# Patient Record
Sex: FEMALE | Race: WHITE | NOT HISPANIC OR LATINO | Employment: OTHER | URBAN - NONMETROPOLITAN AREA
[De-identification: names, ages, dates, MRNs, and addresses within clinical notes are randomized per-mention and may not be internally consistent; named-entity substitution may affect disease eponyms.]

---

## 2017-02-22 ENCOUNTER — HOSPITAL ENCOUNTER (EMERGENCY)
Facility: HOSPITAL | Age: 67
Discharge: HOME | End: 2017-02-22
Attending: EMERGENCY MEDICINE | Admitting: EMERGENCY MEDICINE

## 2017-02-22 VITALS
HEIGHT: 62 IN | DIASTOLIC BLOOD PRESSURE: 89 MMHG | HEART RATE: 82 BPM | SYSTOLIC BLOOD PRESSURE: 146 MMHG | OXYGEN SATURATION: 99 % | WEIGHT: 130 LBS | BODY MASS INDEX: 23.92 KG/M2 | TEMPERATURE: 98.2 F | RESPIRATION RATE: 18 BRPM

## 2017-02-22 DIAGNOSIS — H00.014 HORDEOLUM OF LEFT UPPER EYELID, UNSPECIFIED HORDEOLUM TYPE: Primary | ICD-10-CM

## 2017-02-22 PROCEDURE — 99283 EMERGENCY DEPT VISIT LOW MDM: CPT

## 2017-02-22 RX ORDER — ACETAMINOPHEN AND CODEINE PHOSPHATE 300; 30 MG/1; MG/1
1 TABLET ORAL ONCE
Status: COMPLETED | OUTPATIENT
Start: 2017-02-22 | End: 2017-02-22

## 2017-02-22 RX ORDER — ERYTHROMYCIN 5 MG/G
1 OINTMENT OPHTHALMIC ONCE
Status: COMPLETED | OUTPATIENT
Start: 2017-02-22 | End: 2017-02-22

## 2017-02-22 RX ORDER — ERYTHROMYCIN 5 MG/G
OINTMENT OPHTHALMIC
Qty: 3.5 G | Refills: 0 | Status: ON HOLD | OUTPATIENT
Start: 2017-02-22 | End: 2017-07-03

## 2017-02-22 RX ADMIN — ERYTHROMYCIN 1 APPLICATION: 5 OINTMENT OPHTHALMIC at 18:02

## 2017-02-22 RX ADMIN — ACETAMINOPHEN AND CODEINE PHOSPHATE 1 TABLET: 30; 300 TABLET ORAL at 18:00

## 2017-07-03 ENCOUNTER — APPOINTMENT (OUTPATIENT)
Dept: GENERAL RADIOLOGY | Facility: HOSPITAL | Age: 67
End: 2017-07-03

## 2017-07-03 ENCOUNTER — APPOINTMENT (OUTPATIENT)
Dept: CT IMAGING | Facility: HOSPITAL | Age: 67
End: 2017-07-03

## 2017-07-03 ENCOUNTER — HOSPITAL ENCOUNTER (INPATIENT)
Facility: HOSPITAL | Age: 67
LOS: 8 days | Discharge: HOME HEALTH | End: 2017-07-11
Attending: EMERGENCY MEDICINE | Admitting: INTERNAL MEDICINE

## 2017-07-03 ENCOUNTER — APPOINTMENT (OUTPATIENT)
Dept: ULTRASOUND IMAGING | Facility: HOSPITAL | Age: 67
End: 2017-07-03

## 2017-07-03 DIAGNOSIS — N18.9 ACUTE ON CHRONIC RENAL FAILURE (HCC): ICD-10-CM

## 2017-07-03 DIAGNOSIS — R19.5 HEME + STOOL: Primary | ICD-10-CM

## 2017-07-03 DIAGNOSIS — R71.0 DECREASED HEMOGLOBIN: ICD-10-CM

## 2017-07-03 DIAGNOSIS — R10.13 EPIGASTRIC PAIN: ICD-10-CM

## 2017-07-03 DIAGNOSIS — J18.9 PNEUMONIA OF LEFT LOWER LOBE DUE TO INFECTIOUS ORGANISM: ICD-10-CM

## 2017-07-03 DIAGNOSIS — N17.9 ACUTE ON CHRONIC RENAL FAILURE (HCC): ICD-10-CM

## 2017-07-03 DIAGNOSIS — R11.2 NAUSEA AND VOMITING, INTRACTABILITY OF VOMITING NOT SPECIFIED, UNSPECIFIED VOMITING TYPE: ICD-10-CM

## 2017-07-03 DIAGNOSIS — R19.7 DIARRHEA, UNSPECIFIED TYPE: ICD-10-CM

## 2017-07-03 DIAGNOSIS — E86.0 DEHYDRATION: ICD-10-CM

## 2017-07-03 LAB
6-ACETYL MORPHINE: NEGATIVE
A-A DO2: 49.6 MMHG (ref 0–300)
ALBUMIN SERPL-MCNC: 3.5 G/DL (ref 3.4–4.8)
ALBUMIN/GLOB SERPL: 1.3 G/DL (ref 1.5–2.5)
ALP SERPL-CCNC: 94 U/L (ref 35–104)
ALT SERPL W P-5'-P-CCNC: 11 U/L (ref 10–36)
AMPHET+METHAMPHET UR QL: NEGATIVE
AMYLASE SERPL-CCNC: 56 U/L (ref 28–100)
ANION GAP SERPL CALCULATED.3IONS-SCNC: 7.5 MMOL/L (ref 3.6–11.2)
ARTERIAL PATENCY WRIST A: ABNORMAL
ARTERIAL PATENCY WRIST A: ABNORMAL
AST SERPL-CCNC: 14 U/L (ref 10–30)
ATMOSPHERIC PRESS: 729 MMHG
BACTERIA UR QL AUTO: ABNORMAL /HPF
BARBITURATES UR QL SCN: NEGATIVE
BASE EXCESS BLDA CALC-SCNC: -10.5 MMOL/L
BASE EXCESS BLDA CALC-SCNC: -9.6 MMOL/L
BASOPHILS # BLD AUTO: 0.05 10*3/MM3 (ref 0–0.3)
BASOPHILS NFR BLD AUTO: 0.6 % (ref 0–2)
BDY SITE: ABNORMAL
BDY SITE: ABNORMAL
BENZODIAZ UR QL SCN: NEGATIVE
BILIRUB SERPL-MCNC: 0.1 MG/DL (ref 0.2–1.8)
BILIRUB UR QL STRIP: NEGATIVE
BNP SERPL-MCNC: 500 PG/ML (ref 0–100)
BODY TEMPERATURE: 98.6 C
BUN BLD-MCNC: 46 MG/DL (ref 7–21)
BUN/CREAT SERPL: 13.2 (ref 7–25)
BUPRENORPHINE SERPL-MCNC: NEGATIVE NG/ML
CALCIUM SPEC-SCNC: 7.5 MG/DL (ref 7.7–10)
CANNABINOIDS SERPL QL: NEGATIVE
CHLORIDE SERPL-SCNC: 115 MMOL/L (ref 99–112)
CK MB SERPL-CCNC: 6.6 NG/ML (ref 0–5)
CK MB SERPL-CCNC: 7.19 NG/ML (ref 0–5)
CK MB SERPL-RTO: 6.1 % (ref 0–3)
CK MB SERPL-RTO: 6.3 % (ref 0–3)
CK SERPL-CCNC: 108 U/L (ref 24–173)
CK SERPL-CCNC: 115 U/L (ref 24–173)
CLARITY UR: ABNORMAL
CO2 SERPL-SCNC: 17.5 MMOL/L (ref 24.3–31.9)
COCAINE UR QL: NEGATIVE
COHGB MFR BLD: 1.3 % (ref 0–5)
COHGB MFR BLD: 4.6 % (ref 0–5)
COLOR UR: YELLOW
CREAT BLD-MCNC: 3.49 MG/DL (ref 0.43–1.29)
CREAT UR-MCNC: 25.3 MG/DL
CRP SERPL-MCNC: <0.5 MG/DL (ref 0–0.99)
D-LACTATE SERPL-SCNC: 0.5 MMOL/L (ref 0.5–2)
DEPRECATED RDW RBC AUTO: 55.2 FL (ref 37–54)
EOSINOPHIL # BLD AUTO: 0.17 10*3/MM3 (ref 0–0.7)
EOSINOPHIL NFR BLD AUTO: 2 % (ref 0–7)
ERYTHROCYTE [DISTWIDTH] IN BLOOD BY AUTOMATED COUNT: 17.4 % (ref 11.5–14.5)
GFR SERPL CREATININE-BSD FRML MDRD: 13 ML/MIN/1.73
GLOBULIN UR ELPH-MCNC: 2.8 GM/DL
GLUCOSE BLD-MCNC: 151 MG/DL (ref 70–110)
GLUCOSE BLDC GLUCOMTR-MCNC: 132 MG/DL (ref 70–130)
GLUCOSE BLDC GLUCOMTR-MCNC: 135 MG/DL (ref 70–130)
GLUCOSE BLDC GLUCOMTR-MCNC: 174 MG/DL (ref 70–130)
GLUCOSE UR STRIP-MCNC: ABNORMAL MG/DL
HBA1C MFR BLD: 6.1 % (ref 4.5–5.7)
HCO3 BLDA-SCNC: 15 MMOL/L (ref 22–26)
HCO3 BLDA-SCNC: 16 MMOL/L (ref 22–26)
HCT VFR BLD AUTO: 34.7 % (ref 37–47)
HCT VFR BLD CALC: 30 % (ref 37–47)
HCT VFR BLD CALC: 31 % (ref 37–47)
HGB BLD-MCNC: 10.9 G/DL (ref 12–16)
HGB BLDA-MCNC: 10.3 G/DL (ref 12–16)
HGB BLDA-MCNC: 10.7 G/DL (ref 12–16)
HGB UR QL STRIP.AUTO: ABNORMAL
HOROWITZ INDEX BLD+IHG-RTO: 21 %
HYALINE CASTS UR QL AUTO: ABNORMAL /LPF
IMM GRANULOCYTES # BLD: 0.02 10*3/MM3 (ref 0–0.03)
IMM GRANULOCYTES NFR BLD: 0.2 % (ref 0–0.5)
KETONES UR QL STRIP: NEGATIVE
L PNEUMO1 AG UR QL IA: NEGATIVE
LEUKOCYTE ESTERASE UR QL STRIP.AUTO: NEGATIVE
LIPASE SERPL-CCNC: 36 U/L (ref 13–60)
LYMPHOCYTES # BLD AUTO: 1.24 10*3/MM3 (ref 1–3)
LYMPHOCYTES NFR BLD AUTO: 14.3 % (ref 16–46)
MAGNESIUM SERPL-MCNC: 1.9 MG/DL (ref 1.7–2.6)
MCH RBC QN AUTO: 27 PG (ref 27–33)
MCHC RBC AUTO-ENTMCNC: 31.4 G/DL (ref 33–37)
MCV RBC AUTO: 85.9 FL (ref 80–94)
MDMA UR QL SCN: NEGATIVE
METHADONE UR QL SCN: NEGATIVE
METHGB BLD QL: 0.3 % (ref 0–3)
METHGB BLD QL: 0.3 % (ref 0–3)
MODALITY: ABNORMAL
MODALITY: ABNORMAL
MONOCYTES # BLD AUTO: 0.44 10*3/MM3 (ref 0.1–0.9)
MONOCYTES NFR BLD AUTO: 5.1 % (ref 0–12)
MYOGLOBIN SERPL-MCNC: 128 NG/ML (ref 0–109)
MYOGLOBIN SERPL-MCNC: 138 NG/ML (ref 0–109)
NEUTROPHILS # BLD AUTO: 6.75 10*3/MM3 (ref 1.4–6.5)
NEUTROPHILS NFR BLD AUTO: 77.8 % (ref 40–75)
NITRITE UR QL STRIP: NEGATIVE
OPIATES UR QL: NEGATIVE
OSMOLALITY SERPL CALC.SUM OF ELEC: 294.2 MOSM/KG (ref 273–305)
OXYCODONE UR QL SCN: NEGATIVE
OXYHGB MFR BLDV: 82 % (ref 85–100)
OXYHGB MFR BLDV: 85.4 % (ref 85–100)
PCO2 BLDA: 32.2 MM HG (ref 35–45)
PCO2 BLDA: 34.3 MM HG (ref 35–45)
PCP UR QL SCN: NEGATIVE
PH BLDA: 7.29 PH UNITS (ref 7.35–7.45)
PH BLDA: 7.29 PH UNITS (ref 7.35–7.45)
PH UR STRIP.AUTO: 5.5 [PH] (ref 5–8)
PLATELET # BLD AUTO: 268 10*3/MM3 (ref 130–400)
PMV BLD AUTO: 10.7 FL (ref 6–10)
PO2 BLDA: 50.7 MM HG (ref 80–100)
PO2 BLDA: 52.5 MM HG (ref 80–100)
POTASSIUM BLD-SCNC: 3.8 MMOL/L (ref 3.5–5.3)
PROT SERPL-MCNC: 6.3 G/DL (ref 6–8)
PROT UR QL STRIP: ABNORMAL
RBC # BLD AUTO: 4.04 10*6/MM3 (ref 4.2–5.4)
RBC # UR: ABNORMAL /HPF
REF LAB TEST METHOD: ABNORMAL
SAO2 % BLDCOA: 86.2 % (ref 90–100)
SAO2 % BLDCOA: 86.8 % (ref 90–100)
SODIUM BLD-SCNC: 140 MMOL/L (ref 135–153)
SODIUM UR-SCNC: 119 MMOL/L
SP GR UR STRIP: 1.02 (ref 1–1.03)
SQUAMOUS #/AREA URNS HPF: ABNORMAL /HPF
TROPONIN I SERPL-MCNC: 0.02 NG/ML
TROPONIN I SERPL-MCNC: <0.006 NG/ML
TSH SERPL DL<=0.05 MIU/L-ACNC: 0.86 MIU/ML (ref 0.55–4.78)
UROBILINOGEN UR QL STRIP: ABNORMAL
WBC NRBC COR # BLD: 8.67 10*3/MM3 (ref 4.5–12.5)
WBC UR QL AUTO: ABNORMAL /HPF

## 2017-07-03 PROCEDURE — 83735 ASSAY OF MAGNESIUM: CPT | Performed by: PHYSICIAN ASSISTANT

## 2017-07-03 PROCEDURE — 93005 ELECTROCARDIOGRAM TRACING: CPT | Performed by: PHYSICIAN ASSISTANT

## 2017-07-03 PROCEDURE — 80307 DRUG TEST PRSMV CHEM ANLYZR: CPT | Performed by: INTERNAL MEDICINE

## 2017-07-03 PROCEDURE — 74176 CT ABD & PELVIS W/O CONTRAST: CPT

## 2017-07-03 PROCEDURE — 82805 BLOOD GASES W/O2 SATURATION: CPT | Performed by: PHYSICIAN ASSISTANT

## 2017-07-03 PROCEDURE — 74022 RADEX COMPL AQT ABD SERIES: CPT | Performed by: RADIOLOGY

## 2017-07-03 PROCEDURE — 74022 RADEX COMPL AQT ABD SERIES: CPT

## 2017-07-03 PROCEDURE — 94799 UNLISTED PULMONARY SVC/PX: CPT

## 2017-07-03 PROCEDURE — 87040 BLOOD CULTURE FOR BACTERIA: CPT | Performed by: PHYSICIAN ASSISTANT

## 2017-07-03 PROCEDURE — 83874 ASSAY OF MYOGLOBIN: CPT | Performed by: INTERNAL MEDICINE

## 2017-07-03 PROCEDURE — 85025 COMPLETE CBC W/AUTO DIFF WBC: CPT | Performed by: PHYSICIAN ASSISTANT

## 2017-07-03 PROCEDURE — 74176 CT ABD & PELVIS W/O CONTRAST: CPT | Performed by: RADIOLOGY

## 2017-07-03 PROCEDURE — 94640 AIRWAY INHALATION TREATMENT: CPT

## 2017-07-03 PROCEDURE — 25010000002 HEPARIN (PORCINE) PER 1000 UNITS: Performed by: INTERNAL MEDICINE

## 2017-07-03 PROCEDURE — 93010 ELECTROCARDIOGRAM REPORT: CPT | Performed by: INTERNAL MEDICINE

## 2017-07-03 PROCEDURE — 83880 ASSAY OF NATRIURETIC PEPTIDE: CPT | Performed by: PHYSICIAN ASSISTANT

## 2017-07-03 PROCEDURE — 84484 ASSAY OF TROPONIN QUANT: CPT | Performed by: INTERNAL MEDICINE

## 2017-07-03 PROCEDURE — 82570 ASSAY OF URINE CREATININE: CPT | Performed by: INTERNAL MEDICINE

## 2017-07-03 PROCEDURE — 83050 HGB METHEMOGLOBIN QUAN: CPT | Performed by: INTERNAL MEDICINE

## 2017-07-03 PROCEDURE — 36600 WITHDRAWAL OF ARTERIAL BLOOD: CPT | Performed by: INTERNAL MEDICINE

## 2017-07-03 PROCEDURE — 81001 URINALYSIS AUTO W/SCOPE: CPT | Performed by: PHYSICIAN ASSISTANT

## 2017-07-03 PROCEDURE — 82962 GLUCOSE BLOOD TEST: CPT

## 2017-07-03 PROCEDURE — 63710000001 INSULIN ASPART PER 5 UNITS: Performed by: PHYSICIAN ASSISTANT

## 2017-07-03 PROCEDURE — 99284 EMERGENCY DEPT VISIT MOD MDM: CPT

## 2017-07-03 PROCEDURE — 25010000002 ONDANSETRON PER 1 MG: Performed by: PHYSICIAN ASSISTANT

## 2017-07-03 PROCEDURE — 25010000002 PIPERACILLIN-TAZOBACTAM: Performed by: PHYSICIAN ASSISTANT

## 2017-07-03 PROCEDURE — 80053 COMPREHEN METABOLIC PANEL: CPT | Performed by: PHYSICIAN ASSISTANT

## 2017-07-03 PROCEDURE — 25010000002 CEFTRIAXONE: Performed by: PHYSICIAN ASSISTANT

## 2017-07-03 PROCEDURE — 87205 SMEAR GRAM STAIN: CPT | Performed by: INTERNAL MEDICINE

## 2017-07-03 PROCEDURE — 82550 ASSAY OF CK (CPK): CPT | Performed by: INTERNAL MEDICINE

## 2017-07-03 PROCEDURE — 84300 ASSAY OF URINE SODIUM: CPT | Performed by: INTERNAL MEDICINE

## 2017-07-03 PROCEDURE — 99223 1ST HOSP IP/OBS HIGH 75: CPT | Performed by: INTERNAL MEDICINE

## 2017-07-03 PROCEDURE — 83690 ASSAY OF LIPASE: CPT | Performed by: PHYSICIAN ASSISTANT

## 2017-07-03 PROCEDURE — 82375 ASSAY CARBOXYHB QUANT: CPT | Performed by: PHYSICIAN ASSISTANT

## 2017-07-03 PROCEDURE — 83605 ASSAY OF LACTIC ACID: CPT | Performed by: PHYSICIAN ASSISTANT

## 2017-07-03 PROCEDURE — 83050 HGB METHEMOGLOBIN QUAN: CPT | Performed by: PHYSICIAN ASSISTANT

## 2017-07-03 PROCEDURE — 84443 ASSAY THYROID STIM HORMONE: CPT | Performed by: PHYSICIAN ASSISTANT

## 2017-07-03 PROCEDURE — 82375 ASSAY CARBOXYHB QUANT: CPT | Performed by: INTERNAL MEDICINE

## 2017-07-03 PROCEDURE — 82553 CREATINE MB FRACTION: CPT | Performed by: INTERNAL MEDICINE

## 2017-07-03 PROCEDURE — 87449 NOS EACH ORGANISM AG IA: CPT | Performed by: PHYSICIAN ASSISTANT

## 2017-07-03 PROCEDURE — 82150 ASSAY OF AMYLASE: CPT | Performed by: PHYSICIAN ASSISTANT

## 2017-07-03 PROCEDURE — 82805 BLOOD GASES W/O2 SATURATION: CPT | Performed by: INTERNAL MEDICINE

## 2017-07-03 PROCEDURE — 76775 US EXAM ABDO BACK WALL LIM: CPT | Performed by: RADIOLOGY

## 2017-07-03 PROCEDURE — 86140 C-REACTIVE PROTEIN: CPT | Performed by: PHYSICIAN ASSISTANT

## 2017-07-03 PROCEDURE — 76775 US EXAM ABDO BACK WALL LIM: CPT

## 2017-07-03 PROCEDURE — 36600 WITHDRAWAL OF ARTERIAL BLOOD: CPT | Performed by: PHYSICIAN ASSISTANT

## 2017-07-03 PROCEDURE — 83036 HEMOGLOBIN GLYCOSYLATED A1C: CPT | Performed by: INTERNAL MEDICINE

## 2017-07-03 RX ORDER — ALBUTEROL SULFATE 2.5 MG/3ML
2.5 SOLUTION RESPIRATORY (INHALATION)
Status: CANCELLED | OUTPATIENT
Start: 2017-07-03

## 2017-07-03 RX ORDER — ASPIRIN 81 MG/1
81 TABLET ORAL DAILY
Status: CANCELLED | OUTPATIENT
Start: 2017-07-03

## 2017-07-03 RX ORDER — GABAPENTIN 100 MG/1
200 CAPSULE ORAL 3 TIMES DAILY
Status: DISCONTINUED | OUTPATIENT
Start: 2017-07-03 | End: 2017-07-04

## 2017-07-03 RX ORDER — HYDRALAZINE HYDROCHLORIDE 25 MG/1
25 TABLET, FILM COATED ORAL 2 TIMES DAILY WITH MEALS
Status: CANCELLED | OUTPATIENT
Start: 2017-07-03

## 2017-07-03 RX ORDER — SODIUM CHLORIDE 0.9 % (FLUSH) 0.9 %
10 SYRINGE (ML) INJECTION AS NEEDED
Status: DISCONTINUED | OUTPATIENT
Start: 2017-07-03 | End: 2017-07-11 | Stop reason: HOSPADM

## 2017-07-03 RX ORDER — GABAPENTIN 300 MG/1
300 CAPSULE ORAL 3 TIMES DAILY
Status: ON HOLD | COMMUNITY
End: 2017-08-09

## 2017-07-03 RX ORDER — ONDANSETRON 4 MG/1
4 TABLET, ORALLY DISINTEGRATING ORAL EVERY 4 HOURS PRN
COMMUNITY
End: 2017-08-09 | Stop reason: HOSPADM

## 2017-07-03 RX ORDER — DEXTROSE MONOHYDRATE 25 G/50ML
25 INJECTION, SOLUTION INTRAVENOUS
Status: DISCONTINUED | OUTPATIENT
Start: 2017-07-03 | End: 2017-07-11 | Stop reason: HOSPADM

## 2017-07-03 RX ORDER — CLONAZEPAM 0.5 MG/1
0.5 TABLET ORAL 2 TIMES DAILY
Status: CANCELLED | OUTPATIENT
Start: 2017-07-03

## 2017-07-03 RX ORDER — AMLODIPINE BESYLATE 5 MG/1
5 TABLET ORAL NIGHTLY
Status: CANCELLED | OUTPATIENT
Start: 2017-07-03

## 2017-07-03 RX ORDER — GABAPENTIN 300 MG/1
300 CAPSULE ORAL 3 TIMES DAILY
Status: CANCELLED | OUTPATIENT
Start: 2017-07-03

## 2017-07-03 RX ORDER — GABAPENTIN 300 MG/1
300 CAPSULE ORAL EVERY 8 HOURS SCHEDULED
Status: CANCELLED | OUTPATIENT
Start: 2017-07-03

## 2017-07-03 RX ORDER — LISINOPRIL 40 MG/1
40 TABLET ORAL DAILY
COMMUNITY
End: 2017-07-11 | Stop reason: HOSPADM

## 2017-07-03 RX ORDER — IPRATROPIUM BROMIDE AND ALBUTEROL SULFATE 2.5; .5 MG/3ML; MG/3ML
3 SOLUTION RESPIRATORY (INHALATION) EVERY 6 HOURS PRN
Status: DISCONTINUED | OUTPATIENT
Start: 2017-07-03 | End: 2017-07-11 | Stop reason: HOSPADM

## 2017-07-03 RX ORDER — BUDESONIDE AND FORMOTEROL FUMARATE DIHYDRATE 80; 4.5 UG/1; UG/1
2 AEROSOL RESPIRATORY (INHALATION)
Status: CANCELLED | OUTPATIENT
Start: 2017-07-03

## 2017-07-03 RX ORDER — LISINOPRIL 10 MG/1
40 TABLET ORAL DAILY
Status: CANCELLED | OUTPATIENT
Start: 2017-07-03

## 2017-07-03 RX ORDER — NICOTINE POLACRILEX 4 MG
15 LOZENGE BUCCAL
Status: DISCONTINUED | OUTPATIENT
Start: 2017-07-03 | End: 2017-07-11 | Stop reason: HOSPADM

## 2017-07-03 RX ORDER — AMLODIPINE BESYLATE 5 MG/1
5 TABLET ORAL NIGHTLY
COMMUNITY
End: 2017-07-11 | Stop reason: HOSPADM

## 2017-07-03 RX ORDER — HYDRALAZINE HYDROCHLORIDE 25 MG/1
25 TABLET, FILM COATED ORAL 2 TIMES DAILY WITH MEALS
COMMUNITY
End: 2017-08-09 | Stop reason: HOSPADM

## 2017-07-03 RX ORDER — HYDRALAZINE HYDROCHLORIDE 10 MG/1
10 TABLET, FILM COATED ORAL 3 TIMES DAILY
Status: ON HOLD | COMMUNITY
End: 2017-07-03

## 2017-07-03 RX ORDER — ONDANSETRON 2 MG/ML
4 INJECTION INTRAMUSCULAR; INTRAVENOUS ONCE
Status: COMPLETED | OUTPATIENT
Start: 2017-07-03 | End: 2017-07-03

## 2017-07-03 RX ORDER — ONDANSETRON 4 MG/1
4 TABLET, ORALLY DISINTEGRATING ORAL EVERY 4 HOURS PRN
Status: CANCELLED | OUTPATIENT
Start: 2017-07-03

## 2017-07-03 RX ORDER — HYDRALAZINE HYDROCHLORIDE 25 MG/1
25 TABLET, FILM COATED ORAL EVERY 12 HOURS SCHEDULED
Status: DISCONTINUED | OUTPATIENT
Start: 2017-07-03 | End: 2017-07-04

## 2017-07-03 RX ORDER — CLONAZEPAM 0.5 MG/1
0.25 TABLET ORAL 2 TIMES DAILY PRN
Status: DISCONTINUED | OUTPATIENT
Start: 2017-07-03 | End: 2017-07-05

## 2017-07-03 RX ORDER — ALBUTEROL SULFATE 2.5 MG/3ML
2.5 SOLUTION RESPIRATORY (INHALATION) EVERY 6 HOURS PRN
Status: DISCONTINUED | OUTPATIENT
Start: 2017-07-03 | End: 2017-07-11 | Stop reason: HOSPADM

## 2017-07-03 RX ORDER — BUDESONIDE AND FORMOTEROL FUMARATE DIHYDRATE 80; 4.5 UG/1; UG/1
2 AEROSOL RESPIRATORY (INHALATION)
COMMUNITY

## 2017-07-03 RX ORDER — HEPARIN SODIUM 5000 [USP'U]/ML
5000 INJECTION, SOLUTION INTRAVENOUS; SUBCUTANEOUS EVERY 12 HOURS SCHEDULED
Status: DISCONTINUED | OUTPATIENT
Start: 2017-07-03 | End: 2017-07-11 | Stop reason: HOSPADM

## 2017-07-03 RX ORDER — SODIUM CHLORIDE 9 MG/ML
75 INJECTION, SOLUTION INTRAVENOUS CONTINUOUS
Status: DISCONTINUED | OUTPATIENT
Start: 2017-07-03 | End: 2017-07-07

## 2017-07-03 RX ORDER — ASPIRIN 81 MG/1
81 TABLET ORAL DAILY
Status: DISCONTINUED | OUTPATIENT
Start: 2017-07-03 | End: 2017-07-11 | Stop reason: HOSPADM

## 2017-07-03 RX ORDER — BUDESONIDE AND FORMOTEROL FUMARATE DIHYDRATE 80; 4.5 UG/1; UG/1
2 AEROSOL RESPIRATORY (INHALATION)
Status: DISCONTINUED | OUTPATIENT
Start: 2017-07-03 | End: 2017-07-11 | Stop reason: HOSPADM

## 2017-07-03 RX ORDER — SODIUM CHLORIDE 0.9 % (FLUSH) 0.9 %
1-10 SYRINGE (ML) INJECTION AS NEEDED
Status: DISCONTINUED | OUTPATIENT
Start: 2017-07-03 | End: 2017-07-11 | Stop reason: HOSPADM

## 2017-07-03 RX ORDER — AMLODIPINE BESYLATE 5 MG/1
5 TABLET ORAL NIGHTLY
Status: DISCONTINUED | OUTPATIENT
Start: 2017-07-03 | End: 2017-07-04

## 2017-07-03 RX ADMIN — GABAPENTIN 200 MG: 100 CAPSULE ORAL at 20:25

## 2017-07-03 RX ADMIN — INSULIN ASPART 2 UNITS: 100 INJECTION, SOLUTION INTRAVENOUS; SUBCUTANEOUS at 17:42

## 2017-07-03 RX ADMIN — HYDRALAZINE HYDROCHLORIDE 25 MG: 25 TABLET ORAL at 20:25

## 2017-07-03 RX ADMIN — SODIUM CHLORIDE 125 ML/HR: 9 INJECTION, SOLUTION INTRAVENOUS at 13:25

## 2017-07-03 RX ADMIN — ONDANSETRON 4 MG: 2 INJECTION, SOLUTION INTRAMUSCULAR; INTRAVENOUS at 11:23

## 2017-07-03 RX ADMIN — SODIUM CHLORIDE 500 ML: 9 INJECTION, SOLUTION INTRAVENOUS at 12:08

## 2017-07-03 RX ADMIN — GABAPENTIN 200 MG: 100 CAPSULE ORAL at 17:42

## 2017-07-03 RX ADMIN — AMLODIPINE BESYLATE 5 MG: 5 TABLET ORAL at 20:25

## 2017-07-03 RX ADMIN — HEPARIN SODIUM 5000 UNITS: 5000 INJECTION, SOLUTION INTRAVENOUS; SUBCUTANEOUS at 20:24

## 2017-07-03 RX ADMIN — DOXYCYCLINE 100 MG: 100 INJECTION, POWDER, LYOPHILIZED, FOR SOLUTION INTRAVENOUS at 12:44

## 2017-07-03 RX ADMIN — BUDESONIDE AND FORMOTEROL FUMARATE DIHYDRATE 2 PUFF: 80; 4.5 AEROSOL RESPIRATORY (INHALATION) at 18:12

## 2017-07-03 RX ADMIN — CLONAZEPAM 0.25 MG: 0.5 TABLET ORAL at 20:25

## 2017-07-03 RX ADMIN — CEFTRIAXONE 1 G: 1 INJECTION, POWDER, FOR SOLUTION INTRAMUSCULAR; INTRAVENOUS at 12:05

## 2017-07-03 RX ADMIN — PIPERACILLIN AND TAZOBACTAM 2.25 G: 2; .25 INJECTION, POWDER, LYOPHILIZED, FOR SOLUTION INTRAVENOUS; PARENTERAL at 18:17

## 2017-07-03 RX ADMIN — SODIUM CHLORIDE 500 ML: 9 INJECTION, SOLUTION INTRAVENOUS at 11:21

## 2017-07-03 RX ADMIN — ASPIRIN 81 MG: 81 TABLET ORAL at 17:42

## 2017-07-03 NOTE — H&P
[facility] HOSPITALIST HISTORY AND PHYSICAL    Patient Identification:  Name:  Sara Cardona  Age:  66 y.o.  Sex:  female  :  1950  MRN:  [id number]   Visit Number:  [id number]  Primary Care Physician:  Marcelino Sheehan MD     Chief complaint:   Chief Complaint   Patient presents with   • Diarrhea, nausea, vomiting, illness     History of presenting illness:   Patient is a 66 y.o. female with past medical history significant for arteriosclerotic cardiovascular disease, aortic stenosis, COPD, insulin dependent type II diabetes mellitus, essential hypertension, and anxiety that presented to the [facility] emergency department for evaluation of nausea, vomiting, diarrhea, and generalized illness. Patient states that on 2017 she developed acute onset of nausea, vomiting, and diarrhea. She also reports mid abdominal pain and cramping. Patient states that her symptoms were so severe that she went to her primary care the next day after onset. It was recommended that the patient be admitted to the hospital for IV fluids. Patient states that she wanted to try to get well at home instead. Patient reports that she had had no diarrhea or further vomiting since 2017, but has continued to worsen and feel ill. She states that her abdominal pain is still present but much improved, she also states she no longer has abdominal cramping. She denies any change in her appetite, she states she has actually been tolerating PO intake as she normally does. It is noted that she is eating fast food meal during my evaluation. She is still complaining of waxing and waning severe nausea and GI upset.  Patient denies any chest pain or shortness of breath. She denies any cough or sputum. However, she does report increased phlegm production since time of onset. She denies any urinary symptoms. She denies use of home oxygen.    Patient states that she checks her blood glucose regularly and states that  her average is 107-135. She states that she takes oral medications and is prescribed insulin. She states that she has not had to have an insulin injection in approximately one month. Patient states that she does not regularly check her blood pressure at home, but states that her nephrologist recently took her off of lisinopril. However, patient states that she did not follow physician's instructions and continued to take lisinopril daily as she was afraid to stop taking it all of a sudden. She states she was under the impression the lisinopril was to be discontinued for further kidney studies that she is unsure of.     Upon arrival to the ED, vitals were temperature 98.3, HR 82, RR 16-20, and //86, and oxygen 98 % on room air. CMP with glucose 151, chloride 115, creatinine 3.49, BUN 46, calcium 7.5, and eGFR 13. Amylase and lipase both WNL. CBC with hemoglobin 10.9 and hematocrit 34.7. Lactate WNL. Initial ABG with pH 7.287, pCO2 32.2, pO2 50.7, HCO3 15.0, and oxygen saturation of 86.2% on room air. Urinalysis with cloudy appearance, 1+ glucose, and 3+ proteinuria. While in the ED patient was started on rocephin and doxycycline. She was also given two 500 ml bolus of normal saline and started on continuous infusion.     Patient has been admitted to the medical telemetry floor for further evaluation and treatment.   ---------------------------------------------------------------------------------------------------------------------   Review of Systems   Constitutional: Negative for appetite change, chills, diaphoresis, fatigue and fever.   HENT: Negative for congestion, postnasal drip, sinus pressure, sneezing and sore throat.    Eyes: Negative for pain and visual disturbance.   Respiratory: Negative for cough, shortness of breath and wheezing.    Cardiovascular: Negative for chest pain, palpitations and leg swelling.   Gastrointestinal: Positive for abdominal pain, diarrhea, nausea and vomiting.    Endocrine: Negative for cold intolerance.   Genitourinary: Negative for dysuria, enuresis, flank pain, frequency, hematuria and urgency.   Musculoskeletal: Negative for arthralgias, back pain and myalgias.   Skin: Negative for rash and wound.   Allergic/Immunologic: Negative for environmental allergies and immunocompromised state.   Neurological: Negative for dizziness, syncope and weakness.   Hematological: Negative for adenopathy. Does not bruise/bleed easily.   Psychiatric/Behavioral: Negative for confusion and decreased concentration.      ---------------------------------------------------------------------------------------------------------------------   Past Medical History:   Diagnosis Date   • Anxiety    • Aortic stenosis    • ASCVD (arteriosclerotic cardiovascular disease)    • Breast cancer    • Chronic pain    • COPD (chronic obstructive pulmonary disease)    • Diabetic neuropathy    • Dyslipidemia    • Essential hypertension    • Insulin dependent diabetes mellitus    • Left carotid bruit    • Recurrent pneumonia    • Renal failure      Past Surgical History:   Procedure Laterality Date   • APPENDECTOMY     • CARDIAC CATHETERIZATION     • COLONOSCOPY     • HYSTERECTOMY     • MASTECTOMY Right    • RHINOPLASTY       Family History   Problem Relation Age of Onset   • Diabetes Mother    • Lung cancer Father      Social History     Social History   • Marital status:      Spouse name: N/A   • Number of children: N/A   • Years of education: N/A     Social History Main Topics   • Smoking status: Current Every Day Smoker     Packs/day: 1.00     Types: Cigarettes   • Smokeless tobacco: None   • Alcohol use No   • Drug use: No   • Sexual activity: Defer     Other Topics Concern   • None     Social History Narrative   • None     ---------------------------------------------------------------------------------------------------------------------   Allergies:  Review of patient's allergies indicates no  known allergies.  ---------------------------------------------------------------------------------------------------------------------   Prior to Admission Medications     Prescriptions Last Dose Informant Patient Reported? Taking?    acetaminophen (TYLENOL) 500 MG tablet   Yes No    Take 500 mg by mouth as needed for mild pain (1-3)    albuterol (PROVENTIL HFA;VENTOLIN HFA) 108 (90 BASE) MCG/ACT inhaler 7/3/2017  No Yes    Inhale 2 puffs every 6 (six) hours as needed for wheezing.    amLODIPine (NORVASC) 10 MG tablet 7/3/2017  No Yes    Take 1 tablet by mouth daily.    aspirin 81 MG tablet 7/3/2017  Yes Yes    Take 81 mg by mouth daily    atorvastatin (LIPITOR) 80 MG tablet 7/3/2017  Yes Yes    Take 80 mg by mouth daily    clonazePAM (KlonoPIN) 0.5 MG tablet 7/3/2017  Yes Yes    Take 0.5 mg by mouth 2 (two) times a day as needed for anxiety or seizures.    cloNIDine (CATAPRES) 0.1 MG tablet 7/3/2017  No Yes    Take 1 tablet by mouth every 12 (twelve) hours.    erythromycin (ROMYCIN) 5 MG/GM ophthalmic ointment   No No    Administer  to the right eye Every 4 (Four) Hours While Awake.    hydrALAZINE (APRESOLINE) 10 MG tablet   Yes Yes    Take 10 mg by mouth 3 (Three) Times a Day.    insulin glargine (LANTUS) 100 UNIT/ML injection 7/3/2017  Yes Yes    Inject 35 Units under the skin every night    isosorbide mononitrate (IMDUR) 30 MG 24 hr tablet   Yes No    Take 30 mg by mouth daily    lisinopril (PRINIVIL,ZESTRIL) 40 MG tablet   Yes Yes    Take 40 mg by mouth Daily.    metaxalone (SKELAXIN) 800 MG tablet   No No    Take 1 tablet by mouth 3 (three) times a day as needed for muscle spasms.    metoprolol tartrate (LOPRESSOR) 25 MG tablet   No No    Take 2 tablets by mouth 2 (two) times a day.    nabumetone (RELAFEN) 750 MG tablet   No No    Take 1 tablet by mouth 2 (two) times a day as needed for moderate pain (4-6).        Hospital Scheduled Meds:    [START ON 7/4/2017] doxycycline 100 mg Intravenous Q12H   heparin  (porcine) 5,000 Units Subcutaneous Q12H       Pharmacy to Dose Zosyn     sodium chloride 125 mL/hr Last Rate: 125 mL/hr (07/03/17 1325)     ---------------------------------------------------------------------------------------------------------------------   Vital Signs:  Temp:  [98.3 °F (36.8 °C)-98.5 °F (36.9 °C)] 98.3 °F (36.8 °C)  Heart Rate:  [82-89] 88  Resp:  [16-20] 18  BP: (117-196)/() 182/84  Last 3 weights    07/03/17  0950   Weight: 115 lb (52.2 kg)     Body mass index is 21.73 kg/(m^2).  SpO2 Readings from Last 3 Encounters:   07/03/17 97%   02/22/17 99%   08/15/16 94%     ---------------------------------------------------------------------------------------------------------------------   Physical Exam:  Constitutional:  Well-developed and well-nourished.  No respiratory distress.  Nasal cannula in place.     HENT:  Head: Normocephalic and atraumatic.  Mouth:  Moist mucous membranes.    Eyes:  Conjunctivae and EOM are normal.  Pupils are equal, round, and reactive to light.  No scleral icterus.  Neck:  Neck supple.  No JVD present.    Cardiovascular:  Normal rate, regular rhythm and normal heart sounds with no murmur.  Pulmonary/Chest:  No respiratory distress, no wheezes, no crackles, with normal breath sounds and good air movement.  Abdominal:  Soft.  Bowel sounds are normal.  No distension. Mild generalized tenderness to palpation.   Musculoskeletal:  No edema, no tenderness, and no deformity.  No red or swollen joints anywhere.    Neurological:  Alert and oriented to person, place, and time.  No cranial nerve deficit.  No tongue deviation.  No facial droop.  No slurred speech.   Skin:  Skin is warm and dry.  No rash noted.  No pallor.   Psychiatric:  Normal mood and affect.  Behavior is normal.  Judgment and thought content normal.   Peripheral vascular:  Trace edema bilateral lower extremities and strong pulses on all 4 extremities.  Genitourinary: No perales catheter in place, making urine.    ---------------------------------------------------------------------------------------------------------------------  EKG:    Reviewed.    Telemetry:    Patient not yet on telemetry.     I have personally reviewed the EKG/Telemetry strip  ---------------------------------------------------------------------------------------------------------------------     Results from last 7 days  Lab Units 07/03/17  1158 07/03/17  1039   LACTATE mmol/L 0.5  --    WBC 10*3/mm3  --  8.67   HEMOGLOBIN g/dL  --  10.9*   HEMATOCRIT %  --  34.7*   MCV fL  --  85.9   MCHC g/dL  --  31.4*   PLATELETS 10*3/mm3  --  268       Results from last 7 days  Lab Units 07/03/17  1216   PH, ARTERIAL pH units 7.287*   PO2 ART mm Hg 50.7*   PCO2, ARTERIAL mm Hg 32.2*   HCO3 ART mmol/L 15.0*       Results from last 7 days  Lab Units 07/03/17  1039   SODIUM mmol/L 140   POTASSIUM mmol/L 3.8   MAGNESIUM mg/dL 1.9   CHLORIDE mmol/L 115*   CO2 mmol/L 17.5*   BUN mg/dL 46*   CREATININE mg/dL 3.49*   EGFR IF NONAFRICN AM mL/min/1.73 13*   CALCIUM mg/dL 7.5*   GLUCOSE mg/dL 151*   ALBUMIN g/dL 3.50   BILIRUBIN mg/dL 0.1*   ALK PHOS U/L 94   AST (SGOT) U/L 14   ALT (SGPT) U/L 11   Estimated Creatinine Clearance: 12 mL/min (by C-G formula based on Cr of 3.49).  No results found for: AMMONIA          Lab Results   Component Value Date    HGBA1C 6.80 (H) 08/09/2016     Lab Results   Component Value Date    TSH 0.188 (L) 08/03/2015    FREET4 0.94 08/03/2015     No results found for: PREGTESTUR, PREGSERUM, HCG, HCGQUANT  Pain Management Panel     Pain Management Panel Latest Ref Rng & Units 8/9/2016 8/8/2016    CREATININE UR mg/dL 51.6 150.7    AMPHETAMINES SCREEN, URINE Negative - Negative    BARBITURATES SCREEN Negative - Negative    BENZODIAZEPINE SCREEN, URINE Negative - Negative    COCAINE SCREEN, URINE Negative - Negative    METHADONE SCREEN, URINE Negative - Negative      I have personally reviewed the above laboratory results.    ---------------------------------------------------------------------------------------------------------------------  Imaging Results (last 7 days)     Procedure Component Value Units Date/Time    XR Abdomen 2 View With Chest 1 View [[id number]] Collected:  07/03/17 1134     Updated:  07/03/17 1137    Narrative:       FINDINGS:   Cardiomegaly persists. Mild vascular congestion. No airspace edema.   Right lower lung pneumonia has resolved. Mild left basilar airspace  disease may represent pneumonia or atelectasis.  No pneumothorax.   Trace pleural effusions.   No acute osseous findings.  Mild constipation. Nonobstructive bowel gas pattern. Calcification  projects in the region of the left kidney and may represent  nonobstructing stone.    Impression:       1. Left basilar airspace disease.  2. Stable cardiomegaly with mild CHF.  3. Nonobstructive bowel gas pattern with changes of mild constipation.  4. Possible nonobstructing stone in the region of left kidney.     This report was finalized on 7/3/2017 11:35 AM by Dr. Junior Angel MD.    CT Abdomen Pelvis Without Contrast [[id number]] Collected:  07/03/17 1252     Updated:  07/03/17 1258    Narrative:       EXAM: CT ABDOMEN PELVIS WO CONTRAST-   Findings  LOWER THORAX:   Cardiomegaly. Chronic interstitial lung changes. Interstitial edema  noted. Trace right pleural effusion which is nonloculated. Right middle  lobe atelectasis and/or scarring. Dense coronary vascular  calcifications.  ABDOMEN:  Unenhanced liver is within normal limits. Gallstones are noted. The  unenhanced spleen and pancreas appear within normal limits. Large  nonobstructing stone lower pole of the left kidney stable from previous  exam and is 9 mm. Right kidney appears within normal limits. The  unenhanced adrenals are unremarkable. Moderate to large amount of stool  throughout colon to level of cecum consistent with moderate-advanced  chronic constipation. Resolution of inflammatory changes of  the right  colon since the previous exam. No intra-abdominal free fluid. No free  air. No adenopathy by CT size criteria. Dense advanced calcified  atherosclerotic changes abdominal arterial vasculature without aneurysm.  Body wall edema/anasarca is noted.  PELVIS:  Sigmoid diverticulosis noted but without evidence of acute  diverticulitis. No pelvic free fluid or adenopathy. Prior hysterectomy.  Prior appendectomy. No distal ureteral stones or urinary bladder stones.  BONES:   No acute bony abnormality.    Impression:       Impression:  1. CHF/edema involving the partially imaged lower chest with right  middle lobe airspace disease likely atelectasis.  2. Chronic constipation and changes of sigmoid diverticulosis without  evidence of diverticulitis.  3. Resolution of previously described colitis.  4. Large nonobstructing stone left kidney.  5. Cholelithiasis.     This report was finalized on 7/3/2017 12:56 PM by Dr. Junior Angel MD.      I have personally reviewed the above radiology results.   ---------------------------------------------------------------------------------------------------------------------  Assessment and Plan:  -Acute on chronic stage IV renal failure with baseline creatinine of 1.2-1.7 and creatinine on admission of 3.49  -Acute hypoxic respiratory failure   -Questionable left basilar and right middle lobe pneumonia/airspace disease with possible aspiration component due to emesis  -Acute metabolic acidosis   -Essential hypertension   -Hyperlipidemia   -History of coronary artery disease  -Insulin dependent type II diabetes mellitus with hyperglycemia   -Diabetic neuropathy   -COPD without acute exacerbation   -History of aortic stenosis   -Prolonged QTc interval 474 m/s  -Anxiety   -Chronic pain syndrome   -Tobacco smoking addiction     Patient has been admitted to the medical telemetry floor for further evaluation and treatment. Plan to continue gentle IV fluid hydration, will be careful  to avoid large fluid boluses as patient's BNP is elevated at 500 and slight edema on exam. I have ordered an echo to further evaluate.  Will hold home lisinopril. Continue to avoid nephrotoxic agents. Renal ultrasound and urine electrolytes ordered and pending. Continue supplemental oxygen to titrate SpO2 > 90%. Repeat ABG has been ordered to further evaluate acidosis. PRN nebulizer treatments made available for shortness of breath. Due to radiology findings suggestive of airspace disease/pneumonia, patient has been empirically started on doxycycline and zosyn with pharmacy dosing to cover possible aspiration during emesis. Blood cultures pending, will order respiratory culture. Mycoplasma and legionella testing ordered and pending, once negative will discontinue doxycycline.     Plan to continue patient's home blood pressure medications with hold parameters. Continue to avoid QTc prolonging agents, will repeat EKG in the morning. Trend cardiacs to rule out infarction/ischemia. Patient has been placed on low dose sliding scale insulin with accuchecks for now, will hold oral agents for now. Hemoglobin A1C level ordered, will titrate therapy as necessary.     Will repeat labs in the morning and continue to monitor the patient closely on telemetry.       DVT Prophylaxis: SQ Heparin     The patient is considered to be a high risk patient due to: Acute on chronic renal failure, acute hypoxic respiratory failure, metabolic acidosis, CAD, insulin dependent type II diabetes mellitus, aortic stenosis, tobacco smoking addiction.    I have discussed the patient's assessment and plan with the patient and the patient's .     MEAGAN Jasso  07/03/17  3:12 PM  ---------------------------------------------------------------------------------------------------------------------     *I have discussed with MEAGAN Jasso and agree with her assessment. I have edited/amended this note to reflect my findings and  recommendations.

## 2017-07-03 NOTE — ED NOTES
Pt is gone to CT at this time, will collect blood cultures when pt returns      Susie Mitchell  07/03/17 [id number]

## 2017-07-03 NOTE — PROGRESS NOTES
Pharmacy Renal Dosing Service Note:    Ms. Cardona has been evaluated for Zosyn dosing to treat her suspected aspiration pneumonia.  SIMON on CKD noted.  Based on her estimated ClCr of 11 mL/min using serum Cr of 3.49 mg/dL, will dose at 2.25 gm q8hrs at this time.  Will continue to monitor her renal function and adjust further if needed.    Thank you.  Meaghan Medel, Pharm.D.  7/3/2017  4:21 PM

## 2017-07-03 NOTE — ED NOTES
Patient resting on stretcher, NAD noted, 1st 500ml Bolus infusing at this time. Will continue to monitor.     Samira Chahal RN  07/03/17 [id number]

## 2017-07-03 NOTE — ED PROVIDER NOTES
Subjective   HPI Comments: 66 year old female who presents with nausea and abdominal pain for the last 4 days.  She states initially she had vomiting and diarrhea but that stopped about 3 days ago.  She is having constant midabdominal cramping.  She states she has had a subjective fever.  She feels very gassy.  She tried Gas-X with no relief.  She saw her PCP when this first started and he mentioned that he wanted to admit her to the hospital but she declined.  She was prescribed Zofran and told to take Pepto Bismol and drink plenty of fluids.  She states she has done this but it has not really helped.  She feels very fatigued and weak.    Patient is a 66 y.o. female presenting with abdominal pain.   History provided by:  Patient  Abdominal Pain   Pain location:  Periumbilical  Pain quality: cramping    Pain radiates to:  Does not radiate  Pain severity:  Moderate  Onset quality:  Gradual  Duration:  4 days  Timing:  Constant  Progression:  Worsening  Chronicity:  New  Context: not recent travel, not sick contacts, not suspicious food intake and not trauma    Relieved by:  Nothing  Worsened by:  Nothing  Ineffective treatments: Zofran and Pepto Bismol.  Associated symptoms: diarrhea, fatigue, nausea and vomiting    Associated symptoms: no anorexia, no belching, no chest pain, no chills, no constipation, no cough, no dysuria, no fever, no hematemesis, no hematochezia, no hematuria, no melena and no shortness of breath        Review of Systems   Constitutional: Positive for appetite change and fatigue. Negative for chills and fever.   HENT: Negative.    Eyes: Negative.    Respiratory: Negative for cough and shortness of breath.    Cardiovascular: Negative for chest pain.   Gastrointestinal: Positive for abdominal pain, diarrhea, nausea and vomiting. Negative for anorexia, constipation, hematemesis, hematochezia and melena.   Genitourinary: Negative for dysuria and hematuria.   Musculoskeletal: Negative.    Skin:  Negative.    Neurological: Positive for weakness.   Psychiatric/Behavioral: Negative.    All other systems reviewed and are negative.      Past Medical History:   Diagnosis Date   • Anxiety    • Aortic stenosis    • ASCVD (arteriosclerotic cardiovascular disease)    • Breast cancer    • Chronic pain    • COPD (chronic obstructive pulmonary disease)    • Diabetic neuropathy    • Dyslipidemia    • Essential hypertension    • Insulin dependent diabetes mellitus    • Left carotid bruit    • Recurrent pneumonia    • Renal failure        No Known Allergies    Past Surgical History:   Procedure Laterality Date   • APPENDECTOMY     • CARDIAC CATHETERIZATION     • COLONOSCOPY     • HYSTERECTOMY     • MASTECTOMY Right    • RHINOPLASTY         Family History   Problem Relation Age of Onset   • Diabetes Mother    • Lung cancer Father        Social History     Social History   • Marital status:      Spouse name: N/A   • Number of children: N/A   • Years of education: N/A     Social History Main Topics   • Smoking status: Current Every Day Smoker     Packs/day: 1.00     Types: Cigarettes   • Smokeless tobacco: None   • Alcohol use No   • Drug use: No   • Sexual activity: Defer     Other Topics Concern   • None     Social History Narrative   • None           Objective   Physical Exam   Constitutional: She is oriented to person, place, and time. She appears well-developed and well-nourished.   HENT:   Head: Normocephalic and atraumatic.   Right Ear: External ear normal.   Left Ear: External ear normal.   Nose: Nose normal.   Mouth/Throat: Oropharynx is clear and moist.   Eyes: Conjunctivae and EOM are normal. Pupils are equal, round, and reactive to light.   Neck: Normal range of motion. Neck supple.   Cardiovascular: Normal rate, regular rhythm, normal heart sounds and intact distal pulses.    Pulmonary/Chest: Effort normal and breath sounds normal. No respiratory distress.   Abdominal: Soft. Bowel sounds are normal. There  is no tenderness. There is no rebound and no guarding.   Musculoskeletal: Normal range of motion.   Neurological: She is alert and oriented to person, place, and time.   Skin: Skin is warm and dry.   Psychiatric: She has a normal mood and affect. Her behavior is normal. Judgment and thought content normal.   Nursing note and vitals reviewed.      Procedures         ED Course  ED Course   Comment By Time   Pt found to have acute on chronic renal failure, last known creatinine here was 1.7 last year.  Pt also possibly has a left lower lobe pneumonia.  I have discussed with Dr. Givens who will admit to medical telemetry. MEAGAN Yoder 07/03 1725   EKG shows a sinus rhythm, rate of 79, nonspecific T wave abnormalities, Prolonged QT, no acute ischemia per MEAGAN Cheung 07/03 1727                  MDM  Number of Diagnoses or Management Options  Acute on chronic renal failure:   Dehydration:   Pneumonia of left lower lobe due to infectious organism:      Amount and/or Complexity of Data Reviewed  Clinical lab tests: reviewed and ordered  Tests in the radiology section of CPT®: ordered and reviewed  Decide to obtain previous medical records or to obtain history from someone other than the patient: yes  Discuss the patient with other providers: yes    Patient Progress  Patient progress: stable      Final diagnoses:   Acute on chronic renal failure   Dehydration   Pneumonia of left lower lobe due to infectious organism            MEAGAN Yoder  07/03/17 1725       MEAGAN Yoder  07/03/17 0727

## 2017-07-04 ENCOUNTER — APPOINTMENT (OUTPATIENT)
Dept: GENERAL RADIOLOGY | Facility: HOSPITAL | Age: 67
End: 2017-07-04

## 2017-07-04 ENCOUNTER — APPOINTMENT (OUTPATIENT)
Dept: CARDIOLOGY | Facility: HOSPITAL | Age: 67
End: 2017-07-04

## 2017-07-04 LAB
A-A DO2: 63.4 MMHG (ref 0–300)
ALBUMIN SERPL-MCNC: 2.7 G/DL (ref 3.4–4.8)
ALBUMIN/GLOB SERPL: 1.1 G/DL (ref 1.5–2.5)
ALP SERPL-CCNC: 76 U/L (ref 35–104)
ALT SERPL W P-5'-P-CCNC: 9 U/L (ref 10–36)
ANION GAP SERPL CALCULATED.3IONS-SCNC: 5.8 MMOL/L (ref 3.6–11.2)
ARTERIAL PATENCY WRIST A: ABNORMAL
AST SERPL-CCNC: 12 U/L (ref 10–30)
ATMOSPHERIC PRESS: 729 MMHG
BASE EXCESS BLDA CALC-SCNC: -10.2 MMOL/L
BASOPHILS # BLD AUTO: 0.07 10*3/MM3 (ref 0–0.3)
BASOPHILS NFR BLD AUTO: 0.7 % (ref 0–2)
BDY SITE: ABNORMAL
BH CV ECHO MEAS - % IVS THICK: 20.7 %
BH CV ECHO MEAS - % LVPW THICK: 85.7 %
BH CV ECHO MEAS - ACS: 1.8 CM
BH CV ECHO MEAS - AO ROOT AREA (BSA CORRECTED): 2
BH CV ECHO MEAS - AO ROOT AREA: 6.9 CM^2
BH CV ECHO MEAS - AO ROOT DIAM: 3 CM
BH CV ECHO MEAS - BSA(HAYCOCK): 1.5 M^2
BH CV ECHO MEAS - BSA: 1.5 M^2
BH CV ECHO MEAS - BZI_BMI: 21.7 KILOGRAMS/M^2
BH CV ECHO MEAS - BZI_METRIC_HEIGHT: 154.9 CM
BH CV ECHO MEAS - BZI_METRIC_WEIGHT: 52.2 KG
BH CV ECHO MEAS - CONTRAST EF 4CH: 71.4 ML/M^2
BH CV ECHO MEAS - EDV(CUBED): 78.9 ML
BH CV ECHO MEAS - EDV(MOD-SP4): 77 ML
BH CV ECHO MEAS - EDV(TEICH): 82.6 ML
BH CV ECHO MEAS - EF(CUBED): 79.5 %
BH CV ECHO MEAS - EF(MOD-SP4): 71.4 %
BH CV ECHO MEAS - EF(TEICH): 72.2 %
BH CV ECHO MEAS - ESV(CUBED): 16.2 ML
BH CV ECHO MEAS - ESV(MOD-SP4): 22 ML
BH CV ECHO MEAS - ESV(TEICH): 23 ML
BH CV ECHO MEAS - FS: 41 %
BH CV ECHO MEAS - IVS/LVPW: 1
BH CV ECHO MEAS - IVSD: 1.1 CM
BH CV ECHO MEAS - IVSS: 1.3 CM
BH CV ECHO MEAS - LA DIMENSION: 5 CM
BH CV ECHO MEAS - LA/AO: 1.7
BH CV ECHO MEAS - LV DIASTOLIC VOL/BSA (35-75): 51.6 ML/M^2
BH CV ECHO MEAS - LV MASS(C)D: 151 GRAMS
BH CV ECHO MEAS - LV MASS(C)DI: 101.1 GRAMS/M^2
BH CV ECHO MEAS - LV MASS(C)S: 142.8 GRAMS
BH CV ECHO MEAS - LV MASS(C)SI: 95.6 GRAMS/M^2
BH CV ECHO MEAS - LV SYSTOLIC VOL/BSA (12-30): 14.7 ML/M^2
BH CV ECHO MEAS - LVIDD: 4.3 CM
BH CV ECHO MEAS - LVIDS: 2.5 CM
BH CV ECHO MEAS - LVLD AP4: 7.8 CM
BH CV ECHO MEAS - LVLS AP4: 6.4 CM
BH CV ECHO MEAS - LVOT AREA (M): 3.5 CM^2
BH CV ECHO MEAS - LVOT AREA: 3.4 CM^2
BH CV ECHO MEAS - LVOT DIAM: 2.1 CM
BH CV ECHO MEAS - LVPWD: 1 CM
BH CV ECHO MEAS - LVPWS: 1.9 CM
BH CV ECHO MEAS - MV A MAX VEL: 74 CM/SEC
BH CV ECHO MEAS - MV E MAX VEL: 148.7 CM/SEC
BH CV ECHO MEAS - MV E/A: 2
BH CV ECHO MEAS - PA ACC SLOPE: 1558 CM/SEC^2
BH CV ECHO MEAS - PA ACC TIME: 0.08 SEC
BH CV ECHO MEAS - PA PR(ACCEL): 41 MMHG
BH CV ECHO MEAS - RAP SYSTOLE: 10 MMHG
BH CV ECHO MEAS - RVDD: 2.9 CM
BH CV ECHO MEAS - RVSP: 63.5 MMHG
BH CV ECHO MEAS - SI(CUBED): 42 ML/M^2
BH CV ECHO MEAS - SI(MOD-SP4): 36.8 ML/M^2
BH CV ECHO MEAS - SI(TEICH): 39.9 ML/M^2
BH CV ECHO MEAS - SV(CUBED): 62.8 ML
BH CV ECHO MEAS - SV(MOD-SP4): 55 ML
BH CV ECHO MEAS - SV(TEICH): 59.6 ML
BH CV ECHO MEAS - TR MAX VEL: 365.8 CM/SEC
BILIRUB SERPL-MCNC: 0.1 MG/DL (ref 0.2–1.8)
BNP SERPL-MCNC: 1003 PG/ML (ref 0–100)
BODY TEMPERATURE: 98.6 C
BUN BLD-MCNC: 42 MG/DL (ref 7–21)
BUN/CREAT SERPL: 12.8 (ref 7–25)
CALCIUM SPEC-SCNC: 7 MG/DL (ref 7.7–10)
CHLORIDE SERPL-SCNC: 120 MMOL/L (ref 99–112)
CK MB SERPL-CCNC: 5.02 NG/ML (ref 0–5)
CK MB SERPL-RTO: 6.3 % (ref 0–3)
CK SERPL-CCNC: 80 U/L (ref 24–173)
CO2 SERPL-SCNC: 15.2 MMOL/L (ref 24.3–31.9)
COHGB MFR BLD: 1.1 % (ref 0–5)
CREAT BLD-MCNC: 3.28 MG/DL (ref 0.43–1.29)
CRP SERPL-MCNC: <0.5 MG/DL (ref 0–0.99)
DEPRECATED RDW RBC AUTO: 57.2 FL (ref 37–54)
EOSINOPHIL # BLD AUTO: 0.39 10*3/MM3 (ref 0–0.7)
EOSINOPHIL NFR BLD AUTO: 3.9 % (ref 0–7)
ERYTHROCYTE [DISTWIDTH] IN BLOOD BY AUTOMATED COUNT: 17.9 % (ref 11.5–14.5)
GFR SERPL CREATININE-BSD FRML MDRD: 14 ML/MIN/1.73
GLOBULIN UR ELPH-MCNC: 2.5 GM/DL
GLUCOSE BLD-MCNC: 131 MG/DL (ref 70–110)
GLUCOSE BLDC GLUCOMTR-MCNC: 124 MG/DL (ref 70–130)
GLUCOSE BLDC GLUCOMTR-MCNC: 162 MG/DL (ref 70–130)
GLUCOSE BLDC GLUCOMTR-MCNC: 176 MG/DL (ref 70–130)
GLUCOSE BLDC GLUCOMTR-MCNC: 190 MG/DL (ref 70–130)
HCO3 BLDA-SCNC: 15.2 MMOL/L (ref 22–26)
HCT VFR BLD AUTO: 28.8 % (ref 37–47)
HCT VFR BLD CALC: 30 % (ref 37–47)
HGB BLD-MCNC: 8.8 G/DL (ref 12–16)
HGB BLDA-MCNC: 10.1 G/DL (ref 12–16)
HOROWITZ INDEX BLD+IHG-RTO: 21 %
IMM GRANULOCYTES # BLD: 0.02 10*3/MM3 (ref 0–0.03)
IMM GRANULOCYTES NFR BLD: 0.2 % (ref 0–0.5)
LYMPHOCYTES # BLD AUTO: 2.88 10*3/MM3 (ref 1–3)
LYMPHOCYTES NFR BLD AUTO: 28.5 % (ref 16–46)
M PNEUMO IGM SER QL: NEGATIVE
MCH RBC QN AUTO: 27.4 PG (ref 27–33)
MCHC RBC AUTO-ENTMCNC: 30.6 G/DL (ref 33–37)
MCV RBC AUTO: 89.7 FL (ref 80–94)
METHGB BLD QL: 0.3 % (ref 0–3)
MODALITY: ABNORMAL
MONOCYTES # BLD AUTO: 0.59 10*3/MM3 (ref 0.1–0.9)
MONOCYTES NFR BLD AUTO: 5.8 % (ref 0–12)
MYOGLOBIN SERPL-MCNC: 122 NG/ML (ref 0–109)
NEUTROPHILS # BLD AUTO: 6.17 10*3/MM3 (ref 1.4–6.5)
NEUTROPHILS NFR BLD AUTO: 60.9 % (ref 40–75)
OSMOLALITY SERPL CALC.SUM OF ELEC: 293.5 MOSM/KG (ref 273–305)
OXYHGB MFR BLDV: 78 % (ref 85–100)
PCO2 BLDA: 31.6 MM HG (ref 35–45)
PH BLDA: 7.3 PH UNITS (ref 7.35–7.45)
PLATELET # BLD AUTO: 246 10*3/MM3 (ref 130–400)
PMV BLD AUTO: 10.9 FL (ref 6–10)
PO2 BLDA: 42 MM HG (ref 80–100)
POTASSIUM BLD-SCNC: 3.9 MMOL/L (ref 3.5–5.3)
PROT SERPL-MCNC: 5.2 G/DL (ref 6–8)
RBC # BLD AUTO: 3.21 10*6/MM3 (ref 4.2–5.4)
SAO2 % BLDCOA: 79.1 % (ref 90–100)
SODIUM BLD-SCNC: 141 MMOL/L (ref 135–153)
TROPONIN I SERPL-MCNC: <0.006 NG/ML
WBC NRBC COR # BLD: 10.12 10*3/MM3 (ref 4.5–12.5)

## 2017-07-04 PROCEDURE — 93005 ELECTROCARDIOGRAM TRACING: CPT | Performed by: PHYSICIAN ASSISTANT

## 2017-07-04 PROCEDURE — 71020 XR CHEST PA AND LATERAL: CPT | Performed by: RADIOLOGY

## 2017-07-04 PROCEDURE — 83050 HGB METHEMOGLOBIN QUAN: CPT | Performed by: INTERNAL MEDICINE

## 2017-07-04 PROCEDURE — 71020 HC CHEST PA AND LATERAL: CPT

## 2017-07-04 PROCEDURE — 25010000002 HEPARIN (PORCINE) PER 1000 UNITS: Performed by: INTERNAL MEDICINE

## 2017-07-04 PROCEDURE — 82805 BLOOD GASES W/O2 SATURATION: CPT | Performed by: INTERNAL MEDICINE

## 2017-07-04 PROCEDURE — 82962 GLUCOSE BLOOD TEST: CPT

## 2017-07-04 PROCEDURE — 93306 TTE W/DOPPLER COMPLETE: CPT | Performed by: INTERNAL MEDICINE

## 2017-07-04 PROCEDURE — 93306 TTE W/DOPPLER COMPLETE: CPT

## 2017-07-04 PROCEDURE — 84484 ASSAY OF TROPONIN QUANT: CPT | Performed by: INTERNAL MEDICINE

## 2017-07-04 PROCEDURE — 63710000001 INSULIN ASPART PER 5 UNITS: Performed by: PHYSICIAN ASSISTANT

## 2017-07-04 PROCEDURE — 25010000002 PIPERACILLIN-TAZOBACTAM: Performed by: PHYSICIAN ASSISTANT

## 2017-07-04 PROCEDURE — 93010 ELECTROCARDIOGRAM REPORT: CPT | Performed by: INTERNAL MEDICINE

## 2017-07-04 PROCEDURE — 82375 ASSAY CARBOXYHB QUANT: CPT | Performed by: INTERNAL MEDICINE

## 2017-07-04 PROCEDURE — 80053 COMPREHEN METABOLIC PANEL: CPT | Performed by: INTERNAL MEDICINE

## 2017-07-04 PROCEDURE — 82550 ASSAY OF CK (CPK): CPT | Performed by: INTERNAL MEDICINE

## 2017-07-04 PROCEDURE — 94799 UNLISTED PULMONARY SVC/PX: CPT

## 2017-07-04 PROCEDURE — 82553 CREATINE MB FRACTION: CPT | Performed by: INTERNAL MEDICINE

## 2017-07-04 PROCEDURE — 83880 ASSAY OF NATRIURETIC PEPTIDE: CPT | Performed by: PHYSICIAN ASSISTANT

## 2017-07-04 PROCEDURE — 99233 SBSQ HOSP IP/OBS HIGH 50: CPT | Performed by: INTERNAL MEDICINE

## 2017-07-04 PROCEDURE — 86140 C-REACTIVE PROTEIN: CPT | Performed by: PHYSICIAN ASSISTANT

## 2017-07-04 PROCEDURE — 85025 COMPLETE CBC W/AUTO DIFF WBC: CPT | Performed by: INTERNAL MEDICINE

## 2017-07-04 PROCEDURE — 83874 ASSAY OF MYOGLOBIN: CPT | Performed by: INTERNAL MEDICINE

## 2017-07-04 PROCEDURE — 36600 WITHDRAWAL OF ARTERIAL BLOOD: CPT | Performed by: INTERNAL MEDICINE

## 2017-07-04 RX ORDER — HYDRALAZINE HYDROCHLORIDE 20 MG/ML
10 INJECTION INTRAMUSCULAR; INTRAVENOUS EVERY 6 HOURS PRN
Status: DISCONTINUED | OUTPATIENT
Start: 2017-07-04 | End: 2017-07-11 | Stop reason: HOSPADM

## 2017-07-04 RX ORDER — GABAPENTIN 100 MG/1
200 CAPSULE ORAL 3 TIMES DAILY PRN
Status: DISCONTINUED | OUTPATIENT
Start: 2017-07-04 | End: 2017-07-11 | Stop reason: HOSPADM

## 2017-07-04 RX ORDER — GABAPENTIN 100 MG/1
100 CAPSULE ORAL NIGHTLY PRN
Status: DISCONTINUED | OUTPATIENT
Start: 2017-07-04 | End: 2017-07-11 | Stop reason: HOSPADM

## 2017-07-04 RX ORDER — BENZONATATE 100 MG/1
100 CAPSULE ORAL 3 TIMES DAILY PRN
Status: DISCONTINUED | OUTPATIENT
Start: 2017-07-04 | End: 2017-07-11 | Stop reason: HOSPADM

## 2017-07-04 RX ADMIN — SODIUM CHLORIDE 75 ML/HR: 9 INJECTION, SOLUTION INTRAVENOUS at 04:35

## 2017-07-04 RX ADMIN — BUDESONIDE AND FORMOTEROL FUMARATE DIHYDRATE 2 PUFF: 80; 4.5 AEROSOL RESPIRATORY (INHALATION) at 07:25

## 2017-07-04 RX ADMIN — ASPIRIN 81 MG: 81 TABLET ORAL at 08:41

## 2017-07-04 RX ADMIN — PIPERACILLIN AND TAZOBACTAM 2.25 G: 2; .25 INJECTION, POWDER, LYOPHILIZED, FOR SOLUTION INTRAVENOUS; PARENTERAL at 16:56

## 2017-07-04 RX ADMIN — INSULIN ASPART 2 UNITS: 100 INJECTION, SOLUTION INTRAVENOUS; SUBCUTANEOUS at 11:20

## 2017-07-04 RX ADMIN — CLONAZEPAM 0.25 MG: 0.5 TABLET ORAL at 11:20

## 2017-07-04 RX ADMIN — HYDRALAZINE HYDROCHLORIDE 25 MG: 25 TABLET ORAL at 08:41

## 2017-07-04 RX ADMIN — CLONAZEPAM 0.25 MG: 0.5 TABLET ORAL at 21:29

## 2017-07-04 RX ADMIN — INSULIN ASPART 2 UNITS: 100 INJECTION, SOLUTION INTRAVENOUS; SUBCUTANEOUS at 21:29

## 2017-07-04 RX ADMIN — GABAPENTIN 200 MG: 100 CAPSULE ORAL at 08:41

## 2017-07-04 RX ADMIN — INSULIN ASPART 2 UNITS: 100 INJECTION, SOLUTION INTRAVENOUS; SUBCUTANEOUS at 16:56

## 2017-07-04 RX ADMIN — BUDESONIDE AND FORMOTEROL FUMARATE DIHYDRATE 2 PUFF: 80; 4.5 AEROSOL RESPIRATORY (INHALATION) at 19:02

## 2017-07-04 RX ADMIN — PIPERACILLIN AND TAZOBACTAM 2.25 G: 2; .25 INJECTION, POWDER, LYOPHILIZED, FOR SOLUTION INTRAVENOUS; PARENTERAL at 01:17

## 2017-07-04 RX ADMIN — DOXYCYCLINE 100 MG: 100 INJECTION, POWDER, LYOPHILIZED, FOR SOLUTION INTRAVENOUS at 00:06

## 2017-07-04 RX ADMIN — DOXYCYCLINE 100 MG: 100 INJECTION, POWDER, LYOPHILIZED, FOR SOLUTION INTRAVENOUS at 11:20

## 2017-07-04 RX ADMIN — PIPERACILLIN AND TAZOBACTAM 2.25 G: 2; .25 INJECTION, POWDER, LYOPHILIZED, FOR SOLUTION INTRAVENOUS; PARENTERAL at 08:41

## 2017-07-04 RX ADMIN — HEPARIN SODIUM 5000 UNITS: 5000 INJECTION, SOLUTION INTRAVENOUS; SUBCUTANEOUS at 21:29

## 2017-07-04 RX ADMIN — HEPARIN SODIUM 5000 UNITS: 5000 INJECTION, SOLUTION INTRAVENOUS; SUBCUTANEOUS at 08:41

## 2017-07-04 NOTE — NURSING NOTE
Tele called stated pt had 5 beat run of vtach, pt vitals were taken (see flowsheet). Paged Cheri MAYBERRY Madisynpriscila notified of the run of vtach and also that the pt stated she does not normally take her Neurontin as scheduled. She only takes them PRN and they have made her very drowsy. Also notified that pt states her tongue feels swollen. Observed no swelling, tongue was dry.

## 2017-07-04 NOTE — PROGRESS NOTES
HOSPITALIST PROGRESS NOTE    Patient Identification:  Name:  Sara Cardona  Age:  66 y.o.  Sex:  female  :  1950  MRN:  [id number]  Visit Number:  [id number]  Primary Care Provider:  Marcelino Sheehan MD    Length of stay:  1     HPI: The patient is a 66 her old female who was admitted with nausea and found to have acute on chronic renal failure    Subjective:  The patient denies further episodes of nausea.  The patient does report lethargy as she states that she only takes her Neurontin as needed and does not take it as prescribed.  She denies vomiting and/or diarrhea.  She has no abdominal pain.  The patient reports that she has labile blood pressure readings at home as well.  She denies chest pain and/or shortness of breath but does complain of some occasional nonproductive cough.    Present during exam: N/A    Current Hospital Meds:    aspirin 81 mg Oral Daily   budesonide-formoterol 2 puff Inhalation BID - RT   heparin (porcine) 5,000 Units Subcutaneous Q12H   insulin aspart 0-7 Units Subcutaneous 4x Daily AC & at Bedtime   piperacillin-tazobactam 2.25 g Intravenous Q8H       sodium chloride 50 mL/hr Last Rate: 100 mL/hr (17 1440)       Vital Signs  Temp:  [98.2 °F (36.8 °C)-98.8 °F (37.1 °C)] 98.5 °F (36.9 °C)  Heart Rate:  [76-84] 84  Resp:  [18-19] 18  BP: (100-165)/(62-83) 157/77  Last 3 weights    17  0950   Weight: 115 lb (52.2 kg)     Body mass index is 21.73 kg/(m^2).    Physical exam:  Physical Exam   Constitutional: She is oriented to person, place, and time. She appears well-developed and well-nourished. She appears lethargic. No distress.   HENT:   Head: Normocephalic and atraumatic.   Nose: Nose normal.   Mouth/Throat: Oropharynx is clear and moist and mucous membranes are normal.   Eyes: Conjunctivae and EOM are normal. Pupils are equal, round, and reactive to light. No scleral icterus.   Neck: Trachea normal. Neck supple. No JVD present. Carotid bruit is not present. No  thyromegaly present.   Cardiovascular: Normal rate, regular rhythm and normal heart sounds.  Exam reveals no gallop and no friction rub.    No murmur heard.  No significant lower extremity edema   Pulmonary/Chest: Effort normal. No respiratory distress. She has no wheezes. She has rhonchi in the right upper field, the right lower field and the left upper field. She has rales in the left lower field.   Abdominal: Soft. Bowel sounds are normal. She exhibits no distension. There is no tenderness. There is no guarding.   Musculoskeletal: Normal range of motion.   Neurological: She is oriented to person, place, and time. She has normal strength. She appears lethargic. No cranial nerve deficit.   Skin: Skin is warm, dry and intact. No rash noted. No erythema.   Psychiatric: She has a normal mood and affect. Her speech is delayed.     Telemetry: Sinus 88 with a PVC    Results Review:    Results from last 7 days  Lab Units 07/04/17  0240 07/03/17  1039   WBC 10*3/mm3 10.12 8.67   HEMOGLOBIN g/dL 8.8* 10.9*   HEMATOCRIT % 28.8* 34.7*   PLATELETS 10*3/mm3 246 268       Results from last 7 days  Lab Units 07/04/17  0240 07/03/17  1039   SODIUM mmol/L 141 140   POTASSIUM mmol/L 3.9 3.8   CHLORIDE mmol/L 120* 115*   CO2 mmol/L 15.2* 17.5*   BUN mg/dL 42* 46*   CREATININE mg/dL 3.28* 3.49*   CALCIUM mg/dL 7.0* 7.5*   GLUCOSE mg/dL 131* 151*       Results from last 7 days  Lab Units 07/04/17  0240 07/03/17  1039   BILIRUBIN mg/dL 0.1* 0.1*   ALK PHOS U/L 76 94   AST (SGOT) U/L 12 14   ALT (SGPT) U/L 9* 11       Results from last 7 days  Lab Units 07/03/17  1039   MAGNESIUM mg/dL 1.9           Results from last 7 days  Lab Units 07/04/17  0240 07/03/17 2037 07/03/17  1508   CK TOTAL U/L 80 115 108   TROPONIN I ng/mL <0.006 0.018 <0.006   CK MB INDEX % 6.3* 6.3* 6.1*   MYOGLOBIN ng/mL 122.0* 138.0* 128.0*       Results from last 7 days  Lab Units 07/04/17  0240 07/03/17  1403   BNP pg/mL 1003.0* 500.0*     Renal  ultrasound:  FINDINGS:   Cholelithiasis is noted.      RIGHT: The right kidney measures 9.5 cm in length. No mass,  hydronephrosis, or shadowing stone.      LEFT: The left kidney measures 6.6 cm in length. No mass,  hydronephrosis, or shadowing stone. Size discrepancy when compared to  the right may be related to patient body habitus and imaging technique.      IMPRESSION:  1. Asymmetric size of the kidneys with left smaller than right probably  related to technique.  2. No hydronephrosis or shadowing stones noted.    Echocardiogram:          Assessment/Plan     -Non-oliguric SIMON on stage IV CKD with baseline creatinine for the past year ranging from 1.8-2.2; likely multifactorial in nature and related to poor oral intake with nausea/vomiting/diarrhea coupled with medications (ie ACEI); and labile blood pressure readings with history of diabetic nephropathy and nephrotic range proteinuria  -Cough; question volume overload in the setting of gentle IVF hydration and severe pulmonary hypertension with small pericardial effusion  -Protein malnutrition with hypoalbuminemia causing pseudo-hypocalcemia  -Diet controlled diabetes mellitus type II; well controlled with A1c 6.1%  -Essential hypertension  -Tobacco abuse; counseled cessation  -COPD  -Generalized anxiety disorder    Will continue with IV fluid hydration to decrease the rate of infusion.  I have ordered for a urine to protein creatinine ratio.  Her calculated FeNa is greater than 1%.  Her urinalysis was not suggestive of an infection.  I have placed a consult for nephrology and have held her blood pressure medications at this time.  I have placed her on as needed IV hydralazine for systolic blood pressure greater than 170, otherwise will allow for permissive hypertension to allow for renal perfusion.  I have ordered a room-air arterial blood gas given the patient's decreased CO2 level. I have ordered a PA and lateral chest x-ray.  Doxycycline has been  discontinued as her mycoplasma and Legionella studies were negative.  I have placed her on as needed Tessalon Perles for cough.  I will discontinue her IV cefepime as the patient does not appear to clinically exhibit pneumonia.  I have changed the patient's gabapentin to 100 mg at bedtime if needed.  I will repeat her CBC and CMP in the morning.      The patient is high risk due to the following diagnoses/reasons:  SIMON with CKD, pulmonary hypertension, tobacco abuse    I discussed the patients findings and my recommendations with patient and nursing staff (MARILY Hogan)    Disposition  Home when medically stable    Tish Givens DO  07/04/17  5:59 PM

## 2017-07-04 NOTE — PLAN OF CARE
Problem: Patient Care Overview (Adult)  Goal: Plan of Care Review  Outcome: Ongoing (interventions implemented as appropriate)  Goal: Adult Individualization and Mutuality  Outcome: Ongoing (interventions implemented as appropriate)  Goal: Discharge Needs Assessment  Outcome: Ongoing (interventions implemented as appropriate)    Problem: Renal Failure/Kidney Injury, Acute (Adult)  Goal: Signs and Symptoms of Listed Potential Problems Will be Absent or Manageable (Renal Failure/Kidney Injury, Acute)  Outcome: Ongoing (interventions implemented as appropriate)    Problem: Pneumonia (Adult)  Goal: Signs and Symptoms of Listed Potential Problems Will be Absent or Manageable (Pneumonia)  Outcome: Ongoing (interventions implemented as appropriate)

## 2017-07-04 NOTE — PLAN OF CARE
Problem: Patient Care Overview (Adult)  Goal: Plan of Care Review  Outcome: Ongoing (interventions implemented as appropriate)  Goal: Discharge Needs Assessment  Outcome: Ongoing (interventions implemented as appropriate)    Problem: Renal Failure/Kidney Injury, Acute (Adult)  Goal: Signs and Symptoms of Listed Potential Problems Will be Absent or Manageable (Renal Failure/Kidney Injury, Acute)  Outcome: Ongoing (interventions implemented as appropriate)

## 2017-07-05 LAB
ALBUMIN SERPL-MCNC: 3 G/DL (ref 3.4–4.8)
ALBUMIN/GLOB SERPL: 1.2 G/DL (ref 1.5–2.5)
ALP SERPL-CCNC: 78 U/L (ref 35–104)
ALT SERPL W P-5'-P-CCNC: 9 U/L (ref 10–36)
ANION GAP SERPL CALCULATED.3IONS-SCNC: 6.3 MMOL/L (ref 3.6–11.2)
AST SERPL-CCNC: 14 U/L (ref 10–30)
BASOPHILS # BLD AUTO: 0.09 10*3/MM3 (ref 0–0.3)
BASOPHILS NFR BLD AUTO: 0.7 % (ref 0–2)
BILIRUB SERPL-MCNC: 0.1 MG/DL (ref 0.2–1.8)
BUN BLD-MCNC: 44 MG/DL (ref 7–21)
BUN/CREAT SERPL: 13.6 (ref 7–25)
CALCIUM SPEC-SCNC: 7.1 MG/DL (ref 7.7–10)
CHLORIDE SERPL-SCNC: 119 MMOL/L (ref 99–112)
CO2 SERPL-SCNC: 18.7 MMOL/L (ref 24.3–31.9)
CREAT BLD-MCNC: 3.23 MG/DL (ref 0.43–1.29)
DEPRECATED RDW RBC AUTO: 56.9 FL (ref 37–54)
EOSINOPHIL # BLD AUTO: 0.55 10*3/MM3 (ref 0–0.7)
EOSINOPHIL NFR BLD AUTO: 4.3 % (ref 0–7)
ERYTHROCYTE [DISTWIDTH] IN BLOOD BY AUTOMATED COUNT: 17.8 % (ref 11.5–14.5)
GFR SERPL CREATININE-BSD FRML MDRD: 14 ML/MIN/1.73
GLOBULIN UR ELPH-MCNC: 2.5 GM/DL
GLUCOSE BLD-MCNC: 134 MG/DL (ref 70–110)
GLUCOSE BLDC GLUCOMTR-MCNC: 132 MG/DL (ref 70–130)
GLUCOSE BLDC GLUCOMTR-MCNC: 142 MG/DL (ref 70–130)
GLUCOSE BLDC GLUCOMTR-MCNC: 149 MG/DL (ref 70–130)
GLUCOSE BLDC GLUCOMTR-MCNC: 204 MG/DL (ref 70–130)
GLUCOSE BLDC GLUCOMTR-MCNC: 213 MG/DL (ref 70–130)
HCT VFR BLD AUTO: 30.2 % (ref 37–47)
HGB BLD-MCNC: 9.4 G/DL (ref 12–16)
IMM GRANULOCYTES # BLD: 0.03 10*3/MM3 (ref 0–0.03)
IMM GRANULOCYTES NFR BLD: 0.2 % (ref 0–0.5)
LYMPHOCYTES # BLD AUTO: 2.65 10*3/MM3 (ref 1–3)
LYMPHOCYTES NFR BLD AUTO: 20.9 % (ref 16–46)
MCH RBC QN AUTO: 28 PG (ref 27–33)
MCHC RBC AUTO-ENTMCNC: 31.1 G/DL (ref 33–37)
MCV RBC AUTO: 89.9 FL (ref 80–94)
MONOCYTES # BLD AUTO: 0.63 10*3/MM3 (ref 0.1–0.9)
MONOCYTES NFR BLD AUTO: 5 % (ref 0–12)
NEUTROPHILS # BLD AUTO: 8.72 10*3/MM3 (ref 1.4–6.5)
NEUTROPHILS NFR BLD AUTO: 68.9 % (ref 40–75)
OSMOLALITY SERPL CALC.SUM OF ELEC: 300 MOSM/KG (ref 273–305)
PLATELET # BLD AUTO: 280 10*3/MM3 (ref 130–400)
PMV BLD AUTO: 11.2 FL (ref 6–10)
POTASSIUM BLD-SCNC: 3.8 MMOL/L (ref 3.5–5.3)
PROT SERPL-MCNC: 5.5 G/DL (ref 6–8)
RBC # BLD AUTO: 3.36 10*6/MM3 (ref 4.2–5.4)
SODIUM BLD-SCNC: 144 MMOL/L (ref 135–153)
WBC NRBC COR # BLD: 12.67 10*3/MM3 (ref 4.5–12.5)

## 2017-07-05 PROCEDURE — 94799 UNLISTED PULMONARY SVC/PX: CPT

## 2017-07-05 PROCEDURE — 63710000001 INSULIN ASPART PER 5 UNITS: Performed by: PHYSICIAN ASSISTANT

## 2017-07-05 PROCEDURE — 80053 COMPREHEN METABOLIC PANEL: CPT | Performed by: INTERNAL MEDICINE

## 2017-07-05 PROCEDURE — 94640 AIRWAY INHALATION TREATMENT: CPT

## 2017-07-05 PROCEDURE — 63710000001 PROMETHAZINE PER 12.5 MG: Performed by: INTERNAL MEDICINE

## 2017-07-05 PROCEDURE — 82962 GLUCOSE BLOOD TEST: CPT

## 2017-07-05 PROCEDURE — 99232 SBSQ HOSP IP/OBS MODERATE 35: CPT | Performed by: INTERNAL MEDICINE

## 2017-07-05 PROCEDURE — 85025 COMPLETE CBC W/AUTO DIFF WBC: CPT | Performed by: INTERNAL MEDICINE

## 2017-07-05 PROCEDURE — 25010000002 HEPARIN (PORCINE) PER 1000 UNITS: Performed by: INTERNAL MEDICINE

## 2017-07-05 RX ORDER — SODIUM BICARBONATE 650 MG/1
1300 TABLET ORAL 3 TIMES DAILY
Status: DISCONTINUED | OUTPATIENT
Start: 2017-07-05 | End: 2017-07-07

## 2017-07-05 RX ORDER — PANTOPRAZOLE SODIUM 40 MG/1
40 TABLET, DELAYED RELEASE ORAL
Status: DISCONTINUED | OUTPATIENT
Start: 2017-07-06 | End: 2017-07-06

## 2017-07-05 RX ORDER — CLONAZEPAM 0.5 MG/1
0.5 TABLET ORAL 2 TIMES DAILY PRN
Status: DISCONTINUED | OUTPATIENT
Start: 2017-07-05 | End: 2017-07-11 | Stop reason: HOSPADM

## 2017-07-05 RX ORDER — PROMETHAZINE HYDROCHLORIDE 12.5 MG/1
12.5 TABLET ORAL EVERY 8 HOURS PRN
Status: DISCONTINUED | OUTPATIENT
Start: 2017-07-05 | End: 2017-07-11 | Stop reason: HOSPADM

## 2017-07-05 RX ORDER — PROMETHAZINE HYDROCHLORIDE 12.5 MG/1
12.5 TABLET ORAL ONCE
Status: COMPLETED | OUTPATIENT
Start: 2017-07-05 | End: 2017-07-05

## 2017-07-05 RX ADMIN — PROMETHAZINE HYDROCHLORIDE 12.5 MG: 12.5 TABLET ORAL at 07:11

## 2017-07-05 RX ADMIN — ASPIRIN 81 MG: 81 TABLET ORAL at 08:48

## 2017-07-05 RX ADMIN — CLONAZEPAM 0.5 MG: 0.5 TABLET ORAL at 20:43

## 2017-07-05 RX ADMIN — IPRATROPIUM BROMIDE AND ALBUTEROL SULFATE 3 ML: .5; 3 SOLUTION RESPIRATORY (INHALATION) at 08:04

## 2017-07-05 RX ADMIN — SODIUM CHLORIDE 75 ML/HR: 9 INJECTION, SOLUTION INTRAVENOUS at 20:44

## 2017-07-05 RX ADMIN — SODIUM BICARBONATE TAB 650 MG 1300 MG: 650 TAB at 17:29

## 2017-07-05 RX ADMIN — HEPARIN SODIUM 5000 UNITS: 5000 INJECTION, SOLUTION INTRAVENOUS; SUBCUTANEOUS at 20:43

## 2017-07-05 RX ADMIN — INSULIN ASPART 3 UNITS: 100 INJECTION, SOLUTION INTRAVENOUS; SUBCUTANEOUS at 17:30

## 2017-07-05 RX ADMIN — INSULIN ASPART 3 UNITS: 100 INJECTION, SOLUTION INTRAVENOUS; SUBCUTANEOUS at 20:47

## 2017-07-05 RX ADMIN — SODIUM BICARBONATE TAB 650 MG 1300 MG: 650 TAB at 20:43

## 2017-07-05 RX ADMIN — SODIUM BICARBONATE TAB 650 MG 1300 MG: 650 TAB at 12:31

## 2017-07-05 RX ADMIN — SODIUM CHLORIDE 50 ML/HR: 9 INJECTION, SOLUTION INTRAVENOUS at 07:11

## 2017-07-05 RX ADMIN — CLONAZEPAM 0.25 MG: 0.5 TABLET ORAL at 09:40

## 2017-07-05 RX ADMIN — IPRATROPIUM BROMIDE AND ALBUTEROL SULFATE 3 ML: .5; 3 SOLUTION RESPIRATORY (INHALATION) at 19:14

## 2017-07-05 RX ADMIN — BUDESONIDE AND FORMOTEROL FUMARATE DIHYDRATE 2 PUFF: 80; 4.5 AEROSOL RESPIRATORY (INHALATION) at 08:05

## 2017-07-05 RX ADMIN — HEPARIN SODIUM 5000 UNITS: 5000 INJECTION, SOLUTION INTRAVENOUS; SUBCUTANEOUS at 08:48

## 2017-07-05 RX ADMIN — BUDESONIDE AND FORMOTEROL FUMARATE DIHYDRATE 2 PUFF: 80; 4.5 AEROSOL RESPIRATORY (INHALATION) at 19:14

## 2017-07-05 NOTE — PROGRESS NOTES
HOSPITALIST PROGRESS NOTE    Patient Identification:  Name:  Sara Cardona  Age:  66 y.o.  Sex:  female  :  1950  MRN:  [id number]  Visit Number:  [id number]  Primary Care Provider:  Marcelino Sheehan MD    Length of stay:  2     HPI: The patient is a 66 her old female who was admitted with nausea and found to have acute on chronic renal failure    Subjective:  The patient was sitting on the side of the bed this afternoon eating steak buritto.  She reports nausea but denies vomiting.  She denies any abdominal pain but reported 2 episodes of loose stool earlier today.  The patient states that 1 episodewas black in color.    She denies cough, chest pain and/or dyspnea    Present during exam: Patient's     Current Hospital Meds:    aspirin 81 mg Oral Daily   budesonide-formoterol 2 puff Inhalation BID - RT   heparin (porcine) 5,000 Units Subcutaneous Q12H   insulin aspart 0-7 Units Subcutaneous 4x Daily AC & at Bedtime   sodium bicarbonate 1,300 mg Oral TID       sodium chloride 75 mL/hr Last Rate: 75 mL/hr (17 1231)       Vital Signs  Temp:  [97.6 °F (36.4 °C)-98.6 °F (37 °C)] 98.2 °F (36.8 °C)  Heart Rate:  [81-95] 95  Resp:  [18-20] 20  BP: (138-178)/(61-93) 152/84  Last 3 weights    17  0950   Weight: 115 lb (52.2 kg)     Body mass index is 21.73 kg/(m^2).    Physical exam:  Physical Exam   Constitutional: She is oriented to person, place, and time. She appears well-developed and well-nourished. No distress. Nasal cannula in place.   HENT:   Head: Normocephalic and atraumatic.   Nose: Nose normal.   Mouth/Throat: Oropharynx is clear and moist and mucous membranes are normal.   Eyes: Conjunctivae and EOM are normal. Pupils are equal, round, and reactive to light. No scleral icterus.   Neck: Trachea normal. Neck supple. No JVD present. Carotid bruit is not present. No thyromegaly present.   Cardiovascular: Normal rate, regular rhythm and normal heart sounds.  Exam reveals no gallop and  no friction rub.    No murmur heard.  No significant lower extremity edema   Pulmonary/Chest: Effort normal. No respiratory distress. She has no wheezes. She has no rhonchi. She has no rales.   Abdominal: Soft. Bowel sounds are normal. She exhibits no distension. There is no tenderness. There is no guarding.   Musculoskeletal: Normal range of motion.   Neurological: She is oriented to person, place, and time. She has normal strength. No cranial nerve deficit.   Skin: Skin is warm, dry and intact. No rash noted. No erythema.   Psychiatric: She has a normal mood and affect. Her speech is normal.     Results Review:    Results from last 7 days  Lab Units 07/05/17  0116 07/04/17  0240 07/03/17  1039   WBC 10*3/mm3 12.67* 10.12 8.67   HEMOGLOBIN g/dL 9.4* 8.8* 10.9*   HEMATOCRIT % 30.2* 28.8* 34.7*   PLATELETS 10*3/mm3 280 246 268       Results from last 7 days  Lab Units 07/05/17  0116 07/04/17  0240 07/03/17  1039   SODIUM mmol/L 144 141 140   POTASSIUM mmol/L 3.8 3.9 3.8   CHLORIDE mmol/L 119* 120* 115*   CO2 mmol/L 18.7* 15.2* 17.5*   BUN mg/dL 44* 42* 46*   CREATININE mg/dL 3.23* 3.28* 3.49*   CALCIUM mg/dL 7.1* 7.0* 7.5*   GLUCOSE mg/dL 134* 131* 151*       Results from last 7 days  Lab Units 07/05/17  0116 07/04/17 0240 07/03/17  1039   BILIRUBIN mg/dL 0.1* 0.1* 0.1*   ALK PHOS U/L 78 76 94   AST (SGOT) U/L 14 12 14   ALT (SGPT) U/L 9* 9* 11       Results from last 7 days  Lab Units 07/03/17  1039   MAGNESIUM mg/dL 1.9           Results from last 7 days  Lab Units 07/04/17  0240 07/03/17 2037 07/03/17  1508   CK TOTAL U/L 80 115 108   TROPONIN I ng/mL <0.006 0.018 <0.006   CK MB INDEX % 6.3* 6.3* 6.1*   MYOGLOBIN ng/mL 122.0* 138.0* 128.0*       Results from last 7 days  Lab Units 07/04/17  0240 07/03/17  1403   BNP pg/mL 1003.0* 500.0*       Assessment/Plan     -Non-oliguric SIMON on stage IV CKD with baseline creatinine for the past year ranging from 1.8-2.2; likely multifactorial in nature and related to poor  oral intake with nausea/vomiting/diarrhea coupled with medications (ie ACEI); and labile blood pressure readings with history of diabetic nephropathy and nephrotic range proteinuria  -Dark stool per patient report with history of daily NSAID use  -Mild leukocytosis  -Metabolic acidosis   -Protein malnutrition with hypoalbuminemia causing pseudo-hypocalcemia  -Diet controlled diabetes mellitus type II; well controlled with A1c 6.1%; overall fairly well controlled  -Essential hypertension, acceptable at this time  -Tobacco abuse; counseled cessation  -COPD  -Generalized anxiety disorder    Will continue with IV fluid hydration; increase rate of infusion per Nephrology. Will obtain hemoccult x3. Continue to follow her hemoglobin/hematocrit closely and monitor for signs/symptoms of active bleeding. Continue her on as needed IV hydralazine for systolic blood pressure greater than 170, otherwise will allow for permissive hypertension to allow for renal perfusion with goal systolic blood pressures 140s-150s.     Continue her as needed Tessalon Perles for cough.  I will repeat her CBC and CMP in the morning. Iron studies have been ordered as well. I appreciate Nephrology's assistance.    The patient is high risk due to the following diagnoses/reasons:  SIMON with CKD, pulmonary hypertension, tobacco abuse    I discussed the patients findings and my recommendations with patient and nursing staff (MARILY Foy)    Disposition  Home when medically stable    Tish Givens DO  07/05/17  2:35 PM

## 2017-07-05 NOTE — PLAN OF CARE
Problem: Patient Care Overview (Adult)  Goal: Plan of Care Review  Outcome: Ongoing (interventions implemented as appropriate)  Goal: Discharge Needs Assessment  Outcome: Ongoing (interventions implemented as appropriate)    Problem: Renal Failure/Kidney Injury, Acute (Adult)  Goal: Signs and Symptoms of Listed Potential Problems Will be Absent or Manageable (Renal Failure/Kidney Injury, Acute)  Outcome: Ongoing (interventions implemented as appropriate)    Problem: Pneumonia (Adult)  Goal: Signs and Symptoms of Listed Potential Problems Will be Absent or Manageable (Pneumonia)  Outcome: Ongoing (interventions implemented as appropriate)

## 2017-07-05 NOTE — PAYOR COMM NOTE
"Contact: Shey Jones RN @ [facility]  Phone: [phone]  Fax: [phone]    [facility] primary d/t Medicare B only.  Inpatient status  Clinical     Sara Cardona (66 y.o. Female)     Date of Birth Social Security Number Address Home Phone MRN    1950  [id number] [facility] [phone] [id number]    Buddhist Marital Status          Sabianism        Admission Date Admission Type Admitting Provider Attending Provider Department, Room/Bed    7/3/17 Emergency Tish Givens DO Grace, Aimee Russell, DO [address], [address]/    Discharge Date Discharge Disposition Discharge Destination                      Attending Provider: Tish Givens DO     Allergies:  No Known Allergies    Isolation:  None   Infection:  None   Code Status:  FULL    Ht:  61\" (154.9 cm)   Wt:  115 lb (52.2 kg)    Admission Cmt:  None   Principal Problem:  None                Active Insurance as of 7/3/2017     Primary Coverage     Payor Plan Insurance Group Employer/Plan Group    MEDICARE MEDICARE B ONLY      Payor Plan Address Payor Plan Phone Number Effective From Effective To    [address] [address] [phone] 7/1/2015     [place] [address]       Subscriber Name Subscriber Birth Date Member ID       SARA CARDONA 1950 [id number]           Secondary Coverage     Payor Plan Insurance Group Employer/Plan Group    [facility] MEDICAID      Payor Plan Address Payor Plan Phone Number Effective From Effective To    [address] [address] [phone] 6/30/2016     [place] [address]       Subscriber Name Subscriber Birth Date Member ID       SARA CARDONA 1950 [id number]                 Emergency Contacts      (Rel.) Home Phone Work Phone Mobile Phone    Dulce Neal (Friend) [phone] -- --               History & Physical      Tish Givens DO at 7/3/2017  2:22 PM               [facility] HOSPITALIST HISTORY AND " PHYSICAL    Patient Identification:  Name:  Sara Cardona  Age:  66 y.o.  Sex:  female  :  1950  MRN:  [id number]   Visit Number:  [id number]  Primary Care Physician:  Marcelino Sheehan MD     Chief complaint:   Chief Complaint   Patient presents with   • Diarrhea, nausea, vomiting, illness     History of presenting illness:   Patient is a 66 y.o. female with past medical history significant for arteriosclerotic cardiovascular disease, aortic stenosis, COPD, insulin dependent type II diabetes mellitus, essential hypertension, and anxiety that presented to the [facility] emergency department for evaluation of nausea, vomiting, diarrhea, and generalized illness. Patient states that on 2017 she developed acute onset of nausea, vomiting, and diarrhea. She also reports mid abdominal pain and cramping. Patient states that her symptoms were so severe that she went to her primary care the next day after onset. It was recommended that the patient be admitted to the hospital for IV fluids. Patient states that she wanted to try to get well at home instead. Patient reports that she had had no diarrhea or further vomiting since 2017, but has continued to worsen and feel ill. She states that her abdominal pain is still present but much improved, she also states she no longer has abdominal cramping. She denies any change in her appetite, she states she has actually been tolerating PO intake as she normally does. It is noted that she is eating fast food meal during my evaluation. She is still complaining of waxing and waning severe nausea and GI upset.  Patient denies any chest pain or shortness of breath. She denies any cough or sputum. However, she does report increased phlegm production since time of onset. She denies any urinary symptoms. She denies use of home oxygen.    Patient states that she checks her blood glucose regularly and states that her average is 107-135. She states that she takes  oral medications and is prescribed insulin. She states that she has not had to have an insulin injection in approximately one month. Patient states that she does not regularly check her blood pressure at home, but states that her nephrologist recently took her off of lisinopril. However, patient states that she did not follow physician's instructions and continued to take lisinopril daily as she was afraid to stop taking it all of a sudden. She states she was under the impression the lisinopril was to be discontinued for further kidney studies that she is unsure of.     Upon arrival to the ED, vitals were temperature 98.3, HR 82, RR 16-20, and //86, and oxygen 98 % on room air. CMP with glucose 151, chloride 115, creatinine 3.49, BUN 46, calcium 7.5, and eGFR 13. Amylase and lipase both WNL. CBC with hemoglobin 10.9 and hematocrit 34.7. Lactate WNL. Initial ABG with pH 7.287, pCO2 32.2, pO2 50.7, HCO3 15.0, and oxygen saturation of 86.2% on room air. Urinalysis with cloudy appearance, 1+ glucose, and 3+ proteinuria. While in the ED patient was started on rocephin and doxycycline. She was also given two 500 ml bolus of normal saline and started on continuous infusion.     Patient has been admitted to the medical telemetry floor for further evaluation and treatment.   ---------------------------------------------------------------------------------------------------------------------   Review of Systems   Constitutional: Negative for appetite change, chills, diaphoresis, fatigue and fever.   HENT: Negative for congestion, postnasal drip, sinus pressure, sneezing and sore throat.    Eyes: Negative for pain and visual disturbance.   Respiratory: Negative for cough, shortness of breath and wheezing.    Cardiovascular: Negative for chest pain, palpitations and leg swelling.   Gastrointestinal: Positive for abdominal pain, diarrhea, nausea and vomiting.   Endocrine: Negative for cold intolerance.    Genitourinary: Negative for dysuria, enuresis, flank pain, frequency, hematuria and urgency.   Musculoskeletal: Negative for arthralgias, back pain and myalgias.   Skin: Negative for rash and wound.   Allergic/Immunologic: Negative for environmental allergies and immunocompromised state.   Neurological: Negative for dizziness, syncope and weakness.   Hematological: Negative for adenopathy. Does not bruise/bleed easily.   Psychiatric/Behavioral: Negative for confusion and decreased concentration.      ---------------------------------------------------------------------------------------------------------------------   Past Medical History:   Diagnosis Date   • Anxiety    • Aortic stenosis    • ASCVD (arteriosclerotic cardiovascular disease)    • Breast cancer    • Chronic pain    • COPD (chronic obstructive pulmonary disease)    • Diabetic neuropathy    • Dyslipidemia    • Essential hypertension    • Insulin dependent diabetes mellitus    • Left carotid bruit    • Recurrent pneumonia    • Renal failure      Past Surgical History:   Procedure Laterality Date   • APPENDECTOMY     • CARDIAC CATHETERIZATION     • COLONOSCOPY     • HYSTERECTOMY     • MASTECTOMY Right    • RHINOPLASTY       Family History   Problem Relation Age of Onset   • Diabetes Mother    • Lung cancer Father      Social History     Social History   • Marital status:      Spouse name: N/A   • Number of children: N/A   • Years of education: N/A     Social History Main Topics   • Smoking status: Current Every Day Smoker     Packs/day: 1.00     Types: Cigarettes   • Smokeless tobacco: None   • Alcohol use No   • Drug use: No   • Sexual activity: Defer     Other Topics Concern   • None     Social History Narrative   • None     ---------------------------------------------------------------------------------------------------------------------   Allergies:  Review of patient's allergies indicates no known  allergies.  ---------------------------------------------------------------------------------------------------------------------   Prior to Admission Medications     Prescriptions Last Dose Informant Patient Reported? Taking?    acetaminophen (TYLENOL) 500 MG tablet   Yes No    Take 500 mg by mouth as needed for mild pain (1-3)    albuterol (PROVENTIL HFA;VENTOLIN HFA) 108 (90 BASE) MCG/ACT inhaler 7/3/2017  No Yes    Inhale 2 puffs every 6 (six) hours as needed for wheezing.    amLODIPine (NORVASC) 10 MG tablet 7/3/2017  No Yes    Take 1 tablet by mouth daily.    aspirin 81 MG tablet 7/3/2017  Yes Yes    Take 81 mg by mouth daily    atorvastatin (LIPITOR) 80 MG tablet 7/3/2017  Yes Yes    Take 80 mg by mouth daily    clonazePAM (KlonoPIN) 0.5 MG tablet 7/3/2017  Yes Yes    Take 0.5 mg by mouth 2 (two) times a day as needed for anxiety or seizures.    cloNIDine (CATAPRES) 0.1 MG tablet 7/3/2017  No Yes    Take 1 tablet by mouth every 12 (twelve) hours.    erythromycin (ROMYCIN) 5 MG/GM ophthalmic ointment   No No    Administer  to the right eye Every 4 (Four) Hours While Awake.    hydrALAZINE (APRESOLINE) 10 MG tablet   Yes Yes    Take 10 mg by mouth 3 (Three) Times a Day.    insulin glargine (LANTUS) 100 UNIT/ML injection 7/3/2017  Yes Yes    Inject 35 Units under the skin every night    isosorbide mononitrate (IMDUR) 30 MG 24 hr tablet   Yes No    Take 30 mg by mouth daily    lisinopril (PRINIVIL,ZESTRIL) 40 MG tablet   Yes Yes    Take 40 mg by mouth Daily.    metaxalone (SKELAXIN) 800 MG tablet   No No    Take 1 tablet by mouth 3 (three) times a day as needed for muscle spasms.    metoprolol tartrate (LOPRESSOR) 25 MG tablet   No No    Take 2 tablets by mouth 2 (two) times a day.    nabumetone (RELAFEN) 750 MG tablet   No No    Take 1 tablet by mouth 2 (two) times a day as needed for moderate pain (4-6).        Hospital Scheduled Meds:    [START ON 7/4/2017] doxycycline 100 mg Intravenous Q12H   heparin  (porcine) 5,000 Units Subcutaneous Q12H       Pharmacy to Dose Zosyn     sodium chloride 125 mL/hr Last Rate: 125 mL/hr (07/03/17 1325)     ---------------------------------------------------------------------------------------------------------------------   Vital Signs:  Temp:  [98.3 °F (36.8 °C)-98.5 °F (36.9 °C)] 98.3 °F (36.8 °C)  Heart Rate:  [82-89] 88  Resp:  [16-20] 18  BP: (117-196)/() 182/84  Last 3 weights    07/03/17  0950   Weight: 115 lb (52.2 kg)     Body mass index is 21.73 kg/(m^2).  SpO2 Readings from Last 3 Encounters:   07/03/17 97%   02/22/17 99%   08/15/16 94%     ---------------------------------------------------------------------------------------------------------------------   Physical Exam:  Constitutional:  Well-developed and well-nourished.  No respiratory distress.  Nasal cannula in place.     HENT:  Head: Normocephalic and atraumatic.  Mouth:  Moist mucous membranes.    Eyes:  Conjunctivae and EOM are normal.  Pupils are equal, round, and reactive to light.  No scleral icterus.  Neck:  Neck supple.  No JVD present.    Cardiovascular:  Normal rate, regular rhythm and normal heart sounds with no murmur.  Pulmonary/Chest:  No respiratory distress, no wheezes, no crackles, with normal breath sounds and good air movement.  Abdominal:  Soft.  Bowel sounds are normal.  No distension. Mild generalized tenderness to palpation.   Musculoskeletal:  No edema, no tenderness, and no deformity.  No red or swollen joints anywhere.    Neurological:  Alert and oriented to person, place, and time.  No cranial nerve deficit.  No tongue deviation.  No facial droop.  No slurred speech.   Skin:  Skin is warm and dry.  No rash noted.  No pallor.   Psychiatric:  Normal mood and affect.  Behavior is normal.  Judgment and thought content normal.   Peripheral vascular:  Trace edema bilateral lower extremities and strong pulses on all 4 extremities.  Genitourinary: No perales catheter in place, making urine.    ---------------------------------------------------------------------------------------------------------------------  EKG:    Reviewed.    Telemetry:    Patient not yet on telemetry.     I have personally reviewed the EKG/Telemetry strip  ---------------------------------------------------------------------------------------------------------------------     Results from last 7 days  Lab Units 07/03/17  1158 07/03/17  1039   LACTATE mmol/L 0.5  --    WBC 10*3/mm3  --  8.67   HEMOGLOBIN g/dL  --  10.9*   HEMATOCRIT %  --  34.7*   MCV fL  --  85.9   MCHC g/dL  --  31.4*   PLATELETS 10*3/mm3  --  268       Results from last 7 days  Lab Units 07/03/17  1216   PH, ARTERIAL pH units 7.287*   PO2 ART mm Hg 50.7*   PCO2, ARTERIAL mm Hg 32.2*   HCO3 ART mmol/L 15.0*       Results from last 7 days  Lab Units 07/03/17  1039   SODIUM mmol/L 140   POTASSIUM mmol/L 3.8   MAGNESIUM mg/dL 1.9   CHLORIDE mmol/L 115*   CO2 mmol/L 17.5*   BUN mg/dL 46*   CREATININE mg/dL 3.49*   EGFR IF NONAFRICN AM mL/min/1.73 13*   CALCIUM mg/dL 7.5*   GLUCOSE mg/dL 151*   ALBUMIN g/dL 3.50   BILIRUBIN mg/dL 0.1*   ALK PHOS U/L 94   AST (SGOT) U/L 14   ALT (SGPT) U/L 11   Estimated Creatinine Clearance: 12 mL/min (by C-G formula based on Cr of 3.49).  No results found for: AMMONIA          Lab Results   Component Value Date    HGBA1C 6.80 (H) 08/09/2016     Lab Results   Component Value Date    TSH 0.188 (L) 08/03/2015    FREET4 0.94 08/03/2015     No results found for: PREGTESTUR, PREGSERUM, HCG, HCGQUANT  Pain Management Panel     Pain Management Panel Latest Ref Rng & Units 8/9/2016 8/8/2016    CREATININE UR mg/dL 51.6 150.7    AMPHETAMINES SCREEN, URINE Negative - Negative    BARBITURATES SCREEN Negative - Negative    BENZODIAZEPINE SCREEN, URINE Negative - Negative    COCAINE SCREEN, URINE Negative - Negative    METHADONE SCREEN, URINE Negative - Negative      I have personally reviewed the above laboratory results.    ---------------------------------------------------------------------------------------------------------------------  Imaging Results (last 7 days)     Procedure Component Value Units Date/Time    XR Abdomen 2 View With Chest 1 View [[id number]] Collected:  07/03/17 1134     Updated:  07/03/17 1137    Narrative:       FINDINGS:   Cardiomegaly persists. Mild vascular congestion. No airspace edema.   Right lower lung pneumonia has resolved. Mild left basilar airspace  disease may represent pneumonia or atelectasis.  No pneumothorax.   Trace pleural effusions.   No acute osseous findings.  Mild constipation. Nonobstructive bowel gas pattern. Calcification  projects in the region of the left kidney and may represent  nonobstructing stone.    Impression:       1. Left basilar airspace disease.  2. Stable cardiomegaly with mild CHF.  3. Nonobstructive bowel gas pattern with changes of mild constipation.  4. Possible nonobstructing stone in the region of left kidney.     This report was finalized on 7/3/2017 11:35 AM by Dr. Junior Angel MD.    CT Abdomen Pelvis Without Contrast [[id number]] Collected:  07/03/17 1252     Updated:  07/03/17 1258    Narrative:       EXAM: CT ABDOMEN PELVIS WO CONTRAST-   Findings  LOWER THORAX:   Cardiomegaly. Chronic interstitial lung changes. Interstitial edema  noted. Trace right pleural effusion which is nonloculated. Right middle  lobe atelectasis and/or scarring. Dense coronary vascular  calcifications.  ABDOMEN:  Unenhanced liver is within normal limits. Gallstones are noted. The  unenhanced spleen and pancreas appear within normal limits. Large  nonobstructing stone lower pole of the left kidney stable from previous  exam and is 9 mm. Right kidney appears within normal limits. The  unenhanced adrenals are unremarkable. Moderate to large amount of stool  throughout colon to level of cecum consistent with moderate-advanced  chronic constipation. Resolution of inflammatory changes of  the right  colon since the previous exam. No intra-abdominal free fluid. No free  air. No adenopathy by CT size criteria. Dense advanced calcified  atherosclerotic changes abdominal arterial vasculature without aneurysm.  Body wall edema/anasarca is noted.  PELVIS:  Sigmoid diverticulosis noted but without evidence of acute  diverticulitis. No pelvic free fluid or adenopathy. Prior hysterectomy.  Prior appendectomy. No distal ureteral stones or urinary bladder stones.  BONES:   No acute bony abnormality.    Impression:       Impression:  1. CHF/edema involving the partially imaged lower chest with right  middle lobe airspace disease likely atelectasis.  2. Chronic constipation and changes of sigmoid diverticulosis without  evidence of diverticulitis.  3. Resolution of previously described colitis.  4. Large nonobstructing stone left kidney.  5. Cholelithiasis.     This report was finalized on 7/3/2017 12:56 PM by Dr. Junior Angel MD.      I have personally reviewed the above radiology results.   ---------------------------------------------------------------------------------------------------------------------  Assessment and Plan:  -Acute on chronic stage IV renal failure with baseline creatinine of 1.2-1.7 and creatinine on admission of 3.49  -Acute hypoxic respiratory failure   -Questionable left basilar and right middle lobe pneumonia/airspace disease with possible aspiration component due to emesis  -Acute metabolic acidosis   -Essential hypertension   -Hyperlipidemia   -History of coronary artery disease  -Insulin dependent type II diabetes mellitus with hyperglycemia   -Diabetic neuropathy   -COPD without acute exacerbation   -History of aortic stenosis   -Prolonged QTc interval 474 m/s  -Anxiety   -Chronic pain syndrome   -Tobacco smoking addiction     Patient has been admitted to the medical telemetry floor for further evaluation and treatment. Plan to continue gentle IV fluid hydration, will be careful  to avoid large fluid boluses as patient's BNP is elevated at 500 and slight edema on exam. I have ordered an echo to further evaluate.  Will hold home lisinopril. Continue to avoid nephrotoxic agents. Renal ultrasound and urine electrolytes ordered and pending. Continue supplemental oxygen to titrate SpO2 > 90%. Repeat ABG has been ordered to further evaluate acidosis. PRN nebulizer treatments made available for shortness of breath. Due to radiology findings suggestive of airspace disease/pneumonia, patient has been empirically started on doxycycline and zosyn with pharmacy dosing to cover possible aspiration during emesis. Blood cultures pending, will order respiratory culture. Mycoplasma and legionella testing ordered and pending, once negative will discontinue doxycycline.     Plan to continue patient's home blood pressure medications with hold parameters. Continue to avoid QTc prolonging agents, will repeat EKG in the morning. Trend cardiacs to rule out infarction/ischemia. Patient has been placed on low dose sliding scale insulin with accuchecks for now, will hold oral agents for now. Hemoglobin A1C level ordered, will titrate therapy as necessary.     Will repeat labs in the morning and continue to monitor the patient closely on telemetry.       DVT Prophylaxis: SQ Heparin     The patient is considered to be a high risk patient due to: Acute on chronic renal failure, acute hypoxic respiratory failure, metabolic acidosis, CAD, insulin dependent type II diabetes mellitus, aortic stenosis, tobacco smoking addiction.    I have discussed the patient's assessment and plan with the patient and the patient's .     MEAGAN Jasso  07/03/17  3:12 PM  ---------------------------------------------------------------------------------------------------------------------     *I have discussed with MEAGAN Jasso and agree with her assessment. I have edited/amended this note to reflect my findings and  recommendations.     Electronically signed by Tish Givens DO at 7/4/2017  5:29 PM           Emergency Department Notes      MEAGAN Yoder at 7/3/2017 10:14 AM     Attestation signed by Reji Chang MD at 7/3/2017  6:17 PM              For this patient encounter, I reviewed the NP or PA documentation, treatment plan, and medical decision making. Reji Chang MD 7/3/2017 6:17 PM                               Subjective   HPI Comments: 66 year old female who presents with nausea and abdominal pain for the last 4 days.  She states initially she had vomiting and diarrhea but that stopped about 3 days ago.  She is having constant midabdominal cramping.  She states she has had a subjective fever.  She feels very gassy.  She tried Gas-X with no relief.  She saw her PCP when this first started and he mentioned that he wanted to admit her to the hospital but she declined.  She was prescribed Zofran and told to take Pepto Bismol and drink plenty of fluids.  She states she has done this but it has not really helped.  She feels very fatigued and weak.    Patient is a 66 y.o. female presenting with abdominal pain.   History provided by:  Patient  Abdominal Pain   Pain location:  Periumbilical  Pain quality: cramping    Pain radiates to:  Does not radiate  Pain severity:  Moderate  Onset quality:  Gradual  Duration:  4 days  Timing:  Constant  Progression:  Worsening  Chronicity:  New  Context: not recent travel, not sick contacts, not suspicious food intake and not trauma    Relieved by:  Nothing  Worsened by:  Nothing  Ineffective treatments: Zofran and Pepto Bismol.  Associated symptoms: diarrhea, fatigue, nausea and vomiting    Associated symptoms: no anorexia, no belching, no chest pain, no chills, no constipation, no cough, no dysuria, no fever, no hematemesis, no hematochezia, no hematuria, no melena and no shortness of breath        Review of Systems   Constitutional: Positive for appetite change  and fatigue. Negative for chills and fever.   HENT: Negative.    Eyes: Negative.    Respiratory: Negative for cough and shortness of breath.    Cardiovascular: Negative for chest pain.   Gastrointestinal: Positive for abdominal pain, diarrhea, nausea and vomiting. Negative for anorexia, constipation, hematemesis, hematochezia and melena.   Genitourinary: Negative for dysuria and hematuria.   Musculoskeletal: Negative.    Skin: Negative.    Neurological: Positive for weakness.   Psychiatric/Behavioral: Negative.    All other systems reviewed and are negative.      Past Medical History:   Diagnosis Date   • Anxiety    • Aortic stenosis    • ASCVD (arteriosclerotic cardiovascular disease)    • Breast cancer    • Chronic pain    • COPD (chronic obstructive pulmonary disease)    • Diabetic neuropathy    • Dyslipidemia    • Essential hypertension    • Insulin dependent diabetes mellitus    • Left carotid bruit    • Recurrent pneumonia    • Renal failure        No Known Allergies    Past Surgical History:   Procedure Laterality Date   • APPENDECTOMY     • CARDIAC CATHETERIZATION     • COLONOSCOPY     • HYSTERECTOMY     • MASTECTOMY Right    • RHINOPLASTY         Family History   Problem Relation Age of Onset   • Diabetes Mother    • Lung cancer Father        Social History     Social History   • Marital status:      Spouse name: N/A   • Number of children: N/A   • Years of education: N/A     Social History Main Topics   • Smoking status: Current Every Day Smoker     Packs/day: 1.00     Types: Cigarettes   • Smokeless tobacco: None   • Alcohol use No   • Drug use: No   • Sexual activity: Defer     Other Topics Concern   • None     Social History Narrative   • None           Objective   Physical Exam   Constitutional: She is oriented to person, place, and time. She appears well-developed and well-nourished.   HENT:   Head: Normocephalic and atraumatic.   Right Ear: External ear normal.   Left Ear: External ear  normal.   Nose: Nose normal.   Mouth/Throat: Oropharynx is clear and moist.   Eyes: Conjunctivae and EOM are normal. Pupils are equal, round, and reactive to light.   Neck: Normal range of motion. Neck supple.   Cardiovascular: Normal rate, regular rhythm, normal heart sounds and intact distal pulses.    Pulmonary/Chest: Effort normal and breath sounds normal. No respiratory distress.   Abdominal: Soft. Bowel sounds are normal. There is no tenderness. There is no rebound and no guarding.   Musculoskeletal: Normal range of motion.   Neurological: She is alert and oriented to person, place, and time.   Skin: Skin is warm and dry.   Psychiatric: She has a normal mood and affect. Her behavior is normal. Judgment and thought content normal.   Nursing note and vitals reviewed.      Procedures        ED Course  ED Course   Comment By Time   Pt found to have acute on chronic renal failure, last known creatinine here was 1.7 last year.  Pt also possibly has a left lower lobe pneumonia.  I have discussed with Dr. Givens who will admit to medical telemetry. MEAGAN Yoder 07/03 1725   EKG shows a sinus rhythm, rate of 79, nonspecific T wave abnormalities, Prolonged QT, no acute ischemia per MEAGAN Cheung 07/03 1727                  MDM  Number of Diagnoses or Management Options  Acute on chronic renal failure:   Dehydration:   Pneumonia of left lower lobe due to infectious organism:      Amount and/or Complexity of Data Reviewed  Clinical lab tests: reviewed and ordered  Tests in the radiology section of CPT®:  ordered and reviewed  Decide to obtain previous medical records or to obtain history from someone other than the patient: yes  Discuss the patient with other providers: yes    Patient Progress  Patient progress: stable      Final diagnoses:   Acute on chronic renal failure   Dehydration   Pneumonia of left lower lobe due to infectious organism            MEAGAN Yoder  07/03/17 7340       Zandra Vicente  PA  17 1727       Electronically signed by Reji Chang MD at 7/3/2017  6:17 PM      Susie Mitchell at 7/3/2017 10:55 AM          Pt has been given urine cup and encouraged to provide sample.     Susie Mitchell  17 1055       Electronically signed by Susie Mitchell at 7/3/2017 10:55 AM      Samira Chahal RN at 7/3/2017 11:40 AM          Patient resting on stretcher, NAD noted, 1st 500ml Bolus infusing at this time. Will continue to monitor.     Samira Chahal RN  17 1145       Electronically signed by Samira Chahal RN at 7/3/2017 11:45 AM      Susie Mitchell at 7/3/2017 12:34 PM          Pt is gone to CT at this time, will collect blood cultures when pt returns      Susie Mitchell  17 1235       Electronically signed by Susie Mitchell at 7/3/2017 12:35 PM           Physician Progress Notes (last 24 hours) (Notes from 2017  9:32 AM through 2017  9:32 AM)      Tish Givens DO at 2017  5:59 PM  Version 1 of 1         HOSPITALIST PROGRESS NOTE    Patient Identification:  Name:  Sara Cardona  Age:  66 y.o.  Sex:  female  :  1950  MRN:  [id number]  Visit Number:  [id number]  Primary Care Provider:  Marcelino Sheehan MD    Length of stay:  1     HPI: The patient is a 66 her old female who was admitted with nausea and found to have acute on chronic renal failure    Subjective:  The patient denies further episodes of nausea.  The patient does report lethargy as she states that she only takes her Neurontin as needed and does not take it as prescribed.  She denies vomiting and/or diarrhea.  She has no abdominal pain.  The patient reports that she has labile blood pressure readings at home as well.  She denies chest pain and/or shortness of breath but does complain of some occasional nonproductive cough.    Present during exam: N/A    Current Hospital Meds:    aspirin 81 mg Oral Daily   budesonide-formoterol 2 puff Inhalation BID - RT   heparin (porcine) 5,000  Units Subcutaneous Q12H   insulin aspart 0-7 Units Subcutaneous 4x Daily AC & at Bedtime   piperacillin-tazobactam 2.25 g Intravenous Q8H       sodium chloride 50 mL/hr Last Rate: 100 mL/hr (07/04/17 1440)       Vital Signs  Temp:  [98.2 °F (36.8 °C)-98.8 °F (37.1 °C)] 98.5 °F (36.9 °C)  Heart Rate:  [76-84] 84  Resp:  [18-19] 18  BP: (100-165)/(62-83) 157/77  Last 3 weights    07/03/17  0950   Weight: 115 lb (52.2 kg)     Body mass index is 21.73 kg/(m^2).    Physical exam:  Physical Exam   Constitutional: She is oriented to person, place, and time. She appears well-developed and well-nourished. She appears lethargic. No distress.   HENT:   Head: Normocephalic and atraumatic.   Nose: Nose normal.   Mouth/Throat: Oropharynx is clear and moist and mucous membranes are normal.   Eyes: Conjunctivae and EOM are normal. Pupils are equal, round, and reactive to light. No scleral icterus.   Neck: Trachea normal. Neck supple. No JVD present. Carotid bruit is not present. No thyromegaly present.   Cardiovascular: Normal rate, regular rhythm and normal heart sounds.  Exam reveals no gallop and no friction rub.    No murmur heard.  No significant lower extremity edema   Pulmonary/Chest: Effort normal. No respiratory distress. She has no wheezes. She has rhonchi in the right upper field, the right lower field and the left upper field. She has rales in the left lower field.   Abdominal: Soft. Bowel sounds are normal. She exhibits no distension. There is no tenderness. There is no guarding.   Musculoskeletal: Normal range of motion.   Neurological: She is oriented to person, place, and time. She has normal strength. She appears lethargic. No cranial nerve deficit.   Skin: Skin is warm, dry and intact. No rash noted. No erythema.   Psychiatric: She has a normal mood and affect. Her speech is delayed.     Telemetry: Sinus 88 with a PVC    Results Review:    Results from last 7 days  Lab Units 07/04/17  0240 07/03/17  1039   WBC  10*3/mm3 10.12 8.67   HEMOGLOBIN g/dL 8.8* 10.9*   HEMATOCRIT % 28.8* 34.7*   PLATELETS 10*3/mm3 246 268       Results from last 7 days  Lab Units 07/04/17  0240 07/03/17  1039   SODIUM mmol/L 141 140   POTASSIUM mmol/L 3.9 3.8   CHLORIDE mmol/L 120* 115*   CO2 mmol/L 15.2* 17.5*   BUN mg/dL 42* 46*   CREATININE mg/dL 3.28* 3.49*   CALCIUM mg/dL 7.0* 7.5*   GLUCOSE mg/dL 131* 151*       Results from last 7 days  Lab Units 07/04/17  0240 07/03/17  1039   BILIRUBIN mg/dL 0.1* 0.1*   ALK PHOS U/L 76 94   AST (SGOT) U/L 12 14   ALT (SGPT) U/L 9* 11       Results from last 7 days  Lab Units 07/03/17  1039   MAGNESIUM mg/dL 1.9           Results from last 7 days  Lab Units 07/04/17  0240 07/03/17  2037 07/03/17  1508   CK TOTAL U/L 80 115 108   TROPONIN I ng/mL <0.006 0.018 <0.006   CK MB INDEX % 6.3* 6.3* 6.1*   MYOGLOBIN ng/mL 122.0* 138.0* 128.0*       Results from last 7 days  Lab Units 07/04/17  0240 07/03/17  1403   BNP pg/mL 1003.0* 500.0*     Renal ultrasound:  FINDINGS:   Cholelithiasis is noted.      RIGHT: The right kidney measures 9.5 cm in length. No mass,  hydronephrosis, or shadowing stone.      LEFT: The left kidney measures 6.6 cm in length. No mass,  hydronephrosis, or shadowing stone. Size discrepancy when compared to  the right may be related to patient body habitus and imaging technique.      IMPRESSION:  1. Asymmetric size of the kidneys with left smaller than right probably  related to technique.  2. No hydronephrosis or shadowing stones noted.    Echocardiogram:          Assessment/Plan     -Non-oliguric SIMON on stage IV CKD with baseline creatinine for the past year ranging from 1.8-2.2; likely multifactorial in nature and related to poor oral intake with nausea/vomiting/diarrhea coupled with medications (ie ACEI); and labile blood pressure readings with history of diabetic nephropathy and nephrotic range proteinuria  -Cough; question volume overload in the setting of gentle IVF hydration and  severe pulmonary hypertension with small pericardial effusion  -Protein malnutrition with hypoalbuminemia causing pseudo-hypocalcemia  -Diet controlled diabetes mellitus type II; well controlled with A1c 6.1%  -Essential hypertension  -Tobacco abuse; counseled cessation  -COPD  -Generalized anxiety disorder    Will continue with IV fluid hydration to decrease the rate of infusion.  I have ordered for a urine to protein creatinine ratio.  Her calculated FeNa is greater than 1%.  Her urinalysis was not suggestive of an infection.  I have placed a consult for nephrology and have held her blood pressure medications at this time.  I have placed her on as needed IV hydralazine for systolic blood pressure greater than 170, otherwise will allow for permissive hypertension to allow for renal perfusion.  I have ordered a room-air arterial blood gas given the patient's decreased CO2 level. I have ordered a PA and lateral chest x-ray.  Doxycycline has been discontinued as her mycoplasma and Legionella studies were negative.  I have placed her on as needed Tessalon Perles for cough.  I will discontinue her IV cefepime as the patient does not appear to clinically exhibit pneumonia.  I have changed the patient's gabapentin to 100 mg at bedtime if needed.  I will repeat her CBC and CMP in the morning.      The patient is high risk due to the following diagnoses/reasons:  SIMON with CKD, pulmonary hypertension, tobacco abuse    I discussed the patients findings and my recommendations with patient and nursing staff (MARILY Hogan)    Disposition  Home when medically stable    Tish Givens DO  07/04/17  5:59 PM         Electronically signed by Tish Givens DO at 7/4/2017  6:17 PM        Consult Notes (last 24 hours) (Notes from 7/4/2017  9:32 AM through 7/5/2017  9:32 AM)     No notes of this type exist for this encounter.

## 2017-07-05 NOTE — CONSULTS
Nephrology Consult Note    Referring Provider: Dr Givens  Reason for Consultation: SIMON    Subjective       History of present illness:  Sara Cardona is a 66 y.o. female who presented to [facility] emergency department with chief complaint of nausea, vomiting and diarrhea. She states the above symptoms started last Thursday . She has had several episodes of vomiting and diarrhea. She still feels nauseous today. She had soft stools today. She denies chest pain. She has chronic SOB. She was noted to be sleeping comfortably on evaluation. I lowered her bed to zero degrees and she was comfortable and denies SOB. She complains of cough with whitish sputum. She has COPD and  still actively smoking 1 pack per day. She had been taking aleeve 2-3 times/week. Her baseline creatinine is 1.7 and it was elevated at 3.4 on admission. It has improved slightly to 3.2 today   Patient denies hematuria, dysuria, difficulty passing urine.     Acute Kidney Injury Risk Factors :    Medications : lisinopril  Contrast : no  NSAIDS : yes  Volume depletion : yes  Hemodynamic Instability : no    History  Past Medical History:   Diagnosis Date   • Anxiety    • Aortic stenosis    • ASCVD (arteriosclerotic cardiovascular disease)    • Breast cancer    • Chronic pain    • COPD (chronic obstructive pulmonary disease)    • Diabetic neuropathy    • Dyslipidemia    • Essential hypertension    • Insulin dependent diabetes mellitus    • Left carotid bruit    • Recurrent pneumonia    • Renal failure    , Past Surgical History:   Procedure Laterality Date   • APPENDECTOMY     • CARDIAC CATHETERIZATION     • COLONOSCOPY     • HYSTERECTOMY     • MASTECTOMY Right    • RHINOPLASTY     , Family History   Problem Relation Age of Onset   • Diabetes Mother    • Lung cancer Father    , Social History   Substance Use Topics   • Smoking status: Current Every Day Smoker     Packs/day: 1.00     Types: Cigarettes   • Smokeless tobacco: None   • Alcohol use  No   , Prescriptions Prior to Admission   Medication Sig Dispense Refill Last Dose   • albuterol (PROVENTIL HFA;VENTOLIN HFA) 108 (90 BASE) MCG/ACT inhaler Inhale 2 puffs every 6 (six) hours as needed for wheezing. (Patient taking differently: Inhale 2 puffs Every 6 (Six) Hours.) 3.7 g 0 7/2/2017 at 2100   • amLODIPine (NORVASC) 5 MG tablet Take 5 mg by mouth Every Night.   07/01/2017   • aspirin 81 MG tablet Take 81 mg by mouth daily   7/2/2017 at Unknown time   • budesonide-formoterol (SYMBICORT) 80-4.5 MCG/ACT inhaler Inhale 2 puffs 2 (Two) Times a Day.   7/2/2017 at 2100   • clonazePAM (KlonoPIN) 0.5 MG tablet Take 0.5 mg by mouth 2 (Two) Times a Day.   7/2/2017 at Unknown time   • gabapentin (NEURONTIN) 300 MG capsule Take 300 mg by mouth 3 (Three) Times a Day.   7/2/2017 at Unknown time   • hydrALAZINE (APRESOLINE) 25 MG tablet Take 25 mg by mouth 2 (Two) Times a Day With Meals.   7/2/2017 at Unknown time   • lisinopril (PRINIVIL,ZESTRIL) 40 MG tablet Take 40 mg by mouth Daily.   7/2/2017 at Unknown time   • ondansetron ODT (ZOFRAN-ODT) 4 MG disintegrating tablet Take 4 mg by mouth Every 4 (Four) Hours As Needed for Nausea or Vomiting.   Unknown at Unknown time   , Scheduled Meds:    aspirin 81 mg Oral Daily   budesonide-formoterol 2 puff Inhalation BID - RT   heparin (porcine) 5,000 Units Subcutaneous Q12H   insulin aspart 0-7 Units Subcutaneous 4x Daily AC & at Bedtime   , Continuous Infusions:    sodium chloride 75 mL/hr Last Rate: 50 mL/hr (07/05/17 0711)   , PRN Meds:  •  albuterol  •  benzonatate  •  clonazePAM  •  dextrose  •  dextrose  •  gabapentin  •  gabapentin  •  glucagon (human recombinant)  •  hydrALAZINE  •  ipratropium-albuterol  •  sodium chloride  •  Insert peripheral IV **AND** sodium chloride and Allergies:  Review of patient's allergies indicates no known allergies.    Review of Systems  More than 10 point review if systems was done. Pertinent items are noted in HPI, all other systems  reviewed and negative    Objective     Vital Signs  Temp:  [97.6 °F (36.4 °C)-98.6 °F (37 °C)] 97.6 °F (36.4 °C)  Heart Rate:  [81-93] 91  Resp:  [18-20] 19  BP: (138-178)/(61-93) 178/80    I/O this shift:  In: 1000 [I.V.:1000]  Out: -   I/O last 3 completed shifts:  In: 2040 [P.O.:2040]  Out: 2150 [Urine:2150]    Physical Examination:    General Appearance : alert, appears stated age, cooperative and no distress  Head : normocephalic, without obvious abnormality and atraumatic  Eyes : conjunctivae and sclerae normal, no icterus, no pallor and PERRLA  Throat : oral mucosa moist  Neck: no adenopathy, suppple, no carotid bruit and no JVD  Lungs : left base crackles . No wheezing  Heart : regular rhythm & normal rate, normal S1, S2, no murmur, no anjelica, no rub   Abdomen :  normal bowel sounds, no masses and soft non-tender  Rectal : Deferred  Extremities : moves extremities well, no redness and no edema  Pulses :  palpable and equal bilaterally  Skin : no bleeding, bruising or rash  Neurologic : orientated to person, place, time, grossly no focal deficitis    Laboratory Data :      WBC WBC   Date Value Ref Range Status   07/05/2017 12.67 (H) 4.50 - 12.50 10*3/mm3 Final   07/04/2017 10.12 4.50 - 12.50 10*3/mm3 Final   07/03/2017 8.67 4.50 - 12.50 10*3/mm3 Final      HGB Hemoglobin   Date Value Ref Range Status   07/05/2017 9.4 (L) 12.0 - 16.0 g/dL Final   07/04/2017 8.8 (L) 12.0 - 16.0 g/dL Final   07/03/2017 10.9 (L) 12.0 - 16.0 g/dL Final      HCT Hematocrit   Date Value Ref Range Status   07/05/2017 30.2 (L) 37.0 - 47.0 % Final   07/04/2017 28.8 (L) 37.0 - 47.0 % Final   07/03/2017 34.7 (L) 37.0 - 47.0 % Final      Platlets No results found for: LABPLAT   MCV MCV   Date Value Ref Range Status   07/05/2017 89.9 80.0 - 94.0 fL Final   07/04/2017 89.7 80.0 - 94.0 fL Final   07/03/2017 85.9 80.0 - 94.0 fL Final          Sodium Sodium   Date Value Ref Range Status   07/05/2017 144 135 - 153 mmol/L Final   07/04/2017 141  135 - 153 mmol/L Final   07/03/2017 140 135 - 153 mmol/L Final      Potassium Potassium   Date Value Ref Range Status   07/05/2017 3.8 3.5 - 5.3 mmol/L Final   07/04/2017 3.9 3.5 - 5.3 mmol/L Final   07/03/2017 3.8 3.5 - 5.3 mmol/L Final      Chloride Chloride   Date Value Ref Range Status   07/05/2017 119 (H) 99 - 112 mmol/L Final   07/04/2017 120 (H) 99 - 112 mmol/L Final   07/03/2017 115 (H) 99 - 112 mmol/L Final      CO2 CO2   Date Value Ref Range Status   07/05/2017 18.7 (L) 24.3 - 31.9 mmol/L Final   07/04/2017 15.2 (L) 24.3 - 31.9 mmol/L Final   07/03/2017 17.5 (L) 24.3 - 31.9 mmol/L Final      BUN BUN   Date Value Ref Range Status   07/05/2017 44 (H) 7 - 21 mg/dL Final   07/04/2017 42 (H) 7 - 21 mg/dL Final   07/03/2017 46 (H) 7 - 21 mg/dL Final      Creatinine Creatinine   Date Value Ref Range Status   07/05/2017 3.23 (H) 0.43 - 1.29 mg/dL Final   07/04/2017 3.28 (H) 0.43 - 1.29 mg/dL Final   07/03/2017 3.49 (H) 0.43 - 1.29 mg/dL Final      Calcium Calcium   Date Value Ref Range Status   07/05/2017 7.1 (L) 7.7 - 10.0 mg/dL Final   07/04/2017 7.0 (L) 7.7 - 10.0 mg/dL Final   07/03/2017 7.5 (L) 7.7 - 10.0 mg/dL Final      PO4 No results found for: CAPO4   Albumin Albumin   Date Value Ref Range Status   07/05/2017 3.00 (L) 3.40 - 4.80 g/dL Final   07/04/2017 2.70 (L) 3.40 - 4.80 g/dL Final   07/03/2017 3.50 3.40 - 4.80 g/dL Final      Magnesium Magnesium   Date Value Ref Range Status   07/03/2017 1.9 1.7 - 2.6 mg/dL Final      Uric Acid No results found for: URICACID     Radiology results :     Imaging Results (last 72 hours)     Procedure Component Value Units Date/Time    XR Abdomen 2 View With Chest 1 View [[id number]] Collected:  07/03/17 1134     Updated:  07/03/17 1137    Narrative:       EXAMINATION: XR ABDOMEN 2 VW W CHEST 1 VW-      CLINICAL INDICATION:     abd pain, nausea     TECHNIQUE:  XR ABDOMEN 2 VW W CHEST 1 VW-      COMPARISON: 8/14/2016      FINDINGS:   Cardiomegaly persists. Mild vascular  congestion. No airspace edema.   Right lower lung pneumonia has resolved. Mild left basilar airspace  disease may represent pneumonia or atelectasis.  No pneumothorax.   Trace pleural effusions.   No acute osseous findings.     Mild constipation. Nonobstructive bowel gas pattern. Calcification  projects in the region of the left kidney and may represent  nonobstructing stone.            Impression:       1. Left basilar airspace disease.  2. Stable cardiomegaly with mild CHF.  3. Nonobstructive bowel gas pattern with changes of mild constipation.  4. Possible nonobstructing stone in the region of left kidney.     This report was finalized on 7/3/2017 11:35 AM by Dr. Junior Angel MD.       CT Abdomen Pelvis Without Contrast [[id number]] Collected:  07/03/17 1252     Updated:  07/03/17 1258    Narrative:       EXAM: CT ABDOMEN PELVIS WO CONTRAST-              TECHNIQUE: Multiple axial CT images were obtained from lung bases  through pubic symphysis without administration of IV contrast.  Reformatted images in the coronal and/or sagittal plane(s) were  generated from the axial data set to facilitate diagnostic accuracy  and/or surgical planning.  Oral Contrast:NONE.     Radiation dose reduction techniques were utilized per ALARA protocol.  Automated exposure control was initiated through either or [employer] or  D[employer]Right software packages by  protocol.       DOSE:         Clinical information  abd pain, SIMON      Comparison  10/09/2015     Findings  LOWER THORAX:   Cardiomegaly. Chronic interstitial lung changes. Interstitial edema  noted. Trace right pleural effusion which is nonloculated. Right middle  lobe atelectasis and/or scarring. Dense coronary vascular  calcifications.     ABDOMEN:  Unenhanced liver is within normal limits. Gallstones are noted. The  unenhanced spleen and pancreas appear within normal limits. Large  nonobstructing stone lower pole of the left kidney stable from previous  exam and is  9 mm. Right kidney appears within normal limits. The  unenhanced adrenals are unremarkable. Moderate to large amount of stool  throughout colon to level of cecum consistent with moderate-advanced  chronic constipation. Resolution of inflammatory changes of the right  colon since the previous exam. No intra-abdominal free fluid. No free  air. No adenopathy by CT size criteria. Dense advanced calcified  atherosclerotic changes abdominal arterial vasculature without aneurysm.  Body wall edema/anasarca is noted.     PELVIS:  Sigmoid diverticulosis noted but without evidence of acute  diverticulitis. No pelvic free fluid or adenopathy. Prior hysterectomy.  Prior appendectomy. No distal ureteral stones or urinary bladder stones.     BONES:   No acute bony abnormality.       Impression:       Impression:     1. CHF/edema involving the partially imaged lower chest with right  middle lobe airspace disease likely atelectasis.  2. Chronic constipation and changes of sigmoid diverticulosis without  evidence of diverticulitis.  3. Resolution of previously described colitis.  4. Large nonobstructing stone left kidney.  5. Cholelithiasis.     This report was finalized on 7/3/2017 12:56 PM by Dr. Junior Angle MD.       US Renal Limited [[id number]] Collected:  07/04/17 0705     Updated:  07/04/17 0708    Narrative:       EXAMINATION: US RENAL LIMITED-      CLINICAL INDICATION:     acute renal failure; N17.9-Acute kidney  failure, unspecified; N18.9-Chronic kidney disease, unspecified;  E86.0-Dehydration; J18.9-Pneumonia, unspecified organism     TECHNIQUE: Multiplanar gray scale sonographic imaging of the kidneys.  Color Doppler implemented.     COMPARISON: NONE      FINDINGS:      Cholelithiasis is noted.     RIGHT: The right kidney measures 9.5 cm in length. No mass,  hydronephrosis, or shadowing stone.     LEFT: The left kidney measures 6.6 cm in length. No mass,  hydronephrosis, or shadowing stone. Size discrepancy when  compared to  the right may be related to patient body habitus and imaging technique.       Impression:       1. Asymmetric size of the kidneys with left smaller than right probably  related to technique.  2. No hydronephrosis or shadowing stones noted.     This report was finalized on 7/4/2017 7:06 AM by Dr. Junior Angel MD.       XR Chest PA & Lateral [[id number]] Collected:  07/04/17 1745     Updated:  07/04/17 1748    Narrative:       EXAMINATION: XR CHEST PA AND LATERAL-      CLINICAL INDICATION:     cough and wheezing; N17.9-Acute kidney failure,  unspecified; N18.9-Chronic kidney disease, unspecified;  E86.0-Dehydration; J18.9-Pneumonia, unspecified organism     TECHNIQUE:  XR CHEST PA AND LATERAL-      COMPARISON: 07/03/2017      FINDINGS:   Since previous exam, evolving changes of CHF with perihilar airspace  edema.   Bibasilar atelectasis.   Small pleural effusions.   No pneumothorax.   Cardiomegaly.  Stable bony structures.       Impression:       Worsening/evolving changes of CHF with airspace edema.     This report was finalized on 7/4/2017 5:46 PM by Dr. Junior Angel MD.               Medications:        aspirin 81 mg Oral Daily   budesonide-formoterol 2 puff Inhalation BID - RT   heparin (porcine) 5,000 Units Subcutaneous Q12H   insulin aspart 0-7 Units Subcutaneous 4x Daily AC & at Bedtime       sodium chloride 75 mL/hr Last Rate: 50 mL/hr (07/05/17 0711)       Assessment/Plan     Active Problems:    Acute renal failure      1. ISMAEL on CKD 3 : Her baseline creatinine is 1.7 and it was elevated at 3.4 on admission. It has improved slightly to 3.2 today. Ismael from volume depletion and NSAIDS. She was also on lisinopril at home. There is evidence of pulmonary infiltrates on CXR but she has no evidence of active CHF clinically   Increase NS to 75 cc/hr. She has no hydronephrosis.avoid nephrotoxins    2. Metabolic acidosis with respiratory compensation : lactic acid is normal. It is likely from ISMAEL. I  will start sodium bicarbonate tablets    3. Tyep2 DM : controlled    4. HTN : keep -150/80s for renal perfusion    5. Anemia : check iron profile    6. Bone mineral disorder: check labs    Thanks Dr Givens for the consult. We will follow with you.  I discussed the patients findings and my recommendations with patient    Jai Chavez MD  07/05/17  9:35 AM

## 2017-07-05 NOTE — PLAN OF CARE
Problem: Patient Care Overview (Adult)  Goal: Discharge Needs Assessment  Outcome: Ongoing (interventions implemented as appropriate)  Discharge Planning Assessment   Horacio     Patient Name: Sara Cardona                      MRN: [id number]  Today's Date: 7/5/2017                       Admit Date: 7/3/2017             Discharge Needs Assessment        07/05/17 1318     Living Environment     Lives With child(veda), adult   Pt lives with Dipti savage.      Transportation Available family or friend will provide   Ex-, Ángel to provide transportation at discharge.      Living Environment     Quality Of Family Relationships supportive     Able to Return to Prior Living Arrangements yes     Discharge Needs Assessment     Equipment Currently Used at Home none     Equipment Needed After Discharge --   Pt may need home O2. O2 SAT on RA to be checked prior to discharge.      Discharge Facility/Level Of Care Needs --   Pt does not utilize home health services. Pt may benefit from home health services.      Discharge Disposition home or self-care       [facility]       Discharge Plan        07/05/17 1321     Case Management/Social Work Plan     Plan Pt lives at home with Dipti savage and plans to return home at discharge. Pt does not utilize home health services. Pt may benefit from home health services. HH to be arranged with MD order. Pt does not have DME. O2 SAT on RA to be checked prior to discharge and home O2 to be arranged with qualifying O2 SAT or ABG with MD order. SS to follow and assist as needed with discharge planning.      Patient/Family In Agreement With Plan yes       [facility]       Demographic Summary        07/05/17 2842     Referral Information     Referral Source nursing     Reason For Consult --   SS received consult d/c planning; may need O2 at discharge...sat 86% on RA in ED and now sat 90-94% with 3 lpm. SS spoke to pt.      Primary Care Physician Information     Name Dr. Benson  Aguila in [place] and pt also see's kidney specialist in [place]        Legal        07/05/17 1318     Legal     Legal Comments Pt does not have a POA and living will. Pt declines information about a living will.       Purnima Bravo

## 2017-07-05 NOTE — PROGRESS NOTES
Discharge Planning Assessment  [facility]     Patient Name: Sara Cardona  MRN: [id number]  Today's Date: 7/5/2017    Admit Date: 7/3/2017          Discharge Needs Assessment       07/05/17 1318    Living Environment    Lives With child(veda), adult   Pt lives with daughterDipti.     Transportation Available family or friend will provide   Ex-Ángel to provide transportation at discharge.     Living Environment    Quality Of Family Relationships supportive    Able to Return to Prior Living Arrangements yes    Discharge Needs Assessment    Equipment Currently Used at Home none    Equipment Needed After Discharge --   Pt may need home O2. O2 SAT on RA to be checked prior to discharge.     Discharge Facility/Level Of Care Needs --   Pt does not utilize home health services. Pt may benefit from home health services.     Discharge Disposition home or self-care            Discharge Plan       07/05/17 1321    Case Management/Social Work Plan    Plan Pt lives at home with daughterDipti and plans to return home at discharge. Pt does not utilize home health services. Pt may benefit from home health services. HH to be arranged with MD order. Pt does not have DME. O2 SAT on RA to be checked prior to discharge and home O2 to be arranged with qualifying O2 SAT or ABG with MD order. SS to follow and assist as needed with discharge planning.     Patient/Family In Agreement With Plan yes             Demographic Summary       07/05/17 1315    Referral Information    Referral Source nursing    Reason For Consult --   SS received consult d/c planning; may need O2 at discharge...sat 86% on RA in ED and now sat 90-94% with 3 lpm. SS spoke to pt.     Primary Care Physician Information    Name Dr. Marcelino Sheehan in [place] and pt also see's kidney specialist in [place]          Legal       07/05/17 1318    Legal    Legal Comments Pt does not have a POA and living will. Pt declines information about a living will.            Purnima Bravo

## 2017-07-05 NOTE — PLAN OF CARE
Problem: Patient Care Overview (Adult)  Goal: Plan of Care Review  Outcome: Ongoing (interventions implemented as appropriate)    Problem: Renal Failure/Kidney Injury, Acute (Adult)  Goal: Signs and Symptoms of Listed Potential Problems Will be Absent or Manageable (Renal Failure/Kidney Injury, Acute)  Outcome: Ongoing (interventions implemented as appropriate)    Problem: Pneumonia (Adult)  Goal: Signs and Symptoms of Listed Potential Problems Will be Absent or Manageable (Pneumonia)  Outcome: Ongoing (interventions implemented as appropriate)

## 2017-07-06 LAB
027 TOXIN: (no result)
25(OH)D3 SERPL-MCNC: 29 NG/ML
ALBUMIN SERPL-MCNC: 2.9 G/DL (ref 3.4–4.8)
ALBUMIN/GLOB SERPL: 1.1 G/DL (ref 1.5–2.5)
ALP SERPL-CCNC: 80 U/L (ref 35–104)
ALT SERPL W P-5'-P-CCNC: 10 U/L (ref 10–36)
ANION GAP SERPL CALCULATED.3IONS-SCNC: 5.8 MMOL/L (ref 3.6–11.2)
AST SERPL-CCNC: 10 U/L (ref 10–30)
BASOPHILS # BLD AUTO: 0.05 10*3/MM3 (ref 0–0.3)
BASOPHILS NFR BLD AUTO: 0.4 % (ref 0–2)
BILIRUB SERPL-MCNC: 0.2 MG/DL (ref 0.2–1.8)
BNP SERPL-MCNC: 1261 PG/ML (ref 0–100)
BUN BLD-MCNC: 48 MG/DL (ref 7–21)
BUN/CREAT SERPL: 15 (ref 7–25)
C DIFF TOX GENS STL QL NAA+PROBE: NEGATIVE
CALCIUM SPEC-SCNC: 7.1 MG/DL (ref 7.7–10)
CHLORIDE SERPL-SCNC: 121 MMOL/L (ref 99–112)
CO2 SERPL-SCNC: 16.2 MMOL/L (ref 24.3–31.9)
CREAT BLD-MCNC: 3.2 MG/DL (ref 0.43–1.29)
DEPRECATED RDW RBC AUTO: 55.6 FL (ref 37–54)
EOSINOPHIL # BLD AUTO: 0.39 10*3/MM3 (ref 0–0.7)
EOSINOPHIL NFR BLD AUTO: 3.4 % (ref 0–7)
EOSINOPHIL SPEC QL WRIGHT STN: NORMAL %
ERYTHROCYTE [DISTWIDTH] IN BLOOD BY AUTOMATED COUNT: 17.5 % (ref 11.5–14.5)
FERRITIN SERPL-MCNC: 48 NG/ML (ref 10–290.3)
GFR SERPL CREATININE-BSD FRML MDRD: 14 ML/MIN/1.73
GLOBULIN UR ELPH-MCNC: 2.6 GM/DL
GLUCOSE BLD-MCNC: 143 MG/DL (ref 70–110)
GLUCOSE BLDC GLUCOMTR-MCNC: 128 MG/DL (ref 70–130)
GLUCOSE BLDC GLUCOMTR-MCNC: 148 MG/DL (ref 70–130)
GLUCOSE BLDC GLUCOMTR-MCNC: 187 MG/DL (ref 70–130)
GLUCOSE BLDC GLUCOMTR-MCNC: 211 MG/DL (ref 70–130)
HCT VFR BLD AUTO: 28.4 % (ref 37–47)
HEMOCCULT STL QL: POSITIVE
HGB BLD-MCNC: 9 G/DL (ref 12–16)
IMM GRANULOCYTES # BLD: 0.05 10*3/MM3 (ref 0–0.03)
IMM GRANULOCYTES NFR BLD: 0.4 % (ref 0–0.5)
IRON 24H UR-MRATE: 24 MCG/DL (ref 49–151)
IRON SATN MFR SERPL: 11 % (ref 15–50)
LYMPHOCYTES # BLD AUTO: 2.55 10*3/MM3 (ref 1–3)
LYMPHOCYTES NFR BLD AUTO: 22.1 % (ref 16–46)
MCH RBC QN AUTO: 27.4 PG (ref 27–33)
MCHC RBC AUTO-ENTMCNC: 31.7 G/DL (ref 33–37)
MCV RBC AUTO: 86.3 FL (ref 80–94)
MONOCYTES # BLD AUTO: 0.49 10*3/MM3 (ref 0.1–0.9)
MONOCYTES NFR BLD AUTO: 4.2 % (ref 0–12)
NEUTROPHILS # BLD AUTO: 8 10*3/MM3 (ref 1.4–6.5)
NEUTROPHILS NFR BLD AUTO: 69.5 % (ref 40–75)
OSMOLALITY SERPL CALC.SUM OF ELEC: 300.1 MOSM/KG (ref 273–305)
PHOSPHATE SERPL-MCNC: 4.2 MG/DL (ref 2.7–4.5)
PLATELET # BLD AUTO: 280 10*3/MM3 (ref 130–400)
PMV BLD AUTO: 10.3 FL (ref 6–10)
POTASSIUM BLD-SCNC: 3.4 MMOL/L (ref 3.5–5.3)
POTASSIUM BLD-SCNC: 3.5 MMOL/L (ref 3.5–5.3)
PROT SERPL-MCNC: 5.5 G/DL (ref 6–8)
PTH-INTACT SERPL-MCNC: 391.5 PG/ML (ref 14–72)
RBC # BLD AUTO: 3.29 10*6/MM3 (ref 4.2–5.4)
SODIUM BLD-SCNC: 143 MMOL/L (ref 135–153)
TIBC SERPL-MCNC: 228 MCG/DL (ref 241–421)
WBC NRBC COR # BLD: 11.53 10*3/MM3 (ref 4.5–12.5)

## 2017-07-06 PROCEDURE — 80053 COMPREHEN METABOLIC PANEL: CPT | Performed by: INTERNAL MEDICINE

## 2017-07-06 PROCEDURE — 94799 UNLISTED PULMONARY SVC/PX: CPT

## 2017-07-06 PROCEDURE — 85025 COMPLETE CBC W/AUTO DIFF WBC: CPT | Performed by: INTERNAL MEDICINE

## 2017-07-06 PROCEDURE — 25010000002 HEPARIN (PORCINE) PER 1000 UNITS: Performed by: INTERNAL MEDICINE

## 2017-07-06 PROCEDURE — 87045 FECES CULTURE AEROBIC BACT: CPT | Performed by: PHYSICIAN ASSISTANT

## 2017-07-06 PROCEDURE — 83880 ASSAY OF NATRIURETIC PEPTIDE: CPT | Performed by: PHYSICIAN ASSISTANT

## 2017-07-06 PROCEDURE — 84100 ASSAY OF PHOSPHORUS: CPT | Performed by: INTERNAL MEDICINE

## 2017-07-06 PROCEDURE — 87493 C DIFF AMPLIFIED PROBE: CPT | Performed by: PHYSICIAN ASSISTANT

## 2017-07-06 PROCEDURE — 82728 ASSAY OF FERRITIN: CPT | Performed by: INTERNAL MEDICINE

## 2017-07-06 PROCEDURE — 99233 SBSQ HOSP IP/OBS HIGH 50: CPT | Performed by: INTERNAL MEDICINE

## 2017-07-06 PROCEDURE — 63710000001 PROMETHAZINE PER 12.5 MG: Performed by: INTERNAL MEDICINE

## 2017-07-06 PROCEDURE — 87046 STOOL CULTR AEROBIC BACT EA: CPT | Performed by: PHYSICIAN ASSISTANT

## 2017-07-06 PROCEDURE — 82962 GLUCOSE BLOOD TEST: CPT

## 2017-07-06 PROCEDURE — 83970 ASSAY OF PARATHORMONE: CPT | Performed by: INTERNAL MEDICINE

## 2017-07-06 PROCEDURE — 25010000002 IRON SUCROSE PER 1 MG: Performed by: INTERNAL MEDICINE

## 2017-07-06 PROCEDURE — 82270 OCCULT BLOOD FECES: CPT | Performed by: INTERNAL MEDICINE

## 2017-07-06 PROCEDURE — 63710000001 INSULIN ASPART PER 5 UNITS: Performed by: PHYSICIAN ASSISTANT

## 2017-07-06 PROCEDURE — 83540 ASSAY OF IRON: CPT | Performed by: INTERNAL MEDICINE

## 2017-07-06 PROCEDURE — 82306 VITAMIN D 25 HYDROXY: CPT | Performed by: INTERNAL MEDICINE

## 2017-07-06 PROCEDURE — 87899 AGENT NOS ASSAY W/OPTIC: CPT | Performed by: PHYSICIAN ASSISTANT

## 2017-07-06 PROCEDURE — 84132 ASSAY OF SERUM POTASSIUM: CPT | Performed by: PHYSICIAN ASSISTANT

## 2017-07-06 PROCEDURE — 83550 IRON BINDING TEST: CPT | Performed by: INTERNAL MEDICINE

## 2017-07-06 RX ORDER — CALCITRIOL 0.25 UG/1
0.5 CAPSULE, LIQUID FILLED ORAL DAILY
Status: DISCONTINUED | OUTPATIENT
Start: 2017-07-06 | End: 2017-07-11 | Stop reason: HOSPADM

## 2017-07-06 RX ORDER — AMLODIPINE BESYLATE 5 MG/1
5 TABLET ORAL
Status: DISCONTINUED | OUTPATIENT
Start: 2017-07-06 | End: 2017-07-10

## 2017-07-06 RX ORDER — PANTOPRAZOLE SODIUM 40 MG/10ML
40 INJECTION, POWDER, LYOPHILIZED, FOR SOLUTION INTRAVENOUS EVERY 12 HOURS SCHEDULED
Status: DISCONTINUED | OUTPATIENT
Start: 2017-07-06 | End: 2017-07-10

## 2017-07-06 RX ORDER — LOPERAMIDE HYDROCHLORIDE 2 MG/1
2 CAPSULE ORAL 4 TIMES DAILY PRN
Status: DISCONTINUED | OUTPATIENT
Start: 2017-07-06 | End: 2017-07-11 | Stop reason: HOSPADM

## 2017-07-06 RX ADMIN — HEPARIN SODIUM 5000 UNITS: 5000 INJECTION, SOLUTION INTRAVENOUS; SUBCUTANEOUS at 08:25

## 2017-07-06 RX ADMIN — ASPIRIN 81 MG: 81 TABLET ORAL at 08:25

## 2017-07-06 RX ADMIN — PANTOPRAZOLE SODIUM 40 MG: 40 TABLET, DELAYED RELEASE ORAL at 04:32

## 2017-07-06 RX ADMIN — IPRATROPIUM BROMIDE AND ALBUTEROL SULFATE 3 ML: .5; 3 SOLUTION RESPIRATORY (INHALATION) at 19:01

## 2017-07-06 RX ADMIN — PANTOPRAZOLE SODIUM 40 MG: 40 INJECTION, POWDER, FOR SOLUTION INTRAVENOUS at 20:20

## 2017-07-06 RX ADMIN — SODIUM CHLORIDE 75 ML/HR: 9 INJECTION, SOLUTION INTRAVENOUS at 20:27

## 2017-07-06 RX ADMIN — IRON SUCROSE 200 MG: 20 INJECTION, SOLUTION INTRAVENOUS at 09:48

## 2017-07-06 RX ADMIN — INSULIN ASPART 2 UNITS: 100 INJECTION, SOLUTION INTRAVENOUS; SUBCUTANEOUS at 20:27

## 2017-07-06 RX ADMIN — HEPARIN SODIUM 5000 UNITS: 5000 INJECTION, SOLUTION INTRAVENOUS; SUBCUTANEOUS at 20:19

## 2017-07-06 RX ADMIN — SODIUM BICARBONATE TAB 650 MG 1300 MG: 650 TAB at 08:25

## 2017-07-06 RX ADMIN — PROMETHAZINE HYDROCHLORIDE 12.5 MG: 12.5 TABLET ORAL at 09:48

## 2017-07-06 RX ADMIN — BUDESONIDE AND FORMOTEROL FUMARATE DIHYDRATE 2 PUFF: 80; 4.5 AEROSOL RESPIRATORY (INHALATION) at 19:01

## 2017-07-06 RX ADMIN — BUDESONIDE AND FORMOTEROL FUMARATE DIHYDRATE 2 PUFF: 80; 4.5 AEROSOL RESPIRATORY (INHALATION) at 07:46

## 2017-07-06 RX ADMIN — CLONAZEPAM 0.5 MG: 0.5 TABLET ORAL at 08:25

## 2017-07-06 RX ADMIN — CALCITRIOL 0.5 MCG: 0.25 CAPSULE ORAL at 08:25

## 2017-07-06 RX ADMIN — SODIUM BICARBONATE TAB 650 MG 1300 MG: 650 TAB at 17:20

## 2017-07-06 RX ADMIN — AMLODIPINE BESYLATE 5 MG: 5 TABLET ORAL at 08:25

## 2017-07-06 RX ADMIN — CLONAZEPAM 0.5 MG: 0.5 TABLET ORAL at 21:19

## 2017-07-06 RX ADMIN — INSULIN ASPART 3 UNITS: 100 INJECTION, SOLUTION INTRAVENOUS; SUBCUTANEOUS at 12:32

## 2017-07-06 RX ADMIN — PROMETHAZINE HYDROCHLORIDE 12.5 MG: 12.5 TABLET ORAL at 20:20

## 2017-07-06 RX ADMIN — SODIUM BICARBONATE TAB 650 MG 1300 MG: 650 TAB at 20:20

## 2017-07-06 NOTE — PLAN OF CARE
Problem: Patient Care Overview (Adult)  Goal: Plan of Care Review  Outcome: Ongoing (interventions implemented as appropriate)  Goal: Adult Individualization and Mutuality  Outcome: Ongoing (interventions implemented as appropriate)    Problem: Renal Failure/Kidney Injury, Acute (Adult)  Goal: Signs and Symptoms of Listed Potential Problems Will be Absent or Manageable (Renal Failure/Kidney Injury, Acute)  Outcome: Ongoing (interventions implemented as appropriate)

## 2017-07-06 NOTE — PROGRESS NOTES
"HOSPITALIST PROGRESS NOTE    Patient Identification:  Name:  Sara Cardona  Age:  66 y.o.  Sex:  female  :  1950  MRN:  [id number]  Visit Number:  [id number]  Primary Care Provider:  Marcelino Sheehan MD    Length of stay:  3     HPI: The patient is a 65 yo female who was admitted on 7/3/2017 with nausea and found to have acute on chronic renal failure.     Subjective:  Today, the patient was resting in bed upon my evaluation this morning. She was eating her second [employer] bar. She reports ongoing nausea without vomiting. She reports multiple episodes of loose stool overnight and this morning. She reports that her stools are black. She reports that prior to admission she \"drank Pepto-Bismol by the bottle-ful.\"    She denies any other new complaints. She denies chest pain or shortness of breath. She denies abdominal pain. She denies urinary symptoms. She continues to report mild cough productive of white sputum, for which she states she has chronically.      Present during exam: N/A    Current Hospital Meds:    amLODIPine 5 mg Oral Q24H   aspirin 81 mg Oral Daily   budesonide-formoterol 2 puff Inhalation BID - RT   calcitriol 0.5 mcg Oral Daily   heparin (porcine) 5,000 Units Subcutaneous Q12H   insulin aspart 0-7 Units Subcutaneous 4x Daily AC & at Bedtime   iron sucrose (VENOFER) IVPB 200 mg Intravenous Q24H   pantoprazole 40 mg Oral Q AM   sodium bicarbonate 1,300 mg Oral TID       sodium chloride 75 mL/hr Last Rate: 75 mL/hr (17)       Vital Signs  Temp:  [97.6 °F (36.4 °C)-98.3 °F (36.8 °C)] 97.6 °F (36.4 °C)  Heart Rate:  [90-95] 91  Resp:  [18-20] 18  BP: (152-198)/() 162/90  Last 3 weights    17  0950   Weight: 115 lb (52.2 kg)     Body mass index is 21.73 kg/(m^2).    Physical exam:  Physical Exam   Constitutional: She is oriented to person, place, and time. She appears well-developed and well-nourished. No distress. Nasal cannula in place.   HENT:   Head: " Normocephalic and atraumatic.   Nose: Nose normal.   Mouth/Throat: Oropharynx is clear and moist and mucous membranes are normal.   Eyes: Conjunctivae and EOM are normal. Pupils are equal, round, and reactive to light. No scleral icterus.   Neck: Trachea normal. Neck supple. No JVD present. Carotid bruit is not present. No thyromegaly present.   Cardiovascular: Normal rate, regular rhythm and normal heart sounds.  Exam reveals no gallop and no friction rub.    No murmur heard.  No significant lower extremity edema   Pulmonary/Chest: Effort normal. No respiratory distress. She has no wheezes. She has no rhonchi. She has no rales.   Abdominal: Soft. Bowel sounds are normal. She exhibits no distension. There is no tenderness. There is no guarding.   Musculoskeletal: Normal range of motion.   Neurological: She is oriented to person, place, and time. She has normal strength. No cranial nerve deficit.   Skin: Skin is warm, dry and intact. No rash noted. No erythema.   Psychiatric: She has a normal mood and affect. Her speech is normal.     Results Review:    Results from last 7 days  Lab Units 07/06/17  0101 07/05/17  0116 07/04/17  0240 07/03/17  1039   WBC 10*3/mm3 11.53 12.67* 10.12 8.67   HEMOGLOBIN g/dL 9.0* 9.4* 8.8* 10.9*   HEMATOCRIT % 28.4* 30.2* 28.8* 34.7*   PLATELETS 10*3/mm3 280 280 246 268       Results from last 7 days  Lab Units 07/06/17  0956 07/06/17  0101 07/05/17  0116 07/04/17  0240 07/03/17  1039   SODIUM mmol/L  --  143 144 141 140   POTASSIUM mmol/L 3.5 3.4* 3.8 3.9 3.8   CHLORIDE mmol/L  --  121* 119* 120* 115*   CO2 mmol/L  --  16.2* 18.7* 15.2* 17.5*   BUN mg/dL  --  48* 44* 42* 46*   CREATININE mg/dL  --  3.20* 3.23* 3.28* 3.49*   CALCIUM mg/dL  --  7.1* 7.1* 7.0* 7.5*   GLUCOSE mg/dL  --  143* 134* 131* 151*       Results from last 7 days  Lab Units 07/06/17  0101 07/05/17  0116 07/04/17  0240 07/03/17  1039   BILIRUBIN mg/dL 0.2 0.1* 0.1* 0.1*   ALK PHOS U/L 80 78 76 94   AST (SGOT) U/L 10 14  12 14   ALT (SGPT) U/L 10 9* 9* 11       Results from last 7 days  Lab Units 07/03/17  1039   MAGNESIUM mg/dL 1.9           Results from last 7 days  Lab Units 07/04/17  0240 07/03/17  2037 07/03/17  1508   CK TOTAL U/L 80 115 108   TROPONIN I ng/mL <0.006 0.018 <0.006   CK MB INDEX % 6.3* 6.3* 6.1*   MYOGLOBIN ng/mL 122.0* 138.0* 128.0*       Results from last 7 days  Lab Units 07/06/17  0956 07/04/17  0240 07/03/17  1403   BNP pg/mL 1261.0* 1003.0* 500.0*     I have personally reviewed the above laboratory results.     CXR 7/4/2017    I have personally reviewed the above radiology results.     Assessment/Plan     -Non-oliguric SIMON on stage IV CKD with baseline creatinine for the past year ranging from 1.8-2.2; likely multifactorial in nature and related to poor oral intake with nausea/vomiting/diarrhea coupled with medications (ie ACEI); and labile blood pressure readings with history of diabetic nephropathy and nephrotic range proteinuria  -Dark stool per patient report with history of daily NSAID use  -Elevated parathyroid hormone  -Vitamin D insufficiency   -Mild leukocytosis  -Metabolic acidosis   -Protein malnutrition with hypoalbuminemia causing pseudo-hypocalcemia  -Diet controlled diabetes mellitus type II; well controlled with A1c 6.1%; overall fairly well controlled  -Essential hypertension, acceptable at this time  -Tobacco abuse; counseled cessation  -COPD without acute exacerbation   -Generalized anxiety disorder    Will continue IV fluid hydration. Continue calcitriol, sodium bicarb tabs, and Norvasc per nephrology. Consider re-introducing her hydralazine based on blood pressure readings. Creatinine slightly improved. Hemoccult still pending. I ordered C. Difficile toxin PCR and stool culture as patient has had multiple bouts of loose stool overnight and this morning. Continue to closely monitor hemoglobin and hematocrit for signs/symptoms of active bleeding, plan to transfuse if hemoglobin were to  drop below 7.0. IV iron started today. Will consult Gastroenterology for further evaluation of black stool and place her on IV pantoprazole.    Allow permissive hypertension to allow for renal perfusion with goal systolic blood pressure of 140-150's/80s. Will repeat labs in the morning and continue to monitor the patient closely on telemetry. Nephrology assistance is much appreciated.     DVT prophylaxis: SQ Heparin     The patient is high risk due to the following diagnoses/reasons:  SIMON with CKD, pulmonary hypertension, tobacco abuse, possible GI bleed    I discussed the patients findings and my recommendations with patient and nursing staff     Disposition  Home when medically stable    MEAGAN Jasso  07/06/17  10:46 AM    *I have discussed this patient with MEAGAN Jasso and agree with her assessment. I have evaluated the patient independently and have edited/amended this note to reflect my findings/recommendations.

## 2017-07-06 NOTE — SIGNIFICANT NOTE
Attempted to see her twice this morning. She was not in room. reviewed records from clinic  Creatinine was 2.5 in may 2017. It is stable at 3.2 today . Add venofer and calcitriol.

## 2017-07-07 LAB
A-A DO2: 50.5 MMHG (ref 0–300)
ALBUMIN SERPL-MCNC: 2.5 G/DL (ref 3.4–4.8)
ALBUMIN/GLOB SERPL: 1 G/DL (ref 1.5–2.5)
ALP SERPL-CCNC: 74 U/L (ref 35–104)
ALT SERPL W P-5'-P-CCNC: 7 U/L (ref 10–36)
ANION GAP SERPL CALCULATED.3IONS-SCNC: 6.6 MMOL/L (ref 3.6–11.2)
ARTERIAL PATENCY WRIST A: ABNORMAL
AST SERPL-CCNC: 11 U/L (ref 10–30)
ATMOSPHERIC PRESS: 729 MMHG
BASE EXCESS BLDA CALC-SCNC: -10.4 MMOL/L
BASOPHILS # BLD AUTO: 0.06 10*3/MM3 (ref 0–0.3)
BASOPHILS NFR BLD AUTO: 0.6 % (ref 0–2)
BDY SITE: ABNORMAL
BILIRUB SERPL-MCNC: 0.1 MG/DL (ref 0.2–1.8)
BNP SERPL-MCNC: 809 PG/ML (ref 0–100)
BODY TEMPERATURE: 98.6 C
BUN BLD-MCNC: 35 MG/DL (ref 7–21)
BUN/CREAT SERPL: 13 (ref 7–25)
CALCIUM SPEC-SCNC: 7 MG/DL (ref 7.7–10)
CHLORIDE SERPL-SCNC: 120 MMOL/L (ref 99–112)
CO2 SERPL-SCNC: 13.4 MMOL/L (ref 24.3–31.9)
COHGB MFR BLD: 2.7 % (ref 0–5)
CREAT BLD-MCNC: 2.7 MG/DL (ref 0.43–1.29)
DEPRECATED RDW RBC AUTO: 57.3 FL (ref 37–54)
EOSINOPHIL # BLD AUTO: 0.76 10*3/MM3 (ref 0–0.7)
EOSINOPHIL NFR BLD AUTO: 7.2 % (ref 0–7)
ERYTHROCYTE [DISTWIDTH] IN BLOOD BY AUTOMATED COUNT: 17.7 % (ref 11.5–14.5)
GFR SERPL CREATININE-BSD FRML MDRD: 18 ML/MIN/1.73
GLOBULIN UR ELPH-MCNC: 2.5 GM/DL
GLUCOSE BLD-MCNC: 153 MG/DL (ref 70–110)
GLUCOSE BLDC GLUCOMTR-MCNC: 115 MG/DL (ref 70–130)
GLUCOSE BLDC GLUCOMTR-MCNC: 171 MG/DL (ref 70–130)
GLUCOSE BLDC GLUCOMTR-MCNC: 176 MG/DL (ref 70–130)
GLUCOSE BLDC GLUCOMTR-MCNC: 95 MG/DL (ref 70–130)
HCO3 BLDA-SCNC: 14.9 MMOL/L (ref 22–26)
HCT VFR BLD AUTO: 25.9 % (ref 37–47)
HCT VFR BLD CALC: 29 % (ref 37–47)
HGB BLD-MCNC: 7.8 G/DL (ref 12–16)
HGB BLDA-MCNC: 10 G/DL (ref 12–16)
HOROWITZ INDEX BLD+IHG-RTO: 21 %
IMM GRANULOCYTES # BLD: 0.04 10*3/MM3 (ref 0–0.03)
IMM GRANULOCYTES NFR BLD: 0.4 % (ref 0–0.5)
LYMPHOCYTES # BLD AUTO: 3.06 10*3/MM3 (ref 1–3)
LYMPHOCYTES NFR BLD AUTO: 28.9 % (ref 16–46)
MAGNESIUM SERPL-MCNC: 1.5 MG/DL (ref 1.7–2.6)
MCH RBC QN AUTO: 27.6 PG (ref 27–33)
MCHC RBC AUTO-ENTMCNC: 30.1 G/DL (ref 33–37)
MCV RBC AUTO: 91.5 FL (ref 80–94)
METHGB BLD QL: 0.1 % (ref 0–3)
MODALITY: ABNORMAL
MONOCYTES # BLD AUTO: 0.57 10*3/MM3 (ref 0.1–0.9)
MONOCYTES NFR BLD AUTO: 5.4 % (ref 0–12)
NEUTROPHILS # BLD AUTO: 6.08 10*3/MM3 (ref 1.4–6.5)
NEUTROPHILS NFR BLD AUTO: 57.5 % (ref 40–75)
OSMOLALITY SERPL CALC.SUM OF ELEC: 290.4 MOSM/KG (ref 273–305)
OXYHGB MFR BLDV: 86.9 % (ref 85–100)
PCO2 BLDA: 31 MM HG (ref 35–45)
PH BLDA: 7.3 PH UNITS (ref 7.35–7.45)
PLATELET # BLD AUTO: 259 10*3/MM3 (ref 130–400)
PMV BLD AUTO: 10.8 FL (ref 6–10)
PO2 BLDA: 55.6 MM HG (ref 80–100)
POTASSIUM BLD-SCNC: 3.6 MMOL/L (ref 3.5–5.3)
PROT SERPL-MCNC: 5 G/DL (ref 6–8)
RBC # BLD AUTO: 2.83 10*6/MM3 (ref 4.2–5.4)
SAO2 % BLDCOA: 89.4 % (ref 90–100)
SODIUM BLD-SCNC: 140 MMOL/L (ref 135–153)
WBC NRBC COR # BLD: 10.57 10*3/MM3 (ref 4.5–12.5)

## 2017-07-07 PROCEDURE — 83050 HGB METHEMOGLOBIN QUAN: CPT | Performed by: INTERNAL MEDICINE

## 2017-07-07 PROCEDURE — 80053 COMPREHEN METABOLIC PANEL: CPT | Performed by: PHYSICIAN ASSISTANT

## 2017-07-07 PROCEDURE — 93005 ELECTROCARDIOGRAM TRACING: CPT | Performed by: INTERNAL MEDICINE

## 2017-07-07 PROCEDURE — 84484 ASSAY OF TROPONIN QUANT: CPT | Performed by: INTERNAL MEDICINE

## 2017-07-07 PROCEDURE — 83880 ASSAY OF NATRIURETIC PEPTIDE: CPT | Performed by: PHYSICIAN ASSISTANT

## 2017-07-07 PROCEDURE — 25010000002 MAGNESIUM SULFATE 2 GM/50ML SOLUTION: Performed by: INTERNAL MEDICINE

## 2017-07-07 PROCEDURE — 82805 BLOOD GASES W/O2 SATURATION: CPT | Performed by: INTERNAL MEDICINE

## 2017-07-07 PROCEDURE — 82375 ASSAY CARBOXYHB QUANT: CPT | Performed by: INTERNAL MEDICINE

## 2017-07-07 PROCEDURE — 93010 ELECTROCARDIOGRAM REPORT: CPT | Performed by: INTERNAL MEDICINE

## 2017-07-07 PROCEDURE — 99233 SBSQ HOSP IP/OBS HIGH 50: CPT | Performed by: INTERNAL MEDICINE

## 2017-07-07 PROCEDURE — 63710000001 INSULIN ASPART PER 5 UNITS: Performed by: PHYSICIAN ASSISTANT

## 2017-07-07 PROCEDURE — 94799 UNLISTED PULMONARY SVC/PX: CPT

## 2017-07-07 PROCEDURE — 82550 ASSAY OF CK (CPK): CPT | Performed by: INTERNAL MEDICINE

## 2017-07-07 PROCEDURE — 99222 1ST HOSP IP/OBS MODERATE 55: CPT | Performed by: INTERNAL MEDICINE

## 2017-07-07 PROCEDURE — 85025 COMPLETE CBC W/AUTO DIFF WBC: CPT | Performed by: PHYSICIAN ASSISTANT

## 2017-07-07 PROCEDURE — 25010000002 IRON SUCROSE PER 1 MG: Performed by: INTERNAL MEDICINE

## 2017-07-07 PROCEDURE — 36600 WITHDRAWAL OF ARTERIAL BLOOD: CPT | Performed by: INTERNAL MEDICINE

## 2017-07-07 PROCEDURE — 82553 CREATINE MB FRACTION: CPT | Performed by: INTERNAL MEDICINE

## 2017-07-07 PROCEDURE — 63710000001 PROMETHAZINE PER 12.5 MG: Performed by: INTERNAL MEDICINE

## 2017-07-07 PROCEDURE — 82962 GLUCOSE BLOOD TEST: CPT

## 2017-07-07 PROCEDURE — 83735 ASSAY OF MAGNESIUM: CPT | Performed by: INTERNAL MEDICINE

## 2017-07-07 PROCEDURE — 25010000002 HEPARIN (PORCINE) PER 1000 UNITS: Performed by: INTERNAL MEDICINE

## 2017-07-07 RX ORDER — SODIUM BICARBONATE 650 MG/1
1300 TABLET ORAL 4 TIMES DAILY
Status: DISCONTINUED | OUTPATIENT
Start: 2017-07-07 | End: 2017-07-10

## 2017-07-07 RX ORDER — MAGNESIUM SULFATE HEPTAHYDRATE 40 MG/ML
2 INJECTION, SOLUTION INTRAVENOUS ONCE
Status: COMPLETED | OUTPATIENT
Start: 2017-07-07 | End: 2017-07-07

## 2017-07-07 RX ORDER — BISACODYL 5 MG/1
10 TABLET, DELAYED RELEASE ORAL ONCE
Status: COMPLETED | OUTPATIENT
Start: 2017-07-09 | End: 2017-07-09

## 2017-07-07 RX ORDER — POLYETHYLENE GLYCOL 3350, SODIUM CHLORIDE, POTASSIUM CHLORIDE, SODIUM BICARBONATE, AND SODIUM SULFATE 240; 5.84; 2.98; 6.72; 22.72 G/4L; G/4L; G/4L; G/4L; G/4L
4000 POWDER, FOR SOLUTION ORAL ONCE
Status: COMPLETED | OUTPATIENT
Start: 2017-07-09 | End: 2017-07-09

## 2017-07-07 RX ADMIN — INSULIN ASPART 2 UNITS: 100 INJECTION, SOLUTION INTRAVENOUS; SUBCUTANEOUS at 09:15

## 2017-07-07 RX ADMIN — SODIUM BICARBONATE TAB 650 MG 1300 MG: 650 TAB at 09:16

## 2017-07-07 RX ADMIN — MAGNESIUM SULFATE IN WATER 2 G: 40 INJECTION, SOLUTION INTRAVENOUS at 21:45

## 2017-07-07 RX ADMIN — CLONAZEPAM 0.5 MG: 0.5 TABLET ORAL at 09:16

## 2017-07-07 RX ADMIN — IPRATROPIUM BROMIDE AND ALBUTEROL SULFATE 3 ML: .5; 3 SOLUTION RESPIRATORY (INHALATION) at 06:36

## 2017-07-07 RX ADMIN — LOPERAMIDE HYDROCHLORIDE 2 MG: 2 CAPSULE ORAL at 20:43

## 2017-07-07 RX ADMIN — PROMETHAZINE HYDROCHLORIDE 12.5 MG: 12.5 TABLET ORAL at 09:16

## 2017-07-07 RX ADMIN — AMLODIPINE BESYLATE 5 MG: 5 TABLET ORAL at 09:17

## 2017-07-07 RX ADMIN — ASPIRIN 81 MG: 81 TABLET ORAL at 09:17

## 2017-07-07 RX ADMIN — HEPARIN SODIUM 5000 UNITS: 5000 INJECTION, SOLUTION INTRAVENOUS; SUBCUTANEOUS at 09:17

## 2017-07-07 RX ADMIN — PANTOPRAZOLE SODIUM 40 MG: 40 INJECTION, POWDER, FOR SOLUTION INTRAVENOUS at 09:16

## 2017-07-07 RX ADMIN — PANTOPRAZOLE SODIUM 40 MG: 40 INJECTION, POWDER, FOR SOLUTION INTRAVENOUS at 20:24

## 2017-07-07 RX ADMIN — CALCITRIOL 0.5 MCG: 0.25 CAPSULE ORAL at 09:16

## 2017-07-07 RX ADMIN — BUDESONIDE AND FORMOTEROL FUMARATE DIHYDRATE 2 PUFF: 80; 4.5 AEROSOL RESPIRATORY (INHALATION) at 06:36

## 2017-07-07 RX ADMIN — BUDESONIDE AND FORMOTEROL FUMARATE DIHYDRATE 2 PUFF: 80; 4.5 AEROSOL RESPIRATORY (INHALATION) at 19:29

## 2017-07-07 RX ADMIN — BENZONATATE 100 MG: 100 CAPSULE ORAL at 10:48

## 2017-07-07 RX ADMIN — SODIUM BICARBONATE TAB 650 MG 1300 MG: 650 TAB at 20:25

## 2017-07-07 RX ADMIN — IPRATROPIUM BROMIDE AND ALBUTEROL SULFATE 3 ML: .5; 3 SOLUTION RESPIRATORY (INHALATION) at 19:21

## 2017-07-07 RX ADMIN — CLONAZEPAM 0.5 MG: 0.5 TABLET ORAL at 21:00

## 2017-07-07 RX ADMIN — HEPARIN SODIUM 5000 UNITS: 5000 INJECTION, SOLUTION INTRAVENOUS; SUBCUTANEOUS at 20:24

## 2017-07-07 RX ADMIN — IRON SUCROSE 200 MG: 20 INJECTION, SOLUTION INTRAVENOUS at 10:55

## 2017-07-07 NOTE — CONSULTS
07/07/17  Chief Complaint   Patient presents with   • Headache   • Diarrhea     Sara Cardona is a 66 y.o. female who presents today at the request of Dr. Givens for   Blood in stool    HPI  The patient was seen for a GI evaluation due to blood in her stool.  The patient reports that she has had nausea, vomiting, diarrhea, and abdominal pain for the past week.  She also reports melena but explains that she has been using a significant amount of Pepto-Bismol.  Patient has also been taking Aleve.  She denies recent use of antibiotics.  She has never had an EGD.  Her last colonoscopy was 15 years ago.  Patient had a positive fecal blood test.  Her hemoglobin is 7.8 and hematocrit is 25.9.  CT scan of her abdomen and pelvis revealed chronic constipation, sigmoid diverticulosis, and cholelithiasis.  Total bilirubin is normal.   Medical, surgical, social, and family histories were reviewed and are listed below.        Review of Systems  History obtained from the patient  General ROS: negative for - chills, fatigue, fever, malaise, weight gain or weight loss  Psychological ROS: negative for - anxiety, behavioral disorder, depression, disorientation, irritability, memory difficulties or suicidal ideation  ENT ROS: negative for - epistaxis, headaches, hearing change, nasal congestion, nasal discharge, oral lesions, sinus pain, sneezing, sore throat, tinnitus, vertigo, visual changes or vocal changes  Hematological and Lymphatic ROS: negative for - bleeding problems, blood clots, blood transfusions, bruising, fatigue, jaundice, night sweats, pallor, swollen lymph nodes or weight loss  Endocrine ROS: negative for - breast changes, hot flashes, malaise/lethargy, palpitations, polydipsia/polyuria, temperature intolerance or unexpected weight changes  Respiratory ROS: negative for - cough, hemoptysis, orthopnea, pleuritic pain, shortness of breath, tachypnea or wheezing  Cardiovascular ROS: negative for - chest pain, dyspnea on  exertion, edema, irregular heartbeat, loss of consciousness, murmur, orthopnea, palpitations, paroxysmal nocturnal dyspnea, rapid heart rate or shortness of breath  Gastrointestinal ROS: positive for-nausea, vomiting, diarrhea, abdominal pain, melena; negative for -appetite loss, blood in stools, change in bowel habits, change in stools, constipation,  gas/bloating, heartburn, hematemesis, stool incontinence or swallowing difficulty/pain  Genito-Urinary ROS: negative for - change in urinary stream, incontinence, nocturia, pelvic pain, scrotal mass/pain, urinary frequency/urgency or vulvar/vaginal symptoms  Musculoskeletal ROS: positive for-back pain; negative for - gait disturbance,  joint stiffness, joint swelling,  or muscular weakness  Neurological ROS: negative for - behavioral changes, bowel and bladder control changes, confusion, dizziness, gait disturbance, headaches, impaired coordination/balance, memory loss, numbness/tingling, seizures, speech problems, tremors, visual changes or weakness  Dermatological ROS: negative for lumps, nail changes, pruritus, rash and skin lesion changes    ACTIVE PROBLEMS:   Specialty Problems     None          PAST MEDICAL HISTORY:  Past Medical History:   Diagnosis Date   • Anxiety    • Aortic stenosis    • ASCVD (arteriosclerotic cardiovascular disease)    • Breast cancer    • Chronic pain    • COPD (chronic obstructive pulmonary disease)    • Diabetic neuropathy    • Dyslipidemia    • Essential hypertension    • Insulin dependent diabetes mellitus    • Left carotid bruit    • Recurrent pneumonia    • Renal failure        SURGICAL HISTORY:  Past Surgical History:   Procedure Laterality Date   • APPENDECTOMY     • CARDIAC CATHETERIZATION     • COLONOSCOPY     • HYSTERECTOMY     • MASTECTOMY Right    • RHINOPLASTY         FAMILY HISTORY:  Family History   Problem Relation Age of Onset   • Diabetes Mother    • Lung cancer Father        SOCIAL HISTORY:  Social History   Substance  Use Topics   • Smoking status: Current Every Day Smoker     Packs/day: 1.00     Types: Cigarettes   • Smokeless tobacco: Not on file   • Alcohol use No       CURRENT MEDICATION:    Current Facility-Administered Medications:   •  albuterol (PROVENTIL) nebulizer solution 0.083% 2.5 mg/3mL, 2.5 mg, Nebulization, Q6H PRN, MEAGAN Jasso  •  amLODIPine (NORVASC) tablet 5 mg, 5 mg, Oral, Q24H, Jai Chavez MD, 5 mg at 07/07/17 0917  •  aspirin EC tablet 81 mg, 81 mg, Oral, Daily, MEAGAN Jasso, 81 mg at 07/07/17 0917  •  benzonatate (TESSALON) capsule 100 mg, 100 mg, Oral, TID PRN, Tish Givens DO, 100 mg at 07/07/17 1048  •  [START ON 7/9/2017] bisacodyl (DULCOLAX) EC tablet 10 mg, 10 mg, Oral, Once, MEAGAN Williamson  •  budesonide-formoterol (SYMBICORT) 80-4.5 MCG/ACT inhaler 2 puff, 2 puff, Inhalation, BID - RT, MEAGAN Jasso, 2 puff at 07/07/17 0636  •  calcitriol (ROCALTROL) capsule 0.5 mcg, 0.5 mcg, Oral, Daily, Jai Chavez MD, 0.5 mcg at 07/07/17 0916  •  clonazePAM (KlonoPIN) tablet 0.5 mg, 0.5 mg, Oral, BID PRN, Tish Givens DO, 0.5 mg at 07/07/17 0916  •  dextrose (D50W) solution 25 g, 25 g, Intravenous, Q15 Min PRN, MEAGAN Jasso  •  dextrose (GLUTOSE) oral gel 15 g, 15 g, Oral, Q15 Min PRN, MEAGAN Jasso  •  gabapentin (NEURONTIN) capsule 100 mg, 100 mg, Oral, Nightly PRN, Tish Givens DO  •  gabapentin (NEURONTIN) capsule 200 mg, 200 mg, Oral, TID PRN, MEAGAN Jasso  •  glucagon (human recombinant) (GLUCAGEN DIAGNOSTIC) injection 1 mg, 1 mg, Subcutaneous, Q15 Min PRN, MEAGAN Jasso  •  heparin (porcine) 5000 UNIT/ML injection 5,000 Units, 5,000 Units, Subcutaneous, Q12H, Tish Givens DO, 5,000 Units at 07/07/17 0917  •  hydrALAZINE (APRESOLINE) injection 10 mg, 10 mg, Intravenous, Q6H PRN, Tish Givens DO  •  insulin aspart (novoLOG) injection 0-7 Units, 0-7 Units, Subcutaneous, 4x Daily AC & at Bedtime, Cheri  MEAGAN Grey, 2 Units at 07/07/17 0915  •  ipratropium-albuterol (DUO-NEB) nebulizer solution 3 mL, 3 mL, Nebulization, Q6H PRN, MEAGAN Jasso, 3 mL at 07/07/17 0636  •  iron sucrose (VENOFER) 200 mg in sodium chloride 0.9 % 100 mL IVPB, 200 mg, Intravenous, Q24H, Jai Chavez MD, Last Rate: 110 mL/hr at 07/07/17 1055, 200 mg at 07/07/17 1055  •  loperamide (IMODIUM) capsule 2 mg, 2 mg, Oral, 4x Daily PRN, Tish Givens DO  •  pantoprazole (PROTONIX) injection 40 mg, 40 mg, Intravenous, Q12H, Tish Givens DO, 40 mg at 07/07/17 0916  •  [START ON 7/9/2017] polyethylene glycol with electrolytes (GOLYTELY) solution 4,000 mL, 4,000 mL, Oral, Once, MEAGAN Williamson  •  promethazine (PHENERGAN) tablet 12.5 mg, 12.5 mg, Oral, Q8H PRN, Linda Pool MD, 12.5 mg at 07/07/17 0916  •  sodium bicarbonate tablet 1,300 mg, 1,300 mg, Oral, TID, Jai Chavez MD, 1,300 mg at 07/07/17 0916  •  sodium chloride 0.9 % flush 1-10 mL, 1-10 mL, Intravenous, PRN, Tish Givens DO  •  Insert peripheral IV, , , Once **AND** sodium chloride 0.9 % flush 10 mL, 10 mL, Intravenous, PRN, MEAGAN Yoder    ALLERGIES:  Review of patient's allergies indicates no known allergies.     VITAL SIGNS:  Vital Signs (last 24 hours)       07/06 0700  -  07/07 0659 07/07 0700  -  07/07 1132   Most Recent    Temp (°F) 97.6 -  98.8       98 (36.7)    Heart Rate 67 -  94       94    Resp 18 -  20       19    /71 -  176/86       125/71    SpO2 (%) 90 -  98       94          PHYSICAL EXAMINATION    General Appearance:    Alert, cooperative, in no acute distress   Head:    Normocephalic, without obvious abnormality, atraumatic   Eyes:            Lids and lashes normal, conjunctivae and sclerae normal, no   icterus, no pallor, corneas clear, PERRLA   Ears:    Ears appear intact with no abnormalities noted   Throat:   No oral lesions, no thrush, oral mucosa moist   Neck:   No adenopathy, supple, trachea midline, no  thyromegaly, no   carotid bruit, no JVD   Back:     No kyphosis present, no scoliosis present, no skin lesions,      erythema or scars, no tenderness to percussion or                   palpation,   range of motion normal   Lungs:     Crackles,respirations regular, even and unlabored    Heart:    Regular rhythm and normal rate, normal S1 and S2, no            murmur, no gallop, no rub, no click   Chest Wall:    No abnormalities observed   Abdomen:     Normal bowel sounds, no masses, no organomegaly, soft        non-tender, non-distended, no guarding, no rebound                tenderness   Rectal:     Deferred   Extremities:   Moves all extremities well, no edema, no cyanosis, no             redness   Pulses:   Pulses palpable and equal bilaterally   Skin:   No bleeding, bruising or rash   Lymph nodes:   No palpable adenopathy   Neurologic:   Cranial nerves 2 - 12 grossly intact, sensation intact, DTR       present and equal bilaterally       LABS  Lab Results (last 24 hours)     Procedure Component Value Units Date/Time    Eosinophil Smear [[id number]] Collected:  07/03/17 1706    Specimen:  Urine from Urine, Clean Catch Updated:  07/06/17 1213     Eosinophil, Urine No Eosinophils Seen %                                         <5% few or none seen       Narrative:       Performed at:  [address] [place], [place] [id number]  : Anjel Zheng PhD, Phone:  [phone]    POC Glucose Fingerstick [[id number]]  (Abnormal) Collected:  07/06/17 1217    Specimen:  Blood Updated:  07/06/17 1227     Glucose 211 (H) mg/dL     Narrative:       Meter: [id number] : [id number] Gunnar Rankin    Blood Culture [[id number]]  (Normal) Collected:  07/03/17 1203    Specimen:  Blood from Arm, Left Updated:  07/06/17 1301     Blood Culture No growth at 3 days    Blood Culture [[id number]]  (Normal) Collected:  07/03/17 1253    Specimen:  Blood from Arm, Left Updated:  07/06/17 1401     Blood Culture No  growth at 3 days    Clostridium Difficile Toxin, PCR [[id number]] Collected:  07/06/17 0817    Specimen:  Stool from Per Rectum Updated:  07/06/17 1504     C. Difficile Toxins by PCR Negative     027 Toxin Presumptive Negative    Narrative:         For In Vitro Diagnostic use only.  027-NAP1-BI results are NOT intended to guide treatment. 027 typing is relative to PCR ribotyping and shown to be equivalent to B1 typing by restriction endonuclease analysis or NAP1 typing by pulse field gel electrophoresis.    POC Glucose Fingerstick [[id number]]  (Abnormal) Collected:  07/06/17 1638    Specimen:  Blood Updated:  07/06/17 1653     Glucose 148 (H) mg/dL     Narrative:       Meter: [id number] : [id number] Gunnar Rankin    Occult Blood X 1, Stool [[id number]]  (Abnormal) Collected:  07/06/17 0817    Specimen:  Stool from Per Rectum Updated:  07/06/17 2011     Fecal Occult Blood Positive (A)    POC Glucose Fingerstick [[id number]]  (Abnormal) Collected:  07/06/17 2018    Specimen:  Blood Updated:  07/06/17 2024     Glucose 187 (H) mg/dL     Narrative:       Meter: [id number] : [id number] deepthi casanova    CBC & Differential [[id number]] Collected:  07/07/17 0115    Specimen:  Blood Updated:  07/07/17 0212    Narrative:       The following orders were created for panel order CBC & Differential.  Procedure                               Abnormality         Status                     ---------                               -----------         ------                     CBC Auto Differential[[id number]]        Abnormal            Final result                 Please view results for these tests on the individual orders.    CBC Auto Differential [[id number]]  (Abnormal) Collected:  07/07/17 0115    Specimen:  Blood Updated:  07/07/17 0212     WBC 10.57 10*3/mm3      RBC 2.83 (L) 10*6/mm3      Hemoglobin 7.8 (L) g/dL      Hematocrit 25.9 (L) %      MCV 91.5 fL      MCH 27.6 pg      MCHC 30.1 (L) g/dL      RDW 17.7 (H) %       RDW-SD 57.3 (H) fl      MPV 10.8 (H) fL      Platelets 259 10*3/mm3      Neutrophil % 57.5 %      Lymphocyte % 28.9 %      Monocyte % 5.4 %      Eosinophil % 7.2 (H) %      Basophil % 0.6 %      Immature Grans % 0.4 %      Neutrophils, Absolute 6.08 10*3/mm3      Lymphocytes, Absolute 3.06 (H) 10*3/mm3      Monocytes, Absolute 0.57 10*3/mm3      Eosinophils, Absolute 0.76 (H) 10*3/mm3      Basophils, Absolute 0.06 10*3/mm3      Immature Grans, Absolute 0.04 (H) 10*3/mm3     Comprehensive Metabolic Panel [[id number]]  (Abnormal) Collected:  07/07/17 0115    Specimen:  Blood Updated:  07/07/17 0228     Glucose 153 (H) mg/dL      BUN 35 (H) mg/dL      Creatinine 2.70 (H) mg/dL      Sodium 140 mmol/L      Potassium 3.6 mmol/L      Chloride 120 (H) mmol/L      CO2 13.4 (L) mmol/L      Calcium 7.0 (L) mg/dL      Total Protein 5.0 (L) g/dL      Albumin 2.50 (L) g/dL      ALT (SGPT) 7 (L) U/L      AST (SGOT) 11 U/L      Alkaline Phosphatase 74 U/L       Note New Reference Ranges        Total Bilirubin 0.1 (L) mg/dL      eGFR Non African Amer 18 (L) mL/min/1.73      Globulin 2.5 gm/dL      A/G Ratio 1.0 (L) g/dL      BUN/Creatinine Ratio 13.0     Anion Gap 6.6 mmol/L     Magnesium [[id number]]  (Abnormal) Collected:  07/07/17 0115    Specimen:  Blood Updated:  07/07/17 0228     Magnesium 1.5 (L) mg/dL     Osmolality, Calculated [[id number]]  (Normal) Collected:  07/07/17 0115    Specimen:  Blood Updated:  07/07/17 0228     Osmolality Calc 290.4 mOsm/kg     BNP [[id number]]  (Abnormal) Collected:  07/07/17 0115    Specimen:  Blood Updated:  07/07/17 0235     .0 (H) pg/mL     Stool Culture [[id number]] Collected:  07/06/17 0817    Specimen:  Stool from Per Rectum Updated:  07/07/17 0642     Stool Culture Culture in progress    POC Glucose Fingerstick [[id number]]  (Abnormal) Collected:  07/07/17 0902    Specimen:  Blood Updated:  07/07/17 0909     Glucose 176 (H) mg/dL     Narrative:       Meter: [id number] : [id number]  Ismael DALY    Blood Gas, Arterial [[id number]]  (Abnormal) Collected:  07/07/17 1004    Specimen:  Arterial Blood Updated:  07/07/17 1014     Site Arterial: right brachial     Vince's Test N/A     pH, Arterial 7.299 (C) pH units      pCO2, Arterial 31.0 (L) mm Hg      pO2, Arterial 55.6 (L) mm Hg      HCO3, Arterial 14.9 (C) mmol/L      Base Excess, Arterial -10.4 mmol/L      O2 Saturation, Arterial 89.4 (L) %      Hemoglobin, Blood Gas 10.0 (L) g/dL      Hematocrit, Blood Gas 29.0 (L) %      Oxyhemoglobin 86.9 %      Methemoglobin 0.1 %      Carboxyhemoglobin 2.7 %      A-a Gradiant 50.5 mmHg      Temperature 98.6 C      Barometric Pressure for Blood Gas 729 mmHg      Modality Room air     FIO2 21 %           IMAGING  Imaging Results (last 72 hours)     Procedure Component Value Units Date/Time    XR Chest PA & Lateral [[id number]] Collected:  07/04/17 1745     Updated:  07/04/17 1748    Narrative:       EXAMINATION: XR CHEST PA AND LATERAL-      CLINICAL INDICATION:     cough and wheezing; N17.9-Acute kidney failure,  unspecified; N18.9-Chronic kidney disease, unspecified;  E86.0-Dehydration; J18.9-Pneumonia, unspecified organism     TECHNIQUE:  XR CHEST PA AND LATERAL-      COMPARISON: 07/03/2017      FINDINGS:   Since previous exam, evolving changes of CHF with perihilar airspace  edema.   Bibasilar atelectasis.   Small pleural effusions.   No pneumothorax.   Cardiomegaly.  Stable bony structures.       Impression:       Worsening/evolving changes of CHF with airspace edema.     This report was finalized on 7/4/2017 5:46 PM by Dr. Junior Angel MD.             Assessment/Plan   Rectal bleeding  Blood loss anemia  ASCVD  COPD  Diabetes mellitus  Hypertension  Hyperlipidemia  History of breast cancer    REC  Follow HGB + HCT levels--transfuse if needed.  Treatment with Protonix has been initiated.  EGD and colonoscopy will be arranged (Monday).  Procedures, benefits, risks and alternatives were explained to the  patient.  Concur with present management.  Thank you for asking me to participate in the care of your patient.    Patient seen and evaluated by Dr. Suarez, including history of present illness, physical examination, assessment and treatment plan.  The note above was reviewed and edited by Dr. Suarez.      Electronically signed by: MEAGAN Williamson     CC:  Dr. Tish Givens

## 2017-07-07 NOTE — PLAN OF CARE
Problem: Patient Care Overview (Adult)  Goal: Plan of Care Review  Outcome: Ongoing (interventions implemented as appropriate)    07/07/17 0305   Coping/Psychosocial Response Interventions   Plan Of Care Reviewed With patient   Patient Care Overview   Progress no change         Problem: Renal Failure/Kidney Injury, Acute (Adult)  Goal: Signs and Symptoms of Listed Potential Problems Will be Absent or Manageable (Renal Failure/Kidney Injury, Acute)  Outcome: Ongoing (interventions implemented as appropriate)    07/07/17 0305   Renal Failure/Kidney Injury, Acute   Problems Assessed (Acute Renal Failure/Kidney Injury) all   Problems Present (Acute Renal Failure/Kidney Injury) electrolyte imbalance         Problem: Pneumonia (Adult)  Goal: Signs and Symptoms of Listed Potential Problems Will be Absent or Manageable (Pneumonia)  Outcome: Ongoing (interventions implemented as appropriate)    07/07/17 0305   Pneumonia   Problems Assessed (Pneumonia) all   Problems Present (Pneumonia) none

## 2017-07-07 NOTE — PLAN OF CARE
Problem: Patient Care Overview (Adult)  Goal: Plan of Care Review    07/07/17 0305 07/07/17 0800   Coping/Psychosocial Response Interventions   Plan Of Care Reviewed With --  patient   Patient Care Overview   Progress no change --          Problem: Renal Failure/Kidney Injury, Acute (Adult)  Goal: Signs and Symptoms of Listed Potential Problems Will be Absent or Manageable (Renal Failure/Kidney Injury, Acute)    07/07/17 0305   Renal Failure/Kidney Injury, Acute   Problems Assessed (Acute Renal Failure/Kidney Injury) all   Problems Present (Acute Renal Failure/Kidney Injury) electrolyte imbalance         Problem: Pneumonia (Adult)  Goal: Signs and Symptoms of Listed Potential Problems Will be Absent or Manageable (Pneumonia)    07/07/17 0305   Pneumonia   Problems Assessed (Pneumonia) all   Problems Present (Pneumonia) none

## 2017-07-07 NOTE — PROGRESS NOTES
Nephrology  Note      Subjective       Still has nausea, diarrhea better  eating well    Objective     Vital Signs  Temp:  [97.6 °F (36.4 °C)-98.8 °F (37.1 °C)] 98 °F (36.7 °C)  Heart Rate:  [81-94] 81  Resp:  [18-20] 20  BP: (143-176)/(69-90) 151/72    I/O this shift:  In: 240 [P.O.:240]  Out: -   I/O last 3 completed shifts:  In: 4740 [P.O.:2890; I.V.:1850]  Out: -     Physical Examination:    General Appearance : alert,  no distress  Head : normocephalic  Eyes :  no pallor   Throat : oral mucosa moist  Neck:  no JVD  Lungs : clear, No wheezing  Heart : regular rhythm & normal rate, normal S1, S2, no murmur, no anjelica, no rub   Abdomen :   soft non-tender  Extremities : trace edema  Pulses :  palpable and equal bilaterally  Skin : no bleeding, bruising or rash  Neurologic : orientated to person, place, time, grossly no focal deficitis    Laboratory Data :      WBC WBC   Date Value Ref Range Status   07/07/2017 10.57 4.50 - 12.50 10*3/mm3 Final   07/06/2017 11.53 4.50 - 12.50 10*3/mm3 Final   07/05/2017 12.67 (H) 4.50 - 12.50 10*3/mm3 Final      HGB Hemoglobin   Date Value Ref Range Status   07/07/2017 7.8 (L) 12.0 - 16.0 g/dL Final   07/06/2017 9.0 (L) 12.0 - 16.0 g/dL Final   07/05/2017 9.4 (L) 12.0 - 16.0 g/dL Final      HCT Hematocrit   Date Value Ref Range Status   07/07/2017 25.9 (L) 37.0 - 47.0 % Final   07/06/2017 28.4 (L) 37.0 - 47.0 % Final   07/05/2017 30.2 (L) 37.0 - 47.0 % Final      Platlets No results found for: LABPLAT   MCV MCV   Date Value Ref Range Status   07/07/2017 91.5 80.0 - 94.0 fL Final   07/06/2017 86.3 80.0 - 94.0 fL Final   07/05/2017 89.9 80.0 - 94.0 fL Final          Sodium Sodium   Date Value Ref Range Status   07/07/2017 140 135 - 153 mmol/L Final   07/06/2017 143 135 - 153 mmol/L Final   07/05/2017 144 135 - 153 mmol/L Final      Potassium Potassium   Date Value Ref Range Status   07/07/2017 3.6 3.5 - 5.3 mmol/L Final   07/06/2017 3.5 3.5 - 5.3 mmol/L Final   07/06/2017 3.4 (L) 3.5  - 5.3 mmol/L Final   07/05/2017 3.8 3.5 - 5.3 mmol/L Final      Chloride Chloride   Date Value Ref Range Status   07/07/2017 120 (H) 99 - 112 mmol/L Final   07/06/2017 121 (H) 99 - 112 mmol/L Final   07/05/2017 119 (H) 99 - 112 mmol/L Final      CO2 CO2   Date Value Ref Range Status   07/07/2017 13.4 (L) 24.3 - 31.9 mmol/L Final   07/06/2017 16.2 (L) 24.3 - 31.9 mmol/L Final   07/05/2017 18.7 (L) 24.3 - 31.9 mmol/L Final      BUN BUN   Date Value Ref Range Status   07/07/2017 35 (H) 7 - 21 mg/dL Final   07/06/2017 48 (H) 7 - 21 mg/dL Final   07/05/2017 44 (H) 7 - 21 mg/dL Final      Creatinine Creatinine   Date Value Ref Range Status   07/07/2017 2.70 (H) 0.43 - 1.29 mg/dL Final   07/06/2017 3.20 (H) 0.43 - 1.29 mg/dL Final   07/05/2017 3.23 (H) 0.43 - 1.29 mg/dL Final      Calcium Calcium   Date Value Ref Range Status   07/07/2017 7.0 (L) 7.7 - 10.0 mg/dL Final   07/06/2017 7.1 (L) 7.7 - 10.0 mg/dL Final   07/05/2017 7.1 (L) 7.7 - 10.0 mg/dL Final      PO4 No results found for: CAPO4   Albumin Albumin   Date Value Ref Range Status   07/07/2017 2.50 (L) 3.40 - 4.80 g/dL Final   07/06/2017 2.90 (L) 3.40 - 4.80 g/dL Final   07/05/2017 3.00 (L) 3.40 - 4.80 g/dL Final      Magnesium Magnesium   Date Value Ref Range Status   07/07/2017 1.5 (L) 1.7 - 2.6 mg/dL Final      Uric Acid No results found for: URICACID     Radiology results :   Imaging Results (last 24 hours)     ** No results found for the last 24 hours. **              Medications:        amLODIPine 5 mg Oral Q24H   aspirin 81 mg Oral Daily   budesonide-formoterol 2 puff Inhalation BID - RT   calcitriol 0.5 mcg Oral Daily   heparin (porcine) 5,000 Units Subcutaneous Q12H   insulin aspart 0-7 Units Subcutaneous 4x Daily AC & at Bedtime   iron sucrose (VENOFER) IVPB 200 mg Intravenous Q24H   pantoprazole 40 mg Intravenous Q12H   sodium bicarbonate 1,300 mg Oral TID       sodium chloride 75 mL/hr Last Rate: 75 mL/hr (07/06/17 2027)       Assessment/Plan     Active  Problems:    Acute renal failure      1. SIMON on CKD 3 : Her baseline creatinine is 2.5 from may 2017/. It has improved  to 2.7 today. Dc NS as she is developing edema.   SIMON from volume depletion and NSAIDS. She was also on lisinopril at home.  She has no hydronephrosis.avoid nephrotoxins    2. Metabolic acidosis with respiratory compensation : little worse,  increase sodium bicarbonate tablets, check abg    3. Type2 DM : controlled    4. HTN : keep -150/80s for renal perfusion, better with amlodipine    5. Anemia : started venofer    6. Bone mineral disorder:started calcitriol    Counseled to quit smoking and NSAIDS    I discussed the patients findings and my recommendations with patient. Dr Tosha Chavez MD  07/07/17  6:39 AM

## 2017-07-07 NOTE — PROGRESS NOTES
HOSPITALIST PROGRESS NOTE    Patient Identification:  Name:  Sara Cardona  Age:  66 y.o.  Sex:  female  :  1950  MRN:  [id number]  Visit Number:  [id number]  Primary Care Provider:  Marcelino Sheehan MD    Length of stay:  4     HPI: The patient is a 67 yo female who was admitted on 7/3/2017 with nausea and found to have acute on chronic renal failure.     Subjective:  Today, the patient was sitting on the side of the bed.  She continues to report diarrhea and nausea.  She denies abdominal pain.  She complains of some intermittent pain in her mid thoracic back has been present for approximately one year but worsening.  She reports occasional nonproductive cough.    Present during exam: The patient's     Current Hospital Meds:    amLODIPine 5 mg Oral Q24H   aspirin 81 mg Oral Daily   [START ON 2017] bisacodyl 10 mg Oral Once   budesonide-formoterol 2 puff Inhalation BID - RT   calcitriol 0.5 mcg Oral Daily   heparin (porcine) 5,000 Units Subcutaneous Q12H   insulin aspart 0-7 Units Subcutaneous 4x Daily AC & at Bedtime   iron sucrose (VENOFER) IVPB 200 mg Intravenous Q24H   pantoprazole 40 mg Intravenous Q12H   [START ON 2017] polyethylene glycol with electrolytes 4,000 mL Oral Once   sodium bicarbonate 1,300 mg Oral 4x Daily     Vital Signs  Temp:  [97.9 °F (36.6 °C)-98.4 °F (36.9 °C)] 98.1 °F (36.7 °C)  Heart Rate:  [67-94] 79  Resp:  [19-20] 20  BP: (125-151)/(69-92) 138/86  Last 3 weights    17  0950   Weight: 115 lb (52.2 kg)     Body mass index is 21.73 kg/(m^2).    Physical exam:  Physical Exam   Constitutional: She is oriented to person, place, and time. She appears well-developed and well-nourished. No distress. Nasal cannula in place.   HENT:   Head: Normocephalic and atraumatic.   Nose: Nose normal.   Mouth/Throat: Oropharynx is clear and moist and mucous membranes are normal.   Eyes: Conjunctivae and EOM are normal. Pupils are equal, round, and reactive to light. No  scleral icterus.   Neck: Trachea normal. Neck supple. No JVD present. Carotid bruit is not present. No thyromegaly present.   Cardiovascular: Normal rate, regular rhythm and normal heart sounds.  Exam reveals no gallop and no friction rub.    No murmur heard.  No significant lower extremity edema   Pulmonary/Chest: Effort normal. No respiratory distress. She has no wheezes. She has no rhonchi. She has no rales.   Abdominal: Soft. Bowel sounds are normal. She exhibits no distension. There is no tenderness. There is no guarding.   Musculoskeletal: Normal range of motion.        Thoracic back: She exhibits tenderness and bony tenderness.   Neurological: She is oriented to person, place, and time. She has normal strength. No cranial nerve deficit.   Skin: Skin is warm, dry and intact. No rash noted. No erythema.   Psychiatric: She has a normal mood and affect. Her speech is normal.     Results Review:    Telemetry: SR 80-90 with occasional Trigeminy PVCs    Results from last 7 days  Lab Units 07/07/17  0115 07/06/17  0101 07/05/17  0116 07/04/17  0240 07/03/17  1039   WBC 10*3/mm3 10.57 11.53 12.67* 10.12 8.67   HEMOGLOBIN g/dL 7.8* 9.0* 9.4* 8.8* 10.9*   HEMATOCRIT % 25.9* 28.4* 30.2* 28.8* 34.7*   PLATELETS 10*3/mm3 259 280 280 246 268       Results from last 7 days  Lab Units 07/07/17  0115 07/06/17  0956 07/06/17  0101 07/05/17  0116 07/04/17  0240 07/03/17  1039   SODIUM mmol/L 140  --  143 144 141 140   POTASSIUM mmol/L 3.6 3.5 3.4* 3.8 3.9 3.8   CHLORIDE mmol/L 120*  --  121* 119* 120* 115*   CO2 mmol/L 13.4*  --  16.2* 18.7* 15.2* 17.5*   BUN mg/dL 35*  --  48* 44* 42* 46*   CREATININE mg/dL 2.70*  --  3.20* 3.23* 3.28* 3.49*   CALCIUM mg/dL 7.0*  --  7.1* 7.1* 7.0* 7.5*   GLUCOSE mg/dL 153*  --  143* 134* 131* 151*       Results from last 7 days  Lab Units 07/07/17  0115 07/06/17  0101 07/05/17  0116 07/04/17  0240 07/03/17  1039   BILIRUBIN mg/dL 0.1* 0.2 0.1* 0.1* 0.1*   ALK PHOS U/L 74 80 78 76 94   AST  (SGOT) U/L 11 10 14 12 14   ALT (SGPT) U/L 7* 10 9* 9* 11       Results from last 7 days  Lab Units 07/07/17  0115 07/03/17  1039   MAGNESIUM mg/dL 1.5* 1.9       Results from last 7 days  Lab Units 07/04/17  0240 07/03/17  2037 07/03/17  1508   CK TOTAL U/L 80 115 108   TROPONIN I ng/mL <0.006 0.018 <0.006   CK MB INDEX % 6.3* 6.3* 6.1*   MYOGLOBIN ng/mL 122.0* 138.0* 128.0*       Results from last 7 days  Lab Units 07/07/17  0115 07/06/17  0956 07/04/17  0240 07/03/17  1403   BNP pg/mL 809.0* 1261.0* 1003.0* 500.0*     I have personally reviewed the above laboratory results.     CXR 7/4/2017    I have personally reviewed the above radiology results.     Assessment/Plan     -Non-oliguric SIMON on stage IV CKD with baseline creatinine for the past year ranging from 1.8-2.2; likely multifactorial in nature and related to poor oral intake with nausea/vomiting/diarrhea coupled with medications (ie ACEI); and labile blood pressure readings with history of diabetic nephropathy and nephrotic range proteinuria  -Mid thoracic back pain  -Hypomagnesemia  -Diarrhea with negative C.Diff toxin; may be due to stool seepage in the setting of chronic constipation  -Dark stool per patient report with history of daily NSAID use and worsening normocytic anemia  -Metabolic acidosis with respiratory compensation  -Elevated parathyroid hormone  -Vitamin D insufficiency   -Mild leukocytosis, resolved  -Metabolic acidosis   -Severe protein malnutrition with hypoalbuminemia causing pseudo-hypocalcemia  -Diet controlled diabetes mellitus type II; well controlled with A1c 6.1%; overall fairly well controlled  -Essential hypertension, acceptable at this time  -Tobacco abuse; counseled cessation  -COPD without acute exacerbation   -Generalized anxiety disorder    IV fluid hydration has been discontinued by nephrology.  His been evaluated by gastroenterology and will undergo endoscopies early next week. Obtain a thoracic xray given her complaint  of point tenderness/spinal tenderness.   I will continue her on IV Pantoprazole. Continue calcitriol, sodium bicarb tabs, and Norvasc per nephrology. BP improved. Hemoccult positive. C.Diff toxin negative. Will supplement her magnesium.    Continue to closely monitor hemoglobin and hematocrit for signs/symptoms of active bleeding, plan to transfuse if hemoglobin were to drop below 7.0. I appreciate the assistance of my consultants.     Allow permissive hypertension to allow for renal perfusion with goal systolic blood pressure of 140-150's/80s. Will repeat labs in the morning and continue to monitor the patient closely on telemetry. Nephrology assistance is much appreciated.     DVT prophylaxis: SQ Heparin   GI prophylaxis: IV Pantoprazole    The patient is high risk due to the following diagnoses/reasons:  SIMON with CKD, pulmonary hypertension, tobacco abuse, possible GI bleed    I discussed the patients findings and my recommendations with patient, family and nursing staff     Disposition  Home when medically stable    Tihs Givens,   07/07/17  7:40 PM

## 2017-07-07 NOTE — PROGRESS NOTES
Discharge Planning Assessment   [facility]     Patient Name: Sara Cardona  MRN: [id number]  Today's Date: 7/7/2017    Admit Date: 7/3/2017       Discharge Plan       07/07/17 1506    Case Management/Social Work Plan    Plan SS attempted visit with pt and pt was out of her room. Pt lives at home with dtr and plans to return home at discharge. Pt does not utilize home health services. Pt may benefit from home health services. HH to be arranged with MD order. Pt does not have DME. O2 SAT on RA to be checked prior to discharge and home O2 will be arranged with qualifying O2 SAT of 88% or below or ABG with MD order. SS to follow.           Expected Discharge Date and Time     Expected Discharge Date Expected Discharge Time    Jul 10, 2017           Purnima Bravo

## 2017-07-08 ENCOUNTER — APPOINTMENT (OUTPATIENT)
Dept: GENERAL RADIOLOGY | Facility: HOSPITAL | Age: 67
End: 2017-07-08

## 2017-07-08 LAB
ALBUMIN SERPL-MCNC: 3.1 G/DL (ref 3.4–4.8)
ALBUMIN/GLOB SERPL: 1.2 G/DL (ref 1.5–2.5)
ALP SERPL-CCNC: 88 U/L (ref 35–104)
ALT SERPL W P-5'-P-CCNC: 14 U/L (ref 10–36)
ANION GAP SERPL CALCULATED.3IONS-SCNC: 4.7 MMOL/L (ref 3.6–11.2)
AST SERPL-CCNC: 18 U/L (ref 10–30)
BACTERIA SPEC AEROBE CULT: NORMAL
BACTERIA SPEC AEROBE CULT: NORMAL
BASOPHILS # BLD AUTO: 0.07 10*3/MM3 (ref 0–0.3)
BASOPHILS NFR BLD AUTO: 0.6 % (ref 0–2)
BILIRUB SERPL-MCNC: 0.1 MG/DL (ref 0.2–1.8)
BUN BLD-MCNC: 39 MG/DL (ref 7–21)
BUN/CREAT SERPL: 15.4 (ref 7–25)
CALCIUM SPEC-SCNC: 7.8 MG/DL (ref 7.7–10)
CHLORIDE SERPL-SCNC: 119 MMOL/L (ref 99–112)
CK MB SERPL-CCNC: 4.87 NG/ML (ref 0–5)
CK MB SERPL-RTO: 4.7 % (ref 0–3)
CK SERPL-CCNC: 103 U/L (ref 24–173)
CO2 SERPL-SCNC: 19.3 MMOL/L (ref 24.3–31.9)
CREAT BLD-MCNC: 2.53 MG/DL (ref 0.43–1.29)
DEPRECATED RDW RBC AUTO: 56.5 FL (ref 37–54)
EOSINOPHIL # BLD AUTO: 0.68 10*3/MM3 (ref 0–0.7)
EOSINOPHIL NFR BLD AUTO: 5.6 % (ref 0–7)
ERYTHROCYTE [DISTWIDTH] IN BLOOD BY AUTOMATED COUNT: 17.5 % (ref 11.5–14.5)
GFR SERPL CREATININE-BSD FRML MDRD: 19 ML/MIN/1.73
GLOBULIN UR ELPH-MCNC: 2.5 GM/DL
GLUCOSE BLD-MCNC: 144 MG/DL (ref 70–110)
GLUCOSE BLDC GLUCOMTR-MCNC: 109 MG/DL (ref 70–130)
GLUCOSE BLDC GLUCOMTR-MCNC: 158 MG/DL (ref 70–130)
GLUCOSE BLDC GLUCOMTR-MCNC: 171 MG/DL (ref 70–130)
GLUCOSE BLDC GLUCOMTR-MCNC: 190 MG/DL (ref 70–130)
HCT VFR BLD AUTO: 27.7 % (ref 37–47)
HGB BLD-MCNC: 8.3 G/DL (ref 12–16)
IMM GRANULOCYTES # BLD: 0.03 10*3/MM3 (ref 0–0.03)
IMM GRANULOCYTES NFR BLD: 0.2 % (ref 0–0.5)
LYMPHOCYTES # BLD AUTO: 2.11 10*3/MM3 (ref 1–3)
LYMPHOCYTES NFR BLD AUTO: 17.5 % (ref 16–46)
MAGNESIUM SERPL-MCNC: 1.6 MG/DL (ref 1.7–2.6)
MCH RBC QN AUTO: 27.4 PG (ref 27–33)
MCHC RBC AUTO-ENTMCNC: 30 G/DL (ref 33–37)
MCV RBC AUTO: 91.4 FL (ref 80–94)
MONOCYTES # BLD AUTO: 0.62 10*3/MM3 (ref 0.1–0.9)
MONOCYTES NFR BLD AUTO: 5.1 % (ref 0–12)
NEUTROPHILS # BLD AUTO: 8.55 10*3/MM3 (ref 1.4–6.5)
NEUTROPHILS NFR BLD AUTO: 71 % (ref 40–75)
OSMOLALITY SERPL CALC.SUM OF ELEC: 296.9 MOSM/KG (ref 273–305)
PLATELET # BLD AUTO: 275 10*3/MM3 (ref 130–400)
PMV BLD AUTO: 10.6 FL (ref 6–10)
POTASSIUM BLD-SCNC: 3.8 MMOL/L (ref 3.5–5.3)
PROT SERPL-MCNC: 5.6 G/DL (ref 6–8)
RBC # BLD AUTO: 3.03 10*6/MM3 (ref 4.2–5.4)
SODIUM BLD-SCNC: 143 MMOL/L (ref 135–153)
TROPONIN I SERPL-MCNC: 0.04 NG/ML
WBC NRBC COR # BLD: 12.06 10*3/MM3 (ref 4.5–12.5)

## 2017-07-08 PROCEDURE — 82962 GLUCOSE BLOOD TEST: CPT

## 2017-07-08 PROCEDURE — 84484 ASSAY OF TROPONIN QUANT: CPT | Performed by: INTERNAL MEDICINE

## 2017-07-08 PROCEDURE — 63710000001 INSULIN ASPART PER 5 UNITS: Performed by: PHYSICIAN ASSISTANT

## 2017-07-08 PROCEDURE — 63710000001 PROMETHAZINE PER 12.5 MG: Performed by: INTERNAL MEDICINE

## 2017-07-08 PROCEDURE — 99232 SBSQ HOSP IP/OBS MODERATE 35: CPT | Performed by: INTERNAL MEDICINE

## 2017-07-08 PROCEDURE — 25010000002 IRON SUCROSE PER 1 MG: Performed by: INTERNAL MEDICINE

## 2017-07-08 PROCEDURE — 72074 X-RAY EXAM THORAC SPINE4/>VW: CPT

## 2017-07-08 PROCEDURE — 94799 UNLISTED PULMONARY SVC/PX: CPT

## 2017-07-08 PROCEDURE — 80053 COMPREHEN METABOLIC PANEL: CPT | Performed by: INTERNAL MEDICINE

## 2017-07-08 PROCEDURE — 85025 COMPLETE CBC W/AUTO DIFF WBC: CPT | Performed by: INTERNAL MEDICINE

## 2017-07-08 PROCEDURE — 25010000002 HEPARIN (PORCINE) PER 1000 UNITS: Performed by: INTERNAL MEDICINE

## 2017-07-08 PROCEDURE — 72072 X-RAY EXAM THORAC SPINE 3VWS: CPT | Performed by: RADIOLOGY

## 2017-07-08 PROCEDURE — 83735 ASSAY OF MAGNESIUM: CPT | Performed by: INTERNAL MEDICINE

## 2017-07-08 RX ADMIN — SODIUM BICARBONATE TAB 650 MG 1300 MG: 650 TAB at 17:29

## 2017-07-08 RX ADMIN — PROMETHAZINE HYDROCHLORIDE 12.5 MG: 12.5 TABLET ORAL at 09:10

## 2017-07-08 RX ADMIN — SODIUM BICARBONATE TAB 650 MG 1300 MG: 650 TAB at 09:00

## 2017-07-08 RX ADMIN — PANTOPRAZOLE SODIUM 40 MG: 40 INJECTION, POWDER, FOR SOLUTION INTRAVENOUS at 09:00

## 2017-07-08 RX ADMIN — LOPERAMIDE HYDROCHLORIDE 2 MG: 2 CAPSULE ORAL at 13:13

## 2017-07-08 RX ADMIN — PROMETHAZINE HYDROCHLORIDE 12.5 MG: 12.5 TABLET ORAL at 17:32

## 2017-07-08 RX ADMIN — MAGNESIUM GLUCONATE 500 MG ORAL TABLET 400 MG: 500 TABLET ORAL at 09:01

## 2017-07-08 RX ADMIN — LOPERAMIDE HYDROCHLORIDE 2 MG: 2 CAPSULE ORAL at 05:31

## 2017-07-08 RX ADMIN — ASPIRIN 81 MG: 81 TABLET ORAL at 09:00

## 2017-07-08 RX ADMIN — CALCITRIOL 0.5 MCG: 0.25 CAPSULE ORAL at 09:01

## 2017-07-08 RX ADMIN — HEPARIN SODIUM 5000 UNITS: 5000 INJECTION, SOLUTION INTRAVENOUS; SUBCUTANEOUS at 09:01

## 2017-07-08 RX ADMIN — IRON SUCROSE 200 MG: 20 INJECTION, SOLUTION INTRAVENOUS at 09:21

## 2017-07-08 RX ADMIN — PANTOPRAZOLE SODIUM 40 MG: 40 INJECTION, POWDER, FOR SOLUTION INTRAVENOUS at 20:55

## 2017-07-08 RX ADMIN — CLONAZEPAM 0.5 MG: 0.5 TABLET ORAL at 09:00

## 2017-07-08 RX ADMIN — HEPARIN SODIUM 5000 UNITS: 5000 INJECTION, SOLUTION INTRAVENOUS; SUBCUTANEOUS at 20:55

## 2017-07-08 RX ADMIN — AMLODIPINE BESYLATE 5 MG: 5 TABLET ORAL at 09:00

## 2017-07-08 RX ADMIN — SODIUM BICARBONATE TAB 650 MG 1300 MG: 650 TAB at 20:55

## 2017-07-08 RX ADMIN — INSULIN ASPART 2 UNITS: 100 INJECTION, SOLUTION INTRAVENOUS; SUBCUTANEOUS at 08:22

## 2017-07-08 RX ADMIN — SODIUM BICARBONATE TAB 650 MG 1300 MG: 650 TAB at 13:13

## 2017-07-08 RX ADMIN — INSULIN ASPART 2 UNITS: 100 INJECTION, SOLUTION INTRAVENOUS; SUBCUTANEOUS at 21:15

## 2017-07-08 RX ADMIN — INSULIN ASPART 2 UNITS: 100 INJECTION, SOLUTION INTRAVENOUS; SUBCUTANEOUS at 17:28

## 2017-07-08 RX ADMIN — IPRATROPIUM BROMIDE AND ALBUTEROL SULFATE 3 ML: .5; 3 SOLUTION RESPIRATORY (INHALATION) at 06:28

## 2017-07-08 RX ADMIN — CLONAZEPAM 0.5 MG: 0.5 TABLET ORAL at 21:00

## 2017-07-08 RX ADMIN — BUDESONIDE AND FORMOTEROL FUMARATE DIHYDRATE 2 PUFF: 80; 4.5 AEROSOL RESPIRATORY (INHALATION) at 06:26

## 2017-07-08 RX ADMIN — MAGNESIUM GLUCONATE 500 MG ORAL TABLET 400 MG: 500 TABLET ORAL at 00:19

## 2017-07-08 NOTE — PLAN OF CARE
Problem: Patient Care Overview (Adult)  Goal: Plan of Care Review    07/08/17 0249 07/08/17 0800   Coping/Psychosocial Response Interventions   Plan Of Care Reviewed With --  patient   Patient Care Overview   Progress no change --          Problem: Renal Failure/Kidney Injury, Acute (Adult)  Goal: Signs and Symptoms of Listed Potential Problems Will be Absent or Manageable (Renal Failure/Kidney Injury, Acute)    07/08/17 0249   Renal Failure/Kidney Injury, Acute   Problems Assessed (Acute Renal Failure/Kidney Injury) all   Problems Present (Acute Renal Failure/Kidney Injury) hypertension;electrolyte imbalance;acid-base imbalance         Problem: Pneumonia (Adult)  Goal: Signs and Symptoms of Listed Potential Problems Will be Absent or Manageable (Pneumonia)    07/08/17 0249   Pneumonia   Problems Assessed (Pneumonia) all   Problems Present (Pneumonia) none

## 2017-07-08 NOTE — PLAN OF CARE
Problem: Patient Care Overview (Adult)  Goal: Plan of Care Review  Outcome: Ongoing (interventions implemented as appropriate)    07/08/17 0249   Coping/Psychosocial Response Interventions   Plan Of Care Reviewed With patient   Patient Care Overview   Progress no change         Problem: Renal Failure/Kidney Injury, Acute (Adult)  Goal: Signs and Symptoms of Listed Potential Problems Will be Absent or Manageable (Renal Failure/Kidney Injury, Acute)  Outcome: Ongoing (interventions implemented as appropriate)    07/08/17 0249   Renal Failure/Kidney Injury, Acute   Problems Assessed (Acute Renal Failure/Kidney Injury) all   Problems Present (Acute Renal Failure/Kidney Injury) hypertension;electrolyte imbalance;acid-base imbalance         Problem: Pneumonia (Adult)  Goal: Signs and Symptoms of Listed Potential Problems Will be Absent or Manageable (Pneumonia)  Outcome: Ongoing (interventions implemented as appropriate)    07/08/17 0249   Pneumonia   Problems Assessed (Pneumonia) all   Problems Present (Pneumonia) none

## 2017-07-08 NOTE — PROGRESS NOTES
Interval History:     Patient Complaints: The patient is feeling no better today.  She continues to have some diarrhea and nausea though.  She denies any increased shortness of breath.  There is no vomiting.        Vital Signs  Temp:  [97.7 °F (36.5 °C)-98.6 °F (37 °C)] 97.7 °F (36.5 °C)  Heart Rate:  [] 88  Resp:  [20-25] 25  BP: (137-169)/() 142/98    Physical Exam:    General:             No distress      HEENT:  No pallor                Neck:   No JVD        Lungs:     Occasional scattered expiratory wheeze posteriorly     Heart:   RRR,  no rub       Abdomen:     Normal bowel sounds, soft non-tender, non-distended, no guarding       Extremities:   No edema        Skin:   No petechiae, no rash       Neurologic:  Cr Ns grossly intact, sensation N, moves all extremities.        Results Review:     I reviewed the patient's new clinical results.    Lab Results (last 24 hours)     Procedure Component Value Units Date/Time    POC Glucose Fingerstick [[id number]]  (Normal) Collected:  07/07/17 2015    Specimen:  Blood Updated:  07/07/17 2037     Glucose 115 mg/dL     Narrative:       M[facility] [id number] [id number]    CK [[id number]]  (Normal) Collected:  07/07/17 2339    Specimen:  Blood Updated:  07/08/17 0016     Creatine Kinase 103 U/L     CK-MB [[id number]]  (Normal) Collected:  07/07/17 2339    Specimen:  Blood Updated:  07/08/17 0018     CKMB 4.87 ng/mL     CK-MB Index [[id number]]  (Abnormal) Collected:  07/07/17 2339    Specimen:  Blood Updated:  07/08/17 0018     CK-MB Index 4.7 (H) %     Troponin [[id number]]  (Normal) Collected:  07/07/17 2339    Specimen:  Blood Updated:  07/08/17 0033     Troponin I 0.039 ng/mL     Narrative:       Ultra Troponin I Reference Range:         <=0.039 ng/mL: Negative    0.04-0.779 ng/mL: Indeterminate Range. Suspicious of MI.  Clinical correlation required.       >=0.78  ng/mL: Consistent with myocardial injury.  Clinical correlation required.     CBC & Differential [[id number]] Collected:  07/08/17 0509    Specimen:  Blood Updated:  07/08/17 0559    Narrative:       The following orders were created for panel order CBC & Differential.  Procedure                               Abnormality         Status                     ---------                               -----------         ------                     CBC Auto Differential[[id number]]        Abnormal            Final result                 Please view results for these tests on the individual orders.    CBC Auto Differential [[id number]]  (Abnormal) Collected:  07/08/17 0509    Specimen:  Blood Updated:  07/08/17 0559     WBC 12.06 10*3/mm3      RBC 3.03 (L) 10*6/mm3      Hemoglobin 8.3 (L) g/dL      Hematocrit 27.7 (L) %      MCV 91.4 fL      MCH 27.4 pg      MCHC 30.0 (L) g/dL      RDW 17.5 (H) %      RDW-SD 56.5 (H) fl      MPV 10.6 (H) fL      Platelets 275 10*3/mm3      Neutrophil % 71.0 %      Lymphocyte % 17.5 %      Monocyte % 5.1 %      Eosinophil % 5.6 %      Basophil % 0.6 %      Immature Grans % 0.2 %      Neutrophils, Absolute 8.55 (H) 10*3/mm3      Lymphocytes, Absolute 2.11 10*3/mm3      Monocytes, Absolute 0.62 10*3/mm3      Eosinophils, Absolute 0.68 10*3/mm3      Basophils, Absolute 0.07 10*3/mm3      Immature Grans, Absolute 0.03 10*3/mm3     Magnesium [[id number]]  (Abnormal) Collected:  07/08/17 0509    Specimen:  Blood Updated:  07/08/17 0622     Magnesium 1.6 (L) mg/dL     Comprehensive Metabolic Panel [[id number]]  (Abnormal) Collected:  07/08/17 0509    Specimen:  Blood Updated:  07/08/17 0626     Glucose 144 (H) mg/dL      BUN 39 (H) mg/dL      Creatinine 2.53 (H) mg/dL      Sodium 143 mmol/L      Potassium 3.8 mmol/L      Chloride 119 (H) mmol/L      CO2 19.3 (L) mmol/L      Calcium 7.8 mg/dL      Total Protein 5.6 (L) g/dL      Albumin 3.10 (L) g/dL      ALT (SGPT) 14 U/L      AST (SGOT) 18 U/L      Alkaline Phosphatase 88 U/L       Note New Reference Ranges        Total Bilirubin  0.1 (L) mg/dL      eGFR Non African Amer 19 (L) mL/min/1.73      Globulin 2.5 gm/dL      A/G Ratio 1.2 (L) g/dL      BUN/Creatinine Ratio 15.4     Anion Gap 4.7 mmol/L     Osmolality, Calculated [[id number]]  (Normal) Collected:  07/08/17 0509    Specimen:  Blood Updated:  07/08/17 0626     Osmolality Calc 296.9 mOsm/kg     Troponin [[id number]]  (Normal) Collected:  07/08/17 0509    Specimen:  Blood Updated:  07/08/17 0631     Troponin I 0.039 ng/mL     Narrative:       Ultra Troponin I Reference Range:         <=0.039 ng/mL: Negative    0.04-0.779 ng/mL: Indeterminate Range. Suspicious of MI.  Clinical correlation required.       >=0.78  ng/mL: Consistent with myocardial injury.  Clinical correlation required.    POC Glucose Fingerstick [[id number]]  (Abnormal) Collected:  07/08/17 0739    Specimen:  Blood Updated:  07/08/17 0746     Glucose 158 (H) mg/dL     Narrative:       Meter: [id number] : [id number] Ismael DALY    Troponin [[id number]]  (Abnormal) Collected:  07/08/17 1114    Specimen:  Blood Updated:  07/08/17 1205     Troponin I 0.041 (H) ng/mL     Narrative:       Ultra Troponin I Reference Range:         <=0.039 ng/mL: Negative    0.04-0.779 ng/mL: Indeterminate Range. Suspicious of MI.  Clinical correlation required.       >=0.78  ng/mL: Consistent with myocardial injury.  Clinical correlation required.    POC Glucose Fingerstick [[id number]]  (Normal) Collected:  07/08/17 1204    Specimen:  Blood Updated:  07/08/17 1229     Glucose 109 mg/dL     Narrative:       Meter: [id number] : [id number] Ismael DALY    Blood Culture [[id number]]  (Normal) Collected:  07/03/17 1203    Specimen:  Blood from Arm, Left Updated:  07/08/17 1301     Blood Culture No growth at 5 days    Blood Culture [[id number]]  (Normal) Collected:  07/03/17 1253    Specimen:  Blood from Arm, Left Updated:  07/08/17 1401     Blood Culture No growth at 5 days    Stool Culture [[id number]] Collected:  07/06/17 0823    Specimen:   Stool from Per Rectum Updated:  07/08/17 1607     Stool Culture No growth day 1    Narrative:         Absence of Campylobacter antigen or below limit of detection.  Shiga toxin testing not performed due to no enteric growth.    POC Glucose Fingerstick [[id number]]  (Abnormal) Collected:  07/08/17 1721    Specimen:  Blood Updated:  07/08/17 1728     Glucose 190 (H) mg/dL     Narrative:       Meter: [id number] : [id number] Ismael Bhattla MALIKA          Imaging Results (last 24 hours)     Procedure Component Value Units Date/Time    XR Spine Thoracic 4+ View [[id number]] Collected:  07/08/17 0859     Updated:  07/08/17 0902    Narrative:       EXAMINATION: XR SPINE THORACIC 4+ VW-      CLINICAL INDICATION:pain; R19.5-Other fecal abnormalities; N17.9-Acute  kidney failure, unspecified; N18.9-Chronic kidney disease, unspecified;  E86.0-Dehydration; J18.9-Pneumonia, unspecified organism;  R71.0-Precipitous drop in hematocrit; R11.2-Nausea with vomiting,  unspecified; R19.7-Diarrhea, unspecified; R10.13-Epigastric pain        COMPARISON: None      TECHNIQUE: 3 views thoracic spine.     FINDINGS:   Osteoporosis. Mild degenerative disc disease mid and lower thoracic  spine. No acute fracture or malalignment radiographically evident. T1  poorly visualized on lateral view. Incidentally, bibasilar airspace  disease noted with pleural effusions. Please correlate for CHF/edema.       Impression:          1. No acute fracture or malalignment radiographically evident.  2. Bilateral airspace disease with pleural effusions and likely reflect  CHF/edema.        This report was finalized on 7/8/2017 9:00 AM by Dr. Junior Angel MD.             Assessment and Plan:    1. SIMON on CKD 3 : Her baseline creatinine is 2.5 from may 2017/. It has improved to 2.53 today from 2.7 yesterday.   SIMON from volume depletion and NSAIDS but off IV fluids at this time   She was also on lisinopril at home.   She has no hydronephrosis.avoid nephrotoxins     2.  Metabolic acidosis with respiratory compensation :on PO bicarb     3. Type2 DM : controlled     4. HTN : keep -150/80s for renal perfusion, better with amlodipine     5. Anemia iron def : on venofer     6. Bone mineral disorder:on calcitriol    Manuel Garrido MD  07/08/17  6:09 PM

## 2017-07-08 NOTE — PROGRESS NOTES
"HOSPITALIST PROGRESS NOTE    Patient Identification:  Name:  Sara Cardona  Age:  66 y.o.  Sex:  female  :  1950  MRN:  [id number]  Visit Number:  [id number]  Primary Care Provider:  Marcelino Sheehan MD    Length of stay:  5     HPI: The patient is a 67 yo female who was admitted on 7/3/2017 with nausea and found to have acute on chronic renal failure.     Subjective:  Today, the patient was resting in bed upon my evaluation this afternoon. She continues to report diarrhea and nausea. She does state that her nausea has slightly improved since time of admission. She states her stools are the consistency of \"ice cream.\" and states that the stool are no longer dark in color and are starting to lighten up. She denies any abdominal pain. She does continue to report intermittent pain in mid thoracic back. She states that her bed is uncomfortable and she believes this is exacerbating her symptoms. She denies any chest pain. She denies any vomiting. She denies any urinary symptoms. She currently states that she feels worse as she is fighting with her child and she is stressed out.     Present during exam: N/A    Current Hospital Meds:    amLODIPine 5 mg Oral Q24H   aspirin 81 mg Oral Daily   [START ON 2017] bisacodyl 10 mg Oral Once   budesonide-formoterol 2 puff Inhalation BID - RT   calcitriol 0.5 mcg Oral Daily   heparin (porcine) 5,000 Units Subcutaneous Q12H   insulin aspart 0-7 Units Subcutaneous 4x Daily AC & at Bedtime   pantoprazole 40 mg Intravenous Q12H   [START ON 2017] polyethylene glycol with electrolytes 4,000 mL Oral Once   sodium bicarbonate 1,300 mg Oral 4x Daily     Vital Signs  Temp:  [97.7 °F (36.5 °C)-98.6 °F (37 °C)] 97.7 °F (36.5 °C)  Heart Rate:  [] 88  Resp:  [19-25] 25  BP: (137-169)/() 142/98  Last 3 weights    17  0950   Weight: 115 lb (52.2 kg)     Body mass index is 21.73 kg/(m^2).    Physical exam:  Physical Exam   Constitutional: She is oriented to " person, place, and time. She appears well-developed and well-nourished. No distress. Nasal cannula in place.   HENT:   Head: Normocephalic and atraumatic.   Nose: Nose normal.   Mouth/Throat: Oropharynx is clear and moist and mucous membranes are normal.   Eyes: Conjunctivae and EOM are normal. Pupils are equal, round, and reactive to light. No scleral icterus.   Neck: Trachea normal. Neck supple. No JVD present. Carotid bruit is not present. No thyromegaly present.   Cardiovascular: Normal rate, regular rhythm and normal heart sounds.  Exam reveals no gallop and no friction rub.    No murmur heard.  No significant lower extremity edema   Pulmonary/Chest: Effort normal. No respiratory distress. She has no wheezes. She has no rhonchi. She has no rales.   Abdominal: Soft. Bowel sounds are normal. She exhibits no distension. There is no tenderness. There is no guarding.   Musculoskeletal: Normal range of motion.        Thoracic back: She exhibits tenderness and bony tenderness.   Neurological: She is oriented to person, place, and time. She has normal strength. No cranial nerve deficit.   Skin: Skin is warm, dry and intact. No rash noted. No erythema.   Psychiatric: She has a normal mood and affect. Her speech is normal.     Results Review:    Telemetry: SR 80-90     Results from last 7 days  Lab Units 07/08/17  0509 07/07/17  0115 07/06/17  0101 07/05/17  0116 07/04/17  0240 07/03/17  1039   WBC 10*3/mm3 12.06 10.57 11.53 12.67* 10.12 8.67   HEMOGLOBIN g/dL 8.3* 7.8* 9.0* 9.4* 8.8* 10.9*   HEMATOCRIT % 27.7* 25.9* 28.4* 30.2* 28.8* 34.7*   PLATELETS 10*3/mm3 275 259 280 280 246 268       Results from last 7 days  Lab Units 07/08/17  0509 07/07/17  0115 07/06/17  0956 07/06/17  0101 07/05/17  0116 07/04/17  0240 07/03/17  1039   SODIUM mmol/L 143 140  --  143 144 141 140   POTASSIUM mmol/L 3.8 3.6 3.5 3.4* 3.8 3.9 3.8   CHLORIDE mmol/L 119* 120*  --  121* 119* 120* 115*   CO2 mmol/L 19.3* 13.4*  --  16.2* 18.7* 15.2*  17.5*   BUN mg/dL 39* 35*  --  48* 44* 42* 46*   CREATININE mg/dL 2.53* 2.70*  --  3.20* 3.23* 3.28* 3.49*   CALCIUM mg/dL 7.8 7.0*  --  7.1* 7.1* 7.0* 7.5*   GLUCOSE mg/dL 144* 153*  --  143* 134* 131* 151*       Results from last 7 days  Lab Units 07/08/17  0509 07/07/17  0115 07/06/17  0101 07/05/17  0116 07/04/17  0240 07/03/17  1039   BILIRUBIN mg/dL 0.1* 0.1* 0.2 0.1* 0.1* 0.1*   ALK PHOS U/L 88 74 80 78 76 94   AST (SGOT) U/L 18 11 10 14 12 14   ALT (SGPT) U/L 14 7* 10 9* 9* 11       Results from last 7 days  Lab Units 07/08/17  0509 07/07/17  0115 07/03/17  1039   MAGNESIUM mg/dL 1.6* 1.5* 1.9       Results from last 7 days  Lab Units 07/08/17  1114 07/08/17  0509 07/07/17  2339 07/04/17  0240 07/03/17  2037 07/03/17  1508   CK TOTAL U/L  --   --  103 80 115 108   TROPONIN I ng/mL 0.041* 0.039 0.039 <0.006 0.018 <0.006   CK MB INDEX %  --   --  4.7* 6.3* 6.3* 6.1*   MYOGLOBIN ng/mL  --   --   --  122.0* 138.0* 128.0*       Results from last 7 days  Lab Units 07/07/17  0115 07/06/17  0956 07/04/17  0240 07/03/17  1403   BNP pg/mL 809.0* 1261.0* 1003.0* 500.0*     I have personally reviewed the above laboratory results.     CXR 7/4/2017      Thoracic spine X-ray 7/7/2017  IMPRESSION:      1. No acute fracture or malalignment radiographically evident.  2. Bilateral airspace disease with pleural effusions and likely reflect  CHF/edema.      I have personally reviewed the above radiology results.     Assessment/Plan     -Non-oliguric SIMON on stage IV CKD with baseline creatinine for the past year ranging from 1.8-2.2; likely multifactorial in nature and related to poor oral intake with nausea/vomiting/diarrhea coupled with medications (ie ACEI); and labile blood pressure readings with history of diabetic nephropathy and nephrotic range proteinuria  -Mid thoracic back pain  -Hypomagnesemia  -Diarrhea with negative C.Difficile toxin; may be due to stool seepage in the setting of chronic constipation  -Dark stool per  patient report with history of daily NSAID use and worsening normocytic anemia with hemoccult positive stool  -Metabolic acidosis with respiratory compensation  -Elevated parathyroid hormone  -Vitamin D insufficiency   -Mild leukocytosis, resolved  -Metabolic acidosis   -Severe protein malnutrition with hypoalbuminemia causing pseudo-hypocalcemia  -Diet controlled diabetes mellitus type II; well controlled with A1c 6.1%; overall fairly well controlled  -Essential hypertension, acceptable at this time  -Tobacco abuse; counseled cessation  -COPD without acute exacerbation   -Generalized anxiety disorder    Thoracic xray with no acute bony abnormalities. Patient is to undergo endoscopies Monday by gastroenterology. Continue IV Protonix in the meantime. Will begin bowel prep tomorrow. Creatinine continues to improve. Continue holding IV fluids. Continue calcitriol, sodium bicarbonate tabs, and Norvasc per nephrology. Nephrology and gastroenterology assistance is much appreciated. Will continue to supplement electrolytes per protocol. Will repeat her magnesium level in the morning as we are supplementing her Mg.    Continue to closely monitor hemoglobin and hematocrit, plan to transfuse if hemoglobin were to drop below 7.0. Monitor for signs/symptoms of active bleeding. Allow permissive hypertension to allow for renal perfusion with goal systolic blood pressure of 140-150s. Will repeat labs in the morning and continue to monitor the patient closely on telemetry.       DVT prophylaxis: SQ Heparin   GI prophylaxis: IV Pantoprazole    The patient is high risk due to the following diagnoses/reasons:  SIMON with CKD, pulmonary hypertension, tobacco abuse, possible GI bleed    I discussed the patients findings and my recommendations with patient, family and nursing staff     Disposition  Home when medically stable    MEAGAN Jasso  07/08/17  12:26 PM    I have evaluated this patient and have edited/amended this note to  reflect my findings and treatment recommendations.

## 2017-07-09 ENCOUNTER — APPOINTMENT (OUTPATIENT)
Dept: CT IMAGING | Facility: HOSPITAL | Age: 67
End: 2017-07-09

## 2017-07-09 ENCOUNTER — APPOINTMENT (OUTPATIENT)
Dept: GENERAL RADIOLOGY | Facility: HOSPITAL | Age: 67
End: 2017-07-09

## 2017-07-09 LAB
ALBUMIN SERPL-MCNC: 3.5 G/DL (ref 3.4–4.8)
ALBUMIN/GLOB SERPL: 1.2 G/DL (ref 1.5–2.5)
ALP SERPL-CCNC: 104 U/L (ref 35–104)
ALT SERPL W P-5'-P-CCNC: 15 U/L (ref 10–36)
ANION GAP SERPL CALCULATED.3IONS-SCNC: 3.9 MMOL/L (ref 3.6–11.2)
AST SERPL-CCNC: 20 U/L (ref 10–30)
BACTERIA SPEC AEROBE CULT: NO GROWTH
BASOPHILS # BLD AUTO: 0.04 10*3/MM3 (ref 0–0.3)
BASOPHILS NFR BLD AUTO: 0.3 % (ref 0–2)
BILIRUB SERPL-MCNC: 0.3 MG/DL (ref 0.2–1.8)
BUN BLD-MCNC: 44 MG/DL (ref 7–21)
BUN/CREAT SERPL: 16.2 (ref 7–25)
CALCIUM SPEC-SCNC: 8.7 MG/DL (ref 7.7–10)
CHLORIDE SERPL-SCNC: 118 MMOL/L (ref 99–112)
CO2 SERPL-SCNC: 23.1 MMOL/L (ref 24.3–31.9)
CREAT BLD-MCNC: 2.71 MG/DL (ref 0.43–1.29)
DEPRECATED RDW RBC AUTO: 55.3 FL (ref 37–54)
EOSINOPHIL # BLD AUTO: 0.7 10*3/MM3 (ref 0–0.7)
EOSINOPHIL NFR BLD AUTO: 5.1 % (ref 0–7)
ERYTHROCYTE [DISTWIDTH] IN BLOOD BY AUTOMATED COUNT: 17.6 % (ref 11.5–14.5)
GFR SERPL CREATININE-BSD FRML MDRD: 18 ML/MIN/1.73
GLOBULIN UR ELPH-MCNC: 3 GM/DL
GLUCOSE BLD-MCNC: 127 MG/DL (ref 70–110)
GLUCOSE BLDC GLUCOMTR-MCNC: 183 MG/DL (ref 70–130)
GLUCOSE BLDC GLUCOMTR-MCNC: 257 MG/DL (ref 70–130)
GLUCOSE BLDC GLUCOMTR-MCNC: 72 MG/DL (ref 70–130)
GLUCOSE BLDC GLUCOMTR-MCNC: 77 MG/DL (ref 70–130)
HCT VFR BLD AUTO: 31.4 % (ref 37–47)
HGB BLD-MCNC: 9.5 G/DL (ref 12–16)
IMM GRANULOCYTES # BLD: 0.04 10*3/MM3 (ref 0–0.03)
IMM GRANULOCYTES NFR BLD: 0.3 % (ref 0–0.5)
LYMPHOCYTES # BLD AUTO: 2.18 10*3/MM3 (ref 1–3)
LYMPHOCYTES NFR BLD AUTO: 16 % (ref 16–46)
MAGNESIUM SERPL-MCNC: 1.7 MG/DL (ref 1.7–2.6)
MCH RBC QN AUTO: 27.3 PG (ref 27–33)
MCHC RBC AUTO-ENTMCNC: 30.3 G/DL (ref 33–37)
MCV RBC AUTO: 90.2 FL (ref 80–94)
MONOCYTES # BLD AUTO: 0.72 10*3/MM3 (ref 0.1–0.9)
MONOCYTES NFR BLD AUTO: 5.3 % (ref 0–12)
NEUTROPHILS # BLD AUTO: 9.97 10*3/MM3 (ref 1.4–6.5)
NEUTROPHILS NFR BLD AUTO: 73 % (ref 40–75)
OSMOLALITY SERPL CALC.SUM OF ELEC: 301.5 MOSM/KG (ref 273–305)
PLATELET # BLD AUTO: 288 10*3/MM3 (ref 130–400)
PMV BLD AUTO: 10.4 FL (ref 6–10)
POTASSIUM BLD-SCNC: 3.9 MMOL/L (ref 3.5–5.3)
PROT SERPL-MCNC: 6.5 G/DL (ref 6–8)
RBC # BLD AUTO: 3.48 10*6/MM3 (ref 4.2–5.4)
SODIUM BLD-SCNC: 145 MMOL/L (ref 135–153)
WBC NRBC COR # BLD: 13.65 10*3/MM3 (ref 4.5–12.5)

## 2017-07-09 PROCEDURE — 25010000002 HEPARIN (PORCINE) PER 1000 UNITS: Performed by: INTERNAL MEDICINE

## 2017-07-09 PROCEDURE — 80053 COMPREHEN METABOLIC PANEL: CPT | Performed by: PHYSICIAN ASSISTANT

## 2017-07-09 PROCEDURE — 71010 XR CHEST AP: CPT | Performed by: RADIOLOGY

## 2017-07-09 PROCEDURE — 94799 UNLISTED PULMONARY SVC/PX: CPT

## 2017-07-09 PROCEDURE — 99232 SBSQ HOSP IP/OBS MODERATE 35: CPT | Performed by: INTERNAL MEDICINE

## 2017-07-09 PROCEDURE — 71250 CT THORAX DX C-: CPT

## 2017-07-09 PROCEDURE — 71010 HC CHEST AP: CPT

## 2017-07-09 PROCEDURE — 83735 ASSAY OF MAGNESIUM: CPT | Performed by: PHYSICIAN ASSISTANT

## 2017-07-09 PROCEDURE — 25010000002 HYDRALAZINE PER 20 MG: Performed by: INTERNAL MEDICINE

## 2017-07-09 PROCEDURE — 85025 COMPLETE CBC W/AUTO DIFF WBC: CPT | Performed by: PHYSICIAN ASSISTANT

## 2017-07-09 PROCEDURE — 63710000001 INSULIN ASPART PER 5 UNITS: Performed by: PHYSICIAN ASSISTANT

## 2017-07-09 PROCEDURE — 71250 CT THORAX DX C-: CPT | Performed by: RADIOLOGY

## 2017-07-09 PROCEDURE — 82962 GLUCOSE BLOOD TEST: CPT

## 2017-07-09 RX ORDER — SODIUM CHLORIDE 450 MG/100ML
75 INJECTION, SOLUTION INTRAVENOUS CONTINUOUS
Status: DISCONTINUED | OUTPATIENT
Start: 2017-07-09 | End: 2017-07-10

## 2017-07-09 RX ORDER — NICOTINE 21 MG/24HR
1 PATCH, TRANSDERMAL 24 HOURS TRANSDERMAL EVERY 24 HOURS
Status: DISCONTINUED | OUTPATIENT
Start: 2017-07-09 | End: 2017-07-11 | Stop reason: HOSPADM

## 2017-07-09 RX ADMIN — HEPARIN SODIUM 5000 UNITS: 5000 INJECTION, SOLUTION INTRAVENOUS; SUBCUTANEOUS at 08:32

## 2017-07-09 RX ADMIN — HYDRALAZINE HYDROCHLORIDE 10 MG: 20 INJECTION INTRAMUSCULAR; INTRAVENOUS at 07:17

## 2017-07-09 RX ADMIN — AMLODIPINE BESYLATE 5 MG: 5 TABLET ORAL at 08:30

## 2017-07-09 RX ADMIN — IPRATROPIUM BROMIDE AND ALBUTEROL SULFATE 3 ML: .5; 3 SOLUTION RESPIRATORY (INHALATION) at 19:20

## 2017-07-09 RX ADMIN — ASPIRIN 81 MG: 81 TABLET ORAL at 08:30

## 2017-07-09 RX ADMIN — NICOTINE 1 PATCH: 21 PATCH, EXTENDED RELEASE TRANSDERMAL at 22:02

## 2017-07-09 RX ADMIN — BUDESONIDE AND FORMOTEROL FUMARATE DIHYDRATE 2 PUFF: 80; 4.5 AEROSOL RESPIRATORY (INHALATION) at 06:58

## 2017-07-09 RX ADMIN — GABAPENTIN 200 MG: 100 CAPSULE ORAL at 21:01

## 2017-07-09 RX ADMIN — BUDESONIDE AND FORMOTEROL FUMARATE DIHYDRATE 2 PUFF: 80; 4.5 AEROSOL RESPIRATORY (INHALATION) at 19:20

## 2017-07-09 RX ADMIN — IPRATROPIUM BROMIDE AND ALBUTEROL SULFATE 3 ML: .5; 3 SOLUTION RESPIRATORY (INHALATION) at 05:24

## 2017-07-09 RX ADMIN — BENZONATATE 100 MG: 100 CAPSULE ORAL at 08:36

## 2017-07-09 RX ADMIN — INSULIN ASPART 2 UNITS: 100 INJECTION, SOLUTION INTRAVENOUS; SUBCUTANEOUS at 17:48

## 2017-07-09 RX ADMIN — PANTOPRAZOLE SODIUM 40 MG: 40 INJECTION, POWDER, FOR SOLUTION INTRAVENOUS at 21:00

## 2017-07-09 RX ADMIN — INSULIN ASPART 4 UNITS: 100 INJECTION, SOLUTION INTRAVENOUS; SUBCUTANEOUS at 08:27

## 2017-07-09 RX ADMIN — HYDRALAZINE HYDROCHLORIDE 10 MG: 20 INJECTION INTRAMUSCULAR; INTRAVENOUS at 21:02

## 2017-07-09 RX ADMIN — BENZONATATE 100 MG: 100 CAPSULE ORAL at 00:04

## 2017-07-09 RX ADMIN — CALCITRIOL 0.5 MCG: 0.25 CAPSULE ORAL at 08:29

## 2017-07-09 RX ADMIN — SODIUM BICARBONATE TAB 650 MG 1300 MG: 650 TAB at 12:25

## 2017-07-09 RX ADMIN — HEPARIN SODIUM 5000 UNITS: 5000 INJECTION, SOLUTION INTRAVENOUS; SUBCUTANEOUS at 21:01

## 2017-07-09 RX ADMIN — SODIUM CHLORIDE 75 ML/HR: 4.5 INJECTION, SOLUTION INTRAVENOUS at 16:56

## 2017-07-09 RX ADMIN — CLONAZEPAM 0.5 MG: 0.5 TABLET ORAL at 21:01

## 2017-07-09 RX ADMIN — PANTOPRAZOLE SODIUM 40 MG: 40 INJECTION, POWDER, FOR SOLUTION INTRAVENOUS at 08:32

## 2017-07-09 RX ADMIN — BISACODYL 10 MG: 5 TABLET, COATED ORAL at 16:46

## 2017-07-09 RX ADMIN — SODIUM BICARBONATE TAB 650 MG 1300 MG: 650 TAB at 21:01

## 2017-07-09 RX ADMIN — SODIUM BICARBONATE TAB 650 MG 1300 MG: 650 TAB at 08:28

## 2017-07-09 RX ADMIN — CLONAZEPAM 0.5 MG: 0.5 TABLET ORAL at 08:28

## 2017-07-09 RX ADMIN — SODIUM BICARBONATE TAB 650 MG 1300 MG: 650 TAB at 17:48

## 2017-07-09 RX ADMIN — POLYETHYLENE GLYCOL 3350, SODIUM CHLORIDE, POTASSIUM CHLORIDE, SODIUM BICARBONATE, AND SODIUM SULFATE 4000 ML: 240; 5.84; 2.98; 6.72; 22.72 POWDER, FOR SOLUTION ORAL at 16:45

## 2017-07-09 NOTE — PROGRESS NOTES
Interval History:     Patient Complaints: The patient is in no distress.  She is nothing by mouth for a colonoscopy.  Colon prep has not been started yet.  Denies any chest pain fever or hemoptysis.  Sodium is higher as is the creatinine.  X-ray chest reviewed by me and shown to Dr. Givens and then to Dr. Murillo is highly suggestive of a right-sided pneumothorax.  Dr. Murillo will be getting a CT scan.        Vital Signs  Temp:  [98.2 °F (36.8 °C)-99.2 °F (37.3 °C)] 98.5 °F (36.9 °C)  Heart Rate:  [80-96] 89  Resp:  [18-21] 18  BP: (124-193)/() 124/65    Physical Exam:    General:             No distress      HEENT:  no cyanosis                Neck:   No retraction of accessory muscles        Lungs:     Lungs sound surprisingly equal on both sides     Heart:   RRR,  no rub       Abdomen:     Normal bowel sounds, soft non-tender, non-distended, no guarding       Extremities:   No pedal edema        Skin:   No petechiae, no rash       Neurologic:  Cr Ns grossly intact, sensation N, moves all extremities.        Results Review:     I reviewed the patient's new clinical results.    Lab Results (last 24 hours)     Procedure Component Value Units Date/Time    POC Glucose Fingerstick [[id number]]  (Abnormal) Collected:  07/08/17 1721    Specimen:  Blood Updated:  07/08/17 1728     Glucose 190 (H) mg/dL     Narrative:       Meter: [id number] : [id number] Ismael DALY    POC Glucose Fingerstick [[id number]]  (Abnormal) Collected:  07/08/17 2048    Specimen:  Blood Updated:  07/08/17 2056     Glucose 171 (H) mg/dL     Narrative:       Meter: [id number] : [id number] deepthi casanova    CBC & Differential [[id number]] Collected:  07/09/17 0146    Specimen:  Blood Updated:  07/09/17 0316    Narrative:       The following orders were created for panel order CBC & Differential.  Procedure                               Abnormality         Status                     ---------                               -----------          ------                     CBC Auto Differential[[id number]]        Abnormal            Final result                 Please view results for these tests on the individual orders.    CBC Auto Differential [[id number]]  (Abnormal) Collected:  07/09/17 0146    Specimen:  Blood Updated:  07/09/17 0316     WBC 13.65 (H) 10*3/mm3      RBC 3.48 (L) 10*6/mm3      Hemoglobin 9.5 (L) g/dL      Hematocrit 31.4 (L) %      MCV 90.2 fL      MCH 27.3 pg      MCHC 30.3 (L) g/dL      RDW 17.6 (H) %      RDW-SD 55.3 (H) fl      MPV 10.4 (H) fL      Platelets 288 10*3/mm3      Neutrophil % 73.0 %      Lymphocyte % 16.0 %      Monocyte % 5.3 %      Eosinophil % 5.1 %      Basophil % 0.3 %      Immature Grans % 0.3 %      Neutrophils, Absolute 9.97 (H) 10*3/mm3      Lymphocytes, Absolute 2.18 10*3/mm3      Monocytes, Absolute 0.72 10*3/mm3      Eosinophils, Absolute 0.70 10*3/mm3      Basophils, Absolute 0.04 10*3/mm3      Immature Grans, Absolute 0.04 (H) 10*3/mm3     Comprehensive Metabolic Panel [[id number]]  (Abnormal) Collected:  07/09/17 0146    Specimen:  Blood Updated:  07/09/17 0317     Glucose 127 (H) mg/dL      BUN 44 (H) mg/dL      Creatinine 2.71 (H) mg/dL      Sodium 145 mmol/L      Potassium 3.9 mmol/L      Chloride 118 (H) mmol/L      CO2 23.1 (L) mmol/L      Calcium 8.7 mg/dL      Total Protein 6.5 g/dL      Albumin 3.50 g/dL      ALT (SGPT) 15 U/L      AST (SGOT) 20 U/L      Alkaline Phosphatase 104 U/L       Note New Reference Ranges        Total Bilirubin 0.3 mg/dL      eGFR Non African Amer 18 (L) mL/min/1.73      Globulin 3.0 gm/dL      A/G Ratio 1.2 (L) g/dL      BUN/Creatinine Ratio 16.2     Anion Gap 3.9 mmol/L     Magnesium [[id number]]  (Normal) Collected:  07/09/17 0146    Specimen:  Blood Updated:  07/09/17 0317     Magnesium 1.7 mg/dL     Osmolality, Calculated [[id number]]  (Normal) Collected:  07/09/17 0146    Specimen:  Blood Updated:  07/09/17 0317     Osmolality Calc 301.5 mOsm/kg     POC Glucose  Fingerstick [[id number]]  (Abnormal) Collected:  07/09/17 0819    Specimen:  Blood Updated:  07/09/17 0827     Glucose 257 (H) mg/dL     Narrative:       Meter: [id number] : [id number] Ismael DALY    Stool Culture [[id number]] Collected:  07/06/17 0817    Specimen:  Stool from Per Rectum Updated:  07/09/17 0858     Stool Culture No growth    Narrative:         Absence of Campylobacter antigen or below limit of detection.  Shiga toxin testing not performed due to no enteric growth.    POC Glucose Fingerstick [[id number]]  (Normal) Collected:  07/09/17 1151    Specimen:  Blood Updated:  07/09/17 1158     Glucose 72 mg/dL     Narrative:       Meter: [id number] : [id number] Ismael DALY          Imaging Results (last 24 hours)     Procedure Component Value Units Date/Time    XR Chest AP [[id number]] Collected:  07/09/17 0801     Updated:  07/09/17 0804    Narrative:       EXAMINATION: XR CHEST AP-      CLINICAL INDICATION:     SEDATION IN INTUBATED PATIENTS     TECHNIQUE:  XR CHEST AP-      COMPARISON: 07/04/2017      FINDINGS:   Persistent bibasilar airspace disease may represent combination  atelectasis and/or pneumonia. Overall slight decreased since the  previous exam.  Cardiomegaly noted. Interstitial edema improved. Pulmonary vascular  congestion improved.   Stable small effusions.   No pneumothorax.   No acute osseous findings.            Impression:       1. Overall slight improvement in changes of CHF/edema with bibasilar  airspace disease.     This report was finalized on 7/9/2017 8:02 AM by Dr. Junior Angel MD.       CT Chest Without Contrast [[id number]] Updated:  07/09/17 1521          Assessment and Plan:    1. SIMON on CKD 3 : Her baseline creatinine is 2.5 from may 2017/.  Creatinine is worse again today . SIMON initially felt to be from volume depletion and NSAIDS. She was also on lisinopril at home.   She has no hydronephrosis.avoid nephrotoxins.  Since her creatinine is higher as is her sodium  and since she is nothing by mouth I'm going to start her on IV fluids until her nothing by mouth status is discontinued.  This despite the fact that the x-ray chest still shows a little congestion      2. Metabolic acidosis with respiratory compensation :on PO bicarb     3. Type2 DM : controlled      4. HTN : keep -150/80s for renal perfusion, better with amlodipine      5. Anemia iron def : on venofer      6. Bone mineral disorder:on calcitriol    7.  Right pneumothorax: See discussion above    Manuel Garrido MD  07/09/17  3:22 PM

## 2017-07-09 NOTE — PROGRESS NOTES
HOSPITALIST PROGRESS NOTE    Patient Identification:  Name:  Sara Cardona  Age:  66 y.o.  Sex:  female  :  1950  MRN:  [id number]  Visit Number:  [id number]  Primary Care Provider:  Marcelino Sheehan MD    Length of stay:  6     HPI: The patient is a 67 yo female who was admitted on 7/3/2017 with nausea and found to have acute on chronic renal failure.     Subjective:  Today, the patient was walking from the elevator as she had been outside to smoke.  Upon evaluation, the patient continues to report some mild nausea but today she thinks it is related to her nothing by mouth status.  The patient is currently prepping for colonoscopy planned for tomorrow.  She denies any abdominal pain.  She continues to report some black stool.  She denies chest pain and/or dyspnea.    Present during exam: N/A    Current Hospital Meds:    amLODIPine 5 mg Oral Q24H   aspirin 81 mg Oral Daily   budesonide-formoterol 2 puff Inhalation BID - RT   calcitriol 0.5 mcg Oral Daily   heparin (porcine) 5,000 Units Subcutaneous Q12H   insulin aspart 0-7 Units Subcutaneous 4x Daily AC & at Bedtime   pantoprazole 40 mg Intravenous Q12H   sodium bicarbonate 1,300 mg Oral 4x Daily     Vital Signs  Temp:  [97.9 °F (36.6 °C)-99.2 °F (37.3 °C)] 97.9 °F (36.6 °C)  Heart Rate:  [80-96] 85  Resp:  [18-21] 20  BP: (124-193)/() 179/104  Last 3 weights    17  0950   Weight: 115 lb (52.2 kg)     Body mass index is 21.73 kg/(m^2).    Physical exam:  Physical Exam   Constitutional: She is oriented to person, place, and time. She appears well-developed and well-nourished. No distress. Nasal cannula in place.   HENT:   Head: Normocephalic and atraumatic.   Nose: Nose normal.   Mouth/Throat: Oropharynx is clear and moist and mucous membranes are normal.   Eyes: Conjunctivae and EOM are normal. Pupils are equal, round, and reactive to light. No scleral icterus.   Neck: Trachea normal. Neck supple. No JVD present. Carotid bruit is not  present. No thyromegaly present.   Cardiovascular: Normal rate, regular rhythm and normal heart sounds.  Exam reveals no gallop and no friction rub.    No murmur heard.  No significant lower extremity edema   Pulmonary/Chest: Effort normal. No respiratory distress. She has no wheezes. She has no rhonchi. She has no rales.   Abdominal: Soft. Bowel sounds are normal. She exhibits no distension. There is no tenderness. There is no guarding.   Musculoskeletal: Normal range of motion.        Thoracic back: She exhibits tenderness and bony tenderness.   Neurological: She is oriented to person, place, and time. She has normal strength. No cranial nerve deficit.   Skin: Skin is warm, dry and intact. No rash noted. No erythema.   Psychiatric: She has a normal mood and affect. Her speech is normal.     Results Review:    Telemetry: Sinus rhythm     Results from last 7 days  Lab Units 07/09/17  0146 07/08/17  0509 07/07/17  0115 07/06/17  0101 07/05/17  0116 07/04/17  0240 07/03/17  1039   WBC 10*3/mm3 13.65* 12.06 10.57 11.53 12.67* 10.12 8.67   HEMOGLOBIN g/dL 9.5* 8.3* 7.8* 9.0* 9.4* 8.8* 10.9*   HEMATOCRIT % 31.4* 27.7* 25.9* 28.4* 30.2* 28.8* 34.7*   PLATELETS 10*3/mm3 288 275 259 280 280 246 268       Results from last 7 days  Lab Units 07/09/17  0146 07/08/17  0509 07/07/17  0115 07/06/17  0956 07/06/17  0101 07/05/17  0116 07/04/17  0240 07/03/17  1039   SODIUM mmol/L 145 143 140  --  143 144 141 140   POTASSIUM mmol/L 3.9 3.8 3.6 3.5 3.4* 3.8 3.9 3.8   CHLORIDE mmol/L 118* 119* 120*  --  121* 119* 120* 115*   CO2 mmol/L 23.1* 19.3* 13.4*  --  16.2* 18.7* 15.2* 17.5*   BUN mg/dL 44* 39* 35*  --  48* 44* 42* 46*   CREATININE mg/dL 2.71* 2.53* 2.70*  --  3.20* 3.23* 3.28* 3.49*   CALCIUM mg/dL 8.7 7.8 7.0*  --  7.1* 7.1* 7.0* 7.5*   GLUCOSE mg/dL 127* 144* 153*  --  143* 134* 131* 151*       Results from last 7 days  Lab Units 07/09/17  0146 07/08/17  0509 07/07/17  0115 07/06/17  0101 07/05/17  0116 07/04/17  0240  07/03/17  1039   BILIRUBIN mg/dL 0.3 0.1* 0.1* 0.2 0.1* 0.1* 0.1*   ALK PHOS U/L 104 88 74 80 78 76 94   AST (SGOT) U/L 20 18 11 10 14 12 14   ALT (SGPT) U/L 15 14 7* 10 9* 9* 11       Results from last 7 days  Lab Units 07/09/17  0146 07/08/17  0509 07/07/17  0115 07/03/17  1039   MAGNESIUM mg/dL 1.7 1.6* 1.5* 1.9       Results from last 7 days  Lab Units 07/08/17  1114 07/08/17  0509 07/07/17  2339 07/04/17  0240 07/03/17  2037 07/03/17  1508   CK TOTAL U/L  --   --  103 80 115 108   TROPONIN I ng/mL 0.041* 0.039 0.039 <0.006 0.018 <0.006   CK MB INDEX %  --   --  4.7* 6.3* 6.3* 6.1*   MYOGLOBIN ng/mL  --   --   --  122.0* 138.0* 128.0*       Results from last 7 days  Lab Units 07/07/17  0115 07/06/17  0956 07/04/17  0240 07/03/17  1403   BNP pg/mL 809.0* 1261.0* 1003.0* 500.0*     I have personally reviewed the above laboratory results.     CT chest without contrast:  Official report is pending. No pneumothorax per my view.      Assessment/Plan     -Non-oliguric SIMON on stage IV CKD with baseline creatinine for the past year ranging from 1.8-2.2; likely multifactorial and secondary to volume depletion and medication induced (NSAIDs/ACEI) at home  -Hypomagnesemia  -Uncontrolled essential hypertension  -Diarrhea with negative C.Difficile toxin; may be due to stool seepage in the setting of chronic constipation  -Possible upper GI bleed with complaints of dark stool in the setting of NSAID use  -Elevated parathyroid hormone  -Vitamin D insufficiency   -Mild leukocytosis  -Metabolic acidosis   -Severe protein malnutrition with hypoalbuminemia causing pseudo-hypocalcemia  -Diet controlled diabetes mellitus type II; well controlled with A1c 6.1%; overall fairly well controlled  -Tobacco abuse; counseled cessation  -COPD without acute exacerbation   -Generalized anxiety disorder    The patient underwent a chest xray today that was questionable for pneumothorax (reviewed with Dr. Garrido). I have ordered a CT chest without  contrast with no obvious pneumothorax per my view - official report pending. Patient receiving gentle IVF hydration as she will be NPO for endoscopies tomorrow. I will place a nicotine patch.  Continue IV Protonix in the meantime.     Continue calcitriol, sodium bicarbonate tabs, and Norvasc per nephrology. Nephrology and gastroenterology assistance is much appreciated. Will continue to supplement electrolytes per protocol. Will repeat her magnesium level in the morning as we are supplementing her Mg.    Continue to closely monitor hemoglobin and hematocrit, plan to transfuse if hemoglobin were to drop below 7.0. Monitor for signs/symptoms of active bleeding. Allow permissive hypertension to allow for renal perfusion with goal systolic blood pressure of 140-150s. Will repeat labs in the morning and continue to monitor the patient closely on telemetry.     DVT prophylaxis: SQ Heparin   GI prophylaxis: IV Pantoprazole    The patient is high risk due to the following diagnoses/reasons:  SIMON with CKD, pulmonary hypertension, tobacco abuse, possible GI bleed    I discussed the patients findings and my recommendations with patient and consulting provider (Dr. Garrido)    Disposition  Home when medically stable    Tish Givens,   07/09/17  7:33 PM

## 2017-07-09 NOTE — PLAN OF CARE
Problem: Patient Care Overview (Adult)  Goal: Plan of Care Review    07/09/17 0248 07/09/17 0800   Coping/Psychosocial Response Interventions   Plan Of Care Reviewed With --  patient   Patient Care Overview   Progress no change --          Problem: Renal Failure/Kidney Injury, Acute (Adult)  Goal: Signs and Symptoms of Listed Potential Problems Will be Absent or Manageable (Renal Failure/Kidney Injury, Acute)    07/09/17 0248   Renal Failure/Kidney Injury, Acute   Problems Assessed (Acute Renal Failure/Kidney Injury) all   Problems Present (Acute Renal Failure/Kidney Injury) acid-base imbalance;hypertension;electrolyte imbalance         Problem: Pneumonia (Adult)  Goal: Signs and Symptoms of Listed Potential Problems Will be Absent or Manageable (Pneumonia)    07/09/17 0248   Pneumonia   Problems Assessed (Pneumonia) all   Problems Present (Pneumonia) none

## 2017-07-09 NOTE — PLAN OF CARE
Problem: Patient Care Overview (Adult)  Goal: Plan of Care Review    07/09/17 0248   Coping/Psychosocial Response Interventions   Plan Of Care Reviewed With patient   Patient Care Overview   Progress no change         Problem: Renal Failure/Kidney Injury, Acute (Adult)  Goal: Signs and Symptoms of Listed Potential Problems Will be Absent or Manageable (Renal Failure/Kidney Injury, Acute)  Outcome: Ongoing (interventions implemented as appropriate)    07/09/17 0248   Renal Failure/Kidney Injury, Acute   Problems Assessed (Acute Renal Failure/Kidney Injury) all   Problems Present (Acute Renal Failure/Kidney Injury) acid-base imbalance;hypertension;electrolyte imbalance         Problem: Pneumonia (Adult)  Goal: Signs and Symptoms of Listed Potential Problems Will be Absent or Manageable (Pneumonia)  Outcome: Ongoing (interventions implemented as appropriate)    07/09/17 0248   Pneumonia   Problems Assessed (Pneumonia) all   Problems Present (Pneumonia) none

## 2017-07-10 ENCOUNTER — ANESTHESIA EVENT (OUTPATIENT)
Dept: PERIOP | Facility: HOSPITAL | Age: 67
End: 2017-07-10

## 2017-07-10 ENCOUNTER — ANESTHESIA (OUTPATIENT)
Dept: PERIOP | Facility: HOSPITAL | Age: 67
End: 2017-07-10

## 2017-07-10 LAB
ALBUMIN SERPL-MCNC: 2.8 G/DL (ref 3.4–4.8)
ALBUMIN/GLOB SERPL: 1.1 G/DL (ref 1.5–2.5)
ALP SERPL-CCNC: 95 U/L (ref 35–104)
ALT SERPL W P-5'-P-CCNC: 15 U/L (ref 10–36)
ANION GAP SERPL CALCULATED.3IONS-SCNC: 4.3 MMOL/L (ref 3.6–11.2)
AST SERPL-CCNC: 16 U/L (ref 10–30)
BASOPHILS # BLD AUTO: 0.04 10*3/MM3 (ref 0–0.3)
BASOPHILS NFR BLD AUTO: 0.4 % (ref 0–2)
BILIRUB SERPL-MCNC: 0.2 MG/DL (ref 0.2–1.8)
BUN BLD-MCNC: 33 MG/DL (ref 7–21)
BUN/CREAT SERPL: 14.5 (ref 7–25)
CALCIUM SPEC-SCNC: 7.9 MG/DL (ref 7.7–10)
CHLORIDE SERPL-SCNC: 120 MMOL/L (ref 99–112)
CO2 SERPL-SCNC: 21.7 MMOL/L (ref 24.3–31.9)
CREAT BLD-MCNC: 2.27 MG/DL (ref 0.43–1.29)
DEPRECATED RDW RBC AUTO: 55.4 FL (ref 37–54)
EOSINOPHIL # BLD AUTO: 0.53 10*3/MM3 (ref 0–0.7)
EOSINOPHIL NFR BLD AUTO: 5.3 % (ref 0–7)
ERYTHROCYTE [DISTWIDTH] IN BLOOD BY AUTOMATED COUNT: 17.5 % (ref 11.5–14.5)
GFR SERPL CREATININE-BSD FRML MDRD: 22 ML/MIN/1.73
GLOBULIN UR ELPH-MCNC: 2.6 GM/DL
GLUCOSE BLD-MCNC: 113 MG/DL (ref 70–110)
GLUCOSE BLDC GLUCOMTR-MCNC: 131 MG/DL (ref 70–130)
GLUCOSE BLDC GLUCOMTR-MCNC: 132 MG/DL (ref 70–130)
GLUCOSE BLDC GLUCOMTR-MCNC: 136 MG/DL (ref 70–130)
GLUCOSE BLDC GLUCOMTR-MCNC: 238 MG/DL (ref 70–130)
HCT VFR BLD AUTO: 28.1 % (ref 37–47)
HGB BLD-MCNC: 8.6 G/DL (ref 12–16)
IMM GRANULOCYTES # BLD: 0.02 10*3/MM3 (ref 0–0.03)
IMM GRANULOCYTES NFR BLD: 0.2 % (ref 0–0.5)
LYMPHOCYTES # BLD AUTO: 2.3 10*3/MM3 (ref 1–3)
LYMPHOCYTES NFR BLD AUTO: 23.1 % (ref 16–46)
MCH RBC QN AUTO: 27.8 PG (ref 27–33)
MCHC RBC AUTO-ENTMCNC: 30.6 G/DL (ref 33–37)
MCV RBC AUTO: 90.9 FL (ref 80–94)
MONOCYTES # BLD AUTO: 0.5 10*3/MM3 (ref 0.1–0.9)
MONOCYTES NFR BLD AUTO: 5 % (ref 0–12)
NEUTROPHILS # BLD AUTO: 6.56 10*3/MM3 (ref 1.4–6.5)
NEUTROPHILS NFR BLD AUTO: 66 % (ref 40–75)
OSMOLALITY SERPL CALC.SUM OF ELEC: 298.6 MOSM/KG (ref 273–305)
PLATELET # BLD AUTO: 239 10*3/MM3 (ref 130–400)
PMV BLD AUTO: 10.6 FL (ref 6–10)
POTASSIUM BLD-SCNC: 3.5 MMOL/L (ref 3.5–5.3)
PROT SERPL-MCNC: 5.4 G/DL (ref 6–8)
RBC # BLD AUTO: 3.09 10*6/MM3 (ref 4.2–5.4)
SODIUM BLD-SCNC: 146 MMOL/L (ref 135–153)
WBC NRBC COR # BLD: 9.95 10*3/MM3 (ref 4.5–12.5)

## 2017-07-10 PROCEDURE — 99231 SBSQ HOSP IP/OBS SF/LOW 25: CPT | Performed by: INTERNAL MEDICINE

## 2017-07-10 PROCEDURE — 25010000002 FENTANYL CITRATE (PF) 100 MCG/2ML SOLUTION: Performed by: NURSE ANESTHETIST, CERTIFIED REGISTERED

## 2017-07-10 PROCEDURE — 80053 COMPREHEN METABOLIC PANEL: CPT | Performed by: INTERNAL MEDICINE

## 2017-07-10 PROCEDURE — 0DB98ZX EXCISION OF DUODENUM, VIA NATURAL OR ARTIFICIAL OPENING ENDOSCOPIC, DIAGNOSTIC: ICD-10-PCS | Performed by: INTERNAL MEDICINE

## 2017-07-10 PROCEDURE — 94799 UNLISTED PULMONARY SVC/PX: CPT

## 2017-07-10 PROCEDURE — 25010000002 HEPARIN (PORCINE) PER 1000 UNITS: Performed by: INTERNAL MEDICINE

## 2017-07-10 PROCEDURE — 0DBN8ZZ EXCISION OF SIGMOID COLON, VIA NATURAL OR ARTIFICIAL OPENING ENDOSCOPIC: ICD-10-PCS | Performed by: INTERNAL MEDICINE

## 2017-07-10 PROCEDURE — 45385 COLONOSCOPY W/LESION REMOVAL: CPT | Performed by: INTERNAL MEDICINE

## 2017-07-10 PROCEDURE — 43239 EGD BIOPSY SINGLE/MULTIPLE: CPT | Performed by: INTERNAL MEDICINE

## 2017-07-10 PROCEDURE — 82962 GLUCOSE BLOOD TEST: CPT

## 2017-07-10 PROCEDURE — 63710000001 INSULIN ASPART PER 5 UNITS: Performed by: PHYSICIAN ASSISTANT

## 2017-07-10 PROCEDURE — 0DBE8ZX EXCISION OF LARGE INTESTINE, VIA NATURAL OR ARTIFICIAL OPENING ENDOSCOPIC, DIAGNOSTIC: ICD-10-PCS | Performed by: INTERNAL MEDICINE

## 2017-07-10 PROCEDURE — 88305 TISSUE EXAM BY PATHOLOGIST: CPT | Performed by: INTERNAL MEDICINE

## 2017-07-10 PROCEDURE — 85025 COMPLETE CBC W/AUTO DIFF WBC: CPT | Performed by: INTERNAL MEDICINE

## 2017-07-10 PROCEDURE — 25010000002 PROPOFOL 10 MG/ML EMULSION: Performed by: NURSE ANESTHETIST, CERTIFIED REGISTERED

## 2017-07-10 PROCEDURE — 0DB68ZX EXCISION OF STOMACH, VIA NATURAL OR ARTIFICIAL OPENING ENDOSCOPIC, DIAGNOSTIC: ICD-10-PCS | Performed by: INTERNAL MEDICINE

## 2017-07-10 RX ORDER — FENTANYL CITRATE 50 UG/ML
50 INJECTION, SOLUTION INTRAMUSCULAR; INTRAVENOUS
Status: DISCONTINUED | OUTPATIENT
Start: 2017-07-10 | End: 2017-07-10 | Stop reason: HOSPADM

## 2017-07-10 RX ORDER — FENTANYL CITRATE 50 UG/ML
INJECTION, SOLUTION INTRAMUSCULAR; INTRAVENOUS AS NEEDED
Status: DISCONTINUED | OUTPATIENT
Start: 2017-07-10 | End: 2017-07-10 | Stop reason: SURG

## 2017-07-10 RX ORDER — SODIUM BICARBONATE 650 MG/1
1300 TABLET ORAL 3 TIMES DAILY
Status: DISCONTINUED | OUTPATIENT
Start: 2017-07-10 | End: 2017-07-11 | Stop reason: HOSPADM

## 2017-07-10 RX ORDER — SODIUM CHLORIDE, SODIUM LACTATE, POTASSIUM CHLORIDE, CALCIUM CHLORIDE 600; 310; 30; 20 MG/100ML; MG/100ML; MG/100ML; MG/100ML
125 INJECTION, SOLUTION INTRAVENOUS CONTINUOUS
Status: DISCONTINUED | OUTPATIENT
Start: 2017-07-10 | End: 2017-07-10

## 2017-07-10 RX ORDER — PROPOFOL 10 MG/ML
VIAL (ML) INTRAVENOUS AS NEEDED
Status: DISCONTINUED | OUTPATIENT
Start: 2017-07-10 | End: 2017-07-10 | Stop reason: SURG

## 2017-07-10 RX ORDER — ONDANSETRON 2 MG/ML
4 INJECTION INTRAMUSCULAR; INTRAVENOUS ONCE AS NEEDED
Status: DISCONTINUED | OUTPATIENT
Start: 2017-07-10 | End: 2017-07-10 | Stop reason: HOSPADM

## 2017-07-10 RX ORDER — SODIUM CHLORIDE 0.9 % (FLUSH) 0.9 %
1-10 SYRINGE (ML) INJECTION AS NEEDED
Status: DISCONTINUED | OUTPATIENT
Start: 2017-07-10 | End: 2017-07-10 | Stop reason: HOSPADM

## 2017-07-10 RX ORDER — PANTOPRAZOLE SODIUM 40 MG/1
40 TABLET, DELAYED RELEASE ORAL
Status: DISCONTINUED | OUTPATIENT
Start: 2017-07-11 | End: 2017-07-11 | Stop reason: HOSPADM

## 2017-07-10 RX ORDER — OXYCODONE HYDROCHLORIDE AND ACETAMINOPHEN 5; 325 MG/1; MG/1
1 TABLET ORAL ONCE AS NEEDED
Status: DISCONTINUED | OUTPATIENT
Start: 2017-07-10 | End: 2017-07-10 | Stop reason: HOSPADM

## 2017-07-10 RX ORDER — AMLODIPINE BESYLATE 10 MG/1
10 TABLET ORAL
Status: DISCONTINUED | OUTPATIENT
Start: 2017-07-11 | End: 2017-07-11 | Stop reason: HOSPADM

## 2017-07-10 RX ORDER — LIDOCAINE HYDROCHLORIDE 10 MG/ML
INJECTION, SOLUTION INFILTRATION; PERINEURAL AS NEEDED
Status: DISCONTINUED | OUTPATIENT
Start: 2017-07-10 | End: 2017-07-10 | Stop reason: SURG

## 2017-07-10 RX ORDER — IPRATROPIUM BROMIDE AND ALBUTEROL SULFATE 2.5; .5 MG/3ML; MG/3ML
3 SOLUTION RESPIRATORY (INHALATION) ONCE
Status: DISCONTINUED | OUTPATIENT
Start: 2017-07-10 | End: 2017-07-10 | Stop reason: HOSPADM

## 2017-07-10 RX ORDER — MEPERIDINE HYDROCHLORIDE 25 MG/ML
12.5 INJECTION INTRAMUSCULAR; INTRAVENOUS; SUBCUTANEOUS
Status: DISCONTINUED | OUTPATIENT
Start: 2017-07-10 | End: 2017-07-10 | Stop reason: HOSPADM

## 2017-07-10 RX ORDER — PROPOFOL 10 MG/ML
VIAL (ML) INTRAVENOUS CONTINUOUS PRN
Status: DISCONTINUED | OUTPATIENT
Start: 2017-07-10 | End: 2017-07-10

## 2017-07-10 RX ADMIN — PROPOFOL 20 MG: 10 INJECTION, EMULSION INTRAVENOUS at 10:46

## 2017-07-10 RX ADMIN — BENZONATATE 100 MG: 100 CAPSULE ORAL at 23:06

## 2017-07-10 RX ADMIN — SODIUM BICARBONATE TAB 650 MG 1300 MG: 650 TAB at 17:21

## 2017-07-10 RX ADMIN — BUDESONIDE AND FORMOTEROL FUMARATE DIHYDRATE 2 PUFF: 80; 4.5 AEROSOL RESPIRATORY (INHALATION) at 07:37

## 2017-07-10 RX ADMIN — BUDESONIDE AND FORMOTEROL FUMARATE DIHYDRATE 2 PUFF: 80; 4.5 AEROSOL RESPIRATORY (INHALATION) at 18:30

## 2017-07-10 RX ADMIN — SODIUM CHLORIDE 75 ML/HR: 4.5 INJECTION, SOLUTION INTRAVENOUS at 12:17

## 2017-07-10 RX ADMIN — PROPOFOL 20 MG: 10 INJECTION, EMULSION INTRAVENOUS at 10:54

## 2017-07-10 RX ADMIN — CLONAZEPAM 0.5 MG: 0.5 TABLET ORAL at 21:35

## 2017-07-10 RX ADMIN — HEPARIN SODIUM 5000 UNITS: 5000 INJECTION, SOLUTION INTRAVENOUS; SUBCUTANEOUS at 21:35

## 2017-07-10 RX ADMIN — LIDOCAINE HYDROCHLORIDE 40 MG: 10 INJECTION, SOLUTION INFILTRATION; PERINEURAL at 10:25

## 2017-07-10 RX ADMIN — SODIUM BICARBONATE TAB 650 MG 1300 MG: 650 TAB at 21:35

## 2017-07-10 RX ADMIN — IPRATROPIUM BROMIDE AND ALBUTEROL SULFATE 3 ML: .5; 3 SOLUTION RESPIRATORY (INHALATION) at 11:15

## 2017-07-10 RX ADMIN — PROPOFOL 20 MG: 10 INJECTION, EMULSION INTRAVENOUS at 10:36

## 2017-07-10 RX ADMIN — AMLODIPINE BESYLATE 5 MG: 5 TABLET ORAL at 08:07

## 2017-07-10 RX ADMIN — SODIUM CHLORIDE, POTASSIUM CHLORIDE, SODIUM LACTATE AND CALCIUM CHLORIDE: 600; 310; 30; 20 INJECTION, SOLUTION INTRAVENOUS at 10:25

## 2017-07-10 RX ADMIN — FENTANYL CITRATE 100 MCG: 50 INJECTION INTRAMUSCULAR; INTRAVENOUS at 10:25

## 2017-07-10 RX ADMIN — NICOTINE 1 PATCH: 21 PATCH, EXTENDED RELEASE TRANSDERMAL at 21:38

## 2017-07-10 RX ADMIN — CLONAZEPAM 0.5 MG: 0.5 TABLET ORAL at 08:08

## 2017-07-10 NOTE — OP NOTE
07/10/17    COLONOSCOPY with biopsies and polypectomy, ESOPHAGOGASTRODUODENOSCOPY with biopsies Procedure Note    Sara Cardona  7/3/2017 - 7/10/2017    Pre-op Diagnosis: Abdominal pain, nausea/vomiting, diarrhea, anemia, GI blood loss, colon cancer screening, last colonoscopy was performed about 15 years ago      Post-op Diagnosis:   Gastritis, mild  Colonic diverticulosis  Sigmoid polyp, removed  No bleeding was observed        Anesthesia: Per Anesthesia service, general anesthesia      Estimated Blood Loss: Negligible      Findings: EGD was performed with careful inspection of the esophagus, stomach and duodenum to the fourth portion.  Mild nonerosive gastritis was observed.  The examination was otherwise normal.  No evidence of recent/active bleeding was observed.  Biopsies were taken randomly from the gastric antrum and from the duodenum.    Normal rectal examination.  Colonoscopy was performed to the cecum, confirmed by identification of typical cecal anatomy.  Bowel preparation was excellent.  The scope was retroflexed within the rectum.  All areas were examined very carefully.  Scattered diverticuli were seen in the colon, predominantly in the sigmoid colon, without obvious evidence of diverticulitis.  A single small (under 1 cm in size) benign-appearing sessile polyp was removed from the sigmoid colon using the cold snare technique.  No other abnormality was identified.  Biopsies were taken randomly from the colon.    She tolerated the procedures well and there were no immediate complications.  She left the OR in stable and satisfactory condition.      Complications: None      Recommendations:   Diet as tolerated  Await biopsy findings  Repeat screening/surveillance colonoscopy in 3-5 years      Zheng Suarez III, MD     Date: 7/10/2017  Time: 11:05 AM     CC:  Dr. Tish Givens

## 2017-07-10 NOTE — PROGRESS NOTES
HOSPITALIST PROGRESS NOTE    Patient Identification:  Name:  Sara Cardona  Age:  66 y.o.  Sex:  female  :  1950  MRN:  [id number]  Visit Number:  [id number]  Primary Care Provider:  Marcelino Sheehan MD    Length of stay:  7     HPI: The patient is a 67 yo female who was admitted on 7/3/2017 with nausea and found to have acute on chronic renal failure.     Procedures:  EGD/Colonoscopy:   Gastritis, mild   Colonic diverticulosis   Sigmoid polyp, removed   No bleeding was observed     Subjective:  The patient was seen this evening sitting on her bed.  She has undergone EGD and colonoscopy today.  Her EGD revealed some mild gastritis.  She had a sigmoid polyp that was removed.  There was no active bleeding.    Patient currently denies any nausea.  She remained somewhat somnolent from the sedation she received today.  She currently denies any back pain.  She has no more abdominal pain and denies any diarrhea at this time.    Present during exam: N/A    Current Hospital Meds:    amLODIPine 5 mg Oral Q24H   aspirin 81 mg Oral Daily   budesonide-formoterol 2 puff Inhalation BID - RT   calcitriol 0.5 mcg Oral Daily   heparin (porcine) 5,000 Units Subcutaneous Q12H   insulin aspart 0-7 Units Subcutaneous 4x Daily AC & at Bedtime   nicotine 1 patch Transdermal Q24H   pantoprazole 40 mg Intravenous Q12H   sodium bicarbonate 1,300 mg Oral TID     Vital Signs  Temp:  [97.4 °F (36.3 °C)-98.7 °F (37.1 °C)] 97.4 °F (36.3 °C)  Heart Rate:  [] 95  Resp:  [18-24] 20  BP: (124-196)/() 178/95  Last 3 weights    17  0950 07/10/17  0936   Weight: 115 lb (52.2 kg) 115 lb (52.2 kg)     Body mass index is 21.73 kg/(m^2).    Physical exam:  Physical Exam   Constitutional: She is oriented to person, place, and time. She appears well-developed and well-nourished. No distress. Nasal cannula in place.   HENT:   Head: Normocephalic and atraumatic.   Nose: Nose normal.   Mouth/Throat: Oropharynx is clear and moist and  mucous membranes are normal.   Eyes: Conjunctivae and EOM are normal. Pupils are equal, round, and reactive to light. No scleral icterus.   Neck: Trachea normal. Neck supple. No JVD present. Carotid bruit is not present. No thyromegaly present.   Cardiovascular: Normal rate, regular rhythm and normal heart sounds.  Exam reveals no gallop and no friction rub.    No murmur heard.  No significant lower extremity edema   Pulmonary/Chest: Effort normal. No respiratory distress. She has no wheezes. She has no rhonchi. She has no rales.   Abdominal: Soft. Bowel sounds are normal. She exhibits no distension. There is no tenderness. There is no guarding.   Neurological: She is oriented to person, place, and time. She has normal strength. No cranial nerve deficit.   Skin: Skin is warm, dry and intact. No rash noted. No erythema.   Psychiatric: She has a normal mood and affect. Her speech is normal.     Results Review:    Telemetry: Sinus rhythm     Results from last 7 days  Lab Units 07/10/17  0200 07/09/17  0146 07/08/17  0509 07/07/17  0115 07/06/17  0101 07/05/17  0116 07/04/17  0240   WBC 10*3/mm3 9.95 13.65* 12.06 10.57 11.53 12.67* 10.12   HEMOGLOBIN g/dL 8.6* 9.5* 8.3* 7.8* 9.0* 9.4* 8.8*   HEMATOCRIT % 28.1* 31.4* 27.7* 25.9* 28.4* 30.2* 28.8*   PLATELETS 10*3/mm3 239 288 275 259 280 280 246       Results from last 7 days  Lab Units 07/10/17  0200 07/09/17  0146 07/08/17  0509 07/07/17  0115 07/06/17  0956 07/06/17  0101 07/05/17  0116 07/04/17  0240   SODIUM mmol/L 146 145 143 140  --  143 144 141   POTASSIUM mmol/L 3.5 3.9 3.8 3.6 3.5 3.4* 3.8 3.9   CHLORIDE mmol/L 120* 118* 119* 120*  --  121* 119* 120*   CO2 mmol/L 21.7* 23.1* 19.3* 13.4*  --  16.2* 18.7* 15.2*   BUN mg/dL 33* 44* 39* 35*  --  48* 44* 42*   CREATININE mg/dL 2.27* 2.71* 2.53* 2.70*  --  3.20* 3.23* 3.28*   CALCIUM mg/dL 7.9 8.7 7.8 7.0*  --  7.1* 7.1* 7.0*   GLUCOSE mg/dL 113* 127* 144* 153*  --  143* 134* 131*       Results from last 7 days  Lab  Units 07/10/17  0200 07/09/17  0146 07/08/17  0509 07/07/17  0115 07/06/17  0101 07/05/17  0116 07/04/17  0240   BILIRUBIN mg/dL 0.2 0.3 0.1* 0.1* 0.2 0.1* 0.1*   ALK PHOS U/L 95 104 88 74 80 78 76   AST (SGOT) U/L 16 20 18 11 10 14 12   ALT (SGPT) U/L 15 15 14 7* 10 9* 9*       Results from last 7 days  Lab Units 07/09/17  0146 07/08/17  0509 07/07/17  0115   MAGNESIUM mg/dL 1.7 1.6* 1.5*       Results from last 7 days  Lab Units 07/08/17  1114 07/08/17  0509 07/07/17  2339 07/04/17  0240 07/03/17  2037   CK TOTAL U/L  --   --  103 80 115   TROPONIN I ng/mL 0.041* 0.039 0.039 <0.006 0.018   CK MB INDEX %  --   --  4.7* 6.3* 6.3*   MYOGLOBIN ng/mL  --   --   --  122.0* 138.0*       Results from last 7 days  Lab Units 07/07/17  0115 07/06/17  0956 07/04/17  0240   BNP pg/mL 809.0* 1261.0* 1003.0*         Assessment/Plan     -Non-oliguric SIMON on stage IV CKD with baseline creatinine for the past year ranging from 1.8-2.2; likely multifactorial and secondary to volume depletion and medication induced (NSAIDs/ACEI) at home  -Hypomagnesemia, resolved with supplementation  -Uncontrolled essential hypertension  -Possible upper GI bleed with complaints of dark stool in the setting of NSAID use; EGD negative  -Elevated parathyroid hormone  -Vitamin D insufficiency   -Metabolic acidosis   -Severe protein malnutrition with hypoalbuminemia causing pseudo-hypocalcemia  -Diet controlled diabetes mellitus type II; well controlled with A1c 6.1%; overall fairly well controlled  -Tobacco abuse; counseled cessation  -COPD without acute exacerbation   -Generalized anxiety disorder    Patient underwent EGD and colonoscopy today that were unremarkable.  The patient's creatinine is actually better than her baseline creatinine of 2.5 in May 2017.  Plan to discontinue her lactated Ringer's.  I will place her on oral pantoprazole.  She remains on sodium bicarbonate tablets in addition to calcitriol.  Her blood pressure is uncontrolled and I  have increased the dose of her amlodipine. The patient was taking oral hydralazine at home and is currently only receiving as needed IV hydralazine.  Should her blood pressures continued to be uncontrolled, we may consider adding back her oral hydralazine.      Continue to closely monitor hemoglobin and hematocrit, plan to transfuse if hemoglobin were to drop below 7.0. Monitor for signs/symptoms of active bleeding. Allow permissive hypertension to allow for renal perfusion with goal systolic blood pressure of 140-150s. Will repeat labs in the morning and continue to monitor the patient closely on telemetry.     DVT prophylaxis: SQ Heparin   GI prophylaxis: PO Pantoprazole    The patient is high risk due to the following diagnoses/reasons:  SIMON with CKD, pulmonary hypertension, tobacco abuse, possible GI bleed    I discussed the patients findings and my recommendations with patient    Disposition  Home when medically stable; possibly in 1-2 days    Tish Givens DO  07/10/17  6:59 PM

## 2017-07-10 NOTE — ANESTHESIA PREPROCEDURE EVALUATION
Anesthesia Evaluation     no history of anesthetic complications:  NPO Solid Status: > 8 hours  NPO Liquid Status: > 8 hours     Airway   Mallampati: II  TM distance: <3 FB  Neck ROM: full  no difficulty expected  Dental    (+) edentulous    Pulmonary - normal exam   (+) pneumonia , COPD,   Cardiovascular - normal exam    (+) hypertension, valvular problems/murmurs,       Neuro/Psych  GI/Hepatic/Renal/Endo    (+)  diabetes mellitus,     Musculoskeletal     Abdominal  - normal exam   Substance History      OB/GYN          Other                                      Anesthesia Plan    ASA 3     general     intravenous induction   Anesthetic plan and risks discussed with patient.

## 2017-07-10 NOTE — PLAN OF CARE
Problem: Renal Failure/Kidney Injury, Acute (Adult)  Goal: Signs and Symptoms of Listed Potential Problems Will be Absent or Manageable (Renal Failure/Kidney Injury, Acute)  Outcome: Ongoing (interventions implemented as appropriate)    07/09/17 0248   Renal Failure/Kidney Injury, Acute   Problems Assessed (Acute Renal Failure/Kidney Injury) all   Problems Present (Acute Renal Failure/Kidney Injury) acid-base imbalance;hypertension;electrolyte imbalance         Problem: Pneumonia (Adult)  Goal: Signs and Symptoms of Listed Potential Problems Will be Absent or Manageable (Pneumonia)  Outcome: Ongoing (interventions implemented as appropriate)    07/09/17 0248   Pneumonia   Problems Assessed (Pneumonia) all   Problems Present (Pneumonia) none

## 2017-07-10 NOTE — PLAN OF CARE
Problem: Patient Care Overview (Adult)  Goal: Plan of Care Review  Outcome: Ongoing (interventions implemented as appropriate)    07/10/17 1049   Coping/Psychosocial Response Interventions   Plan Of Care Reviewed With patient   Patient Care Overview   Progress no change       Goal: Adult Individualization and Mutuality  Outcome: Ongoing (interventions implemented as appropriate)  Goal: Discharge Needs Assessment  Outcome: Ongoing (interventions implemented as appropriate)    07/04/17 0044 07/05/17 1318 07/10/17 0932   Discharge Needs Assessment   Concerns To Be Addressed no discharge needs identified --  --    Equipment Needed After Discharge --  (Pt may need home O2. O2 SAT on RA to be checked prior to discharge. ) --    Discharge Facility/Level Of Care Needs --  (Pt does not utilize home health services. Pt may benefit from home health services. ) --    Discharge Disposition --  home or self-care --    Current Health   Anticipated Changes Related to Illness inability to care for self --  --    Living Environment   Transportation Available --  family or friend will provide  (Ex-, Ángel to provide transportation at discharge. ) --    Self-Care   Equipment Currently Used at Home --  --  none         Problem: Renal Failure/Kidney Injury, Acute (Adult)  Goal: Signs and Symptoms of Listed Potential Problems Will be Absent or Manageable (Renal Failure/Kidney Injury, Acute)  Outcome: Ongoing (interventions implemented as appropriate)    07/09/17 0248   Renal Failure/Kidney Injury, Acute   Problems Assessed (Acute Renal Failure/Kidney Injury) all   Problems Present (Acute Renal Failure/Kidney Injury) acid-base imbalance;hypertension;electrolyte imbalance         Problem: Pneumonia (Adult)  Goal: Signs and Symptoms of Listed Potential Problems Will be Absent or Manageable (Pneumonia)  Outcome: Ongoing (interventions implemented as appropriate)    07/09/17 0248   Pneumonia   Problems Assessed (Pneumonia) all    Problems Present (Pneumonia) none

## 2017-07-10 NOTE — ANESTHESIA POSTPROCEDURE EVALUATION
Patient: Sara Cardona    Procedure Summary     Date Anesthesia Start Anesthesia Stop Room / Location    07/10/17 1027 1107 [facility] 10 / [facility]       Procedure Diagnosis Surgeon Provider    COLONOSCOPY (N/A ); ESOPHAGOGASTRODUODENOSCOPY (N/A Esophagus) Epigastric pain; Heme + stool; Decreased hemoglobin; Diarrhea, unspecified type; Nausea and vomiting, intractability of vomiting not specified, unspecified vomiting type  (Epigastric pain [R10.13]; Heme + stool [R19.5]; Decreased hemoglobin [R71.0]; Diarrhea, unspecified type [R19.7]; Nausea and vomiting, intractability of vomiting not specified, unspecified vomiting type [R11.2]) MD Nash Hernández III, MD          Anesthesia Type: general  Last vitals  BP     Temp      Pulse     Resp      SpO2        Post Anesthesia Care and Evaluation    Patient location during evaluation: PACU  Patient participation: complete - patient participated  Level of consciousness: awake and alert  Pain score: 1  Pain management: adequate  Airway patency: patent  Anesthetic complications: No anesthetic complications  PONV Status: controlled  Cardiovascular status: acceptable  Respiratory status: acceptable  Hydration status: acceptable

## 2017-07-10 NOTE — PROGRESS NOTES
Discharge Planning Assessment   Horacio     Patient Name: Sara Cardona  MRN: [id number]  Today's Date: 7/10/2017    Admit Date: 7/3/2017       Discharge Plan       07/10/17 1529    Case Management/Social Work Plan    Plan SS spoke to pt who states plans to return home at discharge with dtr. Pt does not utilize home health services. Pt does not have DME. Pt may need home O2. Home O2 to be arranged with MD order and qualifying O2 SAT on RA of 88% or below or ABG's. SS to follow.           Expected Discharge Date and Time     Expected Discharge Date Expected Discharge Time    Jul 12, 2017           Purnima Bravo

## 2017-07-11 VITALS
BODY MASS INDEX: 21.71 KG/M2 | RESPIRATION RATE: 16 BRPM | WEIGHT: 115 LBS | TEMPERATURE: 98.3 F | HEIGHT: 61 IN | OXYGEN SATURATION: 91 % | SYSTOLIC BLOOD PRESSURE: 107 MMHG | DIASTOLIC BLOOD PRESSURE: 88 MMHG | HEART RATE: 95 BPM

## 2017-07-11 LAB
ALBUMIN SERPL-MCNC: 3.1 G/DL (ref 3.4–4.8)
ALBUMIN/GLOB SERPL: 1.2 G/DL (ref 1.5–2.5)
ALP SERPL-CCNC: 102 U/L (ref 35–104)
ALT SERPL W P-5'-P-CCNC: 11 U/L (ref 10–36)
ANION GAP SERPL CALCULATED.3IONS-SCNC: 4.6 MMOL/L (ref 3.6–11.2)
AST SERPL-CCNC: 18 U/L (ref 10–30)
BASOPHILS # BLD AUTO: 0.04 10*3/MM3 (ref 0–0.3)
BASOPHILS NFR BLD AUTO: 0.3 % (ref 0–2)
BILIRUB SERPL-MCNC: 0.1 MG/DL (ref 0.2–1.8)
BUN BLD-MCNC: 31 MG/DL (ref 7–21)
BUN/CREAT SERPL: 11.8 (ref 7–25)
CALCIUM SPEC-SCNC: 7.8 MG/DL (ref 7.7–10)
CHLORIDE SERPL-SCNC: 113 MMOL/L (ref 99–112)
CO2 SERPL-SCNC: 22.4 MMOL/L (ref 24.3–31.9)
CREAT BLD-MCNC: 2.63 MG/DL (ref 0.43–1.29)
DEPRECATED RDW RBC AUTO: 57.9 FL (ref 37–54)
EOSINOPHIL # BLD AUTO: 0.57 10*3/MM3 (ref 0–0.7)
EOSINOPHIL NFR BLD AUTO: 4.8 % (ref 0–7)
ERYTHROCYTE [DISTWIDTH] IN BLOOD BY AUTOMATED COUNT: 17.7 % (ref 11.5–14.5)
GFR SERPL CREATININE-BSD FRML MDRD: 18 ML/MIN/1.73
GLOBULIN UR ELPH-MCNC: 2.5 GM/DL
GLUCOSE BLD-MCNC: 140 MG/DL (ref 70–110)
GLUCOSE BLDC GLUCOMTR-MCNC: 133 MG/DL (ref 70–130)
GLUCOSE BLDC GLUCOMTR-MCNC: 145 MG/DL (ref 70–130)
HCT VFR BLD AUTO: 29.8 % (ref 37–47)
HGB BLD-MCNC: 8.8 G/DL (ref 12–16)
IMM GRANULOCYTES # BLD: 0.03 10*3/MM3 (ref 0–0.03)
IMM GRANULOCYTES NFR BLD: 0.3 % (ref 0–0.5)
LAB AP CASE REPORT: NORMAL
LYMPHOCYTES # BLD AUTO: 2.76 10*3/MM3 (ref 1–3)
LYMPHOCYTES NFR BLD AUTO: 23.3 % (ref 16–46)
Lab: NORMAL
MCH RBC QN AUTO: 27 PG (ref 27–33)
MCHC RBC AUTO-ENTMCNC: 29.5 G/DL (ref 33–37)
MCV RBC AUTO: 91.4 FL (ref 80–94)
MONOCYTES # BLD AUTO: 0.71 10*3/MM3 (ref 0.1–0.9)
MONOCYTES NFR BLD AUTO: 6 % (ref 0–12)
NEUTROPHILS # BLD AUTO: 7.75 10*3/MM3 (ref 1.4–6.5)
NEUTROPHILS NFR BLD AUTO: 65.3 % (ref 40–75)
OSMOLALITY SERPL CALC.SUM OF ELEC: 288.2 MOSM/KG (ref 273–305)
PATH REPORT.FINAL DX SPEC: NORMAL
PLATELET # BLD AUTO: 234 10*3/MM3 (ref 130–400)
PMV BLD AUTO: 10.6 FL (ref 6–10)
POTASSIUM BLD-SCNC: 4 MMOL/L (ref 3.5–5.3)
PROT SERPL-MCNC: 5.6 G/DL (ref 6–8)
RBC # BLD AUTO: 3.26 10*6/MM3 (ref 4.2–5.4)
SODIUM BLD-SCNC: 140 MMOL/L (ref 135–153)
WBC NRBC COR # BLD: 11.86 10*3/MM3 (ref 4.5–12.5)

## 2017-07-11 PROCEDURE — 85025 COMPLETE CBC W/AUTO DIFF WBC: CPT | Performed by: INTERNAL MEDICINE

## 2017-07-11 PROCEDURE — 63510000001 EPOETIN ALFA PER 1000 UNITS: Performed by: INTERNAL MEDICINE

## 2017-07-11 PROCEDURE — 25010000002 HEPARIN (PORCINE) PER 1000 UNITS: Performed by: INTERNAL MEDICINE

## 2017-07-11 PROCEDURE — 82962 GLUCOSE BLOOD TEST: CPT

## 2017-07-11 PROCEDURE — 80053 COMPREHEN METABOLIC PANEL: CPT | Performed by: INTERNAL MEDICINE

## 2017-07-11 PROCEDURE — 99239 HOSP IP/OBS DSCHRG MGMT >30: CPT | Performed by: INTERNAL MEDICINE

## 2017-07-11 PROCEDURE — 99232 SBSQ HOSP IP/OBS MODERATE 35: CPT | Performed by: PHYSICIAN ASSISTANT

## 2017-07-11 RX ORDER — PANTOPRAZOLE SODIUM 40 MG/1
40 TABLET, DELAYED RELEASE ORAL
Qty: 30 TABLET | Refills: 1 | Status: SHIPPED | OUTPATIENT
Start: 2017-07-11 | End: 2017-11-01

## 2017-07-11 RX ORDER — AMLODIPINE BESYLATE 10 MG/1
10 TABLET ORAL
Qty: 30 TABLET | Refills: 1 | Status: SHIPPED | OUTPATIENT
Start: 2017-07-11 | End: 2017-08-09 | Stop reason: HOSPADM

## 2017-07-11 RX ORDER — HYDRALAZINE HYDROCHLORIDE 25 MG/1
25 TABLET, FILM COATED ORAL EVERY 12 HOURS SCHEDULED
Status: DISCONTINUED | OUTPATIENT
Start: 2017-07-11 | End: 2017-07-11 | Stop reason: HOSPADM

## 2017-07-11 RX ADMIN — PANTOPRAZOLE SODIUM 40 MG: 40 TABLET, DELAYED RELEASE ORAL at 08:49

## 2017-07-11 RX ADMIN — AMLODIPINE BESYLATE 10 MG: 10 TABLET ORAL at 08:48

## 2017-07-11 RX ADMIN — SODIUM BICARBONATE TAB 650 MG 1300 MG: 650 TAB at 08:49

## 2017-07-11 RX ADMIN — GABAPENTIN 100 MG: 100 CAPSULE ORAL at 10:04

## 2017-07-11 RX ADMIN — CALCITRIOL 0.5 MCG: 0.25 CAPSULE ORAL at 08:49

## 2017-07-11 RX ADMIN — GABAPENTIN 100 MG: 100 CAPSULE ORAL at 02:05

## 2017-07-11 RX ADMIN — HEPARIN SODIUM 5000 UNITS: 5000 INJECTION, SOLUTION INTRAVENOUS; SUBCUTANEOUS at 08:49

## 2017-07-11 RX ADMIN — BENZONATATE 100 MG: 100 CAPSULE ORAL at 09:59

## 2017-07-11 RX ADMIN — ASPIRIN 81 MG: 81 TABLET ORAL at 08:49

## 2017-07-11 RX ADMIN — HYDRALAZINE HYDROCHLORIDE 25 MG: 25 TABLET ORAL at 08:49

## 2017-07-11 RX ADMIN — ERYTHROPOIETIN 20000 UNITS: 20000 INJECTION, SOLUTION INTRAVENOUS; SUBCUTANEOUS at 05:28

## 2017-07-11 NOTE — DISCHARGE SUMMARY
Discharge Summary:    Date of Admission: 7/3/2017  Date of Discharge:  7/11/2017    PCP: Marcelino Sheehan MD      DISCHARGE DIAGNOSIS  Active Problems:    Acute renal failure      SECONDARY DIAGNOSES  Past Medical History:   Diagnosis Date   • Anxiety    • Aortic stenosis    • ASCVD (arteriosclerotic cardiovascular disease)    • Breast cancer    • Chronic pain    • COPD (chronic obstructive pulmonary disease)    • Diabetic neuropathy    • Dyslipidemia    • Essential hypertension    • Insulin dependent diabetes mellitus    • Left carotid bruit    • Recurrent pneumonia    • Renal failure          CONSULTS   Nephrology and GI    PROCEDURES PERFORMED  EGD and colonoscopy    HOSPITAL COURSE  Patient is a 66 y.o. female presented to [facility] complaining of nausea and had  acute kidney injury on top of chronic kidney disease.  Apparently her baseline creatinine in May 2017 was 2.5.  Upon admission, her creatinine was 3.4.  The patient has been seen by nephrology and her renal function is improving.  Her lisinopril was stopped, nephrology will be contacted to evaluate the patient prior to discharge home today, she still having some nausea today but her creatinine is close to baseline.   The patient also complained of some dark stools and had reportedly been taking approximately 6 Aleve tablets per day.  She underwent EGD and colonoscopy today and her EGD revealed mild gastritis.  Her colonoscopy revealed a sigmoid polyp that was removed and she had no evidence of active bleeding.  Will be discharged on by mouth Protonix.  Had hemoglobin was stable.  She has not required blood during her hospitalization.    Her blood pressure was increasing gradually during the hospital course , lisinopril was stopped, her amlodipine was decreased to 10 mg today.  She was on hydralazine at home.  Initially was held this medication upon admission and restarted yesterday.  Advised to avoid taking any over-the-counter NSAIDs  "regularly, will evaluate for home oxygen and discharged with home health, patient was seen and examined on the day of discharge, diabetes for discharge more than 35 minutes, advised to stop smoking.  Time was taken to coordinate care with the patient and the nursing staff.      CONDITION ON DISCHARGE  Stable.      VITAL SIGNS  /85 (BP Location: Right arm, Patient Position: Lying)  Pulse 90  Temp 98.4 °F (36.9 °C) (Oral)   Resp 18  Ht 61\" (154.9 cm)  Wt 115 lb (52.2 kg)  SpO2 94%  BMI 21.73 kg/m2  Objective      DISCHARGE DISPOSITION   Home-Health Care [facility] [place]    DISCHARGE MEDICATIONS   Sara Cardona   Home Medication Instructions CHATO:[id number]    Printed on:07/11/17 1998   Medication Information                      albuterol (PROVENTIL HFA;VENTOLIN HFA) 108 (90 BASE) MCG/ACT inhaler  Inhale 2 puffs every 6 (six) hours as needed for wheezing.             amLODIPine (NORVASC) 10 MG tablet  Take 1 tablet by mouth Daily.             aspirin 81 MG tablet  Take 81 mg by mouth daily             budesonide-formoterol (SYMBICORT) 80-4.5 MCG/ACT inhaler  Inhale 2 puffs 2 (Two) Times a Day.             clonazePAM (KlonoPIN) 0.5 MG tablet  Take 0.5 mg by mouth 2 (Two) Times a Day.             gabapentin (NEURONTIN) 300 MG capsule  Take 300 mg by mouth 3 (Three) Times a Day.             hydrALAZINE (APRESOLINE) 25 MG tablet  Take 25 mg by mouth 2 (Two) Times a Day With Meals.             ondansetron ODT (ZOFRAN-ODT) 4 MG disintegrating tablet  Take 4 mg by mouth Every 4 (Four) Hours As Needed for Nausea or Vomiting.             pantoprazole (PROTONIX) 40 MG EC tablet  Take 1 tablet by mouth Every Morning Before Breakfast.                 DISCHARGE DIET         Dietary Orders            Start     Ordered    07/10/17 1816  Diet Regular; Consistent Carbohydrate  Diet Effective Now     Question Answer Comment   Diet Texture / Consistency Regular    Common Modifiers Consistent Carbohydrate        07/10/17 1815    "       ACTIVITY AT DISCHARGE  As tolerated   See primary care provider, Marcelino Sheehan MD, in 1-2 weeks  Follow-up with nephrology as an outpatient    Additional Instructions for the Follow-ups that You Need to Schedule     Discharge Follow-up with PCP    As directed    Follow Up Details:  within one week       Discharge Follow-up with Specialty    As directed    Specialty:  nephrology   Follow Up:  1 Week                 TEST  RESULTS PENDING AT DISCHARGE   Order Current Status    Tissue Exam In process             Chas More MD  07/11/17  11:57 AM

## 2017-07-11 NOTE — PLAN OF CARE
Problem: Renal Failure/Kidney Injury, Acute (Adult)  Goal: Signs and Symptoms of Listed Potential Problems Will be Absent or Manageable (Renal Failure/Kidney Injury, Acute)  Outcome: Ongoing (interventions implemented as appropriate)    07/09/17 0248   Renal Failure/Kidney Injury, Acute   Problems Assessed (Acute Renal Failure/Kidney Injury) all   Problems Present (Acute Renal Failure/Kidney Injury) acid-base imbalance;hypertension;electrolyte imbalance

## 2017-07-11 NOTE — DISCHARGE INSTR - APPOINTMENTS
Follow-up appointment with Dr Wheeler July 18, 2018 at 11:30                                                                                                                                                                                                                                                                                                                                                                                                                                                                                                                                                                                                                                                                                                                                                                                                                                                                                                                                                                                                                                                                                                                                                                                                                                      Follow-up appointment with Dr Sheehan July 19, 2017 at 3:45

## 2017-07-11 NOTE — DISCHARGE PLACEMENT REQUEST
"Sara Cardona (66 y.o. Female)     Date of Birth Social Security Number Address Home Phone MRN    [id number]  [id number] [place] [address] [phone] [id number]    Judaism Marital Status          Jain        Admission Date Admission Type Admitting Provider Attending Provider Department, Room/Bed    7/3/17 Emergency Tish Givens DO Grace, Aimee Russell, DO [address], 3327/1P    Discharge Date Discharge Disposition Discharge Destination         Home-Health Care [facility]             Attending Provider: Tish Givens DO     Allergies:  No Known Allergies    Isolation:  None   Infection:  None   Code Status:  FULL    Ht:  61\" (154.9 cm)   Wt:  115 lb (52.2 kg)    Admission Cmt:  None   Principal Problem:  None                Active Insurance as of 7/3/2017     Primary Coverage     Payor Plan Insurance Group Employer/Plan Group    MEDICARE MEDICARE B ONLY      Payor Plan Address Payor Plan Phone Number Effective From Effective To    [address] [address] [phone] 2015     [place] [address]       Subscriber Name Subscriber Birth Date Member ID       SARA CARDONA 1950 [id number]           Secondary Coverage     Payor Plan Insurance Group Employer/Plan Group    [place]      Payor Plan Address Payor Plan Phone Number Effective From Effective To    [address] [address] [phone] 2016     [place] [address]       Subscriber Name Subscriber Birth Date Member ID       SARA CARDONA 1950 [id number]                 Emergency Contacts      (Rel.) Home Phone Work Phone Mobile Phone    Dulce Neal (Friend) [phone] -- --        [address] [address]  Phone:  [phone]  Fax:          Patient:     Sara Cardona MRN:  [id number]   [address] [place] [address] :  1950  SSN:    Phone: [phone] Sex:  F      INSURANCE PAYOR PLAN GROUP # " SUBSCRIBER ID   Primary:  Secondary: [place]ARE [place] [id number]  [id number]   [id number]  [id number]   Admitting Diagnosis: Acute renal failure [N17.9]  Order Date:  2017               [facility]       (Order ID: [id number])     Diagnosis:         Priority:  Routine Expected Date:   Expiration Date:        Interval:   Count:          Specimen Type:   Specimen Source:   Specimen Taken Date:   Specimen Taken Time:                         Authorizing Provider:Chas More MD  Authorizing Provider's NPI: [id number]  Order Entered By: Chas More MD 2017 11:49 AM     Electronically signed by: Chas More MD 2017 11:49 AM                  History & Physical      Tish Orlando Givens DO at 7/3/2017  2:22 PM               [facility]PITAL[facility]YSICAL    Patient Identification:  Name:  Sara Cardona  Age:  66 y.o.  Sex:  female  :  1950  MRN:  [id number]   Visit Number:  [id number]  Primary Care Physician:  Marcelino Sheehan MD     Chief complaint:   Chief Complaint   Patient presents with   • Diarrhea, nausea, vomiting, illness     History of presenting illness:   Patient is a 66 y.o. female with past medical history significant for arteriosclerotic cardiovascular disease, aortic stenosis, COPD, insulin dependent type II diabetes mellitus, essential hypertension, and anxiety that presented to the [facility] emergency department for evaluation of nausea, vomiting, diarrhea, and generalized illness. Patient states that on 2017 she developed acute onset of nausea, vomiting, and diarrhea. She also reports mid abdominal pain and cramping. Patient states that her symptoms were so severe that she went to her primary care the next day after onset. It was recommended that the patient be admitted to the hospital for IV fluids. Patient states that she wanted to try to get well at home instead. Patient reports that she had had  no diarrhea or further vomiting since 6/30/2017, but has continued to worsen and feel ill. She states that her abdominal pain is still present but much improved, she also states she no longer has abdominal cramping. She denies any change in her appetite, she states she has actually been tolerating PO intake as she normally does. It is noted that she is eating fast food meal during my evaluation. She is still complaining of waxing and waning severe nausea and GI upset.  Patient denies any chest pain or shortness of breath. She denies any cough or sputum. However, she does report increased phlegm production since time of onset. She denies any urinary symptoms. She denies use of home oxygen.    Patient states that she checks her blood glucose regularly and states that her average is 107-135. She states that she takes oral medications and is prescribed insulin. She states that she has not had to have an insulin injection in approximately one month. Patient states that she does not regularly check her blood pressure at home, but states that her nephrologist recently took her off of lisinopril. However, patient states that she did not follow physician's instructions and continued to take lisinopril daily as she was afraid to stop taking it all of a sudden. She states she was under the impression the lisinopril was to be discontinued for further kidney studies that she is unsure of.     Upon arrival to the ED, vitals were temperature 98.3, HR 82, RR 16-20, and //86, and oxygen 98 % on room air. CMP with glucose 151, chloride 115, creatinine 3.49, BUN 46, calcium 7.5, and eGFR 13. Amylase and lipase both WNL. CBC with hemoglobin 10.9 and hematocrit 34.7. Lactate WNL. Initial ABG with pH 7.287, pCO2 32.2, pO2 50.7, HCO3 15.0, and oxygen saturation of 86.2% on room air. Urinalysis with cloudy appearance, 1+ glucose, and 3+ proteinuria. While in the ED patient was started on rocephin and doxycycline. She was also  given two 500 ml bolus of normal saline and started on continuous infusion.     Patient has been admitted to the medical telemetry floor for further evaluation and treatment.   ---------------------------------------------------------------------------------------------------------------------   Review of Systems   Constitutional: Negative for appetite change, chills, diaphoresis, fatigue and fever.   HENT: Negative for congestion, postnasal drip, sinus pressure, sneezing and sore throat.    Eyes: Negative for pain and visual disturbance.   Respiratory: Negative for cough, shortness of breath and wheezing.    Cardiovascular: Negative for chest pain, palpitations and leg swelling.   Gastrointestinal: Positive for abdominal pain, diarrhea, nausea and vomiting.   Endocrine: Negative for cold intolerance.   Genitourinary: Negative for dysuria, enuresis, flank pain, frequency, hematuria and urgency.   Musculoskeletal: Negative for arthralgias, back pain and myalgias.   Skin: Negative for rash and wound.   Allergic/Immunologic: Negative for environmental allergies and immunocompromised state.   Neurological: Negative for dizziness, syncope and weakness.   Hematological: Negative for adenopathy. Does not bruise/bleed easily.   Psychiatric/Behavioral: Negative for confusion and decreased concentration.      ---------------------------------------------------------------------------------------------------------------------   Past Medical History:   Diagnosis Date   • Anxiety    • Aortic stenosis    • ASCVD (arteriosclerotic cardiovascular disease)    • Breast cancer    • Chronic pain    • COPD (chronic obstructive pulmonary disease)    • Diabetic neuropathy    • Dyslipidemia    • Essential hypertension    • Insulin dependent diabetes mellitus    • Left carotid bruit    • Recurrent pneumonia    • Renal failure      Past Surgical History:   Procedure Laterality Date   • APPENDECTOMY     • CARDIAC CATHETERIZATION     •  COLONOSCOPY     • HYSTERECTOMY     • MASTECTOMY Right    • RHINOPLASTY       Family History   Problem Relation Age of Onset   • Diabetes Mother    • Lung cancer Father      Social History     Social History   • Marital status:      Spouse name: N/A   • Number of children: N/A   • Years of education: N/A     Social History Main Topics   • Smoking status: Current Every Day Smoker     Packs/day: 1.00     Types: Cigarettes   • Smokeless tobacco: None   • Alcohol use No   • Drug use: No   • Sexual activity: Defer     Other Topics Concern   • None     Social History Narrative   • None     ---------------------------------------------------------------------------------------------------------------------   Allergies:  Review of patient's allergies indicates no known allergies.  ---------------------------------------------------------------------------------------------------------------------   Prior to Admission Medications     Prescriptions Last Dose Informant Patient Reported? Taking?    acetaminophen (TYLENOL) 500 MG tablet   Yes No    Take 500 mg by mouth as needed for mild pain (1-3)    albuterol (PROVENTIL HFA;VENTOLIN HFA) 108 (90 BASE) MCG/ACT inhaler 7/3/2017  No Yes    Inhale 2 puffs every 6 (six) hours as needed for wheezing.    amLODIPine (NORVASC) 10 MG tablet 7/3/2017  No Yes    Take 1 tablet by mouth daily.    aspirin 81 MG tablet 7/3/2017  Yes Yes    Take 81 mg by mouth daily    atorvastatin (LIPITOR) 80 MG tablet 7/3/2017  Yes Yes    Take 80 mg by mouth daily    clonazePAM (KlonoPIN) 0.5 MG tablet 7/3/2017  Yes Yes    Take 0.5 mg by mouth 2 (two) times a day as needed for anxiety or seizures.    cloNIDine (CATAPRES) 0.1 MG tablet 7/3/2017  No Yes    Take 1 tablet by mouth every 12 (twelve) hours.    erythromycin (ROMYCIN) 5 MG/GM ophthalmic ointment   No No    Administer  to the right eye Every 4 (Four) Hours While Awake.    hydrALAZINE (APRESOLINE) 10 MG tablet   Yes Yes    Take 10 mg by mouth  3 (Three) Times a Day.    insulin glargine (LANTUS) 100 UNIT/ML injection 7/3/2017  Yes Yes    Inject 35 Units under the skin every night    isosorbide mononitrate (IMDUR) 30 MG 24 hr tablet   Yes No    Take 30 mg by mouth daily    lisinopril (PRINIVIL,ZESTRIL) 40 MG tablet   Yes Yes    Take 40 mg by mouth Daily.    metaxalone (SKELAXIN) 800 MG tablet   No No    Take 1 tablet by mouth 3 (three) times a day as needed for muscle spasms.    metoprolol tartrate (LOPRESSOR) 25 MG tablet   No No    Take 2 tablets by mouth 2 (two) times a day.    nabumetone (RELAFEN) 750 MG tablet   No No    Take 1 tablet by mouth 2 (two) times a day as needed for moderate pain (4-6).        Hospital Scheduled Meds:    [START ON 7/4/2017] doxycycline 100 mg Intravenous Q12H   heparin (porcine) 5,000 Units Subcutaneous Q12H       Pharmacy to Dose Zosyn     sodium chloride 125 mL/hr Last Rate: 125 mL/hr (07/03/17 1325)     ---------------------------------------------------------------------------------------------------------------------   Vital Signs:  Temp:  [98.3 °F (36.8 °C)-98.5 °F (36.9 °C)] 98.3 °F (36.8 °C)  Heart Rate:  [82-89] 88  Resp:  [16-20] 18  BP: (117-196)/() 182/84  Last 3 weights    07/03/17  0950   Weight: 115 lb (52.2 kg)     Body mass index is 21.73 kg/(m^2).  SpO2 Readings from Last 3 Encounters:   07/03/17 97%   02/22/17 99%   08/15/16 94%     ---------------------------------------------------------------------------------------------------------------------   Physical Exam:  Constitutional:  Well-developed and well-nourished.  No respiratory distress.  Nasal cannula in place.     HENT:  Head: Normocephalic and atraumatic.  Mouth:  Moist mucous membranes.    Eyes:  Conjunctivae and EOM are normal.  Pupils are equal, round, and reactive to light.  No scleral icterus.  Neck:  Neck supple.  No JVD present.    Cardiovascular:  Normal rate, regular rhythm and normal heart sounds with no murmur.  Pulmonary/Chest:   No respiratory distress, no wheezes, no crackles, with normal breath sounds and good air movement.  Abdominal:  Soft.  Bowel sounds are normal.  No distension. Mild generalized tenderness to palpation.   Musculoskeletal:  No edema, no tenderness, and no deformity.  No red or swollen joints anywhere.    Neurological:  Alert and oriented to person, place, and time.  No cranial nerve deficit.  No tongue deviation.  No facial droop.  No slurred speech.   Skin:  Skin is warm and dry.  No rash noted.  No pallor.   Psychiatric:  Normal mood and affect.  Behavior is normal.  Judgment and thought content normal.   Peripheral vascular:  Trace edema bilateral lower extremities and strong pulses on all 4 extremities.  Genitourinary: No perales catheter in place, making urine.   ---------------------------------------------------------------------------------------------------------------------  EKG:    Reviewed.    Telemetry:    Patient not yet on telemetry.     I have personally reviewed the EKG/Telemetry strip  ---------------------------------------------------------------------------------------------------------------------     Results from last 7 days  Lab Units 07/03/17  1158 07/03/17  1039   LACTATE mmol/L 0.5  --    WBC 10*3/mm3  --  8.67   HEMOGLOBIN g/dL  --  10.9*   HEMATOCRIT %  --  34.7*   MCV fL  --  85.9   MCHC g/dL  --  31.4*   PLATELETS 10*3/mm3  --  268       Results from last 7 days  Lab Units 07/03/17  1216   PH, ARTERIAL pH units 7.287*   PO2 ART mm Hg 50.7*   PCO2, ARTERIAL mm Hg 32.2*   HCO3 ART mmol/L 15.0*       Results from last 7 days  Lab Units 07/03/17  1039   SODIUM mmol/L 140   POTASSIUM mmol/L 3.8   MAGNESIUM mg/dL 1.9   CHLORIDE mmol/L 115*   CO2 mmol/L 17.5*   BUN mg/dL 46*   CREATININE mg/dL 3.49*   EGFR IF NONAFRICN AM mL/min/1.73 13*   CALCIUM mg/dL 7.5*   GLUCOSE mg/dL 151*   ALBUMIN g/dL 3.50   BILIRUBIN mg/dL 0.1*   ALK PHOS U/L 94   AST (SGOT) U/L 14   ALT (SGPT) U/L 11   Estimated  Creatinine Clearance: 12 mL/min (by C-G formula based on Cr of 3.49).  No results found for: AMMONIA          Lab Results   Component Value Date    HGBA1C 6.80 (H) 08/09/2016     Lab Results   Component Value Date    TSH 0.188 (L) 08/03/2015    FREET4 0.94 08/03/2015     No results found for: PREGTESTUR, PREGSERUM, HCG, HCGQUANT  Pain Management Panel     Pain Management Panel Latest Ref Rng & Units 8/9/2016 8/8/2016    CREATININE UR mg/dL 51.6 150.7    AMPHETAMINES SCREEN, URINE Negative - Negative    BARBITURATES SCREEN Negative - Negative    BENZODIAZEPINE SCREEN, URINE Negative - Negative    COCAINE SCREEN, URINE Negative - Negative    METHADONE SCREEN, URINE Negative - Negative      I have personally reviewed the above laboratory results.   ---------------------------------------------------------------------------------------------------------------------  Imaging Results (last 7 days)     Procedure Component Value Units Date/Time    XR Abdomen 2 View With Chest 1 View [[id number]] Collected:  07/03/17 1134     Updated:  07/03/17 1137    Narrative:       FINDINGS:   Cardiomegaly persists. Mild vascular congestion. No airspace edema.   Right lower lung pneumonia has resolved. Mild left basilar airspace  disease may represent pneumonia or atelectasis.  No pneumothorax.   Trace pleural effusions.   No acute osseous findings.  Mild constipation. Nonobstructive bowel gas pattern. Calcification  projects in the region of the left kidney and may represent  nonobstructing stone.    Impression:       1. Left basilar airspace disease.  2. Stable cardiomegaly with mild CHF.  3. Nonobstructive bowel gas pattern with changes of mild constipation.  4. Possible nonobstructing stone in the region of left kidney.     This report was finalized on 7/3/2017 11:35 AM by Dr. Junior Angel MD.    CT Abdomen Pelvis Without Contrast [[id number]] Collected:  07/03/17 1252     Updated:  07/03/17 1258    Narrative:       EXAM: CT ABDOMEN  PELVIS WO CONTRAST-   Findings  LOWER THORAX:   Cardiomegaly. Chronic interstitial lung changes. Interstitial edema  noted. Trace right pleural effusion which is nonloculated. Right middle  lobe atelectasis and/or scarring. Dense coronary vascular  calcifications.  ABDOMEN:  Unenhanced liver is within normal limits. Gallstones are noted. The  unenhanced spleen and pancreas appear within normal limits. Large  nonobstructing stone lower pole of the left kidney stable from previous  exam and is 9 mm. Right kidney appears within normal limits. The  unenhanced adrenals are unremarkable. Moderate to large amount of stool  throughout colon to level of cecum consistent with moderate-advanced  chronic constipation. Resolution of inflammatory changes of the right  colon since the previous exam. No intra-abdominal free fluid. No free  air. No adenopathy by CT size criteria. Dense advanced calcified  atherosclerotic changes abdominal arterial vasculature without aneurysm.  Body wall edema/anasarca is noted.  PELVIS:  Sigmoid diverticulosis noted but without evidence of acute  diverticulitis. No pelvic free fluid or adenopathy. Prior hysterectomy.  Prior appendectomy. No distal ureteral stones or urinary bladder stones.  BONES:   No acute bony abnormality.    Impression:       Impression:  1. CHF/edema involving the partially imaged lower chest with right  middle lobe airspace disease likely atelectasis.  2. Chronic constipation and changes of sigmoid diverticulosis without  evidence of diverticulitis.  3. Resolution of previously described colitis.  4. Large nonobstructing stone left kidney.  5. Cholelithiasis.     This report was finalized on 7/3/2017 12:56 PM by Dr. Junior Angel MD.      I have personally reviewed the above radiology results.   ---------------------------------------------------------------------------------------------------------------------  Assessment and Plan:  -Acute on chronic stage IV renal failure  with baseline creatinine of 1.2-1.7 and creatinine on admission of 3.49  -Acute hypoxic respiratory failure   -Questionable left basilar and right middle lobe pneumonia/airspace disease with possible aspiration component due to emesis  -Acute metabolic acidosis   -Essential hypertension   -Hyperlipidemia   -History of coronary artery disease  -Insulin dependent type II diabetes mellitus with hyperglycemia   -Diabetic neuropathy   -COPD without acute exacerbation   -History of aortic stenosis   -Prolonged QTc interval 474 m/s  -Anxiety   -Chronic pain syndrome   -Tobacco smoking addiction     Patient has been admitted to the medical telemetry floor for further evaluation and treatment. Plan to continue gentle IV fluid hydration, will be careful to avoid large fluid boluses as patient's BNP is elevated at 500 and slight edema on exam. I have ordered an echo to further evaluate.  Will hold home lisinopril. Continue to avoid nephrotoxic agents. Renal ultrasound and urine electrolytes ordered and pending. Continue supplemental oxygen to titrate SpO2 > 90%. Repeat ABG has been ordered to further evaluate acidosis. PRN nebulizer treatments made available for shortness of breath. Due to radiology findings suggestive of airspace disease/pneumonia, patient has been empirically started on doxycycline and zosyn with pharmacy dosing to cover possible aspiration during emesis. Blood cultures pending, will order respiratory culture. Mycoplasma and legionella testing ordered and pending, once negative will discontinue doxycycline.     Plan to continue patient's home blood pressure medications with hold parameters. Continue to avoid QTc prolonging agents, will repeat EKG in the morning. Trend cardiacs to rule out infarction/ischemia. Patient has been placed on low dose sliding scale insulin with accuchecks for now, will hold oral agents for now. Hemoglobin A1C level ordered, will titrate therapy as necessary.     Will repeat labs in  the morning and continue to monitor the patient closely on telemetry.       DVT Prophylaxis: SQ Heparin     The patient is considered to be a high risk patient due to: Acute on chronic renal failure, acute hypoxic respiratory failure, metabolic acidosis, CAD, insulin dependent type II diabetes mellitus, aortic stenosis, tobacco smoking addiction.    I have discussed the patient's assessment and plan with the patient and the patient's .     MEAGAN Jasso  07/03/17  3:12 PM  ---------------------------------------------------------------------------------------------------------------------     *I have discussed with MEAGAN Jasso and agree with her assessment. I have edited/amended this note to reflect my findings and recommendations.     Electronically signed by Tish Givens DO at 7/4/2017  5:29 PM        Vital Signs (last 24 hours)       07/10 0700  -  07/11 0659 07/11 0700  -  07/11 1317   Most Recent    Temp (°F) 97.4 -  98.8    98.3 -  98.4     98.3 (36.8)    Heart Rate 85 -  101    90 -  95     95    Resp 19 -  24    16 -  18     16    /78 -  (!) 183/81    107/88 -  151/85     107/88    SpO2 (%) (!)80 -  97      91     91          Intake & Output (last day)       07/10 0701 - 07/11 0700 07/11 0701 - 07/12 0700    P.O. 1240     I.V. (mL/kg) 400 (7.7)     Total Intake(mL/kg) 1640 (31.4)     Net +1640            Unmeasured Urine Occurrence 3 x     Unmeasured Stool Occurrence 1 x         Hospital Medications (active)       Dose Frequency Start End    albuterol (PROVENTIL) nebulizer solution 0.083% 2.5 mg/3mL 2.5 mg Every 6 Hours PRN 7/3/2017     Sig - Route: Take 2.5 mg by nebulization Every 6 (Six) Hours As Needed for Wheezing or Shortness of Air. - Nebulization    Cosign for Ordering: Required by Tish Givens DO    amLODIPine (NORVASC) tablet 10 mg 10 mg Every 24 Hours Scheduled 7/11/2017     Sig - Route: Take 1 tablet by mouth Daily. - Oral    aspirin EC tablet 81 mg 81  mg Daily 7/3/2017     Sig - Route: Take 1 tablet by mouth Daily. - Oral    Cosign for Ordering: Required by Tish Givens DO    benzonatate (TESSALON) capsule 100 mg 100 mg 3 Times Daily PRN 7/4/2017     Sig - Route: Take 1 capsule by mouth 3 (Three) Times a Day As Needed for Cough. - Oral    budesonide-formoterol (SYMBICORT) 80-4.5 MCG/ACT inhaler 2 puff 2 puff 2 Times Daily - RT 7/3/2017     Sig - Route: Inhale 2 puffs 2 (Two) Times a Day. - Inhalation    Cosign for Ordering: Required by Tish Givens DO    calcitriol (ROCALTROL) capsule 0.5 mcg 0.5 mcg Daily 7/6/2017     Sig - Route: Take 2 capsules by mouth Daily. - Oral    clonazePAM (KlonoPIN) tablet 0.5 mg 0.5 mg 2 Times Daily PRN 7/5/2017     Sig - Route: Take 1 tablet by mouth 2 (Two) Times a Day As Needed for Anxiety or Seizures. - Oral    dextrose (D50W) solution 25 g 25 g Every 15 Minutes PRN 7/3/2017     Sig - Route: Infuse 50 mL into a venous catheter Every 15 (Fifteen) Minutes As Needed for Low Blood Sugar (Blood Sugar Less Than 70, Patient Has IV Access - Unresponsive, NPO or Unable To Safely Swallow). - Intravenous    Cosign for Ordering: Required by Tish Givens DO    dextrose (GLUTOSE) oral gel 15 g 15 g Every 15 Minutes PRN 7/3/2017     Sig - Route: Take 15 g by mouth Every 15 (Fifteen) Minutes As Needed for Low Blood Sugar (Blood Sugar Less Than 70, Patient Alert, Is Not NPO & Can Safely Swallow). - Oral    Cosign for Ordering: Required by Tish Givens DO    epoetin jane (EPOGEN,PROCRIT) injection 20,000 Units 20,000 Units Once 7/11/2017 7/11/2017    Sig - Route: Inject 1 mL under the skin 1 (One) Time. - Subcutaneous    gabapentin (NEURONTIN) capsule 100 mg 100 mg Nightly PRN 7/4/2017     Sig - Route: Take 1 capsule by mouth At Night As Needed (leg pain). - Oral    gabapentin (NEURONTIN) capsule 200 mg 200 mg 3 Times Daily PRN 7/4/2017     Sig - Route: Take 2 capsules by mouth 3 (Three) Times a Day As Needed  (Neuropathy). - Oral    Cosign for Ordering: Required by Tish Givens DO    glucagon (human recombinant) (GLUCAGEN DIAGNOSTIC) injection 1 mg 1 mg Every 15 Minutes PRN 7/3/2017     Sig - Route: Inject 1 mg under the skin Every 15 (Fifteen) Minutes As Needed (Blood Glucose Less Than 70 - Patient Without IV Access - Unresponsive, NPO or Unable To Safely Swallow). - Subcutaneous    Cosign for Ordering: Required by Tish Givens DO    heparin (porcine) 5000 UNIT/ML injection 5,000 Units 5,000 Units Every 12 Hours Scheduled 7/3/2017     Sig - Route: Inject 1 mL under the skin Every 12 (Twelve) Hours. - Subcutaneous    hydrALAZINE (APRESOLINE) injection 10 mg 10 mg Every 6 Hours PRN 7/4/2017     Sig - Route: Infuse 0.5 mL into a venous catheter Every 6 (Six) Hours As Needed for High Blood Pressure. - Intravenous    hydrALAZINE (APRESOLINE) tablet 25 mg 25 mg Every 12 Hours Scheduled 7/11/2017     Sig - Route: Take 1 tablet by mouth Every 12 (Twelve) Hours. - Oral    insulin aspart (novoLOG) injection 0-7 Units 0-7 Units 4 Times Daily Before Meals & Nightly 7/3/2017     Sig - Route: Inject 0-7 Units under the skin 4 (Four) Times a Day Before Meals & at Bedtime. - Subcutaneous    Cosign for Ordering: Required by Tish Givens DO    ipratropium-albuterol (DUO-NEB) nebulizer solution 3 mL 3 mL Every 6 Hours PRN 7/3/2017     Sig - Route: Take 3 mL by nebulization Every 6 (Six) Hours As Needed for Shortness of Air. - Nebulization    Cosign for Ordering: Required by Tish Givens DO    loperamide (IMODIUM) capsule 2 mg 2 mg 4 Times Daily PRN 7/6/2017     Sig - Route: Take 1 capsule by mouth 4 (Four) Times a Day As Needed for Diarrhea. - Oral    nicotine (NICODERM CQ) 21 MG/24HR patch 1 patch 1 patch Every 24 Hours 7/9/2017     Sig - Route: Place 1 patch on the skin Daily. - Transdermal    pantoprazole (PROTONIX) EC tablet 40 mg 40 mg Every Morning Before Breakfast 7/11/2017     Sig - Route: Take 1  "tablet by mouth Every Morning Before Breakfast. - Oral    promethazine (PHENERGAN) tablet 12.5 mg 12.5 mg Every 8 Hours PRN 7/5/2017     Sig - Route: Take 1 tablet by mouth Every 8 (Eight) Hours As Needed for Nausea or Vomiting. - Oral    sodium bicarbonate tablet 1,300 mg 1,300 mg 3 Times Daily 7/10/2017     Sig - Route: Take 2 tablets by mouth 3 (Three) Times a Day. - Oral    sodium chloride 0.9 % flush 1-10 mL 1-10 mL As Needed 7/3/2017     Sig - Route: Infuse 1-10 mL into a venous catheter As Needed for Line Care. - Intravenous    sodium chloride 0.9 % flush 10 mL 10 mL As Needed 7/3/2017     Sig - Route: Infuse 10 mL into a venous catheter As Needed for Line Care. - Intravenous    Cosign for Ordering: Accepted by Reji Chang MD on 7/3/2017  2:31 PM    Linked Group 1:  \"And\" Linked Group Details        amLODIPine (NORVASC) tablet 5 mg (Discontinued) 5 mg Every 24 Hours Scheduled 7/6/2017 7/10/2017    Sig - Route: Take 1 tablet by mouth Daily. - Oral    lactated ringers infusion (Discontinued) 125 mL/hr Continuous 7/10/2017 7/10/2017    Sig - Route: Infuse 125 mL/hr into a venous catheter Continuous. - Intravenous    pantoprazole (PROTONIX) injection 40 mg (Discontinued) 40 mg Every 12 Hours Scheduled 7/6/2017 7/10/2017    Sig - Route: Infuse 10 mL into a venous catheter Every 12 (Twelve) Hours. - Intravenous    sodium chloride 0.45 % infusion (Discontinued) 75 mL/hr Continuous 7/9/2017 7/10/2017    Sig - Route: Infuse 75 mL/hr into a venous catheter Continuous. - Intravenous            Lab Results (last 24 hours)     Procedure Component Value Units Date/Time    Tissue Exam [[id number]] Collected:  07/10/17 1034    Specimen:  Tissue from Small Intestine, Duodenum; Tissue from Gastric, Antrum; Tissue from Large Intestine; Polyp from Large Intestine, Sigmoid Colon Updated:  07/10/17 1319    POC Glucose Fingerstick [[id number]]  (Abnormal) Collected:  07/10/17 1651    Specimen:  Blood Updated:  07/10/17 1658 "     Glucose 238 (H) mg/dL     Narrative:       Meter: [id number] : [id number] telly winter    POC Glucose Fingerstick [[id number]]  (Abnormal) Collected:  07/10/17 2059    Specimen:  Blood Updated:  07/10/17 2106     Glucose 132 (H) mg/dL     Narrative:       Meter: [id number] : [id number] lena sprague    CBC & Differential [[id number]] Collected:  07/11/17 0151    Specimen:  Blood Updated:  07/11/17 0233    Narrative:       The following orders were created for panel order CBC & Differential.  Procedure                               Abnormality         Status                     ---------                               -----------         ------                     CBC Auto Differential[[id number]]        Abnormal            Final result                 Please view results for these tests on the individual orders.    CBC Auto Differential [[id number]]  (Abnormal) Collected:  07/11/17 0151    Specimen:  Blood Updated:  07/11/17 0233     WBC 11.86 10*3/mm3      RBC 3.26 (L) 10*6/mm3      Hemoglobin 8.8 (L) g/dL      Hematocrit 29.8 (L) %      MCV 91.4 fL      MCH 27.0 pg      MCHC 29.5 (L) g/dL      RDW 17.7 (H) %      RDW-SD 57.9 (H) fl      MPV 10.6 (H) fL      Platelets 234 10*3/mm3      Neutrophil % 65.3 %      Lymphocyte % 23.3 %      Monocyte % 6.0 %      Eosinophil % 4.8 %      Basophil % 0.3 %      Immature Grans % 0.3 %      Neutrophils, Absolute 7.75 (H) 10*3/mm3      Lymphocytes, Absolute 2.76 10*3/mm3      Monocytes, Absolute 0.71 10*3/mm3      Eosinophils, Absolute 0.57 10*3/mm3      Basophils, Absolute 0.04 10*3/mm3      Immature Grans, Absolute 0.03 10*3/mm3     Comprehensive Metabolic Panel [[id number]]  (Abnormal) Collected:  07/11/17 0151    Specimen:  Blood Updated:  07/11/17 0304     Glucose 140 (H) mg/dL      BUN 31 (H) mg/dL      Creatinine 2.63 (H) mg/dL      Sodium 140 mmol/L      Potassium 4.0 mmol/L      Chloride 113 (H) mmol/L      CO2 22.4 (L) mmol/L      Calcium 7.8 mg/dL      Total  Protein 5.6 (L) g/dL      Albumin 3.10 (L) g/dL      ALT (SGPT) 11 U/L      AST (SGOT) 18 U/L      Alkaline Phosphatase 102 U/L       Note New Reference Ranges        Total Bilirubin 0.1 (L) mg/dL      eGFR Non African Amer 18 (L) mL/min/1.73      Globulin 2.5 gm/dL      A/G Ratio 1.2 (L) g/dL      BUN/Creatinine Ratio 11.8     Anion Gap 4.6 mmol/L     Osmolality, Calculated [[id number]]  (Normal) Collected:  07/11/17 0151    Specimen:  Blood Updated:  07/11/17 0304     Osmolality Calc 288.2 mOsm/kg     POC Glucose Fingerstick [[id number]]  (Abnormal) Collected:  07/11/17 0749    Specimen:  Blood Updated:  07/11/17 0757     Glucose 133 (H) mg/dL     Narrative:       Meter: [id number] : [id number] Padron Thresa L    POC Glucose Fingerstick [[id number]]  (Abnormal) Collected:  07/11/17 1153    Specimen:  Blood Updated:  07/11/17 1201     Glucose 145 (H) mg/dL     Narrative:       Meter: [id number] : [id number] Padron Thresa L        Imaging Results (last 24 hours)     ** No results found for the last 24 hours. **        Orders (last 24 hrs)     Start     Ordered    07/11/17 1315  Inpatient Consult to Case Management   Once     Provider:  (Not yet assigned)    07/11/17 1315    07/11/17 1226  Nursing Communication Okay to discharge, follow up with Dr Wheeler in 1 week and have BMP drawn on Friday  Continuous     Comments:  Okay to discharge, follow up with Dr Wheeler in 1 week and have BMP drawn on Friday 07/11/17 1227    07/11/17 1202  POC Glucose Fingerstick  Once      07/11/17 1201    07/11/17 1154  Discontinue IV  Once      07/11/17 1155    07/11/17 1153  Discharge patient  Once      07/11/17 1155    07/11/17 1150  Pulse Oximetry While Ambulating  Once      07/11/17 1149    07/11/17 1148  Notify Home Health  Until Discontinued      07/11/17 1149    07/11/17 0900  amLODIPine (NORVASC) tablet 10 mg  Every 24 Hours Scheduled      07/10/17 1900    07/11/17 0900  hydrALAZINE (APRESOLINE) tablet 25 mg   Every 12 Hours Scheduled      07/11/17 0658    07/11/17 0758  POC Glucose Fingerstick  Once      07/11/17 0757    07/11/17 0730  pantoprazole (PROTONIX) EC tablet 40 mg  Every Morning Before Breakfast      07/10/17 2033    07/11/17 0600  CBC & Differential  Morning Draw      07/10/17 2033    07/11/17 0600  Comprehensive Metabolic Panel  Morning Draw      07/10/17 2033    07/11/17 0600  epoetin jane (EPOGEN,PROCRIT) injection 20,000 Units  Once      07/10/17 2137    07/11/17 0600  CBC Auto Differential  PROCEDURE ONCE      07/11/17 0001    07/11/17 0305  Osmolality, Calculated  Once      07/11/17 0304    07/11/17 0000  pantoprazole (PROTONIX) 40 MG EC tablet  Every Morning Before Breakfast      07/11/17 1155    07/11/17 0000  amLODIPine (NORVASC) 10 MG tablet  Every 24 Hours Scheduled      07/11/17 1155    07/11/17 0000  Discharge Follow-up with PCP      07/11/17 1155    07/11/17 0000  Discharge Follow-up with Specialty      07/11/17 1155    07/11/17 0000  Activity as Tolerated      07/11/17 1155    07/11/17 0000  Diet: Cardiac; Thin Liquids, No Restrictions      07/11/17 1155    07/10/17 2107  POC Glucose Fingerstick  Once      07/10/17 2106    07/10/17 1816  Diet Regular; Consistent Carbohydrate  Diet Effective Now      07/10/17 1815    07/10/17 1659  POC Glucose Fingerstick  Once      07/10/17 1658    07/10/17 1000  lactated ringers infusion  Continuous,   Status:  Discontinued      07/10/17 0924    07/10/17 0900  sodium bicarbonate tablet 1,300 mg  3 Times Daily      07/10/17 0654    07/09/17 2100  nicotine (NICODERM CQ) 21 MG/24HR patch 1 patch  Every 24 Hours      07/09/17 1937    07/09/17 1600  sodium chloride 0.45 % infusion  Continuous,   Status:  Discontinued      07/09/17 1521    07/06/17 2100  pantoprazole (PROTONIX) injection 40 mg  Every 12 Hours Scheduled,   Status:  Discontinued      07/06/17 1718    07/06/17 1716  loperamide (IMODIUM) capsule 2 mg  4 Times Daily PRN      07/06/17 1717    07/06/17  0900  calcitriol (ROCALTROL) capsule 0.5 mcg  Daily      07/06/17 0642    07/06/17 0900  amLODIPine (NORVASC) tablet 5 mg  Every 24 Hours Scheduled,   Status:  Discontinued      07/06/17 0644    07/05/17 2048  promethazine (PHENERGAN) tablet 12.5 mg  Every 8 Hours PRN      07/05/17 2048    07/05/17 1433  clonazePAM (KlonoPIN) tablet 0.5 mg  2 Times Daily PRN      07/05/17 1433    07/04/17 1800  benzonatate (TESSALON) capsule 100 mg  3 Times Daily PRN      07/04/17 1753    07/04/17 1800  hydrALAZINE (APRESOLINE) injection 10 mg  Every 6 Hours PRN      07/04/17 1754    07/04/17 1630  gabapentin (NEURONTIN) capsule 100 mg  Nightly PRN      07/04/17 1630    07/04/17 1530  gabapentin (NEURONTIN) capsule 200 mg  3 Times Daily PRN      07/04/17 1520    07/03/17 2100  heparin (porcine) 5000 UNIT/ML injection 5,000 Units  Every 12 Hours Scheduled      07/03/17 1424    07/03/17 1900  budesonide-formoterol (SYMBICORT) 80-4.5 MCG/ACT inhaler 2 puff  2 Times Daily - RT      07/03/17 1546    07/03/17 1730  insulin aspart (novoLOG) injection 0-7 Units  4 Times Daily Before Meals & Nightly      07/03/17 1557    07/03/17 1700  POC Glucose Fingerstick  4 Times Daily Before Meals & at Bedtime      07/03/17 1557    07/03/17 1630  aspirin EC tablet 81 mg  Daily      07/03/17 1546    07/03/17 1600  ipratropium-albuterol (DUO-NEB) nebulizer solution 3 mL  Every 6 Hours PRN      07/03/17 1543    07/03/17 1557  dextrose (GLUTOSE) oral gel 15 g  Every 15 Minutes PRN      07/03/17 1557    07/03/17 1557  dextrose (D50W) solution 25 g  Every 15 Minutes PRN      07/03/17 1557    07/03/17 1557  glucagon (human recombinant) (GLUCAGEN DIAGNOSTIC) injection 1 mg  Every 15 Minutes PRN      07/03/17 1557    07/03/17 1543  albuterol (PROVENTIL) nebulizer solution 0.083% 2.5 mg/3mL  Every 6 Hours PRN      07/03/17 1546    07/03/17 1424  sodium chloride 0.9 % flush 1-10 mL  As Needed      07/03/17 1424    07/03/17 1021  sodium chloride 0.9 % flush 10 mL   As Needed      07/03/17 1021    Unscheduled  Oxygen Therapy- Nasal Cannula; 2 LPM; Titrate for SPO2: equal to or greater than, 92%  As Needed      07/03/17 1236    Unscheduled  Up With Assistance  As Needed      07/03/17 1424    --  lisinopril (PRINIVIL,ZESTRIL) 40 MG tablet  Daily      07/03/17 1045    --  budesonide-formoterol (SYMBICORT) 80-4.5 MCG/ACT inhaler  2 Times Daily - RT      07/03/17 1435    --  amLODIPine (NORVASC) 5 MG tablet  Nightly      07/03/17 1435    --  hydrALAZINE (APRESOLINE) 25 MG tablet  2 Times Daily With Meals      07/03/17 1435    --  gabapentin (NEURONTIN) 300 MG capsule  3 Times Daily      07/03/17 1435    --  ondansetron ODT (ZOFRAN-ODT) 4 MG disintegrating tablet  Every 4 Hours PRN      07/03/17 1435    --  SCANNED - TELEMETRY        07/03/17 0000             Operative/Procedure Notes (most recent note)      Zheng Suarez III, MD at 7/10/2017 11:05 AM  Version 2 of 2         07/10/17    COLONOSCOPY with biopsies and polypectomy, ESOPHAGOGASTRODUODENOSCOPY with biopsies Procedure Note    Sara Cardona  7/3/2017 - 7/10/2017    Pre-op Diagnosis: Abdominal pain, nausea/vomiting, diarrhea, anemia, GI blood loss, colon cancer screening, last colonoscopy was performed about 15 years ago      Post-op Diagnosis:   Gastritis, mild  Colonic diverticulosis  Sigmoid polyp, removed  No bleeding was observed        Anesthesia: Per Anesthesia service, general anesthesia      Estimated Blood Loss: Negligible      Findings: EGD was performed with careful inspection of the esophagus, stomach and duodenum to the fourth portion.  Mild nonerosive gastritis was observed.  The examination was otherwise normal.  No evidence of recent/active bleeding was observed.  Biopsies were taken randomly from the gastric antrum and from the duodenum.    Normal rectal examination.  Colonoscopy was performed to the cecum, confirmed by identification of typical cecal anatomy.  Bowel preparation was excellent.  The  scope was retroflexed within the rectum.  All areas were examined very carefully.  Scattered diverticuli were seen in the colon, predominantly in the sigmoid colon, without obvious evidence of diverticulitis.  A single small (under 1 cm in size) benign-appearing sessile polyp was removed from the sigmoid colon using the cold snare technique.  No other abnormality was identified.  Biopsies were taken randomly from the colon.    She tolerated the procedures well and there were no immediate complications.  She left the OR in stable and satisfactory condition.      Complications: None      Recommendations:   Diet as tolerated  Await biopsy findings  Repeat screening/surveillance colonoscopy in 3-5 years      Zheng Suarez III, MD     Date: 7/10/2017  Time: 11:05 AM     CC:  Dr. Tish Givens       Electronically signed by Zheng Suarez III, MD at 7/10/2017 11:26 AM      Zheng Suarez III, MD at 7/10/2017 11:05 AM  Version 1 of 2         07/10/17    COLONOSCOPY with biopsies and polypectomy, ESOPHAGOGASTRODUODENOSCOPY with biopsies Procedure Note    Sara Cardona  7/3/2017 - 7/10/2017    Pre-op Diagnosis: Abdominal pain, nausea/vomiting, diarrhea, anemia, GI blood loss, colon cancer screening, last colonoscopy was performed about 15 years ago      Post-op Diagnosis:   Gastritis, mild  Colonic diverticulosis  Sigmoid polyp, removed  No bleeding was observed        Anesthesia: Per Anesthesia service, general anesthesia      Estimated Blood Loss: Negligible      Findings: EGD was performed with careful inspection of the esophagus, stomach and duodenum to the fourth portion.  Mild nonerosive gastritis was observed.  The examination was otherwise normal.  No evidence of recent/active bleeding was observed.  Biopsies were taken randomly from the gastric antrum and from the duodenum.    Normal rectal examination.  Colonoscopy was performed to the cecum, confirmed by identification of typical cecal anatomy.   Bowel preparation was excellent.  The scope was retroflexed within the rectum.  All areas were examined very carefully.  Scattered diverticuli were seen in the colon, predominantly in the sigmoid colon, without obvious evidence of diverticulitis.  A single small (under 1 cm in size) benign-appearing sessile polyp was removed from the sigmoid colon using the cold snare technique.  This polyp was removed completely, but the polyp was lost after it was removed and no specimen was retrieved.  No other abnormality was identified.  Biopsies were taken randomly from the colon.    She tolerated the procedures well and there were no immediate complications.  She left the OR in stable and satisfactory condition.      Complications: None      Recommendations:   Diet as tolerated  Await biopsy findings  Repeat screening/surveillance colonoscopy in 3-5 years      Zheng Suarez III, MD     Date: 7/10/2017  Time: 11:05 AM     CC:  Dr. Tish Givens       Electronically signed by Zheng Suarez III, MD at 7/10/2017 11:09 AM           Physician Progress Notes (most recent note)      Tish Givens DO at 7/10/2017  6:59 PM  Version 1 of 1         HOSPITALIST PROGRESS NOTE    Patient Identification:  Name:  Sara Cardona  Age:  66 y.o.  Sex:  female  :  1950  MRN:  [id number]  Visit Number:  [id number]  Primary Care Provider:  Marcelino Sheehan MD    Length of stay:  7     HPI: The patient is a 65 yo female who was admitted on 7/3/2017 with nausea and found to have acute on chronic renal failure.     Procedures:  EGD/Colonoscopy:   Gastritis, mild   Colonic diverticulosis   Sigmoid polyp, removed   No bleeding was observed     Subjective:  The patient was seen this evening sitting on her bed.  She has undergone EGD and colonoscopy today.  Her EGD revealed some mild gastritis.  She had a sigmoid polyp that was removed.  There was no active bleeding.    Patient currently denies any nausea.  She remained  somewhat somnolent from the sedation she received today.  She currently denies any back pain.  She has no more abdominal pain and denies any diarrhea at this time.    Present during exam: N/A    Current Hospital Meds:    amLODIPine 5 mg Oral Q24H   aspirin 81 mg Oral Daily   budesonide-formoterol 2 puff Inhalation BID - RT   calcitriol 0.5 mcg Oral Daily   heparin (porcine) 5,000 Units Subcutaneous Q12H   insulin aspart 0-7 Units Subcutaneous 4x Daily AC & at Bedtime   nicotine 1 patch Transdermal Q24H   pantoprazole 40 mg Intravenous Q12H   sodium bicarbonate 1,300 mg Oral TID     Vital Signs  Temp:  [97.4 °F (36.3 °C)-98.7 °F (37.1 °C)] 97.4 °F (36.3 °C)  Heart Rate:  [] 95  Resp:  [18-24] 20  BP: (124-196)/() 178/95  Last 3 weights    07/03/17  0950 07/10/17  0936   Weight: 115 lb (52.2 kg) 115 lb (52.2 kg)     Body mass index is 21.73 kg/(m^2).    Physical exam:  Physical Exam   Constitutional: She is oriented to person, place, and time. She appears well-developed and well-nourished. No distress. Nasal cannula in place.   HENT:   Head: Normocephalic and atraumatic.   Nose: Nose normal.   Mouth/Throat: Oropharynx is clear and moist and mucous membranes are normal.   Eyes: Conjunctivae and EOM are normal. Pupils are equal, round, and reactive to light. No scleral icterus.   Neck: Trachea normal. Neck supple. No JVD present. Carotid bruit is not present. No thyromegaly present.   Cardiovascular: Normal rate, regular rhythm and normal heart sounds.  Exam reveals no gallop and no friction rub.    No murmur heard.  No significant lower extremity edema   Pulmonary/Chest: Effort normal. No respiratory distress. She has no wheezes. She has no rhonchi. She has no rales.   Abdominal: Soft. Bowel sounds are normal. She exhibits no distension. There is no tenderness. There is no guarding.   Neurological: She is oriented to person, place, and time. She has normal strength. No cranial nerve deficit.   Skin: Skin is  warm, dry and intact. No rash noted. No erythema.   Psychiatric: She has a normal mood and affect. Her speech is normal.     Results Review:    Telemetry: Sinus rhythm     Results from last 7 days  Lab Units 07/10/17  0200 07/09/17  0146 07/08/17  0509 07/07/17  0115 07/06/17  0101 07/05/17  0116 07/04/17  0240   WBC 10*3/mm3 9.95 13.65* 12.06 10.57 11.53 12.67* 10.12   HEMOGLOBIN g/dL 8.6* 9.5* 8.3* 7.8* 9.0* 9.4* 8.8*   HEMATOCRIT % 28.1* 31.4* 27.7* 25.9* 28.4* 30.2* 28.8*   PLATELETS 10*3/mm3 239 288 275 259 280 280 246       Results from last 7 days  Lab Units 07/10/17  0200 07/09/17  0146 07/08/17  0509 07/07/17  0115 07/06/17  0956 07/06/17  0101 07/05/17  0116 07/04/17  0240   SODIUM mmol/L 146 145 143 140  --  143 144 141   POTASSIUM mmol/L 3.5 3.9 3.8 3.6 3.5 3.4* 3.8 3.9   CHLORIDE mmol/L 120* 118* 119* 120*  --  121* 119* 120*   CO2 mmol/L 21.7* 23.1* 19.3* 13.4*  --  16.2* 18.7* 15.2*   BUN mg/dL 33* 44* 39* 35*  --  48* 44* 42*   CREATININE mg/dL 2.27* 2.71* 2.53* 2.70*  --  3.20* 3.23* 3.28*   CALCIUM mg/dL 7.9 8.7 7.8 7.0*  --  7.1* 7.1* 7.0*   GLUCOSE mg/dL 113* 127* 144* 153*  --  143* 134* 131*       Results from last 7 days  Lab Units 07/10/17  0200 07/09/17  0146 07/08/17  0509 07/07/17  0115 07/06/17  0101 07/05/17  0116 07/04/17  0240   BILIRUBIN mg/dL 0.2 0.3 0.1* 0.1* 0.2 0.1* 0.1*   ALK PHOS U/L 95 104 88 74 80 78 76   AST (SGOT) U/L 16 20 18 11 10 14 12   ALT (SGPT) U/L 15 15 14 7* 10 9* 9*       Results from last 7 days  Lab Units 07/09/17  0146 07/08/17  0509 07/07/17  0115   MAGNESIUM mg/dL 1.7 1.6* 1.5*       Results from last 7 days  Lab Units 07/08/17  1114 07/08/17  0509 07/07/17  2339 07/04/17  0240 07/03/17  2037   CK TOTAL U/L  --   --  103 80 115   TROPONIN I ng/mL 0.041* 0.039 0.039 <0.006 0.018   CK MB INDEX %  --   --  4.7* 6.3* 6.3*   MYOGLOBIN ng/mL  --   --   --  122.0* 138.0*       Results from last 7 days  Lab Units 07/07/17  0115 07/06/17  0956 07/04/17  0240   BNP  pg/mL 809.0* 1261.0* 1003.0*         Assessment/Plan     -Non-oliguric SIMON on stage IV CKD with baseline creatinine for the past year ranging from 1.8-2.2; likely multifactorial and secondary to volume depletion and medication induced (NSAIDs/ACEI) at home  -Hypomagnesemia, resolved with supplementation  -Uncontrolled essential hypertension  -Possible upper GI bleed with complaints of dark stool in the setting of NSAID use; EGD negative  -Elevated parathyroid hormone  -Vitamin D insufficiency   -Metabolic acidosis   -Severe protein malnutrition with hypoalbuminemia causing pseudo-hypocalcemia  -Diet controlled diabetes mellitus type II; well controlled with A1c 6.1%; overall fairly well controlled  -Tobacco abuse; counseled cessation  -COPD without acute exacerbation   -Generalized anxiety disorder    Patient underwent EGD and colonoscopy today that were unremarkable.  The patient's creatinine is actually better than her baseline creatinine of 2.5 in May 2017.  Plan to discontinue her lactated Ringer's.  I will place her on oral pantoprazole.  She remains on sodium bicarbonate tablets in addition to calcitriol.  Her blood pressure is uncontrolled and I have increased the dose of her amlodipine. The patient was taking oral hydralazine at home and is currently only receiving as needed IV hydralazine.  Should her blood pressures continued to be uncontrolled, we may consider adding back her oral hydralazine.      Continue to closely monitor hemoglobin and hematocrit, plan to transfuse if hemoglobin were to drop below 7.0. Monitor for signs/symptoms of active bleeding. Allow permissive hypertension to allow for renal perfusion with goal systolic blood pressure of 140-150s. Will repeat labs in the morning and continue to monitor the patient closely on telemetry.     DVT prophylaxis: SQ Heparin   GI prophylaxis: PO Pantoprazole    The patient is high risk due to the following diagnoses/reasons:  SIMON with CKD, pulmonary  hypertension, tobacco abuse, possible GI bleed    I discussed the patients findings and my recommendations with patient    Disposition  Home when medically stable; possibly in 1-2 days    Tish Givens DO  07/10/17  6:59 PM     Electronically signed by Tish Givens DO at 7/10/2017  8:32 PM           Consult Notes (most recent note)      Zheng Suarez III, MD at 7/7/2017 11:31 AM  Version 2 of 2     Consult Orders:    1. Inpatient Consult to Gastroenterology [[id number]] ordered by Tish Givens DO at 07/06/17 1717                07/07/17  Chief Complaint   Patient presents with   • Headache   • Diarrhea     Sara Cardona is a 66 y.o. female who presents today at the request of Dr. Givens for   Blood in stool    HPI  The patient was seen for a GI evaluation due to blood in her stool.  The patient reports that she has had nausea, vomiting, diarrhea, and abdominal pain for the past week.  She also reports melena but explains that she has been using a significant amount of Pepto-Bismol.  Patient has also been taking Aleve.  She denies recent use of antibiotics.  She has never had an EGD.  Her last colonoscopy was 15 years ago.  Patient had a positive fecal blood test.  Her hemoglobin is 7.8 and hematocrit is 25.9.  CT scan of her abdomen and pelvis revealed chronic constipation, sigmoid diverticulosis, and cholelithiasis.  Total bilirubin is normal.   Medical, surgical, social, and family histories were reviewed and are listed below.        Review of Systems  History obtained from the patient  General ROS: negative for - chills, fatigue, fever, malaise, weight gain or weight loss  Psychological ROS: negative for - anxiety, behavioral disorder, depression, disorientation, irritability, memory difficulties or suicidal ideation  ENT ROS: negative for - epistaxis, headaches, hearing change, nasal congestion, nasal discharge, oral lesions, sinus pain, sneezing, sore throat, tinnitus, vertigo, visual  changes or vocal changes  Hematological and Lymphatic ROS: negative for - bleeding problems, blood clots, blood transfusions, bruising, fatigue, jaundice, night sweats, pallor, swollen lymph nodes or weight loss  Endocrine ROS: negative for - breast changes, hot flashes, malaise/lethargy, palpitations, polydipsia/polyuria, temperature intolerance or unexpected weight changes  Respiratory ROS: negative for - cough, hemoptysis, orthopnea, pleuritic pain, shortness of breath, tachypnea or wheezing  Cardiovascular ROS: negative for - chest pain, dyspnea on exertion, edema, irregular heartbeat, loss of consciousness, murmur, orthopnea, palpitations, paroxysmal nocturnal dyspnea, rapid heart rate or shortness of breath  Gastrointestinal ROS: positive for-nausea, vomiting, diarrhea, abdominal pain, melena; negative for -appetite loss, blood in stools, change in bowel habits, change in stools, constipation,  gas/bloating, heartburn, hematemesis, stool incontinence or swallowing difficulty/pain  Genito-Urinary ROS: negative for - change in urinary stream, incontinence, nocturia, pelvic pain, scrotal mass/pain, urinary frequency/urgency or vulvar/vaginal symptoms  Musculoskeletal ROS: positive for-back pain; negative for - gait disturbance,  joint stiffness, joint swelling,  or muscular weakness  Neurological ROS: negative for - behavioral changes, bowel and bladder control changes, confusion, dizziness, gait disturbance, headaches, impaired coordination/balance, memory loss, numbness/tingling, seizures, speech problems, tremors, visual changes or weakness  Dermatological ROS: negative for lumps, nail changes, pruritus, rash and skin lesion changes    ACTIVE PROBLEMS:   Specialty Problems     None          PAST MEDICAL HISTORY:  Past Medical History:   Diagnosis Date   • Anxiety    • Aortic stenosis    • ASCVD (arteriosclerotic cardiovascular disease)    • Breast cancer    • Chronic pain    • COPD (chronic obstructive  pulmonary disease)    • Diabetic neuropathy    • Dyslipidemia    • Essential hypertension    • Insulin dependent diabetes mellitus    • Left carotid bruit    • Recurrent pneumonia    • Renal failure        SURGICAL HISTORY:  Past Surgical History:   Procedure Laterality Date   • APPENDECTOMY     • CARDIAC CATHETERIZATION     • COLONOSCOPY     • HYSTERECTOMY     • MASTECTOMY Right    • RHINOPLASTY         FAMILY HISTORY:  Family History   Problem Relation Age of Onset   • Diabetes Mother    • Lung cancer Father        SOCIAL HISTORY:  Social History   Substance Use Topics   • Smoking status: Current Every Day Smoker     Packs/day: 1.00     Types: Cigarettes   • Smokeless tobacco: Not on file   • Alcohol use No       CURRENT MEDICATION:    Current Facility-Administered Medications:   •  albuterol (PROVENTIL) nebulizer solution 0.083% 2.5 mg/3mL, 2.5 mg, Nebulization, Q6H PRN, MEAGAN Jasso  •  amLODIPine (NORVASC) tablet 5 mg, 5 mg, Oral, Q24H, Jai Chavez MD, 5 mg at 07/07/17 0917  •  aspirin EC tablet 81 mg, 81 mg, Oral, Daily, MEAGAN Jasso, 81 mg at 07/07/17 0917  •  benzonatate (TESSALON) capsule 100 mg, 100 mg, Oral, TID PRN, Tish Givens DO, 100 mg at 07/07/17 1048  •  [START ON 7/9/2017] bisacodyl (DULCOLAX) EC tablet 10 mg, 10 mg, Oral, Once, MEAGAN Williamson  •  budesonide-formoterol (SYMBICORT) 80-4.5 MCG/ACT inhaler 2 puff, 2 puff, Inhalation, BID - RT, MEAGAN Jasso, 2 puff at 07/07/17 0636  •  calcitriol (ROCALTROL) capsule 0.5 mcg, 0.5 mcg, Oral, Daily, Jai Chavez MD, 0.5 mcg at 07/07/17 0916  •  clonazePAM (KlonoPIN) tablet 0.5 mg, 0.5 mg, Oral, BID PRN, Tish Givens DO, 0.5 mg at 07/07/17 0916  •  dextrose (D50W) solution 25 g, 25 g, Intravenous, Q15 Min PRN, Cheri E O'Kuma, PA  •  dextrose (GLUTOSE) oral gel 15 g, 15 g, Oral, Q15 Min PRN, MEAGAN Jasso  •  gabapentin (NEURONTIN) capsule 100 mg, 100 mg, Oral, Nightly PRN, Tish Givens,  DO  •  gabapentin (NEURONTIN) capsule 200 mg, 200 mg, Oral, TID PRN, MEAGAN Jasso  •  glucagon (human recombinant) (GLUCAGEN DIAGNOSTIC) injection 1 mg, 1 mg, Subcutaneous, Q15 Min PRN, MEAGAN Jasso  •  heparin (porcine) 5000 UNIT/ML injection 5,000 Units, 5,000 Units, Subcutaneous, Q12H, Tish Givens DO, 5,000 Units at 07/07/17 0917  •  hydrALAZINE (APRESOLINE) injection 10 mg, 10 mg, Intravenous, Q6H PRN, Tish Givens DO  •  insulin aspart (novoLOG) injection 0-7 Units, 0-7 Units, Subcutaneous, 4x Daily AC & at Bedtime, MEAGAN Jasso, 2 Units at 07/07/17 0915  •  ipratropium-albuterol (DUO-NEB) nebulizer solution 3 mL, 3 mL, Nebulization, Q6H PRN, MEAGAN Jasso, 3 mL at 07/07/17 0636  •  iron sucrose (VENOFER) 200 mg in sodium chloride 0.9 % 100 mL IVPB, 200 mg, Intravenous, Q24H, Jai Chavez MD, Last Rate: 110 mL/hr at 07/07/17 1055, 200 mg at 07/07/17 1055  •  loperamide (IMODIUM) capsule 2 mg, 2 mg, Oral, 4x Daily PRN, Tish Givens DO  •  pantoprazole (PROTONIX) injection 40 mg, 40 mg, Intravenous, Q12H, Tish Givens DO, 40 mg at 07/07/17 0916  •  [START ON 7/9/2017] polyethylene glycol with electrolytes (GOLYTELY) solution 4,000 mL, 4,000 mL, Oral, Once, MEAGAN Williamson  •  promethazine (PHENERGAN) tablet 12.5 mg, 12.5 mg, Oral, Q8H PRN, Linda Pool MD, 12.5 mg at 07/07/17 0916  •  sodium bicarbonate tablet 1,300 mg, 1,300 mg, Oral, TID, Jai Chavez MD, 1,300 mg at 07/07/17 0916  •  sodium chloride 0.9 % flush 1-10 mL, 1-10 mL, Intravenous, PRN, Tish Givens DO  •  Insert peripheral IV, , , Once **AND** sodium chloride 0.9 % flush 10 mL, 10 mL, Intravenous, PRN, MEAGAN Yoder    ALLERGIES:  Review of patient's allergies indicates no known allergies.     VITAL SIGNS:  Vital Signs (last 24 hours)       07/06 0700  -  07/07 0659 07/07 0700  -  07/07 1132   Most Recent    Temp (°F) 97.6 -  98.8       98 (36.7)    Heart Rate  67 -  94       94    Resp 18 -  20       19    /71 -  176/86       125/71    SpO2 (%) 90 -  98       94          PHYSICAL EXAMINATION    General Appearance:    Alert, cooperative, in no acute distress   Head:    Normocephalic, without obvious abnormality, atraumatic   Eyes:            Lids and lashes normal, conjunctivae and sclerae normal, no   icterus, no pallor, corneas clear, PERRLA   Ears:    Ears appear intact with no abnormalities noted   Throat:   No oral lesions, no thrush, oral mucosa moist   Neck:   No adenopathy, supple, trachea midline, no thyromegaly, no   carotid bruit, no JVD   Back:     No kyphosis present, no scoliosis present, no skin lesions,      erythema or scars, no tenderness to percussion or                   palpation,   range of motion normal   Lungs:     Crackles,respirations regular, even and unlabored    Heart:    Regular rhythm and normal rate, normal S1 and S2, no            murmur, no gallop, no rub, no click   Chest Wall:    No abnormalities observed   Abdomen:     Normal bowel sounds, no masses, no organomegaly, soft        non-tender, non-distended, no guarding, no rebound                tenderness   Rectal:     Deferred   Extremities:   Moves all extremities well, no edema, no cyanosis, no             redness   Pulses:   Pulses palpable and equal bilaterally   Skin:   No bleeding, bruising or rash   Lymph nodes:   No palpable adenopathy   Neurologic:   Cranial nerves 2 - 12 grossly intact, sensation intact, DTR       present and equal bilaterally       LABS  Lab Results (last 24 hours)     Procedure Component Value Units Date/Time    Eosinophil Smear [[id number]] Collected:  07/03/17 1706    Specimen:  Urine from Urine, Clean Catch Updated:  07/06/17 1213     Eosinophil, Urine No Eosinophils Seen %                                         <5% few or none seen       Narrative:       Performed at:  [address] [place] [address]  [id number]  :  Anjel Zheng PhD, Phone:  [phone]    POC Glucose Fingerstick [[id number]]  (Abnormal) Collected:  07/06/17 1217    Specimen:  Blood Updated:  07/06/17 1227     Glucose 211 (H) mg/dL     Narrative:       Meter: [id number] : [id number] Gunnar Rankin    Blood Culture [[id number]]  (Normal) Collected:  07/03/17 1203    Specimen:  Blood from Arm, Left Updated:  07/06/17 1301     Blood Culture No growth at 3 days    Blood Culture [[id number]]  (Normal) Collected:  07/03/17 1253    Specimen:  Blood from Arm, Left Updated:  07/06/17 1401     Blood Culture No growth at 3 days    Clostridium Difficile Toxin, PCR [[id number]] Collected:  07/06/17 0817    Specimen:  Stool from Per Rectum Updated:  07/06/17 1504     C. Difficile Toxins by PCR Negative     027 Toxin Presumptive Negative    Narrative:         For In Vitro Diagnostic use only.  027-NAP1-BI results are NOT intended to guide treatment. 027 typing is relative to PCR ribotyping and shown to be equivalent to B1 typing by restriction endonuclease analysis or NAP1 typing by pulse field gel electrophoresis.    POC Glucose Fingerstick [[id number]]  (Abnormal) Collected:  07/06/17 1638    Specimen:  Blood Updated:  07/06/17 1653     Glucose 148 (H) mg/dL     Narrative:       Meter: [id number] : [id number] Gunnar Rnakin    Occult Blood X 1, Stool [[id number]]  (Abnormal) Collected:  07/06/17 0817    Specimen:  Stool from Per Rectum Updated:  07/06/17 2011     Fecal Occult Blood Positive (A)    POC Glucose Fingerstick [[id number]]  (Abnormal) Collected:  07/06/17 2018    Specimen:  Blood Updated:  07/06/17 2024     Glucose 187 (H) mg/dL     Narrative:       Meter: [id number] : [id number] deepthi casanova    CBC & Differential [[id number]] Collected:  07/07/17 0115    Specimen:  Blood Updated:  07/07/17 0212    Narrative:       The following orders were created for panel order CBC & Differential.  Procedure                               Abnormality          Status                     ---------                               -----------         ------                     CBC Auto Differential[[id number]]        Abnormal            Final result                 Please view results for these tests on the individual orders.    CBC Auto Differential [[id number]]  (Abnormal) Collected:  07/07/17 0115    Specimen:  Blood Updated:  07/07/17 0212     WBC 10.57 10*3/mm3      RBC 2.83 (L) 10*6/mm3      Hemoglobin 7.8 (L) g/dL      Hematocrit 25.9 (L) %      MCV 91.5 fL      MCH 27.6 pg      MCHC 30.1 (L) g/dL      RDW 17.7 (H) %      RDW-SD 57.3 (H) fl      MPV 10.8 (H) fL      Platelets 259 10*3/mm3      Neutrophil % 57.5 %      Lymphocyte % 28.9 %      Monocyte % 5.4 %      Eosinophil % 7.2 (H) %      Basophil % 0.6 %      Immature Grans % 0.4 %      Neutrophils, Absolute 6.08 10*3/mm3      Lymphocytes, Absolute 3.06 (H) 10*3/mm3      Monocytes, Absolute 0.57 10*3/mm3      Eosinophils, Absolute 0.76 (H) 10*3/mm3      Basophils, Absolute 0.06 10*3/mm3      Immature Grans, Absolute 0.04 (H) 10*3/mm3     Comprehensive Metabolic Panel [[id number]]  (Abnormal) Collected:  07/07/17 0115    Specimen:  Blood Updated:  07/07/17 0228     Glucose 153 (H) mg/dL      BUN 35 (H) mg/dL      Creatinine 2.70 (H) mg/dL      Sodium 140 mmol/L      Potassium 3.6 mmol/L      Chloride 120 (H) mmol/L      CO2 13.4 (L) mmol/L      Calcium 7.0 (L) mg/dL      Total Protein 5.0 (L) g/dL      Albumin 2.50 (L) g/dL      ALT (SGPT) 7 (L) U/L      AST (SGOT) 11 U/L      Alkaline Phosphatase 74 U/L       Note New Reference Ranges        Total Bilirubin 0.1 (L) mg/dL      eGFR Non African Amer 18 (L) mL/min/1.73      Globulin 2.5 gm/dL      A/G Ratio 1.0 (L) g/dL      BUN/Creatinine Ratio 13.0     Anion Gap 6.6 mmol/L     Magnesium [[id number]]  (Abnormal) Collected:  07/07/17 0115    Specimen:  Blood Updated:  07/07/17 0228     Magnesium 1.5 (L) mg/dL     Osmolality, Calculated [[id number]]  (Normal) Collected:   07/07/17 0115    Specimen:  Blood Updated:  07/07/17 0228     Osmolality Calc 290.4 mOsm/kg     BNP [[id number]]  (Abnormal) Collected:  07/07/17 0115    Specimen:  Blood Updated:  07/07/17 0235     .0 (H) pg/mL     Stool Culture [[id number]] Collected:  07/06/17 0817    Specimen:  Stool from Per Rectum Updated:  07/07/17 0642     Stool Culture Culture in progress    POC Glucose Fingerstick [[id number]]  (Abnormal) Collected:  07/07/17 0902    Specimen:  Blood Updated:  07/07/17 0909     Glucose 176 (H) mg/dL     Narrative:       Meter: [id number] : [id number] Ismael DALY    Blood Gas, Arterial [[id number]]  (Abnormal) Collected:  07/07/17 1004    Specimen:  Arterial Blood Updated:  07/07/17 1014     Site Arterial: right brachial     Vince's Test N/A     pH, Arterial 7.299 (C) pH units      pCO2, Arterial 31.0 (L) mm Hg      pO2, Arterial 55.6 (L) mm Hg      HCO3, Arterial 14.9 (C) mmol/L      Base Excess, Arterial -10.4 mmol/L      O2 Saturation, Arterial 89.4 (L) %      Hemoglobin, Blood Gas 10.0 (L) g/dL      Hematocrit, Blood Gas 29.0 (L) %      Oxyhemoglobin 86.9 %      Methemoglobin 0.1 %      Carboxyhemoglobin 2.7 %      A-a Gradiant 50.5 mmHg      Temperature 98.6 C      Barometric Pressure for Blood Gas 729 mmHg      Modality Room air     FIO2 21 %           IMAGING  Imaging Results (last 72 hours)     Procedure Component Value Units Date/Time    XR Chest PA & Lateral [[id number]] Collected:  07/04/17 1745     Updated:  07/04/17 1748    Narrative:       EXAMINATION: XR CHEST PA AND LATERAL-      CLINICAL INDICATION:     cough and wheezing; N17.9-Acute kidney failure,  unspecified; N18.9-Chronic kidney disease, unspecified;  E86.0-Dehydration; J18.9-Pneumonia, unspecified organism     TECHNIQUE:  XR CHEST PA AND LATERAL-      COMPARISON: 07/03/2017      FINDINGS:   Since previous exam, evolving changes of CHF with perihilar airspace  edema.   Bibasilar atelectasis.   Small pleural effusions.   No  pneumothorax.   Cardiomegaly.  Stable bony structures.       Impression:       Worsening/evolving changes of CHF with airspace edema.     This report was finalized on 7/4/2017 5:46 PM by Dr. Junior Angel MD.             Assessment/Plan   Rectal bleeding  Blood loss anemia  ASCVD  COPD  Diabetes mellitus  Hypertension  Hyperlipidemia  History of breast cancer    REC  Follow HGB + HCT levels--transfuse if needed.  Treatment with Protonix has been initiated.  EGD and colonoscopy will be arranged (Monday).  Procedures, benefits, risks and alternatives were explained to the patient.  Concur with present management.  Thank you for asking me to participate in the care of your patient.    Patient seen and evaluated by Dr. Suarez, including history of present illness, physical examination, assessment and treatment plan.  The note above was reviewed and edited by Dr. Suarez.      Electronically signed by: MEAGAN Williamson     CC:  Dr. Tish Givens     Electronically signed by Zheng Suarez III, MD at 7/7/2017  2:17 PM      MEAGAN Williamson at 7/7/2017 11:31 AM  Version 1 of 2         07/07/17  Chief Complaint   Patient presents with   • Headache   • Diarrhea     Sara Cardona is a 66 y.o. female who presents today at the request of Dr. Givens for   Blood in stool    HPI  The patient was seen for a GI evaluation due to blood in her stool.  The patient reports that she has had nausea, vomiting, diarrhea, and abdominal pain for the past week.  She also reports melena but explains that she has been using a significant amount of Pepto-Bismol.  Patient has also been taking Aleve.  She denies recent use of antibiotics.  She has never had an EGD.  Her last colonoscopy was 15 years ago.  Patient had a positive fecal blood test.  Her hemoglobin is 7.8 and hematocrit is 25.9.  CT scan of her abdomen and pelvis revealed chronic constipation, sigmoid diverticulosis, and cholelithiasis.  Total bilirubin is  normal.   Medical, surgical, social, and family histories were reviewed and are listed below.        Review of Systems  History obtained from the patient  General ROS: negative for - chills, fatigue, fever, malaise, weight gain or weight loss  Psychological ROS: negative for - anxiety, behavioral disorder, depression, disorientation, irritability, memory difficulties or suicidal ideation  ENT ROS: negative for - epistaxis, headaches, hearing change, nasal congestion, nasal discharge, oral lesions, sinus pain, sneezing, sore throat, tinnitus, vertigo, visual changes or vocal changes  Hematological and Lymphatic ROS: negative for - bleeding problems, blood clots, blood transfusions, bruising, fatigue, jaundice, night sweats, pallor, swollen lymph nodes or weight loss  Endocrine ROS: negative for - breast changes, hot flashes, malaise/lethargy, palpitations, polydipsia/polyuria, temperature intolerance or unexpected weight changes  Respiratory ROS: negative for - cough, hemoptysis, orthopnea, pleuritic pain, shortness of breath, tachypnea or wheezing  Cardiovascular ROS: negative for - chest pain, dyspnea on exertion, edema, irregular heartbeat, loss of consciousness, murmur, orthopnea, palpitations, paroxysmal nocturnal dyspnea, rapid heart rate or shortness of breath  Gastrointestinal ROS: positive for-nausea, vomiting, diarrhea, abdominal pain, melena; negative for -appetite loss, blood in stools, change in bowel habits, change in stools, constipation,  gas/bloating, heartburn, hematemesis, stool incontinence or swallowing difficulty/pain  Genito-Urinary ROS: negative for - change in urinary stream, incontinence, nocturia, pelvic pain, scrotal mass/pain, urinary frequency/urgency or vulvar/vaginal symptoms  Musculoskeletal ROS: positive for-back pain; negative for - gait disturbance,  joint stiffness, joint swelling,  or muscular weakness  Neurological ROS: negative for - behavioral changes, bowel and bladder  control changes, confusion, dizziness, gait disturbance, headaches, impaired coordination/balance, memory loss, numbness/tingling, seizures, speech problems, tremors, visual changes or weakness  Dermatological ROS: negative for lumps, nail changes, pruritus, rash and skin lesion changes    ACTIVE PROBLEMS:   Specialty Problems     None          PAST MEDICAL HISTORY:  Past Medical History:   Diagnosis Date   • Anxiety    • Aortic stenosis    • ASCVD (arteriosclerotic cardiovascular disease)    • Breast cancer    • Chronic pain    • COPD (chronic obstructive pulmonary disease)    • Diabetic neuropathy    • Dyslipidemia    • Essential hypertension    • Insulin dependent diabetes mellitus    • Left carotid bruit    • Recurrent pneumonia    • Renal failure        SURGICAL HISTORY:  Past Surgical History:   Procedure Laterality Date   • APPENDECTOMY     • CARDIAC CATHETERIZATION     • COLONOSCOPY     • HYSTERECTOMY     • MASTECTOMY Right    • RHINOPLASTY         FAMILY HISTORY:  Family History   Problem Relation Age of Onset   • Diabetes Mother    • Lung cancer Father        SOCIAL HISTORY:  Social History   Substance Use Topics   • Smoking status: Current Every Day Smoker     Packs/day: 1.00     Types: Cigarettes   • Smokeless tobacco: Not on file   • Alcohol use No       CURRENT MEDICATION:    Current Facility-Administered Medications:   •  albuterol (PROVENTIL) nebulizer solution 0.083% 2.5 mg/3mL, 2.5 mg, Nebulization, Q6H PRN, MEAGAN Jasso  •  amLODIPine (NORVASC) tablet 5 mg, 5 mg, Oral, Q24H, Jai Chavez MD, 5 mg at 07/07/17 0917  •  aspirin EC tablet 81 mg, 81 mg, Oral, Daily, MEAGAN Jasso, 81 mg at 07/07/17 0917  •  benzonatate (TESSALON) capsule 100 mg, 100 mg, Oral, TID PRN, Tish Givens DO, 100 mg at 07/07/17 1048  •  [START ON 7/9/2017] bisacodyl (DULCOLAX) EC tablet 10 mg, 10 mg, Oral, Once, MEAGAN Williamson  •  budesonide-formoterol (SYMBICORT) 80-4.5 MCG/ACT inhaler 2  puff, 2 puff, Inhalation, BID - RT, MEAGAN Jasso, 2 puff at 07/07/17 0636  •  calcitriol (ROCALTROL) capsule 0.5 mcg, 0.5 mcg, Oral, Daily, Jai Chavez MD, 0.5 mcg at 07/07/17 0916  •  clonazePAM (KlonoPIN) tablet 0.5 mg, 0.5 mg, Oral, BID PRN, Tish Givens DO, 0.5 mg at 07/07/17 0916  •  dextrose (D50W) solution 25 g, 25 g, Intravenous, Q15 Min PRN, MEAGAN Jasso  •  dextrose (GLUTOSE) oral gel 15 g, 15 g, Oral, Q15 Min PRN, MEAGAN Jasso  •  gabapentin (NEURONTIN) capsule 100 mg, 100 mg, Oral, Nightly PRN, Tish Givens DO  •  gabapentin (NEURONTIN) capsule 200 mg, 200 mg, Oral, TID PRN, MEAGAN Jasso  •  glucagon (human recombinant) (GLUCAGEN DIAGNOSTIC) injection 1 mg, 1 mg, Subcutaneous, Q15 Min PRN, MEAGAN Jasso  •  heparin (porcine) 5000 UNIT/ML injection 5,000 Units, 5,000 Units, Subcutaneous, Q12H, Tish Givens DO, 5,000 Units at 07/07/17 0917  •  hydrALAZINE (APRESOLINE) injection 10 mg, 10 mg, Intravenous, Q6H PRN, Tish Givens DO  •  insulin aspart (novoLOG) injection 0-7 Units, 0-7 Units, Subcutaneous, 4x Daily AC & at Bedtime, MEAGAN Jasso, 2 Units at 07/07/17 0915  •  ipratropium-albuterol (DUO-NEB) nebulizer solution 3 mL, 3 mL, Nebulization, Q6H PRN, MEAGAN Jasso, 3 mL at 07/07/17 0636  •  iron sucrose (VENOFER) 200 mg in sodium chloride 0.9 % 100 mL IVPB, 200 mg, Intravenous, Q24H, Jai Chavez MD, Last Rate: 110 mL/hr at 07/07/17 1055, 200 mg at 07/07/17 1055  •  loperamide (IMODIUM) capsule 2 mg, 2 mg, Oral, 4x Daily PRN, Tish Givens DO  •  pantoprazole (PROTONIX) injection 40 mg, 40 mg, Intravenous, Q12H, Tish Givens DO, 40 mg at 07/07/17 0916  •  [START ON 7/9/2017] polyethylene glycol with electrolytes (GOLYTELY) solution 4,000 mL, 4,000 mL, Oral, Once, MEAGAN Williamson  •  promethazine (PHENERGAN) tablet 12.5 mg, 12.5 mg, Oral, Q8H PRN, Linda Pool MD, 12.5 mg at 07/07/17  0916  •  sodium bicarbonate tablet 1,300 mg, 1,300 mg, Oral, TID, Jai Chavez MD, 1,300 mg at 07/07/17 0916  •  sodium chloride 0.9 % flush 1-10 mL, 1-10 mL, Intravenous, PRN, Tish Givens DO  •  Insert peripheral IV, , , Once **AND** sodium chloride 0.9 % flush 10 mL, 10 mL, Intravenous, PRN, MEAGAN Yoder    ALLERGIES:  Review of patient's allergies indicates no known allergies.     VITAL SIGNS:  Vital Signs (last 24 hours)       07/06 0700  -  07/07 0659 07/07 0700  -  07/07 1132   Most Recent    Temp (°F) 97.6 -  98.8       98 (36.7)    Heart Rate 67 -  94       94    Resp 18 -  20       19    /71 -  176/86       125/71    SpO2 (%) 90 -  98       94          PHYSICAL EXAMINATION    General Appearance:    Alert, cooperative, in no acute distress   Head:    Normocephalic, without obvious abnormality, atraumatic   Eyes:            Lids and lashes normal, conjunctivae and sclerae normal, no   icterus, no pallor, corneas clear, PERRLA   Ears:    Ears appear intact with no abnormalities noted   Throat:   No oral lesions, no thrush, oral mucosa moist   Neck:   No adenopathy, supple, trachea midline, no thyromegaly, no   carotid bruit, no JVD   Back:     No kyphosis present, no scoliosis present, no skin lesions,      erythema or scars, no tenderness to percussion or                   palpation,   range of motion normal   Lungs:     Crackles,respirations regular, even and unlabored    Heart:    Regular rhythm and normal rate, normal S1 and S2, no            murmur, no gallop, no rub, no click   Chest Wall:    No abnormalities observed   Abdomen:     Normal bowel sounds, no masses, no organomegaly, soft        non-tender, non-distended, no guarding, no rebound                tenderness   Rectal:     Deferred   Extremities:   Moves all extremities well, no edema, no cyanosis, no             redness   Pulses:   Pulses palpable and equal bilaterally   Skin:   No bleeding, bruising or rash   Lymph nodes:   No  palpable adenopathy   Neurologic:   Cranial nerves 2 - 12 grossly intact, sensation intact, DTR       present and equal bilaterally       LABS  Lab Results (last 24 hours)     Procedure Component Value Units Date/Time    Eosinophil Smear [[id number]] Collected:  07/03/17 1706    Specimen:  Urine from Urine, Clean Catch Updated:  07/06/17 1213     Eosinophil, Urine No Eosinophils Seen %                                         <5% few or none seen       Narrative:       Performed at:  [address] [place] [address]  [id number]  : Anjel Zheng PhD, Phone:  [phone]    POC Glucose Fingerstick [[id number]]  (Abnormal) Collected:  07/06/17 1217    Specimen:  Blood Updated:  07/06/17 1227     Glucose 211 (H) mg/dL     Narrative:       Meter: [id number] : [id number] Gunnar Rankin    Blood Culture [[id number]]  (Normal) Collected:  07/03/17 1203    Specimen:  Blood from Arm, Left Updated:  07/06/17 1301     Blood Culture No growth at 3 days    Blood Culture [[id number]]  (Normal) Collected:  07/03/17 1253    Specimen:  Blood from Arm, Left Updated:  07/06/17 1401     Blood Culture No growth at 3 days    Clostridium Difficile Toxin, PCR [[id number]] Collected:  07/06/17 0817    Specimen:  Stool from Per Rectum Updated:  07/06/17 1504     C. Difficile Toxins by PCR Negative     027 Toxin Presumptive Negative    Narrative:         For In Vitro Diagnostic use only.  027-NAP1-BI results are NOT intended to guide treatment. 027 typing is relative to PCR ribotyping and shown to be equivalent to B1 typing by restriction endonuclease analysis or NAP1 typing by pulse field gel electrophoresis.    POC Glucose Fingerstick [[id number]]  (Abnormal) Collected:  07/06/17 1638    Specimen:  Blood Updated:  07/06/17 1653     Glucose 148 (H) mg/dL     Narrative:       Meter: [id number] : [id number] Rankin    Occult Blood X 1, Stool [[id number]]  (Abnormal) Collected:  07/06/17 0817     Specimen:  Stool from Per Rectum Updated:  07/06/17 2011     Fecal Occult Blood Positive (A)    POC Glucose Fingerstick [[id number]]  (Abnormal) Collected:  07/06/17 2018    Specimen:  Blood Updated:  07/06/17 2024     Glucose 187 (H) mg/dL     Narrative:       Meter: [id number] : [id number] deepthi casanova    CBC & Differential [[id number]] Collected:  07/07/17 0115    Specimen:  Blood Updated:  07/07/17 0212    Narrative:       The following orders were created for panel order CBC & Differential.  Procedure                               Abnormality         Status                     ---------                               -----------         ------                     CBC Auto Differential[[id number]]        Abnormal            Final result                 Please view results for these tests on the individual orders.    CBC Auto Differential [[id number]]  (Abnormal) Collected:  07/07/17 0115    Specimen:  Blood Updated:  07/07/17 0212     WBC 10.57 10*3/mm3      RBC 2.83 (L) 10*6/mm3      Hemoglobin 7.8 (L) g/dL      Hematocrit 25.9 (L) %      MCV 91.5 fL      MCH 27.6 pg      MCHC 30.1 (L) g/dL      RDW 17.7 (H) %      RDW-SD 57.3 (H) fl      MPV 10.8 (H) fL      Platelets 259 10*3/mm3      Neutrophil % 57.5 %      Lymphocyte % 28.9 %      Monocyte % 5.4 %      Eosinophil % 7.2 (H) %      Basophil % 0.6 %      Immature Grans % 0.4 %      Neutrophils, Absolute 6.08 10*3/mm3      Lymphocytes, Absolute 3.06 (H) 10*3/mm3      Monocytes, Absolute 0.57 10*3/mm3      Eosinophils, Absolute 0.76 (H) 10*3/mm3      Basophils, Absolute 0.06 10*3/mm3      Immature Grans, Absolute 0.04 (H) 10*3/mm3     Comprehensive Metabolic Panel [[id number]]  (Abnormal) Collected:  07/07/17 0115    Specimen:  Blood Updated:  07/07/17 0228     Glucose 153 (H) mg/dL      BUN 35 (H) mg/dL      Creatinine 2.70 (H) mg/dL      Sodium 140 mmol/L      Potassium 3.6 mmol/L      Chloride 120 (H) mmol/L      CO2 13.4 (L) mmol/L      Calcium 7.0 (L)  mg/dL      Total Protein 5.0 (L) g/dL      Albumin 2.50 (L) g/dL      ALT (SGPT) 7 (L) U/L      AST (SGOT) 11 U/L      Alkaline Phosphatase 74 U/L       Note New Reference Ranges        Total Bilirubin 0.1 (L) mg/dL      eGFR Non African Amer 18 (L) mL/min/1.73      Globulin 2.5 gm/dL      A/G Ratio 1.0 (L) g/dL      BUN/Creatinine Ratio 13.0     Anion Gap 6.6 mmol/L     Magnesium [[id number]]  (Abnormal) Collected:  07/07/17 0115    Specimen:  Blood Updated:  07/07/17 0228     Magnesium 1.5 (L) mg/dL     Osmolality, Calculated [[id number]]  (Normal) Collected:  07/07/17 0115    Specimen:  Blood Updated:  07/07/17 0228     Osmolality Calc 290.4 mOsm/kg     BNP [[id number]]  (Abnormal) Collected:  07/07/17 0115    Specimen:  Blood Updated:  07/07/17 0235     .0 (H) pg/mL     Stool Culture [[id number]] Collected:  07/06/17 0817    Specimen:  Stool from Per Rectum Updated:  07/07/17 0642     Stool Culture Culture in progress    POC Glucose Fingerstick [[id number]]  (Abnormal) Collected:  07/07/17 0902    Specimen:  Blood Updated:  07/07/17 0909     Glucose 176 (H) mg/dL     Narrative:       Meter: [id number] : [id number] Ismael DALY    Blood Gas, Arterial [[id number]]  (Abnormal) Collected:  07/07/17 1004    Specimen:  Arterial Blood Updated:  07/07/17 1014     Site Arterial: right brachial     Vince's Test N/A     pH, Arterial 7.299 (C) pH units      pCO2, Arterial 31.0 (L) mm Hg      pO2, Arterial 55.6 (L) mm Hg      HCO3, Arterial 14.9 (C) mmol/L      Base Excess, Arterial -10.4 mmol/L      O2 Saturation, Arterial 89.4 (L) %      Hemoglobin, Blood Gas 10.0 (L) g/dL      Hematocrit, Blood Gas 29.0 (L) %      Oxyhemoglobin 86.9 %      Methemoglobin 0.1 %      Carboxyhemoglobin 2.7 %      A-a Gradiant 50.5 mmHg      Temperature 98.6 C      Barometric Pressure for Blood Gas 729 mmHg      Modality Room air     FIO2 21 %           IMAGING  Imaging Results (last 72 hours)     Procedure Component Value Units  Date/Time    XR Chest PA & Lateral [[id number]] Collected:  07/04/17 1745     Updated:  07/04/17 1748    Narrative:       EXAMINATION: XR CHEST PA AND LATERAL-      CLINICAL INDICATION:     cough and wheezing; N17.9-Acute kidney failure,  unspecified; N18.9-Chronic kidney disease, unspecified;  E86.0-Dehydration; J18.9-Pneumonia, unspecified organism     TECHNIQUE:  XR CHEST PA AND LATERAL-      COMPARISON: 07/03/2017      FINDINGS:   Since previous exam, evolving changes of CHF with perihilar airspace  edema.   Bibasilar atelectasis.   Small pleural effusions.   No pneumothorax.   Cardiomegaly.  Stable bony structures.       Impression:       Worsening/evolving changes of CHF with airspace edema.     This report was finalized on 7/4/2017 5:46 PM by Dr. Junior Angel MD.             Assessment/Plan         Electronically signed by: MEAGAN Williamson     Electronically signed by MEAGAN Williamson at 7/7/2017 11:48 AM        Nutrition Notes (most recent note)     No notes of this type exist for this encounter.        Physical Therapy Notes (most recent note)     No notes of this type exist for this encounter.        Occupational Therapy Notes (most recent note)     No notes of this type exist for this encounter.        Speech Language Pathology Notes (most recent note)     No notes of this type exist for this encounter.        Respiratory Therapy Notes (most recent note)     No notes of this type exist for this encounter.               Discharge Summary      Chas More MD at 7/11/2017 11:57 AM          Discharge Summary:    Date of Admission: 7/3/2017  Date of Discharge:  7/11/2017    PCP: Marcelino Sheehan MD      DISCHARGE DIAGNOSIS  Active Problems:    Acute renal failure      SECONDARY DIAGNOSES  Past Medical History:   Diagnosis Date   • Anxiety    • Aortic stenosis    • ASCVD (arteriosclerotic cardiovascular disease)    • Breast cancer    • Chronic pain    • COPD (chronic  obstructive pulmonary disease)    • Diabetic neuropathy    • Dyslipidemia    • Essential hypertension    • Insulin dependent diabetes mellitus    • Left carotid bruit    • Recurrent pneumonia    • Renal failure          CONSULTS   Nephrology and GI    PROCEDURES PERFORMED  EGD and colonoscopy    HOSPITAL COURSE  Patient is a 66 y.o. female presented to [facility] complaining of nausea and had  acute kidney injury on top of chronic kidney disease.  Apparently her baseline creatinine in May 2017 was 2.5.  Upon admission, her creatinine was 3.4.  The patient has been seen by nephrology and her renal function is improving.  Her lisinopril was stopped, nephrology will be contacted to evaluate the patient prior to discharge home today, she still having some nausea today but her creatinine is close to baseline.   The patient also complained of some dark stools and had reportedly been taking approximately 6 Aleve tablets per day.  She underwent EGD and colonoscopy today and her EGD revealed mild gastritis.  Her colonoscopy revealed a sigmoid polyp that was removed and she had no evidence of active bleeding.  Will be discharged on by mouth Protonix.  Had hemoglobin was stable.  She has not required blood during her hospitalization.    Her blood pressure was increasing gradually during the hospital course , lisinopril was stopped, her amlodipine was decreased to 10 mg today.  She was on hydralazine at home.  Initially was held this medication upon admission and restarted yesterday.  Advised to avoid taking any over-the-counter NSAIDs regularly, will evaluate for home oxygen and discharged with home health, patient was seen and examined on the day of discharge, diabetes for discharge more than 35 minutes, advised to stop smoking.  Time was taken to coordinate care with the patient and the nursing staff.      CONDITION ON DISCHARGE  Stable.      VITAL SIGNS  /85 (BP Location: Right arm, Patient Position: Lying)  " Pulse 90  Temp 98.4 °F (36.9 °C) (Oral)   Resp 18  Ht 61\" (154.9 cm)  Wt 115 lb (52.2 kg)  SpO2 94%  BMI 21.73 kg/m2  Objective      DISCHARGE DISPOSITION   [place] [facility]    [place]CHARGE MEDICATIONS   Sara Cardona   Home Medication Instructions CHATO:[id number]    Printed on:07/11/17 2483   Medication Information                      albuterol (PROVENTIL HFA;VENTOLIN HFA) 108 (90 BASE) MCG/ACT inhaler  Inhale 2 puffs every 6 (six) hours as needed for wheezing.             amLODIPine (NORVASC) 10 MG tablet  Take 1 tablet by mouth Daily.             aspirin 81 MG tablet  Take 81 mg by mouth daily             budesonide-formoterol (SYMBICORT) 80-4.5 MCG/ACT inhaler  Inhale 2 puffs 2 (Two) Times a Day.             clonazePAM (KlonoPIN) 0.5 MG tablet  Take 0.5 mg by mouth 2 (Two) Times a Day.             gabapentin (NEURONTIN) 300 MG capsule  Take 300 mg by mouth 3 (Three) Times a Day.             hydrALAZINE (APRESOLINE) 25 MG tablet  Take 25 mg by mouth 2 (Two) Times a Day With Meals.             ondansetron ODT (ZOFRAN-ODT) 4 MG disintegrating tablet  Take 4 mg by mouth Every 4 (Four) Hours As Needed for Nausea or Vomiting.             pantoprazole (PROTONIX) 40 MG EC tablet  Take 1 tablet by mouth Every Morning Before Breakfast.                 DISCHARGE DIET         Dietary Orders            Start     Ordered    07/10/17 1816  Diet Regular; Consistent Carbohydrate  Diet Effective Now     Question Answer Comment   Diet Texture / Consistency Regular    Common Modifiers Consistent Carbohydrate        07/10/17 1815          ACTIVITY AT DISCHARGE  As tolerated   See primary care provider, Marcelino Sheehan MD, in 1-2 weeks  Follow-up with nephrology as an outpatient    Additional Instructions for the Follow-ups that You Need to Schedule     Discharge Follow-up with PCP    As directed    Follow Up Details:  within one week       Discharge Follow-up with Specialty    As directed    Specialty:  nephrology "   Follow Up:  1 Week                 TEST  RESULTS PENDING AT DISCHARGE   Order Current Status    Tissue Exam In process             Chas More MD  07/11/17  11:57 AM     Electronically signed by Chas More MD at 7/11/2017 12:03 PM        Discharge Order     Start     Ordered    07/11/17 1153  Discharge patient  Once     Expected Discharge Date:  07/11/17    Expected Discharge Time:  Evening    Discharge Disposition:  Home-Health Care [facility]        07/11/17 1157

## 2017-07-11 NOTE — PROGRESS NOTES
Discharge Planning Assessment   [place]     Patient Name: Sara Cardona  MRN: [id number]  Today's Date: 7/11/2017    Admit Date: 7/3/2017       Discharge Plan       07/11/17 1420    Final Note    Final Note Pt is being discharged home today. SS received consult for please arrange home health and home oxygen for room air sat 88% and notify home health. SS spoke to pt who has no preference for DME company or home health agency. SS contacted [facility] [phone] per [place] with referral. SS contacted [facility] per [place]. [facility] [employer]. [employer] to deliver portal O2 tank to hospital and O2 concentrator to pt's home. Nurse to call report to [facility]. No other needs identified.         Discharge Placement     Facility/Agency Request Status Selected? Address Phone Number Fax Number    [facility] Accepted     [address] [place] [id number] [phone]        Expected Discharge Date and Time     Expected Discharge Date Expected Discharge Time    Jul 11, 2017           Purnima Bravo

## 2017-07-11 NOTE — DISCHARGE PLACEMENT REQUEST
"Sara Cardona (66 y.o. Female)     Date of Birth Social Security Number Address Home Phone MRN    [id number]  [address] [facility] [place] [address] [phone] [id number]    Yazidism Marital Status          Anabaptism        Admission Date Admission Type Admitting Provider Attending Provider Department, Room/Bed    7/3/17 Emergency Tish Givens DO Grace, Aimee Russell, DO [address], 3327/1P    Discharge Date Discharge Disposition Discharge Destination         Home-Health [facility]             Attending Provider: Tish Givens DO     Allergies:  No Known Allergies    Isolation:  None   Infection:  None   Code Status:  FULL    Ht:  61\" (154.9 cm)   Wt:  115 lb (52.2 kg)    Admission Cmt:  None   Principal Problem:  None                Active Insurance as of 7/3/2017     Primary Coverage     Payor Plan Insurance Group Employer/Plan Group    MEDICARE MEDICARE B ONLY      Payor Plan Address Payor Plan Phone Number Effective From Effective To    [address] [address] [phone] 2015     [place] [address]       Subscriber Name Subscriber Birth Date Member ID       SARA CARDONA 1950 [id number]           Secondary Coverage     Payor Plan Insurance Group Employer/Plan Group    [place]      Payor Plan Address Payor Plan Phone Number Effective From Effective To    [address] [address] [phone] 2016     [place] [address]       Subscriber Name Subscriber Birth Date Member ID       SARA CARDONA 1950 [id number]                 Emergency Contacts      (Rel.) Home Phone Work Phone Mobile Phone    Dulce Neal (Friend) [phone] -- --        [address] [address]  Phone:  [phone]  Fax:          Patient:     Sara Cardona MRN:  [id number]   [address] [place] [address] :  1950  SSN:    Phone: [phone] Sex:  F      INSURANCE PAYOR PLAN GROUP # " SUBSCRIBER ID   Primary:  Secondary: MEDICARE [place] [id number]  [id number]   [id number]  [id number]   Admitting Diagnosis: Acute renal failure [N17.9]  Order Date:  2017               Inpatient Consult to Case Management        (Order ID: [id number])     Diagnosis:         Priority:  Routine Expected Date:   Expiration Date:        Interval:   Count:    Reason for Consult? please arrange home health and home oxygen for room air sat 88%     Specimen Type:   Specimen Source:   Specimen Taken Date:   Specimen Taken Time:                         Verbal Order Mode: Telephone with readback   Authorizing Provider: Chas More MD  Authorizing Provider's NPI: [id number]     Order Entered By: Caryl Ma RN 2017  1:15 PM     Electronically signed by:                  History & Physical      Tish Givens DO at 7/3/2017  2:22 PM               [facility] HOSPITALIST HISTORY AND PHYSICAL    Patient Identification:  Name:  Sara Cardona  Age:  66 y.o.  Sex:  female  :  1950  MRN:  [id number]   Visit Number:  [id number]  Primary Care Physician:  Marcelino Sheehan MD     Chief complaint:   Chief Complaint   Patient presents with   • Diarrhea, nausea, vomiting, illness     History of presenting illness:   Patient is a 66 y.o. female with past medical history significant for arteriosclerotic cardiovascular disease, aortic stenosis, COPD, insulin dependent type II diabetes mellitus, essential hypertension, and anxiety that presented to the [facility] emergency department for evaluation of nausea, vomiting, diarrhea, and generalized illness. Patient states that on 2017 she developed acute onset of nausea, vomiting, and diarrhea. She also reports mid abdominal pain and cramping. Patient states that her symptoms were so severe that she went to her primary care the next day after onset. It was recommended that the patient be admitted to  the hospital for IV fluids. Patient states that she wanted to try to get well at home instead. Patient reports that she had had no diarrhea or further vomiting since 6/30/2017, but has continued to worsen and feel ill. She states that her abdominal pain is still present but much improved, she also states she no longer has abdominal cramping. She denies any change in her appetite, she states she has actually been tolerating PO intake as she normally does. It is noted that she is eating fast food meal during my evaluation. She is still complaining of waxing and waning severe nausea and GI upset.  Patient denies any chest pain or shortness of breath. She denies any cough or sputum. However, she does report increased phlegm production since time of onset. She denies any urinary symptoms. She denies use of home oxygen.    Patient states that she checks her blood glucose regularly and states that her average is 107-135. She states that she takes oral medications and is prescribed insulin. She states that she has not had to have an insulin injection in approximately one month. Patient states that she does not regularly check her blood pressure at home, but states that her nephrologist recently took her off of lisinopril. However, patient states that she did not follow physician's instructions and continued to take lisinopril daily as she was afraid to stop taking it all of a sudden. She states she was under the impression the lisinopril was to be discontinued for further kidney studies that she is unsure of.     Upon arrival to the ED, vitals were temperature 98.3, HR 82, RR 16-20, and //86, and oxygen 98 % on room air. CMP with glucose 151, chloride 115, creatinine 3.49, BUN 46, calcium 7.5, and eGFR 13. Amylase and lipase both WNL. CBC with hemoglobin 10.9 and hematocrit 34.7. Lactate WNL. Initial ABG with pH 7.287, pCO2 32.2, pO2 50.7, HCO3 15.0, and oxygen saturation of 86.2% on room air. Urinalysis with  cloudy appearance, 1+ glucose, and 3+ proteinuria. While in the ED patient was started on rocephin and doxycycline. She was also given two 500 ml bolus of normal saline and started on continuous infusion.     Patient has been admitted to the medical telemetry floor for further evaluation and treatment.   ---------------------------------------------------------------------------------------------------------------------   Review of Systems   Constitutional: Negative for appetite change, chills, diaphoresis, fatigue and fever.   HENT: Negative for congestion, postnasal drip, sinus pressure, sneezing and sore throat.    Eyes: Negative for pain and visual disturbance.   Respiratory: Negative for cough, shortness of breath and wheezing.    Cardiovascular: Negative for chest pain, palpitations and leg swelling.   Gastrointestinal: Positive for abdominal pain, diarrhea, nausea and vomiting.   Endocrine: Negative for cold intolerance.   Genitourinary: Negative for dysuria, enuresis, flank pain, frequency, hematuria and urgency.   Musculoskeletal: Negative for arthralgias, back pain and myalgias.   Skin: Negative for rash and wound.   Allergic/Immunologic: Negative for environmental allergies and immunocompromised state.   Neurological: Negative for dizziness, syncope and weakness.   Hematological: Negative for adenopathy. Does not bruise/bleed easily.   Psychiatric/Behavioral: Negative for confusion and decreased concentration.      ---------------------------------------------------------------------------------------------------------------------   Past Medical History:   Diagnosis Date   • Anxiety    • Aortic stenosis    • ASCVD (arteriosclerotic cardiovascular disease)    • Breast cancer    • Chronic pain    • COPD (chronic obstructive pulmonary disease)    • Diabetic neuropathy    • Dyslipidemia    • Essential hypertension    • Insulin dependent diabetes mellitus    • Left carotid bruit    • Recurrent pneumonia    •  Renal failure      Past Surgical History:   Procedure Laterality Date   • APPENDECTOMY     • CARDIAC CATHETERIZATION     • COLONOSCOPY     • HYSTERECTOMY     • MASTECTOMY Right    • RHINOPLASTY       Family History   Problem Relation Age of Onset   • Diabetes Mother    • Lung cancer Father      Social History     Social History   • Marital status:      Spouse name: N/A   • Number of children: N/A   • Years of education: N/A     Social History Main Topics   • Smoking status: Current Every Day Smoker     Packs/day: 1.00     Types: Cigarettes   • Smokeless tobacco: None   • Alcohol use No   • Drug use: No   • Sexual activity: Defer     Other Topics Concern   • None     Social History Narrative   • None     ---------------------------------------------------------------------------------------------------------------------   Allergies:  Review of patient's allergies indicates no known allergies.  ---------------------------------------------------------------------------------------------------------------------   Prior to Admission Medications     Prescriptions Last Dose Informant Patient Reported? Taking?    acetaminophen (TYLENOL) 500 MG tablet   Yes No    Take 500 mg by mouth as needed for mild pain (1-3)    albuterol (PROVENTIL HFA;VENTOLIN HFA) 108 (90 BASE) MCG/ACT inhaler 7/3/2017  No Yes    Inhale 2 puffs every 6 (six) hours as needed for wheezing.    amLODIPine (NORVASC) 10 MG tablet 7/3/2017  No Yes    Take 1 tablet by mouth daily.    aspirin 81 MG tablet 7/3/2017  Yes Yes    Take 81 mg by mouth daily    atorvastatin (LIPITOR) 80 MG tablet 7/3/2017  Yes Yes    Take 80 mg by mouth daily    clonazePAM (KlonoPIN) 0.5 MG tablet 7/3/2017  Yes Yes    Take 0.5 mg by mouth 2 (two) times a day as needed for anxiety or seizures.    cloNIDine (CATAPRES) 0.1 MG tablet 7/3/2017  No Yes    Take 1 tablet by mouth every 12 (twelve) hours.    erythromycin (ROMYCIN) 5 MG/GM ophthalmic ointment   No No    Administer  to  the right eye Every 4 (Four) Hours While Awake.    hydrALAZINE (APRESOLINE) 10 MG tablet   Yes Yes    Take 10 mg by mouth 3 (Three) Times a Day.    insulin glargine (LANTUS) 100 UNIT/ML injection 7/3/2017  Yes Yes    Inject 35 Units under the skin every night    isosorbide mononitrate (IMDUR) 30 MG 24 hr tablet   Yes No    Take 30 mg by mouth daily    lisinopril (PRINIVIL,ZESTRIL) 40 MG tablet   Yes Yes    Take 40 mg by mouth Daily.    metaxalone (SKELAXIN) 800 MG tablet   No No    Take 1 tablet by mouth 3 (three) times a day as needed for muscle spasms.    metoprolol tartrate (LOPRESSOR) 25 MG tablet   No No    Take 2 tablets by mouth 2 (two) times a day.    nabumetone (RELAFEN) 750 MG tablet   No No    Take 1 tablet by mouth 2 (two) times a day as needed for moderate pain (4-6).        Hospital Scheduled Meds:    [START ON 7/4/2017] doxycycline 100 mg Intravenous Q12H   heparin (porcine) 5,000 Units Subcutaneous Q12H       Pharmacy to Dose Zosyn     sodium chloride 125 mL/hr Last Rate: 125 mL/hr (07/03/17 1325)     ---------------------------------------------------------------------------------------------------------------------   Vital Signs:  Temp:  [98.3 °F (36.8 °C)-98.5 °F (36.9 °C)] 98.3 °F (36.8 °C)  Heart Rate:  [82-89] 88  Resp:  [16-20] 18  BP: (117-196)/() 182/84  Last 3 weights    07/03/17  0950   Weight: 115 lb (52.2 kg)     Body mass index is 21.73 kg/(m^2).  SpO2 Readings from Last 3 Encounters:   07/03/17 97%   02/22/17 99%   08/15/16 94%     ---------------------------------------------------------------------------------------------------------------------   Physical Exam:  Constitutional:  Well-developed and well-nourished.  No respiratory distress.  Nasal cannula in place.     HENT:  Head: Normocephalic and atraumatic.  Mouth:  Moist mucous membranes.    Eyes:  Conjunctivae and EOM are normal.  Pupils are equal, round, and reactive to light.  No scleral icterus.  Neck:  Neck supple.  No  JVD present.    Cardiovascular:  Normal rate, regular rhythm and normal heart sounds with no murmur.  Pulmonary/Chest:  No respiratory distress, no wheezes, no crackles, with normal breath sounds and good air movement.  Abdominal:  Soft.  Bowel sounds are normal.  No distension. Mild generalized tenderness to palpation.   Musculoskeletal:  No edema, no tenderness, and no deformity.  No red or swollen joints anywhere.    Neurological:  Alert and oriented to person, place, and time.  No cranial nerve deficit.  No tongue deviation.  No facial droop.  No slurred speech.   Skin:  Skin is warm and dry.  No rash noted.  No pallor.   Psychiatric:  Normal mood and affect.  Behavior is normal.  Judgment and thought content normal.   Peripheral vascular:  Trace edema bilateral lower extremities and strong pulses on all 4 extremities.  Genitourinary: No perales catheter in place, making urine.   ---------------------------------------------------------------------------------------------------------------------  EKG:    Reviewed.    Telemetry:    Patient not yet on telemetry.     I have personally reviewed the EKG/Telemetry strip  ---------------------------------------------------------------------------------------------------------------------     Results from last 7 days  Lab Units 07/03/17  1158 07/03/17  1039   LACTATE mmol/L 0.5  --    WBC 10*3/mm3  --  8.67   HEMOGLOBIN g/dL  --  10.9*   HEMATOCRIT %  --  34.7*   MCV fL  --  85.9   MCHC g/dL  --  31.4*   PLATELETS 10*3/mm3  --  268       Results from last 7 days  Lab Units 07/03/17  1216   PH, ARTERIAL pH units 7.287*   PO2 ART mm Hg 50.7*   PCO2, ARTERIAL mm Hg 32.2*   HCO3 ART mmol/L 15.0*       Results from last 7 days  Lab Units 07/03/17  1039   SODIUM mmol/L 140   POTASSIUM mmol/L 3.8   MAGNESIUM mg/dL 1.9   CHLORIDE mmol/L 115*   CO2 mmol/L 17.5*   BUN mg/dL 46*   CREATININE mg/dL 3.49*   EGFR IF NONAFRICN AM mL/min/1.73 13*   CALCIUM mg/dL 7.5*   GLUCOSE mg/dL 151*    ALBUMIN g/dL 3.50   BILIRUBIN mg/dL 0.1*   ALK PHOS U/L 94   AST (SGOT) U/L 14   ALT (SGPT) U/L 11   Estimated Creatinine Clearance: 12 mL/min (by C-G formula based on Cr of 3.49).  No results found for: AMMONIA          Lab Results   Component Value Date    HGBA1C 6.80 (H) 08/09/2016     Lab Results   Component Value Date    TSH 0.188 (L) 08/03/2015    FREET4 0.94 08/03/2015     No results found for: PREGTESTUR, PREGSERUM, HCG, HCGQUANT  Pain Management Panel     Pain Management Panel Latest Ref Rng & Units 8/9/2016 8/8/2016    CREATININE UR mg/dL 51.6 150.7    AMPHETAMINES SCREEN, URINE Negative - Negative    BARBITURATES SCREEN Negative - Negative    BENZODIAZEPINE SCREEN, URINE Negative - Negative    COCAINE SCREEN, URINE Negative - Negative    METHADONE SCREEN, URINE Negative - Negative      I have personally reviewed the above laboratory results.   ---------------------------------------------------------------------------------------------------------------------  Imaging Results (last 7 days)     Procedure Component Value Units Date/Time    XR Abdomen 2 View With Chest 1 View [[id number]] Collected:  07/03/17 1134     Updated:  07/03/17 1137    Narrative:       FINDINGS:   Cardiomegaly persists. Mild vascular congestion. No airspace edema.   Right lower lung pneumonia has resolved. Mild left basilar airspace  disease may represent pneumonia or atelectasis.  No pneumothorax.   Trace pleural effusions.   No acute osseous findings.  Mild constipation. Nonobstructive bowel gas pattern. Calcification  projects in the region of the left kidney and may represent  nonobstructing stone.    Impression:       1. Left basilar airspace disease.  2. Stable cardiomegaly with mild CHF.  3. Nonobstructive bowel gas pattern with changes of mild constipation.  4. Possible nonobstructing stone in the region of left kidney.     This report was finalized on 7/3/2017 11:35 AM by Dr. Junior Angel MD.    CT Abdomen Pelvis Without  Contrast [[id number]] Collected:  07/03/17 1252     Updated:  07/03/17 1258    Narrative:       EXAM: CT ABDOMEN PELVIS WO CONTRAST-   Findings  LOWER THORAX:   Cardiomegaly. Chronic interstitial lung changes. Interstitial edema  noted. Trace right pleural effusion which is nonloculated. Right middle  lobe atelectasis and/or scarring. Dense coronary vascular  calcifications.  ABDOMEN:  Unenhanced liver is within normal limits. Gallstones are noted. The  unenhanced spleen and pancreas appear within normal limits. Large  nonobstructing stone lower pole of the left kidney stable from previous  exam and is 9 mm. Right kidney appears within normal limits. The  unenhanced adrenals are unremarkable. Moderate to large amount of stool  throughout colon to level of cecum consistent with moderate-advanced  chronic constipation. Resolution of inflammatory changes of the right  colon since the previous exam. No intra-abdominal free fluid. No free  air. No adenopathy by CT size criteria. Dense advanced calcified  atherosclerotic changes abdominal arterial vasculature without aneurysm.  Body wall edema/anasarca is noted.  PELVIS:  Sigmoid diverticulosis noted but without evidence of acute  diverticulitis. No pelvic free fluid or adenopathy. Prior hysterectomy.  Prior appendectomy. No distal ureteral stones or urinary bladder stones.  BONES:   No acute bony abnormality.    Impression:       Impression:  1. CHF/edema involving the partially imaged lower chest with right  middle lobe airspace disease likely atelectasis.  2. Chronic constipation and changes of sigmoid diverticulosis without  evidence of diverticulitis.  3. Resolution of previously described colitis.  4. Large nonobstructing stone left kidney.  5. Cholelithiasis.     This report was finalized on 7/3/2017 12:56 PM by Dr. Junior Angel MD.      I have personally reviewed the above radiology results.    ---------------------------------------------------------------------------------------------------------------------  Assessment and Plan:  -Acute on chronic stage IV renal failure with baseline creatinine of 1.2-1.7 and creatinine on admission of 3.49  -Acute hypoxic respiratory failure   -Questionable left basilar and right middle lobe pneumonia/airspace disease with possible aspiration component due to emesis  -Acute metabolic acidosis   -Essential hypertension   -Hyperlipidemia   -History of coronary artery disease  -Insulin dependent type II diabetes mellitus with hyperglycemia   -Diabetic neuropathy   -COPD without acute exacerbation   -History of aortic stenosis   -Prolonged QTc interval 474 m/s  -Anxiety   -Chronic pain syndrome   -Tobacco smoking addiction     Patient has been admitted to the medical telemetry floor for further evaluation and treatment. Plan to continue gentle IV fluid hydration, will be careful to avoid large fluid boluses as patient's BNP is elevated at 500 and slight edema on exam. I have ordered an echo to further evaluate.  Will hold home lisinopril. Continue to avoid nephrotoxic agents. Renal ultrasound and urine electrolytes ordered and pending. Continue supplemental oxygen to titrate SpO2 > 90%. Repeat ABG has been ordered to further evaluate acidosis. PRN nebulizer treatments made available for shortness of breath. Due to radiology findings suggestive of airspace disease/pneumonia, patient has been empirically started on doxycycline and zosyn with pharmacy dosing to cover possible aspiration during emesis. Blood cultures pending, will order respiratory culture. Mycoplasma and legionella testing ordered and pending, once negative will discontinue doxycycline.     Plan to continue patient's home blood pressure medications with hold parameters. Continue to avoid QTc prolonging agents, will repeat EKG in the morning. Trend cardiacs to rule out infarction/ischemia. Patient has been  placed on low dose sliding scale insulin with accuchecks for now, will hold oral agents for now. Hemoglobin A1C level ordered, will titrate therapy as necessary.     Will repeat labs in the morning and continue to monitor the patient closely on telemetry.       DVT Prophylaxis: SQ Heparin     The patient is considered to be a high risk patient due to: Acute on chronic renal failure, acute hypoxic respiratory failure, metabolic acidosis, CAD, insulin dependent type II diabetes mellitus, aortic stenosis, tobacco smoking addiction.    I have discussed the patient's assessment and plan with the patient and the patient's .     MEAGAN Jasso  07/03/17  3:12 PM  ---------------------------------------------------------------------------------------------------------------------     *I have discussed with MEAGAN Jasso and agree with her assessment. I have edited/amended this note to reflect my findings and recommendations.     Electronically signed by Tish Givens DO at 7/4/2017  5:29 PM        Vital Signs (last 72 hrs)       07/08 0700  -  07/09 0659 07/09 0700  -  07/10 0659 07/10 0700  -  07/11 0659 07/11 0700  -  07/11 1320   Most Recent    Temp (°F) 97.7 -  99.2    97.6 -  98.5    97.4 -  98.8    98.3 -  98.4     98.3 (36.8)    Heart Rate 80 -  96    82 -  96    85 -  101    90 -  95     95    Resp 18 -  25    18 -  20    19 -  24    16 -  18     16    /98 -  (!) 193/97    124/65 -  (!) 196/98    124/78 -  (!) 183/81    107/88 -  151/85     107/88    SpO2 (%) 96 -  97    91 -  96    (!)80 -  97      91     91

## 2017-07-11 NOTE — SIGNIFICANT NOTE
She has out to smoke as always. Unable to assess. I had ordered one dose of epogen today and also resumed home dose of hydralazine  Ok to dc today and f/u Dr Wheeler in 1 week with BMP this friday

## 2017-07-11 NOTE — PROGRESS NOTES
Gastroenterology Progress Note    : 1950    Reason for follow-up: blood in stool    Subjective     Interval History:  Sara Cardona is a 66 y.o. female who was admitted to the hospital due to acute renal failure on 7/3/2017. She is being followed by GI due to blood in stool. Patient reports dramatic improvement in symptoms since yesterday. Currently, she denies abdominal pain, nausea, vomiting or rectal bleeding. She is feeling much better.     EGD and colonoscopy by Dr. Suarez yesterday (2017):  Gastritis, mild  Colonic diverticulosis  Sigmoid polyp, removed  No bleeding was observed     Current Hgb 8.8.    Review of Systems   Constitutional: Positive for fatigue.   Respiratory: Negative for shortness of breath.    Cardiovascular: Negative for chest pain.   Gastrointestinal: Negative for abdominal pain, anal bleeding, blood in stool, nausea and vomiting.   Musculoskeletal: Positive for arthralgias.   Neurological: Positive for weakness.       Past medical history, surgical history, family history and social history have been reviewed and remain unchanged since initial consultation.    Objective       Current Facility-Administered Medications:   •  albuterol (PROVENTIL) nebulizer solution 0.083% 2.5 mg/3mL, 2.5 mg, Nebulization, Q6H PRN, MEAGAN Jasso  •  amLODIPine (NORVASC) tablet 10 mg, 10 mg, Oral, Q24H, Tish Givens DO, 10 mg at 17 0848  •  aspirin EC tablet 81 mg, 81 mg, Oral, Daily, MEAGAN Jasso, 81 mg at 17 0849  •  benzonatate (TESSALON) capsule 100 mg, 100 mg, Oral, TID PRN, Tish Givens DO, 100 mg at 17 0959  •  budesonide-formoterol (SYMBICORT) 80-4.5 MCG/ACT inhaler 2 puff, 2 puff, Inhalation, BID - RT, MEAGAN Jasso, 2 puff at 07/10/17 1830  •  calcitriol (ROCALTROL) capsule 0.5 mcg, 0.5 mcg, Oral, Daily, Jai Chavez MD, 0.5 mcg at 17 0849  •  clonazePAM (KlonoPIN) tablet 0.5 mg, 0.5 mg, Oral, BID PRN, Tish Givens DO,  0.5 mg at 07/10/17 2135  •  dextrose (D50W) solution 25 g, 25 g, Intravenous, Q15 Min PRN, MEAGAN Jasso  •  dextrose (GLUTOSE) oral gel 15 g, 15 g, Oral, Q15 Min PRN, MEAGAN Jasso  •  gabapentin (NEURONTIN) capsule 100 mg, 100 mg, Oral, Nightly PRN, Tish Givens DO, 100 mg at 07/11/17 0205  •  gabapentin (NEURONTIN) capsule 200 mg, 200 mg, Oral, TID PRN, MEAGAN Jasso, 100 mg at 07/11/17 1004  •  glucagon (human recombinant) (GLUCAGEN DIAGNOSTIC) injection 1 mg, 1 mg, Subcutaneous, Q15 Min PRN, MEAGAN Jasso  •  heparin (porcine) 5000 UNIT/ML injection 5,000 Units, 5,000 Units, Subcutaneous, Q12H, Tish Givens DO, 5,000 Units at 07/11/17 0849  •  hydrALAZINE (APRESOLINE) injection 10 mg, 10 mg, Intravenous, Q6H PRN, Tish Givens DO, 10 mg at 07/09/17 2102  •  hydrALAZINE (APRESOLINE) tablet 25 mg, 25 mg, Oral, Q12H, Jai Chavez MD, 25 mg at 07/11/17 0849  •  insulin aspart (novoLOG) injection 0-7 Units, 0-7 Units, Subcutaneous, 4x Daily AC & at Bedtime, MEAGAN Jasso, 2 Units at 07/09/17 1748  •  ipratropium-albuterol (DUO-NEB) nebulizer solution 3 mL, 3 mL, Nebulization, Q6H PRN, MEAGAN Jasso, 3 mL at 07/10/17 1115  •  loperamide (IMODIUM) capsule 2 mg, 2 mg, Oral, 4x Daily PRN, Tish Givens DO, 2 mg at 07/08/17 1313  •  nicotine (NICODERM CQ) 21 MG/24HR patch 1 patch, 1 patch, Transdermal, Q24H, Tish Givens DO, 1 patch at 07/10/17 2138  •  pantoprazole (PROTONIX) EC tablet 40 mg, 40 mg, Oral, QAM AC, Tish Givens, , 40 mg at 07/11/17 0849  •  promethazine (PHENERGAN) tablet 12.5 mg, 12.5 mg, Oral, Q8H PRN, Linda Pool MD, 12.5 mg at 07/08/17 1732  •  sodium bicarbonate tablet 1,300 mg, 1,300 mg, Oral, TID, Jai Chavez MD, 1,300 mg at 07/11/17 0849  •  sodium chloride 0.9 % flush 1-10 mL, 1-10 mL, Intravenous, PRN, Tish Givens DO  •  Insert peripheral IV, , , Once **AND** sodium chloride 0.9 % flush 10 mL,  10 mL, Intravenous, PRN, MEAGAN Yoder    Allergies:   Review of patient's allergies indicates no known allergies.    Vital Signs:  Temp:  [97.4 °F (36.3 °C)-98.8 °F (37.1 °C)] 98.4 °F (36.9 °C)  Heart Rate:  [87-95] 90  Resp:  [18-24] 18  BP: (139-182)/() 151/85  Body mass index is 21.73 kg/(m^2).    Intake/Output Summary (Last 24 hours) at 07/11/17 1013  Last data filed at 07/11/17 0300   Gross per 24 hour   Intake             1640 ml   Output                0 ml   Net             1640 ml          Physical Exam   Constitutional: She is oriented to person, place, and time. She appears well-developed. No distress.   Lying comfortably in bed sleeping   HENT:   Head: Normocephalic and atraumatic.   Nose: Nose normal.   Mouth/Throat: Oropharynx is clear and moist.   Eyes: Conjunctivae are normal. Right eye exhibits no discharge. Left eye exhibits no discharge. No scleral icterus.   Neck: Normal range of motion. No JVD present.   Cardiovascular: Normal rate, regular rhythm and normal heart sounds.  Exam reveals no gallop and no friction rub.    No murmur heard.  Pulmonary/Chest: Effort normal and breath sounds normal. No respiratory distress. She has no wheezes. She has no rales. She exhibits no tenderness.   Abdominal: Soft. Bowel sounds are normal. She exhibits no mass. There is no tenderness.   Musculoskeletal: Normal range of motion. She exhibits no edema or deformity.   Neurological: She is alert and oriented to person, place, and time. Coordination normal.   Skin: Skin is warm and dry. No rash noted. She is not diaphoretic. No erythema.   Psychiatric: She has a normal mood and affect. Her behavior is normal. Judgment and thought content normal.   Arouses from sleep upon verbal greeting. Pleasant. Cooperative with exam and answers questions appropriately.   Vitals reviewed.       Results Review:  I reviewed the patient's new clinical results including all available labs and radiology reports.    Hgb  trend:  Results for IRINA BOX (MRN [id number]) as of 7/11/2017 10:15   Ref. Range 7/3/2017 10:39 7/4/2017 02:40 7/5/2017 01:16 7/6/2017 01:01 7/7/2017 01:15   Hemoglobin Latest Ref Range: 12.0 - 16.0 g/dL 10.9 (L) 8.8 (L) 9.4 (L) 9.0 (L) 7.8 (L)     7/8/2017 05:09 7/9/2017 01:46 7/10/2017 02:00 7/11/2017 01:51   8.3 (L) 9.5 (L) 8.6 (L) 8.8 (L)       Lab Results (last 24 hours)     Procedure Component Value Units Date/Time    POC Glucose Fingerstick [[id number]]  (Abnormal) Collected:  07/10/17 1207    Specimen:  Blood Updated:  07/10/17 1214     Glucose 136 (H) mg/dL     Narrative:       Meter: [id number] : [id number] telly winter    Tissue Exam [[id number]] Collected:  07/10/17 1034    Specimen:  Tissue from Small Intestine, Duodenum; Tissue from Gastric, Antrum; Tissue from Large Intestine; Polyp from Large Intestine, Sigmoid Colon Updated:  07/10/17 1319    POC Glucose Fingerstick [[id number]]  (Abnormal) Collected:  07/10/17 1651    Specimen:  Blood Updated:  07/10/17 1658     Glucose 238 (H) mg/dL     Narrative:       Meter: [id number] : [id number] telly winter    POC Glucose Fingerstick [[id number]]  (Abnormal) Collected:  07/10/17 2059    Specimen:  Blood Updated:  07/10/17 2106     Glucose 132 (H) mg/dL     Narrative:       Meter: [id number] : [id number] lena sprague    CBC & Differential [[id number]] Collected:  07/11/17 0151    Specimen:  Blood Updated:  07/11/17 0233    Narrative:       The following orders were created for panel order CBC & Differential.  Procedure                               Abnormality         Status                     ---------                               -----------         ------                     CBC Auto Differential[[id number]]        Abnormal            Final result                 Please view results for these tests on the individual orders.    CBC Auto Differential [[id number]]  (Abnormal) Collected:  07/11/17 0151    Specimen:  Blood Updated:  07/11/17  0233     WBC 11.86 10*3/mm3      RBC 3.26 (L) 10*6/mm3      Hemoglobin 8.8 (L) g/dL      Hematocrit 29.8 (L) %      MCV 91.4 fL      MCH 27.0 pg      MCHC 29.5 (L) g/dL      RDW 17.7 (H) %      RDW-SD 57.9 (H) fl      MPV 10.6 (H) fL      Platelets 234 10*3/mm3      Neutrophil % 65.3 %      Lymphocyte % 23.3 %      Monocyte % 6.0 %      Eosinophil % 4.8 %      Basophil % 0.3 %      Immature Grans % 0.3 %      Neutrophils, Absolute 7.75 (H) 10*3/mm3      Lymphocytes, Absolute 2.76 10*3/mm3      Monocytes, Absolute 0.71 10*3/mm3      Eosinophils, Absolute 0.57 10*3/mm3      Basophils, Absolute 0.04 10*3/mm3      Immature Grans, Absolute 0.03 10*3/mm3     Comprehensive Metabolic Panel [[id number]]  (Abnormal) Collected:  07/11/17 0151    Specimen:  Blood Updated:  07/11/17 0304     Glucose 140 (H) mg/dL      BUN 31 (H) mg/dL      Creatinine 2.63 (H) mg/dL      Sodium 140 mmol/L      Potassium 4.0 mmol/L      Chloride 113 (H) mmol/L      CO2 22.4 (L) mmol/L      Calcium 7.8 mg/dL      Total Protein 5.6 (L) g/dL      Albumin 3.10 (L) g/dL      ALT (SGPT) 11 U/L      AST (SGOT) 18 U/L      Alkaline Phosphatase 102 U/L       Note New Reference Ranges        Total Bilirubin 0.1 (L) mg/dL      eGFR Non African Amer 18 (L) mL/min/1.73      Globulin 2.5 gm/dL      A/G Ratio 1.2 (L) g/dL      BUN/Creatinine Ratio 11.8     Anion Gap 4.6 mmol/L     Osmolality, Calculated [[id number]]  (Normal) Collected:  07/11/17 0151    Specimen:  Blood Updated:  07/11/17 0304     Osmolality Calc 288.2 mOsm/kg     POC Glucose Fingerstick [[id number]]  (Abnormal) Collected:  07/11/17 0749    Specimen:  Blood Updated:  07/11/17 0757     Glucose 133 (H) mg/dL     Narrative:       Meter: [id number] : [id number] Vito GANNON          Imaging Results (last 72 hours)     Procedure Component Value Units Date/Time    XR Chest AP [[id number]] Collected:  07/09/17 0801     Updated:  07/09/17 0804    Narrative:       EXAMINATION: XR CHEST AP-       CLINICAL INDICATION:     SEDATION IN INTUBATED PATIENTS     TECHNIQUE:  XR CHEST AP-      COMPARISON: 07/04/2017      FINDINGS:   Persistent bibasilar airspace disease may represent combination  atelectasis and/or pneumonia. Overall slight decreased since the  previous exam.  Cardiomegaly noted. Interstitial edema improved. Pulmonary vascular  congestion improved.   Stable small effusions.   No pneumothorax.   No acute osseous findings.            Impression:       1. Overall slight improvement in changes of CHF/edema with bibasilar  airspace disease.     This report was finalized on 7/9/2017 8:02 AM by Dr. Junior Angel MD.       CT Chest Without Contrast [[id number]] Collected:  07/10/17 0703     Updated:  07/10/17 0705    Narrative:       CT CHEST WO CONTRAST-        TECHNIQUE: Multiple axial CT images were obtained from lung apex through  upper abdomen without administration of IV contrast. Reformatted images  in the coronal and/or sagittal plane(s) were generated from the axial  data set to facilitate diagnostic accuracy and/or surgical planning.  Oral Contrast:NONE.     Radiation dose reduction techniques were utilized per ALARA protocol.  Automated exposure control was initiated through either or Knexx[employer] or  DoseRight software packages by  protocol.          [id number] mGy.cm  Clinical information  evaluate for pneumothorax; R19.5-Other fecal abnormalities; N17.9-Acute  kidney failure, unspecified; N18.9-Chronic kidney disease, unspecified;  E86.0-Dehydration; J18.9-Pneumonia, unspecified organism;  R71.0-Precipitous drop in hematocrit; R11.2-Nausea with vomiting,  unspecified; R19.7-Diarrhea, unspecified; R10.13-Epigastric pain      Comparison  NONE.     Findings  LUNGS: BIBASILAR LUNG ATELECTASIS WITH SOME GROUNDGLASS ATTENUATION AND  MINIMAL CONSOLIDATION AT THE LEFT LUNG BASE.     HEART: CARDIOMEGALY. CORONARY ARTERY CALCIFICATIONS.     PERICARDIUM: No effusion.     MEDIASTINUM: No masses.  No enlarged lymph nodes.  No fluid collections.     PLEURA: SMALL BILATERAL PLEURAL EFFUSIONS.     MAJOR AIRWAYS: Clear. No intrinsic mass.     VASCULATURE: No evidence of aneurysm.     VISUALIZED UPPER ABDOMEN:        LIVER: Homogeneous. No focal hepatic mass or ductal dilatation.        SPLEEN: Homogeneous. No splenomegaly.        ADRENALS: No mass.        KIDNEYS: No mass. No obstructive uropathy.  No evidence of  urolithiasis.        GI TRACT: Non-dilated. No definite wall thickening.        PERITONEUM: No free air. No free fluid or loculated fluid  collections.           ABDOMINAL WALL: No focal hernia or mass.           OTHER: None.     BONES: No acute bony abnormality.       Impression:       Impression:  Probable congestive failure.     This report was finalized on 7/10/2017 7:03 AM by Dr. Andres Wu MD.               Assessment/Plan     Assessment:  Acute renal failure  Anemia, Hgb 8.8  Gastritis  Diverticulosis  Sigmoid colon polyp    Plan:  · Patient reports feeling much better. Hgb/HCT stable. Denies rectal bleeding. Patient to have repeat colorectal screening colonoscopy based upon pathology results. She will call to check on these results in a few days. GI will sign off.     I discussed the patients findings and my recommendations with patient.    Steph Law PA-C      Electronically signed 7/11/2017 at 1:41 PM.

## 2017-07-24 ENCOUNTER — HOSPITAL ENCOUNTER (INPATIENT)
Facility: HOSPITAL | Age: 67
LOS: 16 days | Discharge: HOME | End: 2017-08-09
Attending: EMERGENCY MEDICINE | Admitting: HOSPITALIST

## 2017-07-24 ENCOUNTER — APPOINTMENT (OUTPATIENT)
Dept: GENERAL RADIOLOGY | Facility: HOSPITAL | Age: 67
End: 2017-07-24

## 2017-07-24 DIAGNOSIS — N17.9 ACUTE ON CHRONIC RENAL FAILURE (HCC): Primary | ICD-10-CM

## 2017-07-24 DIAGNOSIS — N18.9 ACUTE ON CHRONIC RENAL FAILURE (HCC): Primary | ICD-10-CM

## 2017-07-24 PROBLEM — I50.9 CHF (CONGESTIVE HEART FAILURE) (HCC): Status: ACTIVE | Noted: 2017-07-24

## 2017-07-24 LAB
A-A DO2: 245.5 MMHG (ref 0–300)
A-A DO2: 88.6 MMHG (ref 0–300)
ACETONE BLD QL: NEGATIVE
ALBUMIN SERPL-MCNC: 3.8 G/DL (ref 3.4–4.8)
ALBUMIN/GLOB SERPL: 1.1 G/DL (ref 1.5–2.5)
ALP SERPL-CCNC: 132 U/L (ref 35–104)
ALT SERPL W P-5'-P-CCNC: 17 U/L (ref 10–36)
ANION GAP SERPL CALCULATED.3IONS-SCNC: 15.1 MMOL/L (ref 3.6–11.2)
ARTERIAL PATENCY WRIST A: ABNORMAL
ARTERIAL PATENCY WRIST A: POSITIVE
AST SERPL-CCNC: 18 U/L (ref 10–30)
ATMOSPHERIC PRESS: 725 MMHG
ATMOSPHERIC PRESS: 725 MMHG
BACTERIA UR QL AUTO: ABNORMAL /HPF
BASE EXCESS BLDA CALC-SCNC: -7.6 MMOL/L
BASE EXCESS BLDA CALC-SCNC: -9.9 MMOL/L
BASOPHILS # BLD AUTO: 0.05 10*3/MM3 (ref 0–0.3)
BASOPHILS NFR BLD AUTO: 0.5 % (ref 0–2)
BDY SITE: ABNORMAL
BDY SITE: ABNORMAL
BILIRUB SERPL-MCNC: 0.2 MG/DL (ref 0.2–1.8)
BILIRUB UR QL STRIP: NEGATIVE
BNP SERPL-MCNC: 624 PG/ML (ref 0–100)
BODY TEMPERATURE: 98.6 C
BODY TEMPERATURE: 98.6 C
BUN BLD-MCNC: 55 MG/DL (ref 7–21)
BUN/CREAT SERPL: 17.7 (ref 7–25)
CALCIUM SPEC-SCNC: 8 MG/DL (ref 7.7–10)
CHLORIDE SERPL-SCNC: 114 MMOL/L (ref 99–112)
CK MB SERPL-CCNC: 5.64 NG/ML (ref 0–5)
CK MB SERPL-CCNC: 6.17 NG/ML (ref 0–5)
CK MB SERPL-RTO: 6.3 % (ref 0–3)
CK MB SERPL-RTO: 6.5 % (ref 0–3)
CK SERPL-CCNC: 90 U/L (ref 24–173)
CK SERPL-CCNC: 95 U/L (ref 24–173)
CLARITY UR: CLEAR
CO2 SERPL-SCNC: 11.9 MMOL/L (ref 24.3–31.9)
COHGB MFR BLD: 1.8 % (ref 0–5)
COHGB MFR BLD: 2.9 % (ref 0–5)
COLOR UR: YELLOW
CREAT BLD-MCNC: 3.1 MG/DL (ref 0.43–1.29)
CRP SERPL-MCNC: 2.87 MG/DL (ref 0–0.99)
D-LACTATE SERPL-SCNC: 0.7 MMOL/L (ref 0.5–2)
DEPRECATED RDW RBC AUTO: 65.7 FL (ref 37–54)
EOSINOPHIL # BLD AUTO: 0.37 10*3/MM3 (ref 0–0.7)
EOSINOPHIL NFR BLD AUTO: 3.8 % (ref 0–7)
ERYTHROCYTE [DISTWIDTH] IN BLOOD BY AUTOMATED COUNT: 19.3 % (ref 11.5–14.5)
GFR SERPL CREATININE-BSD FRML MDRD: 15 ML/MIN/1.73
GLOBULIN UR ELPH-MCNC: 3.4 GM/DL
GLUCOSE BLD-MCNC: 163 MG/DL (ref 70–110)
GLUCOSE UR STRIP-MCNC: NEGATIVE MG/DL
HCO3 BLDA-SCNC: 16.8 MMOL/L (ref 22–26)
HCO3 BLDA-SCNC: 18.5 MMOL/L (ref 22–26)
HCT VFR BLD AUTO: 34.2 % (ref 37–47)
HCT VFR BLD CALC: 29 % (ref 37–47)
HCT VFR BLD CALC: 31 % (ref 37–47)
HGB BLD-MCNC: 10.2 G/DL (ref 12–16)
HGB BLDA-MCNC: 10 G/DL (ref 12–16)
HGB BLDA-MCNC: 10.6 G/DL (ref 12–16)
HGB UR QL STRIP.AUTO: ABNORMAL
HOROWITZ INDEX BLD+IHG-RTO: 28 %
HOROWITZ INDEX BLD+IHG-RTO: 50 %
HYALINE CASTS UR QL AUTO: ABNORMAL /LPF
IMM GRANULOCYTES # BLD: 0.02 10*3/MM3 (ref 0–0.03)
IMM GRANULOCYTES NFR BLD: 0.2 % (ref 0–0.5)
INR PPP: 0.96 (ref 0.9–1.1)
KETONES UR QL STRIP: NEGATIVE
LEUKOCYTE ESTERASE UR QL STRIP.AUTO: NEGATIVE
LYMPHOCYTES # BLD AUTO: 1.24 10*3/MM3 (ref 1–3)
LYMPHOCYTES NFR BLD AUTO: 12.6 % (ref 16–46)
MCH RBC QN AUTO: 28.6 PG (ref 27–33)
MCHC RBC AUTO-ENTMCNC: 29.8 G/DL (ref 33–37)
MCV RBC AUTO: 95.8 FL (ref 80–94)
METHGB BLD QL: 0.2 % (ref 0–3)
METHGB BLD QL: 0.3 % (ref 0–3)
MODALITY: ABNORMAL
MODALITY: ABNORMAL
MONOCYTES # BLD AUTO: 0.48 10*3/MM3 (ref 0.1–0.9)
MONOCYTES NFR BLD AUTO: 4.9 % (ref 0–12)
MYOGLOBIN SERPL-MCNC: 107 NG/ML (ref 0–109)
NEUTROPHILS # BLD AUTO: 7.65 10*3/MM3 (ref 1.4–6.5)
NEUTROPHILS NFR BLD AUTO: 78 % (ref 40–75)
NITRITE UR QL STRIP: NEGATIVE
OSMOLALITY SERPL CALC.SUM OF ELEC: 300 MOSM/KG (ref 273–305)
OXYHGB MFR BLDV: 80.5 % (ref 85–100)
OXYHGB MFR BLDV: 82.8 % (ref 85–100)
PCO2 BLDA: 40.1 MM HG (ref 35–45)
PCO2 BLDA: 40.4 MM HG (ref 35–45)
PH BLDA: 7.24 PH UNITS (ref 7.35–7.45)
PH BLDA: 7.28 PH UNITS (ref 7.35–7.45)
PH UR STRIP.AUTO: 5.5 [PH] (ref 5–8)
PLATELET # BLD AUTO: 305 10*3/MM3 (ref 130–400)
PMV BLD AUTO: 10.3 FL (ref 6–10)
PO2 BLDA: 48.4 MM HG (ref 80–100)
PO2 BLDA: 53.6 MM HG (ref 80–100)
POTASSIUM BLD-SCNC: 4.9 MMOL/L (ref 3.5–5.3)
PROT SERPL-MCNC: 7.2 G/DL (ref 6–8)
PROT UR QL STRIP: ABNORMAL
PROTHROMBIN TIME: 12.9 SECONDS (ref 11–15.4)
RBC # BLD AUTO: 3.57 10*6/MM3 (ref 4.2–5.4)
RBC # UR: ABNORMAL /HPF
REF LAB TEST METHOD: ABNORMAL
SAO2 % BLDCOA: 82.1 % (ref 90–100)
SAO2 % BLDCOA: 85.5 % (ref 90–100)
SODIUM BLD-SCNC: 141 MMOL/L (ref 135–153)
SP GR UR STRIP: 1.01 (ref 1–1.03)
SQUAMOUS #/AREA URNS HPF: ABNORMAL /HPF
TROPONIN I SERPL-MCNC: 0.03 NG/ML
TROPONIN I SERPL-MCNC: 0.03 NG/ML
UROBILINOGEN UR QL STRIP: ABNORMAL
WBC NRBC COR # BLD: 9.81 10*3/MM3 (ref 4.5–12.5)
WBC UR QL AUTO: ABNORMAL /HPF

## 2017-07-24 PROCEDURE — 86140 C-REACTIVE PROTEIN: CPT | Performed by: HOSPITALIST

## 2017-07-24 PROCEDURE — 71010 XR CHEST 1 VW: CPT | Performed by: RADIOLOGY

## 2017-07-24 PROCEDURE — 82375 ASSAY CARBOXYHB QUANT: CPT | Performed by: HOSPITALIST

## 2017-07-24 PROCEDURE — 94799 UNLISTED PULMONARY SVC/PX: CPT

## 2017-07-24 PROCEDURE — 82550 ASSAY OF CK (CPK): CPT | Performed by: HOSPITALIST

## 2017-07-24 PROCEDURE — 25010000002 HEPARIN (PORCINE) PER 1000 UNITS: Performed by: HOSPITALIST

## 2017-07-24 PROCEDURE — 36600 WITHDRAWAL OF ARTERIAL BLOOD: CPT | Performed by: EMERGENCY MEDICINE

## 2017-07-24 PROCEDURE — 82805 BLOOD GASES W/O2 SATURATION: CPT | Performed by: EMERGENCY MEDICINE

## 2017-07-24 PROCEDURE — 82009 KETONE BODYS QUAL: CPT | Performed by: EMERGENCY MEDICINE

## 2017-07-24 PROCEDURE — 93010 ELECTROCARDIOGRAM REPORT: CPT | Performed by: INTERNAL MEDICINE

## 2017-07-24 PROCEDURE — 94640 AIRWAY INHALATION TREATMENT: CPT

## 2017-07-24 PROCEDURE — 71010 HC CHEST PA OR AP: CPT

## 2017-07-24 PROCEDURE — 82550 ASSAY OF CK (CPK): CPT | Performed by: EMERGENCY MEDICINE

## 2017-07-24 PROCEDURE — 99285 EMERGENCY DEPT VISIT HI MDM: CPT

## 2017-07-24 PROCEDURE — 84484 ASSAY OF TROPONIN QUANT: CPT | Performed by: HOSPITALIST

## 2017-07-24 PROCEDURE — 36415 COLL VENOUS BLD VENIPUNCTURE: CPT

## 2017-07-24 PROCEDURE — 25010000002 FUROSEMIDE PER 20 MG: Performed by: EMERGENCY MEDICINE

## 2017-07-24 PROCEDURE — 83874 ASSAY OF MYOGLOBIN: CPT | Performed by: HOSPITALIST

## 2017-07-24 PROCEDURE — 82553 CREATINE MB FRACTION: CPT | Performed by: EMERGENCY MEDICINE

## 2017-07-24 PROCEDURE — 84484 ASSAY OF TROPONIN QUANT: CPT | Performed by: EMERGENCY MEDICINE

## 2017-07-24 PROCEDURE — 80053 COMPREHEN METABOLIC PANEL: CPT | Performed by: EMERGENCY MEDICINE

## 2017-07-24 PROCEDURE — 82805 BLOOD GASES W/O2 SATURATION: CPT | Performed by: HOSPITALIST

## 2017-07-24 PROCEDURE — 82375 ASSAY CARBOXYHB QUANT: CPT | Performed by: EMERGENCY MEDICINE

## 2017-07-24 PROCEDURE — 81001 URINALYSIS AUTO W/SCOPE: CPT | Performed by: EMERGENCY MEDICINE

## 2017-07-24 PROCEDURE — 83880 ASSAY OF NATRIURETIC PEPTIDE: CPT | Performed by: HOSPITALIST

## 2017-07-24 PROCEDURE — 94660 CPAP INITIATION&MGMT: CPT

## 2017-07-24 PROCEDURE — 82553 CREATINE MB FRACTION: CPT | Performed by: HOSPITALIST

## 2017-07-24 PROCEDURE — 93005 ELECTROCARDIOGRAM TRACING: CPT | Performed by: HOSPITALIST

## 2017-07-24 PROCEDURE — 83050 HGB METHEMOGLOBIN QUAN: CPT | Performed by: HOSPITALIST

## 2017-07-24 PROCEDURE — 83605 ASSAY OF LACTIC ACID: CPT | Performed by: EMERGENCY MEDICINE

## 2017-07-24 PROCEDURE — 85025 COMPLETE CBC W/AUTO DIFF WBC: CPT | Performed by: EMERGENCY MEDICINE

## 2017-07-24 PROCEDURE — 83050 HGB METHEMOGLOBIN QUAN: CPT | Performed by: EMERGENCY MEDICINE

## 2017-07-24 PROCEDURE — 85610 PROTHROMBIN TIME: CPT | Performed by: EMERGENCY MEDICINE

## 2017-07-24 PROCEDURE — 93005 ELECTROCARDIOGRAM TRACING: CPT | Performed by: EMERGENCY MEDICINE

## 2017-07-24 PROCEDURE — 99223 1ST HOSP IP/OBS HIGH 75: CPT | Performed by: HOSPITALIST

## 2017-07-24 PROCEDURE — 36600 WITHDRAWAL OF ARTERIAL BLOOD: CPT | Performed by: HOSPITALIST

## 2017-07-24 RX ORDER — HEPARIN SODIUM 5000 [USP'U]/ML
5000 INJECTION, SOLUTION INTRAVENOUS; SUBCUTANEOUS EVERY 12 HOURS SCHEDULED
Status: DISCONTINUED | OUTPATIENT
Start: 2017-07-24 | End: 2017-08-09 | Stop reason: HOSPADM

## 2017-07-24 RX ORDER — ONDANSETRON 4 MG/1
4 TABLET, ORALLY DISINTEGRATING ORAL EVERY 4 HOURS PRN
Status: CANCELLED | OUTPATIENT
Start: 2017-07-24

## 2017-07-24 RX ORDER — HYDRALAZINE HYDROCHLORIDE 25 MG/1
25 TABLET, FILM COATED ORAL 2 TIMES DAILY WITH MEALS
Status: DISCONTINUED | OUTPATIENT
Start: 2017-07-25 | End: 2017-07-26

## 2017-07-24 RX ORDER — METHYLPREDNISOLONE SODIUM SUCCINATE 40 MG/ML
40 INJECTION, POWDER, LYOPHILIZED, FOR SOLUTION INTRAMUSCULAR; INTRAVENOUS EVERY 12 HOURS SCHEDULED
Status: DISCONTINUED | OUTPATIENT
Start: 2017-07-25 | End: 2017-07-25

## 2017-07-24 RX ORDER — CLONAZEPAM 0.5 MG/1
0.5 TABLET ORAL EVERY 12 HOURS SCHEDULED
Status: DISCONTINUED | OUTPATIENT
Start: 2017-07-25 | End: 2017-07-24

## 2017-07-24 RX ORDER — IPRATROPIUM BROMIDE AND ALBUTEROL SULFATE 2.5; .5 MG/3ML; MG/3ML
3 SOLUTION RESPIRATORY (INHALATION)
Status: DISCONTINUED | OUTPATIENT
Start: 2017-07-25 | End: 2017-07-25

## 2017-07-24 RX ORDER — ASPIRIN 81 MG/1
81 TABLET, CHEWABLE ORAL NIGHTLY
Status: DISCONTINUED | OUTPATIENT
Start: 2017-07-25 | End: 2017-08-09 | Stop reason: HOSPADM

## 2017-07-24 RX ORDER — FUROSEMIDE 10 MG/ML
40 INJECTION INTRAMUSCULAR; INTRAVENOUS ONCE
Status: COMPLETED | OUTPATIENT
Start: 2017-07-24 | End: 2017-07-24

## 2017-07-24 RX ORDER — SODIUM CHLORIDE 0.9 % (FLUSH) 0.9 %
10 SYRINGE (ML) INJECTION AS NEEDED
Status: DISCONTINUED | OUTPATIENT
Start: 2017-07-24 | End: 2017-08-09 | Stop reason: HOSPADM

## 2017-07-24 RX ORDER — ALBUTEROL SULFATE 2.5 MG/3ML
2.5 SOLUTION RESPIRATORY (INHALATION) ONCE
Status: COMPLETED | OUTPATIENT
Start: 2017-07-24 | End: 2017-07-24

## 2017-07-24 RX ORDER — NITROGLYCERIN 0.4 MG/1
0.4 TABLET SUBLINGUAL
Status: DISCONTINUED | OUTPATIENT
Start: 2017-07-24 | End: 2017-08-09 | Stop reason: HOSPADM

## 2017-07-24 RX ORDER — CLONAZEPAM 0.5 MG/1
0.5 TABLET ORAL 2 TIMES DAILY PRN
Status: DISCONTINUED | OUTPATIENT
Start: 2017-07-24 | End: 2017-08-09 | Stop reason: HOSPADM

## 2017-07-24 RX ORDER — IPRATROPIUM BROMIDE AND ALBUTEROL SULFATE 2.5; .5 MG/3ML; MG/3ML
3 SOLUTION RESPIRATORY (INHALATION) EVERY 6 HOURS PRN
Status: DISCONTINUED | OUTPATIENT
Start: 2017-07-24 | End: 2017-07-24

## 2017-07-24 RX ORDER — BUDESONIDE AND FORMOTEROL FUMARATE DIHYDRATE 80; 4.5 UG/1; UG/1
2 AEROSOL RESPIRATORY (INHALATION)
Status: DISCONTINUED | OUTPATIENT
Start: 2017-07-25 | End: 2017-08-09 | Stop reason: HOSPADM

## 2017-07-24 RX ORDER — SODIUM CHLORIDE 0.9 % (FLUSH) 0.9 %
1-10 SYRINGE (ML) INJECTION AS NEEDED
Status: DISCONTINUED | OUTPATIENT
Start: 2017-07-24 | End: 2017-08-09 | Stop reason: HOSPADM

## 2017-07-24 RX ORDER — NICOTINE 21 MG/24HR
1 PATCH, TRANSDERMAL 24 HOURS TRANSDERMAL NIGHTLY
Status: DISCONTINUED | OUTPATIENT
Start: 2017-07-25 | End: 2017-08-09 | Stop reason: HOSPADM

## 2017-07-24 RX ORDER — AMLODIPINE BESYLATE 10 MG/1
10 TABLET ORAL NIGHTLY
Status: DISCONTINUED | OUTPATIENT
Start: 2017-07-25 | End: 2017-07-26

## 2017-07-24 RX ORDER — ALBUTEROL SULFATE 2.5 MG/3ML
2.5 SOLUTION RESPIRATORY (INHALATION)
Status: CANCELLED | OUTPATIENT
Start: 2017-07-24

## 2017-07-24 RX ORDER — PANTOPRAZOLE SODIUM 40 MG/1
40 TABLET, DELAYED RELEASE ORAL
Status: DISCONTINUED | OUTPATIENT
Start: 2017-07-25 | End: 2017-08-09 | Stop reason: HOSPADM

## 2017-07-24 RX ORDER — GABAPENTIN 100 MG/1
100 CAPSULE ORAL EVERY 12 HOURS SCHEDULED
Status: DISCONTINUED | OUTPATIENT
Start: 2017-07-25 | End: 2017-08-01

## 2017-07-24 RX ADMIN — HEPARIN SODIUM 5000 UNITS: 5000 INJECTION, SOLUTION INTRAVENOUS; SUBCUTANEOUS at 22:33

## 2017-07-24 RX ADMIN — ALBUTEROL SULFATE 2.5 MG: 2.5 SOLUTION RESPIRATORY (INHALATION) at 15:23

## 2017-07-24 RX ADMIN — FUROSEMIDE 40 MG: 10 INJECTION, SOLUTION INTRAMUSCULAR; INTRAVENOUS at 15:06

## 2017-07-25 ENCOUNTER — APPOINTMENT (OUTPATIENT)
Dept: GENERAL RADIOLOGY | Facility: HOSPITAL | Age: 67
End: 2017-07-25

## 2017-07-25 LAB
A-A DO2: 156.4 MMHG (ref 0–300)
A-A DO2: 232 MMHG (ref 0–300)
ALBUMIN SERPL-MCNC: 3.1 G/DL (ref 3.4–4.8)
ALBUMIN/GLOB SERPL: 1.1 G/DL (ref 1.5–2.5)
ALP SERPL-CCNC: 114 U/L (ref 35–104)
ALT SERPL W P-5'-P-CCNC: 13 U/L (ref 10–36)
ANION GAP SERPL CALCULATED.3IONS-SCNC: 7.1 MMOL/L (ref 3.6–11.2)
ARTERIAL PATENCY WRIST A: POSITIVE
ARTERIAL PATENCY WRIST A: POSITIVE
AST SERPL-CCNC: 13 U/L (ref 10–30)
ATMOSPHERIC PRESS: 725 MMHG
ATMOSPHERIC PRESS: 729 MMHG
BASE EXCESS BLDA CALC-SCNC: -5.9 MMOL/L
BASE EXCESS BLDA CALC-SCNC: -7.5 MMOL/L
BASOPHILS # BLD AUTO: 0.05 10*3/MM3 (ref 0–0.3)
BASOPHILS NFR BLD AUTO: 0.5 % (ref 0–2)
BDY SITE: ABNORMAL
BDY SITE: ABNORMAL
BILIRUB SERPL-MCNC: 0.2 MG/DL (ref 0.2–1.8)
BNP SERPL-MCNC: 724 PG/ML (ref 0–100)
BODY TEMPERATURE: 98.6 C
BODY TEMPERATURE: 98.6 C
BUN BLD-MCNC: 56 MG/DL (ref 7–21)
BUN/CREAT SERPL: 17.9 (ref 7–25)
CALCIUM SPEC-SCNC: 8 MG/DL (ref 7.7–10)
CHLORIDE SERPL-SCNC: 114 MMOL/L (ref 99–112)
CK MB SERPL-CCNC: 4.1 NG/ML (ref 0–5)
CK MB SERPL-CCNC: 4.89 NG/ML (ref 0–5)
CK MB SERPL-RTO: 5.6 % (ref 0–3)
CK MB SERPL-RTO: 6.2 % (ref 0–3)
CK SERPL-CCNC: 73 U/L (ref 24–173)
CK SERPL-CCNC: 79 U/L (ref 24–173)
CO2 SERPL-SCNC: 20.9 MMOL/L (ref 24.3–31.9)
COHGB MFR BLD: 1.2 % (ref 0–5)
COHGB MFR BLD: 1.7 % (ref 0–5)
CREAT BLD-MCNC: 3.12 MG/DL (ref 0.43–1.29)
CRP SERPL-MCNC: 2.33 MG/DL (ref 0–0.99)
DEPRECATED RDW RBC AUTO: 62.8 FL (ref 37–54)
EOSINOPHIL # BLD AUTO: 0.43 10*3/MM3 (ref 0–0.7)
EOSINOPHIL NFR BLD AUTO: 4.3 % (ref 0–7)
EPAP: 6
EPAP: 6
ERYTHROCYTE [DISTWIDTH] IN BLOOD BY AUTOMATED COUNT: 18.8 % (ref 11.5–14.5)
GFR SERPL CREATININE-BSD FRML MDRD: 15 ML/MIN/1.73
GLOBULIN UR ELPH-MCNC: 2.9 GM/DL
GLUCOSE BLD-MCNC: 183 MG/DL (ref 70–110)
GLUCOSE BLDC GLUCOMTR-MCNC: 228 MG/DL (ref 70–130)
GLUCOSE BLDC GLUCOMTR-MCNC: 321 MG/DL (ref 70–130)
GLUCOSE BLDC GLUCOMTR-MCNC: 372 MG/DL (ref 70–130)
GLUCOSE BLDC GLUCOMTR-MCNC: 435 MG/DL (ref 70–130)
HCO3 BLDA-SCNC: 18.6 MMOL/L (ref 22–26)
HCO3 BLDA-SCNC: 19.5 MMOL/L (ref 22–26)
HCT VFR BLD AUTO: 29.5 % (ref 37–47)
HCT VFR BLD CALC: 30 % (ref 37–47)
HCT VFR BLD CALC: 32 % (ref 37–47)
HGB BLD-MCNC: 8.7 G/DL (ref 12–16)
HGB BLDA-MCNC: 10.2 G/DL (ref 12–16)
HGB BLDA-MCNC: 10.9 G/DL (ref 12–16)
HOROWITZ INDEX BLD+IHG-RTO: 40 %
HOROWITZ INDEX BLD+IHG-RTO: 50 %
IMM GRANULOCYTES # BLD: 0.02 10*3/MM3 (ref 0–0.03)
IMM GRANULOCYTES NFR BLD: 0.2 % (ref 0–0.5)
IPAP: 12
IPAP: 12
LDH SERPL-CCNC: 244 U/L (ref 135–225)
LYMPHOCYTES # BLD AUTO: 1.27 10*3/MM3 (ref 1–3)
LYMPHOCYTES NFR BLD AUTO: 12.6 % (ref 16–46)
MAGNESIUM SERPL-MCNC: 1.8 MG/DL (ref 1.7–2.6)
MCH RBC QN AUTO: 27.8 PG (ref 27–33)
MCHC RBC AUTO-ENTMCNC: 29.5 G/DL (ref 33–37)
MCV RBC AUTO: 94.2 FL (ref 80–94)
METHGB BLD QL: 0 % (ref 0–3)
METHGB BLD QL: 0.2 % (ref 0–3)
MODALITY: ABNORMAL
MODALITY: ABNORMAL
MONOCYTES # BLD AUTO: 0.49 10*3/MM3 (ref 0.1–0.9)
MONOCYTES NFR BLD AUTO: 4.9 % (ref 0–12)
MYOGLOBIN SERPL-MCNC: 100 NG/ML (ref 0–109)
MYOGLOBIN SERPL-MCNC: 97 NG/ML (ref 0–109)
NEUTROPHILS # BLD AUTO: 7.78 10*3/MM3 (ref 1.4–6.5)
NEUTROPHILS NFR BLD AUTO: 77.5 % (ref 40–75)
OSMOLALITY SERPL CALC.SUM OF ELEC: 303.3 MOSM/KG (ref 273–305)
OXYHGB MFR BLDV: 88.8 % (ref 85–100)
OXYHGB MFR BLDV: 92.4 % (ref 85–100)
PCO2 BLDA: 38.2 MM HG (ref 35–45)
PCO2 BLDA: 40.1 MM HG (ref 35–45)
PH BLDA: 7.28 PH UNITS (ref 7.35–7.45)
PH BLDA: 7.33 PH UNITS (ref 7.35–7.45)
PHOSPHATE SERPL-MCNC: 5.2 MG/DL (ref 2.7–4.5)
PLATELET # BLD AUTO: 280 10*3/MM3 (ref 130–400)
PMV BLD AUTO: 10.5 FL (ref 6–10)
PO2 BLDA: 61.9 MM HG (ref 80–100)
PO2 BLDA: 72.5 MM HG (ref 80–100)
POTASSIUM BLD-SCNC: 4.3 MMOL/L (ref 3.5–5.3)
PROT SERPL-MCNC: 6 G/DL (ref 6–8)
PROT SERPL-MCNC: 6.9 G/DL (ref 6–8)
RBC # BLD AUTO: 3.13 10*6/MM3 (ref 4.2–5.4)
SAO2 % BLDCOA: 90.3 % (ref 90–100)
SAO2 % BLDCOA: 93.7 % (ref 90–100)
SET MECH RESP RATE: 16
SET MECH RESP RATE: 20
SODIUM BLD-SCNC: 142 MMOL/L (ref 135–153)
TROPONIN I SERPL-MCNC: 0.03 NG/ML
TROPONIN I SERPL-MCNC: 0.03 NG/ML
WBC NRBC COR # BLD: 10.04 10*3/MM3 (ref 4.5–12.5)

## 2017-07-25 PROCEDURE — 82375 ASSAY CARBOXYHB QUANT: CPT | Performed by: HOSPITALIST

## 2017-07-25 PROCEDURE — 82962 GLUCOSE BLOOD TEST: CPT

## 2017-07-25 PROCEDURE — 83874 ASSAY OF MYOGLOBIN: CPT | Performed by: HOSPITALIST

## 2017-07-25 PROCEDURE — 99233 SBSQ HOSP IP/OBS HIGH 50: CPT | Performed by: INTERNAL MEDICINE

## 2017-07-25 PROCEDURE — 83615 LACTATE (LD) (LDH) ENZYME: CPT | Performed by: INTERNAL MEDICINE

## 2017-07-25 PROCEDURE — 83050 HGB METHEMOGLOBIN QUAN: CPT | Performed by: INTERNAL MEDICINE

## 2017-07-25 PROCEDURE — 87116 MYCOBACTERIA CULTURE: CPT | Performed by: INTERNAL MEDICINE

## 2017-07-25 PROCEDURE — 94799 UNLISTED PULMONARY SVC/PX: CPT

## 2017-07-25 PROCEDURE — 83880 ASSAY OF NATRIURETIC PEPTIDE: CPT | Performed by: HOSPITALIST

## 2017-07-25 PROCEDURE — 86140 C-REACTIVE PROTEIN: CPT | Performed by: HOSPITALIST

## 2017-07-25 PROCEDURE — 25010000002 METHYLPREDNISOLONE PER 40 MG: Performed by: NURSE PRACTITIONER

## 2017-07-25 PROCEDURE — 83735 ASSAY OF MAGNESIUM: CPT | Performed by: NURSE PRACTITIONER

## 2017-07-25 PROCEDURE — 85025 COMPLETE CBC W/AUTO DIFF WBC: CPT | Performed by: HOSPITALIST

## 2017-07-25 PROCEDURE — 32555 ASPIRATE PLEURA W/ IMAGING: CPT | Performed by: INTERNAL MEDICINE

## 2017-07-25 PROCEDURE — 36600 WITHDRAWAL OF ARTERIAL BLOOD: CPT | Performed by: HOSPITALIST

## 2017-07-25 PROCEDURE — 83050 HGB METHEMOGLOBIN QUAN: CPT | Performed by: HOSPITALIST

## 2017-07-25 PROCEDURE — 87070 CULTURE OTHR SPECIMN AEROBIC: CPT | Performed by: INTERNAL MEDICINE

## 2017-07-25 PROCEDURE — 87102 FUNGUS ISOLATION CULTURE: CPT | Performed by: INTERNAL MEDICINE

## 2017-07-25 PROCEDURE — 82805 BLOOD GASES W/O2 SATURATION: CPT | Performed by: HOSPITALIST

## 2017-07-25 PROCEDURE — 82805 BLOOD GASES W/O2 SATURATION: CPT | Performed by: INTERNAL MEDICINE

## 2017-07-25 PROCEDURE — 82553 CREATINE MB FRACTION: CPT | Performed by: HOSPITALIST

## 2017-07-25 PROCEDURE — 25010000002 METHYLPREDNISOLONE PER 40 MG: Performed by: HOSPITALIST

## 2017-07-25 PROCEDURE — 84484 ASSAY OF TROPONIN QUANT: CPT | Performed by: HOSPITALIST

## 2017-07-25 PROCEDURE — 63710000001 INSULIN DETEMIR PER 5 UNITS: Performed by: HOSPITALIST

## 2017-07-25 PROCEDURE — 82042 OTHER SOURCE ALBUMIN QUAN EA: CPT | Performed by: INTERNAL MEDICINE

## 2017-07-25 PROCEDURE — 84157 ASSAY OF PROTEIN OTHER: CPT | Performed by: INTERNAL MEDICINE

## 2017-07-25 PROCEDURE — 36600 WITHDRAWAL OF ARTERIAL BLOOD: CPT | Performed by: INTERNAL MEDICINE

## 2017-07-25 PROCEDURE — 71010 XR CHEST 1 VW: CPT | Performed by: RADIOLOGY

## 2017-07-25 PROCEDURE — 63710000001 INSULIN ASPART PER 5 UNITS: Performed by: HOSPITALIST

## 2017-07-25 PROCEDURE — 84100 ASSAY OF PHOSPHORUS: CPT | Performed by: NURSE PRACTITIONER

## 2017-07-25 PROCEDURE — 25010000002 HEPARIN (PORCINE) PER 1000 UNITS: Performed by: HOSPITALIST

## 2017-07-25 PROCEDURE — 82550 ASSAY OF CK (CPK): CPT | Performed by: HOSPITALIST

## 2017-07-25 PROCEDURE — 82375 ASSAY CARBOXYHB QUANT: CPT | Performed by: INTERNAL MEDICINE

## 2017-07-25 PROCEDURE — 94660 CPAP INITIATION&MGMT: CPT

## 2017-07-25 PROCEDURE — 25010000002 FENTANYL CITRATE (PF) 100 MCG/2ML SOLUTION

## 2017-07-25 PROCEDURE — 87206 SMEAR FLUORESCENT/ACID STAI: CPT | Performed by: INTERNAL MEDICINE

## 2017-07-25 PROCEDURE — 71010 HC CHEST PA OR AP: CPT

## 2017-07-25 PROCEDURE — 87205 SMEAR GRAM STAIN: CPT | Performed by: HOSPITALIST

## 2017-07-25 PROCEDURE — 99407 BEHAV CHNG SMOKING > 10 MIN: CPT | Performed by: INTERNAL MEDICINE

## 2017-07-25 PROCEDURE — 99291 CRITICAL CARE FIRST HOUR: CPT | Performed by: INTERNAL MEDICINE

## 2017-07-25 PROCEDURE — 25010000002 FUROSEMIDE PER 20 MG: Performed by: INTERNAL MEDICINE

## 2017-07-25 PROCEDURE — 99223 1ST HOSP IP/OBS HIGH 75: CPT | Performed by: INTERNAL MEDICINE

## 2017-07-25 PROCEDURE — 80053 COMPREHEN METABOLIC PANEL: CPT | Performed by: HOSPITALIST

## 2017-07-25 PROCEDURE — 84155 ASSAY OF PROTEIN SERUM: CPT | Performed by: INTERNAL MEDICINE

## 2017-07-25 RX ORDER — SODIUM BICARBONATE 650 MG/1
650 TABLET ORAL 3 TIMES DAILY
Status: DISCONTINUED | OUTPATIENT
Start: 2017-07-25 | End: 2017-07-27

## 2017-07-25 RX ORDER — NICOTINE POLACRILEX 4 MG
15 LOZENGE BUCCAL
Status: DISCONTINUED | OUTPATIENT
Start: 2017-07-25 | End: 2017-08-09 | Stop reason: HOSPADM

## 2017-07-25 RX ORDER — DEXTROSE MONOHYDRATE 25 G/50ML
25 INJECTION, SOLUTION INTRAVENOUS
Status: DISCONTINUED | OUTPATIENT
Start: 2017-07-25 | End: 2017-08-09 | Stop reason: HOSPADM

## 2017-07-25 RX ORDER — IPRATROPIUM BROMIDE AND ALBUTEROL SULFATE 2.5; .5 MG/3ML; MG/3ML
3 SOLUTION RESPIRATORY (INHALATION)
Status: DISCONTINUED | OUTPATIENT
Start: 2017-07-25 | End: 2017-07-27

## 2017-07-25 RX ORDER — ISOSORBIDE MONONITRATE 60 MG/1
60 TABLET, EXTENDED RELEASE ORAL
Status: DISCONTINUED | OUTPATIENT
Start: 2017-07-25 | End: 2017-08-09 | Stop reason: HOSPADM

## 2017-07-25 RX ORDER — FUROSEMIDE 10 MG/ML
20 INJECTION INTRAMUSCULAR; INTRAVENOUS ONCE
Status: COMPLETED | OUTPATIENT
Start: 2017-07-25 | End: 2017-07-25

## 2017-07-25 RX ORDER — FENTANYL CITRATE 50 UG/ML
INJECTION, SOLUTION INTRAMUSCULAR; INTRAVENOUS
Status: COMPLETED
Start: 2017-07-25 | End: 2017-07-25

## 2017-07-25 RX ORDER — ATORVASTATIN CALCIUM 40 MG/1
40 TABLET, FILM COATED ORAL NIGHTLY
Status: DISCONTINUED | OUTPATIENT
Start: 2017-07-25 | End: 2017-08-09 | Stop reason: HOSPADM

## 2017-07-25 RX ORDER — FUROSEMIDE 10 MG/ML
40 INJECTION INTRAMUSCULAR; INTRAVENOUS ONCE
Status: DISCONTINUED | OUTPATIENT
Start: 2017-07-25 | End: 2017-07-25

## 2017-07-25 RX ORDER — METHYLPREDNISOLONE SODIUM SUCCINATE 40 MG/ML
20 INJECTION, POWDER, LYOPHILIZED, FOR SOLUTION INTRAMUSCULAR; INTRAVENOUS EVERY 12 HOURS SCHEDULED
Status: DISCONTINUED | OUTPATIENT
Start: 2017-07-25 | End: 2017-07-26

## 2017-07-25 RX ORDER — FENTANYL CITRATE 50 UG/ML
25 INJECTION, SOLUTION INTRAMUSCULAR; INTRAVENOUS ONCE
Status: COMPLETED | OUTPATIENT
Start: 2017-07-25 | End: 2017-07-25

## 2017-07-25 RX ADMIN — AMLODIPINE BESYLATE 10 MG: 10 TABLET ORAL at 00:46

## 2017-07-25 RX ADMIN — GABAPENTIN 100 MG: 100 CAPSULE ORAL at 00:46

## 2017-07-25 RX ADMIN — SODIUM BICARBONATE TAB 650 MG 650 MG: 650 TAB at 20:28

## 2017-07-25 RX ADMIN — ASPIRIN 81 MG: 81 TABLET, CHEWABLE ORAL at 20:30

## 2017-07-25 RX ADMIN — SODIUM BICARBONATE TAB 650 MG 650 MG: 650 TAB at 09:50

## 2017-07-25 RX ADMIN — METHYLPREDNISOLONE SODIUM SUCCINATE 20 MG: 40 INJECTION, POWDER, FOR SOLUTION INTRAMUSCULAR; INTRAVENOUS at 20:30

## 2017-07-25 RX ADMIN — METHYLPREDNISOLONE SODIUM SUCCINATE 40 MG: 40 INJECTION, POWDER, FOR SOLUTION INTRAMUSCULAR; INTRAVENOUS at 00:46

## 2017-07-25 RX ADMIN — BUDESONIDE AND FORMOTEROL FUMARATE DIHYDRATE 2 PUFF: 80; 4.5 AEROSOL RESPIRATORY (INHALATION) at 18:50

## 2017-07-25 RX ADMIN — INSULIN ASPART 2 UNITS: 100 INJECTION, SOLUTION INTRAVENOUS; SUBCUTANEOUS at 06:19

## 2017-07-25 RX ADMIN — IPRATROPIUM BROMIDE AND ALBUTEROL SULFATE 3 ML: .5; 3 SOLUTION RESPIRATORY (INHALATION) at 15:38

## 2017-07-25 RX ADMIN — HYDRALAZINE HYDROCHLORIDE 25 MG: 25 TABLET ORAL at 17:56

## 2017-07-25 RX ADMIN — GABAPENTIN 100 MG: 100 CAPSULE ORAL at 20:29

## 2017-07-25 RX ADMIN — FENTANYL CITRATE 25 MCG: 50 INJECTION INTRAMUSCULAR; INTRAVENOUS at 15:16

## 2017-07-25 RX ADMIN — METOPROLOL TARTRATE 12.5 MG: 25 TABLET, FILM COATED ORAL at 16:15

## 2017-07-25 RX ADMIN — FUROSEMIDE 20 MG: 10 INJECTION, SOLUTION INTRAMUSCULAR; INTRAVENOUS at 15:20

## 2017-07-25 RX ADMIN — ATORVASTATIN CALCIUM 40 MG: 40 TABLET, FILM COATED ORAL at 20:29

## 2017-07-25 RX ADMIN — IPRATROPIUM BROMIDE AND ALBUTEROL SULFATE 3 ML: .5; 3 SOLUTION RESPIRATORY (INHALATION) at 00:36

## 2017-07-25 RX ADMIN — ISOSORBIDE MONONITRATE 60 MG: 60 TABLET, EXTENDED RELEASE ORAL at 16:14

## 2017-07-25 RX ADMIN — INSULIN ASPART 8 UNITS: 100 INJECTION, SOLUTION INTRAVENOUS; SUBCUTANEOUS at 17:54

## 2017-07-25 RX ADMIN — IPRATROPIUM BROMIDE AND ALBUTEROL SULFATE 3 ML: .5; 3 SOLUTION RESPIRATORY (INHALATION) at 18:50

## 2017-07-25 RX ADMIN — NICOTINE 1 PATCH: 21 PATCH, EXTENDED RELEASE TRANSDERMAL at 20:29

## 2017-07-25 RX ADMIN — METOPROLOL TARTRATE 12.5 MG: 25 TABLET, FILM COATED ORAL at 20:29

## 2017-07-25 RX ADMIN — BUDESONIDE AND FORMOTEROL FUMARATE DIHYDRATE 2 PUFF: 80; 4.5 AEROSOL RESPIRATORY (INHALATION) at 00:36

## 2017-07-25 RX ADMIN — IPRATROPIUM BROMIDE AND ALBUTEROL SULFATE 3 ML: .5; 3 SOLUTION RESPIRATORY (INHALATION) at 07:16

## 2017-07-25 RX ADMIN — BUDESONIDE AND FORMOTEROL FUMARATE DIHYDRATE 2 PUFF: 80; 4.5 AEROSOL RESPIRATORY (INHALATION) at 07:16

## 2017-07-25 RX ADMIN — NICOTINE 1 PATCH: 21 PATCH, EXTENDED RELEASE TRANSDERMAL at 00:46

## 2017-07-25 RX ADMIN — SODIUM BICARBONATE TAB 650 MG 650 MG: 650 TAB at 15:20

## 2017-07-25 RX ADMIN — INSULIN ASPART 7 UNITS: 100 INJECTION, SOLUTION INTRAVENOUS; SUBCUTANEOUS at 10:42

## 2017-07-25 RX ADMIN — FENTANYL CITRATE 25 MCG: 50 INJECTION, SOLUTION INTRAMUSCULAR; INTRAVENOUS at 15:16

## 2017-07-25 RX ADMIN — INSULIN DETEMIR 30 UNITS: 100 INJECTION, SOLUTION SUBCUTANEOUS at 22:42

## 2017-07-25 RX ADMIN — PANTOPRAZOLE SODIUM 40 MG: 40 TABLET, DELAYED RELEASE ORAL at 07:49

## 2017-07-25 RX ADMIN — HYDRALAZINE HYDROCHLORIDE 25 MG: 25 TABLET ORAL at 07:49

## 2017-07-25 RX ADMIN — DOXYCYCLINE 100 MG: 100 INJECTION, POWDER, LYOPHILIZED, FOR SOLUTION INTRAVENOUS at 02:17

## 2017-07-25 RX ADMIN — DOXYCYCLINE 100 MG: 100 INJECTION, POWDER, LYOPHILIZED, FOR SOLUTION INTRAVENOUS at 20:28

## 2017-07-25 RX ADMIN — IPRATROPIUM BROMIDE AND ALBUTEROL SULFATE 3 ML: .5; 3 SOLUTION RESPIRATORY (INHALATION) at 11:32

## 2017-07-25 RX ADMIN — HEPARIN SODIUM 5000 UNITS: 5000 INJECTION, SOLUTION INTRAVENOUS; SUBCUTANEOUS at 20:30

## 2017-07-25 RX ADMIN — METHYLPREDNISOLONE SODIUM SUCCINATE 40 MG: 40 INJECTION, POWDER, FOR SOLUTION INTRAMUSCULAR; INTRAVENOUS at 08:19

## 2017-07-25 RX ADMIN — HEPARIN SODIUM 5000 UNITS: 5000 INJECTION, SOLUTION INTRAVENOUS; SUBCUTANEOUS at 08:19

## 2017-07-25 RX ADMIN — CLONAZEPAM 0.5 MG: 0.5 TABLET ORAL at 15:23

## 2017-07-25 RX ADMIN — DOXYCYCLINE 100 MG: 100 INJECTION, POWDER, LYOPHILIZED, FOR SOLUTION INTRAVENOUS at 08:19

## 2017-07-25 RX ADMIN — AMLODIPINE BESYLATE 10 MG: 10 TABLET ORAL at 20:30

## 2017-07-25 RX ADMIN — IPRATROPIUM BROMIDE AND ALBUTEROL SULFATE 3 ML: .5; 3 SOLUTION RESPIRATORY (INHALATION) at 22:24

## 2017-07-25 RX ADMIN — HYDRALAZINE HYDROCHLORIDE 25 MG: 25 TABLET ORAL at 00:46

## 2017-07-25 RX ADMIN — GABAPENTIN 100 MG: 100 CAPSULE ORAL at 09:50

## 2017-07-25 RX ADMIN — ASPIRIN 81 MG: 81 TABLET, CHEWABLE ORAL at 00:46

## 2017-07-25 RX ADMIN — CLONAZEPAM 0.5 MG: 0.5 TABLET ORAL at 00:25

## 2017-07-25 RX ADMIN — INSULIN ASPART 9 UNITS: 100 INJECTION, SOLUTION INTRAVENOUS; SUBCUTANEOUS at 20:30

## 2017-07-25 RX ADMIN — INSULIN ASPART 4 UNITS: 100 INJECTION, SOLUTION INTRAVENOUS; SUBCUTANEOUS at 00:51

## 2017-07-26 LAB
A-A DO2: 145.5 MMHG (ref 0–300)
ALBUMIN FLD-MCNC: 0.8 G/DL
ALBUMIN SERPL-MCNC: 3.2 G/DL (ref 3.4–4.8)
ALBUMIN/GLOB SERPL: 1.1 G/DL (ref 1.5–2.5)
ALP SERPL-CCNC: 112 U/L (ref 35–104)
ALT SERPL W P-5'-P-CCNC: 13 U/L (ref 10–36)
ANION GAP SERPL CALCULATED.3IONS-SCNC: 2 MMOL/L (ref 3.6–11.2)
ARTERIAL PATENCY WRIST A: POSITIVE
AST SERPL-CCNC: 13 U/L (ref 10–30)
ATMOSPHERIC PRESS: 729 MMHG
BASE EXCESS BLDA CALC-SCNC: -5.8 MMOL/L
BASOPHILS # BLD AUTO: 0.01 10*3/MM3 (ref 0–0.3)
BASOPHILS NFR BLD AUTO: 0.1 % (ref 0–2)
BDY SITE: ABNORMAL
BILIRUB SERPL-MCNC: 0.1 MG/DL (ref 0.2–1.8)
BODY TEMPERATURE: 98.6 C
BUN BLD-MCNC: 63 MG/DL (ref 7–21)
BUN/CREAT SERPL: 20.1 (ref 7–25)
CALCIUM SPEC-SCNC: 7.8 MG/DL (ref 7.7–10)
CHLORIDE SERPL-SCNC: 111 MMOL/L (ref 99–112)
CO2 SERPL-SCNC: 26 MMOL/L (ref 24.3–31.9)
COHGB MFR BLD: 2 % (ref 0–5)
CREAT BLD-MCNC: 3.14 MG/DL (ref 0.43–1.29)
CRP SERPL-MCNC: 2.17 MG/DL (ref 0–0.99)
DEPRECATED RDW RBC AUTO: 59.7 FL (ref 37–54)
EOSINOPHIL # BLD AUTO: 0 10*3/MM3 (ref 0–0.7)
EOSINOPHIL NFR BLD AUTO: 0 % (ref 0–7)
ERYTHROCYTE [DISTWIDTH] IN BLOOD BY AUTOMATED COUNT: 18.3 % (ref 11.5–14.5)
GFR SERPL CREATININE-BSD FRML MDRD: 15 ML/MIN/1.73
GLOBULIN UR ELPH-MCNC: 2.9 GM/DL
GLUCOSE BLD-MCNC: 400 MG/DL (ref 70–110)
GLUCOSE BLDC GLUCOMTR-MCNC: 204 MG/DL (ref 70–130)
GLUCOSE BLDC GLUCOMTR-MCNC: 266 MG/DL (ref 70–130)
GLUCOSE BLDC GLUCOMTR-MCNC: 324 MG/DL (ref 70–130)
GLUCOSE BLDC GLUCOMTR-MCNC: 327 MG/DL (ref 70–130)
GLUCOSE BLDC GLUCOMTR-MCNC: 332 MG/DL (ref 70–130)
GLUCOSE BLDC GLUCOMTR-MCNC: 364 MG/DL (ref 70–130)
GRAM STN SPEC: NORMAL
HCO3 BLDA-SCNC: 19.6 MMOL/L (ref 22–26)
HCT VFR BLD AUTO: 27.8 % (ref 37–47)
HCT VFR BLD CALC: 27 % (ref 37–47)
HGB BLD-MCNC: 8.1 G/DL (ref 12–16)
HGB BLDA-MCNC: 9.3 G/DL (ref 12–16)
HOROWITZ INDEX BLD+IHG-RTO: 36 %
IMM GRANULOCYTES # BLD: 0.02 10*3/MM3 (ref 0–0.03)
IMM GRANULOCYTES NFR BLD: 0.3 % (ref 0–0.5)
LDH FLD-CCNC: 67 IU/L
LYMPHOCYTES # BLD AUTO: 0.53 10*3/MM3 (ref 1–3)
LYMPHOCYTES NFR BLD AUTO: 6.7 % (ref 16–46)
MCH RBC QN AUTO: 27.2 PG (ref 27–33)
MCHC RBC AUTO-ENTMCNC: 29.1 G/DL (ref 33–37)
MCV RBC AUTO: 93.3 FL (ref 80–94)
METHGB BLD QL: 0.3 % (ref 0–3)
MODALITY: ABNORMAL
MONOCYTES # BLD AUTO: 0.21 10*3/MM3 (ref 0.1–0.9)
MONOCYTES NFR BLD AUTO: 2.7 % (ref 0–12)
NEUTROPHILS # BLD AUTO: 7.13 10*3/MM3 (ref 1.4–6.5)
NEUTROPHILS NFR BLD AUTO: 90.2 % (ref 40–75)
OSMOLALITY SERPL CALC.SUM OF ELEC: 312.3 MOSM/KG (ref 273–305)
OXYHGB MFR BLDV: 86 % (ref 85–100)
PCO2 BLDA: 37.5 MM HG (ref 35–45)
PH BLDA: 7.33 PH UNITS (ref 7.35–7.45)
PLATELET # BLD AUTO: 309 10*3/MM3 (ref 130–400)
PMV BLD AUTO: 10.9 FL (ref 6–10)
PO2 BLDA: 56.5 MM HG (ref 80–100)
POTASSIUM BLD-SCNC: 4.9 MMOL/L (ref 3.5–5.3)
PROT FLD-MCNC: 1.3 G/DL
PROT SERPL-MCNC: 6.1 G/DL (ref 6–8)
RBC # BLD AUTO: 2.98 10*6/MM3 (ref 4.2–5.4)
SAO2 % BLDCOA: 88 % (ref 90–100)
SODIUM BLD-SCNC: 139 MMOL/L (ref 135–153)
WBC NRBC COR # BLD: 7.9 10*3/MM3 (ref 4.5–12.5)

## 2017-07-26 PROCEDURE — 80053 COMPREHEN METABOLIC PANEL: CPT | Performed by: NURSE PRACTITIONER

## 2017-07-26 PROCEDURE — 99233 SBSQ HOSP IP/OBS HIGH 50: CPT | Performed by: INTERNAL MEDICINE

## 2017-07-26 PROCEDURE — 94799 UNLISTED PULMONARY SVC/PX: CPT

## 2017-07-26 PROCEDURE — 36600 WITHDRAWAL OF ARTERIAL BLOOD: CPT | Performed by: INTERNAL MEDICINE

## 2017-07-26 PROCEDURE — 63710000001 PREDNISONE PER 5 MG: Performed by: INTERNAL MEDICINE

## 2017-07-26 PROCEDURE — 85025 COMPLETE CBC W/AUTO DIFF WBC: CPT | Performed by: NURSE PRACTITIONER

## 2017-07-26 PROCEDURE — 94660 CPAP INITIATION&MGMT: CPT

## 2017-07-26 PROCEDURE — 82805 BLOOD GASES W/O2 SATURATION: CPT | Performed by: INTERNAL MEDICINE

## 2017-07-26 PROCEDURE — 82375 ASSAY CARBOXYHB QUANT: CPT | Performed by: INTERNAL MEDICINE

## 2017-07-26 PROCEDURE — 82962 GLUCOSE BLOOD TEST: CPT

## 2017-07-26 PROCEDURE — 86140 C-REACTIVE PROTEIN: CPT | Performed by: NURSE PRACTITIONER

## 2017-07-26 PROCEDURE — 25010000002 METHYLPREDNISOLONE PER 40 MG: Performed by: NURSE PRACTITIONER

## 2017-07-26 PROCEDURE — 83050 HGB METHEMOGLOBIN QUAN: CPT | Performed by: INTERNAL MEDICINE

## 2017-07-26 PROCEDURE — 63710000001 INSULIN DETEMIR PER 5 UNITS: Performed by: HOSPITALIST

## 2017-07-26 PROCEDURE — 25010000002 HEPARIN (PORCINE) PER 1000 UNITS: Performed by: HOSPITALIST

## 2017-07-26 PROCEDURE — 63710000001 INSULIN ASPART PER 5 UNITS: Performed by: HOSPITALIST

## 2017-07-26 RX ORDER — PREDNISONE 20 MG/1
20 TABLET ORAL
Status: DISCONTINUED | OUTPATIENT
Start: 2017-07-26 | End: 2017-07-26

## 2017-07-26 RX ORDER — PREDNISONE 10 MG/1
10 TABLET ORAL
Status: DISCONTINUED | OUTPATIENT
Start: 2017-07-26 | End: 2017-07-28

## 2017-07-26 RX ORDER — FUROSEMIDE 10 MG/ML
40 INJECTION INTRAMUSCULAR; INTRAVENOUS ONCE
Status: DISCONTINUED | OUTPATIENT
Start: 2017-07-26 | End: 2017-07-26

## 2017-07-26 RX ORDER — AMLODIPINE BESYLATE 5 MG/1
5 TABLET ORAL NIGHTLY
Status: DISCONTINUED | OUTPATIENT
Start: 2017-07-26 | End: 2017-07-26

## 2017-07-26 RX ORDER — GUAIFENESIN 600 MG/1
1200 TABLET, EXTENDED RELEASE ORAL EVERY 12 HOURS SCHEDULED
Status: DISCONTINUED | OUTPATIENT
Start: 2017-07-26 | End: 2017-08-09 | Stop reason: HOSPADM

## 2017-07-26 RX ADMIN — METHYLPREDNISOLONE SODIUM SUCCINATE 20 MG: 40 INJECTION, POWDER, FOR SOLUTION INTRAMUSCULAR; INTRAVENOUS at 08:23

## 2017-07-26 RX ADMIN — ASPIRIN 81 MG: 81 TABLET, CHEWABLE ORAL at 21:37

## 2017-07-26 RX ADMIN — GABAPENTIN 100 MG: 100 CAPSULE ORAL at 08:42

## 2017-07-26 RX ADMIN — NICOTINE 1 PATCH: 21 PATCH, EXTENDED RELEASE TRANSDERMAL at 21:40

## 2017-07-26 RX ADMIN — HEPARIN SODIUM 5000 UNITS: 5000 INJECTION, SOLUTION INTRAVENOUS; SUBCUTANEOUS at 08:23

## 2017-07-26 RX ADMIN — IPRATROPIUM BROMIDE AND ALBUTEROL SULFATE 3 ML: .5; 3 SOLUTION RESPIRATORY (INHALATION) at 19:05

## 2017-07-26 RX ADMIN — INSULIN ASPART 8 UNITS: 100 INJECTION, SOLUTION INTRAVENOUS; SUBCUTANEOUS at 05:24

## 2017-07-26 RX ADMIN — INSULIN ASPART 8 UNITS: 100 INJECTION, SOLUTION INTRAVENOUS; SUBCUTANEOUS at 10:51

## 2017-07-26 RX ADMIN — DOXYCYCLINE 100 MG: 100 INJECTION, POWDER, LYOPHILIZED, FOR SOLUTION INTRAVENOUS at 08:24

## 2017-07-26 RX ADMIN — CLONAZEPAM 0.5 MG: 0.5 TABLET ORAL at 00:04

## 2017-07-26 RX ADMIN — IPRATROPIUM BROMIDE AND ALBUTEROL SULFATE 3 ML: .5; 3 SOLUTION RESPIRATORY (INHALATION) at 22:50

## 2017-07-26 RX ADMIN — METOPROLOL TARTRATE 25 MG: 25 TABLET, FILM COATED ORAL at 21:39

## 2017-07-26 RX ADMIN — CLONAZEPAM 0.5 MG: 0.5 TABLET ORAL at 23:34

## 2017-07-26 RX ADMIN — CLONAZEPAM 0.5 MG: 0.5 TABLET ORAL at 12:40

## 2017-07-26 RX ADMIN — INSULIN DETEMIR 30 UNITS: 100 INJECTION, SOLUTION SUBCUTANEOUS at 21:41

## 2017-07-26 RX ADMIN — SODIUM BICARBONATE TAB 650 MG 650 MG: 650 TAB at 08:23

## 2017-07-26 RX ADMIN — INSULIN ASPART 16 UNITS: 100 INJECTION, SOLUTION INTRAVENOUS; SUBCUTANEOUS at 21:38

## 2017-07-26 RX ADMIN — IPRATROPIUM BROMIDE AND ALBUTEROL SULFATE 3 ML: .5; 3 SOLUTION RESPIRATORY (INHALATION) at 07:05

## 2017-07-26 RX ADMIN — PANTOPRAZOLE SODIUM 40 MG: 40 TABLET, DELAYED RELEASE ORAL at 05:24

## 2017-07-26 RX ADMIN — IPRATROPIUM BROMIDE AND ALBUTEROL SULFATE 3 ML: .5; 3 SOLUTION RESPIRATORY (INHALATION) at 10:57

## 2017-07-26 RX ADMIN — INSULIN ASPART 12 UNITS: 100 INJECTION, SOLUTION INTRAVENOUS; SUBCUTANEOUS at 06:05

## 2017-07-26 RX ADMIN — HEPARIN SODIUM 5000 UNITS: 5000 INJECTION, SOLUTION INTRAVENOUS; SUBCUTANEOUS at 21:40

## 2017-07-26 RX ADMIN — ATORVASTATIN CALCIUM 40 MG: 40 TABLET, FILM COATED ORAL at 21:37

## 2017-07-26 RX ADMIN — METOPROLOL TARTRATE 12.5 MG: 25 TABLET, FILM COATED ORAL at 10:52

## 2017-07-26 RX ADMIN — GUAIFENESIN 1200 MG: 600 TABLET, EXTENDED RELEASE ORAL at 10:47

## 2017-07-26 RX ADMIN — BUDESONIDE AND FORMOTEROL FUMARATE DIHYDRATE 2 PUFF: 80; 4.5 AEROSOL RESPIRATORY (INHALATION) at 19:05

## 2017-07-26 RX ADMIN — BUDESONIDE AND FORMOTEROL FUMARATE DIHYDRATE 2 PUFF: 80; 4.5 AEROSOL RESPIRATORY (INHALATION) at 07:05

## 2017-07-26 RX ADMIN — IPRATROPIUM BROMIDE AND ALBUTEROL SULFATE 3 ML: .5; 3 SOLUTION RESPIRATORY (INHALATION) at 02:00

## 2017-07-26 RX ADMIN — INSULIN ASPART 16 UNITS: 100 INJECTION, SOLUTION INTRAVENOUS; SUBCUTANEOUS at 17:34

## 2017-07-26 RX ADMIN — SODIUM BICARBONATE TAB 650 MG 650 MG: 650 TAB at 17:18

## 2017-07-26 RX ADMIN — PREDNISONE 10 MG: 10 TABLET ORAL at 10:47

## 2017-07-26 RX ADMIN — DOXYCYCLINE 100 MG: 100 INJECTION, POWDER, LYOPHILIZED, FOR SOLUTION INTRAVENOUS at 21:37

## 2017-07-26 RX ADMIN — GUAIFENESIN 1200 MG: 600 TABLET, EXTENDED RELEASE ORAL at 21:38

## 2017-07-26 RX ADMIN — GABAPENTIN 100 MG: 100 CAPSULE ORAL at 21:37

## 2017-07-26 RX ADMIN — SODIUM BICARBONATE TAB 650 MG 650 MG: 650 TAB at 21:40

## 2017-07-27 LAB
A-A DO2: 146.6 MMHG (ref 0–300)
ALBUMIN SERPL-MCNC: 3.3 G/DL (ref 3.4–4.8)
ALBUMIN/GLOB SERPL: 1.2 G/DL (ref 1.5–2.5)
ALP SERPL-CCNC: 106 U/L (ref 35–104)
ALT SERPL W P-5'-P-CCNC: 25 U/L (ref 10–36)
ANION GAP SERPL CALCULATED.3IONS-SCNC: 9.1 MMOL/L (ref 3.6–11.2)
ARTERIAL PATENCY WRIST A: POSITIVE
AST SERPL-CCNC: 19 U/L (ref 10–30)
ATMOSPHERIC PRESS: 727 MMHG
BASE EXCESS BLDA CALC-SCNC: -11.3 MMOL/L
BASOPHILS # BLD AUTO: 0.01 10*3/MM3 (ref 0–0.3)
BASOPHILS NFR BLD AUTO: 0.1 % (ref 0–2)
BDY SITE: ABNORMAL
BILIRUB SERPL-MCNC: 0.2 MG/DL (ref 0.2–1.8)
BODY TEMPERATURE: 98.6 C
BUN BLD-MCNC: 75 MG/DL (ref 7–21)
BUN/CREAT SERPL: 23.4 (ref 7–25)
CALCIUM SPEC-SCNC: 8 MG/DL (ref 7.7–10)
CHLORIDE SERPL-SCNC: 112 MMOL/L (ref 99–112)
CO2 SERPL-SCNC: 16.9 MMOL/L (ref 24.3–31.9)
COHGB MFR BLD: 1.5 % (ref 0–5)
CREAT BLD-MCNC: 3.2 MG/DL (ref 0.43–1.29)
CRP SERPL-MCNC: 1.21 MG/DL (ref 0–0.99)
DEPRECATED RDW RBC AUTO: 60.7 FL (ref 37–54)
EOSINOPHIL # BLD AUTO: 0 10*3/MM3 (ref 0–0.7)
EOSINOPHIL NFR BLD AUTO: 0 % (ref 0–7)
ERYTHROCYTE [DISTWIDTH] IN BLOOD BY AUTOMATED COUNT: 18 % (ref 11.5–14.5)
GFR SERPL CREATININE-BSD FRML MDRD: 14 ML/MIN/1.73
GLOBULIN UR ELPH-MCNC: 2.8 GM/DL
GLUCOSE BLD-MCNC: 115 MG/DL (ref 70–110)
GLUCOSE BLDC GLUCOMTR-MCNC: 101 MG/DL (ref 70–130)
GLUCOSE BLDC GLUCOMTR-MCNC: 156 MG/DL (ref 70–130)
GLUCOSE BLDC GLUCOMTR-MCNC: 208 MG/DL (ref 70–130)
GLUCOSE BLDC GLUCOMTR-MCNC: 252 MG/DL (ref 70–130)
GLUCOSE BLDC GLUCOMTR-MCNC: 90 MG/DL (ref 70–130)
HCO3 BLDA-SCNC: 14.5 MMOL/L (ref 22–26)
HCT VFR BLD AUTO: 27.8 % (ref 37–47)
HCT VFR BLD CALC: 32 % (ref 37–47)
HGB BLD-MCNC: 8.3 G/DL (ref 12–16)
HGB BLDA-MCNC: 10.8 G/DL (ref 12–16)
HOROWITZ INDEX BLD+IHG-RTO: 36 %
IMM GRANULOCYTES # BLD: 0.04 10*3/MM3 (ref 0–0.03)
IMM GRANULOCYTES NFR BLD: 0.3 % (ref 0–0.5)
LYMPHOCYTES # BLD AUTO: 1.19 10*3/MM3 (ref 1–3)
LYMPHOCYTES NFR BLD AUTO: 10.1 % (ref 16–46)
MCH RBC QN AUTO: 27.3 PG (ref 27–33)
MCHC RBC AUTO-ENTMCNC: 29.9 G/DL (ref 33–37)
MCV RBC AUTO: 91.4 FL (ref 80–94)
METHGB BLD QL: 0.2 % (ref 0–3)
MODALITY: ABNORMAL
MONOCYTES # BLD AUTO: 0.73 10*3/MM3 (ref 0.1–0.9)
MONOCYTES NFR BLD AUTO: 6.2 % (ref 0–12)
NEUTROPHILS # BLD AUTO: 9.87 10*3/MM3 (ref 1.4–6.5)
NEUTROPHILS NFR BLD AUTO: 83.3 % (ref 40–75)
OSMOLALITY SERPL CALC.SUM OF ELEC: 298.9 MOSM/KG (ref 273–305)
OXYHGB MFR BLDV: 88.5 % (ref 85–100)
PCO2 BLDA: 32.3 MM HG (ref 35–45)
PH BLDA: 7.27 PH UNITS (ref 7.35–7.45)
PLATELET # BLD AUTO: 289 10*3/MM3 (ref 130–400)
PMV BLD AUTO: 10.4 FL (ref 6–10)
PO2 BLDA: 60.7 MM HG (ref 80–100)
POTASSIUM BLD-SCNC: 4.9 MMOL/L (ref 3.5–5.3)
PROT SERPL-MCNC: 6.1 G/DL (ref 6–8)
RBC # BLD AUTO: 3.04 10*6/MM3 (ref 4.2–5.4)
SAO2 % BLDCOA: 90 % (ref 90–100)
SODIUM BLD-SCNC: 138 MMOL/L (ref 135–153)
WBC NRBC COR # BLD: 11.84 10*3/MM3 (ref 4.5–12.5)

## 2017-07-27 PROCEDURE — 87045 FECES CULTURE AEROBIC BACT: CPT | Performed by: HOSPITALIST

## 2017-07-27 PROCEDURE — 87899 AGENT NOS ASSAY W/OPTIC: CPT | Performed by: HOSPITALIST

## 2017-07-27 PROCEDURE — 80053 COMPREHEN METABOLIC PANEL: CPT | Performed by: INTERNAL MEDICINE

## 2017-07-27 PROCEDURE — 63710000001 INSULIN DETEMIR PER 5 UNITS: Performed by: HOSPITALIST

## 2017-07-27 PROCEDURE — 85025 COMPLETE CBC W/AUTO DIFF WBC: CPT | Performed by: INTERNAL MEDICINE

## 2017-07-27 PROCEDURE — 63710000001 PREDNISONE PER 5 MG: Performed by: INTERNAL MEDICINE

## 2017-07-27 PROCEDURE — 82962 GLUCOSE BLOOD TEST: CPT

## 2017-07-27 PROCEDURE — 94799 UNLISTED PULMONARY SVC/PX: CPT

## 2017-07-27 PROCEDURE — 87046 STOOL CULTR AEROBIC BACT EA: CPT | Performed by: HOSPITALIST

## 2017-07-27 PROCEDURE — 36600 WITHDRAWAL OF ARTERIAL BLOOD: CPT | Performed by: NURSE PRACTITIONER

## 2017-07-27 PROCEDURE — 63710000001 INSULIN ASPART PER 5 UNITS: Performed by: HOSPITALIST

## 2017-07-27 PROCEDURE — 83050 HGB METHEMOGLOBIN QUAN: CPT | Performed by: NURSE PRACTITIONER

## 2017-07-27 PROCEDURE — 94760 N-INVAS EAR/PLS OXIMETRY 1: CPT

## 2017-07-27 PROCEDURE — 94660 CPAP INITIATION&MGMT: CPT

## 2017-07-27 PROCEDURE — 25010000002 HEPARIN (PORCINE) PER 1000 UNITS: Performed by: HOSPITALIST

## 2017-07-27 PROCEDURE — 82805 BLOOD GASES W/O2 SATURATION: CPT | Performed by: NURSE PRACTITIONER

## 2017-07-27 PROCEDURE — 25010000002 FUROSEMIDE PER 20 MG: Performed by: INTERNAL MEDICINE

## 2017-07-27 PROCEDURE — 99233 SBSQ HOSP IP/OBS HIGH 50: CPT | Performed by: INTERNAL MEDICINE

## 2017-07-27 PROCEDURE — 86140 C-REACTIVE PROTEIN: CPT | Performed by: INTERNAL MEDICINE

## 2017-07-27 PROCEDURE — 63510000001 EPOETIN ALFA PER 1000 UNITS: Performed by: INTERNAL MEDICINE

## 2017-07-27 PROCEDURE — 99232 SBSQ HOSP IP/OBS MODERATE 35: CPT | Performed by: INTERNAL MEDICINE

## 2017-07-27 PROCEDURE — 82375 ASSAY CARBOXYHB QUANT: CPT | Performed by: NURSE PRACTITIONER

## 2017-07-27 RX ORDER — ACETAMINOPHEN 325 MG/1
650 TABLET ORAL EVERY 6 HOURS PRN
Status: DISCONTINUED | OUTPATIENT
Start: 2017-07-27 | End: 2017-08-09 | Stop reason: HOSPADM

## 2017-07-27 RX ORDER — IPRATROPIUM BROMIDE AND ALBUTEROL SULFATE 2.5; .5 MG/3ML; MG/3ML
3 SOLUTION RESPIRATORY (INHALATION)
Status: DISCONTINUED | OUTPATIENT
Start: 2017-07-27 | End: 2017-07-28

## 2017-07-27 RX ORDER — SODIUM BICARBONATE 650 MG/1
1300 TABLET ORAL 3 TIMES DAILY
Status: DISCONTINUED | OUTPATIENT
Start: 2017-07-27 | End: 2017-07-31

## 2017-07-27 RX ORDER — FUROSEMIDE 10 MG/ML
40 INJECTION INTRAMUSCULAR; INTRAVENOUS ONCE
Status: COMPLETED | OUTPATIENT
Start: 2017-07-27 | End: 2017-07-27

## 2017-07-27 RX ORDER — CLOPIDOGREL BISULFATE 75 MG/1
75 TABLET ORAL DAILY
Status: DISCONTINUED | OUTPATIENT
Start: 2017-07-27 | End: 2017-08-09 | Stop reason: HOSPADM

## 2017-07-27 RX ADMIN — FUROSEMIDE 40 MG: 10 INJECTION, SOLUTION INTRAMUSCULAR; INTRAVENOUS at 13:20

## 2017-07-27 RX ADMIN — DOXYCYCLINE 100 MG: 100 INJECTION, POWDER, LYOPHILIZED, FOR SOLUTION INTRAVENOUS at 21:20

## 2017-07-27 RX ADMIN — BUDESONIDE AND FORMOTEROL FUMARATE DIHYDRATE 2 PUFF: 80; 4.5 AEROSOL RESPIRATORY (INHALATION) at 06:29

## 2017-07-27 RX ADMIN — IPRATROPIUM BROMIDE AND ALBUTEROL SULFATE 3 ML: .5; 3 SOLUTION RESPIRATORY (INHALATION) at 06:29

## 2017-07-27 RX ADMIN — GUAIFENESIN 1200 MG: 600 TABLET, EXTENDED RELEASE ORAL at 08:22

## 2017-07-27 RX ADMIN — INSULIN ASPART 12 UNITS: 100 INJECTION, SOLUTION INTRAVENOUS; SUBCUTANEOUS at 17:36

## 2017-07-27 RX ADMIN — SODIUM BICARBONATE TAB 650 MG 650 MG: 650 TAB at 08:20

## 2017-07-27 RX ADMIN — CLOPIDOGREL BISULFATE 75 MG: 75 TABLET, FILM COATED ORAL at 16:51

## 2017-07-27 RX ADMIN — HEPARIN SODIUM 5000 UNITS: 5000 INJECTION, SOLUTION INTRAVENOUS; SUBCUTANEOUS at 21:17

## 2017-07-27 RX ADMIN — DOXYCYCLINE 100 MG: 100 INJECTION, POWDER, LYOPHILIZED, FOR SOLUTION INTRAVENOUS at 08:14

## 2017-07-27 RX ADMIN — ACETAMINOPHEN 650 MG: 325 TABLET ORAL at 23:13

## 2017-07-27 RX ADMIN — IPRATROPIUM BROMIDE AND ALBUTEROL SULFATE 3 ML: .5; 3 SOLUTION RESPIRATORY (INHALATION) at 19:27

## 2017-07-27 RX ADMIN — PANTOPRAZOLE SODIUM 40 MG: 40 TABLET, DELAYED RELEASE ORAL at 08:21

## 2017-07-27 RX ADMIN — ERYTHROPOIETIN 10000 UNITS: 20000 INJECTION, SOLUTION INTRAVENOUS; SUBCUTANEOUS at 17:41

## 2017-07-27 RX ADMIN — METOPROLOL TARTRATE 25 MG: 25 TABLET, FILM COATED ORAL at 08:19

## 2017-07-27 RX ADMIN — INSULIN ASPART 12 UNITS: 100 INJECTION, SOLUTION INTRAVENOUS; SUBCUTANEOUS at 13:18

## 2017-07-27 RX ADMIN — SODIUM BICARBONATE TAB 650 MG 1300 MG: 650 TAB at 21:18

## 2017-07-27 RX ADMIN — PREDNISONE 10 MG: 10 TABLET ORAL at 08:21

## 2017-07-27 RX ADMIN — ASPIRIN 81 MG: 81 TABLET, CHEWABLE ORAL at 21:17

## 2017-07-27 RX ADMIN — IPRATROPIUM BROMIDE AND ALBUTEROL SULFATE 3 ML: .5; 3 SOLUTION RESPIRATORY (INHALATION) at 10:25

## 2017-07-27 RX ADMIN — GABAPENTIN 100 MG: 100 CAPSULE ORAL at 13:19

## 2017-07-27 RX ADMIN — NICOTINE 1 PATCH: 21 PATCH, EXTENDED RELEASE TRANSDERMAL at 21:22

## 2017-07-27 RX ADMIN — ISOSORBIDE MONONITRATE 60 MG: 60 TABLET, EXTENDED RELEASE ORAL at 08:20

## 2017-07-27 RX ADMIN — SODIUM BICARBONATE TAB 650 MG 1300 MG: 650 TAB at 16:51

## 2017-07-27 RX ADMIN — ACETAMINOPHEN 650 MG: 325 TABLET ORAL at 02:57

## 2017-07-27 RX ADMIN — CLONAZEPAM 0.5 MG: 0.5 TABLET ORAL at 08:24

## 2017-07-27 RX ADMIN — HEPARIN SODIUM 5000 UNITS: 5000 INJECTION, SOLUTION INTRAVENOUS; SUBCUTANEOUS at 08:25

## 2017-07-27 RX ADMIN — BUDESONIDE AND FORMOTEROL FUMARATE DIHYDRATE 2 PUFF: 80; 4.5 AEROSOL RESPIRATORY (INHALATION) at 19:38

## 2017-07-27 RX ADMIN — METOPROLOL TARTRATE 25 MG: 25 TABLET, FILM COATED ORAL at 21:18

## 2017-07-27 RX ADMIN — IPRATROPIUM BROMIDE AND ALBUTEROL SULFATE 3 ML: .5; 3 SOLUTION RESPIRATORY (INHALATION) at 02:00

## 2017-07-27 RX ADMIN — GUAIFENESIN 1200 MG: 600 TABLET, EXTENDED RELEASE ORAL at 21:18

## 2017-07-27 RX ADMIN — ACETAMINOPHEN 650 MG: 325 TABLET ORAL at 08:26

## 2017-07-27 RX ADMIN — ATORVASTATIN CALCIUM 40 MG: 40 TABLET, FILM COATED ORAL at 21:18

## 2017-07-27 RX ADMIN — GABAPENTIN 100 MG: 100 CAPSULE ORAL at 21:18

## 2017-07-27 RX ADMIN — INSULIN DETEMIR 30 UNITS: 100 INJECTION, SOLUTION SUBCUTANEOUS at 21:17

## 2017-07-27 RX ADMIN — CLONAZEPAM 0.5 MG: 0.5 TABLET ORAL at 21:18

## 2017-07-28 ENCOUNTER — APPOINTMENT (OUTPATIENT)
Dept: GENERAL RADIOLOGY | Facility: HOSPITAL | Age: 67
End: 2017-07-28

## 2017-07-28 LAB
A-A DO2: 108.7 MMHG (ref 0–300)
A-A DO2: 70.1 MMHG (ref 0–300)
ALBUMIN SERPL-MCNC: 3.1 G/DL (ref 3.4–4.8)
ALBUMIN/GLOB SERPL: 1.2 G/DL (ref 1.5–2.5)
ALP SERPL-CCNC: 95 U/L (ref 35–104)
ALT SERPL W P-5'-P-CCNC: 24 U/L (ref 10–36)
ANION GAP SERPL CALCULATED.3IONS-SCNC: 8.5 MMOL/L (ref 3.6–11.2)
ARTERIAL PATENCY WRIST A: ABNORMAL
ARTERIAL PATENCY WRIST A: POSITIVE
AST SERPL-CCNC: 21 U/L (ref 10–30)
ATMOSPHERIC PRESS: 723 MMHG
ATMOSPHERIC PRESS: 727 MMHG
BASE EXCESS BLDA CALC-SCNC: -10.5 MMOL/L
BASE EXCESS BLDA CALC-SCNC: -8.3 MMOL/L
BASOPHILS # BLD AUTO: 0.03 10*3/MM3 (ref 0–0.3)
BASOPHILS NFR BLD AUTO: 0.3 % (ref 0–2)
BDY SITE: ABNORMAL
BDY SITE: ABNORMAL
BILIRUB SERPL-MCNC: 0.1 MG/DL (ref 0.2–1.8)
BODY TEMPERATURE: 98.6 C
BODY TEMPERATURE: 98.6 C
BUN BLD-MCNC: 89 MG/DL (ref 7–21)
BUN/CREAT SERPL: 27 (ref 7–25)
CALCIUM SPEC-SCNC: 7.2 MG/DL (ref 7.7–10)
CHLORIDE SERPL-SCNC: 110 MMOL/L (ref 99–112)
CO2 SERPL-SCNC: 19.5 MMOL/L (ref 24.3–31.9)
COHGB MFR BLD: 1.1 % (ref 0–5)
COHGB MFR BLD: 1.2 % (ref 0–5)
CREAT BLD-MCNC: 3.3 MG/DL (ref 0.43–1.29)
CRP SERPL-MCNC: 0.66 MG/DL (ref 0–0.99)
DEPRECATED RDW RBC AUTO: 59.8 FL (ref 37–54)
EOSINOPHIL # BLD AUTO: 0.05 10*3/MM3 (ref 0–0.7)
EOSINOPHIL NFR BLD AUTO: 0.5 % (ref 0–7)
EPAP: 6
EPAP: 6
ERYTHROCYTE [DISTWIDTH] IN BLOOD BY AUTOMATED COUNT: 18.1 % (ref 11.5–14.5)
GFR SERPL CREATININE-BSD FRML MDRD: 14 ML/MIN/1.73
GLOBULIN UR ELPH-MCNC: 2.6 GM/DL
GLUCOSE BLD-MCNC: 168 MG/DL (ref 70–110)
GLUCOSE BLDC GLUCOMTR-MCNC: 129 MG/DL (ref 70–130)
GLUCOSE BLDC GLUCOMTR-MCNC: 146 MG/DL (ref 70–130)
GLUCOSE BLDC GLUCOMTR-MCNC: 232 MG/DL (ref 70–130)
GLUCOSE BLDC GLUCOMTR-MCNC: 346 MG/DL (ref 70–130)
GLUCOSE BLDC GLUCOMTR-MCNC: 48 MG/DL (ref 70–130)
HCO3 BLDA-SCNC: 16.3 MMOL/L (ref 22–26)
HCO3 BLDA-SCNC: 18.3 MMOL/L (ref 22–26)
HCT VFR BLD AUTO: 27 % (ref 37–47)
HCT VFR BLD CALC: 26 % (ref 37–47)
HCT VFR BLD CALC: 26 % (ref 37–47)
HGB BLD-MCNC: 8 G/DL (ref 12–16)
HGB BLDA-MCNC: 8.9 G/DL (ref 12–16)
HGB BLDA-MCNC: 9 G/DL (ref 12–16)
HOROWITZ INDEX BLD+IHG-RTO: 30 %
HOROWITZ INDEX BLD+IHG-RTO: 35 %
IMM GRANULOCYTES # BLD: 0.07 10*3/MM3 (ref 0–0.03)
IMM GRANULOCYTES NFR BLD: 0.7 % (ref 0–0.5)
IPAP: 12
IPAP: 15
LYMPHOCYTES # BLD AUTO: 1.92 10*3/MM3 (ref 1–3)
LYMPHOCYTES NFR BLD AUTO: 18.9 % (ref 16–46)
MCH RBC QN AUTO: 28 PG (ref 27–33)
MCHC RBC AUTO-ENTMCNC: 29.6 G/DL (ref 33–37)
MCV RBC AUTO: 94.4 FL (ref 80–94)
METHGB BLD QL: 0.1 % (ref 0–3)
METHGB BLD QL: 0.1 % (ref 0–3)
MODALITY: ABNORMAL
MODALITY: ABNORMAL
MONOCYTES # BLD AUTO: 0.59 10*3/MM3 (ref 0.1–0.9)
MONOCYTES NFR BLD AUTO: 5.8 % (ref 0–12)
NEUTROPHILS # BLD AUTO: 7.52 10*3/MM3 (ref 1.4–6.5)
NEUTROPHILS NFR BLD AUTO: 73.8 % (ref 40–75)
OSMOLALITY SERPL CALC.SUM OF ELEC: 306.8 MOSM/KG (ref 273–305)
OXYHGB MFR BLDV: 94 % (ref 85–100)
OXYHGB MFR BLDV: 94.2 % (ref 85–100)
PCO2 BLDA: 39.8 MM HG (ref 35–45)
PCO2 BLDA: 42.5 MM HG (ref 35–45)
PH BLDA: 7.23 PH UNITS (ref 7.35–7.45)
PH BLDA: 7.25 PH UNITS (ref 7.35–7.45)
PLATELET # BLD AUTO: 276 10*3/MM3 (ref 130–400)
PMV BLD AUTO: 10.8 FL (ref 6–10)
PO2 BLDA: 82.8 MM HG (ref 80–100)
PO2 BLDA: 83 MM HG (ref 80–100)
POTASSIUM BLD-SCNC: 5 MMOL/L (ref 3.5–5.3)
PROT SERPL-MCNC: 5.7 G/DL (ref 6–8)
RBC # BLD AUTO: 2.86 10*6/MM3 (ref 4.2–5.4)
SAO2 % BLDCOA: 95.1 % (ref 90–100)
SAO2 % BLDCOA: 95.4 % (ref 90–100)
SET MECH RESP RATE: 20
SODIUM BLD-SCNC: 138 MMOL/L (ref 135–153)
TOTAL RATE: 20 BREATHS/MINUTE
TOTAL RATE: 20 BREATHS/MINUTE
WBC NRBC COR # BLD: 10.18 10*3/MM3 (ref 4.5–12.5)

## 2017-07-28 PROCEDURE — 99233 SBSQ HOSP IP/OBS HIGH 50: CPT | Performed by: INTERNAL MEDICINE

## 2017-07-28 PROCEDURE — 85025 COMPLETE CBC W/AUTO DIFF WBC: CPT | Performed by: INTERNAL MEDICINE

## 2017-07-28 PROCEDURE — 83050 HGB METHEMOGLOBIN QUAN: CPT | Performed by: INTERNAL MEDICINE

## 2017-07-28 PROCEDURE — 71010 XR CHEST AP: CPT | Performed by: RADIOLOGY

## 2017-07-28 PROCEDURE — 83050 HGB METHEMOGLOBIN QUAN: CPT | Performed by: HOSPITALIST

## 2017-07-28 PROCEDURE — 94799 UNLISTED PULMONARY SVC/PX: CPT

## 2017-07-28 PROCEDURE — 82805 BLOOD GASES W/O2 SATURATION: CPT | Performed by: INTERNAL MEDICINE

## 2017-07-28 PROCEDURE — 71010 HC CHEST AP: CPT

## 2017-07-28 PROCEDURE — 63710000001 INSULIN ASPART PER 5 UNITS: Performed by: HOSPITALIST

## 2017-07-28 PROCEDURE — 63710000001 INSULIN DETEMIR PER 5 UNITS: Performed by: INTERNAL MEDICINE

## 2017-07-28 PROCEDURE — 82962 GLUCOSE BLOOD TEST: CPT

## 2017-07-28 PROCEDURE — 25010000002 HEPARIN (PORCINE) PER 1000 UNITS: Performed by: HOSPITALIST

## 2017-07-28 PROCEDURE — 25010000002 METHYLPREDNISOLONE PER 40 MG: Performed by: INTERNAL MEDICINE

## 2017-07-28 PROCEDURE — 86140 C-REACTIVE PROTEIN: CPT | Performed by: INTERNAL MEDICINE

## 2017-07-28 PROCEDURE — 82375 ASSAY CARBOXYHB QUANT: CPT | Performed by: INTERNAL MEDICINE

## 2017-07-28 PROCEDURE — 36600 WITHDRAWAL OF ARTERIAL BLOOD: CPT | Performed by: HOSPITALIST

## 2017-07-28 PROCEDURE — 94660 CPAP INITIATION&MGMT: CPT

## 2017-07-28 PROCEDURE — 63710000001 PREDNISONE PER 5 MG: Performed by: INTERNAL MEDICINE

## 2017-07-28 PROCEDURE — 36600 WITHDRAWAL OF ARTERIAL BLOOD: CPT | Performed by: INTERNAL MEDICINE

## 2017-07-28 PROCEDURE — 80053 COMPREHEN METABOLIC PANEL: CPT | Performed by: INTERNAL MEDICINE

## 2017-07-28 PROCEDURE — 99232 SBSQ HOSP IP/OBS MODERATE 35: CPT | Performed by: INTERNAL MEDICINE

## 2017-07-28 PROCEDURE — 82375 ASSAY CARBOXYHB QUANT: CPT | Performed by: HOSPITALIST

## 2017-07-28 PROCEDURE — 82805 BLOOD GASES W/O2 SATURATION: CPT | Performed by: HOSPITALIST

## 2017-07-28 RX ORDER — METHYLPREDNISOLONE SODIUM SUCCINATE 40 MG/ML
40 INJECTION, POWDER, LYOPHILIZED, FOR SOLUTION INTRAMUSCULAR; INTRAVENOUS EVERY 12 HOURS SCHEDULED
Status: DISCONTINUED | OUTPATIENT
Start: 2017-07-28 | End: 2017-08-01

## 2017-07-28 RX ORDER — DOXYCYCLINE 100 MG/1
100 CAPSULE ORAL EVERY 12 HOURS SCHEDULED
Status: DISCONTINUED | OUTPATIENT
Start: 2017-07-28 | End: 2017-08-09 | Stop reason: HOSPADM

## 2017-07-28 RX ORDER — IPRATROPIUM BROMIDE AND ALBUTEROL SULFATE 2.5; .5 MG/3ML; MG/3ML
3 SOLUTION RESPIRATORY (INHALATION)
Status: DISCONTINUED | OUTPATIENT
Start: 2017-07-28 | End: 2017-08-09 | Stop reason: HOSPADM

## 2017-07-28 RX ADMIN — GABAPENTIN 100 MG: 100 CAPSULE ORAL at 08:37

## 2017-07-28 RX ADMIN — IPRATROPIUM BROMIDE AND ALBUTEROL SULFATE 3 ML: .5; 3 SOLUTION RESPIRATORY (INHALATION) at 22:50

## 2017-07-28 RX ADMIN — SODIUM BICARBONATE TAB 650 MG 1300 MG: 650 TAB at 15:38

## 2017-07-28 RX ADMIN — IPRATROPIUM BROMIDE AND ALBUTEROL SULFATE 3 ML: .5; 3 SOLUTION RESPIRATORY (INHALATION) at 19:31

## 2017-07-28 RX ADMIN — IPRATROPIUM BROMIDE AND ALBUTEROL SULFATE 3 ML: .5; 3 SOLUTION RESPIRATORY (INHALATION) at 00:25

## 2017-07-28 RX ADMIN — CLONAZEPAM 0.5 MG: 0.5 TABLET ORAL at 08:48

## 2017-07-28 RX ADMIN — INSULIN ASPART 8 UNITS: 100 INJECTION, SOLUTION INTRAVENOUS; SUBCUTANEOUS at 21:38

## 2017-07-28 RX ADMIN — BUDESONIDE AND FORMOTEROL FUMARATE DIHYDRATE 2 PUFF: 80; 4.5 AEROSOL RESPIRATORY (INHALATION) at 19:31

## 2017-07-28 RX ADMIN — PANTOPRAZOLE SODIUM 40 MG: 40 TABLET, DELAYED RELEASE ORAL at 09:59

## 2017-07-28 RX ADMIN — GUAIFENESIN 1200 MG: 600 TABLET, EXTENDED RELEASE ORAL at 08:41

## 2017-07-28 RX ADMIN — IPRATROPIUM BROMIDE AND ALBUTEROL SULFATE 3 ML: .5; 3 SOLUTION RESPIRATORY (INHALATION) at 15:14

## 2017-07-28 RX ADMIN — CLONAZEPAM 0.5 MG: 0.5 TABLET ORAL at 20:58

## 2017-07-28 RX ADMIN — METHYLPREDNISOLONE SODIUM SUCCINATE 40 MG: 40 INJECTION, POWDER, FOR SOLUTION INTRAMUSCULAR; INTRAVENOUS at 20:58

## 2017-07-28 RX ADMIN — GUAIFENESIN 1200 MG: 600 TABLET, EXTENDED RELEASE ORAL at 20:57

## 2017-07-28 RX ADMIN — INSULIN ASPART 16 UNITS: 100 INJECTION, SOLUTION INTRAVENOUS; SUBCUTANEOUS at 17:21

## 2017-07-28 RX ADMIN — INSULIN DETEMIR 20 UNITS: 100 INJECTION, SOLUTION SUBCUTANEOUS at 20:59

## 2017-07-28 RX ADMIN — METHYLPREDNISOLONE SODIUM SUCCINATE 40 MG: 40 INJECTION, POWDER, FOR SOLUTION INTRAMUSCULAR; INTRAVENOUS at 11:25

## 2017-07-28 RX ADMIN — SODIUM BICARBONATE TAB 650 MG 1300 MG: 650 TAB at 08:39

## 2017-07-28 RX ADMIN — IPRATROPIUM BROMIDE AND ALBUTEROL SULFATE 3 ML: .5; 3 SOLUTION RESPIRATORY (INHALATION) at 10:59

## 2017-07-28 RX ADMIN — ASPIRIN 81 MG: 81 TABLET, CHEWABLE ORAL at 20:57

## 2017-07-28 RX ADMIN — Medication 10 ML: at 08:38

## 2017-07-28 RX ADMIN — NICOTINE 1 PATCH: 21 PATCH, EXTENDED RELEASE TRANSDERMAL at 20:59

## 2017-07-28 RX ADMIN — SODIUM BICARBONATE TAB 650 MG 1300 MG: 650 TAB at 20:57

## 2017-07-28 RX ADMIN — METOPROLOL TARTRATE 25 MG: 25 TABLET, FILM COATED ORAL at 20:57

## 2017-07-28 RX ADMIN — METOPROLOL TARTRATE 25 MG: 25 TABLET, FILM COATED ORAL at 08:41

## 2017-07-28 RX ADMIN — BUDESONIDE AND FORMOTEROL FUMARATE DIHYDRATE 2 PUFF: 80; 4.5 AEROSOL RESPIRATORY (INHALATION) at 07:09

## 2017-07-28 RX ADMIN — PREDNISONE 10 MG: 10 TABLET ORAL at 08:39

## 2017-07-28 RX ADMIN — ISOSORBIDE MONONITRATE 60 MG: 60 TABLET, EXTENDED RELEASE ORAL at 08:40

## 2017-07-28 RX ADMIN — IPRATROPIUM BROMIDE AND ALBUTEROL SULFATE 3 ML: .5; 3 SOLUTION RESPIRATORY (INHALATION) at 07:09

## 2017-07-28 RX ADMIN — ACETAMINOPHEN 650 MG: 325 TABLET ORAL at 08:37

## 2017-07-28 RX ADMIN — HEPARIN SODIUM 5000 UNITS: 5000 INJECTION, SOLUTION INTRAVENOUS; SUBCUTANEOUS at 20:58

## 2017-07-28 RX ADMIN — HEPARIN SODIUM 5000 UNITS: 5000 INJECTION, SOLUTION INTRAVENOUS; SUBCUTANEOUS at 08:40

## 2017-07-28 RX ADMIN — DOXYCYCLINE 100 MG: 100 CAPSULE ORAL at 20:57

## 2017-07-28 RX ADMIN — CLOPIDOGREL BISULFATE 75 MG: 75 TABLET, FILM COATED ORAL at 08:40

## 2017-07-28 RX ADMIN — ACETAMINOPHEN 650 MG: 325 TABLET ORAL at 14:02

## 2017-07-28 RX ADMIN — ATORVASTATIN CALCIUM 40 MG: 40 TABLET, FILM COATED ORAL at 20:57

## 2017-07-28 RX ADMIN — DOXYCYCLINE 100 MG: 100 INJECTION, POWDER, LYOPHILIZED, FOR SOLUTION INTRAVENOUS at 08:37

## 2017-07-28 RX ADMIN — GABAPENTIN 100 MG: 100 CAPSULE ORAL at 20:58

## 2017-07-29 LAB
A-A DO2: 59 MMHG (ref 0–300)
ALBUMIN SERPL-MCNC: 3.4 G/DL (ref 3.4–4.8)
ALBUMIN/GLOB SERPL: 1.3 G/DL (ref 1.5–2.5)
ALP SERPL-CCNC: 105 U/L (ref 35–104)
ALT SERPL W P-5'-P-CCNC: 56 U/L (ref 10–36)
ANION GAP SERPL CALCULATED.3IONS-SCNC: 10.2 MMOL/L (ref 3.6–11.2)
ARTERIAL PATENCY WRIST A: POSITIVE
AST SERPL-CCNC: 44 U/L (ref 10–30)
ATMOSPHERIC PRESS: 723 MMHG
BACTERIA SPEC AEROBE CULT: NORMAL
BASE EXCESS BLDA CALC-SCNC: -8.3 MMOL/L
BASOPHILS # BLD AUTO: 0.01 10*3/MM3 (ref 0–0.3)
BASOPHILS NFR BLD AUTO: 0.1 % (ref 0–2)
BDY SITE: ABNORMAL
BILIRUB SERPL-MCNC: 0.1 MG/DL (ref 0.2–1.8)
BODY TEMPERATURE: 98.6 C
BUN BLD-MCNC: 88 MG/DL (ref 7–21)
BUN/CREAT SERPL: 24.9 (ref 7–25)
CALCIUM SPEC-SCNC: 7.1 MG/DL (ref 7.7–10)
CHLORIDE SERPL-SCNC: 110 MMOL/L (ref 99–112)
CO2 SERPL-SCNC: 16.8 MMOL/L (ref 24.3–31.9)
COHGB MFR BLD: 1.4 % (ref 0–5)
CREAT BLD-MCNC: 3.54 MG/DL (ref 0.43–1.29)
DEPRECATED RDW RBC AUTO: 58.1 FL (ref 37–54)
EOSINOPHIL # BLD AUTO: 0 10*3/MM3 (ref 0–0.7)
EOSINOPHIL NFR BLD AUTO: 0 % (ref 0–7)
EPAP: 6
ERYTHROCYTE [DISTWIDTH] IN BLOOD BY AUTOMATED COUNT: 17.8 % (ref 11.5–14.5)
GFR SERPL CREATININE-BSD FRML MDRD: 13 ML/MIN/1.73
GLOBULIN UR ELPH-MCNC: 2.7 GM/DL
GLUCOSE BLD-MCNC: 97 MG/DL (ref 70–110)
GLUCOSE BLDC GLUCOMTR-MCNC: 103 MG/DL (ref 70–130)
GLUCOSE BLDC GLUCOMTR-MCNC: 170 MG/DL (ref 70–130)
GLUCOSE BLDC GLUCOMTR-MCNC: 255 MG/DL (ref 70–130)
GLUCOSE BLDC GLUCOMTR-MCNC: 380 MG/DL (ref 70–130)
HCO3 BLDA-SCNC: 18.3 MMOL/L (ref 22–26)
HCT VFR BLD AUTO: 28.2 % (ref 37–47)
HCT VFR BLD CALC: 26 % (ref 37–47)
HGB BLD-MCNC: 8.2 G/DL (ref 12–16)
HGB BLDA-MCNC: 8.7 G/DL (ref 12–16)
HOROWITZ INDEX BLD+IHG-RTO: 30 %
IMM GRANULOCYTES # BLD: 0.07 10*3/MM3 (ref 0–0.03)
IMM GRANULOCYTES NFR BLD: 0.8 % (ref 0–0.5)
IPAP: 15
LYMPHOCYTES # BLD AUTO: 0.73 10*3/MM3 (ref 1–3)
LYMPHOCYTES NFR BLD AUTO: 8.6 % (ref 16–46)
MCH RBC QN AUTO: 27.3 PG (ref 27–33)
MCHC RBC AUTO-ENTMCNC: 29.1 G/DL (ref 33–37)
MCV RBC AUTO: 94 FL (ref 80–94)
METHGB BLD QL: 0.2 % (ref 0–3)
MODALITY: ABNORMAL
MONOCYTES # BLD AUTO: 0.09 10*3/MM3 (ref 0.1–0.9)
MONOCYTES NFR BLD AUTO: 1.1 % (ref 0–12)
NEUTROPHILS # BLD AUTO: 7.54 10*3/MM3 (ref 1.4–6.5)
NEUTROPHILS NFR BLD AUTO: 89.4 % (ref 40–75)
OSMOLALITY SERPL CALC.SUM OF ELEC: 300.6 MOSM/KG (ref 273–305)
OXYHGB MFR BLDV: 95.5 % (ref 85–100)
PCO2 BLDA: 42.3 MM HG (ref 35–45)
PH BLDA: 7.25 PH UNITS (ref 7.35–7.45)
PLATELET # BLD AUTO: 280 10*3/MM3 (ref 130–400)
PMV BLD AUTO: 11 FL (ref 6–10)
PO2 BLDA: 94.1 MM HG (ref 80–100)
POTASSIUM BLD-SCNC: 4.8 MMOL/L (ref 3.5–5.3)
POTASSIUM BLD-SCNC: 5.4 MMOL/L (ref 3.5–5.3)
PROT SERPL-MCNC: 6.1 G/DL (ref 6–8)
RBC # BLD AUTO: 3 10*6/MM3 (ref 4.2–5.4)
SAO2 % BLDCOA: 97.1 % (ref 90–100)
SET MECH RESP RATE: 20
SODIUM BLD-SCNC: 137 MMOL/L (ref 135–153)
WBC NRBC COR # BLD: 8.44 10*3/MM3 (ref 4.5–12.5)

## 2017-07-29 PROCEDURE — 84132 ASSAY OF SERUM POTASSIUM: CPT | Performed by: INTERNAL MEDICINE

## 2017-07-29 PROCEDURE — 85025 COMPLETE CBC W/AUTO DIFF WBC: CPT | Performed by: INTERNAL MEDICINE

## 2017-07-29 PROCEDURE — 36600 WITHDRAWAL OF ARTERIAL BLOOD: CPT | Performed by: INTERNAL MEDICINE

## 2017-07-29 PROCEDURE — 94799 UNLISTED PULMONARY SVC/PX: CPT

## 2017-07-29 PROCEDURE — 83050 HGB METHEMOGLOBIN QUAN: CPT | Performed by: INTERNAL MEDICINE

## 2017-07-29 PROCEDURE — 63710000001 INSULIN ASPART PER 5 UNITS: Performed by: HOSPITALIST

## 2017-07-29 PROCEDURE — 82805 BLOOD GASES W/O2 SATURATION: CPT | Performed by: INTERNAL MEDICINE

## 2017-07-29 PROCEDURE — 99233 SBSQ HOSP IP/OBS HIGH 50: CPT | Performed by: INTERNAL MEDICINE

## 2017-07-29 PROCEDURE — 25010000002 HEPARIN (PORCINE) PER 1000 UNITS: Performed by: HOSPITALIST

## 2017-07-29 PROCEDURE — 25010000002 METHYLPREDNISOLONE PER 40 MG: Performed by: INTERNAL MEDICINE

## 2017-07-29 PROCEDURE — 82962 GLUCOSE BLOOD TEST: CPT

## 2017-07-29 PROCEDURE — 80053 COMPREHEN METABOLIC PANEL: CPT | Performed by: INTERNAL MEDICINE

## 2017-07-29 PROCEDURE — 94660 CPAP INITIATION&MGMT: CPT

## 2017-07-29 PROCEDURE — 82375 ASSAY CARBOXYHB QUANT: CPT | Performed by: INTERNAL MEDICINE

## 2017-07-29 PROCEDURE — 63710000001 INSULIN DETEMIR PER 5 UNITS: Performed by: INTERNAL MEDICINE

## 2017-07-29 RX ADMIN — INSULIN DETEMIR 25 UNITS: 100 INJECTION, SOLUTION SUBCUTANEOUS at 21:53

## 2017-07-29 RX ADMIN — ATORVASTATIN CALCIUM 40 MG: 40 TABLET, FILM COATED ORAL at 21:40

## 2017-07-29 RX ADMIN — SODIUM BICARBONATE TAB 650 MG 1300 MG: 650 TAB at 08:39

## 2017-07-29 RX ADMIN — HEPARIN SODIUM 5000 UNITS: 5000 INJECTION, SOLUTION INTRAVENOUS; SUBCUTANEOUS at 21:53

## 2017-07-29 RX ADMIN — INSULIN ASPART 4 UNITS: 100 INJECTION, SOLUTION INTRAVENOUS; SUBCUTANEOUS at 11:18

## 2017-07-29 RX ADMIN — PANTOPRAZOLE SODIUM 40 MG: 40 TABLET, DELAYED RELEASE ORAL at 08:39

## 2017-07-29 RX ADMIN — METHYLPREDNISOLONE SODIUM SUCCINATE 40 MG: 40 INJECTION, POWDER, FOR SOLUTION INTRAMUSCULAR; INTRAVENOUS at 08:39

## 2017-07-29 RX ADMIN — METHYLPREDNISOLONE SODIUM SUCCINATE 40 MG: 40 INJECTION, POWDER, FOR SOLUTION INTRAMUSCULAR; INTRAVENOUS at 21:42

## 2017-07-29 RX ADMIN — INSULIN ASPART 20 UNITS: 100 INJECTION, SOLUTION INTRAVENOUS; SUBCUTANEOUS at 17:37

## 2017-07-29 RX ADMIN — ISOSORBIDE MONONITRATE 60 MG: 60 TABLET, EXTENDED RELEASE ORAL at 08:39

## 2017-07-29 RX ADMIN — METOPROLOL TARTRATE 25 MG: 25 TABLET, FILM COATED ORAL at 08:40

## 2017-07-29 RX ADMIN — HEPARIN SODIUM 5000 UNITS: 5000 INJECTION, SOLUTION INTRAVENOUS; SUBCUTANEOUS at 08:39

## 2017-07-29 RX ADMIN — INSULIN ASPART 12 UNITS: 100 INJECTION, SOLUTION INTRAVENOUS; SUBCUTANEOUS at 08:42

## 2017-07-29 RX ADMIN — DOXYCYCLINE 100 MG: 100 CAPSULE ORAL at 21:40

## 2017-07-29 RX ADMIN — CLONAZEPAM 0.5 MG: 0.5 TABLET ORAL at 21:40

## 2017-07-29 RX ADMIN — NITROGLYCERIN 0.4 MG: 0.4 TABLET SUBLINGUAL at 23:10

## 2017-07-29 RX ADMIN — GUAIFENESIN 1200 MG: 600 TABLET, EXTENDED RELEASE ORAL at 21:40

## 2017-07-29 RX ADMIN — NICOTINE 1 PATCH: 21 PATCH, EXTENDED RELEASE TRANSDERMAL at 21:41

## 2017-07-29 RX ADMIN — ASPIRIN 81 MG: 81 TABLET, CHEWABLE ORAL at 21:39

## 2017-07-29 RX ADMIN — IPRATROPIUM BROMIDE AND ALBUTEROL SULFATE 3 ML: .5; 3 SOLUTION RESPIRATORY (INHALATION) at 11:00

## 2017-07-29 RX ADMIN — GABAPENTIN 100 MG: 100 CAPSULE ORAL at 21:40

## 2017-07-29 RX ADMIN — IPRATROPIUM BROMIDE AND ALBUTEROL SULFATE 3 ML: .5; 3 SOLUTION RESPIRATORY (INHALATION) at 06:12

## 2017-07-29 RX ADMIN — ACETAMINOPHEN 650 MG: 325 TABLET ORAL at 17:41

## 2017-07-29 RX ADMIN — CLOPIDOGREL BISULFATE 75 MG: 75 TABLET, FILM COATED ORAL at 08:40

## 2017-07-29 RX ADMIN — SODIUM BICARBONATE TAB 650 MG 1300 MG: 650 TAB at 21:39

## 2017-07-29 RX ADMIN — GABAPENTIN 100 MG: 100 CAPSULE ORAL at 08:39

## 2017-07-29 RX ADMIN — SODIUM BICARBONATE TAB 650 MG 1300 MG: 650 TAB at 17:37

## 2017-07-29 RX ADMIN — IPRATROPIUM BROMIDE AND ALBUTEROL SULFATE 3 ML: .5; 3 SOLUTION RESPIRATORY (INHALATION) at 19:02

## 2017-07-29 RX ADMIN — BUDESONIDE AND FORMOTEROL FUMARATE DIHYDRATE 2 PUFF: 80; 4.5 AEROSOL RESPIRATORY (INHALATION) at 19:12

## 2017-07-29 RX ADMIN — BUDESONIDE AND FORMOTEROL FUMARATE DIHYDRATE 2 PUFF: 80; 4.5 AEROSOL RESPIRATORY (INHALATION) at 06:12

## 2017-07-29 RX ADMIN — CLONAZEPAM 0.5 MG: 0.5 TABLET ORAL at 08:38

## 2017-07-29 RX ADMIN — IPRATROPIUM BROMIDE AND ALBUTEROL SULFATE 3 ML: .5; 3 SOLUTION RESPIRATORY (INHALATION) at 23:18

## 2017-07-29 RX ADMIN — DOXYCYCLINE 100 MG: 100 CAPSULE ORAL at 08:41

## 2017-07-29 RX ADMIN — METOPROLOL TARTRATE 25 MG: 25 TABLET, FILM COATED ORAL at 21:40

## 2017-07-29 RX ADMIN — IPRATROPIUM BROMIDE AND ALBUTEROL SULFATE 3 ML: .5; 3 SOLUTION RESPIRATORY (INHALATION) at 14:01

## 2017-07-29 RX ADMIN — GUAIFENESIN 1200 MG: 600 TABLET, EXTENDED RELEASE ORAL at 08:40

## 2017-07-30 LAB
ALBUMIN SERPL-MCNC: 3.2 G/DL (ref 3.4–4.8)
ALBUMIN/GLOB SERPL: 1.2 G/DL (ref 1.5–2.5)
ALP SERPL-CCNC: 100 U/L (ref 35–104)
ALT SERPL W P-5'-P-CCNC: 76 U/L (ref 10–36)
ANION GAP SERPL CALCULATED.3IONS-SCNC: 11.1 MMOL/L (ref 3.6–11.2)
AST SERPL-CCNC: 36 U/L (ref 10–30)
BACTERIA FLD CULT: NORMAL
BASOPHILS # BLD AUTO: 0.01 10*3/MM3 (ref 0–0.3)
BASOPHILS NFR BLD AUTO: 0.1 % (ref 0–2)
BILIRUB SERPL-MCNC: 0.1 MG/DL (ref 0.2–1.8)
BUN BLD-MCNC: 99 MG/DL (ref 7–21)
BUN/CREAT SERPL: 26.5 (ref 7–25)
CALCIUM SPEC-SCNC: 6.9 MG/DL (ref 7.7–10)
CHLORIDE SERPL-SCNC: 110 MMOL/L (ref 99–112)
CO2 SERPL-SCNC: 15.9 MMOL/L (ref 24.3–31.9)
CREAT BLD-MCNC: 3.73 MG/DL (ref 0.43–1.29)
DEPRECATED RDW RBC AUTO: 58.7 FL (ref 37–54)
EOSINOPHIL # BLD AUTO: 0 10*3/MM3 (ref 0–0.7)
EOSINOPHIL NFR BLD AUTO: 0 % (ref 0–7)
ERYTHROCYTE [DISTWIDTH] IN BLOOD BY AUTOMATED COUNT: 17.9 % (ref 11.5–14.5)
GFR SERPL CREATININE-BSD FRML MDRD: 12 ML/MIN/1.73
GLOBULIN UR ELPH-MCNC: 2.6 GM/DL
GLUCOSE BLD-MCNC: 268 MG/DL (ref 70–110)
GLUCOSE BLDC GLUCOMTR-MCNC: 224 MG/DL (ref 70–130)
GLUCOSE BLDC GLUCOMTR-MCNC: 335 MG/DL (ref 70–130)
GLUCOSE BLDC GLUCOMTR-MCNC: 355 MG/DL (ref 70–130)
GLUCOSE BLDC GLUCOMTR-MCNC: 399 MG/DL (ref 70–130)
HCT VFR BLD AUTO: 27.8 % (ref 37–47)
HGB BLD-MCNC: 8.2 G/DL (ref 12–16)
IMM GRANULOCYTES # BLD: 0.09 10*3/MM3 (ref 0–0.03)
IMM GRANULOCYTES NFR BLD: 1 % (ref 0–0.5)
LYMPHOCYTES # BLD AUTO: 0.76 10*3/MM3 (ref 1–3)
LYMPHOCYTES NFR BLD AUTO: 8.3 % (ref 16–46)
MCH RBC QN AUTO: 28.2 PG (ref 27–33)
MCHC RBC AUTO-ENTMCNC: 29.5 G/DL (ref 33–37)
MCV RBC AUTO: 95.5 FL (ref 80–94)
MONOCYTES # BLD AUTO: 0.21 10*3/MM3 (ref 0.1–0.9)
MONOCYTES NFR BLD AUTO: 2.3 % (ref 0–12)
NEUTROPHILS # BLD AUTO: 8.06 10*3/MM3 (ref 1.4–6.5)
NEUTROPHILS NFR BLD AUTO: 88.3 % (ref 40–75)
OSMOLALITY SERPL CALC.SUM OF ELEC: 314.1 MOSM/KG (ref 273–305)
PLATELET # BLD AUTO: 260 10*3/MM3 (ref 130–400)
PMV BLD AUTO: 10.9 FL (ref 6–10)
POTASSIUM BLD-SCNC: 5.3 MMOL/L (ref 3.5–5.3)
PROT SERPL-MCNC: 5.8 G/DL (ref 6–8)
RBC # BLD AUTO: 2.91 10*6/MM3 (ref 4.2–5.4)
SODIUM BLD-SCNC: 137 MMOL/L (ref 135–153)
WBC NRBC COR # BLD: 9.13 10*3/MM3 (ref 4.5–12.5)

## 2017-07-30 PROCEDURE — 93005 ELECTROCARDIOGRAM TRACING: CPT | Performed by: HOSPITALIST

## 2017-07-30 PROCEDURE — 94799 UNLISTED PULMONARY SVC/PX: CPT

## 2017-07-30 PROCEDURE — 25010000002 METHYLPREDNISOLONE PER 40 MG: Performed by: INTERNAL MEDICINE

## 2017-07-30 PROCEDURE — 94660 CPAP INITIATION&MGMT: CPT

## 2017-07-30 PROCEDURE — 63710000001 INSULIN ASPART PER 5 UNITS: Performed by: HOSPITALIST

## 2017-07-30 PROCEDURE — 63710000001 INSULIN DETEMIR PER 5 UNITS: Performed by: INTERNAL MEDICINE

## 2017-07-30 PROCEDURE — 80053 COMPREHEN METABOLIC PANEL: CPT | Performed by: INTERNAL MEDICINE

## 2017-07-30 PROCEDURE — 93010 ELECTROCARDIOGRAM REPORT: CPT | Performed by: INTERNAL MEDICINE

## 2017-07-30 PROCEDURE — 82962 GLUCOSE BLOOD TEST: CPT

## 2017-07-30 PROCEDURE — 99233 SBSQ HOSP IP/OBS HIGH 50: CPT | Performed by: INTERNAL MEDICINE

## 2017-07-30 PROCEDURE — 25010000002 HEPARIN (PORCINE) PER 1000 UNITS: Performed by: HOSPITALIST

## 2017-07-30 PROCEDURE — 85025 COMPLETE CBC W/AUTO DIFF WBC: CPT | Performed by: INTERNAL MEDICINE

## 2017-07-30 RX ADMIN — SODIUM BICARBONATE TAB 650 MG 1300 MG: 650 TAB at 21:11

## 2017-07-30 RX ADMIN — GABAPENTIN 100 MG: 100 CAPSULE ORAL at 08:54

## 2017-07-30 RX ADMIN — DOXYCYCLINE 100 MG: 100 CAPSULE ORAL at 21:12

## 2017-07-30 RX ADMIN — INSULIN ASPART 8 UNITS: 100 INJECTION, SOLUTION INTRAVENOUS; SUBCUTANEOUS at 12:40

## 2017-07-30 RX ADMIN — IPRATROPIUM BROMIDE AND ALBUTEROL SULFATE 3 ML: .5; 3 SOLUTION RESPIRATORY (INHALATION) at 10:47

## 2017-07-30 RX ADMIN — IPRATROPIUM BROMIDE AND ALBUTEROL SULFATE 3 ML: .5; 3 SOLUTION RESPIRATORY (INHALATION) at 19:02

## 2017-07-30 RX ADMIN — NITROGLYCERIN 0.4 MG: 0.4 TABLET SUBLINGUAL at 13:15

## 2017-07-30 RX ADMIN — ISOSORBIDE MONONITRATE 60 MG: 60 TABLET, EXTENDED RELEASE ORAL at 08:54

## 2017-07-30 RX ADMIN — CLOPIDOGREL BISULFATE 75 MG: 75 TABLET, FILM COATED ORAL at 08:55

## 2017-07-30 RX ADMIN — ASPIRIN 81 MG: 81 TABLET, CHEWABLE ORAL at 21:12

## 2017-07-30 RX ADMIN — PANTOPRAZOLE SODIUM 40 MG: 40 TABLET, DELAYED RELEASE ORAL at 08:55

## 2017-07-30 RX ADMIN — HEPARIN SODIUM 5000 UNITS: 5000 INJECTION, SOLUTION INTRAVENOUS; SUBCUTANEOUS at 21:10

## 2017-07-30 RX ADMIN — SODIUM BICARBONATE TAB 650 MG 1300 MG: 650 TAB at 15:32

## 2017-07-30 RX ADMIN — GUAIFENESIN 1200 MG: 600 TABLET, EXTENDED RELEASE ORAL at 08:54

## 2017-07-30 RX ADMIN — IPRATROPIUM BROMIDE AND ALBUTEROL SULFATE 3 ML: .5; 3 SOLUTION RESPIRATORY (INHALATION) at 22:34

## 2017-07-30 RX ADMIN — SODIUM BICARBONATE TAB 650 MG 1300 MG: 650 TAB at 08:54

## 2017-07-30 RX ADMIN — METOPROLOL TARTRATE 25 MG: 25 TABLET, FILM COATED ORAL at 21:12

## 2017-07-30 RX ADMIN — METHYLPREDNISOLONE SODIUM SUCCINATE 40 MG: 40 INJECTION, POWDER, FOR SOLUTION INTRAMUSCULAR; INTRAVENOUS at 08:56

## 2017-07-30 RX ADMIN — INSULIN ASPART 16 UNITS: 100 INJECTION, SOLUTION INTRAVENOUS; SUBCUTANEOUS at 17:26

## 2017-07-30 RX ADMIN — INSULIN ASPART 20 UNITS: 100 INJECTION, SOLUTION INTRAVENOUS; SUBCUTANEOUS at 21:10

## 2017-07-30 RX ADMIN — GUAIFENESIN 1200 MG: 600 TABLET, EXTENDED RELEASE ORAL at 21:12

## 2017-07-30 RX ADMIN — METHYLPREDNISOLONE SODIUM SUCCINATE 40 MG: 40 INJECTION, POWDER, FOR SOLUTION INTRAMUSCULAR; INTRAVENOUS at 21:10

## 2017-07-30 RX ADMIN — GABAPENTIN 100 MG: 100 CAPSULE ORAL at 21:12

## 2017-07-30 RX ADMIN — ATORVASTATIN CALCIUM 40 MG: 40 TABLET, FILM COATED ORAL at 21:11

## 2017-07-30 RX ADMIN — HEPARIN SODIUM 5000 UNITS: 5000 INJECTION, SOLUTION INTRAVENOUS; SUBCUTANEOUS at 08:56

## 2017-07-30 RX ADMIN — IPRATROPIUM BROMIDE AND ALBUTEROL SULFATE 3 ML: .5; 3 SOLUTION RESPIRATORY (INHALATION) at 06:16

## 2017-07-30 RX ADMIN — IPRATROPIUM BROMIDE AND ALBUTEROL SULFATE 3 ML: .5; 3 SOLUTION RESPIRATORY (INHALATION) at 03:54

## 2017-07-30 RX ADMIN — INSULIN DETEMIR 25 UNITS: 100 INJECTION, SOLUTION SUBCUTANEOUS at 21:09

## 2017-07-30 RX ADMIN — CLONAZEPAM 0.5 MG: 0.5 TABLET ORAL at 08:55

## 2017-07-30 RX ADMIN — NITROGLYCERIN 0.4 MG: 0.4 TABLET SUBLINGUAL at 13:11

## 2017-07-30 RX ADMIN — NITROGLYCERIN 0.4 MG: 0.4 TABLET SUBLINGUAL at 18:51

## 2017-07-30 RX ADMIN — METOPROLOL TARTRATE 25 MG: 25 TABLET, FILM COATED ORAL at 08:55

## 2017-07-30 RX ADMIN — INSULIN ASPART 20 UNITS: 100 INJECTION, SOLUTION INTRAVENOUS; SUBCUTANEOUS at 08:57

## 2017-07-30 RX ADMIN — NICOTINE 1 PATCH: 21 PATCH, EXTENDED RELEASE TRANSDERMAL at 21:12

## 2017-07-30 RX ADMIN — DOXYCYCLINE 100 MG: 100 CAPSULE ORAL at 08:54

## 2017-07-30 RX ADMIN — BUDESONIDE AND FORMOTEROL FUMARATE DIHYDRATE 2 PUFF: 80; 4.5 AEROSOL RESPIRATORY (INHALATION) at 06:15

## 2017-07-30 RX ADMIN — IPRATROPIUM BROMIDE AND ALBUTEROL SULFATE 3 ML: .5; 3 SOLUTION RESPIRATORY (INHALATION) at 14:09

## 2017-07-30 RX ADMIN — BUDESONIDE AND FORMOTEROL FUMARATE DIHYDRATE 2 PUFF: 80; 4.5 AEROSOL RESPIRATORY (INHALATION) at 19:02

## 2017-07-30 RX ADMIN — CLONAZEPAM 0.5 MG: 0.5 TABLET ORAL at 21:12

## 2017-07-31 ENCOUNTER — APPOINTMENT (OUTPATIENT)
Dept: GENERAL RADIOLOGY | Facility: HOSPITAL | Age: 67
End: 2017-07-31

## 2017-07-31 LAB
A-A DO2: 64.2 MMHG (ref 0–300)
A-A DO2: 65.9 MMHG (ref 0–300)
ALBUMIN SERPL-MCNC: 3.3 G/DL (ref 3.4–4.8)
ALBUMIN/GLOB SERPL: 1.3 G/DL (ref 1.5–2.5)
ALP SERPL-CCNC: 94 U/L (ref 35–104)
ALT SERPL W P-5'-P-CCNC: 58 U/L (ref 10–36)
ANION GAP SERPL CALCULATED.3IONS-SCNC: 13.1 MMOL/L (ref 3.6–11.2)
ARTERIAL PATENCY WRIST A: ABNORMAL
ARTERIAL PATENCY WRIST A: POSITIVE
AST SERPL-CCNC: 24 U/L (ref 10–30)
ATMOSPHERIC PRESS: 728 MMHG
ATMOSPHERIC PRESS: 730 MMHG
BASE EXCESS BLDA CALC-SCNC: -9.4 MMOL/L
BASE EXCESS BLDA CALC-SCNC: -9.7 MMOL/L
BASOPHILS # BLD AUTO: 0.01 10*3/MM3 (ref 0–0.3)
BASOPHILS NFR BLD AUTO: 0.1 % (ref 0–2)
BDY SITE: ABNORMAL
BDY SITE: ABNORMAL
BILIRUB SERPL-MCNC: 0.1 MG/DL (ref 0.2–1.8)
BODY TEMPERATURE: 98.6 C
BODY TEMPERATURE: 98.6 C
BUN BLD-MCNC: 109 MG/DL (ref 7–21)
BUN/CREAT SERPL: 31 (ref 7–25)
CALCIUM SPEC-SCNC: 6.9 MG/DL (ref 7.7–10)
CHLORIDE SERPL-SCNC: 111 MMOL/L (ref 99–112)
CO2 SERPL-SCNC: 15.9 MMOL/L (ref 24.3–31.9)
COHGB MFR BLD: 1.3 % (ref 0–5)
COHGB MFR BLD: 1.4 % (ref 0–5)
COLLECT DURATION TIME UR: 24 HRS
CREAT BLD-MCNC: 3.52 MG/DL (ref 0.43–1.29)
CREAT CL 24H UR+SERPL-VRATE: 14.7 ML/MIN (ref 63–143)
CREAT CL 24H UR+SERPL-VRATE: 21.2 L/24 HR (ref 95–205.9)
CREAT UR-MCNC: 49 MG/DL
CREATINE 24H UR-MRATE: 0.75 G/24 HR (ref 0.72–1.51)
DEPRECATED RDW RBC AUTO: 58.9 FL (ref 37–54)
EOSINOPHIL # BLD AUTO: 0 10*3/MM3 (ref 0–0.7)
EOSINOPHIL NFR BLD AUTO: 0 % (ref 0–7)
EPAP: 6
ERYTHROCYTE [DISTWIDTH] IN BLOOD BY AUTOMATED COUNT: 18 % (ref 11.5–14.5)
GFR SERPL CREATININE-BSD FRML MDRD: 13 ML/MIN/1.73
GLOBULIN UR ELPH-MCNC: 2.5 GM/DL
GLUCOSE BLD-MCNC: 142 MG/DL (ref 70–110)
GLUCOSE BLDC GLUCOMTR-MCNC: 157 MG/DL (ref 70–130)
GLUCOSE BLDC GLUCOMTR-MCNC: 202 MG/DL (ref 70–130)
GLUCOSE BLDC GLUCOMTR-MCNC: 273 MG/DL (ref 70–130)
GLUCOSE BLDC GLUCOMTR-MCNC: 285 MG/DL (ref 70–130)
HCO3 BLDA-SCNC: 16.4 MMOL/L (ref 22–26)
HCO3 BLDA-SCNC: 17.8 MMOL/L (ref 22–26)
HCT VFR BLD AUTO: 28.5 % (ref 37–47)
HCT VFR BLD CALC: 26 % (ref 37–47)
HCT VFR BLD CALC: 27 % (ref 37–47)
HGB BLD-MCNC: 8.3 G/DL (ref 12–16)
HGB BLDA-MCNC: 8.8 G/DL (ref 12–16)
HGB BLDA-MCNC: 9.2 G/DL (ref 12–16)
HOROWITZ INDEX BLD+IHG-RTO: 30 %
HOROWITZ INDEX BLD+IHG-RTO: 30 %
IMM GRANULOCYTES # BLD: 0.18 10*3/MM3 (ref 0–0.03)
IMM GRANULOCYTES NFR BLD: 1.7 % (ref 0–0.5)
IPAP: 15
LYMPHOCYTES # BLD AUTO: 0.97 10*3/MM3 (ref 1–3)
LYMPHOCYTES NFR BLD AUTO: 9.4 % (ref 16–46)
MAGNESIUM SERPL-MCNC: 1.6 MG/DL (ref 1.7–2.6)
MCH RBC QN AUTO: 27.8 PG (ref 27–33)
MCHC RBC AUTO-ENTMCNC: 29.1 G/DL (ref 33–37)
MCV RBC AUTO: 95.3 FL (ref 80–94)
METHGB BLD QL: 0.1 % (ref 0–3)
METHGB BLD QL: 0.2 % (ref 0–3)
MODALITY: ABNORMAL
MODALITY: ABNORMAL
MONOCYTES # BLD AUTO: 0.35 10*3/MM3 (ref 0.1–0.9)
MONOCYTES NFR BLD AUTO: 3.4 % (ref 0–12)
NEUTROPHILS # BLD AUTO: 8.84 10*3/MM3 (ref 1.4–6.5)
NEUTROPHILS NFR BLD AUTO: 85.4 % (ref 40–75)
OSMOLALITY SERPL CALC.SUM OF ELEC: 316.2 MOSM/KG (ref 273–305)
OXYHGB MFR BLDV: 94.7 % (ref 85–100)
OXYHGB MFR BLDV: 95.7 % (ref 85–100)
PCO2 BLDA: 36.8 MM HG (ref 35–45)
PCO2 BLDA: 44.9 MM HG (ref 35–45)
PH BLDA: 7.22 PH UNITS (ref 7.35–7.45)
PH BLDA: 7.27 PH UNITS (ref 7.35–7.45)
PHOSPHATE SERPL-MCNC: 6.2 MG/DL (ref 2.7–4.5)
PLATELET # BLD AUTO: 256 10*3/MM3 (ref 130–400)
PMV BLD AUTO: 10.8 FL (ref 6–10)
PO2 BLDA: 87.3 MM HG (ref 80–100)
PO2 BLDA: 95.8 MM HG (ref 80–100)
POTASSIUM BLD-SCNC: 4.5 MMOL/L (ref 3.5–5.3)
PROT SERPL-MCNC: 5.8 G/DL (ref 6–8)
RBC # BLD AUTO: 2.99 10*6/MM3 (ref 4.2–5.4)
SAO2 % BLDCOA: 96.2 % (ref 90–100)
SAO2 % BLDCOA: 97.1 % (ref 90–100)
SET MECH RESP RATE: 20
SET MECH RESP RATE: 24
SODIUM BLD-SCNC: 140 MMOL/L (ref 135–153)
WBC NRBC COR # BLD: 10.35 10*3/MM3 (ref 4.5–12.5)

## 2017-07-31 PROCEDURE — 36600 WITHDRAWAL OF ARTERIAL BLOOD: CPT | Performed by: HOSPITALIST

## 2017-07-31 PROCEDURE — 81050 URINALYSIS VOLUME MEASURE: CPT | Performed by: INTERNAL MEDICINE

## 2017-07-31 PROCEDURE — 25010000002 MAGNESIUM SULFATE 2 GM/50ML SOLUTION: Performed by: HOSPITALIST

## 2017-07-31 PROCEDURE — 82962 GLUCOSE BLOOD TEST: CPT

## 2017-07-31 PROCEDURE — 83735 ASSAY OF MAGNESIUM: CPT | Performed by: INTERNAL MEDICINE

## 2017-07-31 PROCEDURE — 36600 WITHDRAWAL OF ARTERIAL BLOOD: CPT | Performed by: INTERNAL MEDICINE

## 2017-07-31 PROCEDURE — 63710000001 INSULIN ASPART PER 5 UNITS: Performed by: HOSPITALIST

## 2017-07-31 PROCEDURE — 94799 UNLISTED PULMONARY SVC/PX: CPT

## 2017-07-31 PROCEDURE — 25010000002 METHYLPREDNISOLONE PER 40 MG: Performed by: INTERNAL MEDICINE

## 2017-07-31 PROCEDURE — 99233 SBSQ HOSP IP/OBS HIGH 50: CPT | Performed by: INTERNAL MEDICINE

## 2017-07-31 PROCEDURE — 82375 ASSAY CARBOXYHB QUANT: CPT | Performed by: INTERNAL MEDICINE

## 2017-07-31 PROCEDURE — 25010000002 HEPARIN (PORCINE) PER 1000 UNITS: Performed by: HOSPITALIST

## 2017-07-31 PROCEDURE — 99407 BEHAV CHNG SMOKING > 10 MIN: CPT | Performed by: INTERNAL MEDICINE

## 2017-07-31 PROCEDURE — 94660 CPAP INITIATION&MGMT: CPT

## 2017-07-31 PROCEDURE — 80053 COMPREHEN METABOLIC PANEL: CPT | Performed by: INTERNAL MEDICINE

## 2017-07-31 PROCEDURE — 82575 CREATININE CLEARANCE TEST: CPT | Performed by: INTERNAL MEDICINE

## 2017-07-31 PROCEDURE — 83050 HGB METHEMOGLOBIN QUAN: CPT | Performed by: HOSPITALIST

## 2017-07-31 PROCEDURE — 83050 HGB METHEMOGLOBIN QUAN: CPT | Performed by: INTERNAL MEDICINE

## 2017-07-31 PROCEDURE — 82805 BLOOD GASES W/O2 SATURATION: CPT | Performed by: INTERNAL MEDICINE

## 2017-07-31 PROCEDURE — 63710000001 INSULIN DETEMIR PER 5 UNITS: Performed by: INTERNAL MEDICINE

## 2017-07-31 PROCEDURE — 82805 BLOOD GASES W/O2 SATURATION: CPT | Performed by: HOSPITALIST

## 2017-07-31 PROCEDURE — 63510000001 EPOETIN ALFA PER 1000 UNITS: Performed by: INTERNAL MEDICINE

## 2017-07-31 PROCEDURE — 82375 ASSAY CARBOXYHB QUANT: CPT | Performed by: HOSPITALIST

## 2017-07-31 PROCEDURE — 71010 HC CHEST AP: CPT

## 2017-07-31 PROCEDURE — 84100 ASSAY OF PHOSPHORUS: CPT | Performed by: INTERNAL MEDICINE

## 2017-07-31 PROCEDURE — 99232 SBSQ HOSP IP/OBS MODERATE 35: CPT | Performed by: INTERNAL MEDICINE

## 2017-07-31 PROCEDURE — 85025 COMPLETE CBC W/AUTO DIFF WBC: CPT | Performed by: INTERNAL MEDICINE

## 2017-07-31 PROCEDURE — 71010 XR CHEST AP: CPT | Performed by: RADIOLOGY

## 2017-07-31 RX ORDER — MAGNESIUM SULFATE HEPTAHYDRATE 40 MG/ML
2 INJECTION, SOLUTION INTRAVENOUS ONCE
Status: COMPLETED | OUTPATIENT
Start: 2017-07-31 | End: 2017-07-31

## 2017-07-31 RX ORDER — MAGNESIUM SULFATE HEPTAHYDRATE 40 MG/ML
2 INJECTION, SOLUTION INTRAVENOUS AS NEEDED
Status: DISCONTINUED | OUTPATIENT
Start: 2017-07-31 | End: 2017-08-09 | Stop reason: HOSPADM

## 2017-07-31 RX ORDER — MAGNESIUM SULFATE HEPTAHYDRATE 40 MG/ML
4 INJECTION, SOLUTION INTRAVENOUS AS NEEDED
Status: DISCONTINUED | OUTPATIENT
Start: 2017-07-31 | End: 2017-08-09 | Stop reason: HOSPADM

## 2017-07-31 RX ORDER — MAGNESIUM SULFATE 1 G/100ML
1 INJECTION INTRAVENOUS AS NEEDED
Status: DISCONTINUED | OUTPATIENT
Start: 2017-07-31 | End: 2017-08-09 | Stop reason: HOSPADM

## 2017-07-31 RX ORDER — SODIUM BICARBONATE 650 MG/1
1300 TABLET ORAL 4 TIMES DAILY
Status: DISCONTINUED | OUTPATIENT
Start: 2017-07-31 | End: 2017-08-06

## 2017-07-31 RX ADMIN — ISOSORBIDE MONONITRATE 60 MG: 60 TABLET, EXTENDED RELEASE ORAL at 09:38

## 2017-07-31 RX ADMIN — GUAIFENESIN 1200 MG: 600 TABLET, EXTENDED RELEASE ORAL at 08:30

## 2017-07-31 RX ADMIN — BUDESONIDE AND FORMOTEROL FUMARATE DIHYDRATE 2 PUFF: 80; 4.5 AEROSOL RESPIRATORY (INHALATION) at 18:54

## 2017-07-31 RX ADMIN — INSULIN ASPART 8 UNITS: 100 INJECTION, SOLUTION INTRAVENOUS; SUBCUTANEOUS at 11:20

## 2017-07-31 RX ADMIN — NICOTINE 1 PATCH: 21 PATCH, EXTENDED RELEASE TRANSDERMAL at 20:52

## 2017-07-31 RX ADMIN — INSULIN DETEMIR 25 UNITS: 100 INJECTION, SOLUTION SUBCUTANEOUS at 20:49

## 2017-07-31 RX ADMIN — SODIUM BICARBONATE TAB 650 MG 1300 MG: 650 TAB at 09:38

## 2017-07-31 RX ADMIN — INSULIN ASPART 4 UNITS: 100 INJECTION, SOLUTION INTRAVENOUS; SUBCUTANEOUS at 08:31

## 2017-07-31 RX ADMIN — SODIUM BICARBONATE TAB 650 MG 1300 MG: 650 TAB at 17:31

## 2017-07-31 RX ADMIN — CLONAZEPAM 0.5 MG: 0.5 TABLET ORAL at 08:31

## 2017-07-31 RX ADMIN — INSULIN ASPART 12 UNITS: 100 INJECTION, SOLUTION INTRAVENOUS; SUBCUTANEOUS at 20:51

## 2017-07-31 RX ADMIN — METOPROLOL TARTRATE 25 MG: 25 TABLET, FILM COATED ORAL at 08:31

## 2017-07-31 RX ADMIN — METHYLPREDNISOLONE SODIUM SUCCINATE 40 MG: 40 INJECTION, POWDER, FOR SOLUTION INTRAMUSCULAR; INTRAVENOUS at 08:31

## 2017-07-31 RX ADMIN — PANTOPRAZOLE SODIUM 40 MG: 40 TABLET, DELAYED RELEASE ORAL at 08:30

## 2017-07-31 RX ADMIN — CLONAZEPAM 0.5 MG: 0.5 TABLET ORAL at 20:51

## 2017-07-31 RX ADMIN — ASPIRIN 81 MG: 81 TABLET, CHEWABLE ORAL at 20:53

## 2017-07-31 RX ADMIN — ERYTHROPOIETIN 10000 UNITS: 20000 INJECTION, SOLUTION INTRAVENOUS; SUBCUTANEOUS at 17:31

## 2017-07-31 RX ADMIN — METHYLPREDNISOLONE SODIUM SUCCINATE 40 MG: 40 INJECTION, POWDER, FOR SOLUTION INTRAMUSCULAR; INTRAVENOUS at 20:52

## 2017-07-31 RX ADMIN — SODIUM BICARBONATE TAB 650 MG 1300 MG: 650 TAB at 20:53

## 2017-07-31 RX ADMIN — IPRATROPIUM BROMIDE AND ALBUTEROL SULFATE 3 ML: .5; 3 SOLUTION RESPIRATORY (INHALATION) at 03:42

## 2017-07-31 RX ADMIN — MAGNESIUM SULFATE IN WATER 2 G: 40 INJECTION, SOLUTION INTRAVENOUS at 13:21

## 2017-07-31 RX ADMIN — ATORVASTATIN CALCIUM 40 MG: 40 TABLET, FILM COATED ORAL at 20:54

## 2017-07-31 RX ADMIN — GABAPENTIN 100 MG: 100 CAPSULE ORAL at 08:30

## 2017-07-31 RX ADMIN — GABAPENTIN 100 MG: 100 CAPSULE ORAL at 20:51

## 2017-07-31 RX ADMIN — DOXYCYCLINE 100 MG: 100 CAPSULE ORAL at 20:53

## 2017-07-31 RX ADMIN — IPRATROPIUM BROMIDE AND ALBUTEROL SULFATE 3 ML: .5; 3 SOLUTION RESPIRATORY (INHALATION) at 18:43

## 2017-07-31 RX ADMIN — HEPARIN SODIUM 5000 UNITS: 5000 INJECTION, SOLUTION INTRAVENOUS; SUBCUTANEOUS at 20:52

## 2017-07-31 RX ADMIN — HEPARIN SODIUM 5000 UNITS: 5000 INJECTION, SOLUTION INTRAVENOUS; SUBCUTANEOUS at 08:30

## 2017-07-31 RX ADMIN — ACETAMINOPHEN 650 MG: 325 TABLET ORAL at 00:30

## 2017-07-31 RX ADMIN — IPRATROPIUM BROMIDE AND ALBUTEROL SULFATE 3 ML: .5; 3 SOLUTION RESPIRATORY (INHALATION) at 06:19

## 2017-07-31 RX ADMIN — BUDESONIDE AND FORMOTEROL FUMARATE DIHYDRATE 2 PUFF: 80; 4.5 AEROSOL RESPIRATORY (INHALATION) at 06:20

## 2017-07-31 RX ADMIN — INSULIN ASPART 8 UNITS: 100 INJECTION, SOLUTION INTRAVENOUS; SUBCUTANEOUS at 17:00

## 2017-07-31 RX ADMIN — GUAIFENESIN 1200 MG: 600 TABLET, EXTENDED RELEASE ORAL at 20:53

## 2017-07-31 RX ADMIN — DOXYCYCLINE 100 MG: 100 CAPSULE ORAL at 08:30

## 2017-07-31 RX ADMIN — CLOPIDOGREL BISULFATE 75 MG: 75 TABLET, FILM COATED ORAL at 08:30

## 2017-07-31 RX ADMIN — METOPROLOL TARTRATE 25 MG: 25 TABLET, FILM COATED ORAL at 20:54

## 2017-07-31 RX ADMIN — IPRATROPIUM BROMIDE AND ALBUTEROL SULFATE 3 ML: .5; 3 SOLUTION RESPIRATORY (INHALATION) at 22:42

## 2017-08-01 LAB
A-A DO2: 55.8 MMHG (ref 0–300)
A-A DO2: 61 MMHG (ref 0–300)
ANION GAP SERPL CALCULATED.3IONS-SCNC: 12.9 MMOL/L (ref 3.6–11.2)
ARTERIAL PATENCY WRIST A: ABNORMAL
ARTERIAL PATENCY WRIST A: POSITIVE
ATMOSPHERIC PRESS: 729 MMHG
ATMOSPHERIC PRESS: 729 MMHG
BASE EXCESS BLDA CALC-SCNC: -12.4 MMOL/L
BASE EXCESS BLDA CALC-SCNC: -12.8 MMOL/L
BASOPHILS # BLD AUTO: 0 10*3/MM3 (ref 0–0.3)
BASOPHILS NFR BLD AUTO: 0 % (ref 0–2)
BDY SITE: ABNORMAL
BDY SITE: ABNORMAL
BODY TEMPERATURE: 98.6 C
BODY TEMPERATURE: 98.6 C
BUN BLD-MCNC: 117 MG/DL (ref 7–21)
BUN/CREAT SERPL: 32.6 (ref 7–25)
CALCIUM SPEC-SCNC: 7.2 MG/DL (ref 7.7–10)
CHLORIDE SERPL-SCNC: 111 MMOL/L (ref 99–112)
CO2 SERPL-SCNC: 17.1 MMOL/L (ref 24.3–31.9)
COHGB MFR BLD: 0.9 % (ref 0–5)
COHGB MFR BLD: 1 % (ref 0–5)
CREAT BLD-MCNC: 3.59 MG/DL (ref 0.43–1.29)
DEPRECATED RDW RBC AUTO: 60.9 FL (ref 37–54)
EOSINOPHIL # BLD AUTO: 0 10*3/MM3 (ref 0–0.7)
EOSINOPHIL NFR BLD AUTO: 0 % (ref 0–7)
EPAP: 6
EPAP: 6
ERYTHROCYTE [DISTWIDTH] IN BLOOD BY AUTOMATED COUNT: 18.2 % (ref 11.5–14.5)
GFR SERPL CREATININE-BSD FRML MDRD: 13 ML/MIN/1.73
GLUCOSE BLD-MCNC: 123 MG/DL (ref 70–110)
GLUCOSE BLDC GLUCOMTR-MCNC: 148 MG/DL (ref 70–130)
GLUCOSE BLDC GLUCOMTR-MCNC: 162 MG/DL (ref 70–130)
GLUCOSE BLDC GLUCOMTR-MCNC: 267 MG/DL (ref 70–130)
GLUCOSE BLDC GLUCOMTR-MCNC: 292 MG/DL (ref 70–130)
HCO3 BLDA-SCNC: 15.6 MMOL/L (ref 22–26)
HCO3 BLDA-SCNC: 16.5 MMOL/L (ref 22–26)
HCT VFR BLD AUTO: 31.6 % (ref 37–47)
HCT VFR BLD CALC: 30 % (ref 37–47)
HCT VFR BLD CALC: 30 % (ref 37–47)
HGB BLD-MCNC: 9.4 G/DL (ref 12–16)
HGB BLDA-MCNC: 10.2 G/DL (ref 12–16)
HGB BLDA-MCNC: 10.2 G/DL (ref 12–16)
HOROWITZ INDEX BLD+IHG-RTO: 30 %
HOROWITZ INDEX BLD+IHG-RTO: 30 %
IMM GRANULOCYTES # BLD: 0.16 10*3/MM3 (ref 0–0.03)
IMM GRANULOCYTES NFR BLD: 1.3 % (ref 0–0.5)
IPAP: 18
IPAP: 18
LYMPHOCYTES # BLD AUTO: 0.71 10*3/MM3 (ref 1–3)
LYMPHOCYTES NFR BLD AUTO: 5.7 % (ref 16–46)
MAGNESIUM SERPL-MCNC: 1.8 MG/DL (ref 1.7–2.6)
MCH RBC QN AUTO: 27.4 PG (ref 27–33)
MCHC RBC AUTO-ENTMCNC: 29.7 G/DL (ref 33–37)
MCV RBC AUTO: 92.1 FL (ref 80–94)
METHGB BLD QL: 0.4 % (ref 0–3)
METHGB BLD QL: 0.5 % (ref 0–3)
MODALITY: ABNORMAL
MODALITY: ABNORMAL
MONOCYTES # BLD AUTO: 0.51 10*3/MM3 (ref 0.1–0.9)
MONOCYTES NFR BLD AUTO: 4.1 % (ref 0–12)
NEUTROPHILS # BLD AUTO: 11.06 10*3/MM3 (ref 1.4–6.5)
NEUTROPHILS NFR BLD AUTO: 88.9 % (ref 40–75)
OSMOLALITY SERPL CALC.SUM OF ELEC: 319.9 MOSM/KG (ref 273–305)
OXYHGB MFR BLDV: 93.4 % (ref 85–100)
OXYHGB MFR BLDV: 95.1 % (ref 85–100)
PCO2 BLDA: 44.6 MM HG (ref 35–45)
PCO2 BLDA: 54 MM HG (ref 35–45)
PH BLDA: 7.1 PH UNITS (ref 7.35–7.45)
PH BLDA: 7.16 PH UNITS (ref 7.35–7.45)
PLATELET # BLD AUTO: 303 10*3/MM3 (ref 130–400)
PMV BLD AUTO: 11 FL (ref 6–10)
PO2 BLDA: 85.4 MM HG (ref 80–100)
PO2 BLDA: 91.2 MM HG (ref 80–100)
POTASSIUM BLD-SCNC: 4.7 MMOL/L (ref 3.5–5.3)
RBC # BLD AUTO: 3.43 10*6/MM3 (ref 4.2–5.4)
SAO2 % BLDCOA: 94.8 % (ref 90–100)
SAO2 % BLDCOA: 96.4 % (ref 90–100)
SET MECH RESP RATE: 24
SET MECH RESP RATE: 24
SODIUM BLD-SCNC: 141 MMOL/L (ref 135–153)
WBC NRBC COR # BLD: 12.44 10*3/MM3 (ref 4.5–12.5)

## 2017-08-01 PROCEDURE — 82962 GLUCOSE BLOOD TEST: CPT

## 2017-08-01 PROCEDURE — 36600 WITHDRAWAL OF ARTERIAL BLOOD: CPT | Performed by: INTERNAL MEDICINE

## 2017-08-01 PROCEDURE — 63710000001 INSULIN ASPART PER 5 UNITS: Performed by: HOSPITALIST

## 2017-08-01 PROCEDURE — 85025 COMPLETE CBC W/AUTO DIFF WBC: CPT | Performed by: INTERNAL MEDICINE

## 2017-08-01 PROCEDURE — 63710000001 INSULIN DETEMIR PER 5 UNITS: Performed by: INTERNAL MEDICINE

## 2017-08-01 PROCEDURE — 83735 ASSAY OF MAGNESIUM: CPT | Performed by: INTERNAL MEDICINE

## 2017-08-01 PROCEDURE — 82375 ASSAY CARBOXYHB QUANT: CPT | Performed by: INTERNAL MEDICINE

## 2017-08-01 PROCEDURE — 94799 UNLISTED PULMONARY SVC/PX: CPT

## 2017-08-01 PROCEDURE — 83050 HGB METHEMOGLOBIN QUAN: CPT | Performed by: INTERNAL MEDICINE

## 2017-08-01 PROCEDURE — 94660 CPAP INITIATION&MGMT: CPT

## 2017-08-01 PROCEDURE — 82805 BLOOD GASES W/O2 SATURATION: CPT | Performed by: INTERNAL MEDICINE

## 2017-08-01 PROCEDURE — 99233 SBSQ HOSP IP/OBS HIGH 50: CPT | Performed by: INTERNAL MEDICINE

## 2017-08-01 PROCEDURE — 25010000002 MAGNESIUM SULFATE 2 GM/50ML SOLUTION: Performed by: INTERNAL MEDICINE

## 2017-08-01 PROCEDURE — 63710000001 PREDNISONE PER 5 MG: Performed by: INTERNAL MEDICINE

## 2017-08-01 PROCEDURE — 80048 BASIC METABOLIC PNL TOTAL CA: CPT | Performed by: INTERNAL MEDICINE

## 2017-08-01 PROCEDURE — 25010000002 HEPARIN (PORCINE) PER 1000 UNITS: Performed by: HOSPITALIST

## 2017-08-01 RX ORDER — FLUMAZENIL 0.1 MG/ML
0.1 INJECTION INTRAVENOUS ONCE
Status: COMPLETED | OUTPATIENT
Start: 2017-08-01 | End: 2017-08-01

## 2017-08-01 RX ORDER — FLUMAZENIL 0.1 MG/ML
INJECTION INTRAVENOUS
Status: COMPLETED
Start: 2017-08-01 | End: 2017-08-01

## 2017-08-01 RX ADMIN — CLONAZEPAM 0.5 MG: 0.5 TABLET ORAL at 21:00

## 2017-08-01 RX ADMIN — ATORVASTATIN CALCIUM 40 MG: 40 TABLET, FILM COATED ORAL at 20:47

## 2017-08-01 RX ADMIN — IPRATROPIUM BROMIDE AND ALBUTEROL SULFATE 3 ML: .5; 3 SOLUTION RESPIRATORY (INHALATION) at 22:36

## 2017-08-01 RX ADMIN — SODIUM BICARBONATE TAB 650 MG 1300 MG: 650 TAB at 11:59

## 2017-08-01 RX ADMIN — IPRATROPIUM BROMIDE AND ALBUTEROL SULFATE 3 ML: .5; 3 SOLUTION RESPIRATORY (INHALATION) at 06:31

## 2017-08-01 RX ADMIN — IPRATROPIUM BROMIDE AND ALBUTEROL SULFATE 3 ML: .5; 3 SOLUTION RESPIRATORY (INHALATION) at 02:15

## 2017-08-01 RX ADMIN — HEPARIN SODIUM 5000 UNITS: 5000 INJECTION, SOLUTION INTRAVENOUS; SUBCUTANEOUS at 09:38

## 2017-08-01 RX ADMIN — SODIUM BICARBONATE TAB 650 MG 1300 MG: 650 TAB at 09:35

## 2017-08-01 RX ADMIN — BUDESONIDE AND FORMOTEROL FUMARATE DIHYDRATE 2 PUFF: 80; 4.5 AEROSOL RESPIRATORY (INHALATION) at 18:49

## 2017-08-01 RX ADMIN — INSULIN DETEMIR 25 UNITS: 100 INJECTION, SOLUTION SUBCUTANEOUS at 22:12

## 2017-08-01 RX ADMIN — IPRATROPIUM BROMIDE AND ALBUTEROL SULFATE 3 ML: .5; 3 SOLUTION RESPIRATORY (INHALATION) at 18:49

## 2017-08-01 RX ADMIN — METOPROLOL TARTRATE 25 MG: 25 TABLET, FILM COATED ORAL at 09:37

## 2017-08-01 RX ADMIN — DOXYCYCLINE 100 MG: 100 CAPSULE ORAL at 09:36

## 2017-08-01 RX ADMIN — NICOTINE 1 PATCH: 21 PATCH, EXTENDED RELEASE TRANSDERMAL at 20:47

## 2017-08-01 RX ADMIN — GUAIFENESIN 1200 MG: 600 TABLET, EXTENDED RELEASE ORAL at 09:35

## 2017-08-01 RX ADMIN — PREDNISONE 15 MG: 5 TABLET ORAL at 12:00

## 2017-08-01 RX ADMIN — SODIUM BICARBONATE TAB 650 MG 1300 MG: 650 TAB at 17:53

## 2017-08-01 RX ADMIN — FLUMAZENIL 0.1 MG: 0.1 INJECTION, SOLUTION INTRAVENOUS at 15:34

## 2017-08-01 RX ADMIN — IPRATROPIUM BROMIDE AND ALBUTEROL SULFATE 3 ML: .5; 3 SOLUTION RESPIRATORY (INHALATION) at 14:35

## 2017-08-01 RX ADMIN — FLUMAZENIL 0.1 MG: 0.1 INJECTION INTRAVENOUS at 15:34

## 2017-08-01 RX ADMIN — ISOSORBIDE MONONITRATE 60 MG: 60 TABLET, EXTENDED RELEASE ORAL at 09:38

## 2017-08-01 RX ADMIN — METOPROLOL TARTRATE 25 MG: 25 TABLET, FILM COATED ORAL at 20:46

## 2017-08-01 RX ADMIN — MAGNESIUM SULFATE IN WATER 2 G: 40 INJECTION, SOLUTION INTRAVENOUS at 14:34

## 2017-08-01 RX ADMIN — GUAIFENESIN 1200 MG: 600 TABLET, EXTENDED RELEASE ORAL at 20:46

## 2017-08-01 RX ADMIN — SODIUM BICARBONATE 50 ML/HR: 84 INJECTION, SOLUTION INTRAVENOUS at 17:53

## 2017-08-01 RX ADMIN — INSULIN ASPART 12 UNITS: 100 INJECTION, SOLUTION INTRAVENOUS; SUBCUTANEOUS at 09:38

## 2017-08-01 RX ADMIN — HEPARIN SODIUM 5000 UNITS: 5000 INJECTION, SOLUTION INTRAVENOUS; SUBCUTANEOUS at 20:47

## 2017-08-01 RX ADMIN — DOXYCYCLINE 100 MG: 100 CAPSULE ORAL at 20:47

## 2017-08-01 RX ADMIN — CLONAZEPAM 0.5 MG: 0.5 TABLET ORAL at 09:47

## 2017-08-01 RX ADMIN — ASPIRIN 81 MG: 81 TABLET, CHEWABLE ORAL at 20:46

## 2017-08-01 RX ADMIN — PANTOPRAZOLE SODIUM 40 MG: 40 TABLET, DELAYED RELEASE ORAL at 11:59

## 2017-08-01 RX ADMIN — BUDESONIDE AND FORMOTEROL FUMARATE DIHYDRATE 2 PUFF: 80; 4.5 AEROSOL RESPIRATORY (INHALATION) at 06:31

## 2017-08-01 RX ADMIN — SODIUM BICARBONATE TAB 650 MG 1300 MG: 650 TAB at 20:47

## 2017-08-01 RX ADMIN — INSULIN ASPART 12 UNITS: 100 INJECTION, SOLUTION INTRAVENOUS; SUBCUTANEOUS at 12:00

## 2017-08-01 RX ADMIN — GABAPENTIN 100 MG: 100 CAPSULE ORAL at 09:38

## 2017-08-01 RX ADMIN — CLOPIDOGREL BISULFATE 75 MG: 75 TABLET, FILM COATED ORAL at 09:38

## 2017-08-01 RX ADMIN — INSULIN ASPART 12 UNITS: 100 INJECTION, SOLUTION INTRAVENOUS; SUBCUTANEOUS at 22:13

## 2017-08-01 NOTE — PROGRESS NOTES
Discharge Planning Assessment   [place]     Patient Name: Sara Cardona  MRN: [id number]  Today's Date: 8/1/2017    Admit Date: 7/24/2017          Discharge Plan       08/01/17 1352    Case Management/Social Work Plan    Plan SS received consult to arrange a home bipap.  SS contacted [facility] and provided order.  Norma contacted SS and she states Dolores will need to be contacted at discharge for bipap to be delivered.  SS will follow.     Patient/Family In Agreement With Plan yes        Expected Discharge Date and Time     Expected Discharge Date Expected Discharge Time    Jul 31, 2017           Zandra Sun

## 2017-08-01 NOTE — PLAN OF CARE
Problem: Cardiac: Heart Failure (Adult)  Goal: Signs and Symptoms of Listed Potential Problems Will be Absent or Manageable (Cardiac: Heart Failure)  Outcome: Ongoing (interventions implemented as appropriate)    07/29/17 2324   Cardiac: Heart Failure   Problems Assessed (Heart Failure) all   Problems Present (Heart Failure) respiratory compromise         Problem: Renal Failure/Kidney Injury, Acute (Adult)  Goal: Signs and Symptoms of Listed Potential Problems Will be Absent or Manageable (Renal Failure/Kidney Injury, Acute)  Outcome: Ongoing (interventions implemented as appropriate)    07/29/17 2324   Renal Failure/Kidney Injury, Acute   Problems Assessed (Acute Renal Failure/Kidney Injury) all   Problems Present (Acute Renal Failure/Kidney Injury) none         Problem: Fall Risk (Adult)  Goal: Identify Related Risk Factors and Signs and Symptoms  Outcome: Ongoing (interventions implemented as appropriate)    07/29/17 2324   Fall Risk   Fall Risk: Related Risk Factors impaired vision   Fall Risk: Signs and Symptoms presence of risk factors       Goal: Absence of Falls  Outcome: Ongoing (interventions implemented as appropriate)    07/29/17 2324   Fall Risk (Adult)   Absence of Falls making progress toward outcome         Problem: Pressure Ulcer Risk (Saul Scale) (Adult,Obstetrics,Pediatric)  Goal: Identify Related Risk Factors and Signs and Symptoms  Outcome: Ongoing (interventions implemented as appropriate)    07/29/17 2324   Pressure Ulcer Risk (Saul Scale)   Related Risk Factors (Pressure Ulcer Risk (Saul Scale)) critical care admission       Goal: Skin Integrity  Outcome: Ongoing (interventions implemented as appropriate)    07/29/17 2324   Pressure Ulcer Risk (Saul Scale) (Adult,Obstetrics,Pediatric)   Skin Integrity making progress toward outcome         Problem: Patient Care Overview (Adult)  Goal: Plan of Care Review  Outcome: Ongoing (interventions implemented as appropriate)    07/29/17 2324  07/31/17 1915   Coping/Psychosocial Response Interventions   Plan Of Care Reviewed With --  patient   Patient Care Overview   Progress improving --        Goal: Adult Individualization and Mutuality  Outcome: Ongoing (interventions implemented as appropriate)    07/27/17 0449   Mutuality/Individual Preferences   What Anxieties, Fears or Concerns Do You Have About Your Health or Care? none   What Questions Do You Have About Your Health or Care? none   What Information Would Help Us Give You More Personalized Care? none       Goal: Discharge Needs Assessment  Outcome: Ongoing (interventions implemented as appropriate)    07/25/17 1505 07/25/17 1936 07/28/17 0013   Discharge Needs Assessment   Concerns To Be Addressed --  --  --    Readmission Within The Last 30 Days --  no previous admission in last 30 days --    Equipment Needed After Discharge --  none --    Current Discharge Risk --  --  chronically ill   Discharge Disposition --  --  still a patient   Discharge Planning Comments --  pt currently in CCU with no complaints identified. VSS will continue to monitor --    Current Health   Outpatient/Agency/Support Group Needs (Pt does not utilize home health at this time. Pt declines home health at discharge. ) --  --    Anticipated Changes Related to Illness --  none --    Self-Care   Equipment Currently Used at Home --  none --    Living Environment   Transportation Available --  none --      07/29/17 0917   Discharge Needs Assessment   Concerns To Be Addressed substance/tobacco abuse/use concerns   Readmission Within The Last 30 Days --    Equipment Needed After Discharge --    Current Discharge Risk --    Discharge Disposition --    Discharge Planning Comments --    Current Health   Outpatient/Agency/Support Group Needs --    Anticipated Changes Related to Illness --    Self-Care   Equipment Currently Used at Home --    Living Environment   Transportation Available --          Problem: Skin Integrity Impairment,  Risk/Actual (Adult)  Goal: Identify Related Risk Factors and Signs and Symptoms  Outcome: Ongoing (interventions implemented as appropriate)    07/29/17 2324   Skin Integrity Impairment, Risk/Actual   Skin Integrity Impairment, Risk/Actual: Related Risk Factors edema   Signs and Symptoms (Skin Integrity Impairment) edema       Goal: Skin Integrity/Wound Healing  Outcome: Ongoing (interventions implemented as appropriate)    07/29/17 0917   Skin Integrity Impairment, Risk/Actual (Adult)   Skin Integrity/Wound Healing making progress toward outcome         Problem: Diabetes, Type 2 (Adult)  Goal: Signs and Symptoms of Listed Potential Problems Will be Absent or Manageable (Diabetes, Type 2)  Outcome: Ongoing (interventions implemented as appropriate)    07/28/17 0013 07/28/17 1604   Diabetes, Type 2   Problems Assessed (Type 2 Diabetes) --  all   Problems Present (Type 2 Diabetes) none --

## 2017-08-01 NOTE — PROGRESS NOTES
Nephrology  Note      Subjective      She has no nausea , vomiting or metallic taste. She is voiding well. Very good appetite. No chest pain or SOB     Objective     Vital Signs  Temp:  [97.4 °F (36.3 °C)-98.8 °F (37.1 °C)] 98.5 °F (36.9 °C)  Heart Rate:  [64-89] 66  Resp:  [12-26] 26  BP: (108-175)/(54-92) 118/56    I/O this shift:  In: 240 [P.O.:240]  Out: 200 [Urine:200]  I/O last 3 completed shifts:  In: 1200 [P.O.:1200]  Out: 1300 [Urine:1300]    Physical Examination:    General Appearance : alert, no distress  Head : normocephalic  Eyes :no pallor   Throat : oral mucosa moist  Lungs : reduced breath sounds at bases  Heart : regular rhythm & normal rate, normal S1, S2, no murmur   Abdomen :  soft non-tender  Extremities : 3+ edema upto thighs  Skin : no  rash  Neurologic :grossly no focal deficitis    Laboratory Data :      WBC WBC   Date Value Ref Range Status   08/01/2017 12.44 4.50 - 12.50 10*3/mm3 Final   07/31/2017 10.35 4.50 - 12.50 10*3/mm3 Final   07/30/2017 9.13 4.50 - 12.50 10*3/mm3 Final      HGB Hemoglobin   Date Value Ref Range Status   08/01/2017 9.4 (L) 12.0 - 16.0 g/dL Final   07/31/2017 8.3 (L) 12.0 - 16.0 g/dL Final   07/30/2017 8.2 (L) 12.0 - 16.0 g/dL Final      HCT Hematocrit   Date Value Ref Range Status   08/01/2017 31.6 (L) 37.0 - 47.0 % Final   07/31/2017 28.5 (L) 37.0 - 47.0 % Final   07/30/2017 27.8 (L) 37.0 - 47.0 % Final      Platlets No results found for: LABPLAT   MCV MCV   Date Value Ref Range Status   08/01/2017 92.1 80.0 - 94.0 fL Final   07/31/2017 95.3 (H) 80.0 - 94.0 fL Final   07/30/2017 95.5 (H) 80.0 - 94.0 fL Final          Sodium Sodium   Date Value Ref Range Status   08/01/2017 141 135 - 153 mmol/L Final   07/31/2017 140 135 - 153 mmol/L Final   07/30/2017 137 135 - 153 mmol/L Final      Potassium Potassium   Date Value Ref Range Status   08/01/2017 4.7 3.5 - 5.3 mmol/L Final   07/31/2017 4.5 3.5 - 5.3 mmol/L Final   07/30/2017 5.3 3.5 - 5.3 mmol/L Final      Chloride  Chloride   Date Value Ref Range Status   08/01/2017 111 99 - 112 mmol/L Final   07/31/2017 111 99 - 112 mmol/L Final   07/30/2017 110 99 - 112 mmol/L Final      CO2 CO2   Date Value Ref Range Status   08/01/2017 17.1 (L) 24.3 - 31.9 mmol/L Final   07/31/2017 15.9 (L) 24.3 - 31.9 mmol/L Final   07/30/2017 15.9 (L) 24.3 - 31.9 mmol/L Final      BUN BUN   Date Value Ref Range Status   08/01/2017 117 (H) 7 - 21 mg/dL Final   07/31/2017 109 (H) 7 - 21 mg/dL Final   07/30/2017 99 (H) 7 - 21 mg/dL Final      Creatinine Creatinine   Date Value Ref Range Status   08/01/2017 3.59 (H) 0.43 - 1.29 mg/dL Final   07/31/2017 3.52 (H) 0.43 - 1.29 mg/dL Final   07/30/2017 3.73 (H) 0.43 - 1.29 mg/dL Final      Calcium Calcium   Date Value Ref Range Status   08/01/2017 7.2 (L) 7.7 - 10.0 mg/dL Final   07/31/2017 6.9 (L) 7.7 - 10.0 mg/dL Final   07/30/2017 6.9 (L) 7.7 - 10.0 mg/dL Final      PO4 No results found for: CAPO4   Albumin Albumin   Date Value Ref Range Status   07/31/2017 3.30 (L) 3.40 - 4.80 g/dL Final   07/30/2017 3.20 (L) 3.40 - 4.80 g/dL Final      Magnesium Magnesium   Date Value Ref Range Status   08/01/2017 1.8 1.7 - 2.6 mg/dL Final   07/31/2017 1.6 (L) 1.7 - 2.6 mg/dL Final      Uric Acid No results found for: URICACID     Radiology results :     Imaging Results (last 24 hours)     ** No results found for the last 24 hours. **            Medications:        aspirin 81 mg Oral Nightly   atorvastatin 40 mg Oral Nightly   budesonide-formoterol 2 puff Inhalation BID - RT   clopidogrel 75 mg Oral Daily   doxycycline 100 mg Oral Q12H   epoetin jane 10,000 Units Subcutaneous Once per day on Mon Thu   gabapentin 100 mg Oral Q12H   guaiFENesin 1,200 mg Oral Q12H   heparin (porcine) 5,000 Units Subcutaneous Q12H   insulin aspart 0-24 Units Subcutaneous 4x Daily AC & at Bedtime   insulin detemir 25 Units Subcutaneous Nightly   ipratropium-albuterol 3 mL Nebulization Q4H - RT   isosorbide mononitrate 60 mg Oral Q24H   metoprolol  tartrate 25 mg Oral Q12H   nicotine 1 patch Transdermal Nightly   pantoprazole 40 mg Oral QAM    Pharmacy Meds to Bed Consult  Does not apply Daily   predniSONE 15 mg Oral Daily With Breakfast   sodium bicarbonate 1,300 mg Oral 4x Daily       hold 1 each       Assessment/Plan     Principal Problem:    Acute on chronic renal failure  Active Problems:    CHF (congestive heart failure)      1. SIMON on CKD 4 : her baseline creatinine is 2.5. She was worsening CKD from uncontrolled HTN and type 2 DM.  Her creatinine is stable at 3.5 . No acute indication for Hemodialysis  24-hour creatinine clearance is 14.7 cc/min    2. Metabolic and respiratory acidosis : on BIPAP and sodium bicarbonate PO . better    3. Anemia of chronic disease : continue epogen    4. Edema : from CKD 4 and steroids, she is not in CHF will watch without diuresis    5. Hypoxic and hypercarbic respiratory failure with R pleural effusion : s/p thoracentesis    6. Azotemia : likely from solumedrol, has been reduced per Dr Martin    I discussed the patients findings and my recommendations with patient, family, Dr Mario Chavez MD  08/01/17  1:01 PM

## 2017-08-01 NOTE — NURSING NOTE
"AT DR. ANTUNEZ'S REQUEST PT WAS ASKED IF SHE WANTED TO BE INTUBATED IF NECESSARY.  PT A & O X4., EDIE GEORGE RN AND AGAPITO ANGELA AND PTS  SIGNIFICANT OTHER PRESENT. ORIENTATION QUESTIONS ASKED IN FRONT OF WITNESSES. PT REPEATEDLY REFUSES INTUBATION. PT THEN ASKED IF IT WAS AN EMERGENCY AND SHE MIGHT DIE WITHOUT MECHANICAL VENTILATION WOULD SHE WANT IT  PT STATES \"I MIGHT WANT IT THEN BUT NOT NOW, I DON'T NEED IT NOW\".  MILLIE HOLCOMB RN  "

## 2017-08-01 NOTE — DISCHARGE PLACEMENT REQUEST
"DulceSara hui (67 y.o. Female)     Date of Birth Social Security Number Address Home Phone MRN    1950  [id number]  [facility] [address] [phone] [id number]    Yazidism Marital Status          Gnosticism        Admission Date Admission Type Admitting Provider Attending Provider Department, Room/Bed    7/24/17 Emergency Edward Solorzano MD Hammons, Edward Wen MD [facility] CARE, P219/1P    Discharge Date Discharge Disposition Discharge Destination                      Attending Provider: Edward Solorzano MD     Allergies:  No Known Allergies    Isolation:  None   Infection:  None   Code Status:  FULL    Ht:  61\" (154.9 cm)   Wt:  159 lb 12.8 oz (72.5 kg)    Admission Cmt:  None   Principal Problem:  Acute on chronic renal failure [N17.9,N18.9]                 Active Insurance as of 7/24/2017     Primary Coverage     Payor Plan Insurance Group Employer/Plan Group    MEDICARE MEDICARE B ONLY      Payor Plan Address Payor Plan Phone Number Effective From Effective To    [address] [address] [phone] 7/1/2015     [place] [address]       Subscriber Name Subscriber Birth Date Member ID       SARA CARDONA 1950 [id number]           Secondary Coverage     Payor Plan Insurance Group Employer/Plan Group    [employer] [place]ICAID      Payor Plan Address Payor Plan Phone Number Effective From Effective To    [address] [address] [phone] 6/30/2016     [place] [address]       Subscriber Name Subscriber Birth Date Member ID       SARA CARDONA 1950 [id number]                 Emergency Contacts      (Rel.) Home Phone Work Phone Mobile Phone    ÁngelDulce (Friend) [phone] -- --            Emergency Contact Information     Name Relation Home Work Mobile    Dulce Neal Friend [phone]            Insurance Information                MEDICARE/MEDICARE B ONLY Phone: [phone]    Subscriber: Sara Cardona Subscriber#: " [id number]    Group#:  Precert#:         [facility] MEDICAID Phone: [phone]    Subscriber: Sara Cardona Subscriber#: [id number]    Group#:  Precert#:              History & Physical      Edward Solorzano MD at 2017 11:51 PM          Hospitalist History and Physical        Patient Identification  Name: Sara Cardona  Age/Sex: 67 y.o. female  :  1950        MRN: [id number]  Visit Number: [id number]  PCP: Marcelino Sheehan MD        Chief complaint shortness of breath    History of Present Illness:  Patient is a 67 y.o. female who presents with complaints of worsening shortness of breath for the last 3-4 days. During this time she has also experienced intermittent chest pain with radiation of pain down both arms all the way to her fingers. She has also had increased cough with foamy clear sputum production. She has developed worsening edema in her bilateral lower extremities during this time as well. She reports taking a water pill for the last couple days to help with swelling, but since starting this she has developed worsening muscle cramps in her legs. In the ER today, she had evidence of mixed metabolic and respiratory acidosis. Chest XR commented on worsening changes of CHF/edema with increasing bibasilar airspace disease and BNP was elevated at 624 and she was given a dose of lasix by her ER provider. She had acute on chronic renal failure as well with Cr up from 2.6 on 2017 to 3.1 now. Bicarb was low at 11.9. She had no leukocytosis and CRP was only mildly elevated at 2.87. Upon arrival to the floor, she demanded to go out and smoke. She returned and had a large liquid bowel movement. She had had persistent hypoxia since that time despite increasing supplemental oxygen and has continued to complain of shortness of breath and chest pain. Repeat ABG showed worsening mixed acidosis. Therefore she has been moved to the CCU for initiation of bipap and closer  monitoring.    Of note, patient was hospitalized at our facility earlier this month from 7/3 to 7/11/2017 for acute on chronic renal failure and complaints of dark stools felt to be secondary to excessive NSAID use. She underwent EGD and colonoscopy at that time which showed mild gastritis and a sigmoid polyp but no evidence of active bleeding. She was discharged home with protonix.    Review of Systems  Review of Systems   Constitutional: Positive for activity change and fatigue. Negative for appetite change, chills, diaphoresis and fever.   HENT: Negative for congestion, postnasal drip, rhinorrhea, sinus pressure and sneezing.    Eyes: Negative for photophobia, pain, discharge, redness, itching and visual disturbance.   Respiratory: Positive for cough, shortness of breath and wheezing. Negative for apnea and stridor.    Cardiovascular: Positive for chest pain and leg swelling. Negative for palpitations.   Gastrointestinal: Positive for diarrhea. Negative for abdominal distention, abdominal pain, blood in stool, constipation, nausea and vomiting.   Endocrine: Positive for polyuria (since starting fluid pills at home ). Negative for cold intolerance, heat intolerance, polydipsia and polyphagia.   Genitourinary: Negative for dysuria, hematuria, pelvic pain and urgency.   Musculoskeletal: Negative for arthralgias, back pain, myalgias, neck pain and neck stiffness.   Skin: Negative for color change, pallor, rash and wound.   Allergic/Immunologic: Negative for environmental allergies, food allergies and immunocompromised state.   Neurological: Positive for numbness (in hands bilaterally, radiating down her arms, associated with episodes of chest pain). Negative for dizziness, light-headedness and headaches.   Hematological: Negative for adenopathy. Does not bruise/bleed easily.   Psychiatric/Behavioral: Negative for agitation, behavioral problems and confusion.       History  Past Medical History:   Diagnosis Date   •  Anxiety    • Aortic stenosis    • ASCVD (arteriosclerotic cardiovascular disease)    • Breast cancer    • CHF (congestive heart failure) 7/24/2017   • Chronic pain    • COPD (chronic obstructive pulmonary disease)    • Diabetic neuropathy    • Dyslipidemia    • Essential hypertension    • Insulin dependent diabetes mellitus    • Left carotid bruit    • Recurrent pneumonia    • Renal failure      Past Surgical History:   Procedure Laterality Date   • APPENDECTOMY     • CARDIAC CATHETERIZATION     • COLONOSCOPY     • COLONOSCOPY N/A 7/10/2017    Procedure: COLONOSCOPY;  Surgeon: Zheng Suarez III, MD;  Location:  [facility];  Service:    • ENDOSCOPY N/A 7/10/2017    Procedure: ESOPHAGOGASTRODUODENOSCOPY;  Surgeon: Zheng Suarez III, MD;  Location:  [facility];  Service:    • HYSTERECTOMY     • MASTECTOMY Right    • RHINOPLASTY       Family History   Problem Relation Age of Onset   • Diabetes Mother    • Lung cancer Father      Social History   Substance Use Topics   • Smoking status: Current Every Day Smoker     Packs/day: 1.00     Types: Cigarettes   • Smokeless tobacco: None   • Alcohol use No     Prescriptions Prior to Admission   Medication Sig Dispense Refill Last Dose   • amLODIPine (NORVASC) 10 MG tablet Take 1 tablet by mouth Daily. 30 tablet 1 7/23/2017 at Unknown time   • aspirin 81 MG tablet Take 81 mg by mouth daily   7/23/2017 at Unknown time   • budesonide-formoterol (SYMBICORT) 80-4.5 MCG/ACT inhaler Inhale 2 puffs 2 (Two) Times a Day.   7/23/2017 at Unknown time   • clonazePAM (KlonoPIN) 0.5 MG tablet Take 0.5 mg by mouth 2 (Two) Times a Day.   7/23/2017 at Unknown time   • gabapentin (NEURONTIN) 300 MG capsule Take 300 mg by mouth 3 (Three) Times a Day.   7/23/2017 at Unknown time   • hydrALAZINE (APRESOLINE) 25 MG tablet Take 25 mg by mouth 2 (Two) Times a Day With Meals.   7/23/2017 at Unknown time   • pantoprazole (PROTONIX) 40 MG EC tablet Take 1 tablet by mouth Every Morning Before  Breakfast. 30 tablet 1 7/23/2017 at Unknown time   • albuterol (PROVENTIL HFA;VENTOLIN HFA) 108 (90 BASE) MCG/ACT inhaler Inhale 2 puffs every 6 (six) hours as needed for wheezing. (Patient taking differently: Inhale 2 puffs Every 6 (Six) Hours.) 3.7 g 0 Unknown at Unknown time   • ondansetron ODT (ZOFRAN-ODT) 4 MG disintegrating tablet Take 4 mg by mouth Every 4 (Four) Hours As Needed for Nausea or Vomiting.   Unknown at Unknown time     Allergies:  Review of patient's allergies indicates no known allergies.    Objective     Vital Signs  Temp:  [98 °F (36.7 °C)-98.4 °F (36.9 °C)] 98.2 °F (36.8 °C)  Heart Rate:  [] 105  Resp:  [20-34] 34  BP: (144-151)/() 144/84  Body mass index is 27.81 kg/(m^2).    Physical Exam:  Physical Exam   Constitutional: She is oriented to person, place, and time.   Anxious, in mild respiratory distress   HENT:   Head: Normocephalic and atraumatic.   Mouth/Throat: Oropharynx is clear and moist.   Eyes: Conjunctivae and EOM are normal. Pupils are equal, round, and reactive to light.   Neck: Normal range of motion. Neck supple. No JVD present. No tracheal deviation present. No thyromegaly present.   Cardiovascular: Normal heart sounds and intact distal pulses.    No murmur heard.  Tachycardic, regular rhythm   Pulmonary/Chest:   Tachypneic, diminished breath sounds diffusely with expiratory wheezing throughout. Faint bibasilar rales.   Abdominal: Soft. Bowel sounds are normal. She exhibits no distension. There is no tenderness.   Musculoskeletal: Normal range of motion. She exhibits edema (1-2+ pitting edema b/l lower ext). She exhibits no tenderness or deformity.   Lymphadenopathy:     She has no cervical adenopathy.   Neurological: She is alert and oriented to person, place, and time.   No focal deficits appreciated on exam   Skin: Skin is warm and dry.   Psychiatric: Her behavior is normal. Thought content normal.   Anxious in appearance         Results Review:       Lab  Results:    Results from last 7 days  Lab Units 07/24/17  1454   WBC 10*3/mm3 9.81   HEMOGLOBIN g/dL 10.2*   PLATELETS 10*3/mm3 305       Results from last 7 days  Lab Units 07/24/17  1642   CRP mg/dL 2.87*       Results from last 7 days  Lab Units 07/24/17  1454   SODIUM mmol/L 141   POTASSIUM mmol/L 4.9   CHLORIDE mmol/L 114*   CO2 mmol/L 11.9*   BUN mg/dL 55*   CREATININE mg/dL 3.10*   CALCIUM mg/dL 8.0   GLUCOSE mg/dL 163*         No results found for: HGBA1C    Results from last 7 days  Lab Units 07/24/17  1454   BILIRUBIN mg/dL 0.2   ALK PHOS U/L 132*   AST (SGOT) U/L 18   ALT (SGPT) U/L 17       Results from last 7 days  Lab Units 07/24/17  1956 07/24/17  1454   CK TOTAL U/L 90 95   TROPONIN I ng/mL 0.032 0.026   CK MB INDEX % 6.3* 6.5*   MYOGLOBIN ng/mL 107.0  --        Results from last 7 days  Lab Units 07/24/17  1939   BNP pg/mL 624.0*       Results from last 7 days  Lab Units 07/24/17  1454   INR  0.96       Results from last 7 days  Lab Units 07/24/17  2316   PH, ARTERIAL pH units 7.283*   PO2 ART mm Hg 48.4*   PCO2, ARTERIAL mm Hg 40.1   HCO3 ART mmol/L 18.5*       I have reviewed the patient's laboratory results.    Imaging:  Imaging Results (last 72 hours)     Procedure Component Value Units Date/Time    XR Chest 1 View [[id number]] Collected:  07/24/17 1519     Updated:  07/24/17 1611    Narrative:       EXAMINATION: XR CHEST 1 VW-      CLINICAL INDICATION:     shortness of air     TECHNIQUE:  XR CHEST 1 VW-      COMPARISON: 07/09/2017      FINDINGS:   Increasing bibasilar airspace disease likely combination atelectasis and  pneumonia.   Cardiomegaly. Interstitial edema. Vascular congestion.   No pneumothorax.   Slight increase in right greater than left pleural effusions.   No acute osseous findings.            Impression:       Worsening changes of CHF/edema with increasing bibasilar  airspace disease.     This report was finalized on 7/24/2017 4:09 PM by Dr. Junior Angel MD.           I have  personally reviewed the patient's radiologic imaging.        EKG: sinus tachycardia, , with PACs. QTc borderline prolonged at 469. No overt ST changes to suggest acute ischemia.    I have personally reviewed the patient's EKG.        Assessment/Plan     Active Problems:  - Acute on chronic hypoxic respiratory failure, felt to be secondary to combination of COPD exacerbation and diastolic CHF exacerbation: already received lasix in the ER, will hold on further doses in light of worsening renal failure. Will treat with IV steroids, doxycycline and nebs for COPD exacerbation. Bipap initiated in light of worsening mixed acidosis on repeat ABG. Trend cardiac enzymes to rule out underlying cardiac ischemia. Will consult both pulmonology and cardiology to assist in further management of the patient tomorrow morning.  - Acute on Stage IV CKD: suspect triggered by recent lasix use at home (for management of worsening lower ext edema related to above). Will avoid nephrotoxic medications moving forward. Repeat lab work in the morning to see how kidneys respond to lasix given in ER. Consult nephrology for further assistance.   - Mixed metabolic and respiratory acidosis: secondary to worsening renal failure, coupled with impairment in respiratory compensation due to COPD exacerbation. Starting bipap, consulting specialists as above. Repeat ABG in a few hours after bipap has been initiated.  - Type II DM: apparently on lantus at home but not listed on med rec. Will cover with SSI for now. A1c as of earlier this month was 6.1% indicating adequate control.  - Hypertension: resume home meds with hold parameters  - Borderline prolonged QT interval: avoid QT prolonging meds  - Chronic pain: continue gabapentin at reduced dose and frequency due to acute on CKD.  - Anxiety: resume home klonopin with hold parameters. Confirm dosage with PATRICIA.  - Tobacco abuse: nicotine patch ordered    DVT/GI Prophylaxis: SQ heparin, PO  protonix    Estimated Length of Stay >2 midnights    I discussed the patient's findings, assessment and plan with the patient and nursing staff in the CCU.    * Patient is high risk due to acute on chronic hypoxic respiratory faliure, COPD exacerbation, diastolic CHF exacerbation, acute on Stage IV CKD, mixed metabolic and respiratory acidosis requiring bipap, Type II DM insulin dependent    Edward Solorzano MD  07/24/17  11:51 PM       Electronically signed by Edward Solorzano MD at 7/25/2017 12:31 AM          Lab Results (last 24 hours)     Procedure Component Value Units Date/Time    Creatinine Clearance, Urine, 24 Hour [[id number]]  (Abnormal) Collected:  07/31/17 1604    Specimen:  24 Hour Urine from Urine, Clean Catch Updated:  07/31/17 1639     Creatinine Clearance 14.7 (L) ml/min      Creatinine, Urine 49.0 mg/dL      Time (Hours) 24 hrs      Creatinine, 24H 0.75 g/24 hr      CREATININE CLEARANCE L/24 HOUR 21.2 (L) L/24 hr     POC Glucose Fingerstick [[id number]]  (Abnormal) Collected:  07/31/17 1640    Specimen:  Blood Updated:  07/31/17 1646     Glucose 273 (H) mg/dL     Narrative:       Meter: [id number] : [id number] cori kunz    POC Glucose Fingerstick [[id number]]  (Abnormal) Collected:  07/31/17 2002    Specimen:  Blood Updated:  07/31/17 2008     Glucose 285 (H) mg/dL     Narrative:       Meter: [id number] : [id number] JOHN CASTILLO    CBC & Differential [[id number]] Collected:  08/01/17 0055    Specimen:  Blood Updated:  08/01/17 0106    Narrative:       The following orders were created for panel order CBC & Differential.  Procedure                               Abnormality         Status                     ---------                               -----------         ------                     CBC Auto Differential[[id number]]        Abnormal            Final result                 Please view results for these tests on the individual orders.    CBC Auto Differential [[id number]]   (Abnormal) Collected:  08/01/17 0055    Specimen:  Blood Updated:  08/01/17 0106     WBC 12.44 10*3/mm3      RBC 3.43 (L) 10*6/mm3      Hemoglobin 9.4 (L) g/dL      Hematocrit 31.6 (L) %      MCV 92.1 fL      MCH 27.4 pg      MCHC 29.7 (L) g/dL      RDW 18.2 (H) %      RDW-SD 60.9 (H) fl      MPV 11.0 (H) fL      Platelets 303 10*3/mm3      Neutrophil % 88.9 (H) %      Lymphocyte % 5.7 (L) %      Monocyte % 4.1 %      Eosinophil % 0.0 %      Basophil % 0.0 %      Immature Grans % 1.3 (H) %      Neutrophils, Absolute 11.06 (H) 10*3/mm3      Lymphocytes, Absolute 0.71 (L) 10*3/mm3      Monocytes, Absolute 0.51 10*3/mm3      Eosinophils, Absolute 0.00 10*3/mm3      Basophils, Absolute 0.00 10*3/mm3      Immature Grans, Absolute 0.16 (H) 10*3/mm3     Basic Metabolic Panel [[id number]]  (Abnormal) Collected:  08/01/17 0055    Specimen:  Blood Updated:  08/01/17 0128     Glucose 123 (H) mg/dL       (H) mg/dL      Creatinine 3.59 (H) mg/dL      Sodium 141 mmol/L      Potassium 4.7 mmol/L      Chloride 111 mmol/L      CO2 17.1 (L) mmol/L      Calcium 7.2 (L) mg/dL      eGFR Non African Amer 13 (L) mL/min/1.73      BUN/Creatinine Ratio 32.6 (H)     Anion Gap 12.9 (H) mmol/L     Narrative:       GFR Normal >60  Chronic Kidney Disease <60  Kidney Failure <15    Magnesium [[id number]]  (Normal) Collected:  08/01/17 0055    Specimen:  Blood Updated:  08/01/17 0128     Magnesium 1.8 mg/dL     Osmolality, Calculated [[id number]]  (Abnormal) Collected:  08/01/17 0055    Specimen:  Blood Updated:  08/01/17 0128     Osmolality Calc 319.9 (H) mOsm/kg     POC Glucose Fingerstick [[id number]]  (Abnormal) Collected:  08/01/17 0712    Specimen:  Blood Updated:  08/01/17 0718     Glucose 267 (H) mg/dL     Narrative:       Meter: [id number] : [id number] cori kunz    POC Glucose Fingerstick [[id number]]  (Abnormal) Collected:  08/01/17 1129    Specimen:  Blood Updated:  08/01/17 1136     Glucose 162 (H) mg/dL     Narrative:        Meter: [id number] : [id number] cori kunz           Physician Progress Notes (last 24 hours) (Notes from 2017  1:37 PM through 2017  1:37 PM)      Priscilla Juárez MD at 2017  9:42 AM  Version 1 of 1             [facility]     Patient Identification:  Name:  Sara Cardona  Age:  67 y.o.  Sex:  female  :  1950  MRN:  [id number]  Visit Number:  [id number]  Primary Care Provider:  Marcelino Sheehan MD    Length of stay:  8    Chief complaint:  67 years old female admitted with acute on chronic renal failure.     Subjective:    Patient was wanting to smoke a cigarette and threatening to leave AMA.  Patient was explained the dangers of leaving AMA.  Patient was explained her medical condition and severity of her illness and the fact that she may die if treatment is not completed.  Her nicotine patch was increased to prevent nicotine withdrawal and craving for cigarettes.  Urine output slightly improved in last 24 hours.    Patient states that her breathing is better today.  He shouldn't states that she is using BiPAP at night and whenever she takes a nap and has been sleeping very well with BiPAP.  Patient denies any complaints.  She states that she wants a cigarette.    ----------------------------------------------------------------------------------------------------------------------  Current Hospital Meds:    aspirin 81 mg Oral Nightly   atorvastatin 40 mg Oral Nightly   budesonide-formoterol 2 puff Inhalation BID - RT   clopidogrel 75 mg Oral Daily   doxycycline 100 mg Oral Q12H   epoetin jane 10,000 Units Subcutaneous Once per day on Mon Th   gabapentin 100 mg Oral Q12H   guaiFENesin 1,200 mg Oral Q12H   heparin (porcine) 5,000 Units Subcutaneous Q12H   insulin aspart 0-24 Units Subcutaneous 4x Daily AC & at Bedtime   insulin detemir 25 Units Subcutaneous Nightly   ipratropium-albuterol 3 mL Nebulization Q4H - RT   isosorbide mononitrate 60  mg Oral Q24H   metoprolol tartrate 25 mg Oral Q12H   nicotine 1 patch Transdermal Nightly   pantoprazole 40 mg Oral [facility]   Pharmacy Meds to Bed Consult  Does not apply Daily   predniSONE 15 mg Oral Daily With Breakfast   sodium bicarbonate 1,300 mg Oral 4x Daily       hold 1 each     ----------------------------------------------------------------------------------------------------------------------  Vital Signs:  Temp:  [97.4 °F (36.3 °C)-98.8 °F (37.1 °C)] 98.5 °F (36.9 °C)  Heart Rate:  [64-89] 80  Resp:  [12-20] 18  BP: (108-175)/(54-92) 172/89  Last 3 weights    07/30/17  0600 07/31/17  0600 08/01/17  0400   Weight: 160 lb 4.8 oz (72.7 kg) 163 lb 1.6 oz (74 kg) 159 lb 12.8 oz (72.5 kg)     Body mass index is 30.19 kg/(m^2).    Intake/Output Summary (Last 24 hours) at 08/01/17 0942  Last data filed at 08/01/17 0400   Gross per 24 hour   Intake              960 ml   Output              400 ml   Net              560 ml     Diet Regular; Cardiac, Consistent Carbohydrate, Low Potassium  ----------------------------------------------------------------------------------------------------------------------  Physical exam:  Constitutional:  Well-developed and well-nourished.     HENT:  Head:  Normocephalic and atraumatic.  Mouth:  Moist mucous membranes.    Eyes:  Conjunctivae and EOM are normal.  Pupils are equal, round, and reactive to light.   Neck:  Neck supple.  No JVD present.    Cardiovascular:  Regular rate and rhythm. S1+S2. No murmur, rubs or gallops.   Pulmonary/Chest: Inspiratory bibasilar crackles with expiratory rhonchi in right basilar area.  Abdominal:  Soft. Non-tender. No viscera palpable.  Bowel sounds audible.   Musculoskeletal: No deformity or joint swelling.   Peripheral vascular: Bilateral dorsalis pedis palpable.  +2 pitting edema extending up to her thighs  Neurological:  Alert and oriented to person, place, and time.  Cranial nerves grossly intact. Strength bilaterally symmetrical in upper  and lower extremities.   Skin:  Skin is warm and dry. No rash noted. No pallor.   ----------------------------------------------------------------------------------------------------------------------  Tele:  Issa rhythm with heart rate 70s-80  ----------------------------------------------------------------------------------------------------------------------        Results from last 7 days  Lab Units 08/01/17  0055 07/31/17  0454 07/30/17  0404  07/28/17  0215 07/27/17  0124 07/26/17  0132   CRP mg/dL  --   --   --   --  0.66 1.21* 2.17*   WBC 10*3/mm3 12.44 10.35 9.13  < > 10.18 11.84 7.90   HEMOGLOBIN g/dL 9.4* 8.3* 8.2*  < > 8.0* 8.3* 8.1*   HEMATOCRIT % 31.6* 28.5* 27.8*  < > 27.0* 27.8* 27.8*   MCV fL 92.1 95.3* 95.5*  < > 94.4* 91.4 93.3   MCHC g/dL 29.7* 29.1* 29.5*  < > 29.6* 29.9* 29.1*   PLATELETS 10*3/mm3 303 256 260  < > 276 289 309   < > = values in this interval not displayed.    Results from last 7 days  Lab Units 07/31/17  0651   PH, ARTERIAL pH units 7.267*   PO2 ART mm Hg 95.8   PCO2, ARTERIAL mm Hg 36.8   HCO3 ART mmol/L 16.4*       Results from last 7 days  Lab Units 08/01/17  0055 07/31/17  0454 07/30/17  0404  07/29/17  0135   SODIUM mmol/L 141 140 137  --  137   POTASSIUM mmol/L 4.7 4.5 5.3  < > 5.4*   MAGNESIUM mg/dL 1.8 1.6*  --   --   --    CHLORIDE mmol/L 111 111 110  --  110   CO2 mmol/L 17.1* 15.9* 15.9*  --  16.8*   BUN mg/dL 117* 109* 99*  --  88*   CREATININE mg/dL 3.59* 3.52* 3.73*  --  3.54*   EGFR IF NONAFRICN AM mL/min/1.73 13* 13* 12*  --  13*   CALCIUM mg/dL 7.2* 6.9* 6.9*  --  7.1*   GLUCOSE mg/dL 123* 142* 268*  --  97   ALBUMIN g/dL  --  3.30* 3.20*  --  3.40   BILIRUBIN mg/dL  --  0.1* 0.1*  --  0.1*   ALK PHOS U/L  --  94 100  --  105*   AST (SGOT) U/L  --  24 36*  --  44*   ALT (SGPT) U/L  --  58* 76*  --  56*   < > = values in this interval not displayed.Estimated Creatinine Clearance: 13.9 mL/min (by C-G formula based on Cr of 3.59).    No results found for:  AMMONIA               Stool Culture   Date Value Ref Range Status   07/27/2017 Normal Fecal Andreina  Final       I have personally looked at the labs and they are summarized above.  ----------------------------------------------------------------------------------------------------------------------  Imaging Results (last 24 hours)     ** No results found for the last 24 hours. **        ----------------------------------------------------------------------------------------------------------------------  Assessment and Plan:    - Acute on chronic hypoxic respiratory failure:   2/2 COPD exacerbation and diastolic CHF exacerbation. ABGs show persistent Metabolic acidosis with normal PCO2. CXR showed bibasilar atelactasis.  Continue treatment for COPD exacerbation and CHF.  Holding diuresis 2/2 worsening renal function. Cont BiPAP PRN and QHS. Pulm input appreciated.     - SIMON on CKD IV:   Creatinine and blood urea nitrogen worsened since yesterday.  Continues to have anion gap acidosis..  24-hour creatinine clearance is 0.75 and WNL.    Pt is at high risk for dialysis. Nephrology input appreciated.      - Acute diastolic CHF exacerbation:   Echo on 7/4/17 showed an EF of 66-70%, trace AR, mild MR, mild MS, mild to moderate TR and severe pulmonary HTN. Holding diuresis 2/2 worsening renal function. Lower extremity edema is worsening. Cardiology input appreciated.       - Acute exacerbation of COPD with ongoing tobacco abuse:   Improving. Cont steroids, antibiotics and nebs. symbicortCont BiPAP. Nicotine patch ordered. Cont to encourage cessation.     - Anemia on chronic disease  Due to CKD. Continue epogen.      - Mixed respiratory and metabolic acidosis:   Pt has been more compliant with BiPAP. Currently on PO sodium bicarb per nephrology. Cont BiPAP and treatment per nephrology.  Patient continues to have persistent anion gap acidosis.     - Hyperkalemia:   Improved.  Potassium continues to remain within normal  limits.  Continue low potassium diet.      - Essential HTN:   Blood pressure controlled. On metoprolol.      - DM II, insulin dependent:   HgbA1c 6.1 on 7/3/17.   Blood sugars elevated to 200s.  Hyperglycemia likely secondary to systemic steroids.  Episode of hypoglycemia on 7/28.    Continue Levemir 25 units daily at bedtime.  Will admit and insulin and continue sliding scale insulin.  Monitor blood sugar with Accu-Cheks before meals and at bedtime..       - Pleural effusions:   R>L. S/P right-sided thoracentesis. Pleural fluid appears transudative. Cultures negative.      - CAD:   Asymptomatic.  Continue aspirin, atorvastatin, Plavix, metoprolol and Imdur. Cardiology following with input appreciated.      - Chronic pain:   Neurontin decreased for renal failure per pharmacy adjustment.      - Anxiety:   Cont klonopin with close monitoring in the setting of respiratory failure.      - Prophylaxis:  Heparin and PPI.     - Fluid, electrolytes and nutrition:  No IVF.   Electrolyte replacement per protocol.  Low K+ diet.      Pt is at high risk 2/2 acute on chronic hypoxic respiratory failure, SIMNO on CKD IV at high risk for dialysis, CHF exacerbation, COPD exacerbation with ongoing tobacco abuse, mixed acidosis and hx of noncompliance.    Priscilla Juárez MD  08/01/17  9:42 AM     Electronically signed by Priscilla Juárez MD at 8/1/2017 11:08 AM      Devin Martin MD at 8/1/2017 11:26 AM  Version 2 of 2          LOS: 8 days     Chief Complaint:  Pulmonology is following for shortness of breath    Subjective     Interval History: Patient states that she is feeling much better today.  She used her BiPAP overnight.  No acute events reported overnight.    History taken from: patient chart RN    Review of Systems:   Review of Systems - History obtained from chart review and the patient  General ROS: negative for - chills, fatigue or fever  Psychological ROS: negative for - anxiety or depression  ENT ROS: negative for -  headaches, visual changes or vocal changes  Allergy and Immunology ROS: negative for - nasal congestion, postnasal drip or seasonal allergies  Endocrine ROS: negative for - polydipsia/polyuria  Respiratory ROS: no cough, shortness of breath, or wheezing  Cardiovascular ROS: no chest pain or dyspnea on exertion  Gastrointestinal ROS: no abdominal pain, change in bowel habits, or black or bloody stools  Musculoskeletal ROS: negative for - joint pain, joint stiffness or joint swelling  Neurological ROS: no TIA or stroke symptoms                    Objective     Vital Signs  Temp:  [97.4 °F (36.3 °C)-98.8 °F (37.1 °C)] 98.5 °F (36.9 °C)  Heart Rate:  [64-89] 80  Resp:  [12-20] 18  BP: (108-175)/(54-92) 172/89  Body mass index is 30.19 kg/(m^2).    Intake/Output Summary (Last 24 hours) at 08/01/17 1126  Last data filed at 08/01/17 1000   Gross per 24 hour   Intake             1200 ml   Output              350 ml   Net              850 ml     I/O this shift:  In: 240 [P.O.:240]  Out: 200 [Urine:200]    Physical Exam:  GENERAL APPEARANCE: Well developed, well nourished, alert and cooperative, and appears to be in no acute distress.    HEAD: normocephalic.    EYES: PERRL    NECK: Neck supple.     CARDIAC: Normal S1 and S2. No S3, S4 or murmurs. Rhythm is regular. There is no peripheral edema, cyanosis or pallor. Extremities are warm and well perfused. Capillary refill is less than 2 seconds. No carotid bruits.    RESPIRATORY: Clear to auscultation and percussion without rales, rhonchi, wheezing or diminished breath sounds.    GI: Positive bowel sounds. Soft, nondistended, nontender.     MUSCULOSKELTAL: No significant deformity or joint abnormality. No edema. Peripheral pulses intact.     NEUROLOGICAL: Strength and sensation symmetric and intact throughout.     PSYCHIATRIC: The mental examination revealed the patient was oriented to person, place, and time.                 Results Review:                I reviewed the  patient's new clinical results.  I reviewed the patient's new imaging results and agree with the interpretation.    Results from last 7 days  Lab Units 08/01/17  0055 07/31/17 0454 07/30/17  0404   WBC 10*3/mm3 12.44 10.35 9.13   HEMOGLOBIN g/dL 9.4* 8.3* 8.2*   PLATELETS 10*3/mm3 303 256 260       Results from last 7 days  Lab Units 08/01/17 0055 07/31/17 0454 07/30/17  0404   SODIUM mmol/L 141 140 137   POTASSIUM mmol/L 4.7 4.5 5.3   CHLORIDE mmol/L 111 111 110   CO2 mmol/L 17.1* 15.9* 15.9*   BUN mg/dL 117* 109* 99*   CREATININE mg/dL 3.59* 3.52* 3.73*   CALCIUM mg/dL 7.2* 6.9* 6.9*   GLUCOSE mg/dL 123* 142* 268*   MAGNESIUM mg/dL 1.8 1.6*  --      Lab Results   Component Value Date    INR 0.96 07/24/2017    INR 0.93 08/08/2016    INR 0.93 10/09/2015    PROTIME 12.9 07/24/2017    PROTIME 10.5 08/08/2016    PROTIME 10.3 10/09/2015       Results from last 7 days  Lab Units 07/31/17 0454 07/30/17 0404 07/29/17  0135   ALK PHOS U/L 94 100 105*   BILIRUBIN mg/dL 0.1* 0.1* 0.1*   ALT (SGPT) U/L 58* 76* 56*   AST (SGOT) U/L 24 36* 44*       Results from last 7 days  Lab Units 07/31/17  0651   PH, ARTERIAL pH units 7.267*   PO2 ART mm Hg 95.8   PCO2, ARTERIAL mm Hg 36.8   HCO3 ART mmol/L 16.4*     Imaging Results (last 24 hours)     ** No results found for the last 24 hours. **             Medication Review:   Scheduled Medications:    aspirin 81 mg Oral Nightly   atorvastatin 40 mg Oral Nightly   budesonide-formoterol 2 puff Inhalation BID - RT   clopidogrel 75 mg Oral Daily   doxycycline 100 mg Oral Q12H   epoetin jane 10,000 Units Subcutaneous Once per day on Mon Thu   gabapentin 100 mg Oral Q12H   guaiFENesin 1,200 mg Oral Q12H   heparin (porcine) 5,000 Units Subcutaneous Q12H   insulin aspart 0-24 Units Subcutaneous 4x Daily AC & at Bedtime   insulin detemir 25 Units Subcutaneous Nightly   ipratropium-albuterol 3 mL Nebulization Q4H - RT   isosorbide mononitrate 60 mg Oral Q24H   metoprolol tartrate 25 mg Oral  Q12H   nicotine 1 patch Transdermal Nightly   pantoprazole 40 mg Oral QAM AC   Pharmacy Meds to Bed Consult  Does not apply Daily   predniSONE 15 mg Oral Daily With Breakfast   sodium bicarbonate 1,300 mg Oral 4x Daily     Continuous infusions:    hold 1 each       Assessment/Plan      Respiratory failure/hypoxia/shortness of breath:  Metabolic acidosis is likely caused by renal failure.    Patient reports she used her BiPAP overnight.  She states that she feels so much better during the day when she used her BiPAP.  She also asked if she could get a BiPAP for home.  We'll place  consult to see if we can get her qualified for home BiPAP/cardiology.    Discontinued Solu-Medrol and started her on prednisone 20 mg by mouth daily.    Continue scheduled inhalants and as needed neb treatments.    Acute on chronic renal failure: 24 hour urine showed a decreased creatinine clearance.  Continue management per nephrology.    Case discussed with dr gamez  Patient Active Problem List   Diagnosis Code   • Acute renal failure N17.9   • Essential hypertension I10   • Dyslipidemia E78.5   • Diabetic neuropathy E11.40   • Chronic pain G89.29   • Anxiety F41.9   • Anemia D64.9   • Acute on chronic renal failure N17.9, N18.9   • CHF (congestive heart failure) I50.9             CLARA Presley  08/01/17  11:26 AM        Attestation: Scribed for Dr. Martin, by CLARA Laboy      I, Devin Martin M.D. attest that the above note accurately reflects the work and decisions made  by me.  Patient was seen and evaluated by Dr. Martin, including history of present illness, physical exam, assessment, and treatment plan.  The above note was reviewed and edited by Dr. Martin.     Electronically signed by Devin Martin MD at 8/1/2017 11:58 AM      CLARA Presley at 8/1/2017 11:26 AM  Version 1 of 2          LOS: 8 days     Chief Complaint:  Pulmonology is following for shortness of  breath    Subjective     Interval History: Patient states that she is feeling much better today.  She used her BiPAP overnight.  No acute events reported overnight.    History taken from: patient chart RN    Review of Systems:   Review of Systems - History obtained from chart review and the patient  General ROS: negative for - chills, fatigue or fever  Psychological ROS: negative for - anxiety or depression  ENT ROS: negative for - headaches, visual changes or vocal changes  Allergy and Immunology ROS: negative for - nasal congestion, postnasal drip or seasonal allergies  Endocrine ROS: negative for - polydipsia/polyuria  Respiratory ROS: no cough, shortness of breath, or wheezing  Cardiovascular ROS: no chest pain or dyspnea on exertion  Gastrointestinal ROS: no abdominal pain, change in bowel habits, or black or bloody stools  Musculoskeletal ROS: negative for - joint pain, joint stiffness or joint swelling  Neurological ROS: no TIA or stroke symptoms                    Objective     Vital Signs  Temp:  [97.4 °F (36.3 °C)-98.8 °F (37.1 °C)] 98.5 °F (36.9 °C)  Heart Rate:  [64-89] 80  Resp:  [12-20] 18  BP: (108-175)/(54-92) 172/89  Body mass index is 30.19 kg/(m^2).    Intake/Output Summary (Last 24 hours) at 08/01/17 1126  Last data filed at 08/01/17 1000   Gross per 24 hour   Intake             1200 ml   Output              350 ml   Net              850 ml     I/O this shift:  In: 240 [P.O.:240]  Out: 200 [Urine:200]    Physical Exam:  GENERAL APPEARANCE: Well developed, well nourished, alert and cooperative, and appears to be in no acute distress.    HEAD: normocephalic.    EYES: PERRL    NECK: Neck supple.     CARDIAC: Normal S1 and S2. No S3, S4 or murmurs. Rhythm is regular. There is no peripheral edema, cyanosis or pallor. Extremities are warm and well perfused. Capillary refill is less than 2 seconds. No carotid bruits.    RESPIRATORY: Clear to auscultation and percussion without rales, rhonchi, wheezing  or diminished breath sounds.    GI: Positive bowel sounds. Soft, nondistended, nontender.     MUSCULOSKELTAL: No significant deformity or joint abnormality. No edema. Peripheral pulses intact.     NEUROLOGICAL: Strength and sensation symmetric and intact throughout.     PSYCHIATRIC: The mental examination revealed the patient was oriented to person, place, and time.                 Results Review:                I reviewed the patient's new clinical results.  I reviewed the patient's new imaging results and agree with the interpretation.    Results from last 7 days  Lab Units 08/01/17 0055 07/31/17 0454 07/30/17  0404   WBC 10*3/mm3 12.44 10.35 9.13   HEMOGLOBIN g/dL 9.4* 8.3* 8.2*   PLATELETS 10*3/mm3 303 256 260       Results from last 7 days  Lab Units 08/01/17 0055 07/31/17 0454 07/30/17  0404   SODIUM mmol/L 141 140 137   POTASSIUM mmol/L 4.7 4.5 5.3   CHLORIDE mmol/L 111 111 110   CO2 mmol/L 17.1* 15.9* 15.9*   BUN mg/dL 117* 109* 99*   CREATININE mg/dL 3.59* 3.52* 3.73*   CALCIUM mg/dL 7.2* 6.9* 6.9*   GLUCOSE mg/dL 123* 142* 268*   MAGNESIUM mg/dL 1.8 1.6*  --      Lab Results   Component Value Date    INR 0.96 07/24/2017    INR 0.93 08/08/2016    INR 0.93 10/09/2015    PROTIME 12.9 07/24/2017    PROTIME 10.5 08/08/2016    PROTIME 10.3 10/09/2015       Results from last 7 days  Lab Units 07/31/17 0454 07/30/17  0404 07/29/17  0135   ALK PHOS U/L 94 100 105*   BILIRUBIN mg/dL 0.1* 0.1* 0.1*   ALT (SGPT) U/L 58* 76* 56*   AST (SGOT) U/L 24 36* 44*       Results from last 7 days  Lab Units 07/31/17  0651   PH, ARTERIAL pH units 7.267*   PO2 ART mm Hg 95.8   PCO2, ARTERIAL mm Hg 36.8   HCO3 ART mmol/L 16.4*     Imaging Results (last 24 hours)     ** No results found for the last 24 hours. **             Medication Review:   Scheduled Medications:    aspirin 81 mg Oral Nightly   atorvastatin 40 mg Oral Nightly   budesonide-formoterol 2 puff Inhalation BID - RT   clopidogrel 75 mg Oral Daily   doxycycline 100  mg Oral Q12H   epoetin jane 10,000 Units Subcutaneous Once per day on Mon Thu   gabapentin 100 mg Oral Q12H   guaiFENesin 1,200 mg Oral Q12H   heparin (porcine) 5,000 Units Subcutaneous Q12H   insulin aspart 0-24 Units Subcutaneous 4x Daily AC & at Bedtime   insulin detemir 25 Units Subcutaneous Nightly   ipratropium-albuterol 3 mL Nebulization Q4H - RT   isosorbide mononitrate 60 mg Oral Q24H   metoprolol tartrate 25 mg Oral Q12H   nicotine 1 patch Transdermal Nightly   pantoprazole 40 mg Oral QAM AC   Pharmacy Meds to Bed Consult  Does not apply Daily   predniSONE 15 mg Oral Daily With Breakfast   sodium bicarbonate 1,300 mg Oral 4x Daily     Continuous infusions:    hold 1 each       Assessment/Plan      Respiratory failure/hypoxia/shortness of breath:  Metabolic acidosis is likely caused by renal failure.    Patient reports she used her BiPAP overnight.  She states that she feels so much better during the day when she used her BiPAP.  She also asked if she could get a BiPAP for home.  We'll place  consult to see if we can get her qualified for home BiPAP/cardiology.    Discontinued Solu-Medrol and started her on prednisone 20 mg by mouth daily.    Continue scheduled inhalants and as needed neb treatments.    Acute on chronic renal failure: 24 hour urine showed a decreased creatinine clearance.  Continue management per nephrology.      Patient Active Problem List   Diagnosis Code   • Acute renal failure N17.9   • Essential hypertension I10   • Dyslipidemia E78.5   • Diabetic neuropathy E11.40   • Chronic pain G89.29   • Anxiety F41.9   • Anemia D64.9   • Acute on chronic renal failure N17.9, N18.9   • CHF (congestive heart failure) I50.9             CLARA Presley  08/01/17  11:26 AM        Attestation: Scribed for Dr. Martin, by CLARA Laboy         Electronically signed by CLARA Presley at 8/1/2017 11:30 AM      Jai Chavez MD at 8/1/2017  1:01 PM  Version 1 of  1         Nephrology  Note      Subjective      She has no nausea , vomiting or metallic taste. She is voiding well. Very good appetite. No chest pain or SOB     Objective     Vital Signs  Temp:  [97.4 °F (36.3 °C)-98.8 °F (37.1 °C)] 98.5 °F (36.9 °C)  Heart Rate:  [64-89] 66  Resp:  [12-26] 26  BP: (108-175)/(54-92) 118/56    I/O this shift:  In: 240 [P.O.:240]  Out: 200 [Urine:200]  I/O last 3 completed shifts:  In: 1200 [P.O.:1200]  Out: 1300 [Urine:1300]    Physical Examination:    General Appearance : alert, no distress  Head : normocephalic  Eyes :no pallor   Throat : oral mucosa moist  Lungs : reduced breath sounds at bases  Heart : regular rhythm & normal rate, normal S1, S2, no murmur   Abdomen :  soft non-tender  Extremities : 3+ edema upto thighs  Skin : no  rash  Neurologic :grossly no focal deficitis    Laboratory Data :      WBC WBC   Date Value Ref Range Status   08/01/2017 12.44 4.50 - 12.50 10*3/mm3 Final   07/31/2017 10.35 4.50 - 12.50 10*3/mm3 Final   07/30/2017 9.13 4.50 - 12.50 10*3/mm3 Final      HGB Hemoglobin   Date Value Ref Range Status   08/01/2017 9.4 (L) 12.0 - 16.0 g/dL Final   07/31/2017 8.3 (L) 12.0 - 16.0 g/dL Final   07/30/2017 8.2 (L) 12.0 - 16.0 g/dL Final      HCT Hematocrit   Date Value Ref Range Status   08/01/2017 31.6 (L) 37.0 - 47.0 % Final   07/31/2017 28.5 (L) 37.0 - 47.0 % Final   07/30/2017 27.8 (L) 37.0 - 47.0 % Final      Platlets No results found for: LABPLAT   MCV MCV   Date Value Ref Range Status   08/01/2017 92.1 80.0 - 94.0 fL Final   07/31/2017 95.3 (H) 80.0 - 94.0 fL Final   07/30/2017 95.5 (H) 80.0 - 94.0 fL Final          Sodium Sodium   Date Value Ref Range Status   08/01/2017 141 135 - 153 mmol/L Final   07/31/2017 140 135 - 153 mmol/L Final   07/30/2017 137 135 - 153 mmol/L Final      Potassium Potassium   Date Value Ref Range Status   08/01/2017 4.7 3.5 - 5.3 mmol/L Final   07/31/2017 4.5 3.5 - 5.3 mmol/L Final   07/30/2017 5.3 3.5 - 5.3 mmol/L Final       Chloride Chloride   Date Value Ref Range Status   08/01/2017 111 99 - 112 mmol/L Final   07/31/2017 111 99 - 112 mmol/L Final   07/30/2017 110 99 - 112 mmol/L Final      CO2 CO2   Date Value Ref Range Status   08/01/2017 17.1 (L) 24.3 - 31.9 mmol/L Final   07/31/2017 15.9 (L) 24.3 - 31.9 mmol/L Final   07/30/2017 15.9 (L) 24.3 - 31.9 mmol/L Final      BUN BUN   Date Value Ref Range Status   08/01/2017 117 (H) 7 - 21 mg/dL Final   07/31/2017 109 (H) 7 - 21 mg/dL Final   07/30/2017 99 (H) 7 - 21 mg/dL Final      Creatinine Creatinine   Date Value Ref Range Status   08/01/2017 3.59 (H) 0.43 - 1.29 mg/dL Final   07/31/2017 3.52 (H) 0.43 - 1.29 mg/dL Final   07/30/2017 3.73 (H) 0.43 - 1.29 mg/dL Final      Calcium Calcium   Date Value Ref Range Status   08/01/2017 7.2 (L) 7.7 - 10.0 mg/dL Final   07/31/2017 6.9 (L) 7.7 - 10.0 mg/dL Final   07/30/2017 6.9 (L) 7.7 - 10.0 mg/dL Final      PO4 No results found for: CAPO4   Albumin Albumin   Date Value Ref Range Status   07/31/2017 3.30 (L) 3.40 - 4.80 g/dL Final   07/30/2017 3.20 (L) 3.40 - 4.80 g/dL Final      Magnesium Magnesium   Date Value Ref Range Status   08/01/2017 1.8 1.7 - 2.6 mg/dL Final   07/31/2017 1.6 (L) 1.7 - 2.6 mg/dL Final      Uric Acid No results found for: URICACID     Radiology results :     Imaging Results (last 24 hours)     ** No results found for the last 24 hours. **            Medications:        aspirin 81 mg Oral Nightly   atorvastatin 40 mg Oral Nightly   budesonide-formoterol 2 puff Inhalation BID - RT   clopidogrel 75 mg Oral Daily   doxycycline 100 mg Oral Q12H   epoetin jane 10,000 Units Subcutaneous Once per day on Mon Thu   gabapentin 100 mg Oral Q12H   guaiFENesin 1,200 mg Oral Q12H   heparin (porcine) 5,000 Units Subcutaneous Q12H   insulin aspart 0-24 Units Subcutaneous 4x Daily AC & at Bedtime   insulin detemir 25 Units Subcutaneous Nightly   ipratropium-albuterol 3 mL Nebulization Q4H - RT   isosorbide mononitrate 60 mg Oral Q24H    metoprolol tartrate 25 mg Oral Q12H   nicotine 1 patch Transdermal Nightly   pantoprazole 40 mg Oral QAM    Pharmacy Meds to Bed Consult  Does not apply Daily   predniSONE 15 mg Oral Daily With Breakfast   sodium bicarbonate 1,300 mg Oral 4x Daily       hold 1 each       Assessment/Plan     Principal Problem:    Acute on chronic renal failure  Active Problems:    CHF (congestive heart failure)      1. SIMON on CKD 4 : her baseline creatinine is 2.5. She was worsening CKD from uncontrolled HTN and type 2 DM.  Her creatinine is stable at 3.5 . No acute indication for Hemodialysis  24-hour creatinine clearance is 14.7 cc/min    2. Metabolic and respiratory acidosis : on BIPAP and sodium bicarbonate PO . better    3. Anemia of chronic disease : continue epogen    4. Edema : from CKD 4 and steroids, she is not in CHF will watch without diuresis    5. Hypoxic and hypercarbic respiratory failure with R pleural effusion : s/p thoracentesis    6. Azotemia : likely from solumedrol, has been reduced per Dr Martin    I discussed the patients findings and my recommendations with patient, family, Dr Mario Chavez MD  17  1:01 PM             Electronically signed by Jai Chavez MD at 2017  1:04 PM        [address] [address]  Phone:  [phone]  Fax:          Patient:     Sara Cardona MRN:  [id number]   [address] [facility] [address] :  1950  SSN:    Phone: [phone] Sex:  F      INSURANCE PAYOR PLAN [employer] # SUBSCRIBER ID   Primary:  Secondary: [employer]ARE [employer] [place] [id number]  [id number]   [id number]  [id number]   Admitting Diagnosis: Acute on chronic renal failure [N17.9, N18.9]  Order Date:  Aug 1, 2017               Inpatient Consult to Case Management        (Order ID: [id number])     Diagnosis:         Priority:  Routine Expected Date:   Expiration Date:        Interval:   Count:    Reason for Consult? at  [place] bipap/ triology     Specimen Type:   Specimen Source:   Specimen Taken Date:   Specimen Taken Time:                         Authorizing Provider:CLARA Presley  Authorizing Provider's NPI: [id number]  Order Entered By: CLARA Presley 8/1/2017 11:31 AM     Electronically signed by: CLARA Presley 8/1/2017 11:31 AM

## 2017-08-01 NOTE — NURSING NOTE
Patient very lethargic at lunch time, could barely hold her head up.  Patient stated she was up at 5 am and was very sleepy.  Respiratory came and put her Bipap back on and patient slept for 2 hours.  I checked on patient approximately every 30 minutes to make sure she was ok.  Patient denied taking any other medication.  After questioning the patient again at 1445, patient admitted to taking 2 of the 0.5 mg of Klonipin that her daughter had given her.  Dr. Juárez notified. Stat ABG's to be drawn.

## 2017-08-01 NOTE — NURSING NOTE
Patient has lost her IV access.  Spoke with Dr. Chavez about a possible midline.  He states she will be having dialysis in the near future so he only wants a Powerhon to be inserted .  I called Joann and she is out of the office until [id number]

## 2017-08-01 NOTE — PLAN OF CARE
Problem: Cardiac: Heart Failure (Adult)  Goal: Signs and Symptoms of Listed Potential Problems Will be Absent or Manageable (Cardiac: Heart Failure)  Outcome: Ongoing (interventions implemented as appropriate)    Problem: Renal Failure/Kidney Injury, Acute (Adult)  Goal: Signs and Symptoms of Listed Potential Problems Will be Absent or Manageable (Renal Failure/Kidney Injury, Acute)  Outcome: Ongoing (interventions implemented as appropriate)    Problem: Fall Risk (Adult)  Goal: Identify Related Risk Factors and Signs and Symptoms  Outcome: Ongoing (interventions implemented as appropriate)  Goal: Absence of Falls  Outcome: Ongoing (interventions implemented as appropriate)    Problem: Pressure Ulcer Risk (Saul Scale) (Adult,Obstetrics,Pediatric)  Goal: Identify Related Risk Factors and Signs and Symptoms  Outcome: Ongoing (interventions implemented as appropriate)  Goal: Skin Integrity  Outcome: Ongoing (interventions implemented as appropriate)    Problem: Patient Care Overview (Adult)  Goal: Plan of Care Review  Outcome: Ongoing (interventions implemented as appropriate)  Goal: Adult Individualization and Mutuality  Outcome: Ongoing (interventions implemented as appropriate)  Goal: Discharge Needs Assessment  Outcome: Ongoing (interventions implemented as appropriate)    Problem: Skin Integrity Impairment, Risk/Actual (Adult)  Goal: Identify Related Risk Factors and Signs and Symptoms  Outcome: Ongoing (interventions implemented as appropriate)  Goal: Skin Integrity/Wound Healing  Outcome: Ongoing (interventions implemented as appropriate)    Problem: Diabetes, Type 2 (Adult)  Goal: Signs and Symptoms of Listed Potential Problems Will be Absent or Manageable (Diabetes, Type 2)  Outcome: Ongoing (interventions implemented as appropriate)    Problem: NPPV/CPAP (Adult)  Goal: Signs and Symptoms of Listed Potential Problems Will be Absent or Manageable (NPPV/CPAP)  Outcome: Ongoing (interventions implemented as  appropriate)

## 2017-08-01 NOTE — PROGRESS NOTES
LOS: 8 days     Chief Complaint:  Pulmonology is following for shortness of breath    Subjective     Interval History: Patient states that she is feeling much better today.  She used her BiPAP overnight.  No acute events reported overnight.    History taken from: patient chart RN    Review of Systems:   Review of Systems - History obtained from chart review and the patient  General ROS: negative for - chills, fatigue or fever  Psychological ROS: negative for - anxiety or depression  ENT ROS: negative for - headaches, visual changes or vocal changes  Allergy and Immunology ROS: negative for - nasal congestion, postnasal drip or seasonal allergies  Endocrine ROS: negative for - polydipsia/polyuria  Respiratory ROS: no cough, shortness of breath, or wheezing  Cardiovascular ROS: no chest pain or dyspnea on exertion  Gastrointestinal ROS: no abdominal pain, change in bowel habits, or black or bloody stools  Musculoskeletal ROS: negative for - joint pain, joint stiffness or joint swelling  Neurological ROS: no TIA or stroke symptoms                    Objective     Vital Signs  Temp:  [97.4 °F (36.3 °C)-98.8 °F (37.1 °C)] 98.5 °F (36.9 °C)  Heart Rate:  [64-89] 80  Resp:  [12-20] 18  BP: (108-175)/(54-92) 172/89  Body mass index is 30.19 kg/(m^2).    Intake/Output Summary (Last 24 hours) at 08/01/17 1126  Last data filed at 08/01/17 1000   Gross per 24 hour   Intake             1200 ml   Output              350 ml   Net              850 ml     I/O this shift:  In: 240 [P.O.:240]  Out: 200 [Urine:200]    Physical Exam:  GENERAL APPEARANCE: Well developed, well nourished, alert and cooperative, and appears to be in no acute distress.    HEAD: normocephalic.    EYES: PERRL    NECK: Neck supple.     CARDIAC: Normal S1 and S2. No S3, S4 or murmurs. Rhythm is regular. There is no peripheral edema, cyanosis or pallor. Extremities are warm and well perfused. Capillary refill is less than 2 seconds. No carotid  bruits.    RESPIRATORY: Clear to auscultation and percussion without rales, rhonchi, wheezing or diminished breath sounds.    GI: Positive bowel sounds. Soft, nondistended, nontender.     MUSCULOSKELTAL: No significant deformity or joint abnormality. No edema. Peripheral pulses intact.     NEUROLOGICAL: Strength and sensation symmetric and intact throughout.     PSYCHIATRIC: The mental examination revealed the patient was oriented to person, place, and time.                 Results Review:                I reviewed the patient's new clinical results.  I reviewed the patient's new imaging results and agree with the interpretation.    Results from last 7 days  Lab Units 08/01/17 0055 07/31/17 0454 07/30/17  0404   WBC 10*3/mm3 12.44 10.35 9.13   HEMOGLOBIN g/dL 9.4* 8.3* 8.2*   PLATELETS 10*3/mm3 303 256 260       Results from last 7 days  Lab Units 08/01/17 0055 07/31/17 0454 07/30/17  0404   SODIUM mmol/L 141 140 137   POTASSIUM mmol/L 4.7 4.5 5.3   CHLORIDE mmol/L 111 111 110   CO2 mmol/L 17.1* 15.9* 15.9*   BUN mg/dL 117* 109* 99*   CREATININE mg/dL 3.59* 3.52* 3.73*   CALCIUM mg/dL 7.2* 6.9* 6.9*   GLUCOSE mg/dL 123* 142* 268*   MAGNESIUM mg/dL 1.8 1.6*  --      Lab Results   Component Value Date    INR 0.96 07/24/2017    INR 0.93 08/08/2016    INR 0.93 10/09/2015    PROTIME 12.9 07/24/2017    PROTIME 10.5 08/08/2016    PROTIME 10.3 10/09/2015       Results from last 7 days  Lab Units 07/31/17 0454 07/30/17  0404 07/29/17  0135   ALK PHOS U/L 94 100 105*   BILIRUBIN mg/dL 0.1* 0.1* 0.1*   ALT (SGPT) U/L 58* 76* 56*   AST (SGOT) U/L 24 36* 44*       Results from last 7 days  Lab Units 07/31/17  0651   PH, ARTERIAL pH units 7.267*   PO2 ART mm Hg 95.8   PCO2, ARTERIAL mm Hg 36.8   HCO3 ART mmol/L 16.4*     Imaging Results (last 24 hours)     ** No results found for the last 24 hours. **             Medication Review:   Scheduled Medications:    aspirin 81 mg Oral Nightly   atorvastatin 40 mg Oral Nightly    budesonide-formoterol 2 puff Inhalation BID - RT   clopidogrel 75 mg Oral Daily   doxycycline 100 mg Oral Q12H   epoetin jane 10,000 Units Subcutaneous Once per day on Mon Thu   gabapentin 100 mg Oral Q12H   guaiFENesin 1,200 mg Oral Q12H   heparin (porcine) 5,000 Units Subcutaneous Q12H   insulin aspart 0-24 Units Subcutaneous 4x Daily AC & at Bedtime   insulin detemir 25 Units Subcutaneous Nightly   ipratropium-albuterol 3 mL Nebulization Q4H - RT   isosorbide mononitrate 60 mg Oral Q24H   metoprolol tartrate 25 mg Oral Q12H   nicotine 1 patch Transdermal Nightly   pantoprazole 40 mg Oral QAM AC   Pharmacy Meds to Bed Consult  Does not apply Daily   predniSONE 15 mg Oral Daily With Breakfast   sodium bicarbonate 1,300 mg Oral 4x Daily     Continuous infusions:    hold 1 each       Assessment/Plan      Respiratory failure/hypoxia/shortness of breath:  Metabolic acidosis is likely caused by renal failure.    Patient reports she used her BiPAP overnight.  She states that she feels so much better during the day when she used her BiPAP.  She also asked if she could get a BiPAP for home.  We'll place  consult to see if we can get her qualified for home BiPAP/cardiology.    Discontinued Solu-Medrol and started her on prednisone 20 mg by mouth daily.    Continue scheduled inhalants and as needed neb treatments.    Acute on chronic renal failure: 24 hour urine showed a decreased creatinine clearance.  Continue management per nephrology.    Case discussed with dr gamez    Later in the day- She became somnolent-when into acute hypoxic and hypercarbic respiratory failure was placed on BiPAP.-took clonopin  from home.  ABG showed hypercarbic respiratory failure.  I was called at bedside.  Patient was given Romazicon.  Visions mental status improved.  Patient's BiPAP settings were reviewed visions.  20 patient improved after giving Romazicon.  Patient was transferred to ICU.    Repeat ABG was still acidotic and  I recommended intubation.  But patient was alert awake oriented and competent and she refused.  Case was discussed with patient's progressive care unit nurse  Patient Active Problem List   Diagnosis Code   • Acute renal failure N17.9   • Essential hypertension I10   • Dyslipidemia E78.5   • Diabetic neuropathy E11.40   • Chronic pain G89.29   • Anxiety F41.9   • Anemia D64.9   • Acute on chronic renal failure N17.9, N18.9   • CHF (congestive heart failure) I50.9             CLARA Presley  08/01/17  11:26 AM    Spent more than 30 minutes in taking care of the patient and tolerating the care.    Attestation: Scribed for Dr. Martin, by CLARA Laboy      I, Devin Martin M.D. attest that the above note accurately reflects the work and decisions made  by me.  Patient was seen and evaluated by Dr. Martin, including history of present illness, physical exam, assessment, and treatment plan.  The above note was reviewed and edited by Dr. Martin.

## 2017-08-02 LAB
A-A DO2: 139.5 MMHG (ref 0–300)
ANION GAP SERPL CALCULATED.3IONS-SCNC: 12.9 MMOL/L (ref 3.6–11.2)
ARTERIAL PATENCY WRIST A: ABNORMAL
ATMOSPHERIC PRESS: 729 MMHG
BASE EXCESS BLDA CALC-SCNC: -9.5 MMOL/L
BASOPHILS # BLD AUTO: 0.01 10*3/MM3 (ref 0–0.3)
BASOPHILS NFR BLD AUTO: 0.1 % (ref 0–2)
BDY SITE: ABNORMAL
BODY TEMPERATURE: 98.6 C
BUN BLD-MCNC: 117 MG/DL (ref 7–21)
BUN/CREAT SERPL: 29.4 (ref 7–25)
CALCIUM SPEC-SCNC: 6.7 MG/DL (ref 7.7–10)
CHLORIDE SERPL-SCNC: 109 MMOL/L (ref 99–112)
CO2 SERPL-SCNC: 17.1 MMOL/L (ref 24.3–31.9)
COHGB MFR BLD: 1.1 % (ref 0–5)
CREAT BLD-MCNC: 3.98 MG/DL (ref 0.43–1.29)
DEPRECATED RDW RBC AUTO: 59.3 FL (ref 37–54)
EOSINOPHIL # BLD AUTO: 0 10*3/MM3 (ref 0–0.7)
EOSINOPHIL NFR BLD AUTO: 0 % (ref 0–7)
ERYTHROCYTE [DISTWIDTH] IN BLOOD BY AUTOMATED COUNT: 18.3 % (ref 11.5–14.5)
FERRITIN SERPL-MCNC: 103 NG/ML (ref 10–290.3)
GFR SERPL CREATININE-BSD FRML MDRD: 11 ML/MIN/1.73
GLUCOSE BLD-MCNC: 262 MG/DL (ref 70–110)
GLUCOSE BLDC GLUCOMTR-MCNC: 157 MG/DL (ref 70–130)
GLUCOSE BLDC GLUCOMTR-MCNC: 196 MG/DL (ref 70–130)
GLUCOSE BLDC GLUCOMTR-MCNC: 266 MG/DL (ref 70–130)
GLUCOSE BLDC GLUCOMTR-MCNC: 314 MG/DL (ref 70–130)
HCO3 BLDA-SCNC: 17.1 MMOL/L (ref 22–26)
HCT VFR BLD AUTO: 27.6 % (ref 37–47)
HCT VFR BLD CALC: 29 % (ref 37–47)
HGB BLD-MCNC: 8.2 G/DL (ref 12–16)
HGB BLDA-MCNC: 9.9 G/DL (ref 12–16)
HOROWITZ INDEX BLD+IHG-RTO: 40 %
IMM GRANULOCYTES # BLD: 0.16 10*3/MM3 (ref 0–0.03)
IMM GRANULOCYTES NFR BLD: 1.3 % (ref 0–0.5)
IRON 24H UR-MRATE: 47 MCG/DL (ref 49–151)
IRON SATN MFR SERPL: 21 % (ref 15–50)
LYMPHOCYTES # BLD AUTO: 1.26 10*3/MM3 (ref 1–3)
LYMPHOCYTES NFR BLD AUTO: 10.2 % (ref 16–46)
MAGNESIUM SERPL-MCNC: 1.8 MG/DL (ref 1.7–2.6)
MCH RBC QN AUTO: 28.4 PG (ref 27–33)
MCHC RBC AUTO-ENTMCNC: 29.7 G/DL (ref 33–37)
MCV RBC AUTO: 95.5 FL (ref 80–94)
METHGB BLD QL: 0.3 % (ref 0–3)
MODALITY: ABNORMAL
MONOCYTES # BLD AUTO: 0.7 10*3/MM3 (ref 0.1–0.9)
MONOCYTES NFR BLD AUTO: 5.7 % (ref 0–12)
NEUTROPHILS # BLD AUTO: 10.25 10*3/MM3 (ref 1.4–6.5)
NEUTROPHILS NFR BLD AUTO: 82.7 % (ref 40–75)
OSMOLALITY SERPL CALC.SUM OF ELEC: 323.9 MOSM/KG (ref 273–305)
OXYHGB MFR BLDV: 94.8 % (ref 85–100)
PCO2 BLDA: 39.9 MM HG (ref 35–45)
PH BLDA: 7.25 PH UNITS (ref 7.35–7.45)
PLATELET # BLD AUTO: 233 10*3/MM3 (ref 130–400)
PMV BLD AUTO: 11.5 FL (ref 6–10)
PO2 BLDA: 87.4 MM HG (ref 80–100)
POTASSIUM BLD-SCNC: 4.9 MMOL/L (ref 3.5–5.3)
RBC # BLD AUTO: 2.89 10*6/MM3 (ref 4.2–5.4)
SAO2 % BLDCOA: 96.1 % (ref 90–100)
SODIUM BLD-SCNC: 139 MMOL/L (ref 135–153)
TIBC SERPL-MCNC: 226 MCG/DL (ref 241–421)
WBC NRBC COR # BLD: 12.38 10*3/MM3 (ref 4.5–12.5)

## 2017-08-02 PROCEDURE — 94799 UNLISTED PULMONARY SVC/PX: CPT

## 2017-08-02 PROCEDURE — 63710000001 PREDNISONE PER 5 MG: Performed by: INTERNAL MEDICINE

## 2017-08-02 PROCEDURE — 99233 SBSQ HOSP IP/OBS HIGH 50: CPT | Performed by: INTERNAL MEDICINE

## 2017-08-02 PROCEDURE — 25010000002 MAGNESIUM SULFATE 2 GM/50ML SOLUTION: Performed by: HOSPITALIST

## 2017-08-02 PROCEDURE — 25010000002 FUROSEMIDE PER 20 MG: Performed by: INTERNAL MEDICINE

## 2017-08-02 PROCEDURE — 82375 ASSAY CARBOXYHB QUANT: CPT | Performed by: NURSE PRACTITIONER

## 2017-08-02 PROCEDURE — 36600 WITHDRAWAL OF ARTERIAL BLOOD: CPT | Performed by: NURSE PRACTITIONER

## 2017-08-02 PROCEDURE — 82805 BLOOD GASES W/O2 SATURATION: CPT | Performed by: NURSE PRACTITIONER

## 2017-08-02 PROCEDURE — 83050 HGB METHEMOGLOBIN QUAN: CPT | Performed by: NURSE PRACTITIONER

## 2017-08-02 PROCEDURE — 25010000002 IRON SUCROSE PER 1 MG: Performed by: INTERNAL MEDICINE

## 2017-08-02 PROCEDURE — 83540 ASSAY OF IRON: CPT | Performed by: INTERNAL MEDICINE

## 2017-08-02 PROCEDURE — 63710000001 INSULIN DETEMIR PER 5 UNITS: Performed by: INTERNAL MEDICINE

## 2017-08-02 PROCEDURE — 83735 ASSAY OF MAGNESIUM: CPT | Performed by: INTERNAL MEDICINE

## 2017-08-02 PROCEDURE — 82962 GLUCOSE BLOOD TEST: CPT

## 2017-08-02 PROCEDURE — 85025 COMPLETE CBC W/AUTO DIFF WBC: CPT | Performed by: INTERNAL MEDICINE

## 2017-08-02 PROCEDURE — 63510000001 EPOETIN ALFA PER 1000 UNITS: Performed by: INTERNAL MEDICINE

## 2017-08-02 PROCEDURE — 83550 IRON BINDING TEST: CPT | Performed by: INTERNAL MEDICINE

## 2017-08-02 PROCEDURE — 63710000001 INSULIN ASPART PER 5 UNITS: Performed by: HOSPITALIST

## 2017-08-02 PROCEDURE — 82728 ASSAY OF FERRITIN: CPT | Performed by: INTERNAL MEDICINE

## 2017-08-02 PROCEDURE — 25010000002 HEPARIN (PORCINE) PER 1000 UNITS: Performed by: HOSPITALIST

## 2017-08-02 PROCEDURE — 80048 BASIC METABOLIC PNL TOTAL CA: CPT | Performed by: INTERNAL MEDICINE

## 2017-08-02 RX ORDER — PREDNISONE 10 MG/1
10 TABLET ORAL
Status: DISCONTINUED | OUTPATIENT
Start: 2017-08-03 | End: 2017-08-04

## 2017-08-02 RX ORDER — MAGNESIUM SULFATE HEPTAHYDRATE 40 MG/ML
2 INJECTION, SOLUTION INTRAVENOUS ONCE
Status: DISCONTINUED | OUTPATIENT
Start: 2017-08-02 | End: 2017-08-04

## 2017-08-02 RX ORDER — FUROSEMIDE 10 MG/ML
60 INJECTION INTRAMUSCULAR; INTRAVENOUS ONCE
Status: COMPLETED | OUTPATIENT
Start: 2017-08-02 | End: 2017-08-02

## 2017-08-02 RX ADMIN — IPRATROPIUM BROMIDE AND ALBUTEROL SULFATE 3 ML: .5; 3 SOLUTION RESPIRATORY (INHALATION) at 11:00

## 2017-08-02 RX ADMIN — IPRATROPIUM BROMIDE AND ALBUTEROL SULFATE 3 ML: .5; 3 SOLUTION RESPIRATORY (INHALATION) at 02:16

## 2017-08-02 RX ADMIN — IPRATROPIUM BROMIDE AND ALBUTEROL SULFATE 3 ML: .5; 3 SOLUTION RESPIRATORY (INHALATION) at 15:20

## 2017-08-02 RX ADMIN — ERYTHROPOIETIN 10000 UNITS: 20000 INJECTION, SOLUTION INTRAVENOUS; SUBCUTANEOUS at 09:19

## 2017-08-02 RX ADMIN — INSULIN ASPART 4 UNITS: 100 INJECTION, SOLUTION INTRAVENOUS; SUBCUTANEOUS at 06:55

## 2017-08-02 RX ADMIN — IRON SUCROSE 200 MG: 20 INJECTION, SOLUTION INTRAVENOUS at 13:43

## 2017-08-02 RX ADMIN — SODIUM BICARBONATE TAB 650 MG 1300 MG: 650 TAB at 21:39

## 2017-08-02 RX ADMIN — GUAIFENESIN 1200 MG: 600 TABLET, EXTENDED RELEASE ORAL at 21:40

## 2017-08-02 RX ADMIN — ATORVASTATIN CALCIUM 40 MG: 40 TABLET, FILM COATED ORAL at 21:40

## 2017-08-02 RX ADMIN — NICOTINE 1 PATCH: 21 PATCH, EXTENDED RELEASE TRANSDERMAL at 21:41

## 2017-08-02 RX ADMIN — ASPIRIN 81 MG: 81 TABLET, CHEWABLE ORAL at 21:40

## 2017-08-02 RX ADMIN — INSULIN ASPART 12 UNITS: 100 INJECTION, SOLUTION INTRAVENOUS; SUBCUTANEOUS at 11:49

## 2017-08-02 RX ADMIN — IPRATROPIUM BROMIDE AND ALBUTEROL SULFATE 3 ML: .5; 3 SOLUTION RESPIRATORY (INHALATION) at 19:06

## 2017-08-02 RX ADMIN — IPRATROPIUM BROMIDE AND ALBUTEROL SULFATE 3 ML: .5; 3 SOLUTION RESPIRATORY (INHALATION) at 07:00

## 2017-08-02 RX ADMIN — BUDESONIDE AND FORMOTEROL FUMARATE DIHYDRATE 2 PUFF: 80; 4.5 AEROSOL RESPIRATORY (INHALATION) at 19:06

## 2017-08-02 RX ADMIN — METOPROLOL TARTRATE 25 MG: 25 TABLET, FILM COATED ORAL at 21:40

## 2017-08-02 RX ADMIN — HEPARIN SODIUM 5000 UNITS: 5000 INJECTION, SOLUTION INTRAVENOUS; SUBCUTANEOUS at 09:18

## 2017-08-02 RX ADMIN — HEPARIN SODIUM 5000 UNITS: 5000 INJECTION, SOLUTION INTRAVENOUS; SUBCUTANEOUS at 21:39

## 2017-08-02 RX ADMIN — SODIUM BICARBONATE TAB 650 MG 1300 MG: 650 TAB at 09:18

## 2017-08-02 RX ADMIN — DOXYCYCLINE 100 MG: 100 CAPSULE ORAL at 09:18

## 2017-08-02 RX ADMIN — CLONAZEPAM 0.5 MG: 0.5 TABLET ORAL at 21:39

## 2017-08-02 RX ADMIN — CLONAZEPAM 0.5 MG: 0.5 TABLET ORAL at 09:19

## 2017-08-02 RX ADMIN — SODIUM BICARBONATE TAB 650 MG 1300 MG: 650 TAB at 11:58

## 2017-08-02 RX ADMIN — FUROSEMIDE 60 MG: 10 INJECTION, SOLUTION INTRAMUSCULAR; INTRAVENOUS at 11:48

## 2017-08-02 RX ADMIN — INSULIN ASPART 4 UNITS: 100 INJECTION, SOLUTION INTRAVENOUS; SUBCUTANEOUS at 21:48

## 2017-08-02 RX ADMIN — DOXYCYCLINE 100 MG: 100 CAPSULE ORAL at 21:40

## 2017-08-02 RX ADMIN — ISOSORBIDE MONONITRATE 60 MG: 60 TABLET, EXTENDED RELEASE ORAL at 09:18

## 2017-08-02 RX ADMIN — METOPROLOL TARTRATE 25 MG: 25 TABLET, FILM COATED ORAL at 09:18

## 2017-08-02 RX ADMIN — BUDESONIDE AND FORMOTEROL FUMARATE DIHYDRATE 2 PUFF: 80; 4.5 AEROSOL RESPIRATORY (INHALATION) at 07:00

## 2017-08-02 RX ADMIN — INSULIN ASPART 16 UNITS: 100 INJECTION, SOLUTION INTRAVENOUS; SUBCUTANEOUS at 16:55

## 2017-08-02 RX ADMIN — PREDNISONE 15 MG: 5 TABLET ORAL at 09:17

## 2017-08-02 RX ADMIN — GUAIFENESIN 1200 MG: 600 TABLET, EXTENDED RELEASE ORAL at 09:18

## 2017-08-02 RX ADMIN — PANTOPRAZOLE SODIUM 40 MG: 40 TABLET, DELAYED RELEASE ORAL at 06:48

## 2017-08-02 RX ADMIN — CLOPIDOGREL BISULFATE 75 MG: 75 TABLET, FILM COATED ORAL at 09:18

## 2017-08-02 RX ADMIN — INSULIN DETEMIR 25 UNITS: 100 INJECTION, SOLUTION SUBCUTANEOUS at 21:48

## 2017-08-02 NOTE — PLAN OF CARE
Problem: NPPV/CPAP (Adult)  Goal: Signs and Symptoms of Listed Potential Problems Will be Absent or Manageable (NPPV/CPAP)  Outcome: Ongoing (interventions implemented as appropriate)    08/02/17 0022   NPPV/CPAP   Problems Assessed (NPPV/CPAP) all

## 2017-08-02 NOTE — PROGRESS NOTES
Antibiotic length of therapy :      Doxycycline  Day 9   -   Please advise on the anticipated duration of therapy.

## 2017-08-02 NOTE — PROGRESS NOTES
Nephrology  Note      Subjective      She took klonipin from her sister yesterday and developed respiratory depression and AMS. She was moved to ICU. She used bipap and bicarbonate drip was given. She was ambulating today and walked for 180 feet without issues.  Edema persists. Denies chest pain     Objective     Vital Signs  Temp:  [98 °F (36.7 °C)-98.5 °F (36.9 °C)] 98.1 °F (36.7 °C)  Heart Rate:  [65-81] 69  Resp:  [16-30] 18  BP: (107-172)/(49-89) 143/77    I/O this shift:  In: 605.8 [I.V.:605.8]  Out: 520 [Urine:520]  I/O last 3 completed shifts:  In: 2440 [P.O.:1440; I.V.:1000]  Out: 1400 [Urine:1400]    Physical Examination:    General Appearance : alert, no distress  Head : normocephalic  Eyes :no pallor   Throat : oral mucosa moist  Lungs : reduced breath sounds at bases  Heart : regular rhythm & normal rate, normal S1, S2, no murmur   Abdomen :  soft non-tender  Extremities : 3+ edema upto thighs  Skin : no  rash  Neurologic :grossly no focal deficitis    Laboratory Data :      WBC WBC   Date Value Ref Range Status   08/02/2017 12.38 4.50 - 12.50 10*3/mm3 Final   08/01/2017 12.44 4.50 - 12.50 10*3/mm3 Final   07/31/2017 10.35 4.50 - 12.50 10*3/mm3 Final      HGB Hemoglobin   Date Value Ref Range Status   08/02/2017 8.2 (L) 12.0 - 16.0 g/dL Final   08/01/2017 9.4 (L) 12.0 - 16.0 g/dL Final   07/31/2017 8.3 (L) 12.0 - 16.0 g/dL Final      HCT Hematocrit   Date Value Ref Range Status   08/02/2017 27.6 (L) 37.0 - 47.0 % Final   08/01/2017 31.6 (L) 37.0 - 47.0 % Final   07/31/2017 28.5 (L) 37.0 - 47.0 % Final      Platlets No results found for: LABPLAT   MCV MCV   Date Value Ref Range Status   08/02/2017 95.5 (H) 80.0 - 94.0 fL Final   08/01/2017 92.1 80.0 - 94.0 fL Final   07/31/2017 95.3 (H) 80.0 - 94.0 fL Final          Sodium Sodium   Date Value Ref Range Status   08/02/2017 139 135 - 153 mmol/L Final   08/01/2017 141 135 - 153 mmol/L Final   07/31/2017 140 135 - 153 mmol/L Final      Potassium Potassium    Date Value Ref Range Status   08/02/2017 4.9 3.5 - 5.3 mmol/L Final   08/01/2017 4.7 3.5 - 5.3 mmol/L Final   07/31/2017 4.5 3.5 - 5.3 mmol/L Final      Chloride Chloride   Date Value Ref Range Status   08/02/2017 109 99 - 112 mmol/L Final   08/01/2017 111 99 - 112 mmol/L Final   07/31/2017 111 99 - 112 mmol/L Final      CO2 CO2   Date Value Ref Range Status   08/02/2017 17.1 (L) 24.3 - 31.9 mmol/L Final   08/01/2017 17.1 (L) 24.3 - 31.9 mmol/L Final   07/31/2017 15.9 (L) 24.3 - 31.9 mmol/L Final      BUN BUN   Date Value Ref Range Status   08/02/2017 117 (H) 7 - 21 mg/dL Final   08/01/2017 117 (H) 7 - 21 mg/dL Final   07/31/2017 109 (H) 7 - 21 mg/dL Final      Creatinine Creatinine   Date Value Ref Range Status   08/02/2017 3.98 (H) 0.43 - 1.29 mg/dL Final   08/01/2017 3.59 (H) 0.43 - 1.29 mg/dL Final   07/31/2017 3.52 (H) 0.43 - 1.29 mg/dL Final      Calcium Calcium   Date Value Ref Range Status   08/02/2017 6.7 (L) 7.7 - 10.0 mg/dL Final   08/01/2017 7.2 (L) 7.7 - 10.0 mg/dL Final   07/31/2017 6.9 (L) 7.7 - 10.0 mg/dL Final      PO4 No results found for: CAPO4   Albumin Albumin   Date Value Ref Range Status   07/31/2017 3.30 (L) 3.40 - 4.80 g/dL Final      Magnesium Magnesium   Date Value Ref Range Status   08/02/2017 1.8 1.7 - 2.6 mg/dL Final   08/01/2017 1.8 1.7 - 2.6 mg/dL Final   07/31/2017 1.6 (L) 1.7 - 2.6 mg/dL Final      Uric Acid No results found for: URICACID     Radiology results :     Imaging Results (last 24 hours)     ** No results found for the last 24 hours. **            Medications:        aspirin 81 mg Oral Nightly   atorvastatin 40 mg Oral Nightly   budesonide-formoterol 2 puff Inhalation BID - RT   clopidogrel 75 mg Oral Daily   doxycycline 100 mg Oral Q12H   epoetin jane 10,000 Units Subcutaneous Once per day on Mon Wed Fri   guaiFENesin 1,200 mg Oral Q12H   heparin (porcine) 5,000 Units Subcutaneous Q12H   insulin aspart 0-24 Units Subcutaneous 4x Daily AC & at Bedtime   insulin detemir  25 Units Subcutaneous Nightly   ipratropium-albuterol 3 mL Nebulization Q4H - RT   isosorbide mononitrate 60 mg Oral Q24H   magnesium sulfate 2 g Intravenous Once   metoprolol tartrate 25 mg Oral Q12H   nicotine 1 patch Transdermal Nightly   pantoprazole 40 mg Oral QAM    Pharmacy Meds to Bed Consult  Does not apply Daily   predniSONE 15 mg Oral Daily With Breakfast   sodium bicarbonate 1,300 mg Oral 4x Daily       hold 1 each    sodium bicarbonate drip (greater than 75 mEq/bag) 50 mL/hr Last Rate: 50 mL/hr (08/01/17 9373)       Assessment/Plan     Principal Problem:    Acute on chronic renal failure  Active Problems:    CHF (congestive heart failure)      1. SIMON on CKD 4 : her baseline creatinine is 2.5. She was worsening CKD from uncontrolled HTN and type 2 DM.  Her creatinine has worsened to 3.9 . She has persistent fluid overload. I will give lasix 60 mg iv x 1 today and monitor. If she doesn't respond will consider starting hemodialysis  24-hour creatinine clearance is 14.7 cc/min    2. Metabolic and respiratory acidosis : worsened yesterday but improved now on BIPAP and sodium bicarbonate PO. She also got bicarbonate drip x 1 L    3. Anemia of chronic disease : will give venofer as iron saturations are low, will increase epogen    4. Edema : from CKD 4 and steroids, lasix as above    5. Hypoxic and hypercarbic respiratory failure with R pleural effusion : s/p thoracentesis    6. Azotemia : likely from solumedrol, no acute indication for HD today    I discussed the patients findings and my recommendations with patient, family, Dr Martin and Dr Franck Chavez MD  08/02/17  6:45 AM

## 2017-08-02 NOTE — PROGRESS NOTES
[facility] HOSPITALIST PROGRESS NOTE     Patient Identification:  Name:  Sara Cardona  Age:  67 y.o.  Sex:  female  :  1950  MRN:  [id number]  Visit Number:  [id number]  Primary Care Provider:  Marcelino Sheehan MD    Length of stay:  9    Chief complaint:  67 years old female admitted with acute on chronic renal failure.     Subjective:    Shouldn't was transferred to intensive care unit yesterday because she had taken extra Klonopin from her home medications which she had with her.  Patient was getting increasingly drowsy and ABGs showed worsening metabolic and respiratory acidosis.  Was given flumazenil and and had improvement in her drowsiness.  Patient was kept on BiPAP all day yesterday.  This morning when I went to see the patient she was eating hamburger and fries brought to her by her family member.  Family member stated that patient call him stating that she was hungry and was wanting something to eat.  The patient states that she is feeling better.  She has been ambulating in the hallway and denies any complaints.    ----------------------------------------------------------------------------------------------------------------------  Current Hospital Meds:    aspirin 81 mg Oral Nightly   atorvastatin 40 mg Oral Nightly   budesonide-formoterol 2 puff Inhalation BID - RT   clopidogrel 75 mg Oral Daily   doxycycline 100 mg Oral Q12H   epoetin jane 10,000 Units Subcutaneous Once per day on    furosemide 60 mg Intravenous Once   guaiFENesin 1,200 mg Oral Q12H   heparin (porcine) 5,000 Units Subcutaneous Q12H   insulin aspart 0-24 Units Subcutaneous 4x Daily AC & at Bedtime   insulin detemir 25 Units Subcutaneous Nightly   ipratropium-albuterol 3 mL Nebulization Q4H - RT   isosorbide mononitrate 60 mg Oral Q24H   magnesium sulfate 2 g Intravenous Once   metoprolol tartrate 25 mg Oral Q12H   nicotine 1 patch Transdermal Nightly   pantoprazole 40 mg Oral [facility]   Pharmacy  Meds to Bed Consult  Does not apply Daily   [START ON 8/3/2017] predniSONE 10 mg Oral Daily With Breakfast   sodium bicarbonate 1,300 mg Oral 4x Daily       hold 1 each    sodium bicarbonate drip (greater than 75 mEq/bag) 50 mL/hr Last Rate: 50 mL/hr (08/01/17 1243)     ----------------------------------------------------------------------------------------------------------------------  Vital Signs:  Temp:  [98 °F (36.7 °C)-98.3 °F (36.8 °C)] 98.3 °F (36.8 °C)  Heart Rate:  [65-81] 71  Resp:  [16-30] 18  BP: (107-153)/(49-85) 133/63  Last 3 weights    07/31/17  0600 08/01/17  0400 08/02/17  0600   Weight: 163 lb 1.6 oz (74 kg) 159 lb 12.8 oz (72.5 kg) 167 lb (75.8 kg)     Body mass index is 31.55 kg/(m^2).    Intake/Output Summary (Last 24 hours) at 08/02/17 1021  Last data filed at 08/02/17 0809   Gross per 24 hour   Intake          1723.83 ml   Output             1020 ml   Net           703.83 ml     Diet Regular; Cardiac, Consistent Carbohydrate, Low Potassium  ----------------------------------------------------------------------------------------------------------------------  Physical exam:  Constitutional:  Well-developed and well-nourished.     HENT:  Head:  Normocephalic and atraumatic.  Mouth:  Moist mucous membranes.    Eyes:  Conjunctivae and EOM are normal.  Pupils are equal, round, and reactive to light.   Neck:  Neck supple.  No JVD present.    Cardiovascular:  Regular rate and rhythm. S1+S2. No murmur, rubs or gallops.   Pulmonary/Chest: Inspiratory bibasilar crackles with expiratory rhonchi in right basilar area.  Abdominal:  Soft. Non-tender. No viscera palpable.  Bowel sounds audible.   Musculoskeletal: No deformity or joint swelling.   Peripheral vascular: Bilateral dorsalis pedis palpable.  +2 pitting edema extending up to her thighs  Neurological:  Alert and oriented to person, place, and time.  Cranial nerves grossly intact. Strength bilaterally symmetrical in upper and lower extremities.      Skin:  Skin is warm and dry. No rash noted. No pallor.   ----------------------------------------------------------------------------------------------------------------------  Tele:  Issa rhythm with heart rate 70s-80  ----------------------------------------------------------------------------------------------------------------------        Results from last 7 days  Lab Units 08/02/17  0108 08/01/17  0055 07/31/17  0454  07/28/17  0215 07/27/17  0124   CRP mg/dL  --   --   --   --  0.66 1.21*   WBC 10*3/mm3 12.38 12.44 10.35  < > 10.18 11.84   HEMOGLOBIN g/dL 8.2* 9.4* 8.3*  < > 8.0* 8.3*   HEMATOCRIT % 27.6* 31.6* 28.5*  < > 27.0* 27.8*   MCV fL 95.5* 92.1 95.3*  < > 94.4* 91.4   MCHC g/dL 29.7* 29.7* 29.1*  < > 29.6* 29.9*   PLATELETS 10*3/mm3 233 303 256  < > 276 289   < > = values in this interval not displayed.    Results from last 7 days  Lab Units 08/02/17  0922   PH, ARTERIAL pH units 7.249*   PO2 ART mm Hg 87.4   PCO2, ARTERIAL mm Hg 39.9   HCO3 ART mmol/L 17.1*       Results from last 7 days  Lab Units 08/02/17  0108 08/01/17  0055 07/31/17  0454 07/30/17  0404  07/29/17  0135   SODIUM mmol/L 139 141 140 137  --  137   POTASSIUM mmol/L 4.9 4.7 4.5 5.3  < > 5.4*   MAGNESIUM mg/dL 1.8 1.8 1.6*  --   --   --    CHLORIDE mmol/L 109 111 111 110  --  110   CO2 mmol/L 17.1* 17.1* 15.9* 15.9*  --  16.8*   BUN mg/dL 117* 117* 109* 99*  --  88*   CREATININE mg/dL 3.98* 3.59* 3.52* 3.73*  --  3.54*   EGFR IF NONAFRICN AM mL/min/1.73 11* 13* 13* 12*  --  13*   CALCIUM mg/dL 6.7* 7.2* 6.9* 6.9*  --  7.1*   GLUCOSE mg/dL 262* 123* 142* 268*  --  97   ALBUMIN g/dL  --   --  3.30* 3.20*  --  3.40   BILIRUBIN mg/dL  --   --  0.1* 0.1*  --  0.1*   ALK PHOS U/L  --   --  94 100  --  105*   AST (SGOT) U/L  --   --  24 36*  --  44*   ALT (SGPT) U/L  --   --  58* 76*  --  56*   < > = values in this interval not displayed.Estimated Creatinine Clearance: 12.8 mL/min (by C-G formula based on Cr of 3.98).    No results found  for: AMMONIA               Stool Culture   Date Value Ref Range Status   07/27/2017 Normal Fecal Andreina  Final       I have personally looked at the labs and they are summarized above.  ----------------------------------------------------------------------------------------------------------------------  Imaging Results (last 24 hours)     ** No results found for the last 24 hours. **        ----------------------------------------------------------------------------------------------------------------------  Assessment and Plan:    - Acute on chronic hypoxic respiratory failure:   2/2 COPD exacerbation and diastolic CHF exacerbation. ABGs Today show persistent Metabolic acidosis with normal PCO2. CXR showed bibasilar atelactasis.  Continue treatment for COPD exacerbation and CHF. Cont BiPAP PRN and QHS. Pulm input appreciated.     - SIMON on CKD IV:   Creatinine and blood urea nitrogen worsened since yesterday.  Continues to have anion gap acidosis..  Pt is at high risk for dialysis. Nephrology input appreciated.      - Acute diastolic CHF exacerbation:   Echo on 7/4/17 showed an EF of 66-70%, trace AR, mild MR, mild MS, mild to moderate TR and severe pulmonary HTN. Holding diuresis 2/2 worsening renal function. Lower extremity edema is unchanged. Cardiology input appreciated.       - Acute exacerbation of COPD with ongoing tobacco abuse:   Improved. Cont steroids, antibiotics and nebs. Symbicort. Cont BiPAP. Nicotine patch ordered. Cont to encourage cessation.     - Anemia on chronic disease  Due to CKD. Continue epogen.      - Mixed respiratory and metabolic acidosis:   ABGs yesterday showed worsening metabolic and respiratory acidosis after patient had taken extra Klonopin.  Repeat ABGs after BiPAP yesterday and this morning show improvement in acidosis and hypercapnia.  Metabolic competent still continues to remain unchanged.  Pt has been more compliant with BiPAP. Currently on PO sodium bicarb per nephrology.  Cont BiPAP and treatment per nephrology.       - Hyperkalemia:   Improved.  Potassium continues to remain within normal limits.  Continue low potassium diet.      - Essential HTN:   Blood pressure elevated today.  Patient is eating hamburger and fries.  She has been noncompliant with dietary restrictions.  Continue metoprolol and will add when necessary hydralazine.     - DM II, insulin dependent:   HgbA1c 6.1 on 7/3/17.   Blood sugars have been elevated since patient has not been compliant with the dietary restrictions.  And systemic steroids also has been contributing to her hyperglycemia.  Patient had an episode of hypoglycemia on 7/28, her insulin was decreased at that time but then she did develop hyperglycemia.  This morning I found out that patient has been noncompliant with dietary restriction with the family members bringing her food from outside.  This makes her glycemic control challenging due to her erratic and inconsistent calorie consumption.  Therefore, I will add on the side of hyperglycemia.  Continue Levemir 25 units daily at bedtime and continue sliding scale insulin.  Monitor blood sugar with Accu-Cheks before meals and at bedtime..       - Pleural effusions:   R>L. S/P right-sided thoracentesis. Pleural fluid appears transudative. Cultures negative.      - CAD:   Asymptomatic.  Continue aspirin, atorvastatin, Plavix, metoprolol and Imdur. Cardiology following with input appreciated.      - Chronic pain:   Neurontin dose adjusted for decreased creatinine clearance per pharmacy recommendations.     - Anxiety:   Cont klonopin with close monitoring in the setting of respiratory failure.      - Prophylaxis:  Heparin and PPI.     - Fluid, electrolytes and nutrition:  No IVF.   Electrolyte replacement per protocol.  Low K+ diet.      Pt is at high risk 2/2 acute on chronic hypoxic respiratory failure, SIMON on CKD IV at high risk for dialysis, CHF exacerbation, COPD exacerbation with ongoing tobacco  abuse, mixed acidosis and hx of noncompliance.  Patient has been noncompliant with her prescribed diet restrictions, medications and instructions from physicians and nursing staff.  Patient has been found to be smoking in her room during the course of her hospitalization.  She had taken Klonopin in unknown quantity yesterday without informing physicians.  This morning she was eating food brought in by the family from outside which has high sodium content and high potassium content.  I, along with nursing staff explained to the patient and her family present in the room that noncompliance with the advice of her physicians and nursing staff will lead to deterioration of her medical condition and can lead to death.  I explained to the patient and her family that we will continue to advise her regarding the best treatment for her but if patient continues to remain noncompliant she will end up On dialysis and will worsen her chronic organ failure.  Unfortunately, patient has not heeded advice before and it is unlikely that she would have any change in behavior at this time.    Priscilla Juárez MD  08/02/17  10:21 AM

## 2017-08-02 NOTE — PROGRESS NOTES
Discharge Planning Assessment   Horacio     Patient Name: Sara Cardona  MRN: [id number]  Today's Date: 8/2/2017    Admit Date: 7/24/2017          Discharge Plan       08/02/17 1326    Case Management/Social Work Plan    Plan Pt transferred from PCU to CCU on this date.  Pt lives at home with her spouse and plans to return home at discharge. Pt does not utilize home health or DME at this time.  Home Bipap has been arranged with [facility].  DME will need to be contacted at discharge for delivery.  SS will continue to follow.     Patient/Family In Agreement With Plan yes          Expected Discharge Date and Time     Expected Discharge Date Expected Discharge Time    Jul 31, 2017           Zandra Sun

## 2017-08-02 NOTE — PROGRESS NOTES
LOS: 9 days     Chief Complaint:  Pulmonology is following for shortness of breath    Subjective     Interval History: Patient is resting comfortably in bed.  Yesterday evening patient was moved to the critical care unit for altered mental status and worsening respiratory failure.  Patient was given Romazicon, her mental status improved significantly.  No acute events reported overnight.    History taken from: patient chart RN    Review of Systems:   Review of Systems - History obtained from chart review and the patient  General ROS: negative for - chills, fatigue or fever  Psychological ROS: negative for - anxiety or depression  ENT ROS: negative for - headaches, visual changes or vocal changes  Allergy and Immunology ROS: negative for - nasal congestion, postnasal drip or seasonal allergies  Endocrine ROS: negative for - polydipsia/polyuria  Respiratory ROS: no cough, shortness of breath, or wheezing  Cardiovascular ROS: no chest pain or dyspnea on exertion  Gastrointestinal ROS: no abdominal pain, change in bowel habits, or black or bloody stools  Musculoskeletal ROS: negative for - joint pain, joint stiffness or joint swelling  Neurological ROS: no TIA or stroke symptoms                    Objective     Vital Signs  Temp:  [98 °F (36.7 °C)-98.3 °F (36.8 °C)] 98.3 °F (36.8 °C)  Heart Rate:  [65-81] 71  Resp:  [16-30] 18  BP: (107-153)/(49-85) 133/63  Body mass index is 31.55 kg/(m^2).    Intake/Output Summary (Last 24 hours) at 08/02/17 1036  Last data filed at 08/02/17 0809   Gross per 24 hour   Intake          1723.83 ml   Output             1020 ml   Net           703.83 ml     I/O this shift:  In: 118 [P.O.:118]  Out: 200 [Urine:200]    Physical Exam:  GENERAL APPEARANCE: Well developed, well nourished, alert and cooperative, and appears to be in no acute distress.    HEAD: normocephalic.    EYES: PERRL    NECK: Neck supple.     CARDIAC: Normal S1 and S2. No S3, S4 or murmurs. Rhythm is regular. There is no  peripheral edema, cyanosis or pallor. Extremities are warm and well perfused. Capillary refill is less than 2 seconds. No carotid bruits. No JVD      RESPIRATORY: Diminished breath sounds bilaterally.    GI: Positive bowel sounds. Soft, nondistended, nontender.     MUSCULOSKELTAL: No significant deformity or joint abnormality. No edema. Peripheral pulses intact.     NEUROLOGICAL: Strength and sensation symmetric and intact throughout.     PSYCHIATRIC: The mental examination revealed the patient was oriented to person, place, and time.                 Results Review:                I reviewed the patient's new clinical results.  I reviewed the patient's new imaging results and agree with the interpretation.    Results from last 7 days  Lab Units 08/02/17  0108 08/01/17  0055 07/31/17  0454   WBC 10*3/mm3 12.38 12.44 10.35   HEMOGLOBIN g/dL 8.2* 9.4* 8.3*   PLATELETS 10*3/mm3 233 303 256       Results from last 7 days  Lab Units 08/02/17 0108 08/01/17 0055 07/31/17  0454   SODIUM mmol/L 139 141 140   POTASSIUM mmol/L 4.9 4.7 4.5   CHLORIDE mmol/L 109 111 111   CO2 mmol/L 17.1* 17.1* 15.9*   BUN mg/dL 117* 117* 109*   CREATININE mg/dL 3.98* 3.59* 3.52*   CALCIUM mg/dL 6.7* 7.2* 6.9*   GLUCOSE mg/dL 262* 123* 142*   MAGNESIUM mg/dL 1.8 1.8 1.6*     Lab Results   Component Value Date    INR 0.96 07/24/2017    INR 0.93 08/08/2016    INR 0.93 10/09/2015    PROTIME 12.9 07/24/2017    PROTIME 10.5 08/08/2016    PROTIME 10.3 10/09/2015       Results from last 7 days  Lab Units 07/31/17  0454 07/30/17  0404 07/29/17  0135   ALK PHOS U/L 94 100 105*   BILIRUBIN mg/dL 0.1* 0.1* 0.1*   ALT (SGPT) U/L 58* 76* 56*   AST (SGOT) U/L 24 36* 44*       Results from last 7 days  Lab Units 08/02/17  0922   PH, ARTERIAL pH units 7.249*   PO2 ART mm Hg 87.4   PCO2, ARTERIAL mm Hg 39.9   HCO3 ART mmol/L 17.1*     Imaging Results (last 24 hours)     ** No results found for the last 24 hours. **             Medication Review:   Scheduled  Medications:    aspirin 81 mg Oral Nightly   atorvastatin 40 mg Oral Nightly   budesonide-formoterol 2 puff Inhalation BID - RT   clopidogrel 75 mg Oral Daily   doxycycline 100 mg Oral Q12H   epoetin jane 10,000 Units Subcutaneous Once per day on Mon Wed Fri   furosemide 60 mg Intravenous Once   guaiFENesin 1,200 mg Oral Q12H   heparin (porcine) 5,000 Units Subcutaneous Q12H   insulin aspart 0-24 Units Subcutaneous 4x Daily AC & at Bedtime   insulin detemir 25 Units Subcutaneous Nightly   ipratropium-albuterol 3 mL Nebulization Q4H - RT   isosorbide mononitrate 60 mg Oral Q24H   magnesium sulfate 2 g Intravenous Once   metoprolol tartrate 25 mg Oral Q12H   nicotine 1 patch Transdermal Nightly   pantoprazole 40 mg Oral QAM AC   Pharmacy Meds to Bed Consult  Does not apply Daily   [START ON 8/3/2017] predniSONE 10 mg Oral Daily With Breakfast   sodium bicarbonate 1,300 mg Oral 4x Daily     Continuous infusions:    hold 1 each    sodium bicarbonate drip (greater than 75 mEq/bag) 50 mL/hr Last Rate: 50 mL/hr (08/01/17 5203)       Assessment/Plan      Neuro: Patient is alert and oriented ×4 this morning.  We'll continue to monitor.    Respiratory failure: Metabolic acidosis is likely caused by renal failure.    Patient had worsening respiratory failure yesterday evening likely caused by her decreased mental status which was drug-induced.    Patient was transferred to the critical care unit for possible intubation but then given Romazicon.  Her mental status and respiratory failure improved at this time.    She wore BiPAP overnight and was monitored closely.    Repeat ABG done this morning still shows acidosis but it hasn't significantly improved.    Decreased her prednisone to 2 mg daily.  Continue scheduled inhalants and as needed neb treatments.    Again today educated patient that she does not need to be smoking.  Educated her on the complications that are caused by smoking.    Cardiac:   Hemodynamically Stable.   Continuous ECG and pulse ox monitoring.      Acute on chronic renal failure: Case discussed with Dr. Chavez.  He will try to give her Lasix today.  Continue management per nephrology.    Electrolytes:  Monitor levels, manage and replete per protocols.    GI:  Continue current diet.    Hematology:   Monitor blood counts, transfuse at or below 7 g/dL.  Unless patient is actively bleeding then began transfusing at 8 g/dL.     We'll continue ambulation.      Patient Active Problem List   Diagnosis Code   • Acute renal failure N17.9   • Essential hypertension I10   • Dyslipidemia E78.5   • Diabetic neuropathy E11.40   • Chronic pain G89.29   • Anxiety F41.9   • Anemia D64.9   • Acute on chronic renal failure N17.9, N18.9   • CHF (congestive heart failure) I50.9             CLARA Presley  08/02/17  10:36 AM        Attestation: Scribed for Dr. Martin, by CLARA Laboy      I, Devin Martin M.D. attest that the above note accurately reflects the work and decisions made  by me.  Patient was seen and evaluated by Dr. Martin, including history of present illness, physical exam, assessment, and treatment plan.  The above note was reviewed and edited by Dr. Martin.

## 2017-08-03 LAB
A-A DO2: 147.7 MMHG (ref 0–300)
ANION GAP SERPL CALCULATED.3IONS-SCNC: 15 MMOL/L (ref 3.6–11.2)
ATMOSPHERIC PRESS: 730 MMHG
BASE EXCESS BLDV CALC-SCNC: -7.8 MMOL/L
BASOPHILS # BLD AUTO: 0.01 10*3/MM3 (ref 0–0.3)
BASOPHILS NFR BLD AUTO: 0.1 % (ref 0–2)
BDY SITE: ABNORMAL
BODY TEMPERATURE: 98.6 C
BUN BLD-MCNC: 123 MG/DL (ref 7–21)
BUN/CREAT SERPL: 31.1 (ref 7–25)
CALCIUM SPEC-SCNC: 6.4 MG/DL (ref 7.7–10)
CHLORIDE SERPL-SCNC: 112 MMOL/L (ref 99–112)
CO2 SERPL-SCNC: 17 MMOL/L (ref 24.3–31.9)
CREAT BLD-MCNC: 3.96 MG/DL (ref 0.43–1.29)
DEPRECATED RDW RBC AUTO: 59.8 FL (ref 37–54)
EOSINOPHIL # BLD AUTO: 0.03 10*3/MM3 (ref 0–0.7)
EOSINOPHIL NFR BLD AUTO: 0.2 % (ref 0–7)
ERYTHROCYTE [DISTWIDTH] IN BLOOD BY AUTOMATED COUNT: 18.7 % (ref 11.5–14.5)
GFR SERPL CREATININE-BSD FRML MDRD: 11 ML/MIN/1.73
GLUCOSE BLD-MCNC: 178 MG/DL (ref 70–110)
GLUCOSE BLDC GLUCOMTR-MCNC: 125 MG/DL (ref 70–130)
GLUCOSE BLDC GLUCOMTR-MCNC: 369 MG/DL (ref 70–130)
GLUCOSE BLDC GLUCOMTR-MCNC: 68 MG/DL (ref 70–130)
GLUCOSE BLDC GLUCOMTR-MCNC: 74 MG/DL (ref 70–130)
HCO3 BLDV-SCNC: 18.3 MMOL/L
HCT VFR BLD AUTO: 28.4 % (ref 37–47)
HGB BLD-MCNC: 8.6 G/DL (ref 12–16)
HGB BLDA-MCNC: 9.9 G/DL (ref 12–16)
HOROWITZ INDEX BLD+IHG-RTO: 32 %
IMM GRANULOCYTES # BLD: 0.23 10*3/MM3 (ref 0–0.03)
IMM GRANULOCYTES NFR BLD: 1.6 % (ref 0–0.5)
LYMPHOCYTES # BLD AUTO: 2.36 10*3/MM3 (ref 1–3)
LYMPHOCYTES NFR BLD AUTO: 15.9 % (ref 16–46)
MAGNESIUM SERPL-MCNC: 1.6 MG/DL (ref 1.7–2.6)
MCH RBC QN AUTO: 28.6 PG (ref 27–33)
MCHC RBC AUTO-ENTMCNC: 30.3 G/DL (ref 33–37)
MCV RBC AUTO: 94.4 FL (ref 80–94)
MODALITY: ABNORMAL
MONOCYTES # BLD AUTO: 0.99 10*3/MM3 (ref 0.1–0.9)
MONOCYTES NFR BLD AUTO: 6.7 % (ref 0–12)
NEUTROPHILS # BLD AUTO: 11.19 10*3/MM3 (ref 1.4–6.5)
NEUTROPHILS NFR BLD AUTO: 75.5 % (ref 40–75)
OSMOLALITY SERPL CALC.SUM OF ELEC: 330.7 MOSM/KG (ref 273–305)
PCO2 BLDV: 39.5 MM HG
PH BLDV: 7.28 [PH]
PLATELET # BLD AUTO: 229 10*3/MM3 (ref 130–400)
PMV BLD AUTO: 11.1 FL (ref 6–10)
PO2 BLDV: 24.6 MM HG
POTASSIUM BLD-SCNC: 4 MMOL/L (ref 3.5–5.3)
RBC # BLD AUTO: 3.01 10*6/MM3 (ref 4.2–5.4)
SAO2 % BLDCOV: 42.6 %
SODIUM BLD-SCNC: 144 MMOL/L (ref 135–153)
WBC NRBC COR # BLD: 14.81 10*3/MM3 (ref 4.5–12.5)

## 2017-08-03 PROCEDURE — 25010000002 FUROSEMIDE PER 20 MG: Performed by: INTERNAL MEDICINE

## 2017-08-03 PROCEDURE — 25010000002 HEPARIN (PORCINE) PER 1000 UNITS: Performed by: HOSPITALIST

## 2017-08-03 PROCEDURE — 99407 BEHAV CHNG SMOKING > 10 MIN: CPT | Performed by: INTERNAL MEDICINE

## 2017-08-03 PROCEDURE — 99232 SBSQ HOSP IP/OBS MODERATE 35: CPT | Performed by: INTERNAL MEDICINE

## 2017-08-03 PROCEDURE — 94660 CPAP INITIATION&MGMT: CPT

## 2017-08-03 PROCEDURE — 25010000002 IRON SUCROSE PER 1 MG: Performed by: INTERNAL MEDICINE

## 2017-08-03 PROCEDURE — 82803 BLOOD GASES ANY COMBINATION: CPT | Performed by: INTERNAL MEDICINE

## 2017-08-03 PROCEDURE — 63710000001 PREDNISONE PER 5 MG: Performed by: INTERNAL MEDICINE

## 2017-08-03 PROCEDURE — 82962 GLUCOSE BLOOD TEST: CPT

## 2017-08-03 PROCEDURE — 82805 BLOOD GASES W/O2 SATURATION: CPT | Performed by: INTERNAL MEDICINE

## 2017-08-03 PROCEDURE — 94799 UNLISTED PULMONARY SVC/PX: CPT

## 2017-08-03 PROCEDURE — 85025 COMPLETE CBC W/AUTO DIFF WBC: CPT | Performed by: INTERNAL MEDICINE

## 2017-08-03 PROCEDURE — 63710000001 INSULIN ASPART PER 5 UNITS: Performed by: HOSPITALIST

## 2017-08-03 PROCEDURE — 25010000002 HYDRALAZINE PER 20 MG: Performed by: INTERNAL MEDICINE

## 2017-08-03 PROCEDURE — 83735 ASSAY OF MAGNESIUM: CPT | Performed by: INTERNAL MEDICINE

## 2017-08-03 PROCEDURE — 80048 BASIC METABOLIC PNL TOTAL CA: CPT | Performed by: INTERNAL MEDICINE

## 2017-08-03 RX ORDER — HYDRALAZINE HYDROCHLORIDE 20 MG/ML
10 INJECTION INTRAMUSCULAR; INTRAVENOUS EVERY 6 HOURS PRN
Status: DISCONTINUED | OUTPATIENT
Start: 2017-08-03 | End: 2017-08-09 | Stop reason: HOSPADM

## 2017-08-03 RX ORDER — FUROSEMIDE 10 MG/ML
80 INJECTION INTRAMUSCULAR; INTRAVENOUS EVERY 12 HOURS
Status: DISCONTINUED | OUTPATIENT
Start: 2017-08-03 | End: 2017-08-07

## 2017-08-03 RX ADMIN — ASPIRIN 81 MG: 81 TABLET, CHEWABLE ORAL at 21:23

## 2017-08-03 RX ADMIN — HEPARIN SODIUM 5000 UNITS: 5000 INJECTION, SOLUTION INTRAVENOUS; SUBCUTANEOUS at 09:15

## 2017-08-03 RX ADMIN — IPRATROPIUM BROMIDE AND ALBUTEROL SULFATE 3 ML: .5; 3 SOLUTION RESPIRATORY (INHALATION) at 11:23

## 2017-08-03 RX ADMIN — HEPARIN SODIUM 5000 UNITS: 5000 INJECTION, SOLUTION INTRAVENOUS; SUBCUTANEOUS at 21:22

## 2017-08-03 RX ADMIN — HYDRALAZINE HYDROCHLORIDE 10 MG: 20 INJECTION INTRAMUSCULAR; INTRAVENOUS at 18:11

## 2017-08-03 RX ADMIN — BUDESONIDE AND FORMOTEROL FUMARATE DIHYDRATE 2 PUFF: 80; 4.5 AEROSOL RESPIRATORY (INHALATION) at 19:58

## 2017-08-03 RX ADMIN — CLOPIDOGREL BISULFATE 75 MG: 75 TABLET, FILM COATED ORAL at 09:15

## 2017-08-03 RX ADMIN — IPRATROPIUM BROMIDE AND ALBUTEROL SULFATE 3 ML: .5; 3 SOLUTION RESPIRATORY (INHALATION) at 22:21

## 2017-08-03 RX ADMIN — SODIUM BICARBONATE TAB 650 MG 1300 MG: 650 TAB at 09:14

## 2017-08-03 RX ADMIN — METOPROLOL TARTRATE 25 MG: 25 TABLET, FILM COATED ORAL at 09:14

## 2017-08-03 RX ADMIN — GUAIFENESIN 1200 MG: 600 TABLET, EXTENDED RELEASE ORAL at 09:14

## 2017-08-03 RX ADMIN — NICOTINE 1 PATCH: 21 PATCH, EXTENDED RELEASE TRANSDERMAL at 21:23

## 2017-08-03 RX ADMIN — CLONAZEPAM 0.5 MG: 0.5 TABLET ORAL at 09:33

## 2017-08-03 RX ADMIN — IPRATROPIUM BROMIDE AND ALBUTEROL SULFATE 3 ML: .5; 3 SOLUTION RESPIRATORY (INHALATION) at 02:15

## 2017-08-03 RX ADMIN — BUDESONIDE AND FORMOTEROL FUMARATE DIHYDRATE 2 PUFF: 80; 4.5 AEROSOL RESPIRATORY (INHALATION) at 06:39

## 2017-08-03 RX ADMIN — FUROSEMIDE 80 MG: 10 INJECTION, SOLUTION INTRAMUSCULAR; INTRAVENOUS at 21:20

## 2017-08-03 RX ADMIN — PANTOPRAZOLE SODIUM 40 MG: 40 TABLET, DELAYED RELEASE ORAL at 09:15

## 2017-08-03 RX ADMIN — IPRATROPIUM BROMIDE AND ALBUTEROL SULFATE 3 ML: .5; 3 SOLUTION RESPIRATORY (INHALATION) at 06:39

## 2017-08-03 RX ADMIN — METOPROLOL TARTRATE 25 MG: 25 TABLET, FILM COATED ORAL at 21:22

## 2017-08-03 RX ADMIN — IRON SUCROSE 200 MG: 20 INJECTION, SOLUTION INTRAVENOUS at 14:01

## 2017-08-03 RX ADMIN — IPRATROPIUM BROMIDE AND ALBUTEROL SULFATE 3 ML: .5; 3 SOLUTION RESPIRATORY (INHALATION) at 19:52

## 2017-08-03 RX ADMIN — IPRATROPIUM BROMIDE AND ALBUTEROL SULFATE 3 ML: .5; 3 SOLUTION RESPIRATORY (INHALATION) at 14:34

## 2017-08-03 RX ADMIN — SODIUM BICARBONATE TAB 650 MG 1300 MG: 650 TAB at 21:22

## 2017-08-03 RX ADMIN — INSULIN ASPART 20 UNITS: 100 INJECTION, SOLUTION INTRAVENOUS; SUBCUTANEOUS at 18:24

## 2017-08-03 RX ADMIN — FUROSEMIDE 80 MG: 10 INJECTION, SOLUTION INTRAMUSCULAR; INTRAVENOUS at 09:33

## 2017-08-03 RX ADMIN — CLONAZEPAM 0.5 MG: 0.5 TABLET ORAL at 21:21

## 2017-08-03 RX ADMIN — SODIUM BICARBONATE TAB 650 MG 1300 MG: 650 TAB at 18:24

## 2017-08-03 RX ADMIN — PREDNISONE 10 MG: 10 TABLET ORAL at 09:14

## 2017-08-03 RX ADMIN — DOXYCYCLINE 100 MG: 100 CAPSULE ORAL at 09:14

## 2017-08-03 RX ADMIN — ISOSORBIDE MONONITRATE 60 MG: 60 TABLET, EXTENDED RELEASE ORAL at 09:14

## 2017-08-03 RX ADMIN — DOXYCYCLINE 100 MG: 100 CAPSULE ORAL at 21:22

## 2017-08-03 RX ADMIN — ATORVASTATIN CALCIUM 40 MG: 40 TABLET, FILM COATED ORAL at 21:22

## 2017-08-03 RX ADMIN — SODIUM BICARBONATE TAB 650 MG 1300 MG: 650 TAB at 13:05

## 2017-08-03 RX ADMIN — GUAIFENESIN 1200 MG: 600 TABLET, EXTENDED RELEASE ORAL at 21:21

## 2017-08-03 NOTE — PROGRESS NOTES
Nephrology  Note      Subjective      She denies chest pain or SOB. She has been voiding well. She admits to eating fast food brought by  daily but states will not eat it now. She continues to have swelling, she reports improved urine output with lasix     Objective     Vital Signs  Temp:  [98.2 °F (36.8 °C)-99 °F (37.2 °C)] 98.6 °F (37 °C)  Heart Rate:  [68-89] 73  Resp:  [18-28] 18  BP: (127-189)/(63-98) 142/75    I/O this shift:  In: 200 [P.O.:200]  Out: -   I/O last 3 completed shifts:  In: 2520.8 [P.O.:805; I.V.:1605.8; IV Piggyback:110]  Out: 1220 [Urine:1220]    Physical Examination:    General Appearance : alert, no distress  Head : normocephalic  Eyes :no pallor   Throat : oral mucosa moist  Lungs : reduced breath sounds at bases  Heart : regular rhythm & normal rate, normal S1, S2, no murmur   Abdomen :  soft non-tender  Extremities : 3+ edema upto thighs  Skin : no  rash  Neurologic :grossly no focal deficitis    Laboratory Data :      WBC WBC   Date Value Ref Range Status   08/03/2017 14.81 (H) 4.50 - 12.50 10*3/mm3 Final   08/02/2017 12.38 4.50 - 12.50 10*3/mm3 Final   08/01/2017 12.44 4.50 - 12.50 10*3/mm3 Final      HGB Hemoglobin   Date Value Ref Range Status   08/03/2017 8.6 (L) 12.0 - 16.0 g/dL Final   08/02/2017 8.2 (L) 12.0 - 16.0 g/dL Final   08/01/2017 9.4 (L) 12.0 - 16.0 g/dL Final      HCT Hematocrit   Date Value Ref Range Status   08/03/2017 28.4 (L) 37.0 - 47.0 % Final   08/02/2017 27.6 (L) 37.0 - 47.0 % Final   08/01/2017 31.6 (L) 37.0 - 47.0 % Final      Platlets No results found for: LABPLAT   MCV MCV   Date Value Ref Range Status   08/03/2017 94.4 (H) 80.0 - 94.0 fL Final   08/02/2017 95.5 (H) 80.0 - 94.0 fL Final   08/01/2017 92.1 80.0 - 94.0 fL Final          Sodium Sodium   Date Value Ref Range Status   08/03/2017 144 135 - 153 mmol/L Final   08/02/2017 139 135 - 153 mmol/L Final   08/01/2017 141 135 - 153 mmol/L Final      Potassium Potassium   Date Value Ref Range Status    08/03/2017 4.0 3.5 - 5.3 mmol/L Final   08/02/2017 4.9 3.5 - 5.3 mmol/L Final   08/01/2017 4.7 3.5 - 5.3 mmol/L Final      Chloride Chloride   Date Value Ref Range Status   08/03/2017 112 99 - 112 mmol/L Final   08/02/2017 109 99 - 112 mmol/L Final   08/01/2017 111 99 - 112 mmol/L Final      CO2 CO2   Date Value Ref Range Status   08/03/2017 17.0 (L) 24.3 - 31.9 mmol/L Final   08/02/2017 17.1 (L) 24.3 - 31.9 mmol/L Final   08/01/2017 17.1 (L) 24.3 - 31.9 mmol/L Final      BUN BUN   Date Value Ref Range Status   08/03/2017 123 (H) 7 - 21 mg/dL Final   08/02/2017 117 (H) 7 - 21 mg/dL Final   08/01/2017 117 (H) 7 - 21 mg/dL Final      Creatinine Creatinine   Date Value Ref Range Status   08/03/2017 3.96 (H) 0.43 - 1.29 mg/dL Final   08/02/2017 3.98 (H) 0.43 - 1.29 mg/dL Final   08/01/2017 3.59 (H) 0.43 - 1.29 mg/dL Final      Calcium Calcium   Date Value Ref Range Status   08/03/2017 6.4 (L) 7.7 - 10.0 mg/dL Final   08/02/2017 6.7 (L) 7.7 - 10.0 mg/dL Final   08/01/2017 7.2 (L) 7.7 - 10.0 mg/dL Final      PO4 No results found for: CAPO4   Albumin No results found for: ALBUMIN   Magnesium Magnesium   Date Value Ref Range Status   08/03/2017 1.6 (L) 1.7 - 2.6 mg/dL Final   08/02/2017 1.8 1.7 - 2.6 mg/dL Final   08/01/2017 1.8 1.7 - 2.6 mg/dL Final      Uric Acid No results found for: URICACID     Radiology results :     Imaging Results (last 24 hours)     ** No results found for the last 24 hours. **            Medications:        aspirin 81 mg Oral Nightly   atorvastatin 40 mg Oral Nightly   budesonide-formoterol 2 puff Inhalation BID - RT   clopidogrel 75 mg Oral Daily   doxycycline 100 mg Oral Q12H   epoetin jane 10,000 Units Subcutaneous Once per day on Mon Wed Fri   furosemide 80 mg Intravenous Q12H   guaiFENesin 1,200 mg Oral Q12H   heparin (porcine) 5,000 Units Subcutaneous Q12H   insulin aspart 0-24 Units Subcutaneous 4x Daily AC & at Bedtime   insulin detemir 25 Units Subcutaneous Nightly    ipratropium-albuterol 3 mL Nebulization Q4H - RT   iron sucrose (VENOFER) IVPB 200 mg Intravenous Q24H   isosorbide mononitrate 60 mg Oral Q24H   magnesium sulfate 2 g Intravenous Once   metoprolol tartrate 25 mg Oral Q12H   nicotine 1 patch Transdermal Nightly   pantoprazole 40 mg Oral QAM    Pharmacy Meds to Bed Consult  Does not apply Daily   predniSONE 10 mg Oral Daily With Breakfast   sodium bicarbonate 1,300 mg Oral 4x Daily       hold 1 each    sodium bicarbonate drip (greater than 75 mEq/bag) 50 mL/hr Last Rate: 50 mL/hr (08/01/17 5227)       Assessment/Plan     Principal Problem:    Acute on chronic renal failure  Active Problems:    CHF (congestive heart failure)      1. SIMON on CKD 4 : her baseline creatinine is 2.5. She was worsening CKD from uncontrolled HTN and type 2 DM.  Her creatinine is stable at 3.9 with lasix, I will increase it to 80 mg iv bid today and monitor.   If she doesn't respond will consider starting hemodialysis  24-hour creatinine clearance is 14.7 cc/min    2. Metabolic and respiratory acidosis : on bipap and po sodium bicarbonate, CO2 levels persistently low. Will monitor for now    3. Anemia of chronic disease : on venofer and epogen    4. Edema : lasix as above, steroids tapered    5. Hypoxic and hypercarbic respiratory failure with R pleural effusion : s/p thoracentesis    6. Azotemia : likely from solumedrol, no acute indication for HD today    I discussed the patients findings and my recommendations with patient, RN    Jai Chavez MD  08/03/17  6:43 AM

## 2017-08-03 NOTE — PROGRESS NOTES
[facility] HOSPITALIST PROGRESS NOTE     Patient Identification:  Name:  Sara Cardona  Age:  67 y.o.  Sex:  female  :  1950  MRN:  [id number]  Visit Number:  [id number]  Primary Care Provider:  Marcelino Sheehan MD    Length of stay:  10    Chief complaint:  67 years old female admitted with acute on chronic renal failure.     Subjective:    No acute issues overnight.  Patient denies any complaints this morning.  She states that she's feeling better.  Still continues to complain of lower extremity swelling.  Patient denies any headache, nausea, vomiting, cough, chest pain, palpitation, constipation or diarrhea.    ----------------------------------------------------------------------------------------------------------------------  Current Hospital Meds:    aspirin 81 mg Oral Nightly   atorvastatin 40 mg Oral Nightly   budesonide-formoterol 2 puff Inhalation BID - RT   clopidogrel 75 mg Oral Daily   doxycycline 100 mg Oral Q12H   epoetin jane 10,000 Units Subcutaneous Once per day on    furosemide 80 mg Intravenous Q12H   guaiFENesin 1,200 mg Oral Q12H   heparin (porcine) 5,000 Units Subcutaneous Q12H   insulin aspart 0-24 Units Subcutaneous 4x Daily AC & at Bedtime   insulin detemir 25 Units Subcutaneous Nightly   ipratropium-albuterol 3 mL Nebulization Q4H - RT   iron sucrose (VENOFER) IVPB 200 mg Intravenous Q24H   isosorbide mononitrate 60 mg Oral Q24H   magnesium sulfate 2 g Intravenous Once   metoprolol tartrate 25 mg Oral Q12H   nicotine 1 patch Transdermal Nightly   pantoprazole 40 mg Oral QAM AC   Pharmacy Meds to Bed Consult  Does not apply Daily   predniSONE 10 mg Oral Daily With Breakfast   sodium bicarbonate 1,300 mg Oral 4x Daily       hold 1 each    sodium bicarbonate drip (greater than 75 mEq/bag) 50 mL/hr Last Rate: 50 mL/hr (17 7476)      ----------------------------------------------------------------------------------------------------------------------  Vital Signs:  Temp:  [98.2 °F (36.8 °C)-99 °F (37.2 °C)] 98.6 °F (37 °C)  Heart Rate:  [71-89] 80  Resp:  [18-28] 20  BP: (127-184)/(63-99) 184/99  Last 3 weights    08/01/17  0400 08/02/17  0600 08/03/17  0405   Weight: 159 lb 12.8 oz (72.5 kg) 167 lb (75.8 kg) 173 lb 3.2 oz (78.6 kg)     Body mass index is 32.73 kg/(m^2).    Intake/Output Summary (Last 24 hours) at 08/03/17 1038  Last data filed at 08/03/17 0900   Gross per 24 hour   Intake             1477 ml   Output             1100 ml   Net              377 ml     Diet Regular; Cardiac, Consistent Carbohydrate, Low Potassium  ----------------------------------------------------------------------------------------------------------------------  Physical exam:  Constitutional:  Well-developed and well-nourished.     HENT:  Head:  Normocephalic and atraumatic.  Mouth:  Moist mucous membranes.    Eyes:  Conjunctivae and EOM are normal.  Pupils are equal, round, and reactive to light.   Neck:  Neck supple.  No JVD present.    Cardiovascular:  Regular rate and rhythm. S1+S2. No murmur, rubs or gallops.   Pulmonary/Chest: Inspiratory bibasilar crackles with expiratory rhonchi in right basilar area.  Abdominal:  Soft. Non-tender. No viscera palpable.  Bowel sounds audible.   Musculoskeletal: No deformity or joint swelling.   Peripheral vascular: Bilateral dorsalis pedis palpable.  +2 pitting edema extending up to her thighs  Neurological:  Alert and oriented to person, place, and time.  Cranial nerves grossly intact. Strength bilaterally symmetrical in upper and lower extremities.   Skin:  Skin is warm and dry. No rash noted. No pallor.   ----------------------------------------------------------------------------------------------------------------------  Tele:  Sinus rhythm with heart rate  70s-80  ----------------------------------------------------------------------------------------------------------------------        Results from last 7 days  Lab Units 08/03/17 0154 08/02/17 0108 08/01/17 0055 07/28/17  0215   CRP mg/dL  --   --   --   --  0.66   WBC 10*3/mm3 14.81* 12.38 12.44  < > 10.18   HEMOGLOBIN g/dL 8.6* 8.2* 9.4*  < > 8.0*   HEMATOCRIT % 28.4* 27.6* 31.6*  < > 27.0*   MCV fL 94.4* 95.5* 92.1  < > 94.4*   MCHC g/dL 30.3* 29.7* 29.7*  < > 29.6*   PLATELETS 10*3/mm3 229 233 303  < > 276   < > = values in this interval not displayed.    Results from last 7 days  Lab Units 08/02/17  0922   PH, ARTERIAL pH units 7.249*   PO2 ART mm Hg 87.4   PCO2, ARTERIAL mm Hg 39.9   HCO3 ART mmol/L 17.1*       Results from last 7 days  Lab Units 08/03/17  0154 08/02/17 0108 08/01/17 0055 07/31/17  0454  07/30/17  0404  07/29/17  0135   SODIUM mmol/L 144 139 141 140  --  137  --  137   POTASSIUM mmol/L 4.0 4.9 4.7 4.5  --  5.3  < > 5.4*   MAGNESIUM mg/dL 1.6* 1.8 1.8 1.6*  < >  --   --   --    CHLORIDE mmol/L 112 109 111 111  --  110  --  110   CO2 mmol/L 17.0* 17.1* 17.1* 15.9*  --  15.9*  --  16.8*   BUN mg/dL 123* 117* 117* 109*  --  99*  --  88*   CREATININE mg/dL 3.96* 3.98* 3.59* 3.52*  --  3.73*  --  3.54*   EGFR IF NONAFRICN AM mL/min/1.73 11* 11* 13* 13*  --  12*  --  13*   CALCIUM mg/dL 6.4* 6.7* 7.2* 6.9*  --  6.9*  --  7.1*   GLUCOSE mg/dL 178* 262* 123* 142*  --  268*  --  97   ALBUMIN g/dL  --   --   --  3.30*  --  3.20*  --  3.40   BILIRUBIN mg/dL  --   --   --  0.1*  --  0.1*  --  0.1*   ALK PHOS U/L  --   --   --  94  --  100  --  105*   AST (SGOT) U/L  --   --   --  24  --  36*  --  44*   ALT (SGPT) U/L  --   --   --  58*  --  76*  --  56*   < > = values in this interval not displayed.Estimated Creatinine Clearance: 13.1 mL/min (by C-G formula based on Cr of 3.96).    No results found for: AMMONIA               Stool Culture   Date Value Ref Range Status   07/27/2017 Normal Fecal Andreina   Final       I have personally looked at the labs and they are summarized above.  ----------------------------------------------------------------------------------------------------------------------  Imaging Results (last 24 hours)     ** No results found for the last 24 hours. **        ----------------------------------------------------------------------------------------------------------------------  Assessment and Plan:    - Acute on chronic hypoxic respiratory failure:   Improving.  2/2 COPD exacerbation and diastolic CHF exacerbation. ABGs showed persistent Metabolic acidosis with normal PCO2. CXR showed bibasilar atelactasis.  Continue treatment for COPD exacerbation and CHF. Cont BiPAP PRN and QHS. Pulm input appreciated.     - SIMON on CKD IV:   Patient continues to have persistently elevated blood urea nitrogen and creatinine.  Creatinine elevated to 3.9.  Continues to have anion gap acidosis and fluid overload.  Patient started on Lasix today by Dr. Chavez.  Pt is at high risk for dialysis. Nephrology input appreciated.      - Acute diastolic CHF exacerbation:   Echo on 7/4/17 showed an EF of 66-70%, trace AR, mild MR, mild MS, mild to moderate TR and severe pulmonary HTN. Lower extremity edema is unchanged. Cardiology input appreciated.       - Acute exacerbation of COPD with ongoing tobacco abuse:   Improved. Cont steroids, antibiotics and nebs. Symbicort. Cont BiPAP. Nicotine patch ordered. Cont to encourage cessation.     - Anemia on chronic disease  Due to CKD. Continue epogen.      - Mixed respiratory and metabolic acidosis:   Patient continues to have worsening metabolic and respiratory acidosis.  Pt has been more compliant with BiPAP. Currently on PO sodium bicarb per nephrology. Cont BiPAP and treatment per nephrology.       - Hyperkalemia:   Improved.  Potassium continues to remain within normal limits.  Continue low potassium diet.      - Essential HTN:   Blood pressure elevated today.  She has  been noncompliant with dietary restrictions.  Continue metoprolol and will add when necessary hydralazine.    - Leucocytosis:  Patient is afebrile.  We'll continue to monitor.     - DM II, insulin dependent:   HgbA1c 6.1 on 7/3/17.   Blood sugars have been elevated since patient has not been compliant with the dietary restrictions.  And systemic steroids also has been contributing to her hyperglycemia.  Patient had an episode of hypoglycemia on 7/28, her insulin was decreased at that time but then she did develop hyperglycemia.  This morning I found out that patient has been noncompliant with dietary restriction with the family members bringing her food from outside.  This makes her glycemic control challenging due to her erratic and inconsistent calorie consumption.  Therefore, I will add on the side of hyperglycemia.  Continue Levemir 25 units daily at bedtime and continue sliding scale insulin.  Monitor blood sugar with Accu-Cheks before meals and at bedtime..       - Pleural effusions:   R>L. S/P right-sided thoracentesis. Pleural fluid appears transudative. Cultures negative.      - CAD:   Asymptomatic.  Continue aspirin, atorvastatin, Plavix, metoprolol and Imdur. Cardiology following with input appreciated.      - Chronic pain:   Neurontin dose adjusted for decreased creatinine clearance per pharmacy recommendations.     - Anxiety:   Cont klonopin with close monitoring in the setting of respiratory failure.      - Prophylaxis:  Heparin and PPI.     - Fluid, electrolytes and nutrition:  No IVF.   Electrolyte replacement per protocol.  Low K+ diet.      Pt is at high risk 2/2 acute on chronic hypoxic respiratory failure, SIMON on CKD IV at high risk for dialysis, CHF exacerbation, COPD exacerbation with ongoing tobacco abuse, mixed acidosis and hx of noncompliance.  Patient has been noncompliant with her prescribed diet restrictions, medications and instructions from physicians and nursing staff.  Patient states  that she will be compliant with the restrictions and medications.    Priscilla Juárez MD  08/03/17  10:38 AM

## 2017-08-03 NOTE — PLAN OF CARE
Problem: Cardiac: Heart Failure (Adult)  Goal: Signs and Symptoms of Listed Potential Problems Will be Absent or Manageable (Cardiac: Heart Failure)  Outcome: Ongoing (interventions implemented as appropriate)    Problem: Renal Failure/Kidney Injury, Acute (Adult)  Goal: Signs and Symptoms of Listed Potential Problems Will be Absent or Manageable (Renal Failure/Kidney Injury, Acute)  Outcome: Ongoing (interventions implemented as appropriate)    Problem: Fall Risk (Adult)  Goal: Identify Related Risk Factors and Signs and Symptoms  Outcome: Outcome(s) achieved Date Met:  08/03/17  Goal: Absence of Falls  Outcome: Outcome(s) achieved Date Met:  08/03/17    Problem: Pressure Ulcer Risk (Saul Scale) (Adult,Obstetrics,Pediatric)  Goal: Identify Related Risk Factors and Signs and Symptoms  Outcome: Outcome(s) achieved Date Met:  08/03/17    Problem: Patient Care Overview (Adult)  Goal: Plan of Care Review  Outcome: Ongoing (interventions implemented as appropriate)    Problem: Diabetes, Type 2 (Adult)  Goal: Signs and Symptoms of Listed Potential Problems Will be Absent or Manageable (Diabetes, Type 2)  Outcome: Ongoing (interventions implemented as appropriate)

## 2017-08-03 NOTE — PROGRESS NOTES
LOS: 10 days     Chief Complaint:  Pulmonology is following for shortness of breath    Subjective     Interval History: Patient is resting comfortably in bed today.  She states that she used her BiPAP overnight.  She denies any shortness of breath.  No acute events reported overnight.    History taken from: patient chart RN    Review of Systems:   Review of Systems - History obtained from chart review and the patient  General ROS: negative for - chills, fatigue or fever  Psychological ROS: negative for - anxiety or depression  ENT ROS: negative for - headaches or visual changes  Allergy and Immunology ROS: negative for - nasal congestion, postnasal drip or seasonal allergies  Endocrine ROS: negative for - polydipsia/polyuria  Respiratory ROS: no cough, shortness of breath, or wheezing  Cardiovascular ROS: no chest pain or dyspnea on exertion  Gastrointestinal ROS: no abdominal pain, change in bowel habits, or black or bloody stools  Musculoskeletal ROS: negative for - joint pain, joint stiffness or joint swelling  Neurological ROS: no TIA or stroke symptoms                    Objective     Vital Signs  Temp:  [98.2 °F (36.8 °C)-99 °F (37.2 °C)] 98.6 °F (37 °C)  Heart Rate:  [71-89] 80  Resp:  [18-28] 20  BP: (127-184)/(63-99) 184/99  Body mass index is 32.73 kg/(m^2).    Intake/Output Summary (Last 24 hours) at 08/03/17 1040  Last data filed at 08/03/17 0900   Gross per 24 hour   Intake             1477 ml   Output             1100 ml   Net              377 ml     I/O this shift:  In: 720 [P.O.:720]  Out: 300 [Urine:300]    Physical Exam:  GENERAL APPEARANCE: Well developed, well nourished, alert and cooperative, and appears to be in no acute distress.    HEAD: normocephalic.    EYES: PERRL    NECK: Neck supple.     CARDIAC: Normal S1 and S2. No S3, S4 or murmurs. Rhythm is regular. Bilateral lower extremity edema.  Extremities are warm and well perfused. Capillary refill is less than 2 seconds. No carotid  bruits.    RESPIRATORY: Clear to auscultation and percussion without rales, rhonchi, wheezing or diminished breath sounds.    GI: Positive bowel sounds. Soft, nondistended, nontender.     MUSCULOSKELTAL: No significant deformity or joint abnormality. Bilateral lower extremity edema . Peripheral pulses intact.     NEUROLOGICAL: Strength and sensation symmetric and intact throughout.     PSYCHIATRIC: The mental examination revealed the patient was oriented to person, place, and time.                 Results Review:                I reviewed the patient's new clinical results.  I reviewed the patient's new imaging results and agree with the interpretation.    Results from last 7 days  Lab Units 08/03/17  0154 08/02/17  0108 08/01/17  0055   WBC 10*3/mm3 14.81* 12.38 12.44   HEMOGLOBIN g/dL 8.6* 8.2* 9.4*   PLATELETS 10*3/mm3 229 233 303       Results from last 7 days  Lab Units 08/03/17  0154 08/02/17  0108 08/01/17  0055   SODIUM mmol/L 144 139 141   POTASSIUM mmol/L 4.0 4.9 4.7   CHLORIDE mmol/L 112 109 111   CO2 mmol/L 17.0* 17.1* 17.1*   BUN mg/dL 123* 117* 117*   CREATININE mg/dL 3.96* 3.98* 3.59*   CALCIUM mg/dL 6.4* 6.7* 7.2*   GLUCOSE mg/dL 178* 262* 123*   MAGNESIUM mg/dL 1.6* 1.8 1.8     Lab Results   Component Value Date    INR 0.96 07/24/2017    INR 0.93 08/08/2016    INR 0.93 10/09/2015    PROTIME 12.9 07/24/2017    PROTIME 10.5 08/08/2016    PROTIME 10.3 10/09/2015       Results from last 7 days  Lab Units 07/31/17  0454 07/30/17  0404 07/29/17  0135   ALK PHOS U/L 94 100 105*   BILIRUBIN mg/dL 0.1* 0.1* 0.1*   ALT (SGPT) U/L 58* 76* 56*   AST (SGOT) U/L 24 36* 44*       Results from last 7 days  Lab Units 08/02/17  0922   PH, ARTERIAL pH units 7.249*   PO2 ART mm Hg 87.4   PCO2, ARTERIAL mm Hg 39.9   HCO3 ART mmol/L 17.1*     Imaging Results (last 24 hours)     ** No results found for the last 24 hours. **             Medication Review:   Scheduled Medications:    aspirin 81 mg Oral Nightly    atorvastatin 40 mg Oral Nightly   budesonide-formoterol 2 puff Inhalation BID - RT   clopidogrel 75 mg Oral Daily   doxycycline 100 mg Oral Q12H   epoetin jane 10,000 Units Subcutaneous Once per day on Mon Wed Fri   furosemide 80 mg Intravenous Q12H   guaiFENesin 1,200 mg Oral Q12H   heparin (porcine) 5,000 Units Subcutaneous Q12H   insulin aspart 0-24 Units Subcutaneous 4x Daily AC & at Bedtime   insulin detemir 25 Units Subcutaneous Nightly   ipratropium-albuterol 3 mL Nebulization Q4H - RT   iron sucrose (VENOFER) IVPB 200 mg Intravenous Q24H   isosorbide mononitrate 60 mg Oral Q24H   magnesium sulfate 2 g Intravenous Once   metoprolol tartrate 25 mg Oral Q12H   nicotine 1 patch Transdermal Nightly   pantoprazole 40 mg Oral QAM AC   Pharmacy Meds to Bed Consult  Does not apply Daily   predniSONE 10 mg Oral Daily With Breakfast   sodium bicarbonate 1,300 mg Oral 4x Daily     Continuous infusions:    hold 1 each    sodium bicarbonate drip (greater than 75 mEq/bag) 50 mL/hr Last Rate: 50 mL/hr (08/01/17 8367)       Assessment/Plan      Respiratory failure: Metabolic acidosis is likely caused by renal failure.    Patient states that she wore BiPAP overnight.  She denies any shortness of breath today.    He is saturating 96% on 4 L nasal cannula.      Continue prednisone, scheduled inhalants and as needed neb treatments.    Acute on chronic renal failure: Patient is having increasing lower extremity edema.  Continue Lasix IV per nephrology.  Nephrology is on case, management per them.  Extensive smoking cessation counseling was again done today over 10 minutes.  Patient is not ready to quit yet.    Patient Active Problem List   Diagnosis Code   • Acute renal failure N17.9   • Essential hypertension I10   • Dyslipidemia E78.5   • Diabetic neuropathy E11.40   • Chronic pain G89.29   • Anxiety F41.9   • Anemia D64.9   • Acute on chronic renal failure N17.9, N18.9   • CHF (congestive heart failure) I50.9              CLARA Presley  08/03/17  10:40 AM        Attestation: Scribed for Dr. Martin, by CLARA Laboy      I, Devin Martin M.D. attest that the above note accurately reflects the work and decisions made  by me.  Patient was seen and evaluated by Dr. Martin, including history of present illness, physical exam, assessment, and treatment plan.  The above note was reviewed and edited by Dr. Martin.

## 2017-08-03 NOTE — PLAN OF CARE
Problem: Cardiac: Heart Failure (Adult)  Goal: Signs and Symptoms of Listed Potential Problems Will be Absent or Manageable (Cardiac: Heart Failure)  Outcome: Ongoing (interventions implemented as appropriate)    Problem: Renal Failure/Kidney Injury, Acute (Adult)  Goal: Signs and Symptoms of Listed Potential Problems Will be Absent or Manageable (Renal Failure/Kidney Injury, Acute)  Outcome: Ongoing (interventions implemented as appropriate)    Problem: Fall Risk (Adult)  Goal: Identify Related Risk Factors and Signs and Symptoms  Outcome: Ongoing (interventions implemented as appropriate)  Goal: Absence of Falls  Outcome: Ongoing (interventions implemented as appropriate)    Problem: Pressure Ulcer Risk (Saul Scale) (Adult,Obstetrics,Pediatric)  Goal: Identify Related Risk Factors and Signs and Symptoms  Outcome: Ongoing (interventions implemented as appropriate)  Goal: Skin Integrity  Outcome: Ongoing (interventions implemented as appropriate)    Problem: Skin Integrity Impairment, Risk/Actual (Adult)  Goal: Identify Related Risk Factors and Signs and Symptoms  Outcome: Ongoing (interventions implemented as appropriate)  Goal: Skin Integrity/Wound Healing  Outcome: Ongoing (interventions implemented as appropriate)    Problem: Diabetes, Type 2 (Adult)  Goal: Signs and Symptoms of Listed Potential Problems Will be Absent or Manageable (Diabetes, Type 2)  Outcome: Ongoing (interventions implemented as appropriate)    Problem: NPPV/CPAP (Adult)  Goal: Signs and Symptoms of Listed Potential Problems Will be Absent or Manageable (NPPV/CPAP)  Outcome: Ongoing (interventions implemented as appropriate)

## 2017-08-04 ENCOUNTER — APPOINTMENT (OUTPATIENT)
Dept: GENERAL RADIOLOGY | Facility: HOSPITAL | Age: 67
End: 2017-08-04

## 2017-08-04 LAB
ANION GAP SERPL CALCULATED.3IONS-SCNC: 12.6 MMOL/L (ref 3.6–11.2)
BASOPHILS # BLD AUTO: 0.01 10*3/MM3 (ref 0–0.3)
BASOPHILS NFR BLD AUTO: 0.1 % (ref 0–2)
BUN BLD-MCNC: 108 MG/DL (ref 7–21)
BUN/CREAT SERPL: 29.8 (ref 7–25)
CALCIUM SPEC-SCNC: 6.3 MG/DL (ref 7.7–10)
CHLORIDE SERPL-SCNC: 110 MMOL/L (ref 99–112)
CO2 SERPL-SCNC: 22.4 MMOL/L (ref 24.3–31.9)
CREAT BLD-MCNC: 3.62 MG/DL (ref 0.43–1.29)
CRP SERPL-MCNC: <0.5 MG/DL (ref 0–0.99)
DEPRECATED RDW RBC AUTO: 60.2 FL (ref 37–54)
EOSINOPHIL # BLD AUTO: 0.2 10*3/MM3 (ref 0–0.7)
EOSINOPHIL NFR BLD AUTO: 1.1 % (ref 0–7)
ERYTHROCYTE [DISTWIDTH] IN BLOOD BY AUTOMATED COUNT: 18.7 % (ref 11.5–14.5)
GFR SERPL CREATININE-BSD FRML MDRD: 13 ML/MIN/1.73
GLUCOSE BLD-MCNC: 84 MG/DL (ref 70–110)
GLUCOSE BLDC GLUCOMTR-MCNC: 101 MG/DL (ref 70–130)
GLUCOSE BLDC GLUCOMTR-MCNC: 142 MG/DL (ref 70–130)
GLUCOSE BLDC GLUCOMTR-MCNC: 201 MG/DL (ref 70–130)
GLUCOSE BLDC GLUCOMTR-MCNC: 204 MG/DL (ref 70–130)
HCT VFR BLD AUTO: 30.7 % (ref 37–47)
HGB BLD-MCNC: 9.3 G/DL (ref 12–16)
IMM GRANULOCYTES # BLD: 0.27 10*3/MM3 (ref 0–0.03)
IMM GRANULOCYTES NFR BLD: 1.5 % (ref 0–0.5)
LYMPHOCYTES # BLD AUTO: 3.72 10*3/MM3 (ref 1–3)
LYMPHOCYTES NFR BLD AUTO: 20.8 % (ref 16–46)
MAGNESIUM SERPL-MCNC: 1.4 MG/DL (ref 1.7–2.6)
MCH RBC QN AUTO: 28.2 PG (ref 27–33)
MCHC RBC AUTO-ENTMCNC: 30.3 G/DL (ref 33–37)
MCV RBC AUTO: 93 FL (ref 80–94)
MONOCYTES # BLD AUTO: 1.18 10*3/MM3 (ref 0.1–0.9)
MONOCYTES NFR BLD AUTO: 6.6 % (ref 0–12)
NEUTROPHILS # BLD AUTO: 12.49 10*3/MM3 (ref 1.4–6.5)
NEUTROPHILS NFR BLD AUTO: 69.9 % (ref 40–75)
OSMOLALITY SERPL CALC.SUM OF ELEC: 321.9 MOSM/KG (ref 273–305)
PLATELET # BLD AUTO: 207 10*3/MM3 (ref 130–400)
PMV BLD AUTO: 11.3 FL (ref 6–10)
POTASSIUM BLD-SCNC: 3.9 MMOL/L (ref 3.5–5.3)
RBC # BLD AUTO: 3.3 10*6/MM3 (ref 4.2–5.4)
SODIUM BLD-SCNC: 145 MMOL/L (ref 135–153)
WBC NRBC COR # BLD: 17.87 10*3/MM3 (ref 4.5–12.5)

## 2017-08-04 PROCEDURE — 99233 SBSQ HOSP IP/OBS HIGH 50: CPT | Performed by: INTERNAL MEDICINE

## 2017-08-04 PROCEDURE — 80048 BASIC METABOLIC PNL TOTAL CA: CPT | Performed by: INTERNAL MEDICINE

## 2017-08-04 PROCEDURE — 85025 COMPLETE CBC W/AUTO DIFF WBC: CPT | Performed by: INTERNAL MEDICINE

## 2017-08-04 PROCEDURE — 63710000001 INSULIN DETEMIR PER 5 UNITS: Performed by: INTERNAL MEDICINE

## 2017-08-04 PROCEDURE — 63510000001 EPOETIN ALFA PER 1000 UNITS: Performed by: INTERNAL MEDICINE

## 2017-08-04 PROCEDURE — 86140 C-REACTIVE PROTEIN: CPT | Performed by: INTERNAL MEDICINE

## 2017-08-04 PROCEDURE — 94799 UNLISTED PULMONARY SVC/PX: CPT

## 2017-08-04 PROCEDURE — 25010000002 HYDRALAZINE PER 20 MG: Performed by: INTERNAL MEDICINE

## 2017-08-04 PROCEDURE — 71010 HC CHEST PA OR AP: CPT

## 2017-08-04 PROCEDURE — 25010000002 HEPARIN (PORCINE) PER 1000 UNITS: Performed by: HOSPITALIST

## 2017-08-04 PROCEDURE — 82962 GLUCOSE BLOOD TEST: CPT

## 2017-08-04 PROCEDURE — 25010000002 FUROSEMIDE PER 20 MG: Performed by: INTERNAL MEDICINE

## 2017-08-04 PROCEDURE — 63710000001 INSULIN ASPART PER 5 UNITS: Performed by: HOSPITALIST

## 2017-08-04 PROCEDURE — 99232 SBSQ HOSP IP/OBS MODERATE 35: CPT | Performed by: INTERNAL MEDICINE

## 2017-08-04 PROCEDURE — 71010 XR CHEST 1 VW: CPT | Performed by: RADIOLOGY

## 2017-08-04 PROCEDURE — 25010000002 PROMETHAZINE PER 50 MG: Performed by: INTERNAL MEDICINE

## 2017-08-04 PROCEDURE — 25010000002 MAGNESIUM SULFATE IN D5W 1G/100ML (PREMIX) 1-5 GM/100ML-% SOLUTION: Performed by: INTERNAL MEDICINE

## 2017-08-04 PROCEDURE — 83735 ASSAY OF MAGNESIUM: CPT | Performed by: INTERNAL MEDICINE

## 2017-08-04 RX ORDER — AMOXICILLIN AND CLAVULANATE POTASSIUM 500; 125 MG/1; MG/1
1 TABLET, FILM COATED ORAL EVERY 12 HOURS SCHEDULED
Status: DISCONTINUED | OUTPATIENT
Start: 2017-08-04 | End: 2017-08-05

## 2017-08-04 RX ORDER — MAGNESIUM SULFATE 1 G/100ML
1 INJECTION INTRAVENOUS
Status: COMPLETED | OUTPATIENT
Start: 2017-08-04 | End: 2017-08-04

## 2017-08-04 RX ORDER — DIPHENHYDRAMINE, LIDOCAINE, NYSTATIN
5 KIT ORAL 4 TIMES DAILY
Status: DISCONTINUED | OUTPATIENT
Start: 2017-08-04 | End: 2017-08-09 | Stop reason: HOSPADM

## 2017-08-04 RX ORDER — PREDNISONE 1 MG/1
5 TABLET ORAL
Status: DISCONTINUED | OUTPATIENT
Start: 2017-08-05 | End: 2017-08-07

## 2017-08-04 RX ORDER — MAGNESIUM SULFATE 1 G/100ML
1 INJECTION INTRAVENOUS
Status: DISCONTINUED | OUTPATIENT
Start: 2017-08-04 | End: 2017-08-04 | Stop reason: SDUPTHER

## 2017-08-04 RX ORDER — METOPROLOL TARTRATE 50 MG/1
50 TABLET, FILM COATED ORAL EVERY 12 HOURS SCHEDULED
Status: DISCONTINUED | OUTPATIENT
Start: 2017-08-04 | End: 2017-08-09 | Stop reason: HOSPADM

## 2017-08-04 RX ADMIN — IPRATROPIUM BROMIDE AND ALBUTEROL SULFATE 3 ML: .5; 3 SOLUTION RESPIRATORY (INHALATION) at 06:40

## 2017-08-04 RX ADMIN — Medication 5 ML: at 20:56

## 2017-08-04 RX ADMIN — FUROSEMIDE 80 MG: 10 INJECTION, SOLUTION INTRAMUSCULAR; INTRAVENOUS at 08:40

## 2017-08-04 RX ADMIN — FUROSEMIDE 80 MG: 10 INJECTION, SOLUTION INTRAMUSCULAR; INTRAVENOUS at 20:56

## 2017-08-04 RX ADMIN — MAGNESIUM GLUCONATE 500 MG ORAL TABLET 800 MG: 500 TABLET ORAL at 08:41

## 2017-08-04 RX ADMIN — ASPIRIN 81 MG: 81 TABLET, CHEWABLE ORAL at 20:57

## 2017-08-04 RX ADMIN — DOXYCYCLINE 100 MG: 100 CAPSULE ORAL at 08:41

## 2017-08-04 RX ADMIN — MAGNESIUM SULFATE IN DEXTROSE 1 G: 10 INJECTION, SOLUTION INTRAVENOUS at 09:23

## 2017-08-04 RX ADMIN — ISOSORBIDE MONONITRATE 60 MG: 60 TABLET, EXTENDED RELEASE ORAL at 08:41

## 2017-08-04 RX ADMIN — INSULIN DETEMIR 25 UNITS: 100 INJECTION, SOLUTION SUBCUTANEOUS at 20:50

## 2017-08-04 RX ADMIN — ATORVASTATIN CALCIUM 40 MG: 40 TABLET, FILM COATED ORAL at 20:57

## 2017-08-04 RX ADMIN — GUAIFENESIN 1200 MG: 600 TABLET, EXTENDED RELEASE ORAL at 20:57

## 2017-08-04 RX ADMIN — IPRATROPIUM BROMIDE AND ALBUTEROL SULFATE 3 ML: .5; 3 SOLUTION RESPIRATORY (INHALATION) at 02:15

## 2017-08-04 RX ADMIN — IPRATROPIUM BROMIDE AND ALBUTEROL SULFATE 3 ML: .5; 3 SOLUTION RESPIRATORY (INHALATION) at 14:06

## 2017-08-04 RX ADMIN — NICOTINE 1 PATCH: 21 PATCH, EXTENDED RELEASE TRANSDERMAL at 20:57

## 2017-08-04 RX ADMIN — METOPROLOL TARTRATE 50 MG: 50 TABLET, FILM COATED ORAL at 20:56

## 2017-08-04 RX ADMIN — GUAIFENESIN 1200 MG: 600 TABLET, EXTENDED RELEASE ORAL at 08:40

## 2017-08-04 RX ADMIN — IPRATROPIUM BROMIDE AND ALBUTEROL SULFATE 3 ML: .5; 3 SOLUTION RESPIRATORY (INHALATION) at 11:32

## 2017-08-04 RX ADMIN — HYDRALAZINE HYDROCHLORIDE 10 MG: 20 INJECTION INTRAMUSCULAR; INTRAVENOUS at 18:49

## 2017-08-04 RX ADMIN — METOPROLOL TARTRATE 25 MG: 25 TABLET, FILM COATED ORAL at 08:41

## 2017-08-04 RX ADMIN — SODIUM BICARBONATE TAB 650 MG 1300 MG: 650 TAB at 12:19

## 2017-08-04 RX ADMIN — SODIUM BICARBONATE TAB 650 MG 1300 MG: 650 TAB at 20:56

## 2017-08-04 RX ADMIN — AMOXICILLIN AND CLAVULANATE POTASSIUM 500 MG: 500; 125 TABLET, FILM COATED ORAL at 09:23

## 2017-08-04 RX ADMIN — MAGNESIUM GLUCONATE 500 MG ORAL TABLET 800 MG: 500 TABLET ORAL at 18:49

## 2017-08-04 RX ADMIN — ERYTHROPOIETIN 10000 UNITS: 20000 INJECTION, SOLUTION INTRAVENOUS; SUBCUTANEOUS at 09:28

## 2017-08-04 RX ADMIN — SODIUM BICARBONATE TAB 650 MG 1300 MG: 650 TAB at 18:49

## 2017-08-04 RX ADMIN — PANTOPRAZOLE SODIUM 40 MG: 40 TABLET, DELAYED RELEASE ORAL at 08:57

## 2017-08-04 RX ADMIN — INSULIN ASPART 8 UNITS: 100 INJECTION, SOLUTION INTRAVENOUS; SUBCUTANEOUS at 12:19

## 2017-08-04 RX ADMIN — CLONAZEPAM 0.5 MG: 0.5 TABLET ORAL at 08:39

## 2017-08-04 RX ADMIN — BUDESONIDE AND FORMOTEROL FUMARATE DIHYDRATE 2 PUFF: 80; 4.5 AEROSOL RESPIRATORY (INHALATION) at 19:32

## 2017-08-04 RX ADMIN — PROMETHAZINE HYDROCHLORIDE 12.5 MG: 25 INJECTION INTRAMUSCULAR; INTRAVENOUS at 20:02

## 2017-08-04 RX ADMIN — AMOXICILLIN AND CLAVULANATE POTASSIUM 500 MG: 500; 125 TABLET, FILM COATED ORAL at 20:57

## 2017-08-04 RX ADMIN — SODIUM BICARBONATE TAB 650 MG 1300 MG: 650 TAB at 08:41

## 2017-08-04 RX ADMIN — MAGNESIUM SULFATE IN DEXTROSE 1 G: 10 INJECTION, SOLUTION INTRAVENOUS at 06:26

## 2017-08-04 RX ADMIN — HYDRALAZINE HYDROCHLORIDE 10 MG: 20 INJECTION INTRAMUSCULAR; INTRAVENOUS at 10:43

## 2017-08-04 RX ADMIN — INSULIN ASPART 8 UNITS: 100 INJECTION, SOLUTION INTRAVENOUS; SUBCUTANEOUS at 20:51

## 2017-08-04 RX ADMIN — DOXYCYCLINE 100 MG: 100 CAPSULE ORAL at 20:57

## 2017-08-04 RX ADMIN — HEPARIN SODIUM 5000 UNITS: 5000 INJECTION, SOLUTION INTRAVENOUS; SUBCUTANEOUS at 08:40

## 2017-08-04 RX ADMIN — MAGNESIUM SULFATE IN DEXTROSE 1 G: 10 INJECTION, SOLUTION INTRAVENOUS at 08:39

## 2017-08-04 RX ADMIN — CLONAZEPAM 0.5 MG: 0.5 TABLET ORAL at 20:57

## 2017-08-04 RX ADMIN — HEPARIN SODIUM 5000 UNITS: 5000 INJECTION, SOLUTION INTRAVENOUS; SUBCUTANEOUS at 20:55

## 2017-08-04 RX ADMIN — CLOPIDOGREL BISULFATE 75 MG: 75 TABLET, FILM COATED ORAL at 08:41

## 2017-08-04 NOTE — DISCHARGE PLACEMENT REQUEST
"Sara Cardona (67 y.o. Female)     Date of Birth Social Security Number Address Home Phone MRN    [id number]  [id number]  [facility] [place] [address] [phone] [id number]    Congregation Marital Status          Church        Admission Date Admission Type Admitting Provider Attending Provider Department, Room/Bed    7/24/17 Emergency Edward Solorzano MD Hammons, Edward Wen MD [facility] PROGRESS CARE, P218/1P    Discharge Date Discharge Disposition Discharge Destination                      Attending Provider: Edward Solorzano MD     Allergies:  No Known Allergies    Isolation:  None   Infection:  None   Code Status:  FULL    Ht:  61\" (154.9 cm)   Wt:  164 lb (74.4 kg)    Admission Cmt:  None   Principal Problem:  Acute on chronic renal failure [N17.9,N18.9]                 Active Insurance as of 7/24/2017     Primary Coverage     Payor Plan Insurance Group Employer/Plan Group    MEDICARE MEDICARE B ONLY      Payor Plan Address Payor Plan Phone Number Effective From Effective To    [address] [address] [phone] 7/1/2015     [place] [address]       Subscriber Name Subscriber Birth Date Member ID       SARA CARDONA 1950 [id number]           Secondary Coverage     Payor Plan Insurance Group Employer/Plan Group    [employer] [place] [employer] [place]ICAID      Payor Plan Address Payor Plan Phone Number Effective From Effective To    [address] [address] [phone] 6/30/2016     [place] [address]       Subscriber Name Subscriber Birth Date Member ID       SARA CARDONA 1950 [id number]                 Emergency Contacts      (Rel.) Home Phone Work Phone Mobile Phone    DulceÁngel hui (Surrogate) [phone] -- --        Massiel Barbosa, RRT Respiratory Therapist Signed Pulmonology Progress Notes Date of Service: 8/4/2017  3:34 PM         Hide copied text  Hover for attribution information  Bipap settings, 18/6 Rate 24 fiO2 30%            Emergency Contact " Information     Name Relation Home Work Mobile    Ángel Cardona Surrogate [phone]

## 2017-08-04 NOTE — PAYOR COMM NOTE
"[facility]  NPI:[id number]    Utilization Review  Contact: Louisa Vee RN  Phone: [phone]  Fax:[phone]    CLINICAL UPDATE  AUTH # [id number]  Sara Bxo (67 y.o. Female)     Date of Birth Social Security Number Address Home Phone MRN    [id number]  [id number]  [facility] [id number] [phone] [id number]    Druze Marital Status          Scientologist        Admission Date Admission Type Admitting Provider Attending Provider Department, Room/Bed    7/24/17 Emergency Edward Solorzano MD Hammons, Edward Wen MD [facility] PROGRESS CARE, P218/1P    Discharge Date Discharge Disposition Discharge Destination                      Attending Provider: Edward Solorzano MD     Allergies:  No Known Allergies    Isolation:  None   Infection:  None   Code Status:  FULL    Ht:  61\" (154.9 cm)   Wt:  164 lb (74.4 kg)    Admission Cmt:  None   Principal Problem:  Acute on chronic renal failure [N17.9,N18.9]                 Active Insurance as of 7/24/2017     Primary Coverage     Payor Plan Insurance Group Employer/Plan Group    MEDICARE MEDICARE B ONLY      Payor Plan Address Payor Plan Phone Number Effective From Effective To    [address] [address] [phone] 7/1/2015     [place] [address]       Subscriber Name Subscriber Birth Date Member ID       [address]ARA [address] 1950 [id number]           Secondary Coverage     Payor Plan Insurance Group Employer/Plan Group    [employer] [place]ICAID      Payor Plan Address Payor Plan Phone Number Effective From Effective To    [address] [address] [phone] 6/30/2016     [place] [address]       Subscriber Name Subscriber Birth Date Member ID       SARA [address] 1950 [id number]                 Emergency Contacts      (Rel.) Home Phone Work Phone Mobile Phone    ÁngelDulce ([place]) [phone] -- --            Hospital Medications (active)       Dose Frequency Start End    acetaminophen " (TYLENOL) tablet 650 mg 650 mg Every 6 Hours PRN 7/27/2017     Sig - Route: Take 2 tablets by mouth Every 6 (Six) Hours As Needed for Mild Pain (1-3). - Oral    amoxicillin-clavulanate (AUGMENTIN) 500-125 MG per tablet 500 mg 1 tablet Every 12 Hours Scheduled 8/4/2017 8/11/2017    Sig - Route: Take 1 tablet by mouth Every 12 (Twelve) Hours. - Oral    aspirin chewable tablet 81 mg 81 mg Nightly 7/25/2017     Sig - Route: Chew 1 tablet Every Night. - Oral    atorvastatin (LIPITOR) tablet 40 mg 40 mg Nightly 7/25/2017     Sig - Route: Take 1 tablet by mouth Every Night. - Oral    budesonide-formoterol (SYMBICORT) 80-4.5 MCG/ACT inhaler 2 puff 2 puff 2 Times Daily - RT 7/25/2017     Sig - Route: Inhale 2 puffs 2 (Two) Times a Day. - Inhalation    clonazePAM (KlonoPIN) tablet 0.5 mg 0.5 mg 2 Times Daily PRN 7/24/2017     Sig - Route: Take 1 tablet by mouth 2 (Two) Times a Day As Needed for Anxiety. - Oral    clopidogrel (PLAVIX) tablet 75 mg 75 mg Daily 7/27/2017     Sig - Route: Take 1 tablet by mouth Daily. - Oral    dextrose (D50W) solution 25 g 25 g Every 15 Minutes PRN 7/25/2017     Sig - Route: Infuse 50 mL into a venous catheter Every 15 (Fifteen) Minutes As Needed for Low Blood Sugar (Blood Sugar Less Than 70, Patient Has IV Access - Unresponsive, NPO or Unable To Safely Swallow). - Intravenous    dextrose (GLUTOSE) oral gel 15 g 15 g Every 15 Minutes PRN 7/25/2017     Sig - Route: Take 15 g by mouth Every 15 (Fifteen) Minutes As Needed for Low Blood Sugar (Blood Sugar Less Than 70, Patient Alert, Is Not NPO & Can Safely Swallow). - Oral    doxycycline (MONODOX) capsule 100 mg 100 mg Every 12 Hours Scheduled 7/28/2017     Sig - Route: Take 1 capsule by mouth Every 12 (Twelve) Hours. - Oral    epoetin jane (EPOGEN,PROCRIT) injection 10,000 Units 10,000 Units 3 Times Weekly 8/2/2017     Sig - Route: Inject 0.5 mL under the skin Once per day on Mon Wed Fri. - Subcutaneous    furosemide (LASIX) injection 80 mg 80 mg  Every 12 Hours 8/3/2017     Sig - Route: Infuse 8 mL into a venous catheter Every 12 (Twelve) Hours. - Intravenous    glucagon (GLUCAGEN) injection 1 mg 1 mg Every 15 Minutes PRN 7/25/2017     Sig - Route: Inject 1 mg under the skin Every 15 (Fifteen) Minutes As Needed (Blood Glucose Less Than 70 - Patient Without IV Access - Unresponsive, NPO or Unable To Safely Swallow). - Subcutaneous    guaiFENesin (MUCINEX) 12 hr tablet 1,200 mg 1,200 mg Every 12 Hours Scheduled 7/26/2017     Sig - Route: Take 2 tablets by mouth Every 12 (Twelve) Hours. - Oral    heparin (porcine) 5000 UNIT/ML injection 5,000 Units 5,000 Units Every 12 Hours Scheduled 7/24/2017     Sig - Route: Inject 1 mL under the skin Every 12 (Twelve) Hours. - Subcutaneous    Hold medication 1 each Continuous PRN 7/25/2017     Sig - Route: 1 each Continuous As Needed (For 24 Hours Prior to Thoracentesis). - Does not apply    hydrALAZINE (APRESOLINE) injection 10 mg 10 mg Every 6 Hours PRN 8/3/2017     Sig - Route: Infuse 0.5 mL into a venous catheter Every 6 (Six) Hours As Needed for High Blood Pressure. - Intravenous    insulin aspart (novoLOG) injection 0-24 Units 0-24 Units 4 Times Daily Before Meals & Nightly 7/26/2017     Sig - Route: Inject 0-24 Units under the skin 4 (Four) Times a Day Before Meals & at Bedtime. - Subcutaneous    insulin detemir (LEVEMIR) injection 25 Units 25 Units Nightly 7/29/2017     Sig - Route: Inject 25 Units under the skin Every Night. - Subcutaneous    ipratropium-albuterol (DUO-NEB) nebulizer solution 3 mL 3 mL Every 4 Hours - RT 7/28/2017     Sig - Route: Take 3 mL by nebulization Every 4 (Four) Hours. - Nebulization    iron sucrose (VENOFER) 200 mg in sodium chloride 0.9 % 100 mL IVPB 200 mg Every 24 Hours 8/2/2017 8/3/2017    Sig - Route: Infuse 200 mg into a venous catheter Daily. - Intravenous    isosorbide mononitrate (IMDUR) 24 hr tablet 60 mg 60 mg Every 24 Hours Scheduled 7/25/2017     Sig - Route: Take 1 tablet by  "mouth Daily. - Oral    magnesium oxide (MAGOX) tablet 800 mg 800 mg 2 Times Daily 8/4/2017 8/7/2017    Sig - Route: Take 2 tablets by mouth 2 (Two) Times a Day. - Oral    Magnesium Sulfate 2 gram infusion- Mg 1.6 - 1.9 mg/dL 2 g As Needed 7/31/2017     Sig - Route: Infuse 50 mL into a venous catheter As Needed (Mg 1.6 - 1.9 mg/dL). - Intravenous    Linked Group 1:  \"Or\" Linked Group Details        magnesium sulfate 3 gram infusion (1gm x 3) - Mg 1.1 - 1.5 mg/dL 1 g As Needed 7/31/2017     Sig - Route: Infuse 100 mL into a venous catheter As Needed (Mg 1.1 - 1.5 mg/dL). - Intravenous    Linked Group 1:  \"Or\" Linked Group Details        magnesium sulfate 3 gram infusion (1gm x 3) - Mg 1.1 - 1.5 mg/dL 1 g Every 1 Hour 8/4/2017 8/4/2017    Sig - Route: Infuse 100 mL into a venous catheter Every 1 (One) Hour. - Intravenous    magnesium sulfate 4 gram infusion- Mg less than or equal to 1 mg/dL 4 g As Needed 7/31/2017     Sig - Route: Infuse 100 mL into a venous catheter As Needed (Mg less than or equal to 1 mg/dL). - Intravenous    Linked Group 1:  \"Or\" Linked Group Details        metoprolol tartrate (LOPRESSOR) tablet 25 mg 25 mg Every 12 Hours Scheduled 7/26/2017     Sig - Route: Take 1 tablet by mouth Every 12 (Twelve) Hours. - Oral    nicotine (NICODERM CQ) 21 MG/24HR patch 1 patch 1 patch Nightly 7/25/2017     Sig - Route: Place 1 patch on the skin Every Night. - Transdermal    nitroglycerin (NITROSTAT) SL tablet 0.4 mg 0.4 mg Every 5 Minutes PRN 7/24/2017     Sig - Route: Place 1 tablet under the tongue Every 5 (Five) Minutes As Needed for Chest Pain (if systolic BP greater than 100 mm/Hg.). - Sublingual    pantoprazole (PROTONIX) EC tablet 40 mg 40 mg Every Morning Before Breakfast 7/25/2017     Sig - Route: Take 1 tablet by mouth Every Morning Before Breakfast. - Oral    Pharmacy Meds to Bed Consult  Daily 7/25/2017     Sig - Route: Daily. - Does not apply    predniSONE (DELTASONE) tablet 5 mg 5 mg Daily With " "Breakfast 2017     Sig - Route: Take 1 tablet by mouth Daily With Breakfast. - Oral    sodium bicarbonate tablet 1,300 mg 1,300 mg 4 Times Daily 2017     Sig - Route: Take 2 tablets by mouth 4 (Four) Times a Day. - Oral    sodium chloride 0.9 % flush 1-10 mL 1-10 mL As Needed 2017     Sig - Route: Infuse 1-10 mL into a venous catheter As Needed for Line Care. - Intravenous    sodium chloride 0.9 % flush 10 mL 10 mL As Needed 2017     Sig - Route: Infuse 10 mL into a venous catheter As Needed for Line Care. - Intravenous    Linked Group 2:  \"And\" Linked Group Details        Magnesium Sulfate 2 gram infusion- Mg 1.6 - 1.9 mg/dL (Discontinued) 2 g Once 2017    Sig - Route: Infuse 50 mL into a venous catheter 1 (One) Time. - Intravenous    magnesium sulfate 3 gram infusion (1gm x 3) - Mg 1.1 - 1.5 mg/dL (Discontinued) 1 g Every 1 Hour 2017    Sig - Route: Infuse 100 mL into a venous catheter Every 1 (One) Hour. - Intravenous    Reason for Discontinue: Reorder    predniSONE (DELTASONE) tablet 10 mg (Discontinued) 10 mg Daily With Breakfast 8/3/2017 2017    Sig - Route: Take 1 tablet by mouth Daily With Breakfast. - Oral    sodium bicarbonate 8.4 % 150 mEq in dextrose (D5W) 5 % 1,000 mL infusion (greater than 75 mEq) (Discontinued) 50 mL/hr Continuous 2017    Sig - Route: Infuse 50 mL/hr into a venous catheter Continuous. - Intravenous             Physician Progress Notes (last 24 hours) (Notes from 8/3/2017  9:31 AM through 2017  9:31 AM)      Priscilla Juárez MD at 8/3/2017 10:38 AM  Version 1 of 1             [facility] HOSPITALIST PROGRESS NOTE     Patient Identification:  Name:  Sara Cardona  Age:  67 y.o.  Sex:  female  :  1950  MRN:  [id number]  Visit Number:  [id number]  Primary Care Provider:  Marcelino Donavon Aguila, MD    Length of stay:  10    Chief complaint:  67 years old female admitted with acute on chronic renal failure. "     Subjective:    No acute issues overnight.  Patient denies any complaints this morning.  She states that she's feeling better.  Still continues to complain of lower extremity swelling.  Patient denies any headache, nausea, vomiting, cough, chest pain, palpitation, constipation or diarrhea.    ----------------------------------------------------------------------------------------------------------------------  Current Hospital Meds:    aspirin 81 mg Oral Nightly   atorvastatin 40 mg Oral Nightly   budesonide-formoterol 2 puff Inhalation BID - RT   clopidogrel 75 mg Oral Daily   doxycycline 100 mg Oral Q12H   epoetin jane 10,000 Units Subcutaneous Once per day on Mon Wed Fri   furosemide 80 mg Intravenous Q12H   guaiFENesin 1,200 mg Oral Q12H   heparin (porcine) 5,000 Units Subcutaneous Q12H   insulin aspart 0-24 Units Subcutaneous 4x Daily AC & at Bedtime   insulin detemir 25 Units Subcutaneous Nightly   ipratropium-albuterol 3 mL Nebulization Q4H - RT   iron sucrose (VENOFER) IVPB 200 mg Intravenous Q24H   isosorbide mononitrate 60 mg Oral Q24H   magnesium sulfate 2 g Intravenous Once   metoprolol tartrate 25 mg Oral Q12H   nicotine 1 patch Transdermal Nightly   pantoprazole 40 mg Oral QAM    Pharmacy Meds to Bed Consult  Does not apply Daily   predniSONE 10 mg Oral Daily With Breakfast   sodium bicarbonate 1,300 mg Oral 4x Daily       hold 1 each    sodium bicarbonate drip (greater than 75 mEq/bag) 50 mL/hr Last Rate: 50 mL/hr (08/01/17 5828)     ----------------------------------------------------------------------------------------------------------------------  Vital Signs:  Temp:  [98.2 °F (36.8 °C)-99 °F (37.2 °C)] 98.6 °F (37 °C)  Heart Rate:  [71-89] 80  Resp:  [18-28] 20  BP: (127-184)/(63-99) 184/99  Last 3 weights    08/01/17  0400 08/02/17  0600 08/03/17  0405   Weight: 159 lb 12.8 oz (72.5 kg) 167 lb (75.8 kg) 173 lb 3.2 oz (78.6 kg)     Body mass index is 32.73 kg/(m^2).    Intake/Output Summary  (Last 24 hours) at 08/03/17 1038  Last data filed at 08/03/17 0900   Gross per 24 hour   Intake             1477 ml   Output             1100 ml   Net              377 ml     Diet Regular; Cardiac, Consistent Carbohydrate, Low Potassium  ----------------------------------------------------------------------------------------------------------------------  Physical exam:  Constitutional:  Well-developed and well-nourished.     HENT:  Head:  Normocephalic and atraumatic.  Mouth:  Moist mucous membranes.    Eyes:  Conjunctivae and EOM are normal.  Pupils are equal, round, and reactive to light.   Neck:  Neck supple.  No JVD present.    Cardiovascular:  Regular rate and rhythm. S1+S2. No murmur, rubs or gallops.   Pulmonary/Chest: Inspiratory bibasilar crackles with expiratory rhonchi in right basilar area.  Abdominal:  Soft. Non-tender. No viscera palpable.  Bowel sounds audible.   Musculoskeletal: No deformity or joint swelling.   Peripheral vascular: Bilateral dorsalis pedis palpable.  +2 pitting edema extending up to her thighs  Neurological:  Alert and oriented to person, place, and time.  Cranial nerves grossly intact. Strength bilaterally symmetrical in upper and lower extremities.   Skin:  Skin is warm and dry. No rash noted. No pallor.   ----------------------------------------------------------------------------------------------------------------------  Tele:  Sinus rhythm with heart rate 70s-80  ----------------------------------------------------------------------------------------------------------------------        Results from last 7 days  Lab Units 08/03/17  0154 08/02/17  0108 08/01/17  0055  07/28/17  0215   CRP mg/dL  --   --   --   --  0.66   WBC 10*3/mm3 14.81* 12.38 12.44  < > 10.18   HEMOGLOBIN g/dL 8.6* 8.2* 9.4*  < > 8.0*   HEMATOCRIT % 28.4* 27.6* 31.6*  < > 27.0*   MCV fL 94.4* 95.5* 92.1  < > 94.4*   MCHC g/dL 30.3* 29.7* 29.7*  < > 29.6*   PLATELETS 10*3/mm3 229 233 303  < > 276   < > =  values in this interval not displayed.    Results from last 7 days  Lab Units 08/02/17  0922   PH, ARTERIAL pH units 7.249*   PO2 ART mm Hg 87.4   PCO2, ARTERIAL mm Hg 39.9   HCO3 ART mmol/L 17.1*       Results from last 7 days  Lab Units 08/03/17  0154 08/02/17  0108 08/01/17  0055 07/31/17  0454  07/30/17  0404  07/29/17  0135   SODIUM mmol/L 144 139 141 140  --  137  --  137   POTASSIUM mmol/L 4.0 4.9 4.7 4.5  --  5.3  < > 5.4*   MAGNESIUM mg/dL 1.6* 1.8 1.8 1.6*  < >  --   --   --    CHLORIDE mmol/L 112 109 111 111  --  110  --  110   CO2 mmol/L 17.0* 17.1* 17.1* 15.9*  --  15.9*  --  16.8*   BUN mg/dL 123* 117* 117* 109*  --  99*  --  88*   CREATININE mg/dL 3.96* 3.98* 3.59* 3.52*  --  3.73*  --  3.54*   EGFR IF NONAFRICN AM mL/min/1.73 11* 11* 13* 13*  --  12*  --  13*   CALCIUM mg/dL 6.4* 6.7* 7.2* 6.9*  --  6.9*  --  7.1*   GLUCOSE mg/dL 178* 262* 123* 142*  --  268*  --  97   ALBUMIN g/dL  --   --   --  3.30*  --  3.20*  --  3.40   BILIRUBIN mg/dL  --   --   --  0.1*  --  0.1*  --  0.1*   ALK PHOS U/L  --   --   --  94  --  100  --  105*   AST (SGOT) U/L  --   --   --  24  --  36*  --  44*   ALT (SGPT) U/L  --   --   --  58*  --  76*  --  56*   < > = values in this interval not displayed.Estimated Creatinine Clearance: 13.1 mL/min (by C-G formula based on Cr of 3.96).    No results found for: AMMONIA               Stool Culture   Date Value Ref Range Status   07/27/2017 Normal Fecal Andreina  Final       I have personally looked at the labs and they are summarized above.  ----------------------------------------------------------------------------------------------------------------------  Imaging Results (last 24 hours)     ** No results found for the last 24 hours. **        ----------------------------------------------------------------------------------------------------------------------  Assessment and Plan:    - Acute on chronic hypoxic respiratory failure:   Improving.  2/2 COPD exacerbation and  diastolic CHF exacerbation. ABGs showed persistent Metabolic acidosis with normal PCO2. CXR showed bibasilar atelactasis.  Continue treatment for COPD exacerbation and CHF. Cont BiPAP PRN and QHS. Pulm input appreciated.     - SIMON on CKD IV:   Patient continues to have persistently elevated blood urea nitrogen and creatinine.  Creatinine elevated to 3.9.  Continues to have anion gap acidosis and fluid overload.  Patient started on Lasix today by Dr. Chavez.  Pt is at high risk for dialysis. Nephrology input appreciated.      - Acute diastolic CHF exacerbation:   Echo on 7/4/17 showed an EF of 66-70%, trace AR, mild MR, mild MS, mild to moderate TR and severe pulmonary HTN. Lower extremity edema is unchanged. Cardiology input appreciated.       - Acute exacerbation of COPD with ongoing tobacco abuse:   Improved. Cont steroids, antibiotics and nebs. Symbicort. Cont BiPAP. Nicotine patch ordered. Cont to encourage cessation.     - Anemia on chronic disease  Due to CKD. Continue epogen.      - Mixed respiratory and metabolic acidosis:   Patient continues to have worsening metabolic and respiratory acidosis.  Pt has been more compliant with BiPAP. Currently on PO sodium bicarb per nephrology. Cont BiPAP and treatment per nephrology.       - Hyperkalemia:   Improved.  Potassium continues to remain within normal limits.  Continue low potassium diet.      - Essential HTN:   Blood pressure elevated today.  She has been noncompliant with dietary restrictions.  Continue metoprolol and will add when necessary hydralazine.    - Leucocytosis:  Patient is afebrile.  We'll continue to monitor.     - DM II, insulin dependent:   HgbA1c 6.1 on 7/3/17.   Blood sugars have been elevated since patient has not been compliant with the dietary restrictions.  And systemic steroids also has been contributing to her hyperglycemia.  Patient had an episode of hypoglycemia on 7/28, her insulin was decreased at that time but then she did develop  hyperglycemia.  This morning I found out that patient has been noncompliant with dietary restriction with the family members bringing her food from outside.  This makes her glycemic control challenging due to her erratic and inconsistent calorie consumption.  Therefore, I will add on the side of hyperglycemia.  Continue Levemir 25 units daily at bedtime and continue sliding scale insulin.  Monitor blood sugar with Accu-Cheks before meals and at bedtime..       - Pleural effusions:   R>L. S/P right-sided thoracentesis. Pleural fluid appears transudative. Cultures negative.      - CAD:   Asymptomatic.  Continue aspirin, atorvastatin, Plavix, metoprolol and Imdur. Cardiology following with input appreciated.      - Chronic pain:   Neurontin dose adjusted for decreased creatinine clearance per pharmacy recommendations.     - Anxiety:   Cont klonopin with close monitoring in the setting of respiratory failure.      - Prophylaxis:  Heparin and PPI.     - Fluid, electrolytes and nutrition:  No IVF.   Electrolyte replacement per protocol.  Low K+ diet.      Pt is at high risk 2/2 acute on chronic hypoxic respiratory failure, SIMON on CKD IV at high risk for dialysis, CHF exacerbation, COPD exacerbation with ongoing tobacco abuse, mixed acidosis and hx of noncompliance.  Patient has been noncompliant with her prescribed diet restrictions, medications and instructions from physicians and nursing staff.  Patient states that she will be compliant with the restrictions and medications.    Priscilla Juárez MD  08/03/17  10:38 AM          Electronically signed by Priscilla Juárez MD at 8/3/2017  2:36 PM      Devin Martin MD at 8/3/2017 10:40 AM  Version 3 of 3          LOS: 10 days     Chief Complaint:  Pulmonology is following for shortness of breath    Subjective     Interval History: Patient is resting comfortably in bed today.  She states that she used her BiPAP overnight.  She denies any shortness of breath.  No acute events  reported overnight.    History taken from: patient chart RN    Review of Systems:   Review of Systems - History obtained from chart review and the patient  General ROS: negative for - chills, fatigue or fever  Psychological ROS: negative for - anxiety or depression  ENT ROS: negative for - headaches or visual changes  Allergy and Immunology ROS: negative for - nasal congestion, postnasal drip or seasonal allergies  Endocrine ROS: negative for - polydipsia/polyuria  Respiratory ROS: no cough, shortness of breath, or wheezing  Cardiovascular ROS: no chest pain or dyspnea on exertion  Gastrointestinal ROS: no abdominal pain, change in bowel habits, or black or bloody stools  Musculoskeletal ROS: negative for - joint pain, joint stiffness or joint swelling  Neurological ROS: no TIA or stroke symptoms                    Objective     Vital Signs  Temp:  [98.2 °F (36.8 °C)-99 °F (37.2 °C)] 98.6 °F (37 °C)  Heart Rate:  [71-89] 80  Resp:  [18-28] 20  BP: (127-184)/(63-99) 184/99  Body mass index is 32.73 kg/(m^2).    Intake/Output Summary (Last 24 hours) at 08/03/17 1040  Last data filed at 08/03/17 0900   Gross per 24 hour   Intake             1477 ml   Output             1100 ml   Net              377 ml     I/O this shift:  In: 720 [P.O.:720]  Out: 300 [Urine:300]    Physical Exam:  GENERAL APPEARANCE: Well developed, well nourished, alert and cooperative, and appears to be in no acute distress.    HEAD: normocephalic.    EYES: PERRL    NECK: Neck supple.     CARDIAC: Normal S1 and S2. No S3, S4 or murmurs. Rhythm is regular. Bilateral lower extremity edema.  Extremities are warm and well perfused. Capillary refill is less than 2 seconds. No carotid bruits.    RESPIRATORY: Clear to auscultation and percussion without rales, rhonchi, wheezing or diminished breath sounds.    GI: Positive bowel sounds. Soft, nondistended, nontender.     MUSCULOSKELTAL: No significant deformity or joint abnormality. Bilateral lower  extremity edema . Peripheral pulses intact.     NEUROLOGICAL: Strength and sensation symmetric and intact throughout.     PSYCHIATRIC: The mental examination revealed the patient was oriented to person, place, and time.                 Results Review:                I reviewed the patient's new clinical results.  I reviewed the patient's new imaging results and agree with the interpretation.    Results from last 7 days  Lab Units 08/03/17  0154 08/02/17  0108 08/01/17  0055   WBC 10*3/mm3 14.81* 12.38 12.44   HEMOGLOBIN g/dL 8.6* 8.2* 9.4*   PLATELETS 10*3/mm3 229 233 303       Results from last 7 days  Lab Units 08/03/17  0154 08/02/17  0108 08/01/17  0055   SODIUM mmol/L 144 139 141   POTASSIUM mmol/L 4.0 4.9 4.7   CHLORIDE mmol/L 112 109 111   CO2 mmol/L 17.0* 17.1* 17.1*   BUN mg/dL 123* 117* 117*   CREATININE mg/dL 3.96* 3.98* 3.59*   CALCIUM mg/dL 6.4* 6.7* 7.2*   GLUCOSE mg/dL 178* 262* 123*   MAGNESIUM mg/dL 1.6* 1.8 1.8     Lab Results   Component Value Date    INR 0.96 07/24/2017    INR 0.93 08/08/2016    INR 0.93 10/09/2015    PROTIME 12.9 07/24/2017    PROTIME 10.5 08/08/2016    PROTIME 10.3 10/09/2015       Results from last 7 days  Lab Units 07/31/17  0454 07/30/17  0404 07/29/17  0135   ALK PHOS U/L 94 100 105*   BILIRUBIN mg/dL 0.1* 0.1* 0.1*   ALT (SGPT) U/L 58* 76* 56*   AST (SGOT) U/L 24 36* 44*       Results from last 7 days  Lab Units 08/02/17  0922   PH, ARTERIAL pH units 7.249*   PO2 ART mm Hg 87.4   PCO2, ARTERIAL mm Hg 39.9   HCO3 ART mmol/L 17.1*     Imaging Results (last 24 hours)     ** No results found for the last 24 hours. **             Medication Review:   Scheduled Medications:    aspirin 81 mg Oral Nightly   atorvastatin 40 mg Oral Nightly   budesonide-formoterol 2 puff Inhalation BID - RT   clopidogrel 75 mg Oral Daily   doxycycline 100 mg Oral Q12H   epoetin jane 10,000 Units Subcutaneous Once per day on Mon Wed Fri   furosemide 80 mg Intravenous Q12H   guaiFENesin 1,200 mg Oral  Q12H   heparin (porcine) 5,000 Units Subcutaneous Q12H   insulin aspart 0-24 Units Subcutaneous 4x Daily AC & at Bedtime   insulin detemir 25 Units Subcutaneous Nightly   ipratropium-albuterol 3 mL Nebulization Q4H - RT   iron sucrose (VENOFER) IVPB 200 mg Intravenous Q24H   isosorbide mononitrate 60 mg Oral Q24H   magnesium sulfate 2 g Intravenous Once   metoprolol tartrate 25 mg Oral Q12H   nicotine 1 patch Transdermal Nightly   pantoprazole 40 mg Oral QAM AC   Pharmacy Meds to Bed Consult  Does not apply Daily   predniSONE 10 mg Oral Daily With Breakfast   sodium bicarbonate 1,300 mg Oral 4x Daily     Continuous infusions:    hold 1 each    sodium bicarbonate drip (greater than 75 mEq/bag) 50 mL/hr Last Rate: 50 mL/hr (08/01/17 4523)       Assessment/Plan      Respiratory failure: Metabolic acidosis is likely caused by renal failure.    Patient states that she wore BiPAP overnight.  She denies any shortness of breath today.    He is saturating 96% on 4 L nasal cannula.      Continue prednisone, scheduled inhalants and as needed neb treatments.    Acute on chronic renal failure: Patient is having increasing lower extremity edema.  Continue Lasix IV per nephrology.  Nephrology is on case, management per them.  Extensive smoking cessation counseling was again done today over 10 minutes.  Patient is not ready to quit yet.    Patient Active Problem List   Diagnosis Code   • Acute renal failure N17.9   • Essential hypertension I10   • Dyslipidemia E78.5   • Diabetic neuropathy E11.40   • Chronic pain G89.29   • Anxiety F41.9   • Anemia D64.9   • Acute on chronic renal failure N17.9, N18.9   • CHF (congestive heart failure) I50.9             CLARA Presley  08/03/17  10:40 AM        Attestation: Scribed for Dr. Martin, by CLARA Laboy      IDevin M.D. attest that the above note accurately reflects the work and decisions made  by me.  Patient was seen and evaluated by Dr. Martin,  including history of present illness, physical exam, assessment, and treatment plan.  The above note was reviewed and edited by Dr. Martin.     Electronically signed by Devin Martin MD at 8/3/2017 11:01 AM      Devin Martin MD at 8/3/2017 10:40 AM  Version 2 of 3          LOS: 10 days     Chief Complaint:  Pulmonology is following for shortness of breath    Subjective     Interval History: Patient is resting comfortably in bed today.  She states that she used her BiPAP overnight.  She denies any shortness of breath.  No acute events reported overnight.    History taken from: patient chart RN    Review of Systems:   Review of Systems - History obtained from chart review and the patient  General ROS: negative for - chills, fatigue or fever  Psychological ROS: negative for - anxiety or depression  ENT ROS: negative for - headaches or visual changes  Allergy and Immunology ROS: negative for - nasal congestion, postnasal drip or seasonal allergies  Endocrine ROS: negative for - polydipsia/polyuria  Respiratory ROS: no cough, shortness of breath, or wheezing  Cardiovascular ROS: no chest pain or dyspnea on exertion  Gastrointestinal ROS: no abdominal pain, change in bowel habits, or black or bloody stools  Musculoskeletal ROS: negative for - joint pain, joint stiffness or joint swelling  Neurological ROS: no TIA or stroke symptoms                    Objective     Vital Signs  Temp:  [98.2 °F (36.8 °C)-99 °F (37.2 °C)] 98.6 °F (37 °C)  Heart Rate:  [71-89] 80  Resp:  [18-28] 20  BP: (127-184)/(63-99) 184/99  Body mass index is 32.73 kg/(m^2).    Intake/Output Summary (Last 24 hours) at 08/03/17 1040  Last data filed at 08/03/17 0900   Gross per 24 hour   Intake             1477 ml   Output             1100 ml   Net              377 ml     I/O this shift:  In: 720 [P.O.:720]  Out: 300 [Urine:300]    Physical Exam:  GENERAL APPEARANCE: Well developed, well nourished, alert and cooperative, and appears to be in no acute  distress.    HEAD: normocephalic.    EYES: PERRL    NECK: Neck supple.     CARDIAC: Normal S1 and S2. No S3, S4 or murmurs. Rhythm is regular. Bilateral lower extremity edema.  Extremities are warm and well perfused. Capillary refill is less than 2 seconds. No carotid bruits.    RESPIRATORY: Clear to auscultation and percussion without rales, rhonchi, wheezing or diminished breath sounds.    GI: Positive bowel sounds. Soft, nondistended, nontender.     MUSCULOSKELTAL: No significant deformity or joint abnormality. Bilateral lower extremity edema . Peripheral pulses intact.     NEUROLOGICAL: Strength and sensation symmetric and intact throughout.     PSYCHIATRIC: The mental examination revealed the patient was oriented to person, place, and time.                 Results Review:                I reviewed the patient's new clinical results.  I reviewed the patient's new imaging results and agree with the interpretation.    Results from last 7 days  Lab Units 08/03/17  0154 08/02/17  0108 08/01/17  0055   WBC 10*3/mm3 14.81* 12.38 12.44   HEMOGLOBIN g/dL 8.6* 8.2* 9.4*   PLATELETS 10*3/mm3 229 233 303       Results from last 7 days  Lab Units 08/03/17  0154 08/02/17  0108 08/01/17  0055   SODIUM mmol/L 144 139 141   POTASSIUM mmol/L 4.0 4.9 4.7   CHLORIDE mmol/L 112 109 111   CO2 mmol/L 17.0* 17.1* 17.1*   BUN mg/dL 123* 117* 117*   CREATININE mg/dL 3.96* 3.98* 3.59*   CALCIUM mg/dL 6.4* 6.7* 7.2*   GLUCOSE mg/dL 178* 262* 123*   MAGNESIUM mg/dL 1.6* 1.8 1.8     Lab Results   Component Value Date    INR 0.96 07/24/2017    INR 0.93 08/08/2016    INR 0.93 10/09/2015    PROTIME 12.9 07/24/2017    PROTIME 10.5 08/08/2016    PROTIME 10.3 10/09/2015       Results from last 7 days  Lab Units 07/31/17  0454 07/30/17  0404 07/29/17  0135   ALK PHOS U/L 94 100 105*   BILIRUBIN mg/dL 0.1* 0.1* 0.1*   ALT (SGPT) U/L 58* 76* 56*   AST (SGOT) U/L 24 36* 44*       Results from last 7 days  Lab Units 08/02/17  0922   PH, ARTERIAL pH  units 7.249*   PO2 ART mm Hg 87.4   PCO2, ARTERIAL mm Hg 39.9   HCO3 ART mmol/L 17.1*     Imaging Results (last 24 hours)     ** No results found for the last 24 hours. **             Medication Review:   Scheduled Medications:    aspirin 81 mg Oral Nightly   atorvastatin 40 mg Oral Nightly   budesonide-formoterol 2 puff Inhalation BID - RT   clopidogrel 75 mg Oral Daily   doxycycline 100 mg Oral Q12H   epoetin jane 10,000 Units Subcutaneous Once per day on Mon Wed Fri   furosemide 80 mg Intravenous Q12H   guaiFENesin 1,200 mg Oral Q12H   heparin (porcine) 5,000 Units Subcutaneous Q12H   insulin aspart 0-24 Units Subcutaneous 4x Daily AC & at Bedtime   insulin detemir 25 Units Subcutaneous Nightly   ipratropium-albuterol 3 mL Nebulization Q4H - RT   iron sucrose (VENOFER) IVPB 200 mg Intravenous Q24H   isosorbide mononitrate 60 mg Oral Q24H   magnesium sulfate 2 g Intravenous Once   metoprolol tartrate 25 mg Oral Q12H   nicotine 1 patch Transdermal Nightly   pantoprazole 40 mg Oral QAM AC   Pharmacy Meds to Bed Consult  Does not apply Daily   predniSONE 10 mg Oral Daily With Breakfast   sodium bicarbonate 1,300 mg Oral 4x Daily     Continuous infusions:    hold 1 each    sodium bicarbonate drip (greater than 75 mEq/bag) 50 mL/hr Last Rate: 50 mL/hr (08/01/17 1753)       Assessment/Plan      Respiratory failure: Metabolic acidosis is likely caused by renal failure.    Patient states that she wore BiPAP overnight.  She denies any shortness of breath today.    He is saturating 96% on 4 L nasal cannula.      Continue prednisone, scheduled inhalants and as needed neb treatments.    Acute on chronic renal failure: Patient is having increasing lower extremity edema.  Continue Lasix IV per nephrology.  Nephrology is on case, management per them.  Extensive smoking cessation counseling was again done today over 10 minutes.  Patient is not ready to quit yet.    Patient Active Problem List   Diagnosis Code   • Acute renal  failure N17.9   • Essential hypertension I10   • Dyslipidemia E78.5   • Diabetic neuropathy E11.40   • Chronic pain G89.29   • Anxiety F41.9   • Anemia D64.9   • Acute on chronic renal failure N17.9, N18.9   • CHF (congestive heart failure) I50.9             CLARA Presley  08/03/17  10:40 AM        Attestation: Scribed for Dr. Martin, by CLARA Laboy      I, Devin Martin M.D. attest that the above note accurately reflects the work and decisions made  by me.  Patient was seen and evaluated by Dr. Martin, including history of present illness, physical exam, assessment, and treatment plan.  The above note was reviewed and edited by Dr. Martin.     Electronically signed by Devin Martin MD at 8/3/2017 11:01 AM      CLARA Presley at 8/3/2017 10:40 AM  Version 1 of 3          LOS: 10 days     Chief Complaint:  Pulmonology is following for shortness of breath    Subjective     Interval History: Patient is resting comfortably in bed today.  She states that she used her BiPAP overnight.  She denies any shortness of breath.  No acute events reported overnight.    History taken from: patient chart RN    Review of Systems:   Review of Systems - History obtained from chart review and the patient  General ROS: negative for - chills, fatigue or fever  Psychological ROS: negative for - anxiety or depression  ENT ROS: negative for - headaches or visual changes  Allergy and Immunology ROS: negative for - nasal congestion, postnasal drip or seasonal allergies  Endocrine ROS: negative for - polydipsia/polyuria  Respiratory ROS: no cough, shortness of breath, or wheezing  Cardiovascular ROS: no chest pain or dyspnea on exertion  Gastrointestinal ROS: no abdominal pain, change in bowel habits, or black or bloody stools  Musculoskeletal ROS: negative for - joint pain, joint stiffness or joint swelling  Neurological ROS: no TIA or stroke symptoms                    Objective     Vital Signs  Temp:   [98.2 °F (36.8 °C)-99 °F (37.2 °C)] 98.6 °F (37 °C)  Heart Rate:  [71-89] 80  Resp:  [18-28] 20  BP: (127-184)/(63-99) 184/99  Body mass index is 32.73 kg/(m^2).    Intake/Output Summary (Last 24 hours) at 08/03/17 1040  Last data filed at 08/03/17 0900   Gross per 24 hour   Intake             1477 ml   Output             1100 ml   Net              377 ml     I/O this shift:  In: 720 [P.O.:720]  Out: 300 [Urine:300]    Physical Exam:  GENERAL APPEARANCE: Well developed, well nourished, alert and cooperative, and appears to be in no acute distress.    HEAD: normocephalic.    EYES: PERRL    NECK: Neck supple.     CARDIAC: Normal S1 and S2. No S3, S4 or murmurs. Rhythm is regular. Bilateral lower extremity edema.  Extremities are warm and well perfused. Capillary refill is less than 2 seconds. No carotid bruits.    RESPIRATORY: Clear to auscultation and percussion without rales, rhonchi, wheezing or diminished breath sounds.    GI: Positive bowel sounds. Soft, nondistended, nontender.     MUSCULOSKELTAL: No significant deformity or joint abnormality. Bilateral lower extremity edema . Peripheral pulses intact.     NEUROLOGICAL: Strength and sensation symmetric and intact throughout.     PSYCHIATRIC: The mental examination revealed the patient was oriented to person, place, and time.                 Results Review:                I reviewed the patient's new clinical results.  I reviewed the patient's new imaging results and agree with the interpretation.    Results from last 7 days  Lab Units 08/03/17  0154 08/02/17  0108 08/01/17  0055   WBC 10*3/mm3 14.81* 12.38 12.44   HEMOGLOBIN g/dL 8.6* 8.2* 9.4*   PLATELETS 10*3/mm3 229 233 303       Results from last 7 days  Lab Units 08/03/17  0154 08/02/17  0108 08/01/17  0055   SODIUM mmol/L 144 139 141   POTASSIUM mmol/L 4.0 4.9 4.7   CHLORIDE mmol/L 112 109 111   CO2 mmol/L 17.0* 17.1* 17.1*   BUN mg/dL 123* 117* 117*   CREATININE mg/dL 3.96* 3.98* 3.59*   CALCIUM mg/dL  6.4* 6.7* 7.2*   GLUCOSE mg/dL 178* 262* 123*   MAGNESIUM mg/dL 1.6* 1.8 1.8     Lab Results   Component Value Date    INR 0.96 07/24/2017    INR 0.93 08/08/2016    INR 0.93 10/09/2015    PROTIME 12.9 07/24/2017    PROTIME 10.5 08/08/2016    PROTIME 10.3 10/09/2015       Results from last 7 days  Lab Units 07/31/17  0454 07/30/17  0404 07/29/17  0135   ALK PHOS U/L 94 100 105*   BILIRUBIN mg/dL 0.1* 0.1* 0.1*   ALT (SGPT) U/L 58* 76* 56*   AST (SGOT) U/L 24 36* 44*       Results from last 7 days  Lab Units 08/02/17  0922   PH, ARTERIAL pH units 7.249*   PO2 ART mm Hg 87.4   PCO2, ARTERIAL mm Hg 39.9   HCO3 ART mmol/L 17.1*     Imaging Results (last 24 hours)     ** No results found for the last 24 hours. **             Medication Review:   Scheduled Medications:    aspirin 81 mg Oral Nightly   atorvastatin 40 mg Oral Nightly   budesonide-formoterol 2 puff Inhalation BID - RT   clopidogrel 75 mg Oral Daily   doxycycline 100 mg Oral Q12H   epoetin jane 10,000 Units Subcutaneous Once per day on Mon Wed Fri   furosemide 80 mg Intravenous Q12H   guaiFENesin 1,200 mg Oral Q12H   heparin (porcine) 5,000 Units Subcutaneous Q12H   insulin aspart 0-24 Units Subcutaneous 4x Daily AC & at Bedtime   insulin detemir 25 Units Subcutaneous Nightly   ipratropium-albuterol 3 mL Nebulization Q4H - RT   iron sucrose (VENOFER) IVPB 200 mg Intravenous Q24H   isosorbide mononitrate 60 mg Oral Q24H   magnesium sulfate 2 g Intravenous Once   metoprolol tartrate 25 mg Oral Q12H   nicotine 1 patch Transdermal Nightly   pantoprazole 40 mg Oral QAM AC   Pharmacy Meds to Bed Consult  Does not apply Daily   predniSONE 10 mg Oral Daily With Breakfast   sodium bicarbonate 1,300 mg Oral 4x Daily     Continuous infusions:    hold 1 each    sodium bicarbonate drip (greater than 75 mEq/bag) 50 mL/hr Last Rate: 50 mL/hr (08/01/17 1753)       Assessment/Plan      Respiratory failure: Metabolic acidosis is likely caused by renal failure.    Patient states  that she wore BiPAP overnight.  She denies any shortness of breath today.    He is saturating 96% on 4 L nasal cannula.      Continue prednisone, scheduled inhalants and as needed neb treatments.    Acute on chronic renal failure: Patient is having increasing lower extremity edema.  Continue Lasix IV per nephrology.  Nephrology is on case, management per them.    Patient Active Problem List   Diagnosis Code   • Acute renal failure N17.9   • Essential hypertension I10   • Dyslipidemia E78.5   • Diabetic neuropathy E11.40   • Chronic pain G89.29   • Anxiety F41.9   • Anemia D64.9   • Acute on chronic renal failure N17.9, N18.9   • CHF (congestive heart failure) I50.9             CLARA Presley  08/03/17  10:40 AM        Attestation: Scribed for Dr. Martin, by CLARA Laboy         Electronically signed by CLARA Presley at 8/3/2017 10:47 AM        Consult Notes (last 24 hours) (Notes from 8/3/2017  9:31 AM through 8/4/2017  9:31 AM)     No notes of this type exist for this encounter.

## 2017-08-04 NOTE — PROGRESS NOTES
LOS: 11 days     Chief Complaint:  Pulmonology is following for shortness of breath    Subjective     Interval History: Patient is resting in bed comfortably.  She denies any shortness of breath this morning.  No acute events reported overnight. Case discussed with pcu nurse     History taken from: patient chart RN    Review of Systems:   Review of Systems - History obtained from chart review and the patient  General ROS: negative for - chills, fatigue or fever  Psychological ROS: negative for - anxiety or depression  ENT ROS: negative for - headaches, visual changes or vocal changes  Allergy and Immunology ROS: negative for - nasal congestion, postnasal drip or seasonal allergies  Endocrine ROS: negative for - polydipsia/polyuria  Respiratory ROS: no cough, shortness of breath, or wheezing  Cardiovascular ROS: no chest pain or dyspnea on exertion  Gastrointestinal ROS: no abdominal pain, change in bowel habits, or black or bloody stools  Musculoskeletal ROS: negative for - joint pain, joint stiffness or joint swelling  Neurological ROS: no TIA or stroke symptoms      Objective     Vital Signs  Temp:  [97.5 °F (36.4 °C)-98.4 °F (36.9 °C)] 98.4 °F (36.9 °C)  Heart Rate:  [65-92] 85  Resp:  [18-25] 20  BP: (123-196)/() 196/100  Body mass index is 30.99 kg/(m^2).    Intake/Output Summary (Last 24 hours) at 08/04/17 0941  Last data filed at 08/04/17 0800   Gross per 24 hour   Intake             1420 ml   Output             5450 ml   Net            -4030 ml     I/O this shift:  In: 240 [P.O.:240]  Out: 400 [Urine:400]    Physical Exam:  GENERAL APPEARANCE: Well developed, well nourished, alert and cooperative, and appears to be in no acute distress.    HEAD: normocephalic.    EYES: PERRL    NECK: Neck supple.     CARDIAC: Normal S1 and S2. No S3, S4 or murmurs. Rhythm is regular. There is no peripheral edema, cyanosis or pallor. Extremities are warm and well perfused. Capillary refill is less than 2 seconds. No  carotid bruits. No JVD    RESPIRATORY: Clear to auscultation and percussion without rales, rhonchi, wheezing or diminished breath sounds.    GI: Positive bowel sounds. Soft, nondistended, nontender.     MUSCULOSKELTAL: No significant deformity or joint abnormality. No edema. Peripheral pulses intact.     NEUROLOGICAL: Strength and sensation symmetric and intact throughout.     PSYCHIATRIC: The mental examination revealed the patient was oriented to person, place, and time.                 Results Review:                I reviewed the patient's new clinical results.  I reviewed the patient's new imaging results and agree with the interpretation.    Results from last 7 days  Lab Units 08/04/17  0341 08/03/17  0154 08/02/17  0108   WBC 10*3/mm3 17.87* 14.81* 12.38   HEMOGLOBIN g/dL 9.3* 8.6* 8.2*   PLATELETS 10*3/mm3 207 229 233       Results from last 7 days  Lab Units 08/04/17  0341 08/03/17  0154 08/02/17  0108   SODIUM mmol/L 145 144 139   POTASSIUM mmol/L 3.9 4.0 4.9   CHLORIDE mmol/L 110 112 109   CO2 mmol/L 22.4* 17.0* 17.1*   BUN mg/dL 108* 123* 117*   CREATININE mg/dL 3.62* 3.96* 3.98*   CALCIUM mg/dL 6.3* 6.4* 6.7*   GLUCOSE mg/dL 84 178* 262*   MAGNESIUM mg/dL 1.4* 1.6* 1.8     Lab Results   Component Value Date    INR 0.96 07/24/2017    INR 0.93 08/08/2016    INR 0.93 10/09/2015    PROTIME 12.9 07/24/2017    PROTIME 10.5 08/08/2016    PROTIME 10.3 10/09/2015       Results from last 7 days  Lab Units 07/31/17  0454 07/30/17  0404 07/29/17  0135   ALK PHOS U/L 94 100 105*   BILIRUBIN mg/dL 0.1* 0.1* 0.1*   ALT (SGPT) U/L 58* 76* 56*   AST (SGOT) U/L 24 36* 44*       Results from last 7 days  Lab Units 08/02/17  0922   PH, ARTERIAL pH units 7.249*   PO2 ART mm Hg 87.4   PCO2, ARTERIAL mm Hg 39.9   HCO3 ART mmol/L 17.1*     Imaging Results (last 24 hours)     ** No results found for the last 24 hours. **             Medication Review:   Scheduled Medications:    amoxicillin-clavulanate 1 tablet Oral Q12H    aspirin 81 mg Oral Nightly   atorvastatin 40 mg Oral Nightly   budesonide-formoterol 2 puff Inhalation BID - RT   clopidogrel 75 mg Oral Daily   doxycycline 100 mg Oral Q12H   epoetin jane 10,000 Units Subcutaneous Once per day on Mon Wed Fri   furosemide 80 mg Intravenous Q12H   guaiFENesin 1,200 mg Oral Q12H   heparin (porcine) 5,000 Units Subcutaneous Q12H   insulin aspart 0-24 Units Subcutaneous 4x Daily AC & at Bedtime   insulin detemir 25 Units Subcutaneous Nightly   ipratropium-albuterol 3 mL Nebulization Q4H - RT   isosorbide mononitrate 60 mg Oral Q24H   magnesium oxide 800 mg Oral BID   magnesium sulfate in D5W 1g/100mL (PREMIX) 1 g Intravenous Q1H   metoprolol tartrate 25 mg Oral Q12H   nicotine 1 patch Transdermal Nightly   pantoprazole 40 mg Oral QAM AC   Pharmacy Meds to Bed Consult  Does not apply Daily   [START ON 8/5/2017] predniSONE 5 mg Oral Daily With Breakfast   sodium bicarbonate 1,300 mg Oral 4x Daily     Continuous infusions:    hold 1 each       Assessment/Plan      Respiratory failure: Metabolic acidosis is likely caused by renal failure.    Patient wore BiPAP overnight.      We'll decrease prednisone to 5 mg daily.  Ordered a chest x-ray and a CRP.     White blood cell count is elevated today.  ? Aspirated- ordered chest xray    We'll start the patient on Augmentin PO for 7 days.   if wbc goes up further broaden the abx coverage.    Continue scheduled inhalants and as needed neb treatments.    Acute on chronic renal failure: Continue Lasix IV per nephrology.  Management per them.    Patient Active Problem List   Diagnosis Code   • Acute renal failure N17.9   • Essential hypertension I10   • Dyslipidemia E78.5   • Diabetic neuropathy E11.40   • Chronic pain G89.29   • Anxiety F41.9   • Anemia D64.9   • Acute on chronic renal failure N17.9, N18.9   • CHF (congestive heart failure) I50.9             Katelynn Neal, CLARA  08/04/17  9:41 AM        Attestation: Scribed for Dr. Martin,  by CLARA Laboy, Devin Martin M.D. attest that the above note accurately reflects the work and decisions made  by me.  Patient was seen and evaluated by Dr. Martin, including history of present illness, physical exam, assessment, and treatment plan.  The above note was reviewed and edited by Dr. Martin.

## 2017-08-04 NOTE — ED PROVIDER NOTES
Subjective   Patient is a 67 y.o. female presenting with shortness of breath.   History provided by:  Patient  Shortness of Breath   Severity:  Moderate  Onset quality:  Gradual  Progression:  Worsening  Chronicity:  New  Context: activity    Relieved by:  Nothing  Worsened by:  Exertion and movement  Associated symptoms: wheezing    Associated symptoms: no abdominal pain, no chest pain and no fever        Review of Systems   Constitutional: Negative.  Negative for fever.   HENT: Negative.    Respiratory: Positive for shortness of breath and wheezing.    Cardiovascular: Negative.  Negative for chest pain.   Gastrointestinal: Negative.  Negative for abdominal pain.   Endocrine: Negative.    Genitourinary: Negative.  Negative for dysuria.   Skin: Negative.    Neurological: Negative.    Psychiatric/Behavioral: Negative.    All other systems reviewed and are negative.      Past Medical History:   Diagnosis Date   • Anxiety    • Aortic stenosis    • ASCVD (arteriosclerotic cardiovascular disease)    • Breast cancer    • CHF (congestive heart failure) 7/24/2017   • Chronic pain    • COPD (chronic obstructive pulmonary disease)    • Diabetic neuropathy    • Dyslipidemia    • Essential hypertension    • Insulin dependent diabetes mellitus    • Left carotid bruit    • Recurrent pneumonia    • Renal failure        No Known Allergies    Past Surgical History:   Procedure Laterality Date   • APPENDECTOMY     • CARDIAC CATHETERIZATION     • COLONOSCOPY     • COLONOSCOPY N/A 7/10/2017    Procedure: COLONOSCOPY;  Surgeon: Zheng Suarez III, MD;  Location: [facility];  Service:    • ENDOSCOPY N/A 7/10/2017    Procedure: ESOPHAGOGASTRODUODENOSCOPY;  Surgeon: Zheng Suarez III, MD;  Location: [facility];  Service:    • HYSTERECTOMY     • MASTECTOMY Right    • RHINOPLASTY         Family History   Problem Relation Age of Onset   • Diabetes Mother    • Lung cancer Father        Social History     Social History   • Marital  status:      Spouse name: N/A   • Number of children: N/A   • Years of education: N/A     Social History Main Topics   • Smoking status: Current Every Day Smoker     Packs/day: 1.00     Types: Cigarettes   • Smokeless tobacco: None   • Alcohol use No   • Drug use: No   • Sexual activity: Defer     Other Topics Concern   • None     Social History Narrative           Objective   Physical Exam   Constitutional: She is oriented to person, place, and time. She appears well-developed and well-nourished. No distress.   HENT:   Head: Normocephalic and atraumatic.   Right Ear: External ear normal.   Left Ear: External ear normal.   Nose: Nose normal.   Eyes: Conjunctivae and EOM are normal. Pupils are equal, round, and reactive to light.   Neck: Normal range of motion. Neck supple. No JVD present. No tracheal deviation present.   Cardiovascular: Normal rate, regular rhythm and normal heart sounds.    No murmur heard.  Pulmonary/Chest: Effort normal. No respiratory distress. She has wheezes. She has rales.   Abdominal: Soft. Bowel sounds are normal. There is no tenderness.   Musculoskeletal: Normal range of motion. She exhibits no edema or deformity.   Neurological: She is alert and oriented to person, place, and time. No cranial nerve deficit.   Skin: Skin is warm and dry. No rash noted. She is not diaphoretic. No erythema. No pallor.   Psychiatric: She has a normal mood and affect. Her behavior is normal. Thought content normal.   Nursing note and vitals reviewed.      Procedures         ED Course  ED Course                  MDM    Final diagnoses:   Acute on chronic renal failure            Levy Brown MD  08/04/17 3116

## 2017-08-04 NOTE — PLAN OF CARE
Problem: Cardiac: Heart Failure (Adult)  Goal: Signs and Symptoms of Listed Potential Problems Will be Absent or Manageable (Cardiac: Heart Failure)  Outcome: Ongoing (interventions implemented as appropriate)    Problem: Renal Failure/Kidney Injury, Acute (Adult)  Goal: Signs and Symptoms of Listed Potential Problems Will be Absent or Manageable (Renal Failure/Kidney Injury, Acute)  Outcome: Ongoing (interventions implemented as appropriate)    Problem: Pressure Ulcer Risk (Saul Scale) (Adult,Obstetrics,Pediatric)  Goal: Skin Integrity  Outcome: Ongoing (interventions implemented as appropriate)    Problem: Patient Care Overview (Adult)  Goal: Plan of Care Review  Outcome: Ongoing (interventions implemented as appropriate)  Goal: Adult Individualization and Mutuality  Outcome: Ongoing (interventions implemented as appropriate)  Goal: Discharge Needs Assessment  Outcome: Ongoing (interventions implemented as appropriate)    Problem: Skin Integrity Impairment, Risk/Actual (Adult)  Goal: Identify Related Risk Factors and Signs and Symptoms  Outcome: Ongoing (interventions implemented as appropriate)  Goal: Skin Integrity/Wound Healing  Outcome: Ongoing (interventions implemented as appropriate)    Problem: Diabetes, Type 2 (Adult)  Goal: Signs and Symptoms of Listed Potential Problems Will be Absent or Manageable (Diabetes, Type 2)  Outcome: Ongoing (interventions implemented as appropriate)    Problem: NPPV/CPAP (Adult)  Goal: Signs and Symptoms of Listed Potential Problems Will be Absent or Manageable (NPPV/CPAP)  Outcome: Ongoing (interventions implemented as appropriate)

## 2017-08-04 NOTE — PROGRESS NOTES
Discharge Planning Assessment  [facility]     Patient Name: Sara Cardona  MRN: [id number]  Today's Date: 8/4/2017    Admit Date: 7/24/2017       Discharge Plan       08/04/17 1607    Case Management/Social Work Plan    Plan SS spoke with Dr. Juárez and he states the pt may be discharged in the AM.  SS arranged a bipap with [facility].  SS contacted [facility] and spoke with Chely and they are agreeable to deliver the bipap to the hospital on this date.  SS provided Chely the Bipap settings and faxed a copy provided by Resp Therapist Massiel Barbosa.  SS will continue to follow.     Patient/Family In Agreement With Plan yes             Expected Discharge Date and Time     Expected Discharge Date Expected Discharge Time    Aug 4, 2017           Zandra Sun

## 2017-08-04 NOTE — PROGRESS NOTES
[facility] HOSPITALIST PROGRESS NOTE     Patient Identification:  Name:  Sara Cardona  Age:  67 y.o.  Sex:  female  :  1950  MRN:  [id number]  Visit Number:  [id number]  Primary Care Provider:  Marcelino Sheehan MD    Length of stay:  11    Chief complaint:  67 years old female admitted with acute on chronic renal failure.     Subjective:    Blood pressure elevated this morning.   Pt is complaining of sore throat since last night.  She states that she has trouble swallowing due to pain.  Patient denies any headache, nausea, vomiting, cough, chest pain, palpitation, constipation or diarrhea.  Diuresed net of 4L in last 24 hours.     ----------------------------------------------------------------------------------------------------------------------  Current Hospital Meds:    amoxicillin-clavulanate 1 tablet Oral Q12H   aspirin 81 mg Oral Nightly   atorvastatin 40 mg Oral Nightly   budesonide-formoterol 2 puff Inhalation BID - RT   clopidogrel 75 mg Oral Daily   doxycycline 100 mg Oral Q12H   epoetin jane 10,000 Units Subcutaneous Once per day on    furosemide 80 mg Intravenous Q12H   guaiFENesin 1,200 mg Oral Q12H   heparin (porcine) 5,000 Units Subcutaneous Q12H   insulin aspart 0-24 Units Subcutaneous 4x Daily AC & at Bedtime   insulin detemir 25 Units Subcutaneous Nightly   ipratropium-albuterol 3 mL Nebulization Q4H - RT   isosorbide mononitrate 60 mg Oral Q24H   magnesium oxide 800 mg Oral BID   metoprolol tartrate 25 mg Oral Q12H   nicotine 1 patch Transdermal Nightly   pantoprazole 40 mg Oral QAM AC   Pharmacy Meds to Bed Consult  Does not apply Daily   [START ON 2017] predniSONE 5 mg Oral Daily With Breakfast   sodium bicarbonate 1,300 mg Oral 4x Daily       hold 1 each     ----------------------------------------------------------------------------------------------------------------------  Vital Signs:  Temp:  [97.5 °F (36.4 °C)-98.4 °F (36.9 °C)] 98.4 °F (36.9  °C)  Heart Rate:  [65-92] 85  Resp:  [18-25] 20  BP: (123-196)/() 196/100  Last 3 weights    08/02/17  0600 08/03/17  0405 08/04/17  0600   Weight: 167 lb (75.8 kg) 173 lb 3.2 oz (78.6 kg) 164 lb (74.4 kg)     Body mass index is 30.99 kg/(m^2).    Intake/Output Summary (Last 24 hours) at 08/04/17 1043  Last data filed at 08/04/17 0800   Gross per 24 hour   Intake             1420 ml   Output             5450 ml   Net            -4030 ml     Diet Regular; Cardiac, Consistent Carbohydrate, Low Potassium  ----------------------------------------------------------------------------------------------------------------------  Physical exam:  Constitutional:  Well-developed and well-nourished.     HENT:  Head:  Normocephalic and atraumatic.  Mouth:  Moist mucous membranes. Minimal erythema with the small pus points in left tonsillar area.   Eyes:  Conjunctivae and EOM are normal.  Pupils are equal, round, and reactive to light.   Neck:  Neck supple.  No JVD present.    Cardiovascular:  Regular rate and rhythm. S1+S2. No murmur, rubs or gallops.   Pulmonary/Chest: Inspiratory bibasilar crackles with expiratory rhonchi in right basilar area.  Abdominal:  Soft. Non-tender. No viscera palpable.  Bowel sounds audible.   Musculoskeletal: No deformity or joint swelling.   Peripheral vascular: Bilateral dorsalis pedis palpable.  +2 pitting edema extending up to her thighs  Neurological:  Alert and oriented to person, place, and time.  Cranial nerves grossly intact. Strength bilaterally symmetrical in upper and lower extremities.   Skin:  Skin is warm and dry. No rash noted. No pallor.   ----------------------------------------------------------------------------------------------------------------------  Tele:  Sinus rhythm with heart rate 70s-80  ----------------------------------------------------------------------------------------------------------------------        Results from last 7 days  Lab Units 08/04/17  0887  08/04/17  0341 08/03/17  0154 08/02/17  0108   CRP mg/dL <0.50  --   --   --    WBC 10*3/mm3  --  17.87* 14.81* 12.38   HEMOGLOBIN g/dL  --  9.3* 8.6* 8.2*   HEMATOCRIT %  --  30.7* 28.4* 27.6*   MCV fL  --  93.0 94.4* 95.5*   MCHC g/dL  --  30.3* 30.3* 29.7*   PLATELETS 10*3/mm3  --  207 229 233       Results from last 7 days  Lab Units 08/02/17  0922   PH, ARTERIAL pH units 7.249*   PO2 ART mm Hg 87.4   PCO2, ARTERIAL mm Hg 39.9   HCO3 ART mmol/L 17.1*       Results from last 7 days  Lab Units 08/04/17  0341 08/03/17  0154 08/02/17  0108  07/31/17  0454 07/30/17  0404  07/29/17  0135   SODIUM mmol/L 145 144 139  < > 140 137  --  137   POTASSIUM mmol/L 3.9 4.0 4.9  < > 4.5 5.3  < > 5.4*   MAGNESIUM mg/dL 1.4* 1.6* 1.8  < > 1.6*  --   --   --    CHLORIDE mmol/L 110 112 109  < > 111 110  --  110   CO2 mmol/L 22.4* 17.0* 17.1*  < > 15.9* 15.9*  --  16.8*   BUN mg/dL 108* 123* 117*  < > 109* 99*  --  88*   CREATININE mg/dL 3.62* 3.96* 3.98*  < > 3.52* 3.73*  --  3.54*   EGFR IF NONAFRICN AM mL/min/1.73 13* 11* 11*  < > 13* 12*  --  13*   CALCIUM mg/dL 6.3* 6.4* 6.7*  < > 6.9* 6.9*  --  7.1*   GLUCOSE mg/dL 84 178* 262*  < > 142* 268*  --  97   ALBUMIN g/dL  --   --   --   --  3.30* 3.20*  --  3.40   BILIRUBIN mg/dL  --   --   --   --  0.1* 0.1*  --  0.1*   ALK PHOS U/L  --   --   --   --  94 100  --  105*   AST (SGOT) U/L  --   --   --   --  24 36*  --  44*   ALT (SGPT) U/L  --   --   --   --  58* 76*  --  56*   < > = values in this interval not displayed.Estimated Creatinine Clearance: 13.9 mL/min (by C-G formula based on Cr of 3.62).    No results found for: AMMONIA               Stool Culture   Date Value Ref Range Status   07/27/2017 Normal Fecal Andreina  Final       I have personally looked at the labs and they are summarized above.  ----------------------------------------------------------------------------------------------------------------------  Imaging Results (last 24 hours)     ** No results found for the  last 24 hours. **        ----------------------------------------------------------------------------------------------------------------------  Assessment and Plan:    - Acute on chronic hypoxic respiratory failure:   Improving.  2/2 COPD exacerbation and diastolic CHF exacerbation. ABGs showed persistent Metabolic acidosis with normal PCO2. CXR showed bibasilar atelactasis.  Continue treatment for COPD exacerbation and CHF. Cont BiPAP PRN and QHS. Pulm input appreciated.   Repeat chest x-ray ordered due to leukocytosis.  CRP is normal.    - SIMON on CKD IV:   Patient continues to have persistently elevated blood urea nitrogen and creatinine.  Creatinine slightly improved to 3.6 from 3.9.  Continues to have anion gap acidosis and fluid overload.Pt had a dose fo lasix and diuresed well in last 24 hours. Pt is at high risk for dialysis. Nephrology following.     - Acute tonsillitis  Pt started on augmentin.      - Acute diastolic CHF exacerbation:   Continues to have fluid overload.  Echo on 7/4/17 showed an EF of 66-70%, trace AR, mild MR, mild MS, mild to moderate TR and severe pulmonary HTN.  Diuresed well with Lasix.  Lower extremity edema is unchanged. Cardiology input appreciated.       - Acute exacerbation of COPD with ongoing tobacco abuse:   Improved. Cont steroids, antibiotics and nebs. Symbicort. Cont BiPAP. Nicotine patch ordered. Cont to encourage cessation.   Home bipap arranged.    - Anemia on chronic disease  Due to CKD. Continue epogen.      - Mixed respiratory and metabolic acidosis:   Slight improvement in anion gap and acidosis.  Pt has been more compliant with BiPAP. Currently on PO sodium bicarb per nephrology. Cont BiPAP and treatment per nephrology.       - Hyperkalemia:   Resolve.  Potassium continues to remain within normal limits.  Continue low potassium diet.      - Essential HTN:   Blood pressure elevated today.  She has been noncompliant with dietary restrictions.  Continue metoprolol and  will add when necessary hydralazine.    - Leucocytosis:  Worsened. Likely due to acute tonsillitis. Patient is afebrile.  CRP is normal. Pt  Started on augmentin.      - DM II, insulin dependent:   HgbA1c 6.1 on 7/3/17.   Blood sugars have been elevated since patient has not been compliant with the dietary restrictions.  And systemic steroids also has been contributing to her hyperglycemia.  Patient had an episode of hypoglycemia on 7/28, her insulin was decreased at that time but then she did develop hyperglycemia.  This morning I found out that patient has been noncompliant with dietary restriction with the family members bringing her food from outside.  This makes her glycemic control challenging due to her erratic and inconsistent calorie consumption.  Therefore, I will add on the side of hyperglycemia.  Continue Levemir 25 units daily at bedtime and continue sliding scale insulin.  Monitor blood sugar with Accu-Cheks before meals and at bedtime..       - Pleural effusions:   R>L. S/P right-sided thoracentesis. Pleural fluid appears transudative. Cultures negative.  repeat chest x-ray ordered.     - CAD:   Asymptomatic.  Continue aspirin, atorvastatin, Plavix, metoprolol and Imdur. Cardiology following with input appreciated.      - Chronic pain:   Neurontin dose adjusted for decreased creatinine clearance per pharmacy recommendations.     - Anxiety:   Cont klonopin with close monitoring in the setting of respiratory failure.      - Prophylaxis:  Heparin and PPI.     - Fluid, electrolytes and nutrition:  No IVF.   Electrolyte replacement per protocol. Mg is 1.4.   Low K+ diet.      Pt is at high risk 2/2 acute on chronic hypoxic respiratory failure, SIMON on CKD IV at high risk for dialysis, CHF exacerbation, COPD exacerbation with ongoing tobacco abuse, mixed acidosis and hx of noncompliance.  Patient has been noncompliant with her prescribed diet restrictions, medications and instructions from physicians and  nursing staff.  Patient states that she will be compliant with the restrictions and medications.    Priscilla Juárez MD  08/04/17  10:43 AM

## 2017-08-04 NOTE — PROGRESS NOTES
Nephrology  Note      Subjective      She reports improvement in edema. Excellent diuresis yesterday with lasix. She denies SOB or chest pain. She has no nausea, vomiting or metallic taste     Objective     Vital Signs  Temp:  [97.5 °F (36.4 °C)-98.4 °F (36.9 °C)] 98.4 °F (36.9 °C)  Heart Rate:  [65-92] 82  Resp:  [18-25] 20  BP: (123-196)/() 168/87    I/O this shift:  In: 240 [P.O.:240]  Out: 400 [Urine:400]  I/O last 3 completed shifts:  In: 2100 [P.O.:2000; IV Piggyback:100]  Out: 6150 [Urine:6150]    Physical Examination:    General Appearance : alert, no distress  Head : normocephalic  Eyes :no pallor   Throat : oral mucosa moist  Lungs : reduced breath sounds at bases  Heart : regular rhythm & normal rate, normal S1, S2, no murmur   Abdomen :  soft non-tender  Extremities : 3+ edema upto thighs  Skin : no  rash  Neurologic :grossly no focal deficitis    Laboratory Data :      WBC WBC   Date Value Ref Range Status   08/04/2017 17.87 (H) 4.50 - 12.50 10*3/mm3 Final   08/03/2017 14.81 (H) 4.50 - 12.50 10*3/mm3 Final   08/02/2017 12.38 4.50 - 12.50 10*3/mm3 Final      HGB Hemoglobin   Date Value Ref Range Status   08/04/2017 9.3 (L) 12.0 - 16.0 g/dL Final   08/03/2017 8.6 (L) 12.0 - 16.0 g/dL Final   08/02/2017 8.2 (L) 12.0 - 16.0 g/dL Final      HCT Hematocrit   Date Value Ref Range Status   08/04/2017 30.7 (L) 37.0 - 47.0 % Final   08/03/2017 28.4 (L) 37.0 - 47.0 % Final   08/02/2017 27.6 (L) 37.0 - 47.0 % Final      Platlets No results found for: LABPLAT   MCV MCV   Date Value Ref Range Status   08/04/2017 93.0 80.0 - 94.0 fL Final   08/03/2017 94.4 (H) 80.0 - 94.0 fL Final   08/02/2017 95.5 (H) 80.0 - 94.0 fL Final          Sodium Sodium   Date Value Ref Range Status   08/04/2017 145 135 - 153 mmol/L Final   08/03/2017 144 135 - 153 mmol/L Final   08/02/2017 139 135 - 153 mmol/L Final      Potassium Potassium   Date Value Ref Range Status   08/04/2017 3.9 3.5 - 5.3 mmol/L Final   08/03/2017 4.0 3.5 - 5.3  mmol/L Final   08/02/2017 4.9 3.5 - 5.3 mmol/L Final      Chloride Chloride   Date Value Ref Range Status   08/04/2017 110 99 - 112 mmol/L Final   08/03/2017 112 99 - 112 mmol/L Final   08/02/2017 109 99 - 112 mmol/L Final      CO2 CO2   Date Value Ref Range Status   08/04/2017 22.4 (L) 24.3 - 31.9 mmol/L Final   08/03/2017 17.0 (L) 24.3 - 31.9 mmol/L Final   08/02/2017 17.1 (L) 24.3 - 31.9 mmol/L Final      BUN BUN   Date Value Ref Range Status   08/04/2017 108 (H) 7 - 21 mg/dL Final   08/03/2017 123 (H) 7 - 21 mg/dL Final   08/02/2017 117 (H) 7 - 21 mg/dL Final      Creatinine Creatinine   Date Value Ref Range Status   08/04/2017 3.62 (H) 0.43 - 1.29 mg/dL Final   08/03/2017 3.96 (H) 0.43 - 1.29 mg/dL Final   08/02/2017 3.98 (H) 0.43 - 1.29 mg/dL Final      Calcium Calcium   Date Value Ref Range Status   08/04/2017 6.3 (L) 7.7 - 10.0 mg/dL Final   08/03/2017 6.4 (L) 7.7 - 10.0 mg/dL Final   08/02/2017 6.7 (L) 7.7 - 10.0 mg/dL Final      PO4 No results found for: CAPO4   Albumin No results found for: ALBUMIN   Magnesium Magnesium   Date Value Ref Range Status   08/04/2017 1.4 (C) 1.7 - 2.6 mg/dL Final   08/03/2017 1.6 (L) 1.7 - 2.6 mg/dL Final   08/02/2017 1.8 1.7 - 2.6 mg/dL Final      Uric Acid No results found for: URICACID     Radiology results :     Imaging Results (last 24 hours)     ** No results found for the last 24 hours. **        Imaging Results (last 24 hours)     ** No results found for the last 24 hours. **            Medications:        amoxicillin-clavulanate 1 tablet Oral Q12H   aspirin 81 mg Oral Nightly   atorvastatin 40 mg Oral Nightly   budesonide-formoterol 2 puff Inhalation BID - RT   clopidogrel 75 mg Oral Daily   doxycycline 100 mg Oral Q12H   epoetin jane 10,000 Units Subcutaneous Once per day on Mon Wed Fri   furosemide 80 mg Intravenous Q12H   guaiFENesin 1,200 mg Oral Q12H   heparin (porcine) 5,000 Units Subcutaneous Q12H   insulin aspart 0-24 Units Subcutaneous 4x Daily AC & at  Bedtime   insulin detemir 25 Units Subcutaneous Nightly   ipratropium-albuterol 3 mL Nebulization Q4H - RT   isosorbide mononitrate 60 mg Oral Q24H   magnesium oxide 800 mg Oral BID   metoprolol tartrate 50 mg Oral Q12H   nicotine 1 patch Transdermal Nightly   pantoprazole 40 mg Oral QAM    Pharmacy Meds to Bed Consult  Does not apply Daily   [START ON 8/5/2017] predniSONE 5 mg Oral Daily With Breakfast   sodium bicarbonate 1,300 mg Oral 4x Daily       hold 1 each       Assessment/Plan     Principal Problem:    Acute on chronic renal failure  Active Problems:    CHF (congestive heart failure)      1. SIMON on CKD 4 : her baseline creatinine is 2.5. She was worsening CKD from uncontrolled HTN and type 2 DM.  Her creatinine is has improved to 3.6 from 3.9  I will continue lasix 80 mg iv bid  and monitor  24-hour creatinine clearance is 14.7 cc/min    2. Metabolic and respiratory acidosis : better on bipap and po sodium bicarbonate    3. Anemia of chronic disease : on venofer and epogen    4. Edema : better. Ct lasix as above    5. Hypoxic and hypercarbic respiratory failure with R pleural effusion : s/p thoracentesis    6. Azotemia : likely from steroids, better. No acute indication for HD    I discussed the patients findings and my recommendations with patient, RN    Jai Chavez MD  08/04/17  12:41 PM

## 2017-08-05 LAB
ANION GAP SERPL CALCULATED.3IONS-SCNC: 10.2 MMOL/L (ref 3.6–11.2)
BASOPHILS # BLD AUTO: 0.02 10*3/MM3 (ref 0–0.3)
BASOPHILS NFR BLD AUTO: 0.1 % (ref 0–2)
BUN BLD-MCNC: 106 MG/DL (ref 7–21)
BUN/CREAT SERPL: 29.9 (ref 7–25)
CALCIUM SPEC-SCNC: 6.6 MG/DL (ref 7.7–10)
CHLORIDE SERPL-SCNC: 108 MMOL/L (ref 99–112)
CO2 SERPL-SCNC: 25.8 MMOL/L (ref 24.3–31.9)
CREAT BLD-MCNC: 3.55 MG/DL (ref 0.43–1.29)
DEPRECATED RDW RBC AUTO: 62.5 FL (ref 37–54)
EOSINOPHIL # BLD AUTO: 0.16 10*3/MM3 (ref 0–0.7)
EOSINOPHIL NFR BLD AUTO: 0.9 % (ref 0–7)
ERYTHROCYTE [DISTWIDTH] IN BLOOD BY AUTOMATED COUNT: 19.4 % (ref 11.5–14.5)
GFR SERPL CREATININE-BSD FRML MDRD: 13 ML/MIN/1.73
GLUCOSE BLD-MCNC: 157 MG/DL (ref 70–110)
GLUCOSE BLDC GLUCOMTR-MCNC: 139 MG/DL (ref 70–130)
GLUCOSE BLDC GLUCOMTR-MCNC: 213 MG/DL (ref 70–130)
GLUCOSE BLDC GLUCOMTR-MCNC: 213 MG/DL (ref 70–130)
GLUCOSE BLDC GLUCOMTR-MCNC: 308 MG/DL (ref 70–130)
HCT VFR BLD AUTO: 33.3 % (ref 37–47)
HGB BLD-MCNC: 10.1 G/DL (ref 12–16)
IMM GRANULOCYTES # BLD: 0.15 10*3/MM3 (ref 0–0.03)
IMM GRANULOCYTES NFR BLD: 0.9 % (ref 0–0.5)
LYMPHOCYTES # BLD AUTO: 1.31 10*3/MM3 (ref 1–3)
LYMPHOCYTES NFR BLD AUTO: 7.6 % (ref 16–46)
MAGNESIUM SERPL-MCNC: 1.9 MG/DL (ref 1.7–2.6)
MCH RBC QN AUTO: 28 PG (ref 27–33)
MCHC RBC AUTO-ENTMCNC: 30.3 G/DL (ref 33–37)
MCV RBC AUTO: 92.2 FL (ref 80–94)
MONOCYTES # BLD AUTO: 0.77 10*3/MM3 (ref 0.1–0.9)
MONOCYTES NFR BLD AUTO: 4.4 % (ref 0–12)
NEUTROPHILS # BLD AUTO: 14.94 10*3/MM3 (ref 1.4–6.5)
NEUTROPHILS NFR BLD AUTO: 86.1 % (ref 40–75)
OSMOLALITY SERPL CALC.SUM OF ELEC: 323.4 MOSM/KG (ref 273–305)
PLATELET # BLD AUTO: 182 10*3/MM3 (ref 130–400)
PMV BLD AUTO: 11.8 FL (ref 6–10)
POTASSIUM BLD-SCNC: 3.9 MMOL/L (ref 3.5–5.3)
RBC # BLD AUTO: 3.61 10*6/MM3 (ref 4.2–5.4)
SODIUM BLD-SCNC: 144 MMOL/L (ref 135–153)
WBC NRBC COR # BLD: 17.35 10*3/MM3 (ref 4.5–12.5)

## 2017-08-05 PROCEDURE — 94799 UNLISTED PULMONARY SVC/PX: CPT

## 2017-08-05 PROCEDURE — 25010000002 HEPARIN (PORCINE) PER 1000 UNITS: Performed by: HOSPITALIST

## 2017-08-05 PROCEDURE — 63710000001 INSULIN ASPART PER 5 UNITS: Performed by: HOSPITALIST

## 2017-08-05 PROCEDURE — 94660 CPAP INITIATION&MGMT: CPT

## 2017-08-05 PROCEDURE — 85025 COMPLETE CBC W/AUTO DIFF WBC: CPT | Performed by: INTERNAL MEDICINE

## 2017-08-05 PROCEDURE — 83735 ASSAY OF MAGNESIUM: CPT | Performed by: INTERNAL MEDICINE

## 2017-08-05 PROCEDURE — 63710000001 PREDNISONE PER 5 MG: Performed by: INTERNAL MEDICINE

## 2017-08-05 PROCEDURE — 63710000001 INSULIN DETEMIR PER 5 UNITS: Performed by: INTERNAL MEDICINE

## 2017-08-05 PROCEDURE — 99233 SBSQ HOSP IP/OBS HIGH 50: CPT | Performed by: INTERNAL MEDICINE

## 2017-08-05 PROCEDURE — 82962 GLUCOSE BLOOD TEST: CPT

## 2017-08-05 PROCEDURE — 25010000002 MAGNESIUM SULFATE 2 GM/50ML SOLUTION: Performed by: HOSPITALIST

## 2017-08-05 PROCEDURE — 87040 BLOOD CULTURE FOR BACTERIA: CPT | Performed by: INTERNAL MEDICINE

## 2017-08-05 PROCEDURE — 25010000002 FUROSEMIDE PER 20 MG: Performed by: INTERNAL MEDICINE

## 2017-08-05 PROCEDURE — 80048 BASIC METABOLIC PNL TOTAL CA: CPT | Performed by: INTERNAL MEDICINE

## 2017-08-05 PROCEDURE — 25010000002 PROMETHAZINE PER 50 MG: Performed by: INTERNAL MEDICINE

## 2017-08-05 RX ORDER — CEPHALEXIN 500 MG/1
500 CAPSULE ORAL EVERY 12 HOURS SCHEDULED
Status: DISCONTINUED | OUTPATIENT
Start: 2017-08-05 | End: 2017-08-05

## 2017-08-05 RX ORDER — MAGNESIUM SULFATE HEPTAHYDRATE 40 MG/ML
2 INJECTION, SOLUTION INTRAVENOUS ONCE
Status: COMPLETED | OUTPATIENT
Start: 2017-08-05 | End: 2017-08-05

## 2017-08-05 RX ADMIN — DOXYCYCLINE 100 MG: 100 CAPSULE ORAL at 08:44

## 2017-08-05 RX ADMIN — CLOPIDOGREL BISULFATE 75 MG: 75 TABLET, FILM COATED ORAL at 08:45

## 2017-08-05 RX ADMIN — Medication 5 ML: at 13:33

## 2017-08-05 RX ADMIN — ISOSORBIDE MONONITRATE 60 MG: 60 TABLET, EXTENDED RELEASE ORAL at 08:45

## 2017-08-05 RX ADMIN — PROMETHAZINE HYDROCHLORIDE 12.5 MG: 25 INJECTION INTRAMUSCULAR; INTRAVENOUS at 08:21

## 2017-08-05 RX ADMIN — FUROSEMIDE 80 MG: 10 INJECTION, SOLUTION INTRAMUSCULAR; INTRAVENOUS at 20:12

## 2017-08-05 RX ADMIN — NICOTINE 1 PATCH: 21 PATCH, EXTENDED RELEASE TRANSDERMAL at 20:12

## 2017-08-05 RX ADMIN — CEFEPIME HYDROCHLORIDE 1 G: 1 INJECTION, POWDER, FOR SOLUTION INTRAMUSCULAR; INTRAVENOUS at 13:33

## 2017-08-05 RX ADMIN — METRONIDAZOLE 500 MG: 500 INJECTION, SOLUTION INTRAVENOUS at 14:15

## 2017-08-05 RX ADMIN — IPRATROPIUM BROMIDE AND ALBUTEROL SULFATE 3 ML: .5; 3 SOLUTION RESPIRATORY (INHALATION) at 02:44

## 2017-08-05 RX ADMIN — BUDESONIDE AND FORMOTEROL FUMARATE DIHYDRATE 2 PUFF: 80; 4.5 AEROSOL RESPIRATORY (INHALATION) at 19:14

## 2017-08-05 RX ADMIN — MAGNESIUM SULFATE IN WATER 2 G: 40 INJECTION, SOLUTION INTRAVENOUS at 08:42

## 2017-08-05 RX ADMIN — INSULIN ASPART 4 UNITS: 100 INJECTION, SOLUTION INTRAVENOUS; SUBCUTANEOUS at 08:43

## 2017-08-05 RX ADMIN — METOPROLOL TARTRATE 50 MG: 50 TABLET, FILM COATED ORAL at 20:13

## 2017-08-05 RX ADMIN — PANTOPRAZOLE SODIUM 40 MG: 40 TABLET, DELAYED RELEASE ORAL at 08:43

## 2017-08-05 RX ADMIN — Medication 5 ML: at 20:31

## 2017-08-05 RX ADMIN — GUAIFENESIN 1200 MG: 600 TABLET, EXTENDED RELEASE ORAL at 20:13

## 2017-08-05 RX ADMIN — MAGNESIUM GLUCONATE 500 MG ORAL TABLET 800 MG: 500 TABLET ORAL at 08:44

## 2017-08-05 RX ADMIN — INSULIN DETEMIR 25 UNITS: 100 INJECTION, SOLUTION SUBCUTANEOUS at 20:39

## 2017-08-05 RX ADMIN — Medication 5 ML: at 17:13

## 2017-08-05 RX ADMIN — HEPARIN SODIUM 5000 UNITS: 5000 INJECTION, SOLUTION INTRAVENOUS; SUBCUTANEOUS at 20:12

## 2017-08-05 RX ADMIN — CLONAZEPAM 0.5 MG: 0.5 TABLET ORAL at 20:11

## 2017-08-05 RX ADMIN — PROMETHAZINE HYDROCHLORIDE 12.5 MG: 25 INJECTION INTRAMUSCULAR; INTRAVENOUS at 20:38

## 2017-08-05 RX ADMIN — SODIUM BICARBONATE TAB 650 MG 1300 MG: 650 TAB at 17:13

## 2017-08-05 RX ADMIN — FUROSEMIDE 80 MG: 10 INJECTION, SOLUTION INTRAMUSCULAR; INTRAVENOUS at 08:46

## 2017-08-05 RX ADMIN — INSULIN ASPART 16 UNITS: 100 INJECTION, SOLUTION INTRAVENOUS; SUBCUTANEOUS at 17:14

## 2017-08-05 RX ADMIN — ACETAMINOPHEN 650 MG: 325 TABLET ORAL at 01:16

## 2017-08-05 RX ADMIN — MAGNESIUM GLUCONATE 500 MG ORAL TABLET 800 MG: 500 TABLET ORAL at 17:13

## 2017-08-05 RX ADMIN — ATORVASTATIN CALCIUM 40 MG: 40 TABLET, FILM COATED ORAL at 20:13

## 2017-08-05 RX ADMIN — ACETAMINOPHEN 650 MG: 325 TABLET ORAL at 08:43

## 2017-08-05 RX ADMIN — SODIUM BICARBONATE TAB 650 MG 1300 MG: 650 TAB at 13:32

## 2017-08-05 RX ADMIN — CLONAZEPAM 0.5 MG: 0.5 TABLET ORAL at 08:43

## 2017-08-05 RX ADMIN — METOPROLOL TARTRATE 50 MG: 50 TABLET, FILM COATED ORAL at 08:45

## 2017-08-05 RX ADMIN — PREDNISONE 5 MG: 5 TABLET ORAL at 08:44

## 2017-08-05 RX ADMIN — METRONIDAZOLE 500 MG: 500 INJECTION, SOLUTION INTRAVENOUS at 20:07

## 2017-08-05 RX ADMIN — AMOXICILLIN AND CLAVULANATE POTASSIUM 500 MG: 500; 125 TABLET, FILM COATED ORAL at 08:45

## 2017-08-05 RX ADMIN — SODIUM BICARBONATE TAB 650 MG 1300 MG: 650 TAB at 20:12

## 2017-08-05 RX ADMIN — IPRATROPIUM BROMIDE AND ALBUTEROL SULFATE 3 ML: .5; 3 SOLUTION RESPIRATORY (INHALATION) at 06:14

## 2017-08-05 RX ADMIN — BUDESONIDE AND FORMOTEROL FUMARATE DIHYDRATE 2 PUFF: 80; 4.5 AEROSOL RESPIRATORY (INHALATION) at 06:14

## 2017-08-05 RX ADMIN — GUAIFENESIN 1200 MG: 600 TABLET, EXTENDED RELEASE ORAL at 08:44

## 2017-08-05 RX ADMIN — IPRATROPIUM BROMIDE AND ALBUTEROL SULFATE 3 ML: .5; 3 SOLUTION RESPIRATORY (INHALATION) at 14:17

## 2017-08-05 RX ADMIN — ASPIRIN 81 MG: 81 TABLET, CHEWABLE ORAL at 20:13

## 2017-08-05 RX ADMIN — INSULIN ASPART 8 UNITS: 100 INJECTION, SOLUTION INTRAVENOUS; SUBCUTANEOUS at 20:39

## 2017-08-05 RX ADMIN — SODIUM BICARBONATE TAB 650 MG 1300 MG: 650 TAB at 08:45

## 2017-08-05 RX ADMIN — IPRATROPIUM BROMIDE AND ALBUTEROL SULFATE 3 ML: .5; 3 SOLUTION RESPIRATORY (INHALATION) at 22:43

## 2017-08-05 RX ADMIN — HEPARIN SODIUM 5000 UNITS: 5000 INJECTION, SOLUTION INTRAVENOUS; SUBCUTANEOUS at 08:45

## 2017-08-05 RX ADMIN — DOXYCYCLINE 100 MG: 100 CAPSULE ORAL at 20:13

## 2017-08-05 RX ADMIN — Medication 5 ML: at 08:46

## 2017-08-05 NOTE — PLAN OF CARE
Problem: Cardiac: Heart Failure (Adult)  Goal: Signs and Symptoms of Listed Potential Problems Will be Absent or Manageable (Cardiac: Heart Failure)  Outcome: Ongoing (interventions implemented as appropriate)    08/03/17 1613   Cardiac: Heart Failure   Problems Assessed (Heart Failure) dysrhythmia/arrhythmia;respiratory compromise;situational response;sleep-disordered breathing   Problems Present (Heart Failure) fluid/electrolyte imbalance;situational response;respiratory compromise;sleep-disordered breathing         Problem: Patient Care Overview (Adult)  Goal: Plan of Care Review  Outcome: Ongoing (interventions implemented as appropriate)    08/04/17 2216   Coping/Psychosocial Response Interventions   Plan Of Care Reviewed With patient   Patient Care Overview   Progress improving       Goal: Discharge Needs Assessment  Outcome: Ongoing (interventions implemented as appropriate)    07/25/17 1936 07/29/17 0917 08/04/17 0548   Discharge Needs Assessment   Concerns To Be Addressed --  substance/tobacco abuse/use concerns --    Readmission Within The Last 30 Days no previous admission in last 30 days --  --    Equipment Needed After Discharge --  --  --    Current Discharge Risk --  --  chronically ill   Discharge Disposition --  --  still a patient   Current Health   Anticipated Changes Related to Illness --  --  --    Self-Care   Equipment Currently Used at Home --  --  --    Living Environment   Transportation Available --  --  --      08/04/17 2216   Discharge Needs Assessment   Concerns To Be Addressed --    Readmission Within The Last 30 Days --    Equipment Needed After Discharge none   Current Discharge Risk --    Discharge Disposition --    Current Health   Anticipated Changes Related to Illness none   Self-Care   Equipment Currently Used at Home none   Living Environment   Transportation Available none         Problem: Skin Integrity Impairment, Risk/Actual (Adult)  Goal: Identify Related Risk Factors and  Signs and Symptoms  Outcome: Ongoing (interventions implemented as appropriate)    08/04/17 2216   Skin Integrity Impairment, Risk/Actual   Skin Integrity Impairment, Risk/Actual: Related Risk Factors age extremes   Signs and Symptoms (Skin Integrity Impairment) edema       Goal: Skin Integrity/Wound Healing  Outcome: Ongoing (interventions implemented as appropriate)    08/04/17 2216   Skin Integrity Impairment, Risk/Actual (Adult)   Skin Integrity/Wound Healing making progress toward outcome         Problem: Diabetes, Type 2 (Adult)  Goal: Signs and Symptoms of Listed Potential Problems Will be Absent or Manageable (Diabetes, Type 2)  Outcome: Ongoing (interventions implemented as appropriate)    08/04/17 2216   Diabetes, Type 2   Problems Assessed (Type 2 Diabetes) all   Problems Present (Type 2 Diabetes) hypoglycemia

## 2017-08-05 NOTE — PLAN OF CARE
Problem: NPPV/CPAP (Adult)  Goal: Signs and Symptoms of Listed Potential Problems Will be Absent or Manageable (NPPV/CPAP)  Outcome: Ongoing (interventions implemented as appropriate)    08/03/17 0314 08/04/17 0326   NPPV/CPAP   Problems Assessed (NPPV/CPAP) --  hypoxia/hypoxemia;situational response;skin breakdown   Problems Present (NPPV/CPAP) situational response;dry mucous membranes --

## 2017-08-05 NOTE — PROGRESS NOTES
Interval History:     Patient Complaints: Patient says she is breathing better.  Still a little edematous.  No nausea or vomiting.  No abdominal pain or chest pain        Vital Signs  Temp:  [98 °F (36.7 °C)-99.5 °F (37.5 °C)] 99.5 °F (37.5 °C)  Heart Rate:  [] 72  Resp:  [16-24] 18  BP: (125-190)/(64-95) 132/65    Physical Exam:    General:             Mildly tachypneic      HEENT:  Mild pallor                Neck:   Elevated JVD        Lungs:     Scattered crackles and wheezes     Heart:    no rub       Abdomen:     Normal bowel sounds, soft non-tender, non-distended, no guarding       Extremities:   Edematous        Skin:   No petechiae, no rash       Neurologic:  Cr Ns grossly intact, sensation N, moves all extremities.        Results Review:     I reviewed the patient's new clinical results.    Lab Results (last 24 hours)     Procedure Component Value Units Date/Time    POC Glucose Fingerstick [[id number]]  (Normal) Collected:  08/04/17 1746    Specimen:  Blood Updated:  08/04/17 1752     Glucose 101 mg/dL     Narrative:       Meter: [id number] : [id number] urbano tierney    POC Glucose Fingerstick [[id number]]  (Abnormal) Collected:  08/04/17 2026    Specimen:  Blood Updated:  08/04/17 2034     Glucose 204 (H) mg/dL     Narrative:       Meter: [id number] : [id number] Masood Hayward    CBC & Differential [[id number]] Collected:  08/05/17 0601    Specimen:  Blood Updated:  08/05/17 0624    Narrative:       The following orders were created for panel order CBC & Differential.  Procedure                               Abnormality         Status                     ---------                               -----------         ------                     CBC Auto Differential[[id number]]        Abnormal            Final result                 Please view results for these tests on the individual orders.    CBC Auto Differential [[id number]]  (Abnormal) Collected:  08/05/17 0601    Specimen:  Blood  Updated:  08/05/17 0624     WBC 17.35 (H) 10*3/mm3      RBC 3.61 (L) 10*6/mm3      Hemoglobin 10.1 (L) g/dL      Hematocrit 33.3 (L) %      MCV 92.2 fL      MCH 28.0 pg      MCHC 30.3 (L) g/dL      RDW 19.4 (H) %      RDW-SD 62.5 (H) fl      MPV 11.8 (H) fL      Platelets 182 10*3/mm3      Neutrophil % 86.1 (H) %      Lymphocyte % 7.6 (L) %      Monocyte % 4.4 %      Eosinophil % 0.9 %      Basophil % 0.1 %      Immature Grans % 0.9 (H) %      Neutrophils, Absolute 14.94 (H) 10*3/mm3      Lymphocytes, Absolute 1.31 10*3/mm3      Monocytes, Absolute 0.77 10*3/mm3      Eosinophils, Absolute 0.16 10*3/mm3      Basophils, Absolute 0.02 10*3/mm3      Immature Grans, Absolute 0.15 (H) 10*3/mm3     Basic Metabolic Panel [[id number]]  (Abnormal) Collected:  08/05/17 0601    Specimen:  Blood Updated:  08/05/17 0648     Glucose 157 (H) mg/dL       (H) mg/dL      Creatinine 3.55 (H) mg/dL      Sodium 144 mmol/L      Potassium 3.9 mmol/L      Chloride 108 mmol/L      CO2 25.8 mmol/L      Calcium 6.6 (L) mg/dL      eGFR Non African Amer 13 (L) mL/min/1.73      BUN/Creatinine Ratio 29.9 (H)     Anion Gap 10.2 mmol/L     Narrative:       GFR Normal >60  Chronic Kidney Disease <60  Kidney Failure <15    Magnesium [[id number]]  (Normal) Collected:  08/05/17 0601    Specimen:  Blood Updated:  08/05/17 0648     Magnesium 1.9 mg/dL     Osmolality, Calculated [[id number]]  (Abnormal) Collected:  08/05/17 0601    Specimen:  Blood Updated:  08/05/17 0648     Osmolality Calc 323.4 (H) mOsm/kg     POC Glucose Fingerstick [[id number]]  (Abnormal) Collected:  08/05/17 0747    Specimen:  Blood Updated:  08/05/17 0753     Glucose 213 (H) mg/dL     Narrative:       Meter: [id number] : [id number] cecilia crystal    POC Glucose Fingerstick [[id number]]  (Abnormal) Collected:  08/05/17 1114    Specimen:  Blood Updated:  08/05/17 1121     Glucose 139 (H) mg/dL     Narrative:       Meter: [id number] : [id number] cecilia crystal    Blood  Culture [[id number]] Collected:  08/05/17 1149    Specimen:  Blood from Hand, Digit Left Updated:  08/05/17 1502    POC Glucose Fingerstick [[id number]]  (Abnormal) Collected:  08/05/17 1655    Specimen:  Blood Updated:  08/05/17 1701     Glucose 308 (H) mg/dL     Narrative:       Meter: [id number] : [id number] cecilia [employer]          Imaging Results (last 24 hours)     ** No results found for the last 24 hours. **          Assessment and Plan:    1. SIMON on CKD 4 : her baseline creatinine is 2.5. She was worsening CKD from uncontrolled HTN and type 2 DM.  Her creatinine is has improved to 3. from 3.9  I will continue lasix 80 mg iv bid  and monitor    24-hour creatinine clearance is 14.7 cc/min but there is no immediate need for dialysis     2. Metabolic and respiratory acidosis : better on bipap and po sodium bicarbonate     3. Anemia of chronic disease : on venofer and epogen     4. Edema : better. Ct lasix as above.  Will check x-ray chest in the morning    Manuel Garrido MD  08/05/17  5:15 PM

## 2017-08-06 ENCOUNTER — APPOINTMENT (OUTPATIENT)
Dept: GENERAL RADIOLOGY | Facility: HOSPITAL | Age: 67
End: 2017-08-06

## 2017-08-06 LAB
027 TOXIN: NORMAL
ANION GAP SERPL CALCULATED.3IONS-SCNC: 10.9 MMOL/L (ref 3.6–11.2)
BASOPHILS # BLD AUTO: 0.01 10*3/MM3 (ref 0–0.3)
BASOPHILS NFR BLD AUTO: 0.1 % (ref 0–2)
BUN BLD-MCNC: 100 MG/DL (ref 7–21)
BUN/CREAT SERPL: 30 (ref 7–25)
C DIFF TOX GENS STL QL NAA+PROBE: NEGATIVE
CALCIUM SPEC-SCNC: 6.8 MG/DL (ref 7.7–10)
CHLORIDE SERPL-SCNC: 107 MMOL/L (ref 99–112)
CO2 SERPL-SCNC: 28.1 MMOL/L (ref 24.3–31.9)
CREAT BLD-MCNC: 3.33 MG/DL (ref 0.43–1.29)
DEPRECATED RDW RBC AUTO: 62.3 FL (ref 37–54)
EOSINOPHIL # BLD AUTO: 0.11 10*3/MM3 (ref 0–0.7)
EOSINOPHIL NFR BLD AUTO: 0.9 % (ref 0–7)
ERYTHROCYTE [DISTWIDTH] IN BLOOD BY AUTOMATED COUNT: 19.5 % (ref 11.5–14.5)
GFR SERPL CREATININE-BSD FRML MDRD: 14 ML/MIN/1.73
GLUCOSE BLD-MCNC: 31 MG/DL (ref 70–110)
GLUCOSE BLDC GLUCOMTR-MCNC: 141 MG/DL (ref 70–130)
GLUCOSE BLDC GLUCOMTR-MCNC: 154 MG/DL (ref 70–130)
GLUCOSE BLDC GLUCOMTR-MCNC: 205 MG/DL (ref 70–130)
GLUCOSE BLDC GLUCOMTR-MCNC: 345 MG/DL (ref 70–130)
GLUCOSE BLDC GLUCOMTR-MCNC: 47 MG/DL (ref 70–130)
GLUCOSE BLDC GLUCOMTR-MCNC: 53 MG/DL (ref 70–130)
HCT VFR BLD AUTO: 32.7 % (ref 37–47)
HGB BLD-MCNC: 9.8 G/DL (ref 12–16)
IMM GRANULOCYTES # BLD: 0.08 10*3/MM3 (ref 0–0.03)
IMM GRANULOCYTES NFR BLD: 0.7 % (ref 0–0.5)
LYMPHOCYTES # BLD AUTO: 1.11 10*3/MM3 (ref 1–3)
LYMPHOCYTES NFR BLD AUTO: 9.4 % (ref 16–46)
MAGNESIUM SERPL-MCNC: 1.9 MG/DL (ref 1.7–2.6)
MCH RBC QN AUTO: 28.2 PG (ref 27–33)
MCHC RBC AUTO-ENTMCNC: 30 G/DL (ref 33–37)
MCV RBC AUTO: 94.2 FL (ref 80–94)
MONOCYTES # BLD AUTO: 0.85 10*3/MM3 (ref 0.1–0.9)
MONOCYTES NFR BLD AUTO: 7.2 % (ref 0–12)
NEUTROPHILS # BLD AUTO: 9.66 10*3/MM3 (ref 1.4–6.5)
NEUTROPHILS NFR BLD AUTO: 81.7 % (ref 40–75)
OSMOLALITY SERPL CALC.SUM OF ELEC: 318 MOSM/KG (ref 273–305)
PLATELET # BLD AUTO: 166 10*3/MM3 (ref 130–400)
PMV BLD AUTO: 12.1 FL (ref 6–10)
POTASSIUM BLD-SCNC: 3.1 MMOL/L (ref 3.5–5.3)
RBC # BLD AUTO: 3.47 10*6/MM3 (ref 4.2–5.4)
SODIUM BLD-SCNC: 146 MMOL/L (ref 135–153)
WBC NRBC COR # BLD: 11.82 10*3/MM3 (ref 4.5–12.5)

## 2017-08-06 PROCEDURE — 83735 ASSAY OF MAGNESIUM: CPT | Performed by: INTERNAL MEDICINE

## 2017-08-06 PROCEDURE — 87046 STOOL CULTR AEROBIC BACT EA: CPT | Performed by: INTERNAL MEDICINE

## 2017-08-06 PROCEDURE — 82962 GLUCOSE BLOOD TEST: CPT

## 2017-08-06 PROCEDURE — 25010000002 FUROSEMIDE PER 20 MG: Performed by: INTERNAL MEDICINE

## 2017-08-06 PROCEDURE — 25010000002 PROMETHAZINE PER 50 MG: Performed by: INTERNAL MEDICINE

## 2017-08-06 PROCEDURE — 25010000002 MAGNESIUM SULFATE 2 GM/50ML SOLUTION: Performed by: INTERNAL MEDICINE

## 2017-08-06 PROCEDURE — 87899 AGENT NOS ASSAY W/OPTIC: CPT | Performed by: INTERNAL MEDICINE

## 2017-08-06 PROCEDURE — 63710000001 INSULIN ASPART PER 5 UNITS: Performed by: HOSPITALIST

## 2017-08-06 PROCEDURE — 80048 BASIC METABOLIC PNL TOTAL CA: CPT | Performed by: INTERNAL MEDICINE

## 2017-08-06 PROCEDURE — 94799 UNLISTED PULMONARY SVC/PX: CPT

## 2017-08-06 PROCEDURE — 25010000002 HYDRALAZINE PER 20 MG: Performed by: INTERNAL MEDICINE

## 2017-08-06 PROCEDURE — 87493 C DIFF AMPLIFIED PROBE: CPT | Performed by: INTERNAL MEDICINE

## 2017-08-06 PROCEDURE — 63710000001 INSULIN DETEMIR PER 5 UNITS: Performed by: INTERNAL MEDICINE

## 2017-08-06 PROCEDURE — 71010 HC CHEST PA OR AP: CPT

## 2017-08-06 PROCEDURE — 87045 FECES CULTURE AEROBIC BACT: CPT | Performed by: INTERNAL MEDICINE

## 2017-08-06 PROCEDURE — 25010000002 HEPARIN (PORCINE) PER 1000 UNITS: Performed by: HOSPITALIST

## 2017-08-06 PROCEDURE — 99233 SBSQ HOSP IP/OBS HIGH 50: CPT | Performed by: INTERNAL MEDICINE

## 2017-08-06 PROCEDURE — 94660 CPAP INITIATION&MGMT: CPT

## 2017-08-06 PROCEDURE — 71010 XR CHEST 1 VW: CPT | Performed by: RADIOLOGY

## 2017-08-06 PROCEDURE — 85025 COMPLETE CBC W/AUTO DIFF WBC: CPT | Performed by: INTERNAL MEDICINE

## 2017-08-06 PROCEDURE — 63710000001 PREDNISONE PER 5 MG: Performed by: INTERNAL MEDICINE

## 2017-08-06 RX ADMIN — METRONIDAZOLE 500 MG: 500 INJECTION, SOLUTION INTRAVENOUS at 21:47

## 2017-08-06 RX ADMIN — INSULIN ASPART 8 UNITS: 100 INJECTION, SOLUTION INTRAVENOUS; SUBCUTANEOUS at 21:47

## 2017-08-06 RX ADMIN — SODIUM BICARBONATE TAB 650 MG 1300 MG: 650 TAB at 12:19

## 2017-08-06 RX ADMIN — FUROSEMIDE 80 MG: 10 INJECTION, SOLUTION INTRAMUSCULAR; INTRAVENOUS at 21:47

## 2017-08-06 RX ADMIN — MAGNESIUM GLUCONATE 500 MG ORAL TABLET 800 MG: 500 TABLET ORAL at 09:33

## 2017-08-06 RX ADMIN — Medication 5 ML: at 12:23

## 2017-08-06 RX ADMIN — DEXTROSE MONOHYDRATE 25 G: 25 INJECTION, SOLUTION INTRAVENOUS at 07:01

## 2017-08-06 RX ADMIN — ASPIRIN 81 MG: 81 TABLET, CHEWABLE ORAL at 21:46

## 2017-08-06 RX ADMIN — DOXYCYCLINE 100 MG: 100 CAPSULE ORAL at 21:46

## 2017-08-06 RX ADMIN — ISOSORBIDE MONONITRATE 60 MG: 60 TABLET, EXTENDED RELEASE ORAL at 09:34

## 2017-08-06 RX ADMIN — PROMETHAZINE HYDROCHLORIDE 12.5 MG: 25 INJECTION INTRAMUSCULAR; INTRAVENOUS at 01:33

## 2017-08-06 RX ADMIN — IPRATROPIUM BROMIDE AND ALBUTEROL SULFATE 3 ML: .5; 3 SOLUTION RESPIRATORY (INHALATION) at 23:57

## 2017-08-06 RX ADMIN — CLOPIDOGREL BISULFATE 75 MG: 75 TABLET, FILM COATED ORAL at 09:33

## 2017-08-06 RX ADMIN — INSULIN ASPART 4 UNITS: 100 INJECTION, SOLUTION INTRAVENOUS; SUBCUTANEOUS at 12:23

## 2017-08-06 RX ADMIN — PREDNISONE 5 MG: 5 TABLET ORAL at 09:33

## 2017-08-06 RX ADMIN — PANTOPRAZOLE SODIUM 40 MG: 40 TABLET, DELAYED RELEASE ORAL at 09:34

## 2017-08-06 RX ADMIN — INSULIN DETEMIR 18 UNITS: 100 INJECTION, SOLUTION SUBCUTANEOUS at 21:48

## 2017-08-06 RX ADMIN — CLONAZEPAM 0.5 MG: 0.5 TABLET ORAL at 09:32

## 2017-08-06 RX ADMIN — HYDRALAZINE HYDROCHLORIDE 10 MG: 20 INJECTION INTRAMUSCULAR; INTRAVENOUS at 17:58

## 2017-08-06 RX ADMIN — ATORVASTATIN CALCIUM 40 MG: 40 TABLET, FILM COATED ORAL at 21:46

## 2017-08-06 RX ADMIN — Medication 5 ML: at 17:57

## 2017-08-06 RX ADMIN — HEPARIN SODIUM 5000 UNITS: 5000 INJECTION, SOLUTION INTRAVENOUS; SUBCUTANEOUS at 21:47

## 2017-08-06 RX ADMIN — ACETAMINOPHEN 650 MG: 325 TABLET ORAL at 09:34

## 2017-08-06 RX ADMIN — FUROSEMIDE 80 MG: 10 INJECTION, SOLUTION INTRAMUSCULAR; INTRAVENOUS at 09:34

## 2017-08-06 RX ADMIN — GUAIFENESIN 1200 MG: 600 TABLET, EXTENDED RELEASE ORAL at 21:46

## 2017-08-06 RX ADMIN — Medication 5 ML: at 09:34

## 2017-08-06 RX ADMIN — IPRATROPIUM BROMIDE AND ALBUTEROL SULFATE 3 ML: .5; 3 SOLUTION RESPIRATORY (INHALATION) at 19:25

## 2017-08-06 RX ADMIN — IPRATROPIUM BROMIDE AND ALBUTEROL SULFATE 3 ML: .5; 3 SOLUTION RESPIRATORY (INHALATION) at 14:41

## 2017-08-06 RX ADMIN — METRONIDAZOLE 500 MG: 500 INJECTION, SOLUTION INTRAVENOUS at 12:19

## 2017-08-06 RX ADMIN — CLONAZEPAM 0.5 MG: 0.5 TABLET ORAL at 21:46

## 2017-08-06 RX ADMIN — Medication 5 ML: at 21:49

## 2017-08-06 RX ADMIN — IPRATROPIUM BROMIDE AND ALBUTEROL SULFATE 3 ML: .5; 3 SOLUTION RESPIRATORY (INHALATION) at 10:22

## 2017-08-06 RX ADMIN — MAGNESIUM SULFATE IN WATER 2 G: 40 INJECTION, SOLUTION INTRAVENOUS at 06:40

## 2017-08-06 RX ADMIN — GUAIFENESIN 1200 MG: 600 TABLET, EXTENDED RELEASE ORAL at 09:32

## 2017-08-06 RX ADMIN — CEFEPIME HYDROCHLORIDE 1 G: 1 INJECTION, POWDER, FOR SOLUTION INTRAMUSCULAR; INTRAVENOUS at 12:19

## 2017-08-06 RX ADMIN — METOPROLOL TARTRATE 50 MG: 50 TABLET, FILM COATED ORAL at 09:33

## 2017-08-06 RX ADMIN — PROMETHAZINE HYDROCHLORIDE 12.5 MG: 25 INJECTION INTRAMUSCULAR; INTRAVENOUS at 09:29

## 2017-08-06 RX ADMIN — METRONIDAZOLE 500 MG: 500 INJECTION, SOLUTION INTRAVENOUS at 04:04

## 2017-08-06 RX ADMIN — BUDESONIDE AND FORMOTEROL FUMARATE DIHYDRATE 2 PUFF: 80; 4.5 AEROSOL RESPIRATORY (INHALATION) at 19:25

## 2017-08-06 RX ADMIN — NICOTINE 1 PATCH: 21 PATCH, EXTENDED RELEASE TRANSDERMAL at 21:49

## 2017-08-06 RX ADMIN — SODIUM BICARBONATE TAB 650 MG 1300 MG: 650 TAB at 09:32

## 2017-08-06 RX ADMIN — HEPARIN SODIUM 5000 UNITS: 5000 INJECTION, SOLUTION INTRAVENOUS; SUBCUTANEOUS at 09:31

## 2017-08-06 RX ADMIN — METOPROLOL TARTRATE 50 MG: 50 TABLET, FILM COATED ORAL at 21:46

## 2017-08-06 RX ADMIN — HYDRALAZINE HYDROCHLORIDE 10 MG: 20 INJECTION INTRAMUSCULAR; INTRAVENOUS at 04:04

## 2017-08-06 RX ADMIN — DOXYCYCLINE 100 MG: 100 CAPSULE ORAL at 09:33

## 2017-08-06 RX ADMIN — INSULIN ASPART 16 UNITS: 100 INJECTION, SOLUTION INTRAVENOUS; SUBCUTANEOUS at 17:57

## 2017-08-06 NOTE — PROGRESS NOTES
Interval History:     Patient Complaints: No new complaints.  Still a bit short of breath and still radiologically in CHF and still edematous but wants to go home.  Her sodium is rising but her creatinine is declining.  She says she is breathing better.  She has had loose watery stools but some of them are semi-consistent        Vital Signs  Temp:  [98.2 °F (36.8 °C)-99.1 °F (37.3 °C)] 98.4 °F (36.9 °C)  Heart Rate:  [] 69  Resp:  [18-20] 18  BP: (132-189)/() 139/71    Physical Exam:    General:             No distress      HEENT:  No pallor                Neck:   Mild JVD and mild retraction of accessory muscles        Lungs:     scattered crackles and wheeze     Heart:    no rub       Abdomen:     Normal bowel sounds, soft non-tender, non-distended, no guarding       Extremities:   Plus plus edema        Skin:   No petechiae, no rash       Neurologic:  Cr Ns grossly intact, sensation N, moves all extremities.        Results Review:     I reviewed the patient's new clinical results.    Lab Results (last 24 hours)     Procedure Component Value Units Date/Time    POC Glucose Fingerstick [[id number]]  (Abnormal) Collected:  08/05/17 1655    Specimen:  Blood Updated:  08/05/17 1701     Glucose 308 (H) mg/dL     Narrative:       Meter: [id number] : [id number] cecilia crystal    POC Glucose Fingerstick [[id number]]  (Abnormal) Collected:  08/05/17 2016    Specimen:  Blood Updated:  08/05/17 2022     Glucose 213 (H) mg/dL     Narrative:       Meter: [id number] : [id number] Raymundo Chandra    Blood Culture [[id number]]  (Normal) Collected:  08/05/17 1149    Specimen:  Blood from Hand, Digit Left Updated:  08/06/17 0401     Blood Culture No growth at less than 24 hours    CBC & Differential [[id number]] Collected:  08/06/17 0459    Specimen:  Blood Updated:  08/06/17 0522    Narrative:       The following orders were created for panel order CBC & Differential.  Procedure                                Abnormality         Status                     ---------                               -----------         ------                     CBC Auto Differential[[id number]]        Abnormal            Final result                 Please view results for these tests on the individual orders.    CBC Auto Differential [[id number]]  (Abnormal) Collected:  08/06/17 0459    Specimen:  Blood Updated:  08/06/17 0525     WBC 11.82 10*3/mm3      RBC 3.47 (L) 10*6/mm3      Hemoglobin 9.8 (L) g/dL      Hematocrit 32.7 (L) %      MCV 94.2 (H) fL      MCH 28.2 pg      MCHC 30.0 (L) g/dL      RDW 19.5 (H) %      RDW-SD 62.3 (H) fl      MPV 12.1 (H) fL      Platelets 166 10*3/mm3      Neutrophil % 81.7 (H) %      Lymphocyte % 9.4 (L) %      Monocyte % 7.2 %      Eosinophil % 0.9 %      Basophil % 0.1 %      Immature Grans % 0.7 (H) %      Neutrophils, Absolute 9.66 (H) 10*3/mm3      Lymphocytes, Absolute 1.11 10*3/mm3      Monocytes, Absolute 0.85 10*3/mm3      Eosinophils, Absolute 0.11 10*3/mm3      Basophils, Absolute 0.01 10*3/mm3      Immature Grans, Absolute 0.08 (H) 10*3/mm3     Magnesium [[id number]]  (Normal) Collected:  08/06/17 0459    Specimen:  Blood Updated:  08/06/17 0553     Magnesium 1.9 mg/dL     Basic Metabolic Panel [[id number]]  (Abnormal) Collected:  08/06/17 0459    Specimen:  Blood Updated:  08/06/17 0624     Glucose 31 (C) mg/dL       (H) mg/dL      Creatinine 3.33 (H) mg/dL      Sodium 146 mmol/L      Potassium 3.1 (L) mmol/L      Chloride 107 mmol/L      CO2 28.1 mmol/L      Calcium 6.8 (L) mg/dL      eGFR Non African Amer 14 (L) mL/min/1.73      BUN/Creatinine Ratio 30.0 (H)     Anion Gap 10.9 mmol/L     Narrative:       GFR Normal >60  Chronic Kidney Disease <60  Kidney Failure <15    Osmolality, Calculated [[id number]]  (Abnormal) Collected:  08/06/17 0459    Specimen:  Blood Updated:  08/06/17 0624     Osmolality Calc 318.0 (H) mOsm/kg     POC Glucose Fingerstick [[id number]]  (Abnormal) Collected:   08/06/17 0627    Specimen:  Blood Updated:  08/06/17 0636     Glucose 53 (L) mg/dL     Narrative:       Meter: [id number] : [id number] RAVINDER KOHLER    POC Glucose Fingerstick [[id number]]  (Abnormal) Collected:  08/06/17 0658    Specimen:  Blood Updated:  08/06/17 0704     Glucose 47 (C) mg/dL     Narrative:       Treated Patient Meter: [id number] : [id number] JOHN CASTILLO    POC Glucose Fingerstick [[id number]]  (Abnormal) Collected:  08/06/17 0716    Specimen:  Blood Updated:  08/06/17 0723     Glucose 141 (H) mg/dL     Narrative:       Meter: [id number] : [id number] cori kunz    POC Glucose Fingerstick [[id number]]  (Abnormal) Collected:  08/06/17 1125    Specimen:  Blood Updated:  08/06/17 1132     Glucose 154 (H) mg/dL     Narrative:       Meter: [id number] : [id number] cori kunz          Imaging Results (last 24 hours)     Procedure Component Value Units Date/Time    XR Chest 1 View [[id number]] Updated:  08/06/17 1054          Assessment and Plan:    1. SIMON on CKD 4 : her baseline creatinine is 2.5. She was worsening CKD from uncontrolled HTN and type 2 DM.  Her creatinine is has improved to 3.3 from 3.9  I will continue lasix 80 mg iv bid  and monitor   Encouraged her to drink more water as the sodium is rising.     24-hour creatinine clearance is 14.7 cc/min but there is no immediate need for dialysis      2. Metabolic and respiratory acidosis : better on bipap since CO@ is at 28 will stop sodium bicarbonate      3. Anemia of chronic disease : on venofer and epogen      4. Edema : better. Ct lasix as above.  Will check x-ray chest in the morning       Manuel Garrido MD  08/06/17  2:29 PM

## 2017-08-06 NOTE — PLAN OF CARE
Problem: Cardiac: Heart Failure (Adult)  Goal: Signs and Symptoms of Listed Potential Problems Will be Absent or Manageable (Cardiac: Heart Failure)  Outcome: Ongoing (interventions implemented as appropriate)    Problem: Renal Failure/Kidney Injury, Acute (Adult)  Goal: Signs and Symptoms of Listed Potential Problems Will be Absent or Manageable (Renal Failure/Kidney Injury, Acute)  Outcome: Ongoing (interventions implemented as appropriate)    Problem: Pressure Ulcer Risk (Saul Scale) (Adult,Obstetrics,Pediatric)  Goal: Skin Integrity  Outcome: Ongoing (interventions implemented as appropriate)    Problem: Patient Care Overview (Adult)  Goal: Plan of Care Review  Outcome: Ongoing (interventions implemented as appropriate)  Goal: Adult Individualization and Mutuality  Outcome: Ongoing (interventions implemented as appropriate)    Problem: Skin Integrity Impairment, Risk/Actual (Adult)  Goal: Identify Related Risk Factors and Signs and Symptoms  Outcome: Ongoing (interventions implemented as appropriate)    Problem: Diabetes, Type 2 (Adult)  Goal: Signs and Symptoms of Listed Potential Problems Will be Absent or Manageable (Diabetes, Type 2)  Outcome: Ongoing (interventions implemented as appropriate)

## 2017-08-07 LAB
ANION GAP SERPL CALCULATED.3IONS-SCNC: 12.5 MMOL/L (ref 3.6–11.2)
BASOPHILS # BLD AUTO: 0.01 10*3/MM3 (ref 0–0.3)
BASOPHILS NFR BLD AUTO: 0.1 % (ref 0–2)
BUN BLD-MCNC: 92 MG/DL (ref 7–21)
BUN/CREAT SERPL: 28.1 (ref 7–25)
CALCIUM SPEC-SCNC: 6.7 MG/DL (ref 7.7–10)
CHLORIDE SERPL-SCNC: 107 MMOL/L (ref 99–112)
CO2 SERPL-SCNC: 25.5 MMOL/L (ref 24.3–31.9)
CREAT BLD-MCNC: 3.27 MG/DL (ref 0.43–1.29)
DEPRECATED RDW RBC AUTO: 62.5 FL (ref 37–54)
EOSINOPHIL # BLD AUTO: 0.22 10*3/MM3 (ref 0–0.7)
EOSINOPHIL NFR BLD AUTO: 1.8 % (ref 0–7)
ERYTHROCYTE [DISTWIDTH] IN BLOOD BY AUTOMATED COUNT: 19.8 % (ref 11.5–14.5)
GFR SERPL CREATININE-BSD FRML MDRD: 14 ML/MIN/1.73
GLUCOSE BLD-MCNC: 38 MG/DL (ref 70–110)
GLUCOSE BLDC GLUCOMTR-MCNC: 112 MG/DL (ref 70–130)
GLUCOSE BLDC GLUCOMTR-MCNC: 123 MG/DL (ref 70–130)
GLUCOSE BLDC GLUCOMTR-MCNC: 193 MG/DL (ref 70–130)
GLUCOSE BLDC GLUCOMTR-MCNC: 219 MG/DL (ref 70–130)
GLUCOSE BLDC GLUCOMTR-MCNC: 226 MG/DL (ref 70–130)
GLUCOSE BLDC GLUCOMTR-MCNC: 29 MG/DL (ref 70–130)
GLUCOSE BLDC GLUCOMTR-MCNC: 34 MG/DL (ref 70–130)
GLUCOSE BLDC GLUCOMTR-MCNC: 62 MG/DL (ref 70–130)
GLUCOSE BLDC GLUCOMTR-MCNC: 89 MG/DL (ref 70–130)
HCT VFR BLD AUTO: 32 % (ref 37–47)
HGB BLD-MCNC: 9.6 G/DL (ref 12–16)
IMM GRANULOCYTES # BLD: 0.06 10*3/MM3 (ref 0–0.03)
IMM GRANULOCYTES NFR BLD: 0.5 % (ref 0–0.5)
LYMPHOCYTES # BLD AUTO: 2.43 10*3/MM3 (ref 1–3)
LYMPHOCYTES NFR BLD AUTO: 19.6 % (ref 16–46)
MAGNESIUM SERPL-MCNC: 2.3 MG/DL (ref 1.7–2.6)
MCH RBC QN AUTO: 28.7 PG (ref 27–33)
MCHC RBC AUTO-ENTMCNC: 30 G/DL (ref 33–37)
MCV RBC AUTO: 95.8 FL (ref 80–94)
MONOCYTES # BLD AUTO: 0.89 10*3/MM3 (ref 0.1–0.9)
MONOCYTES NFR BLD AUTO: 7.2 % (ref 0–12)
NEUTROPHILS # BLD AUTO: 8.77 10*3/MM3 (ref 1.4–6.5)
NEUTROPHILS NFR BLD AUTO: 70.8 % (ref 40–75)
OSMOLALITY SERPL CALC.SUM OF ELEC: 313.7 MOSM/KG (ref 273–305)
PLATELET # BLD AUTO: 176 10*3/MM3 (ref 130–400)
PMV BLD AUTO: 12.1 FL (ref 6–10)
POTASSIUM BLD-SCNC: 3.4 MMOL/L (ref 3.5–5.3)
RBC # BLD AUTO: 3.34 10*6/MM3 (ref 4.2–5.4)
SODIUM BLD-SCNC: 145 MMOL/L (ref 135–153)
WBC NRBC COR # BLD: 12.38 10*3/MM3 (ref 4.5–12.5)

## 2017-08-07 PROCEDURE — 82962 GLUCOSE BLOOD TEST: CPT

## 2017-08-07 PROCEDURE — 80048 BASIC METABOLIC PNL TOTAL CA: CPT | Performed by: INTERNAL MEDICINE

## 2017-08-07 PROCEDURE — 94799 UNLISTED PULMONARY SVC/PX: CPT

## 2017-08-07 PROCEDURE — 25010000002 FUROSEMIDE PER 20 MG: Performed by: INTERNAL MEDICINE

## 2017-08-07 PROCEDURE — 63510000001 EPOETIN ALFA PER 1000 UNITS: Performed by: INTERNAL MEDICINE

## 2017-08-07 PROCEDURE — 63710000001 INSULIN DETEMIR PER 5 UNITS: Performed by: INTERNAL MEDICINE

## 2017-08-07 PROCEDURE — 94660 CPAP INITIATION&MGMT: CPT

## 2017-08-07 PROCEDURE — 99232 SBSQ HOSP IP/OBS MODERATE 35: CPT | Performed by: INTERNAL MEDICINE

## 2017-08-07 PROCEDURE — 25010000002 HEPARIN (PORCINE) PER 1000 UNITS: Performed by: HOSPITALIST

## 2017-08-07 PROCEDURE — 99233 SBSQ HOSP IP/OBS HIGH 50: CPT | Performed by: INTERNAL MEDICINE

## 2017-08-07 PROCEDURE — 83735 ASSAY OF MAGNESIUM: CPT | Performed by: INTERNAL MEDICINE

## 2017-08-07 PROCEDURE — 25010000002 GLUCAGON (RDNA) PER 1 MG: Performed by: HOSPITALIST

## 2017-08-07 PROCEDURE — 63710000001 INSULIN ASPART PER 5 UNITS: Performed by: INTERNAL MEDICINE

## 2017-08-07 PROCEDURE — 85025 COMPLETE CBC W/AUTO DIFF WBC: CPT | Performed by: INTERNAL MEDICINE

## 2017-08-07 RX ORDER — FUROSEMIDE 80 MG
80 TABLET ORAL DAILY
Status: DISCONTINUED | OUTPATIENT
Start: 2017-08-07 | End: 2017-08-08

## 2017-08-07 RX ADMIN — ERYTHROPOIETIN 10000 UNITS: 20000 INJECTION, SOLUTION INTRAVENOUS; SUBCUTANEOUS at 09:50

## 2017-08-07 RX ADMIN — FUROSEMIDE 80 MG: 10 INJECTION, SOLUTION INTRAMUSCULAR; INTRAVENOUS at 09:50

## 2017-08-07 RX ADMIN — Medication 5 ML: at 12:41

## 2017-08-07 RX ADMIN — DEXTROSE MONOHYDRATE 25 G: 25 INJECTION, SOLUTION INTRAVENOUS at 03:38

## 2017-08-07 RX ADMIN — IPRATROPIUM BROMIDE AND ALBUTEROL SULFATE 3 ML: .5; 3 SOLUTION RESPIRATORY (INHALATION) at 06:55

## 2017-08-07 RX ADMIN — CLOPIDOGREL BISULFATE 75 MG: 75 TABLET, FILM COATED ORAL at 09:49

## 2017-08-07 RX ADMIN — INSULIN ASPART 4 UNITS: 100 INJECTION, SOLUTION INTRAVENOUS; SUBCUTANEOUS at 22:04

## 2017-08-07 RX ADMIN — PANTOPRAZOLE SODIUM 40 MG: 40 TABLET, DELAYED RELEASE ORAL at 09:50

## 2017-08-07 RX ADMIN — GUAIFENESIN 1200 MG: 600 TABLET, EXTENDED RELEASE ORAL at 22:05

## 2017-08-07 RX ADMIN — HEPARIN SODIUM 5000 UNITS: 5000 INJECTION, SOLUTION INTRAVENOUS; SUBCUTANEOUS at 22:03

## 2017-08-07 RX ADMIN — INSULIN DETEMIR 10 UNITS: 100 INJECTION, SOLUTION SUBCUTANEOUS at 22:03

## 2017-08-07 RX ADMIN — DEXTROSE 15 G: 15 GEL ORAL at 03:07

## 2017-08-07 RX ADMIN — IPRATROPIUM BROMIDE AND ALBUTEROL SULFATE 3 ML: .5; 3 SOLUTION RESPIRATORY (INHALATION) at 14:16

## 2017-08-07 RX ADMIN — DOXYCYCLINE 100 MG: 100 CAPSULE ORAL at 09:49

## 2017-08-07 RX ADMIN — Medication 5 ML: at 22:06

## 2017-08-07 RX ADMIN — Medication 5 ML: at 18:36

## 2017-08-07 RX ADMIN — ASPIRIN 81 MG: 81 TABLET, CHEWABLE ORAL at 22:06

## 2017-08-07 RX ADMIN — METOPROLOL TARTRATE 50 MG: 50 TABLET, FILM COATED ORAL at 09:49

## 2017-08-07 RX ADMIN — METRONIDAZOLE 500 MG: 500 INJECTION, SOLUTION INTRAVENOUS at 05:56

## 2017-08-07 RX ADMIN — BUDESONIDE AND FORMOTEROL FUMARATE DIHYDRATE 2 PUFF: 80; 4.5 AEROSOL RESPIRATORY (INHALATION) at 06:55

## 2017-08-07 RX ADMIN — METRONIDAZOLE 500 MG: 500 INJECTION, SOLUTION INTRAVENOUS at 22:04

## 2017-08-07 RX ADMIN — METRONIDAZOLE 500 MG: 500 INJECTION, SOLUTION INTRAVENOUS at 12:42

## 2017-08-07 RX ADMIN — CLONAZEPAM 0.5 MG: 0.5 TABLET ORAL at 10:19

## 2017-08-07 RX ADMIN — HEPARIN SODIUM 5000 UNITS: 5000 INJECTION, SOLUTION INTRAVENOUS; SUBCUTANEOUS at 09:50

## 2017-08-07 RX ADMIN — FUROSEMIDE 80 MG: 80 TABLET ORAL at 15:47

## 2017-08-07 RX ADMIN — METOPROLOL TARTRATE 50 MG: 50 TABLET, FILM COATED ORAL at 22:05

## 2017-08-07 RX ADMIN — GUAIFENESIN 1200 MG: 600 TABLET, EXTENDED RELEASE ORAL at 09:50

## 2017-08-07 RX ADMIN — ATORVASTATIN CALCIUM 40 MG: 40 TABLET, FILM COATED ORAL at 22:05

## 2017-08-07 RX ADMIN — NICOTINE 1 PATCH: 21 PATCH, EXTENDED RELEASE TRANSDERMAL at 22:06

## 2017-08-07 RX ADMIN — ACETAMINOPHEN 650 MG: 325 TABLET ORAL at 15:45

## 2017-08-07 RX ADMIN — GLUCAGON HYDROCHLORIDE 1 MG: KIT at 03:34

## 2017-08-07 RX ADMIN — Medication 5 ML: at 09:51

## 2017-08-07 RX ADMIN — ISOSORBIDE MONONITRATE 60 MG: 60 TABLET, EXTENDED RELEASE ORAL at 09:50

## 2017-08-07 RX ADMIN — CEFEPIME HYDROCHLORIDE 1 G: 1 INJECTION, POWDER, FOR SOLUTION INTRAMUSCULAR; INTRAVENOUS at 12:41

## 2017-08-07 RX ADMIN — CLONAZEPAM 0.5 MG: 0.5 TABLET ORAL at 22:05

## 2017-08-07 RX ADMIN — DOXYCYCLINE 100 MG: 100 CAPSULE ORAL at 22:05

## 2017-08-07 RX ADMIN — ACETAMINOPHEN 650 MG: 325 TABLET ORAL at 22:05

## 2017-08-07 RX ADMIN — INSULIN ASPART 4 UNITS: 100 INJECTION, SOLUTION INTRAVENOUS; SUBCUTANEOUS at 12:40

## 2017-08-07 NOTE — PLAN OF CARE
Problem: Cardiac: Heart Failure (Adult)  Goal: Signs and Symptoms of Listed Potential Problems Will be Absent or Manageable (Cardiac: Heart Failure)    08/07/17 0026   Cardiac: Heart Failure   Problems Assessed (Heart Failure) all   Problems Present (Heart Failure) fluid/electrolyte imbalance;functional decline/self-care deficit;respiratory compromise         Problem: Renal Failure/Kidney Injury, Acute (Adult)  Goal: Signs and Symptoms of Listed Potential Problems Will be Absent or Manageable (Renal Failure/Kidney Injury, Acute)    08/07/17 0026   Renal Failure/Kidney Injury, Acute   Problems Assessed (Acute Renal Failure/Kidney Injury) all   Problems Present (Acute Renal Failure/Kidney Injury) fluid imbalance         Problem: Pressure Ulcer Risk (Saul Scale) (Adult,Obstetrics,Pediatric)  Goal: Skin Integrity    08/07/17 0026   Pressure Ulcer Risk (Saul Scale) (Adult,Obstetrics,Pediatric)   Skin Integrity making progress toward outcome         Problem: Skin Integrity Impairment, Risk/Actual (Adult)  Goal: Identify Related Risk Factors and Signs and Symptoms    08/07/17 0026   Skin Integrity Impairment, Risk/Actual   Skin Integrity Impairment, Risk/Actual: Related Risk Factors edema   Signs and Symptoms (Skin Integrity Impairment) edema       Goal: Skin Integrity/Wound Healing    08/07/17 0026   Skin Integrity Impairment, Risk/Actual (Adult)   Skin Integrity/Wound Healing making progress toward outcome

## 2017-08-07 NOTE — NURSING NOTE
Safety round check found pt lying cross ways on bed and very sweaty. Blood sugar check 34. Glutose oral gel given. And pt drank grape soda. Pt did not improve, check blood sugar again 29. Dr Tosha Higginbotham and two RNs from [facility] came to help. Pt had recently pulled IV out , Another one was placed by Trina, and Glucagen inj and D50W given, recheck blood sugar 193. Pt stable and alert now.

## 2017-08-07 NOTE — PROGRESS NOTES
Nephrology  Note      Subjective      She denies shortness of breath. She is desperate to go home.      Objective     Vital Signs  Temp:  [97.6 °F (36.4 °C)-98.7 °F (37.1 °C)] 97.6 °F (36.4 °C)  Heart Rate:  [58-89] 58  Resp:  [18] 18  BP: (111-190)/(56-99) 123/63    I/O this shift:  In: 480 [P.O.:480]  Out: -   I/O last 3 completed shifts:  In: 3790 [P.O.:3240; IV Piggyback:550]  Out: 4950 [Urine:4950]    Physical Examination:    General Appearance : alert, no distress  Head : normocephalic  Eyes :no pallor   Throat : oral mucosa moist  Lungs : reduced breath sounds at bases  Heart : regular rhythm & normal rate, normal S1, S2, no murmur   Abdomen :  soft non-tender  Extremities : 1+ edema - better  Skin : no  rash  Neurologic :grossly no focal deficitis    Laboratory Data :      WBC WBC   Date Value Ref Range Status   08/07/2017 12.38 4.50 - 12.50 10*3/mm3 Final   08/06/2017 11.82 4.50 - 12.50 10*3/mm3 Final   08/05/2017 17.35 (H) 4.50 - 12.50 10*3/mm3 Final      HGB Hemoglobin   Date Value Ref Range Status   08/07/2017 9.6 (L) 12.0 - 16.0 g/dL Final   08/06/2017 9.8 (L) 12.0 - 16.0 g/dL Final   08/05/2017 10.1 (L) 12.0 - 16.0 g/dL Final      HCT Hematocrit   Date Value Ref Range Status   08/07/2017 32.0 (L) 37.0 - 47.0 % Final   08/06/2017 32.7 (L) 37.0 - 47.0 % Final   08/05/2017 33.3 (L) 37.0 - 47.0 % Final      Platlets No results found for: LABPLAT   MCV MCV   Date Value Ref Range Status   08/07/2017 95.8 (H) 80.0 - 94.0 fL Final   08/06/2017 94.2 (H) 80.0 - 94.0 fL Final   08/05/2017 92.2 80.0 - 94.0 fL Final          Sodium Sodium   Date Value Ref Range Status   08/07/2017 145 135 - 153 mmol/L Final   08/06/2017 146 135 - 153 mmol/L Final   08/05/2017 144 135 - 153 mmol/L Final      Potassium Potassium   Date Value Ref Range Status   08/07/2017 3.4 (L) 3.5 - 5.3 mmol/L Final   08/06/2017 3.1 (L) 3.5 - 5.3 mmol/L Final   08/05/2017 3.9 3.5 - 5.3 mmol/L Final      Chloride Chloride   Date Value Ref Range  Status   08/07/2017 107 99 - 112 mmol/L Final   08/06/2017 107 99 - 112 mmol/L Final   08/05/2017 108 99 - 112 mmol/L Final      CO2 CO2   Date Value Ref Range Status   08/07/2017 25.5 24.3 - 31.9 mmol/L Final   08/06/2017 28.1 24.3 - 31.9 mmol/L Final   08/05/2017 25.8 24.3 - 31.9 mmol/L Final      BUN BUN   Date Value Ref Range Status   08/07/2017 92 (H) 7 - 21 mg/dL Final   08/06/2017 100 (H) 7 - 21 mg/dL Final   08/05/2017 106 (H) 7 - 21 mg/dL Final      Creatinine Creatinine   Date Value Ref Range Status   08/07/2017 3.27 (H) 0.43 - 1.29 mg/dL Final   08/06/2017 3.33 (H) 0.43 - 1.29 mg/dL Final   08/05/2017 3.55 (H) 0.43 - 1.29 mg/dL Final      Calcium Calcium   Date Value Ref Range Status   08/07/2017 6.7 (L) 7.7 - 10.0 mg/dL Final   08/06/2017 6.8 (L) 7.7 - 10.0 mg/dL Final   08/05/2017 6.6 (L) 7.7 - 10.0 mg/dL Final      PO4 No results found for: CAPO4   Albumin No results found for: ALBUMIN   Magnesium Magnesium   Date Value Ref Range Status   08/07/2017 2.3 1.7 - 2.6 mg/dL Final   08/06/2017 1.9 1.7 - 2.6 mg/dL Final   08/05/2017 1.9 1.7 - 2.6 mg/dL Final      Uric Acid No results found for: URICACID     Radiology results :     Imaging Results (last 24 hours)     Procedure Component Value Units Date/Time    XR Chest 1 View [[id number]] Collected:  08/06/17 1701     Updated:  08/06/17 1704    Narrative:       XR CHEST 1 VW-     CLINICAL INDICATION: CHF; N17.9-Acute kidney failure, unspecified;  N18.9-Chronic kidney disease, unspecified          COMPARISON: 8/4/2017      TECHNIQUE: Single frontal view of the chest.     FINDINGS:     There is bibasilar consolidation  The cardiac silhouette is normal. The pulmonary vasculature is  unremarkable.  There is no evidence of an acute osseous abnormality.   There are no suspicious-appearing parenchymal soft tissue nodules.            Impression:       Bibasilar consolidation         This report was finalized on 8/6/2017 5:02 PM by Dr. Roger Varma MD.                  Medications:        aspirin 81 mg Oral Nightly   atorvastatin 40 mg Oral Nightly   budesonide-formoterol 2 puff Inhalation BID - RT   cefepime 1 g Intravenous Q24H   clopidogrel 75 mg Oral Daily   doxycycline 100 mg Oral Q12H   epoetin jane 10,000 Units Subcutaneous Once per day on Mon Wed Fri   furosemide 80 mg Intravenous Q12H   guaiFENesin 1,200 mg Oral Q12H   heparin (porcine) 5,000 Units Subcutaneous Q12H   insulin aspart 0-9 Units Subcutaneous 4x Daily AC & at Bedtime   insulin detemir 10 Units Subcutaneous Nightly   ipratropium-albuterol 3 mL Nebulization Q4H - RT   isosorbide mononitrate 60 mg Oral Q24H   metoprolol tartrate 50 mg Oral Q12H   metroNIDAZOLE 500 mg Intravenous Q8H   nicotine 1 patch Transdermal Nightly   nystatin susp + lidocaine viscous 5 mL Swish & Swallow 4x Daily   pantoprazole 40 mg Oral QAM AC   Pharmacy Meds to Bed Consult  Does not apply Daily   predniSONE 5 mg Oral Daily With Breakfast       hold 1 each       Assessment/Plan     Principal Problem:    Acute on chronic renal failure  Active Problems:    CHF (congestive heart failure)      1. SIMON on CKD 4 : her baseline creatinine is 2.5. She was worsening CKD from uncontrolled HTN and type 2 DM.  Her creatinine is has improved to 3.2. Will reduce lasix to 80 mg po daily  24-hour creatinine clearance is 14.7 cc/min    2. Metabolic and respiratory acidosis : better    3. Anemia of chronic disease : Hb improved with epogen    4. Edema : much better. She is not in CHF. CXr reviewed personally    5. Hypoxic and hypercarbic respiratory failure with R pleural effusion : s/p thoracentesis    6. Hypoglycemia : Reduce levemir    Ok to dc from renal standpoint, fu Dr Wheeler in 1 week with BMP. Counseled on low Na diet and to avoid NSAIDS  I discussed the patients findings and my recommendations with patient,Dr Abdulaziz Chavez MD  08/07/17  6:53 AM

## 2017-08-07 NOTE — PROGRESS NOTES
Discharge Planning Assessment   Horacio     Patient Name: Sara Cardona  MRN: [id number]  Today's Date: 8/7/2017    Admit Date: 7/24/2017          Discharge Plan       08/07/17 1514    Case Management/Social Work Plan    Plan SS spoke with Dr. Martin regarding home bi-pap vs triology in the home.  SS informed Dr. Martin pt has been approved for a bipap and bipap has been delivered to the pt's hospital room.  Dr. Martin is in agreement with the bipap at home.  SS will continue to follow.     Patient/Family In Agreement With Plan yes          Expected Discharge Date and Time     Expected Discharge Date Expected Discharge Time    Aug 8, 2017           Zandra Sun

## 2017-08-07 NOTE — PROGRESS NOTES
LOS: 14 days     Chief Complaint:  Pulmonology is following for shortness of breath     Subjective     Interval History: Patient states that she is not feeling very well this morning due to dropping blood sugars overnight.  She states that her breathing is baseline.  Patient's blood sugar dropped to 34 during the night.  Patient states that she wants to go home today.    History taken from: patient chart RN    Review of Systems:   Review of Systems - History obtained from chart review and the patient  General ROS: negative for - chills, fatigue or fever  Psychological ROS: negative for - anxiety or depression  ENT ROS: negative for - headaches, visual changes or vocal changes  Allergy and Immunology ROS: negative for - nasal congestion, postnasal drip or seasonal allergies  Endocrine ROS: negative for - polydipsia/polyuria  Respiratory ROS: positive for - shortness of breath  Cardiovascular ROS: no chest pain or dyspnea on exertion  Gastrointestinal ROS: no abdominal pain, change in bowel habits, or black or bloody stools  Musculoskeletal ROS: negative for - joint pain, joint stiffness or joint swelling  Neurological ROS: no TIA or stroke symptoms                    Objective     Vital Signs  Temp:  [97.6 °F (36.4 °C)-98.7 °F (37.1 °C)] 97.6 °F (36.4 °C)  Heart Rate:  [58-89] 74  Resp:  [18] 18  BP: (111-190)/(56-99) 171/92  Body mass index is 26.81 kg/(m^2).    Intake/Output Summary (Last 24 hours) at 08/07/17 1032  Last data filed at 08/07/17 0100   Gross per 24 hour   Intake             1060 ml   Output             1250 ml   Net             -190 ml          Physical Exam:  GENERAL APPEARANCE: Well developed, well nourished, alert and cooperative, and appears to be in no acute distress.    HEAD: normocephalic.    EYES: PERRL    NECK: Neck supple.     CARDIAC: Normal S1 and S2. No S3, S4 or murmurs. Rhythm is regular. There is no peripheral edema, cyanosis or pallor. Extremities are warm and well perfused.  Capillary refill is less than 2 seconds. No carotid bruits.    RESPIRATORY: diminished breath sounds.    GI: Positive bowel sounds. Soft, nondistended, nontender.     MUSCULOSKELTAL: No significant deformity or joint abnormality. No edema. Peripheral pulses intact.     NEUROLOGICAL: Strength and sensation symmetric and intact throughout.     PSYCHIATRIC: The mental examination revealed the patient was oriented to person, place, and time.                 Results Review:                I reviewed the patient's new clinical results.  I reviewed the patient's new imaging results and agree with the interpretation.    Results from last 7 days  Lab Units 08/07/17  0200 08/06/17  0459 08/05/17  0601   WBC 10*3/mm3 12.38 11.82 17.35*   HEMOGLOBIN g/dL 9.6* 9.8* 10.1*   PLATELETS 10*3/mm3 176 166 182       Results from last 7 days  Lab Units 08/07/17  0200 08/06/17  0459 08/05/17  0601   SODIUM mmol/L 145 146 144   POTASSIUM mmol/L 3.4* 3.1* 3.9   CHLORIDE mmol/L 107 107 108   CO2 mmol/L 25.5 28.1 25.8   BUN mg/dL 92* 100* 106*   CREATININE mg/dL 3.27* 3.33* 3.55*   CALCIUM mg/dL 6.7* 6.8* 6.6*   GLUCOSE mg/dL 38* 31* 157*   MAGNESIUM mg/dL 2.3 1.9 1.9     Lab Results   Component Value Date    INR 0.96 07/24/2017    INR 0.93 08/08/2016    INR 0.93 10/09/2015    PROTIME 12.9 07/24/2017    PROTIME 10.5 08/08/2016    PROTIME 10.3 10/09/2015           Invalid input(s): PROT, LABALBU    Results from last 7 days  Lab Units 08/02/17  0922   PH, ARTERIAL pH units 7.249*   PO2 ART mm Hg 87.4   PCO2, ARTERIAL mm Hg 39.9   HCO3 ART mmol/L 17.1*     Imaging Results (last 24 hours)     Procedure Component Value Units Date/Time    XR Chest 1 View [[id number]] Collected:  08/06/17 1701     Updated:  08/06/17 1704    Narrative:       XR CHEST 1 VW-     CLINICAL INDICATION: CHF; N17.9-Acute kidney failure, unspecified;  N18.9-Chronic kidney disease, unspecified          COMPARISON: 8/4/2017      TECHNIQUE: Single frontal view of the chest.      FINDINGS:     There is bibasilar consolidation  The cardiac silhouette is normal. The pulmonary vasculature is  unremarkable.  There is no evidence of an acute osseous abnormality.   There are no suspicious-appearing parenchymal soft tissue nodules.            Impression:       Bibasilar consolidation         This report was finalized on 8/6/2017 5:02 PM by Dr. Roger Varma MD.                Medication Review:   Scheduled Medications:    aspirin 81 mg Oral Nightly   atorvastatin 40 mg Oral Nightly   budesonide-formoterol 2 puff Inhalation BID - RT   cefepime 1 g Intravenous Q24H   clopidogrel 75 mg Oral Daily   doxycycline 100 mg Oral Q12H   epoetin jane 10,000 Units Subcutaneous Once per day on Mon Wed Fri   furosemide 80 mg Intravenous Q12H   guaiFENesin 1,200 mg Oral Q12H   heparin (porcine) 5,000 Units Subcutaneous Q12H   insulin aspart 0-9 Units Subcutaneous 4x Daily AC & at Bedtime   insulin detemir 10 Units Subcutaneous Nightly   ipratropium-albuterol 3 mL Nebulization Q4H - RT   isosorbide mononitrate 60 mg Oral Q24H   metoprolol tartrate 50 mg Oral Q12H   metroNIDAZOLE 500 mg Intravenous Q8H   nicotine 1 patch Transdermal Nightly   nystatin susp + lidocaine viscous 5 mL Swish & Swallow 4x Daily   pantoprazole 40 mg Oral QAM AC   Pharmacy Meds to Bed Consult  Does not apply Daily     Continuous infusions:    hold 1 each       Assessment/Plan     Respiratory failure:  Metabolic acidosis is likely caused by renal failure.    She states that she did not wear her BiPAP overnight due to her dropping blood sugar.    Discontinued prednisone.    Chest xray is still showing bibasilar.  Could possibly be due to aspiration.    Continue IV antibiotics.  Microbiology Results (last 10 days)     Procedure Component Value - Date/Time    Stool Culture [[id number]] Collected:  08/06/17 0744    Lab Status:  Preliminary result Specimen:  Stool from Per Rectum Updated:  08/07/17 0721     Stool Culture Normal Fecal Andreina     Clostridium Difficile Toxin [[id number]] Collected:  08/06/17 0744    Lab Status:  Final result Specimen:  Stool from Per Rectum Updated:  08/06/17 1807    Narrative:       The following orders were created for panel order Clostridium Difficile Toxin.  Procedure                               Abnormality         Status                     ---------                               -----------         ------                     Clostridium Difficile To...[[id number]]                      Final result                 Please view results for these tests on the individual orders.    Clostridium Difficile Toxin, PCR [[id number]] Collected:  08/06/17 0744    Lab Status:  Final result Specimen:  Stool from Per Rectum Updated:  08/06/17 1807     C. Difficile Toxins by PCR Negative     027 Toxin Presumptive Negative    Narrative:         For In Vitro Diagnostic use only.  027-NAP1-BI results are NOT intended to guide treatment. 027 typing is relative to PCR ribotyping and shown to be equivalent to B1 typing by restriction endonuclease analysis or NAP1 typing by pulse field gel electrophoresis.    Blood Culture [[id number]]  (Normal) Collected:  08/05/17 1149    Lab Status:  Preliminary result Specimen:  Blood from Hand, Digit Left Updated:  08/06/17 1601     Blood Culture No growth at 24 hours          Continue scheduled inhalants and PRN neb treatments.    Acute on chronic renal failure:  Continue lasix IV per nephrology.  Management per them.     is checking on triology for home use.      Patient Active Problem List   Diagnosis Code   • Acute renal failure N17.9   • Essential hypertension I10   • Dyslipidemia E78.5   • Diabetic neuropathy E11.40   • Chronic pain G89.29   • Anxiety F41.9   • Anemia D64.9   • Acute on chronic renal failure N17.9, N18.9   • CHF (congestive heart failure) I50.9             Katelynn Neal, CLARA  08/07/17  10:32 AM        Attestation: Scribed for Dr. Martin, by Katelynn Neal  CLARA CURRIE, Devin Martin M.D. attest that the above note accurately reflects the work and decisions made  by me.  Patient was seen and evaluated by Dr. Martin, including history of present illness, physical exam, assessment, and treatment plan.  The above note was reviewed and edited by Dr. Martin.

## 2017-08-07 NOTE — PLAN OF CARE
Problem: Cardiac: Heart Failure (Adult)  Goal: Signs and Symptoms of Listed Potential Problems Will be Absent or Manageable (Cardiac: Heart Failure)  Outcome: Ongoing (interventions implemented as appropriate)    Problem: Renal Failure/Kidney Injury, Acute (Adult)  Goal: Signs and Symptoms of Listed Potential Problems Will be Absent or Manageable (Renal Failure/Kidney Injury, Acute)  Outcome: Ongoing (interventions implemented as appropriate)    Problem: Patient Care Overview (Adult)  Goal: Plan of Care Review  Outcome: Ongoing (interventions implemented as appropriate)

## 2017-08-07 NOTE — PROGRESS NOTES
Subjective     History:   Sara Cardona is a 67 y.o. female admitted on 7/24/2017 secondary to Acute on chronic renal failure     Procedures:   7/25/17: US-guided right-sided thoracentesis with 350mL of fluid removed     Patient seen and examined with MARILY Parker. Awake and alert. States she feels significantly improved and is anxious to go home. States her breathing is back to baseline. Episode of hypoglycemia this AM.     History taken from: patient, chart, and RN.      Objective     Vital Signs  Temp:  [97.6 °F (36.4 °C)-98.7 °F (37.1 °C)] 97.6 °F (36.4 °C)  Heart Rate:  [58-89] 81  Resp:  [18] 18  BP: (111-190)/(56-99) 167/92    Intake/Output Summary (Last 24 hours) at 08/07/17 1159  Last data filed at 08/07/17 0100   Gross per 24 hour   Intake             1060 ml   Output             1250 ml   Net             -190 ml         Physical Exam:  General:    Awake, alert, in no acute distress, chronically ill appearing    Heart:      Normal S1 and S2. Regular rate and rhythm. No significant murmur, rubs or gallops appreciated.   Lungs:     Respirations regular, even and unlabored. Lungs clear to ausculation B/L. No wheezes, rales or rhonchi.     Abdomen:   Soft and nontender. No guarding, rebound tenderness or  organomegaly noted. Bowel sounds present x 4.   Extremities:  1-2+ edema in LE B/L but improved. Moves UE and LE equally B/L.     Results Review:      Results from last 7 days  Lab Units 08/07/17  0200 08/06/17  0459 08/05/17  0601 08/04/17  0341 08/03/17  0154 08/02/17  0108 08/01/17  0055   WBC 10*3/mm3 12.38 11.82 17.35* 17.87* 14.81* 12.38 12.44   HEMOGLOBIN g/dL 9.6* 9.8* 10.1* 9.3* 8.6* 8.2* 9.4*   PLATELETS 10*3/mm3 176 166 182 207 229 233 303       Results from last 7 days  Lab Units 08/07/17  0200 08/06/17  0459 08/05/17  0601 08/04/17  0341 08/03/17  0154 08/02/17  0108 08/01/17  0055   SODIUM mmol/L 145 146 144 145 144 139 141   POTASSIUM mmol/L 3.4* 3.1* 3.9 3.9 4.0 4.9 4.7   CHLORIDE mmol/L 107 107  108 110 112 109 111   CO2 mmol/L 25.5 28.1 25.8 22.4* 17.0* 17.1* 17.1*   BUN mg/dL 92* 100* 106* 108* 123* 117* 117*   CREATININE mg/dL 3.27* 3.33* 3.55* 3.62* 3.96* 3.98* 3.59*   CALCIUM mg/dL 6.7* 6.8* 6.6* 6.3* 6.4* 6.7* 7.2*   GLUCOSE mg/dL 38* 31* 157* 84 178* 262* 123*           Results from last 7 days  Lab Units 08/07/17  0200 08/06/17  0459 08/05/17  0601 08/04/17  0341 08/03/17  0154 08/02/17  0108 08/01/17  0055   MAGNESIUM mg/dL 2.3 1.9 1.9 1.4* 1.6* 1.8 1.8               Imaging Results (last 24 hours)     Procedure Component Value Units Date/Time    XR Chest 1 View [[id number]] Collected:  08/06/17 1701     Updated:  08/06/17 1704    Narrative:       XR CHEST 1 VW-     CLINICAL INDICATION: CHF; N17.9-Acute kidney failure, unspecified;  N18.9-Chronic kidney disease, unspecified          COMPARISON: 8/4/2017      TECHNIQUE: Single frontal view of the chest.     FINDINGS:     There is bibasilar consolidation  The cardiac silhouette is normal. The pulmonary vasculature is  unremarkable.  There is no evidence of an acute osseous abnormality.   There are no suspicious-appearing parenchymal soft tissue nodules.            Impression:       Bibasilar consolidation         This report was finalized on 8/6/2017 5:02 PM by Dr. Roger Varma MD.               Medications:    aspirin 81 mg Oral Nightly   atorvastatin 40 mg Oral Nightly   budesonide-formoterol 2 puff Inhalation BID - RT   cefepime 1 g Intravenous Q24H   clopidogrel 75 mg Oral Daily   doxycycline 100 mg Oral Q12H   epoetin jane 10,000 Units Subcutaneous Once per day on Mon Wed Fri   furosemide 80 mg Intravenous Q12H   guaiFENesin 1,200 mg Oral Q12H   heparin (porcine) 5,000 Units Subcutaneous Q12H   insulin aspart 0-9 Units Subcutaneous 4x Daily AC & at Bedtime   insulin detemir 10 Units Subcutaneous Nightly   ipratropium-albuterol 3 mL Nebulization Q4H - RT   isosorbide mononitrate 60 mg Oral Q24H   metoprolol tartrate 50 mg Oral Q12H    metroNIDAZOLE 500 mg Intravenous Q8H   nicotine 1 patch Transdermal Nightly   nystatin susp + lidocaine viscous 5 mL Swish & Swallow 4x Daily   pantoprazole 40 mg Oral QAM AC   Pharmacy Meds to Bed Consult  Does not apply Daily       hold 1 each           Assessment/Plan   Acute on chronic hypoxic respiratory failure: Likely 2/2 COPD exacerbation and diastolic CHF exacerbation. Steroids have been weaned. Cont Doxycycline and scheduled nebs. Cont diuresis per nephrology. Cont BiPAP PRN and QHS. Trilogy has been arranged for upon discharge. Pulm input appreciated.     SIMON on CKD IV: Cr stable today. Cont diuresis per nephrology. Pt is at high risk for dialysis. Repeat labs in the AM. Nephrology input appreciated.     Acute diastolic CHF exacerbation: Echo obtained on 7/4/17 revealing an EF of 66-70%, trace AR, mild MR, mild MS, mild to moderate TR and severe pulmonary HTN. Appears clinically improved. Cont diuresis per nephrology.     Acute exacerbation of COPD with ongoing tobacco abuse: Cont Doxycycline and nebs. Cont BiPAP. Steroids have been tapered. Nicotine patch ordered. Cont to encourage cessation.     Mixed respiratory and metabolic acidosis: Pt has been more compliant with BiPAP.  Cont BiPAP and treatment per nephrology.      Hyperkalemia: Now resolved and K+ is borderline low this AM. Repeat labs in the AM.     Essential HTN: Cont current regimen and monitor.     DM II, insulin dependent: HgbA1c 6.1 on 7/3/17. Episode of hypoglycemia this AM. Levemir has been reduced. Cont to monitor closely as pt has been very noncompliant with dietary restrictions.     Pleural effusions: R>L. S/P right-sided thoracentesis. Pleural fluid appears transudative. Cultures negative to date.      CAD: Cont current medical management. Cardiology following with input appreciated.     Chronic pain: Neurontin decreased in setting of SIMON.     Anxiety: Cont klonopin with close monitoring in the setting of respiratory failure.      DVT PPX: SQ heparin    Discussed with Giuseppe Chavez and Mario.     Pt is at high risk 2/2 acute on chronic hypoxic respiratory failure, SIMON on CKD IV at high risk for dialysis, CHF exacerbation, COPD exacerbation with ongoing tobacco abuse, mixed acidosis, DM with hypoglycemia and hx of noncompliance.       Fredis Cintron,   08/07/17  11:59 AM

## 2017-08-08 LAB
ANION GAP SERPL CALCULATED.3IONS-SCNC: 11.6 MMOL/L (ref 3.6–11.2)
ANION GAP SERPL CALCULATED.3IONS-SCNC: 13.5 MMOL/L (ref 3.6–11.2)
BACTERIA SPEC AEROBE CULT: NORMAL
BASOPHILS # BLD AUTO: 0.02 10*3/MM3 (ref 0–0.3)
BASOPHILS NFR BLD AUTO: 0.2 % (ref 0–2)
BUN BLD-MCNC: 106 MG/DL (ref 7–21)
BUN BLD-MCNC: 94 MG/DL (ref 7–21)
BUN/CREAT SERPL: 24.3 (ref 7–25)
BUN/CREAT SERPL: 25.7 (ref 7–25)
CALCIUM SPEC-SCNC: 6 MG/DL (ref 7.7–10)
CALCIUM SPEC-SCNC: 6.3 MG/DL (ref 7.7–10)
CHLORIDE SERPL-SCNC: 102 MMOL/L (ref 99–112)
CHLORIDE SERPL-SCNC: 105 MMOL/L (ref 99–112)
CO2 SERPL-SCNC: 24.4 MMOL/L (ref 24.3–31.9)
CO2 SERPL-SCNC: 24.5 MMOL/L (ref 24.3–31.9)
CREAT BLD-MCNC: 3.87 MG/DL (ref 0.43–1.29)
CREAT BLD-MCNC: 4.12 MG/DL (ref 0.43–1.29)
CRP SERPL-MCNC: 1.48 MG/DL (ref 0–0.99)
DEPRECATED RDW RBC AUTO: 65.5 FL (ref 37–54)
EOSINOPHIL # BLD AUTO: 0.32 10*3/MM3 (ref 0–0.7)
EOSINOPHIL NFR BLD AUTO: 3 % (ref 0–7)
ERYTHROCYTE [DISTWIDTH] IN BLOOD BY AUTOMATED COUNT: 19.8 % (ref 11.5–14.5)
GFR SERPL CREATININE-BSD FRML MDRD: 11 ML/MIN/1.73
GFR SERPL CREATININE-BSD FRML MDRD: 12 ML/MIN/1.73
GLUCOSE BLD-MCNC: 166 MG/DL (ref 70–110)
GLUCOSE BLD-MCNC: 178 MG/DL (ref 70–110)
GLUCOSE BLDC GLUCOMTR-MCNC: 154 MG/DL (ref 70–130)
GLUCOSE BLDC GLUCOMTR-MCNC: 165 MG/DL (ref 70–130)
GLUCOSE BLDC GLUCOMTR-MCNC: 190 MG/DL (ref 70–130)
GLUCOSE BLDC GLUCOMTR-MCNC: 268 MG/DL (ref 70–130)
GLUCOSE BLDC GLUCOMTR-MCNC: 93 MG/DL (ref 70–130)
HCT VFR BLD AUTO: 30.8 % (ref 37–47)
HGB BLD-MCNC: 9.1 G/DL (ref 12–16)
IMM GRANULOCYTES # BLD: 0.04 10*3/MM3 (ref 0–0.03)
IMM GRANULOCYTES NFR BLD: 0.4 % (ref 0–0.5)
LYMPHOCYTES # BLD AUTO: 2.44 10*3/MM3 (ref 1–3)
LYMPHOCYTES NFR BLD AUTO: 22.9 % (ref 16–46)
MAGNESIUM SERPL-MCNC: 2 MG/DL (ref 1.7–2.6)
MCH RBC QN AUTO: 28.7 PG (ref 27–33)
MCHC RBC AUTO-ENTMCNC: 29.5 G/DL (ref 33–37)
MCV RBC AUTO: 97.2 FL (ref 80–94)
MONOCYTES # BLD AUTO: 0.87 10*3/MM3 (ref 0.1–0.9)
MONOCYTES NFR BLD AUTO: 8.2 % (ref 0–12)
NEUTROPHILS # BLD AUTO: 6.97 10*3/MM3 (ref 1.4–6.5)
NEUTROPHILS NFR BLD AUTO: 65.3 % (ref 40–75)
OSMOLALITY SERPL CALC.SUM OF ELEC: 314.1 MOSM/KG (ref 273–305)
OSMOLALITY SERPL CALC.SUM OF ELEC: 317.1 MOSM/KG (ref 273–305)
PLATELET # BLD AUTO: 159 10*3/MM3 (ref 130–400)
PMV BLD AUTO: 11.5 FL (ref 6–10)
POTASSIUM BLD-SCNC: 3.9 MMOL/L (ref 3.5–5.3)
POTASSIUM BLD-SCNC: 4 MMOL/L (ref 3.5–5.3)
RBC # BLD AUTO: 3.17 10*6/MM3 (ref 4.2–5.4)
SODIUM BLD-SCNC: 140 MMOL/L (ref 135–153)
SODIUM BLD-SCNC: 141 MMOL/L (ref 135–153)
WBC NRBC COR # BLD: 10.66 10*3/MM3 (ref 4.5–12.5)

## 2017-08-08 PROCEDURE — 85025 COMPLETE CBC W/AUTO DIFF WBC: CPT | Performed by: INTERNAL MEDICINE

## 2017-08-08 PROCEDURE — 25010000002 HEPARIN (PORCINE) PER 1000 UNITS: Performed by: HOSPITALIST

## 2017-08-08 PROCEDURE — 94799 UNLISTED PULMONARY SVC/PX: CPT

## 2017-08-08 PROCEDURE — 80048 BASIC METABOLIC PNL TOTAL CA: CPT | Performed by: INTERNAL MEDICINE

## 2017-08-08 PROCEDURE — 63710000001 INSULIN DETEMIR PER 5 UNITS: Performed by: INTERNAL MEDICINE

## 2017-08-08 PROCEDURE — 86140 C-REACTIVE PROTEIN: CPT | Performed by: INTERNAL MEDICINE

## 2017-08-08 PROCEDURE — 94660 CPAP INITIATION&MGMT: CPT

## 2017-08-08 PROCEDURE — 99233 SBSQ HOSP IP/OBS HIGH 50: CPT | Performed by: INTERNAL MEDICINE

## 2017-08-08 PROCEDURE — 83735 ASSAY OF MAGNESIUM: CPT | Performed by: INTERNAL MEDICINE

## 2017-08-08 PROCEDURE — 63710000001 INSULIN ASPART PER 5 UNITS: Performed by: INTERNAL MEDICINE

## 2017-08-08 PROCEDURE — 82962 GLUCOSE BLOOD TEST: CPT

## 2017-08-08 PROCEDURE — 99232 SBSQ HOSP IP/OBS MODERATE 35: CPT | Performed by: INTERNAL MEDICINE

## 2017-08-08 RX ORDER — SODIUM CHLORIDE 9 MG/ML
75 INJECTION, SOLUTION INTRAVENOUS ONCE
Status: COMPLETED | OUTPATIENT
Start: 2017-08-08 | End: 2017-08-08

## 2017-08-08 RX ADMIN — PANTOPRAZOLE SODIUM 40 MG: 40 TABLET, DELAYED RELEASE ORAL at 06:30

## 2017-08-08 RX ADMIN — CLONAZEPAM 0.5 MG: 0.5 TABLET ORAL at 21:23

## 2017-08-08 RX ADMIN — Medication 5 ML: at 17:00

## 2017-08-08 RX ADMIN — METRONIDAZOLE 500 MG: 500 INJECTION, SOLUTION INTRAVENOUS at 15:22

## 2017-08-08 RX ADMIN — BUDESONIDE AND FORMOTEROL FUMARATE DIHYDRATE 2 PUFF: 80; 4.5 AEROSOL RESPIRATORY (INHALATION) at 18:41

## 2017-08-08 RX ADMIN — BUDESONIDE AND FORMOTEROL FUMARATE DIHYDRATE 2 PUFF: 80; 4.5 AEROSOL RESPIRATORY (INHALATION) at 06:45

## 2017-08-08 RX ADMIN — HEPARIN SODIUM 5000 UNITS: 5000 INJECTION, SOLUTION INTRAVENOUS; SUBCUTANEOUS at 21:24

## 2017-08-08 RX ADMIN — METRONIDAZOLE 500 MG: 500 INJECTION, SOLUTION INTRAVENOUS at 21:24

## 2017-08-08 RX ADMIN — IPRATROPIUM BROMIDE AND ALBUTEROL SULFATE 3 ML: .5; 3 SOLUTION RESPIRATORY (INHALATION) at 18:41

## 2017-08-08 RX ADMIN — GUAIFENESIN 1200 MG: 600 TABLET, EXTENDED RELEASE ORAL at 21:23

## 2017-08-08 RX ADMIN — INSULIN ASPART 2 UNITS: 100 INJECTION, SOLUTION INTRAVENOUS; SUBCUTANEOUS at 21:24

## 2017-08-08 RX ADMIN — ATORVASTATIN CALCIUM 40 MG: 40 TABLET, FILM COATED ORAL at 21:23

## 2017-08-08 RX ADMIN — Medication 5 ML: at 11:37

## 2017-08-08 RX ADMIN — SODIUM CHLORIDE 75 ML/HR: 9 INJECTION, SOLUTION INTRAVENOUS at 08:08

## 2017-08-08 RX ADMIN — INSULIN DETEMIR 10 UNITS: 100 INJECTION, SOLUTION SUBCUTANEOUS at 21:24

## 2017-08-08 RX ADMIN — DOXYCYCLINE 100 MG: 100 CAPSULE ORAL at 08:07

## 2017-08-08 RX ADMIN — CLONAZEPAM 0.5 MG: 0.5 TABLET ORAL at 10:48

## 2017-08-08 RX ADMIN — DOXYCYCLINE 100 MG: 100 CAPSULE ORAL at 21:23

## 2017-08-08 RX ADMIN — IPRATROPIUM BROMIDE AND ALBUTEROL SULFATE 3 ML: .5; 3 SOLUTION RESPIRATORY (INHALATION) at 14:39

## 2017-08-08 RX ADMIN — METOPROLOL TARTRATE 50 MG: 50 TABLET, FILM COATED ORAL at 08:07

## 2017-08-08 RX ADMIN — CLOPIDOGREL BISULFATE 75 MG: 75 TABLET, FILM COATED ORAL at 08:07

## 2017-08-08 RX ADMIN — Medication 5 ML: at 21:24

## 2017-08-08 RX ADMIN — GUAIFENESIN 1200 MG: 600 TABLET, EXTENDED RELEASE ORAL at 08:07

## 2017-08-08 RX ADMIN — IPRATROPIUM BROMIDE AND ALBUTEROL SULFATE 3 ML: .5; 3 SOLUTION RESPIRATORY (INHALATION) at 06:44

## 2017-08-08 RX ADMIN — INSULIN ASPART 6 UNITS: 100 INJECTION, SOLUTION INTRAVENOUS; SUBCUTANEOUS at 16:57

## 2017-08-08 RX ADMIN — CEFEPIME HYDROCHLORIDE 1 G: 1 INJECTION, POWDER, FOR SOLUTION INTRAMUSCULAR; INTRAVENOUS at 11:37

## 2017-08-08 RX ADMIN — ISOSORBIDE MONONITRATE 60 MG: 60 TABLET, EXTENDED RELEASE ORAL at 08:07

## 2017-08-08 RX ADMIN — IPRATROPIUM BROMIDE AND ALBUTEROL SULFATE 3 ML: .5; 3 SOLUTION RESPIRATORY (INHALATION) at 02:57

## 2017-08-08 RX ADMIN — ACETAMINOPHEN 650 MG: 325 TABLET ORAL at 18:47

## 2017-08-08 RX ADMIN — ASPIRIN 81 MG: 81 TABLET, CHEWABLE ORAL at 21:23

## 2017-08-08 RX ADMIN — Medication 5 ML: at 08:08

## 2017-08-08 RX ADMIN — HEPARIN SODIUM 5000 UNITS: 5000 INJECTION, SOLUTION INTRAVENOUS; SUBCUTANEOUS at 08:09

## 2017-08-08 RX ADMIN — METOPROLOL TARTRATE 50 MG: 50 TABLET, FILM COATED ORAL at 21:23

## 2017-08-08 RX ADMIN — METRONIDAZOLE 500 MG: 500 INJECTION, SOLUTION INTRAVENOUS at 06:30

## 2017-08-08 RX ADMIN — IPRATROPIUM BROMIDE AND ALBUTEROL SULFATE 3 ML: .5; 3 SOLUTION RESPIRATORY (INHALATION) at 22:09

## 2017-08-08 RX ADMIN — INSULIN ASPART 2 UNITS: 100 INJECTION, SOLUTION INTRAVENOUS; SUBCUTANEOUS at 11:48

## 2017-08-08 RX ADMIN — NICOTINE 1 PATCH: 21 PATCH, EXTENDED RELEASE TRANSDERMAL at 21:00

## 2017-08-08 NOTE — PROGRESS NOTES
LOS: 15 days     Chief Complaint:  Pulmonology is following for shortness of breath    Subjective     Interval History: Patient states that she is feeling better and is ready to go home. patient states that she did wear her BiPAP overnight and that her blood sugar did not drop.  No acute events reported overnight.  She will be discharged home today.    History taken from: patient chart RN    Review of Systems:   Review of Systems - History obtained from chart review and the patient  General ROS: negative for - chills, fatigue or fever  Psychological ROS: negative for - anxiety or depression  ENT ROS: negative for - headaches, visual changes or vocal changes  Allergy and Immunology ROS: negative for - nasal congestion, postnasal drip or seasonal allergies  Endocrine ROS: negative for - polydipsia/polyuria  Respiratory ROS: no cough, shortness of breath, or wheezing  Cardiovascular ROS: no chest pain or dyspnea on exertion  Gastrointestinal ROS: no abdominal pain, change in bowel habits, or black or bloody stools  Musculoskeletal ROS: negative for - joint pain, joint stiffness or joint swelling  Neurological ROS: no TIA or stroke symptoms                    Objective     Vital Signs  Temp:  [97.6 °F (36.4 °C)-98.4 °F (36.9 °C)] 98 °F (36.7 °C)  Heart Rate:  [66-81] 80  Resp:  [16-20] 18  BP: (105-168)/(47-99) 168/92  Body mass index is 25.81 kg/(m^2).    Intake/Output Summary (Last 24 hours) at 08/08/17 1028  Last data filed at 08/08/17 0808   Gross per 24 hour   Intake             1820 ml   Output             3650 ml   Net            -1830 ml     I/O this shift:  In: 1000 [I.V.:1000]  Out: -     Physical Exam:  GENERAL APPEARANCE: Well developed, well nourished, alert and cooperative, and appears to be in no acute distress.    HEAD: normocephalic.    EYES: PERRL    NECK: Neck supple.     CARDIAC: Normal S1 and S2. No S3, S4 or murmurs. Rhythm is regular. There is no peripheral edema, cyanosis or pallor. Extremities  are warm and well perfused. Capillary refill is less than 2 seconds. No carotid bruits.    RESPIRATORY: Clear to auscultation and percussion without rales, rhonchi, wheezing or diminished breath sounds.    GI: Positive bowel sounds. Soft, nondistended, nontender.     MUSCULOSKELTAL: No significant deformity or joint abnormality. No edema. Peripheral pulses intact.     NEUROLOGICAL: Strength and sensation symmetric and intact throughout.     PSYCHIATRIC: The mental examination revealed the patient was oriented to person, place, and time.                 Results Review:                I reviewed the patient's new clinical results.  I reviewed the patient's new imaging results and agree with the interpretation.    Results from last 7 days  Lab Units 08/08/17  0051 08/07/17  0200 08/06/17  0459   WBC 10*3/mm3 10.66 12.38 11.82   HEMOGLOBIN g/dL 9.1* 9.6* 9.8*   PLATELETS 10*3/mm3 159 176 166       Results from last 7 days  Lab Units 08/08/17  0051 08/07/17  0200 08/06/17  0459   SODIUM mmol/L 140 145 146   POTASSIUM mmol/L 4.0 3.4* 3.1*   CHLORIDE mmol/L 102 107 107   CO2 mmol/L 24.5 25.5 28.1   BUN mg/dL 106* 92* 100*   CREATININE mg/dL 4.12* 3.27* 3.33*   CALCIUM mg/dL 6.0* 6.7* 6.8*   GLUCOSE mg/dL 178* 38* 31*   MAGNESIUM mg/dL 2.0 2.3 1.9     Lab Results   Component Value Date    INR 0.96 07/24/2017    INR 0.93 08/08/2016    INR 0.93 10/09/2015    PROTIME 12.9 07/24/2017    PROTIME 10.5 08/08/2016    PROTIME 10.3 10/09/2015           Invalid input(s): PROT, LABALBU    Results from last 7 days  Lab Units 08/02/17  0922   PH, ARTERIAL pH units 7.249*   PO2 ART mm Hg 87.4   PCO2, ARTERIAL mm Hg 39.9   HCO3 ART mmol/L 17.1*     Imaging Results (last 24 hours)     ** No results found for the last 24 hours. **             Medication Review:   Scheduled Medications:    aspirin 81 mg Oral Nightly   atorvastatin 40 mg Oral Nightly   budesonide-formoterol 2 puff Inhalation BID - RT   cefepime 1 g Intravenous Q24H    clopidogrel 75 mg Oral Daily   doxycycline 100 mg Oral Q12H   epoetin jane 10,000 Units Subcutaneous Once per day on Mon Wed Fri   guaiFENesin 1,200 mg Oral Q12H   heparin (porcine) 5,000 Units Subcutaneous Q12H   insulin aspart 0-9 Units Subcutaneous 4x Daily AC & at Bedtime   insulin detemir 10 Units Subcutaneous Nightly   ipratropium-albuterol 3 mL Nebulization Q4H - RT   isosorbide mononitrate 60 mg Oral Q24H   metoprolol tartrate 50 mg Oral Q12H   metroNIDAZOLE 500 mg Intravenous Q8H   nicotine 1 patch Transdermal Nightly   nystatin susp + lidocaine viscous 5 mL Swish & Swallow 4x Daily   pantoprazole 40 mg Oral QAM AC   Pharmacy Meds to Bed Consult  Does not apply Daily     Continuous infusions:    hold 1 each       Assessment/Plan     Respiratory failure:  Metabolic acidosis likely caused by renal failure.    She states that she did use BiPAP overnight.    She will be discharged home today with a home BiPAP.    Will have her follow up on the outpatient basis in about 6 weeks.    Acute on chronic renal failure:  Creatinine remains elevated.  Nephrology is following.    Patient Active Problem List   Diagnosis Code   • Acute renal failure N17.9   • Essential hypertension I10   • Dyslipidemia E78.5   • Diabetic neuropathy E11.40   • Chronic pain G89.29   • Anxiety F41.9   • Anemia D64.9   • Acute on chronic renal failure N17.9, N18.9   • CHF (congestive heart failure) I50.9     Case was discussed with patients nurse.        CLARA Presley  08/08/17  10:28 AM        Attestation: Scribed for Dr. Martin, by CLARA Laboy    I, Devin Martin M.D. attest that the above note accurately reflects the work and decisions made  by me.  Patient was seen and evaluated by Dr. Martin, including history of present illness, physical exam, assessment, and treatment plan.  The above note was reviewed and edited by Dr. Martin.

## 2017-08-08 NOTE — PROGRESS NOTES
Nephrology  Note      Subjective      She denies chest pain or SOB. She denies nausea or vomiting and eating great. Blood sugars did not drop      Objective     Vital Signs  Temp:  [97.6 °F (36.4 °C)-98.4 °F (36.9 °C)] 98 °F (36.7 °C)  Heart Rate:  [63-81] 73  Resp:  [16-20] 18  BP: (105-183)/(47-99) 123/64    I/O this shift:  In: -   Out: 1350 [Urine:1350]  I/O last 3 completed shifts:  In: 3010 [P.O.:2760; IV Piggyback:250]  Out: 5300 [Urine:5300]    Physical Examination:    General Appearance : alert, no distress  Head : normocephalic  Eyes :no pallor   Throat : oral mucosa moist  Lungs : reduced breath sounds at bases  Heart : regular rhythm & normal rate, normal S1, S2, no murmur   Abdomen :  soft non-tender  Extremities : 1+ edema - better  Skin : no  rash  Neurologic :grossly no focal deficitis    Laboratory Data :      WBC WBC   Date Value Ref Range Status   08/08/2017 10.66 4.50 - 12.50 10*3/mm3 Final   08/07/2017 12.38 4.50 - 12.50 10*3/mm3 Final   08/06/2017 11.82 4.50 - 12.50 10*3/mm3 Final      HGB Hemoglobin   Date Value Ref Range Status   08/08/2017 9.1 (L) 12.0 - 16.0 g/dL Final   08/07/2017 9.6 (L) 12.0 - 16.0 g/dL Final   08/06/2017 9.8 (L) 12.0 - 16.0 g/dL Final      HCT Hematocrit   Date Value Ref Range Status   08/08/2017 30.8 (L) 37.0 - 47.0 % Final   08/07/2017 32.0 (L) 37.0 - 47.0 % Final   08/06/2017 32.7 (L) 37.0 - 47.0 % Final      Platlets No results found for: LABPLAT   MCV MCV   Date Value Ref Range Status   08/08/2017 97.2 (H) 80.0 - 94.0 fL Final   08/07/2017 95.8 (H) 80.0 - 94.0 fL Final   08/06/2017 94.2 (H) 80.0 - 94.0 fL Final          Sodium Sodium   Date Value Ref Range Status   08/08/2017 140 135 - 153 mmol/L Final   08/07/2017 145 135 - 153 mmol/L Final   08/06/2017 146 135 - 153 mmol/L Final      Potassium Potassium   Date Value Ref Range Status   08/08/2017 4.0 3.5 - 5.3 mmol/L Final   08/07/2017 3.4 (L) 3.5 - 5.3 mmol/L Final   08/06/2017 3.1 (L) 3.5 - 5.3 mmol/L Final       Chloride Chloride   Date Value Ref Range Status   08/08/2017 102 99 - 112 mmol/L Final   08/07/2017 107 99 - 112 mmol/L Final   08/06/2017 107 99 - 112 mmol/L Final      CO2 CO2   Date Value Ref Range Status   08/08/2017 24.5 24.3 - 31.9 mmol/L Final   08/07/2017 25.5 24.3 - 31.9 mmol/L Final   08/06/2017 28.1 24.3 - 31.9 mmol/L Final      BUN BUN   Date Value Ref Range Status   08/08/2017 106 (H) 7 - 21 mg/dL Final   08/07/2017 92 (H) 7 - 21 mg/dL Final   08/06/2017 100 (H) 7 - 21 mg/dL Final      Creatinine Creatinine   Date Value Ref Range Status   08/08/2017 4.12 (H) 0.43 - 1.29 mg/dL Final   08/07/2017 3.27 (H) 0.43 - 1.29 mg/dL Final   08/06/2017 3.33 (H) 0.43 - 1.29 mg/dL Final      Calcium Calcium   Date Value Ref Range Status   08/08/2017 6.0 (L) 7.7 - 10.0 mg/dL Final   08/07/2017 6.7 (L) 7.7 - 10.0 mg/dL Final   08/06/2017 6.8 (L) 7.7 - 10.0 mg/dL Final      PO4 No results found for: CAPO4   Albumin No results found for: ALBUMIN   Magnesium Magnesium   Date Value Ref Range Status   08/08/2017 2.0 1.7 - 2.6 mg/dL Final   08/07/2017 2.3 1.7 - 2.6 mg/dL Final   08/06/2017 1.9 1.7 - 2.6 mg/dL Final      Uric Acid No results found for: URICACID     Radiology results :     Imaging Results (last 24 hours)     ** No results found for the last 24 hours. **              Medications:        aspirin 81 mg Oral Nightly   atorvastatin 40 mg Oral Nightly   budesonide-formoterol 2 puff Inhalation BID - RT   cefepime 1 g Intravenous Q24H   clopidogrel 75 mg Oral Daily   doxycycline 100 mg Oral Q12H   epoetin jane 10,000 Units Subcutaneous Once per day on Mon Wed Fri   guaiFENesin 1,200 mg Oral Q12H   heparin (porcine) 5,000 Units Subcutaneous Q12H   insulin aspart 0-9 Units Subcutaneous 4x Daily AC & at Bedtime   insulin detemir 10 Units Subcutaneous Nightly   ipratropium-albuterol 3 mL Nebulization Q4H - RT   isosorbide mononitrate 60 mg Oral Q24H   metoprolol tartrate 50 mg Oral Q12H   metroNIDAZOLE 500 mg Intravenous  Q8H   nicotine 1 patch Transdermal Nightly   nystatin susp + lidocaine viscous 5 mL Swish & Swallow 4x Daily   pantoprazole 40 mg Oral QAM AC   Pharmacy Meds to Bed Consult  Does not apply Daily   sodium chloride 75 mL/hr Intravenous Once       hold 1 each       Assessment/Plan     Principal Problem:    Acute on chronic renal failure  Active Problems:    CHF (congestive heart failure)      1. SIMON on CKD 4 : her baseline creatinine is 2.5. She was worsening CKD from uncontrolled HTN and type 2 DM.  Her creatinine has worsened  to 4.1 from 3.2 today likely from diuresis. I will dc lasix and give 1L NS today  24-hour creatinine clearance is 14.7 cc/min    2. Metabolic and respiratory acidosis : resolved    3. Anemia of chronic disease : Hb improved with epogen    4. Edema : much better. She has lost around 35 lbs with diuresis    5. Hypoxic and hypercarbic respiratory failure with R pleural effusion : s/p thoracentesis    6. Hypoglycemia : resolved    Will monitor for another day and if improved can go home in AM  I discussed the patients findings and my recommendations with patient,RN and Dr Abdulaziz Chavez MD  08/08/17  6:47 AM

## 2017-08-08 NOTE — PROGRESS NOTES
Subjective     History:   Sara Cardona is a 67 y.o. female admitted on 7/24/2017 secondary to Acute on chronic renal failure     Procedures:   7/25/17: US-guided right-sided thoracentesis with 350mL of fluid removed     Patient seen and examined with MARILY Mon. Awake and alert. Remains very anxious to go home and was considering leaving AMA prior to our encounter as nephrology was recommending continued hospitalization in the setting of worsening renal failure. States she feels significantly improved with improvement in her cough, dyspnea and lower extremity edema. No further episodes of hypoglycemia. Pt diuresed over 4L's yesterday. No acute events overnight per RN.     History taken from: patient, chart, and RN.      Objective     Vital Signs  Temp:  [97.7 °F (36.5 °C)-98.4 °F (36.9 °C)] 98 °F (36.7 °C)  Heart Rate:  [66-81] 70  Resp:  [16-20] 18  BP: (105-168)/(47-92) 144/70    Intake/Output Summary (Last 24 hours) at 08/08/17 1348  Last data filed at 08/08/17 1137   Gross per 24 hour   Intake             1100 ml   Output             3650 ml   Net            -2550 ml         Physical Exam:  General:    Awake, alert, in no acute distress, chronically ill appearing    Heart:      Normal S1 and S2. Regular rate and rhythm. No significant murmur, rubs or gallops appreciated.   Lungs:     Respirations regular, even and unlabored. Lungs clear to ausculation B/L. No wheezes, rales or rhonchi.     Abdomen:   Soft and nontender. No guarding, rebound tenderness or  organomegaly noted. Bowel sounds present x 4.   Extremities:  1-2+ edema in LE B/L but much improved. Moves UE and LE equally B/L.     Results Review:      Results from last 7 days  Lab Units 08/08/17  0051 08/07/17  0200 08/06/17  0459 08/05/17  0601 08/04/17  0341 08/03/17  0154 08/02/17  0108   WBC 10*3/mm3 10.66 12.38 11.82 17.35* 17.87* 14.81* 12.38   HEMOGLOBIN g/dL 9.1* 9.6* 9.8* 10.1* 9.3* 8.6* 8.2*   PLATELETS 10*3/mm3 159 176 166 182 207 229 233        Results from last 7 days  Lab Units 08/08/17  0051 08/07/17  0200 08/06/17  0459 08/05/17  0601 08/04/17  0341 08/03/17  0154 08/02/17  0108   SODIUM mmol/L 140 145 146 144 145 144 139   POTASSIUM mmol/L 4.0 3.4* 3.1* 3.9 3.9 4.0 4.9   CHLORIDE mmol/L 102 107 107 108 110 112 109   CO2 mmol/L 24.5 25.5 28.1 25.8 22.4* 17.0* 17.1*   BUN mg/dL 106* 92* 100* 106* 108* 123* 117*   CREATININE mg/dL 4.12* 3.27* 3.33* 3.55* 3.62* 3.96* 3.98*   CALCIUM mg/dL 6.0* 6.7* 6.8* 6.6* 6.3* 6.4* 6.7*   GLUCOSE mg/dL 178* 38* 31* 157* 84 178* 262*           Results from last 7 days  Lab Units 08/08/17  0051 08/07/17  0200 08/06/17  0459 08/05/17  0601 08/04/17  0341 08/03/17  0154 08/02/17  0108   MAGNESIUM mg/dL 2.0 2.3 1.9 1.9 1.4* 1.6* 1.8               Imaging Results (last 24 hours)     ** No results found for the last 24 hours. **            Medications:    aspirin 81 mg Oral Nightly   atorvastatin 40 mg Oral Nightly   budesonide-formoterol 2 puff Inhalation BID - RT   cefepime 1 g Intravenous Q24H   clopidogrel 75 mg Oral Daily   doxycycline 100 mg Oral Q12H   epoetin jane 10,000 Units Subcutaneous Once per day on Mon Wed Fri   guaiFENesin 1,200 mg Oral Q12H   heparin (porcine) 5,000 Units Subcutaneous Q12H   insulin aspart 0-9 Units Subcutaneous 4x Daily AC & at Bedtime   insulin detemir 10 Units Subcutaneous Nightly   ipratropium-albuterol 3 mL Nebulization Q4H - RT   isosorbide mononitrate 60 mg Oral Q24H   metoprolol tartrate 50 mg Oral Q12H   metroNIDAZOLE 500 mg Intravenous Q8H   nicotine 1 patch Transdermal Nightly   nystatin susp + lidocaine viscous 5 mL Swish & Swallow 4x Daily   pantoprazole 40 mg Oral QAM AC   Pharmacy Meds to Bed Consult  Does not apply Daily       hold 1 each           Assessment/Plan   Acute on chronic hypoxic respiratory failure: Likely 2/2 COPD exacerbation and diastolic CHF exacerbation. Steroids have been weaned. Cont Doxycycline and scheduled nebs. Pt has diuresed well. Cont BiPAP PRN  and [facility]. Trilogy has been arranged for upon discharge. Pulm input appreciated.     SIMON on CKD IV: Cr worse today. Baseline Cr likely worse 2/2 DM and HTN. However, worsening Cr today is likely 2/2 diuresis. Nephrology has stopped Lasix and has ordered 1L of fluids. Pt is at high risk for dialysis but is not acutely indicated. Repeat labs in the AM. Nephrology input appreciated.     Acute diastolic CHF exacerbation: Echo obtained on 7/4/17 revealing an EF of 66-70%, trace AR, mild MR, mild MS, mild to moderate TR and severe pulmonary HTN. Appears clinically improved. Diuresis held today 2/2 worsening renal function.     Acute exacerbation of COPD with ongoing tobacco abuse: Cont Doxycycline and nebs. Cont BiPAP. Steroids have been tapered. Nicotine patch ordered. Cont to encourage cessation.     Mixed respiratory and metabolic acidosis: Pt has been more compliant with BiPAP.  Cont BiPAP and treatment per nephrology.      Hyperkalemia: Now resolved and K+ is stable today. Repeat labs in the AM.     Essential HTN: Cont current regimen and monitor.     DM II, insulin dependent: HgbA1c 6.1 on 7/3/17. Stable today with no reported hypoglycemia. Levemir has been reduced. Cont to monitor closely as pt has been very noncompliant with dietary restrictions.     Pleural effusions: R>L. S/P right-sided thoracentesis. Pleural fluid appears transudative. Cultures negative to date.      CAD: Cont current medical management. Cardiology following with input appreciated.     Chronic pain: Neurontin decreased in setting of SIMON.     Anxiety: Cont klonopin with close monitoring in the setting of respiratory failure.     DVT PPX: SQ heparin    After a lengthy discussion with the patient regarding the risks of leaving AMA, she is now agreeable to remain hospitalized.     Discussed with Dr. Chavez.     Pt is at high risk 2/2 acute on chronic hypoxic respiratory failure, SIMON on CKD IV at high risk for dialysis, CHF exacerbation, COPD  exacerbation with ongoing tobacco abuse, mixed acidosis, DM with hypoglycemia and hx of noncompliance.       Fredis Cintron,   08/08/17  1:48 PM

## 2017-08-08 NOTE — PLAN OF CARE
Problem: Cardiac: Heart Failure (Adult)  Goal: Signs and Symptoms of Listed Potential Problems Will be Absent or Manageable (Cardiac: Heart Failure)  Outcome: Ongoing (interventions implemented as appropriate)    Problem: Renal Failure/Kidney Injury, Acute (Adult)  Goal: Signs and Symptoms of Listed Potential Problems Will be Absent or Manageable (Renal Failure/Kidney Injury, Acute)  Outcome: Ongoing (interventions implemented as appropriate)    Problem: Pressure Ulcer Risk (Saul Scale) (Adult,Obstetrics,Pediatric)  Goal: Skin Integrity  Outcome: Ongoing (interventions implemented as appropriate)    Problem: Patient Care Overview (Adult)  Goal: Plan of Care Review  Outcome: Ongoing (interventions implemented as appropriate)  Goal: Adult Individualization and Mutuality  Outcome: Ongoing (interventions implemented as appropriate)  Goal: Discharge Needs Assessment  Outcome: Ongoing (interventions implemented as appropriate)    Problem: Skin Integrity Impairment, Risk/Actual (Adult)  Goal: Identify Related Risk Factors and Signs and Symptoms  Outcome: Ongoing (interventions implemented as appropriate)

## 2017-08-08 NOTE — PLAN OF CARE
Problem: Cardiac: Heart Failure (Adult)  Goal: Signs and Symptoms of Listed Potential Problems Will be Absent or Manageable (Cardiac: Heart Failure)  Outcome: Ongoing (interventions implemented as appropriate)    Problem: Renal Failure/Kidney Injury, Acute (Adult)  Goal: Signs and Symptoms of Listed Potential Problems Will be Absent or Manageable (Renal Failure/Kidney Injury, Acute)  Outcome: Ongoing (interventions implemented as appropriate)    Problem: Pressure Ulcer Risk (Sual Scale) (Adult,Obstetrics,Pediatric)  Goal: Skin Integrity  Outcome: Ongoing (interventions implemented as appropriate)    Problem: Patient Care Overview (Adult)  Goal: Plan of Care Review  Outcome: Ongoing (interventions implemented as appropriate)  Goal: Adult Individualization and Mutuality  Outcome: Ongoing (interventions implemented as appropriate)  Goal: Discharge Needs Assessment  Outcome: Ongoing (interventions implemented as appropriate)    Problem: Skin Integrity Impairment, Risk/Actual (Adult)  Goal: Identify Related Risk Factors and Signs and Symptoms  Outcome: Ongoing (interventions implemented as appropriate)  Goal: Skin Integrity/Wound Healing  Outcome: Ongoing (interventions implemented as appropriate)    Problem: Diabetes, Type 2 (Adult)  Goal: Signs and Symptoms of Listed Potential Problems Will be Absent or Manageable (Diabetes, Type 2)  Outcome: Ongoing (interventions implemented as appropriate)    Problem: NPPV/CPAP (Adult)  Goal: Signs and Symptoms of Listed Potential Problems Will be Absent or Manageable (NPPV/CPAP)  Outcome: Ongoing (interventions implemented as appropriate)

## 2017-08-08 NOTE — PLAN OF CARE
Problem: Pressure Ulcer Risk (Saul Scale) (Adult,Obstetrics,Pediatric)  Goal: Skin Integrity  Outcome: Ongoing (interventions implemented as appropriate)    Problem: Patient Care Overview (Adult)  Goal: Plan of Care Review  Outcome: Ongoing (interventions implemented as appropriate)  Goal: Adult Individualization and Mutuality  Outcome: Ongoing (interventions implemented as appropriate)  Goal: Discharge Needs Assessment  Outcome: Ongoing (interventions implemented as appropriate)    Problem: Skin Integrity Impairment, Risk/Actual (Adult)  Goal: Identify Related Risk Factors and Signs and Symptoms  Outcome: Ongoing (interventions implemented as appropriate)  Goal: Skin Integrity/Wound Healing  Outcome: Ongoing (interventions implemented as appropriate)    Problem: Diabetes, Type 2 (Adult)  Goal: Signs and Symptoms of Listed Potential Problems Will be Absent or Manageable (Diabetes, Type 2)  Outcome: Ongoing (interventions implemented as appropriate)    Problem: NPPV/CPAP (Adult)  Goal: Signs and Symptoms of Listed Potential Problems Will be Absent or Manageable (NPPV/CPAP)  Outcome: Ongoing (interventions implemented as appropriate)

## 2017-08-09 VITALS
SYSTOLIC BLOOD PRESSURE: 164 MMHG | RESPIRATION RATE: 20 BRPM | OXYGEN SATURATION: 96 % | TEMPERATURE: 98.1 F | WEIGHT: 144.2 LBS | BODY MASS INDEX: 27.23 KG/M2 | DIASTOLIC BLOOD PRESSURE: 102 MMHG | HEART RATE: 71 BPM | HEIGHT: 61 IN

## 2017-08-09 LAB
ANION GAP SERPL CALCULATED.3IONS-SCNC: 12.1 MMOL/L (ref 3.6–11.2)
BASOPHILS # BLD AUTO: 0.03 10*3/MM3 (ref 0–0.3)
BASOPHILS NFR BLD AUTO: 0.3 % (ref 0–2)
BUN BLD-MCNC: 106 MG/DL (ref 7–21)
BUN/CREAT SERPL: 27.9 (ref 7–25)
CALCIUM SPEC-SCNC: 5.8 MG/DL (ref 7.7–10)
CHLORIDE SERPL-SCNC: 105 MMOL/L (ref 99–112)
CO2 SERPL-SCNC: 24.9 MMOL/L (ref 24.3–31.9)
CREAT BLD-MCNC: 3.8 MG/DL (ref 0.43–1.29)
CRP SERPL-MCNC: 0.88 MG/DL (ref 0–0.99)
DEPRECATED RDW RBC AUTO: 65.2 FL (ref 37–54)
EOSINOPHIL # BLD AUTO: 0.25 10*3/MM3 (ref 0–0.7)
EOSINOPHIL NFR BLD AUTO: 2.5 % (ref 0–7)
ERYTHROCYTE [DISTWIDTH] IN BLOOD BY AUTOMATED COUNT: 19.6 % (ref 11.5–14.5)
GFR SERPL CREATININE-BSD FRML MDRD: 12 ML/MIN/1.73
GLUCOSE BLD-MCNC: 158 MG/DL (ref 70–110)
GLUCOSE BLDC GLUCOMTR-MCNC: 115 MG/DL (ref 70–130)
HCT VFR BLD AUTO: 30.7 % (ref 37–47)
HGB BLD-MCNC: 9 G/DL (ref 12–16)
IMM GRANULOCYTES # BLD: 0.05 10*3/MM3 (ref 0–0.03)
IMM GRANULOCYTES NFR BLD: 0.5 % (ref 0–0.5)
LYMPHOCYTES # BLD AUTO: 2.53 10*3/MM3 (ref 1–3)
LYMPHOCYTES NFR BLD AUTO: 25 % (ref 16–46)
MAGNESIUM SERPL-MCNC: 1.8 MG/DL (ref 1.7–2.6)
MCH RBC QN AUTO: 28.3 PG (ref 27–33)
MCHC RBC AUTO-ENTMCNC: 29.3 G/DL (ref 33–37)
MCV RBC AUTO: 96.5 FL (ref 80–94)
MONOCYTES # BLD AUTO: 0.96 10*3/MM3 (ref 0.1–0.9)
MONOCYTES NFR BLD AUTO: 9.5 % (ref 0–12)
NEUTROPHILS # BLD AUTO: 6.31 10*3/MM3 (ref 1.4–6.5)
NEUTROPHILS NFR BLD AUTO: 62.2 % (ref 40–75)
OSMOLALITY SERPL CALC.SUM OF ELEC: 319.8 MOSM/KG (ref 273–305)
PLATELET # BLD AUTO: 171 10*3/MM3 (ref 130–400)
PMV BLD AUTO: 12 FL (ref 6–10)
POTASSIUM BLD-SCNC: 3.8 MMOL/L (ref 3.5–5.3)
RBC # BLD AUTO: 3.18 10*6/MM3 (ref 4.2–5.4)
SODIUM BLD-SCNC: 142 MMOL/L (ref 135–153)
WBC NRBC COR # BLD: 10.13 10*3/MM3 (ref 4.5–12.5)

## 2017-08-09 PROCEDURE — 25010000002 MAGNESIUM SULFATE 2 GM/50ML SOLUTION: Performed by: INTERNAL MEDICINE

## 2017-08-09 PROCEDURE — 25010000002 HEPARIN (PORCINE) PER 1000 UNITS: Performed by: HOSPITALIST

## 2017-08-09 PROCEDURE — 82962 GLUCOSE BLOOD TEST: CPT

## 2017-08-09 PROCEDURE — 63510000001 EPOETIN ALFA PER 1000 UNITS: Performed by: INTERNAL MEDICINE

## 2017-08-09 PROCEDURE — 99239 HOSP IP/OBS DSCHRG MGMT >30: CPT | Performed by: INTERNAL MEDICINE

## 2017-08-09 PROCEDURE — 99232 SBSQ HOSP IP/OBS MODERATE 35: CPT | Performed by: INTERNAL MEDICINE

## 2017-08-09 PROCEDURE — 80048 BASIC METABOLIC PNL TOTAL CA: CPT | Performed by: INTERNAL MEDICINE

## 2017-08-09 PROCEDURE — 94760 N-INVAS EAR/PLS OXIMETRY 1: CPT

## 2017-08-09 PROCEDURE — 94799 UNLISTED PULMONARY SVC/PX: CPT

## 2017-08-09 PROCEDURE — 86140 C-REACTIVE PROTEIN: CPT | Performed by: INTERNAL MEDICINE

## 2017-08-09 PROCEDURE — 83735 ASSAY OF MAGNESIUM: CPT | Performed by: INTERNAL MEDICINE

## 2017-08-09 PROCEDURE — 85025 COMPLETE CBC W/AUTO DIFF WBC: CPT | Performed by: INTERNAL MEDICINE

## 2017-08-09 RX ORDER — METRONIDAZOLE 500 MG/1
500 TABLET ORAL 3 TIMES DAILY
Qty: 21 TABLET | Refills: 0 | Status: SHIPPED | OUTPATIENT
Start: 2017-08-09 | End: 2017-08-16

## 2017-08-09 RX ORDER — CLOPIDOGREL BISULFATE 75 MG/1
75 TABLET ORAL DAILY
Qty: 30 TABLET | Refills: 0 | Status: SHIPPED | OUTPATIENT
Start: 2017-08-09 | End: 2017-11-01

## 2017-08-09 RX ORDER — ISOSORBIDE MONONITRATE 60 MG/1
60 TABLET, EXTENDED RELEASE ORAL
Qty: 30 TABLET | Refills: 0 | Status: SHIPPED | OUTPATIENT
Start: 2017-08-09 | End: 2017-11-01

## 2017-08-09 RX ORDER — ATORVASTATIN CALCIUM 40 MG/1
40 TABLET, FILM COATED ORAL NIGHTLY
Qty: 30 TABLET | Refills: 0 | Status: SHIPPED | OUTPATIENT
Start: 2017-08-09 | End: 2017-11-01

## 2017-08-09 RX ORDER — CEFPODOXIME PROXETIL 200 MG/1
200 TABLET, FILM COATED ORAL DAILY
Qty: 7 TABLET | Refills: 0 | Status: SHIPPED | OUTPATIENT
Start: 2017-08-09 | End: 2017-08-16

## 2017-08-09 RX ORDER — GABAPENTIN 100 MG/1
100 CAPSULE ORAL
Qty: 30 CAPSULE | Refills: 0 | Status: SHIPPED | OUTPATIENT
Start: 2017-08-09

## 2017-08-09 RX ORDER — METOPROLOL TARTRATE 50 MG/1
50 TABLET, FILM COATED ORAL EVERY 12 HOURS SCHEDULED
Qty: 60 TABLET | Refills: 0 | Status: SHIPPED | OUTPATIENT
Start: 2017-08-09 | End: 2017-11-01

## 2017-08-09 RX ORDER — FUROSEMIDE 40 MG/1
40 TABLET ORAL DAILY PRN
Qty: 30 TABLET | Refills: 0 | Status: ON HOLD | OUTPATIENT
Start: 2017-08-09 | End: 2017-11-02

## 2017-08-09 RX ADMIN — Medication 5 ML: at 08:51

## 2017-08-09 RX ADMIN — ISOSORBIDE MONONITRATE 60 MG: 60 TABLET, EXTENDED RELEASE ORAL at 08:49

## 2017-08-09 RX ADMIN — BUDESONIDE AND FORMOTEROL FUMARATE DIHYDRATE 2 PUFF: 80; 4.5 AEROSOL RESPIRATORY (INHALATION) at 06:29

## 2017-08-09 RX ADMIN — PANTOPRAZOLE SODIUM 40 MG: 40 TABLET, DELAYED RELEASE ORAL at 06:50

## 2017-08-09 RX ADMIN — ERYTHROPOIETIN 10000 UNITS: 20000 INJECTION, SOLUTION INTRAVENOUS; SUBCUTANEOUS at 08:49

## 2017-08-09 RX ADMIN — MAGNESIUM SULFATE IN WATER 2 G: 40 INJECTION, SOLUTION INTRAVENOUS at 03:24

## 2017-08-09 RX ADMIN — IPRATROPIUM BROMIDE AND ALBUTEROL SULFATE 3 ML: .5; 3 SOLUTION RESPIRATORY (INHALATION) at 10:13

## 2017-08-09 RX ADMIN — HEPARIN SODIUM 5000 UNITS: 5000 INJECTION, SOLUTION INTRAVENOUS; SUBCUTANEOUS at 08:49

## 2017-08-09 RX ADMIN — CLOPIDOGREL BISULFATE 75 MG: 75 TABLET, FILM COATED ORAL at 08:49

## 2017-08-09 RX ADMIN — GUAIFENESIN 1200 MG: 600 TABLET, EXTENDED RELEASE ORAL at 08:48

## 2017-08-09 RX ADMIN — IPRATROPIUM BROMIDE AND ALBUTEROL SULFATE 3 ML: .5; 3 SOLUTION RESPIRATORY (INHALATION) at 03:12

## 2017-08-09 RX ADMIN — IPRATROPIUM BROMIDE AND ALBUTEROL SULFATE 3 ML: .5; 3 SOLUTION RESPIRATORY (INHALATION) at 06:29

## 2017-08-09 RX ADMIN — DOXYCYCLINE 100 MG: 100 CAPSULE ORAL at 08:49

## 2017-08-09 RX ADMIN — METOPROLOL TARTRATE 50 MG: 50 TABLET, FILM COATED ORAL at 08:50

## 2017-08-09 RX ADMIN — METRONIDAZOLE 500 MG: 500 INJECTION, SOLUTION INTRAVENOUS at 06:50

## 2017-08-09 NOTE — PROGRESS NOTES
LOS: 16 days     Chief Complaint:  Pulmonology is following for shortness of breath    Subjective     Interval History: Patient states that she is doing well today.  She was not discharged yesterday due to worsening kidney function.  She was given IV fluids and her lasix was stopped.  Kidneys have improved slightly.  Patient should go home today.  No acute events reported overnight.     History taken from: patient chart RN    Review of Systems:   Review of Systems - History obtained from chart review and the patient  General ROS: negative for - chills, fatigue or fever  Psychological ROS: negative for - anxiety or depression  ENT ROS: negative for - headaches, visual changes or vocal changes  Allergy and Immunology ROS: negative for - nasal congestion, postnasal drip or seasonal allergies  Endocrine ROS: negative for - polydipsia/polyuria  Respiratory ROS: no cough, shortness of breath, or wheezing  Cardiovascular ROS: no chest pain or dyspnea on exertion  Gastrointestinal ROS: no abdominal pain, change in bowel habits, or black or bloody stools  Musculoskeletal ROS: negative for - joint pain, joint stiffness or joint swelling  Neurological ROS: no TIA or stroke symptoms                    Objective     Vital Signs  Temp:  [97.9 °F (36.6 °C)-99 °F (37.2 °C)] 98.1 °F (36.7 °C)  Heart Rate:  [68-85] 71  Resp:  [16-32] 20  BP: (109-174)/() 164/102  Body mass index is 27.25 kg/(m^2).    Intake/Output Summary (Last 24 hours) at 08/09/17 1044  Last data filed at 08/09/17 0800   Gross per 24 hour   Intake             1790 ml   Output              652 ml   Net             1138 ml     I/O this shift:  In: 240 [P.O.:240]  Out: 250 [Urine:250]    Physical Exam:  GENERAL APPEARANCE: Well developed, well nourished, alert and cooperative, and appears to be in no acute distress.    HEAD: normocephalic.    EYES: PERRL    NECK: Neck supple.     CARDIAC: Normal S1 and S2. No S3, S4 or murmurs. Rhythm is regular. There is no  peripheral edema, cyanosis or pallor. Extremities are warm and well perfused. Capillary refill is less than 2 seconds. No carotid bruits.    RESPIRATORY: Clear to auscultation and percussion without rales, rhonchi, wheezing or diminished breath sounds.    GI: Positive bowel sounds. Soft, nondistended, nontender.     MUSCULOSKELTAL: No significant deformity or joint abnormality. No edema. Peripheral pulses intact.     NEUROLOGICAL: Strength and sensation symmetric and intact throughout.     PSYCHIATRIC: The mental examination revealed the patient was oriented to person, place, and time.                 Results Review:                I reviewed the patient's new clinical results.  I reviewed the patient's new imaging results and agree with the interpretation.    Results from last 7 days  Lab Units 08/09/17  0122 08/08/17  0051 08/07/17  0200   WBC 10*3/mm3 10.13 10.66 12.38   HEMOGLOBIN g/dL 9.0* 9.1* 9.6*   PLATELETS 10*3/mm3 171 159 176       Results from last 7 days  Lab Units 08/09/17  0122 08/08/17  1845 08/08/17  0051 08/07/17  0200   SODIUM mmol/L 142 141 140 145   POTASSIUM mmol/L 3.8 3.9 4.0 3.4*   CHLORIDE mmol/L 105 105 102 107   CO2 mmol/L 24.9 24.4 24.5 25.5   BUN mg/dL 106* 94* 106* 92*   CREATININE mg/dL 3.80* 3.87* 4.12* 3.27*   CALCIUM mg/dL 5.8* 6.3* 6.0* 6.7*   GLUCOSE mg/dL 158* 166* 178* 38*   MAGNESIUM mg/dL 1.8  --  2.0 2.3     Lab Results   Component Value Date    INR 0.96 07/24/2017    INR 0.93 08/08/2016    INR 0.93 10/09/2015    PROTIME 12.9 07/24/2017    PROTIME 10.5 08/08/2016    PROTIME 10.3 10/09/2015           Invalid input(s): PROT, LABALBU      Imaging Results (last 24 hours)     ** No results found for the last 24 hours. **             Medication Review:   Scheduled Medications:    aspirin 81 mg Oral Nightly   atorvastatin 40 mg Oral Nightly   budesonide-formoterol 2 puff Inhalation BID - RT   cefepime 1 g Intravenous Q24H   clopidogrel 75 mg Oral Daily   doxycycline 100 mg Oral Q12H    epoetin jane 10,000 Units Subcutaneous Once per day on Mon Wed Fri   guaiFENesin 1,200 mg Oral Q12H   heparin (porcine) 5,000 Units Subcutaneous Q12H   insulin aspart 0-9 Units Subcutaneous 4x Daily AC & at Bedtime   insulin detemir 10 Units Subcutaneous Nightly   ipratropium-albuterol 3 mL Nebulization Q4H - RT   isosorbide mononitrate 60 mg Oral Q24H   metoprolol tartrate 50 mg Oral Q12H   metroNIDAZOLE 500 mg Intravenous Q8H   nicotine 1 patch Transdermal Nightly   nystatin susp + lidocaine viscous 5 mL Swish & Swallow 4x Daily   pantoprazole 40 mg Oral QAM AC   Pharmacy Meds to Bed Consult  Does not apply Daily     Continuous infusions:    hold 1 each       Assessment/Plan      respiratory failure: metabolic acidosis likely caused by renal failure.    She used BIPAP and will be discharged wit a home BiPAP.    Will follow up with patient on the the outpatient basis in 4-69 weeks.    Acute on chronic renal failure:  Patient was not discharged yesterday due to worsening kidney function.  Her lasix was stopped and she was given IV fluids and has slightly improved today.    She will be discharged home today.    Patient Active Problem List   Diagnosis Code   • Acute renal failure N17.9   • Essential hypertension I10   • Dyslipidemia E78.5   • Diabetic neuropathy E11.40   • Chronic pain G89.29   • Anxiety F41.9   • Anemia D64.9   • Acute on chronic renal failure N17.9, N18.9   • CHF (congestive heart failure) I50.9             CLARA Presley  08/09/17  10:44 AM        Attestation: Scribed for Dr. Martin, by CLARA Laboy    I, Devin Martin M.D. attest that the above note accurately reflects the work and decisions made  by me.  Patient was seen and evaluated by Dr. Martin, including history of present illness, physical exam, assessment, and treatment plan.  The above note was reviewed and edited by Dr. Martin.

## 2017-08-09 NOTE — PROGRESS NOTES
Pharmacy was consulted to  the patient on tobacco cessation. She states she quit smoking after Dr. Martin told her he would not see her anymore if she did not quit.     Thank you,  Herlinda Bravo [facility] 08/09/17 10:11 AM

## 2017-08-09 NOTE — PATIENT CARE CONFERENCE
Discharge Planning Assessment   Horacio     Patient Name: Sara Cardona  MRN: [id number]  Today's Date: 8/9/2017    Admit Date: 7/24/2017          Discharge Needs Assessment     None            Discharge Plan       08/09/17 1338    Final Note    Final Note Pt discharged home on this date. Pt discharged home with a bipap from [facility].  No other needs at this time.         Discharge Placement     No information found        Expected Discharge Date and Time     Expected Discharge Date Expected Discharge Time    Aug 9, 2017               Demographic Summary     None            Functional Status     None            Psychosocial     None            Abuse/Neglect     None            Legal     None            Substance Abuse     None            Patient Forms     None          Zandra Sun

## 2017-08-09 NOTE — PLAN OF CARE
Problem: Cardiac: Heart Failure (Adult)  Goal: Signs and Symptoms of Listed Potential Problems Will be Absent or Manageable (Cardiac: Heart Failure)    08/09/17 0439   Cardiac: Heart Failure   Problems Assessed (Heart Failure) all   Problems Present (Heart Failure) functional decline/self-care deficit         Problem: Renal Failure/Kidney Injury, Acute (Adult)  Goal: Signs and Symptoms of Listed Potential Problems Will be Absent or Manageable (Renal Failure/Kidney Injury, Acute)    08/09/17 0439   Renal Failure/Kidney Injury, Acute   Problems Assessed (Acute Renal Failure/Kidney Injury) all   Problems Present (Acute Renal Failure/Kidney Injury) none         Problem: Pressure Ulcer Risk (Saul Scale) (Adult,Obstetrics,Pediatric)  Goal: Skin Integrity    08/09/17 0439   Pressure Ulcer Risk (Saul Scale) (Adult,Obstetrics,Pediatric)   Skin Integrity making progress toward outcome         Problem: Patient Care Overview (Adult)  Goal: Plan of Care Review    08/09/17 0439   Coping/Psychosocial Response Interventions   Plan Of Care Reviewed With patient   Patient Care Overview   Progress improving         Problem: Skin Integrity Impairment, Risk/Actual (Adult)  Goal: Skin Integrity/Wound Healing    08/09/17 0439   Skin Integrity Impairment, Risk/Actual (Adult)   Skin Integrity/Wound Healing making progress toward outcome

## 2017-08-09 NOTE — DISCHARGE INSTR - APPOINTMENTS
Dr. Sheehan  August 16, 2017 at 1:15 pm  [phone]    Dr. Wheeler  August 18, 2017 at 12:15 pm   [phone]

## 2017-08-10 LAB — BACTERIA SPEC AEROBE CULT: NORMAL

## 2017-08-24 LAB
BACTERIA SPEC AEROBE CULT: NORMAL
FUNGUS SPEC CULT: NORMAL
FUNGUS SPEC CULT: NORMAL
FUNGUS SPEC FUNGUS STN: NORMAL

## 2017-09-06 LAB
ACID FAST STN SPEC: NEGATIVE
BACTERIA SPEC AEROBE CULT: NEGATIVE
SPECIMEN PREPARATION: NORMAL

## 2017-09-24 ENCOUNTER — HOSPITAL ENCOUNTER (EMERGENCY)
Facility: HOSPITAL | Age: 67
Discharge: HOME | End: 2017-09-24
Attending: EMERGENCY MEDICINE | Admitting: EMERGENCY MEDICINE

## 2017-09-24 VITALS
HEART RATE: 72 BPM | WEIGHT: 170 LBS | SYSTOLIC BLOOD PRESSURE: 174 MMHG | RESPIRATION RATE: 20 BRPM | TEMPERATURE: 98.2 F | HEIGHT: 61 IN | DIASTOLIC BLOOD PRESSURE: 86 MMHG | OXYGEN SATURATION: 97 % | BODY MASS INDEX: 32.1 KG/M2

## 2017-09-24 DIAGNOSIS — N17.9 ACUTE ON CHRONIC RENAL FAILURE (HCC): Primary | ICD-10-CM

## 2017-09-24 DIAGNOSIS — N18.9 ACUTE ON CHRONIC RENAL FAILURE (HCC): Primary | ICD-10-CM

## 2017-09-24 LAB
ALBUMIN SERPL-MCNC: 3.5 G/DL (ref 3.4–4.8)
ALBUMIN/GLOB SERPL: 1 G/DL (ref 1.5–2.5)
ALP SERPL-CCNC: 156 U/L (ref 35–104)
ALT SERPL W P-5'-P-CCNC: 14 U/L (ref 10–36)
ANION GAP SERPL CALCULATED.3IONS-SCNC: 7.2 MMOL/L (ref 3.6–11.2)
APTT PPP: 29.3 SECONDS (ref 23.8–36.1)
AST SERPL-CCNC: 13 U/L (ref 10–30)
BASOPHILS # BLD AUTO: 0.04 10*3/MM3 (ref 0–0.3)
BASOPHILS NFR BLD AUTO: 0.4 % (ref 0–2)
BILIRUB SERPL-MCNC: 0.1 MG/DL (ref 0.2–1.8)
BUN BLD-MCNC: 63 MG/DL (ref 7–21)
BUN/CREAT SERPL: 15.1 (ref 7–25)
CALCIUM SPEC-SCNC: 6.7 MG/DL (ref 7.7–10)
CHLORIDE SERPL-SCNC: 116 MMOL/L (ref 99–112)
CO2 SERPL-SCNC: 18.8 MMOL/L (ref 24.3–31.9)
CREAT BLD-MCNC: 4.18 MG/DL (ref 0.43–1.29)
DEPRECATED RDW RBC AUTO: 59.1 FL (ref 37–54)
EOSINOPHIL # BLD AUTO: 0.29 10*3/MM3 (ref 0–0.7)
EOSINOPHIL NFR BLD AUTO: 2.8 % (ref 0–7)
ERYTHROCYTE [DISTWIDTH] IN BLOOD BY AUTOMATED COUNT: 18.2 % (ref 11.5–14.5)
GFR SERPL CREATININE-BSD FRML MDRD: 11 ML/MIN/1.73
GLOBULIN UR ELPH-MCNC: 3.5 GM/DL
GLUCOSE BLD-MCNC: 200 MG/DL (ref 70–110)
HCT VFR BLD AUTO: 41 % (ref 37–47)
HGB BLD-MCNC: 12.3 G/DL (ref 12–16)
IMM GRANULOCYTES # BLD: 0.02 10*3/MM3 (ref 0–0.03)
IMM GRANULOCYTES NFR BLD: 0.2 % (ref 0–0.5)
INR PPP: 1.08 (ref 0.9–1.1)
LYMPHOCYTES # BLD AUTO: 0.97 10*3/MM3 (ref 1–3)
LYMPHOCYTES NFR BLD AUTO: 9.5 % (ref 16–46)
MCH RBC QN AUTO: 27.7 PG (ref 27–33)
MCHC RBC AUTO-ENTMCNC: 30 G/DL (ref 33–37)
MCV RBC AUTO: 92.3 FL (ref 80–94)
MONOCYTES # BLD AUTO: 0.52 10*3/MM3 (ref 0.1–0.9)
MONOCYTES NFR BLD AUTO: 5.1 % (ref 0–12)
NEUTROPHILS # BLD AUTO: 8.34 10*3/MM3 (ref 1.4–6.5)
NEUTROPHILS NFR BLD AUTO: 82 % (ref 40–75)
OSMOLALITY SERPL CALC.SUM OF ELEC: 306.7 MOSM/KG (ref 273–305)
PLATELET # BLD AUTO: 300 10*3/MM3 (ref 130–400)
PMV BLD AUTO: 10.4 FL (ref 6–10)
POTASSIUM BLD-SCNC: 4.3 MMOL/L (ref 3.5–5.3)
PROT SERPL-MCNC: 7 G/DL (ref 6–8)
PROTHROMBIN TIME: 14.2 SECONDS (ref 11–15.4)
RBC # BLD AUTO: 4.44 10*6/MM3 (ref 4.2–5.4)
SODIUM BLD-SCNC: 142 MMOL/L (ref 135–153)
WBC NRBC COR # BLD: 10.18 10*3/MM3 (ref 4.5–12.5)

## 2017-09-24 PROCEDURE — 85025 COMPLETE CBC W/AUTO DIFF WBC: CPT | Performed by: EMERGENCY MEDICINE

## 2017-09-24 PROCEDURE — 80053 COMPREHEN METABOLIC PANEL: CPT | Performed by: EMERGENCY MEDICINE

## 2017-09-24 PROCEDURE — 85730 THROMBOPLASTIN TIME PARTIAL: CPT | Performed by: EMERGENCY MEDICINE

## 2017-09-24 PROCEDURE — 93010 ELECTROCARDIOGRAM REPORT: CPT | Performed by: INTERNAL MEDICINE

## 2017-09-24 PROCEDURE — 85610 PROTHROMBIN TIME: CPT | Performed by: EMERGENCY MEDICINE

## 2017-09-24 PROCEDURE — 36415 COLL VENOUS BLD VENIPUNCTURE: CPT

## 2017-09-24 PROCEDURE — 93005 ELECTROCARDIOGRAM TRACING: CPT | Performed by: EMERGENCY MEDICINE

## 2017-09-24 PROCEDURE — 99283 EMERGENCY DEPT VISIT LOW MDM: CPT

## 2017-09-24 PROCEDURE — 96372 THER/PROPH/DIAG INJ SC/IM: CPT

## 2017-09-24 RX ORDER — BUMETANIDE 0.25 MG/ML
2 INJECTION INTRAMUSCULAR; INTRAVENOUS ONCE
Status: COMPLETED | OUTPATIENT
Start: 2017-09-24 | End: 2017-09-24

## 2017-09-24 RX ADMIN — BUMETANIDE 2 MG: 0.25 INJECTION INTRAMUSCULAR; INTRAVENOUS at 18:57

## 2017-09-24 NOTE — ED PROVIDER NOTES
Subjective   Patient is a 67 y.o. female presenting with lower extremity pain.   Lower Extremity Issue   Location:  Leg, ankle and foot  Leg location:  R leg and L leg  Ankle location:  L ankle and R ankle  Foot location:  L foot and R foot  Pain details:     Quality:  Dull    Radiates to:  Does not radiate    Onset quality:  Gradual    Progression:  Worsening  Chronicity:  Chronic  Dislocation: no    Foreign body present:  No foreign bodies  Relieved by:  Nothing  Worsened by:  Nothing  Associated symptoms: no fever        Review of Systems   Constitutional: Negative.  Negative for fever.   HENT: Negative.    Respiratory: Negative.    Cardiovascular: Negative.  Negative for chest pain.   Gastrointestinal: Negative.  Negative for abdominal pain.   Endocrine: Negative.    Genitourinary: Negative.  Negative for dysuria.   Skin: Negative.         Swelling all extremities   Neurological: Negative.    Psychiatric/Behavioral: Negative.    All other systems reviewed and are negative.      Past Medical History:   Diagnosis Date   • Anxiety    • Aortic stenosis    • ASCVD (arteriosclerotic cardiovascular disease)    • Breast cancer    • CHF (congestive heart failure) 7/24/2017   • Chronic pain    • COPD (chronic obstructive pulmonary disease)    • Diabetic neuropathy    • Dyslipidemia    • Essential hypertension    • Insulin dependent diabetes mellitus    • Left carotid bruit    • Recurrent pneumonia    • Renal failure        No Known Allergies    Past Surgical History:   Procedure Laterality Date   • APPENDECTOMY     • CARDIAC CATHETERIZATION     • COLONOSCOPY     • COLONOSCOPY N/A 7/10/2017    Procedure: COLONOSCOPY;  Surgeon: Zheng Suarez III, MD;  Location: [facility];  Service:    • ENDOSCOPY N/A 7/10/2017    Procedure: ESOPHAGOGASTRODUODENOSCOPY;  Surgeon: Zheng Suarez III, MD;  Location: [facility];  Service:    • HYSTERECTOMY     • MASTECTOMY Right    • RHINOPLASTY         Family History   Problem  Relation Age of Onset   • Diabetes Mother    • Lung cancer Father        Social History     Social History   • Marital status:      Spouse name: N/A   • Number of children: N/A   • Years of education: N/A     Social History Main Topics   • Smoking status: Current Every Day Smoker     Packs/day: 1.00     Types: Cigarettes   • Smokeless tobacco: None   • Alcohol use No   • Drug use: No   • Sexual activity: Defer     Other Topics Concern   • None     Social History Narrative           Objective   Physical Exam   Constitutional: She is oriented to person, place, and time. She appears well-developed and well-nourished. No distress.   HENT:   Head: Normocephalic and atraumatic.   Right Ear: External ear normal.   Left Ear: External ear normal.   Nose: Nose normal.   Eyes: Conjunctivae and EOM are normal. Pupils are equal, round, and reactive to light.   Neck: Normal range of motion. Neck supple. No JVD present. No tracheal deviation present.   Cardiovascular: Normal rate, regular rhythm and normal heart sounds.    No murmur heard.  Pulmonary/Chest: Effort normal and breath sounds normal. No respiratory distress. She has no wheezes.   Abdominal: Soft. Bowel sounds are normal. There is no tenderness.   Musculoskeletal: Normal range of motion. She exhibits no edema or deformity.   Swelling all extremities     Neurological: She is alert and oriented to person, place, and time. No cranial nerve deficit.   Skin: Skin is warm and dry. No rash noted. She is not diaphoretic. No erythema. No pallor.   3plus edema lower extremities   Psychiatric: She has a normal mood and affect. Her behavior is normal. Thought content normal.   Nursing note and vitals reviewed.      Procedures         ED Course  ED Course                  MDM    Final diagnoses:   Acute on chronic renal failure            Levy Brown MD  09/28/17 [id number]

## 2017-09-24 NOTE — ED NOTES
Discharge instructions reviewed with patient, patient instructed to return to ED if symptoms worsen or if any new problems arise. Patient verbalizes understanding of discharge instructions, patient out of ED via wheelchair. No acute distress noted.       Chapis Mckinney RN  09/24/17 [id number]

## 2017-11-01 ENCOUNTER — APPOINTMENT (OUTPATIENT)
Dept: CT IMAGING | Facility: HOSPITAL | Age: 67
End: 2017-11-01

## 2017-11-01 ENCOUNTER — HOSPITAL ENCOUNTER (INPATIENT)
Facility: HOSPITAL | Age: 67
LOS: 14 days | Discharge: HOME HEALTH | End: 2017-11-15
Attending: FAMILY MEDICINE | Admitting: INTERNAL MEDICINE

## 2017-11-01 ENCOUNTER — APPOINTMENT (OUTPATIENT)
Dept: GENERAL RADIOLOGY | Facility: HOSPITAL | Age: 67
End: 2017-11-01

## 2017-11-01 DIAGNOSIS — N18.4 CHRONIC RENAL DISEASE, STAGE IV (HCC): ICD-10-CM

## 2017-11-01 DIAGNOSIS — J96.21 ACUTE ON CHRONIC RESPIRATORY FAILURE WITH HYPOXIA AND HYPERCAPNIA (HCC): ICD-10-CM

## 2017-11-01 DIAGNOSIS — G89.29 OTHER CHRONIC PAIN: ICD-10-CM

## 2017-11-01 DIAGNOSIS — J96.22 ACUTE ON CHRONIC RESPIRATORY FAILURE WITH HYPOXIA AND HYPERCAPNIA (HCC): ICD-10-CM

## 2017-11-01 DIAGNOSIS — I50.23 ACUTE ON CHRONIC SYSTOLIC CONGESTIVE HEART FAILURE (HCC): Primary | ICD-10-CM

## 2017-11-01 PROBLEM — J96.20 ACUTE ON CHRONIC RESPIRATORY FAILURE (HCC): Status: ACTIVE | Noted: 2017-11-01

## 2017-11-01 LAB
6-ACETYL MORPHINE: NEGATIVE
A-A DO2: >300 MMHG (ref 0–300)
ACETONE BLD QL: NEGATIVE
ALBUMIN SERPL-MCNC: 3.6 G/DL (ref 3.4–4.8)
ALBUMIN/GLOB SERPL: 1 G/DL (ref 1.5–2.5)
ALP SERPL-CCNC: 133 U/L (ref 35–104)
ALT SERPL W P-5'-P-CCNC: 29 U/L (ref 10–36)
AMPHET+METHAMPHET UR QL: NEGATIVE
ANION GAP SERPL CALCULATED.3IONS-SCNC: 14 MMOL/L (ref 3.6–11.2)
ANISOCYTOSIS BLD QL: NORMAL
ARTERIAL PATENCY WRIST A: ABNORMAL
AST SERPL-CCNC: 59 U/L (ref 10–30)
ATMOSPHERIC PRESS: 727 MMHG
BACTERIA UR QL AUTO: ABNORMAL /HPF
BARBITURATES UR QL SCN: NEGATIVE
BASE EXCESS BLDA CALC-SCNC: -2.2 MMOL/L
BASE EXCESS BLDA CALC-SCNC: -5 MMOL/L
BASE EXCESS BLDA CALC-SCNC: -7.4 MMOL/L
BASOPHILS # BLD AUTO: 0.03 10*3/MM3 (ref 0–0.3)
BASOPHILS NFR BLD AUTO: 0.1 % (ref 0–2)
BDY SITE: ABNORMAL
BENZODIAZ UR QL SCN: POSITIVE
BILIRUB SERPL-MCNC: 0.3 MG/DL (ref 0.2–1.8)
BILIRUB UR QL STRIP: NEGATIVE
BNP SERPL-MCNC: 3358 PG/ML (ref 0–100)
BODY TEMPERATURE: 98.6 C
BUN BLD-MCNC: 47 MG/DL (ref 7–21)
BUN/CREAT SERPL: 12.2 (ref 7–25)
BUPRENORPHINE SERPL-MCNC: NEGATIVE NG/ML
CALCIUM SPEC-SCNC: 8.1 MG/DL (ref 7.7–10)
CANNABINOIDS SERPL QL: NEGATIVE
CHLORIDE SERPL-SCNC: 108 MMOL/L (ref 99–112)
CK MB SERPL-CCNC: 3.99 NG/ML (ref 0–5)
CK MB SERPL-RTO: 8.5 % (ref 0–3)
CK SERPL-CCNC: 47 U/L (ref 24–173)
CK SERPL-CCNC: 58 U/L (ref 24–173)
CLARITY UR: ABNORMAL
CO2 SERPL-SCNC: 21 MMOL/L (ref 24.3–31.9)
COCAINE UR QL: NEGATIVE
COHGB MFR BLD: 1.6 % (ref 0–5)
COHGB MFR BLD: 2 % (ref 0–5)
COHGB MFR BLD: 2.3 % (ref 0–5)
COLOR UR: ABNORMAL
CREAT BLD-MCNC: 3.85 MG/DL (ref 0.43–1.29)
CRP SERPL-MCNC: 0.85 MG/DL (ref 0–0.99)
D-LACTATE SERPL-SCNC: 1.5 MMOL/L (ref 0.5–2)
DEPRECATED RDW RBC AUTO: 69.5 FL (ref 37–54)
EOSINOPHIL # BLD AUTO: 0.03 10*3/MM3 (ref 0–0.7)
EOSINOPHIL NFR BLD AUTO: 0.1 % (ref 0–7)
ERYTHROCYTE [DISTWIDTH] IN BLOOD BY AUTOMATED COUNT: 19.7 % (ref 11.5–14.5)
ERYTHROCYTE [SEDIMENTATION RATE] IN BLOOD: 9 MM/HR (ref 0–30)
ETHANOL BLD-MCNC: <10 MG/DL
ETHANOL UR QL: <0.01 %
GFR SERPL CREATININE-BSD FRML MDRD: 12 ML/MIN/1.73
GLOBULIN UR ELPH-MCNC: 3.5 GM/DL
GLUCOSE BLD-MCNC: 355 MG/DL (ref 70–110)
GLUCOSE BLDC GLUCOMTR-MCNC: 310 MG/DL (ref 70–130)
GLUCOSE UR STRIP-MCNC: ABNORMAL MG/DL
HBA1C MFR BLD: 6.5 % (ref 4.5–5.7)
HCO3 BLDA-SCNC: 22.5 MMOL/L (ref 22–26)
HCO3 BLDA-SCNC: 22.9 MMOL/L (ref 22–26)
HCO3 BLDA-SCNC: 22.9 MMOL/L (ref 22–26)
HCT VFR BLD AUTO: 38 % (ref 37–47)
HCT VFR BLD CALC: 30 % (ref 37–47)
HCT VFR BLD CALC: 34 % (ref 37–47)
HCT VFR BLD CALC: 34 % (ref 37–47)
HGB BLD-MCNC: 10.7 G/DL (ref 12–16)
HGB BLDA-MCNC: 10.3 G/DL (ref 12–16)
HGB BLDA-MCNC: 11.4 G/DL (ref 12–16)
HGB BLDA-MCNC: 11.5 G/DL (ref 12–16)
HGB UR QL STRIP.AUTO: ABNORMAL
HOROWITZ INDEX BLD+IHG-RTO: 100 %
HYALINE CASTS UR QL AUTO: ABNORMAL /LPF
HYPOCHROMIA BLD QL: NORMAL
IMM GRANULOCYTES # BLD: 0.1 10*3/MM3 (ref 0–0.03)
IMM GRANULOCYTES NFR BLD: 0.5 % (ref 0–0.5)
KETONES UR QL STRIP: NEGATIVE
L PNEUMO1 AG UR QL IA: NEGATIVE
LEUKOCYTE ESTERASE UR QL STRIP.AUTO: NEGATIVE
LYMPHOCYTES # BLD AUTO: 0.68 10*3/MM3 (ref 1–3)
LYMPHOCYTES NFR BLD AUTO: 3.2 % (ref 16–46)
M PNEUMO IGM SER QL: NEGATIVE
MACROCYTES BLD QL SMEAR: NORMAL
MAGNESIUM SERPL-MCNC: 2.3 MG/DL (ref 1.7–2.6)
MCH RBC QN AUTO: 28.2 PG (ref 27–33)
MCHC RBC AUTO-ENTMCNC: 28.2 G/DL (ref 33–37)
MCV RBC AUTO: 100 FL (ref 80–94)
METHADONE UR QL SCN: NEGATIVE
METHGB BLD QL: 0.3 % (ref 0–3)
METHGB BLD QL: 0.3 % (ref 0–3)
METHGB BLD QL: 0.4 % (ref 0–3)
MODALITY: ABNORMAL
MONOCYTES # BLD AUTO: 0.84 10*3/MM3 (ref 0.1–0.9)
MONOCYTES NFR BLD AUTO: 4 % (ref 0–12)
MYOGLOBIN SERPL-MCNC: 177 NG/ML (ref 0–109)
MYOGLOBIN SERPL-MCNC: 211 NG/ML (ref 0–109)
NEUTROPHILS # BLD AUTO: 19.54 10*3/MM3 (ref 1.4–6.5)
NEUTROPHILS NFR BLD AUTO: 92.1 % (ref 40–75)
NITRITE UR QL STRIP: NEGATIVE
OPIATES UR QL: POSITIVE
OSMOLALITY SERPL CALC.SUM OF ELEC: 311.5 MOSM/KG (ref 273–305)
OXYCODONE UR QL SCN: POSITIVE
OXYHGB MFR BLDV: 88.6 % (ref 85–100)
OXYHGB MFR BLDV: 96.8 % (ref 85–100)
OXYHGB MFR BLDV: 98 % (ref 85–100)
PCO2 BLDA: 38.4 MM HG (ref 35–45)
PCO2 BLDA: 55.7 MM HG (ref 35–45)
PCO2 BLDA: 73.1 MM HG (ref 35–45)
PCP UR QL SCN: NEGATIVE
PEEP RESPIRATORY: 5 CM[H2O]
PEEP RESPIRATORY: 5 CM[H2O]
PH BLDA: 7.11 PH UNITS (ref 7.35–7.45)
PH BLDA: 7.23 PH UNITS (ref 7.35–7.45)
PH BLDA: 7.39 PH UNITS (ref 7.35–7.45)
PH UR STRIP.AUTO: <=5 [PH] (ref 5–8)
PHOSPHATE SERPL-MCNC: 9.2 MG/DL (ref 2.7–4.5)
PLAT MORPH BLD: NORMAL
PLATELET # BLD AUTO: 195 10*3/MM3 (ref 130–400)
PMV BLD AUTO: 11.2 FL (ref 6–10)
PO2 BLDA: 160.8 MM HG (ref 80–100)
PO2 BLDA: 272.4 MM HG (ref 80–100)
PO2 BLDA: 75.4 MM HG (ref 80–100)
POTASSIUM BLD-SCNC: 4.3 MMOL/L (ref 3.5–5.3)
PROT SERPL-MCNC: 7.1 G/DL (ref 6–8)
PROT UR QL STRIP: ABNORMAL
RBC # BLD AUTO: 3.8 10*6/MM3 (ref 4.2–5.4)
RBC # UR: ABNORMAL /HPF
REF LAB TEST METHOD: ABNORMAL
SAO2 % BLDCOA: 90.8 % (ref 90–100)
SAO2 % BLDCOA: 99.4 % (ref 90–100)
SAO2 % BLDCOA: 99.9 % (ref 90–100)
SET MECH RESP RATE: 20
SET MECH RESP RATE: 20
SODIUM BLD-SCNC: 143 MMOL/L (ref 135–153)
SP GR UR STRIP: 1.02 (ref 1–1.03)
SQUAMOUS #/AREA URNS HPF: ABNORMAL /HPF
TOTAL RATE: 20 BREATHS/MINUTE
TROPONIN I SERPL-MCNC: 0.08 NG/ML
TROPONIN I SERPL-MCNC: 0.11 NG/ML
TROPONIN I SERPL-MCNC: 0.13 NG/ML
UROBILINOGEN UR QL STRIP: ABNORMAL
VENTILATOR MODE: ABNORMAL
VENTILATOR MODE: AC
VT ON VENT VENT: 450 ML
VT ON VENT VENT: 450 ML
WBC NRBC COR # BLD: 21.22 10*3/MM3 (ref 4.5–12.5)
WBC UR QL AUTO: ABNORMAL /HPF

## 2017-11-01 PROCEDURE — 84100 ASSAY OF PHOSPHORUS: CPT | Performed by: FAMILY MEDICINE

## 2017-11-01 PROCEDURE — 83036 HEMOGLOBIN GLYCOSYLATED A1C: CPT | Performed by: HOSPITALIST

## 2017-11-01 PROCEDURE — 80307 DRUG TEST PRSMV CHEM ANLYZR: CPT | Performed by: FAMILY MEDICINE

## 2017-11-01 PROCEDURE — 85652 RBC SED RATE AUTOMATED: CPT | Performed by: FAMILY MEDICINE

## 2017-11-01 PROCEDURE — 82962 GLUCOSE BLOOD TEST: CPT

## 2017-11-01 PROCEDURE — 71010 XR CHEST 1 VW: CPT | Performed by: RADIOLOGY

## 2017-11-01 PROCEDURE — 82375 ASSAY CARBOXYHB QUANT: CPT | Performed by: FAMILY MEDICINE

## 2017-11-01 PROCEDURE — 70450 CT HEAD/BRAIN W/O DYE: CPT

## 2017-11-01 PROCEDURE — 82805 BLOOD GASES W/O2 SATURATION: CPT | Performed by: FAMILY MEDICINE

## 2017-11-01 PROCEDURE — 99285 EMERGENCY DEPT VISIT HI MDM: CPT

## 2017-11-01 PROCEDURE — 87899 AGENT NOS ASSAY W/OPTIC: CPT | Performed by: HOSPITALIST

## 2017-11-01 PROCEDURE — 06HM33Z INSERTION OF INFUSION DEVICE INTO RIGHT FEMORAL VEIN, PERCUTANEOUS APPROACH: ICD-10-PCS | Performed by: FAMILY MEDICINE

## 2017-11-01 PROCEDURE — 25010000002 VANCOMYCIN PER 500 MG: Performed by: FAMILY MEDICINE

## 2017-11-01 PROCEDURE — 25010000002 PROPOFOL 10 MG/ML EMULSION: Performed by: FAMILY MEDICINE

## 2017-11-01 PROCEDURE — 85025 COMPLETE CBC W/AUTO DIFF WBC: CPT | Performed by: FAMILY MEDICINE

## 2017-11-01 PROCEDURE — 85007 BL SMEAR W/DIFF WBC COUNT: CPT | Performed by: FAMILY MEDICINE

## 2017-11-01 PROCEDURE — 25010000002 PROPOFOL 1000 MG/ML EMULSION: Performed by: FAMILY MEDICINE

## 2017-11-01 PROCEDURE — 83874 ASSAY OF MYOGLOBIN: CPT | Performed by: FAMILY MEDICINE

## 2017-11-01 PROCEDURE — 87040 BLOOD CULTURE FOR BACTERIA: CPT | Performed by: FAMILY MEDICINE

## 2017-11-01 PROCEDURE — 84484 ASSAY OF TROPONIN QUANT: CPT | Performed by: FAMILY MEDICINE

## 2017-11-01 PROCEDURE — 80053 COMPREHEN METABOLIC PANEL: CPT | Performed by: FAMILY MEDICINE

## 2017-11-01 PROCEDURE — 71250 CT THORAX DX C-: CPT

## 2017-11-01 PROCEDURE — 83874 ASSAY OF MYOGLOBIN: CPT | Performed by: HOSPITALIST

## 2017-11-01 PROCEDURE — 81001 URINALYSIS AUTO W/SCOPE: CPT | Performed by: FAMILY MEDICINE

## 2017-11-01 PROCEDURE — 94799 UNLISTED PULMONARY SVC/PX: CPT

## 2017-11-01 PROCEDURE — 82550 ASSAY OF CK (CPK): CPT | Performed by: FAMILY MEDICINE

## 2017-11-01 PROCEDURE — 83735 ASSAY OF MAGNESIUM: CPT | Performed by: FAMILY MEDICINE

## 2017-11-01 PROCEDURE — 86140 C-REACTIVE PROTEIN: CPT | Performed by: FAMILY MEDICINE

## 2017-11-01 PROCEDURE — 36600 WITHDRAWAL OF ARTERIAL BLOOD: CPT | Performed by: FAMILY MEDICINE

## 2017-11-01 PROCEDURE — 83880 ASSAY OF NATRIURETIC PEPTIDE: CPT | Performed by: FAMILY MEDICINE

## 2017-11-01 PROCEDURE — 74000 XR ABDOMEN KUB: CPT | Performed by: RADIOLOGY

## 2017-11-01 PROCEDURE — 93010 ELECTROCARDIOGRAM REPORT: CPT | Performed by: INTERNAL MEDICINE

## 2017-11-01 PROCEDURE — 5A1945Z RESPIRATORY VENTILATION, 24-96 CONSECUTIVE HOURS: ICD-10-PCS | Performed by: INTERNAL MEDICINE

## 2017-11-01 PROCEDURE — 94002 VENT MGMT INPAT INIT DAY: CPT

## 2017-11-01 PROCEDURE — 82550 ASSAY OF CK (CPK): CPT | Performed by: HOSPITALIST

## 2017-11-01 PROCEDURE — 71010 HC CHEST PA OR AP: CPT

## 2017-11-01 PROCEDURE — 25010000002 PIPERACILLIN-TAZOBACTAM: Performed by: FAMILY MEDICINE

## 2017-11-01 PROCEDURE — 82009 KETONE BODYS QUAL: CPT | Performed by: HOSPITALIST

## 2017-11-01 PROCEDURE — 84484 ASSAY OF TROPONIN QUANT: CPT | Performed by: HOSPITALIST

## 2017-11-01 PROCEDURE — 83050 HGB METHEMOGLOBIN QUAN: CPT | Performed by: FAMILY MEDICINE

## 2017-11-01 PROCEDURE — 87086 URINE CULTURE/COLONY COUNT: CPT | Performed by: FAMILY MEDICINE

## 2017-11-01 PROCEDURE — 83605 ASSAY OF LACTIC ACID: CPT | Performed by: FAMILY MEDICINE

## 2017-11-01 PROCEDURE — 82553 CREATINE MB FRACTION: CPT | Performed by: HOSPITALIST

## 2017-11-01 PROCEDURE — 93005 ELECTROCARDIOGRAM TRACING: CPT | Performed by: FAMILY MEDICINE

## 2017-11-01 PROCEDURE — 71250 CT THORAX DX C-: CPT | Performed by: RADIOLOGY

## 2017-11-01 PROCEDURE — 25010000002 MIDAZOLAM PER 1 MG

## 2017-11-01 PROCEDURE — 51702 INSERT TEMP BLADDER CATH: CPT

## 2017-11-01 PROCEDURE — 74000 HC ABDOMEN KUB: CPT

## 2017-11-01 PROCEDURE — 63710000001 INSULIN REGULAR HUMAN PER 5 UNITS: Performed by: FAMILY MEDICINE

## 2017-11-01 PROCEDURE — 25010000002 PROPOFOL 1000 MG/ML EMULSION: Performed by: HOSPITALIST

## 2017-11-01 PROCEDURE — 70450 CT HEAD/BRAIN W/O DYE: CPT | Performed by: RADIOLOGY

## 2017-11-01 RX ORDER — PROPOFOL 10 MG/ML
20 VIAL (ML) INTRAVENOUS ONCE
Status: COMPLETED | OUTPATIENT
Start: 2017-11-01 | End: 2017-11-01

## 2017-11-01 RX ORDER — PROPOFOL 10 MG/ML
VIAL (ML) INTRAVENOUS
Status: COMPLETED
Start: 2017-11-01 | End: 2017-11-03

## 2017-11-01 RX ORDER — INSULIN GLARGINE 100 [IU]/ML
10 INJECTION, SOLUTION SUBCUTANEOUS NIGHTLY
COMMUNITY

## 2017-11-01 RX ORDER — NALOXONE HYDROCHLORIDE 1 MG/ML
2 INJECTION INTRAMUSCULAR; INTRAVENOUS; SUBCUTANEOUS ONCE
Status: DISCONTINUED | OUTPATIENT
Start: 2017-11-01 | End: 2017-11-01

## 2017-11-01 RX ORDER — NICOTINE POLACRILEX 4 MG
15 LOZENGE BUCCAL
Status: DISCONTINUED | OUTPATIENT
Start: 2017-11-01 | End: 2017-11-15 | Stop reason: HOSPADM

## 2017-11-01 RX ORDER — DEXTROSE MONOHYDRATE 25 G/50ML
25 INJECTION, SOLUTION INTRAVENOUS
Status: DISCONTINUED | OUTPATIENT
Start: 2017-11-01 | End: 2017-11-15 | Stop reason: HOSPADM

## 2017-11-01 RX ORDER — PROPOFOL 10 MG/ML
100 VIAL (ML) INTRAVENOUS ONCE
Status: COMPLETED | OUTPATIENT
Start: 2017-11-01 | End: 2017-11-01

## 2017-11-01 RX ORDER — SODIUM CHLORIDE 0.9 % (FLUSH) 0.9 %
10 SYRINGE (ML) INJECTION AS NEEDED
Status: DISCONTINUED | OUTPATIENT
Start: 2017-11-01 | End: 2017-11-15 | Stop reason: HOSPADM

## 2017-11-01 RX ORDER — BUMETANIDE 0.25 MG/ML
2 INJECTION INTRAMUSCULAR; INTRAVENOUS ONCE
Status: COMPLETED | OUTPATIENT
Start: 2017-11-01 | End: 2017-11-01

## 2017-11-01 RX ORDER — MIDAZOLAM HYDROCHLORIDE 1 MG/ML
INJECTION INTRAMUSCULAR; INTRAVENOUS
Status: COMPLETED
Start: 2017-11-01 | End: 2017-11-01

## 2017-11-01 RX ORDER — PHENOL 1.4 %
1000 AEROSOL, SPRAY (ML) MUCOUS MEMBRANE DAILY
COMMUNITY
End: 2018-01-14 | Stop reason: HOSPADM

## 2017-11-01 RX ORDER — MIDODRINE HYDROCHLORIDE 5 MG/1
10 TABLET ORAL 3 TIMES DAILY
COMMUNITY
End: 2017-11-15 | Stop reason: HOSPADM

## 2017-11-01 RX ORDER — PROPOFOL 10 MG/ML
VIAL (ML) INTRAVENOUS
Status: DISPENSED
Start: 2017-11-01 | End: 2017-11-02

## 2017-11-01 RX ORDER — CALCITRIOL 0.25 UG/1
0.25 CAPSULE, LIQUID FILLED ORAL DAILY
Status: ON HOLD | COMMUNITY
End: 2018-03-29

## 2017-11-01 RX ORDER — MIDAZOLAM HYDROCHLORIDE 1 MG/ML
2 INJECTION INTRAMUSCULAR; INTRAVENOUS ONCE
Status: COMPLETED | OUTPATIENT
Start: 2017-11-01 | End: 2017-11-01

## 2017-11-01 RX ORDER — FERROUS SULFATE 325(65) MG
325 TABLET ORAL 2 TIMES DAILY WITH MEALS
Status: ON HOLD | COMMUNITY
End: 2018-03-29

## 2017-11-01 RX ADMIN — PIPERACILLIN SODIUM AND TAZOBACTAM SODIUM 4.5 G: 4; .5 INJECTION, POWDER, FOR SOLUTION INTRAVENOUS at 22:33

## 2017-11-01 RX ADMIN — VANCOMYCIN HYDROCHLORIDE 1500 MG: 5 INJECTION, POWDER, LYOPHILIZED, FOR SOLUTION INTRAVENOUS at 22:34

## 2017-11-01 RX ADMIN — SODIUM CHLORIDE 500 ML: 9 INJECTION, SOLUTION INTRAVENOUS at 19:37

## 2017-11-01 RX ADMIN — PROPOFOL 25 MCG/KG/MIN: 10 INJECTION, EMULSION INTRAVENOUS at 20:15

## 2017-11-01 RX ADMIN — PROPOFOL 45 MCG/KG/MIN: 10 INJECTION, EMULSION INTRAVENOUS at 22:20

## 2017-11-01 RX ADMIN — MIDAZOLAM HYDROCHLORIDE 2 MG: 1 INJECTION INTRAMUSCULAR; INTRAVENOUS at 21:40

## 2017-11-01 RX ADMIN — BUMETANIDE 2 MG: 0.25 INJECTION, SOLUTION INTRAMUSCULAR; INTRAVENOUS at 22:10

## 2017-11-01 RX ADMIN — HUMAN INSULIN 5 UNITS: 100 INJECTION, SOLUTION SUBCUTANEOUS at 23:10

## 2017-11-01 RX ADMIN — PROPOFOL 20 MG: 10 INJECTION, EMULSION INTRAVENOUS at 20:06

## 2017-11-01 RX ADMIN — MIDAZOLAM HYDROCHLORIDE 2 MG: 1 INJECTION, SOLUTION INTRAMUSCULAR; INTRAVENOUS at 21:40

## 2017-11-01 RX ADMIN — PROPOFOL 100 MG: 10 INJECTION, EMULSION INTRAVENOUS at 21:10

## 2017-11-01 RX ADMIN — PROPOFOL 5 MCG/KG/MIN: 10 INJECTION, EMULSION INTRAVENOUS at 19:33

## 2017-11-02 ENCOUNTER — APPOINTMENT (OUTPATIENT)
Dept: GENERAL RADIOLOGY | Facility: HOSPITAL | Age: 67
End: 2017-11-02

## 2017-11-02 ENCOUNTER — APPOINTMENT (OUTPATIENT)
Dept: NUCLEAR MEDICINE | Facility: HOSPITAL | Age: 67
End: 2017-11-02

## 2017-11-02 LAB
A-A DO2: >300 MMHG (ref 0–300)
ALBUMIN SERPL-MCNC: 3 G/DL (ref 3.4–4.8)
ALBUMIN/GLOB SERPL: 1.1 G/DL (ref 1.5–2.5)
ALP SERPL-CCNC: 99 U/L (ref 35–104)
ALT SERPL W P-5'-P-CCNC: 22 U/L (ref 10–36)
ANION GAP SERPL CALCULATED.3IONS-SCNC: 11.6 MMOL/L (ref 3.6–11.2)
ANISOCYTOSIS BLD QL: NORMAL
ARTERIAL PATENCY WRIST A: ABNORMAL
AST SERPL-CCNC: 33 U/L (ref 10–30)
ATMOSPHERIC PRESS: 727 MMHG
BASE EXCESS BLDA CALC-SCNC: -1.6 MMOL/L
BASOPHILS # BLD AUTO: 0.05 10*3/MM3 (ref 0–0.3)
BASOPHILS NFR BLD AUTO: 0.4 % (ref 0–2)
BDY SITE: ABNORMAL
BILIRUB SERPL-MCNC: 0.3 MG/DL (ref 0.2–1.8)
BODY TEMPERATURE: 98.6 C
BUN BLD-MCNC: 53 MG/DL (ref 7–21)
BUN/CREAT SERPL: 13.7 (ref 7–25)
CALCIUM SPEC-SCNC: 7.6 MG/DL (ref 7.7–10)
CHLORIDE SERPL-SCNC: 108 MMOL/L (ref 99–112)
CK MB SERPL-CCNC: 2.7 NG/ML (ref 0–5)
CK MB SERPL-CCNC: 3.49 NG/ML (ref 0–5)
CK MB SERPL-RTO: 7.3 % (ref 0–3)
CK MB SERPL-RTO: 8.1 % (ref 0–3)
CK SERPL-CCNC: 37 U/L (ref 24–173)
CK SERPL-CCNC: 43 U/L (ref 24–173)
CO2 SERPL-SCNC: 23.4 MMOL/L (ref 24.3–31.9)
COHGB MFR BLD: 1.1 % (ref 0–5)
CREAT BLD-MCNC: 3.87 MG/DL (ref 0.43–1.29)
CRP SERPL-MCNC: 1.05 MG/DL (ref 0–0.99)
DEPRECATED RDW RBC AUTO: 64.3 FL (ref 37–54)
EOSINOPHIL # BLD AUTO: 0.11 10*3/MM3 (ref 0–0.7)
EOSINOPHIL NFR BLD AUTO: 1 % (ref 0–7)
ERYTHROCYTE [DISTWIDTH] IN BLOOD BY AUTOMATED COUNT: 19 % (ref 11.5–14.5)
FOLATE SERPL-MCNC: 6.61 NG/ML (ref 5.4–20)
GFR SERPL CREATININE-BSD FRML MDRD: 12 ML/MIN/1.73
GLOBULIN UR ELPH-MCNC: 2.8 GM/DL
GLUCOSE BLD-MCNC: 144 MG/DL (ref 70–110)
GLUCOSE BLDC GLUCOMTR-MCNC: 107 MG/DL (ref 70–130)
GLUCOSE BLDC GLUCOMTR-MCNC: 118 MG/DL (ref 70–130)
GLUCOSE BLDC GLUCOMTR-MCNC: 122 MG/DL (ref 70–130)
GLUCOSE BLDC GLUCOMTR-MCNC: 124 MG/DL (ref 70–130)
GLUCOSE BLDC GLUCOMTR-MCNC: 179 MG/DL (ref 70–130)
GLUCOSE BLDC GLUCOMTR-MCNC: 213 MG/DL (ref 70–130)
HCO3 BLDA-SCNC: 22.4 MMOL/L (ref 22–26)
HCT VFR BLD AUTO: 31.2 % (ref 37–47)
HCT VFR BLD CALC: 31 % (ref 37–47)
HGB BLD-MCNC: 9 G/DL (ref 12–16)
HGB BLDA-MCNC: 10.4 G/DL (ref 12–16)
HOROWITZ INDEX BLD+IHG-RTO: 60 %
HYPOCHROMIA BLD QL: NORMAL
IMM GRANULOCYTES # BLD: 0.03 10*3/MM3 (ref 0–0.03)
IMM GRANULOCYTES NFR BLD: 0.3 % (ref 0–0.5)
LARGE PLATELETS: NORMAL
LYMPHOCYTES # BLD AUTO: 1.64 10*3/MM3 (ref 1–3)
LYMPHOCYTES NFR BLD AUTO: 14.4 % (ref 16–46)
MACROCYTES BLD QL SMEAR: NORMAL
MCH RBC QN AUTO: 27.9 PG (ref 27–33)
MCHC RBC AUTO-ENTMCNC: 28.8 G/DL (ref 33–37)
MCV RBC AUTO: 96.6 FL (ref 80–94)
METHGB BLD QL: 0.4 % (ref 0–3)
MODALITY: ABNORMAL
MONOCYTES # BLD AUTO: 0.63 10*3/MM3 (ref 0.1–0.9)
MONOCYTES NFR BLD AUTO: 5.5 % (ref 0–12)
MYOGLOBIN SERPL-MCNC: 181 NG/ML (ref 0–109)
MYOGLOBIN SERPL-MCNC: 213 NG/ML (ref 0–109)
NEUTROPHILS # BLD AUTO: 8.95 10*3/MM3 (ref 1.4–6.5)
NEUTROPHILS NFR BLD AUTO: 78.4 % (ref 40–75)
OSMOLALITY SERPL CALC.SUM OF ELEC: 301.9 MOSM/KG (ref 273–305)
OXYHGB MFR BLDV: 69.6 % (ref 85–100)
PCO2 BLDA: 35.4 MM HG (ref 35–45)
PEEP RESPIRATORY: 5 CM[H2O]
PH BLDA: 7.42 PH UNITS (ref 7.35–7.45)
PLATELET # BLD AUTO: 164 10*3/MM3 (ref 130–400)
PMV BLD AUTO: 11.4 FL (ref 6–10)
PO2 BLDA: 37.5 MM HG (ref 80–100)
POTASSIUM BLD-SCNC: 3.6 MMOL/L (ref 3.5–5.3)
PROT SERPL-MCNC: 5.8 G/DL (ref 6–8)
RBC # BLD AUTO: 3.23 10*6/MM3 (ref 4.2–5.4)
SAO2 % BLDCOA: 70.7 % (ref 90–100)
SET MECH RESP RATE: 20
SODIUM BLD-SCNC: 143 MMOL/L (ref 135–153)
TROPONIN I SERPL-MCNC: 0.15 NG/ML
TROPONIN I SERPL-MCNC: 0.19 NG/ML
VANCOMYCIN SERPL-MCNC: 30.6 MCG/ML
VENTILATOR MODE: ABNORMAL
VIT B12 BLD-MCNC: 1961 PG/ML (ref 211–911)
VT ON VENT VENT: 450 ML
WBC NRBC COR # BLD: 11.41 10*3/MM3 (ref 4.5–12.5)

## 2017-11-02 PROCEDURE — 5A1D70Z PERFORMANCE OF URINARY FILTRATION, INTERMITTENT, LESS THAN 6 HOURS PER DAY: ICD-10-PCS | Performed by: INTERNAL MEDICINE

## 2017-11-02 PROCEDURE — 94799 UNLISTED PULMONARY SVC/PX: CPT

## 2017-11-02 PROCEDURE — 71010 XR CHEST 1 VW: CPT | Performed by: RADIOLOGY

## 2017-11-02 PROCEDURE — 78580 LUNG PERFUSION IMAGING: CPT | Performed by: RADIOLOGY

## 2017-11-02 PROCEDURE — 84484 ASSAY OF TROPONIN QUANT: CPT | Performed by: HOSPITALIST

## 2017-11-02 PROCEDURE — 82805 BLOOD GASES W/O2 SATURATION: CPT | Performed by: HOSPITALIST

## 2017-11-02 PROCEDURE — 99291 CRITICAL CARE FIRST HOUR: CPT | Performed by: INTERNAL MEDICINE

## 2017-11-02 PROCEDURE — 25010000002 CEFEPIME PER 500 MG: Performed by: HOSPITALIST

## 2017-11-02 PROCEDURE — 82550 ASSAY OF CK (CPK): CPT | Performed by: HOSPITALIST

## 2017-11-02 PROCEDURE — 82553 CREATINE MB FRACTION: CPT | Performed by: HOSPITALIST

## 2017-11-02 PROCEDURE — 80202 ASSAY OF VANCOMYCIN: CPT | Performed by: INTERNAL MEDICINE

## 2017-11-02 PROCEDURE — 25010000002 METHYLPREDNISOLONE PER 40 MG: Performed by: HOSPITALIST

## 2017-11-02 PROCEDURE — 87205 SMEAR GRAM STAIN: CPT | Performed by: INTERNAL MEDICINE

## 2017-11-02 PROCEDURE — 82962 GLUCOSE BLOOD TEST: CPT

## 2017-11-02 PROCEDURE — 83874 ASSAY OF MYOGLOBIN: CPT | Performed by: HOSPITALIST

## 2017-11-02 PROCEDURE — 71010 HC CHEST PA OR AP: CPT

## 2017-11-02 PROCEDURE — 78580 LUNG PERFUSION IMAGING: CPT

## 2017-11-02 PROCEDURE — 82746 ASSAY OF FOLIC ACID SERUM: CPT | Performed by: HOSPITALIST

## 2017-11-02 PROCEDURE — 87077 CULTURE AEROBIC IDENTIFY: CPT | Performed by: INTERNAL MEDICINE

## 2017-11-02 PROCEDURE — 94003 VENT MGMT INPAT SUBQ DAY: CPT

## 2017-11-02 PROCEDURE — 84145 PROCALCITONIN (PCT): CPT | Performed by: INTERNAL MEDICINE

## 2017-11-02 PROCEDURE — 87186 SC STD MICRODIL/AGAR DIL: CPT | Performed by: INTERNAL MEDICINE

## 2017-11-02 PROCEDURE — 83050 HGB METHEMOGLOBIN QUAN: CPT | Performed by: HOSPITALIST

## 2017-11-02 PROCEDURE — 94640 AIRWAY INHALATION TREATMENT: CPT

## 2017-11-02 PROCEDURE — 86140 C-REACTIVE PROTEIN: CPT | Performed by: HOSPITALIST

## 2017-11-02 PROCEDURE — 25010000002 HEPARIN (PORCINE) PER 1000 UNITS: Performed by: HOSPITALIST

## 2017-11-02 PROCEDURE — 87070 CULTURE OTHR SPECIMN AEROBIC: CPT | Performed by: INTERNAL MEDICINE

## 2017-11-02 PROCEDURE — 76937 US GUIDE VASCULAR ACCESS: CPT | Performed by: INTERNAL MEDICINE

## 2017-11-02 PROCEDURE — 36600 WITHDRAWAL OF ARTERIAL BLOOD: CPT | Performed by: HOSPITALIST

## 2017-11-02 PROCEDURE — 05HN33Z INSERTION OF INFUSION DEVICE INTO LEFT INTERNAL JUGULAR VEIN, PERCUTANEOUS APPROACH: ICD-10-PCS | Performed by: INTERNAL MEDICINE

## 2017-11-02 PROCEDURE — 25010000002 PROPOFOL 1000 MG/ML EMULSION: Performed by: HOSPITALIST

## 2017-11-02 PROCEDURE — 80053 COMPREHEN METABOLIC PANEL: CPT | Performed by: HOSPITALIST

## 2017-11-02 PROCEDURE — 36556 INSERT NON-TUNNEL CV CATH: CPT | Performed by: INTERNAL MEDICINE

## 2017-11-02 PROCEDURE — 85007 BL SMEAR W/DIFF WBC COUNT: CPT | Performed by: HOSPITALIST

## 2017-11-02 PROCEDURE — P9046 ALBUMIN (HUMAN), 25%, 20 ML: HCPCS | Performed by: INTERNAL MEDICINE

## 2017-11-02 PROCEDURE — 85025 COMPLETE CBC W/AUTO DIFF WBC: CPT | Performed by: HOSPITALIST

## 2017-11-02 PROCEDURE — 25010000002 ALBUMIN HUMAN 25% PER 50 ML: Performed by: INTERNAL MEDICINE

## 2017-11-02 PROCEDURE — 99291 CRITICAL CARE FIRST HOUR: CPT | Performed by: HOSPITALIST

## 2017-11-02 PROCEDURE — B544ZZA ULTRASONOGRAPHY OF LEFT JUGULAR VEINS, GUIDANCE: ICD-10-PCS | Performed by: INTERNAL MEDICINE

## 2017-11-02 PROCEDURE — 82375 ASSAY CARBOXYHB QUANT: CPT | Performed by: HOSPITALIST

## 2017-11-02 PROCEDURE — 82607 VITAMIN B-12: CPT | Performed by: HOSPITALIST

## 2017-11-02 RX ORDER — CLONAZEPAM 0.5 MG/1
0.5 TABLET ORAL 2 TIMES DAILY
Status: CANCELLED | OUTPATIENT
Start: 2017-11-02

## 2017-11-02 RX ORDER — MIDODRINE HYDROCHLORIDE 2.5 MG/1
10 TABLET ORAL 3 TIMES DAILY
Status: CANCELLED | OUTPATIENT
Start: 2017-11-02

## 2017-11-02 RX ORDER — ALBUMIN (HUMAN) 12.5 G/50ML
12.5 SOLUTION INTRAVENOUS
Status: COMPLETED | OUTPATIENT
Start: 2017-11-02 | End: 2017-11-02

## 2017-11-02 RX ORDER — ALBUTEROL SULFATE 2.5 MG/3ML
2.5 SOLUTION RESPIRATORY (INHALATION) EVERY 6 HOURS PRN
Status: CANCELLED | OUTPATIENT
Start: 2017-11-02

## 2017-11-02 RX ORDER — BUDESONIDE AND FORMOTEROL FUMARATE DIHYDRATE 80; 4.5 UG/1; UG/1
2 AEROSOL RESPIRATORY (INHALATION)
Status: CANCELLED | OUTPATIENT
Start: 2017-11-02

## 2017-11-02 RX ORDER — GABAPENTIN 100 MG/1
100 CAPSULE ORAL NIGHTLY
Status: CANCELLED | OUTPATIENT
Start: 2017-11-02

## 2017-11-02 RX ORDER — CALCITRIOL 0.25 UG/1
0.25 CAPSULE, LIQUID FILLED ORAL DAILY
Status: CANCELLED | OUTPATIENT
Start: 2017-11-02

## 2017-11-02 RX ORDER — SODIUM CHLORIDE 0.9 % (FLUSH) 0.9 %
1-10 SYRINGE (ML) INJECTION AS NEEDED
Status: DISCONTINUED | OUTPATIENT
Start: 2017-11-02 | End: 2017-11-15 | Stop reason: HOSPADM

## 2017-11-02 RX ORDER — FUROSEMIDE 80 MG
80 TABLET ORAL DAILY
COMMUNITY
End: 2017-11-15 | Stop reason: HOSPADM

## 2017-11-02 RX ORDER — FUROSEMIDE 80 MG
80 TABLET ORAL DAILY
Status: CANCELLED | OUTPATIENT
Start: 2017-11-02

## 2017-11-02 RX ORDER — CALCIUM CARBONATE 500(1250)
500 TABLET ORAL DAILY
Status: CANCELLED | OUTPATIENT
Start: 2017-11-02

## 2017-11-02 RX ORDER — CALCIUM CARBONATE 200(500)MG
2 TABLET,CHEWABLE ORAL DAILY
Status: CANCELLED | OUTPATIENT
Start: 2017-11-02

## 2017-11-02 RX ORDER — IPRATROPIUM BROMIDE AND ALBUTEROL SULFATE 2.5; .5 MG/3ML; MG/3ML
3 SOLUTION RESPIRATORY (INHALATION)
Status: DISCONTINUED | OUTPATIENT
Start: 2017-11-02 | End: 2017-11-15 | Stop reason: HOSPADM

## 2017-11-02 RX ORDER — HEPARIN SODIUM 5000 [USP'U]/ML
5000 INJECTION, SOLUTION INTRAVENOUS; SUBCUTANEOUS EVERY 12 HOURS SCHEDULED
Status: DISCONTINUED | OUTPATIENT
Start: 2017-11-02 | End: 2017-11-15 | Stop reason: HOSPADM

## 2017-11-02 RX ORDER — PANTOPRAZOLE SODIUM 40 MG/1
40 TABLET, DELAYED RELEASE ORAL
Status: DISCONTINUED | OUTPATIENT
Start: 2017-11-02 | End: 2017-11-02 | Stop reason: ALTCHOICE

## 2017-11-02 RX ORDER — METHYLPREDNISOLONE SODIUM SUCCINATE 40 MG/ML
20 INJECTION, POWDER, LYOPHILIZED, FOR SOLUTION INTRAMUSCULAR; INTRAVENOUS EVERY 12 HOURS
Status: DISCONTINUED | OUTPATIENT
Start: 2017-11-02 | End: 2017-11-07

## 2017-11-02 RX ORDER — FERROUS SULFATE 325(65) MG
325 TABLET ORAL 2 TIMES DAILY WITH MEALS
Status: CANCELLED | OUTPATIENT
Start: 2017-11-02

## 2017-11-02 RX ORDER — PANTOPRAZOLE SODIUM 40 MG/10ML
40 INJECTION, POWDER, LYOPHILIZED, FOR SOLUTION INTRAVENOUS
Status: DISCONTINUED | OUTPATIENT
Start: 2017-11-02 | End: 2017-11-06

## 2017-11-02 RX ORDER — METHYLPREDNISOLONE SODIUM SUCCINATE 40 MG/ML
40 INJECTION, POWDER, LYOPHILIZED, FOR SOLUTION INTRAMUSCULAR; INTRAVENOUS EVERY 12 HOURS
Status: DISCONTINUED | OUTPATIENT
Start: 2017-11-02 | End: 2017-11-02

## 2017-11-02 RX ORDER — CALCIUM ACETATE 667 MG/1
1334 CAPSULE ORAL
Status: CANCELLED | OUTPATIENT
Start: 2017-11-02

## 2017-11-02 RX ADMIN — PROPOFOL 40 MCG/KG/MIN: 10 INJECTION, EMULSION INTRAVENOUS at 21:59

## 2017-11-02 RX ADMIN — PROPOFOL 30 MCG/KG/MIN: 10 INJECTION, EMULSION INTRAVENOUS at 05:56

## 2017-11-02 RX ADMIN — IPRATROPIUM BROMIDE AND ALBUTEROL SULFATE 3 ML: .5; 3 SOLUTION RESPIRATORY (INHALATION) at 12:08

## 2017-11-02 RX ADMIN — METHYLPREDNISOLONE SODIUM SUCCINATE 20 MG: 40 INJECTION, POWDER, FOR SOLUTION INTRAMUSCULAR; INTRAVENOUS at 15:00

## 2017-11-02 RX ADMIN — METRONIDAZOLE 500 MG: 500 INJECTION, SOLUTION INTRAVENOUS at 02:25

## 2017-11-02 RX ADMIN — IPRATROPIUM BROMIDE AND ALBUTEROL SULFATE 3 ML: .5; 3 SOLUTION RESPIRATORY (INHALATION) at 06:26

## 2017-11-02 RX ADMIN — SODIUM CHLORIDE 1000 ML: 9 INJECTION, SOLUTION INTRAVENOUS at 10:13

## 2017-11-02 RX ADMIN — PROPOFOL 50 MCG/KG/MIN: 10 INJECTION, EMULSION INTRAVENOUS at 17:33

## 2017-11-02 RX ADMIN — ALBUMIN HUMAN 12.5 G: 0.25 SOLUTION INTRAVENOUS at 11:16

## 2017-11-02 RX ADMIN — IPRATROPIUM BROMIDE AND ALBUTEROL SULFATE 3 ML: .5; 3 SOLUTION RESPIRATORY (INHALATION) at 01:35

## 2017-11-02 RX ADMIN — METHYLPREDNISOLONE SODIUM SUCCINATE 20 MG: 40 INJECTION, POWDER, FOR SOLUTION INTRAMUSCULAR; INTRAVENOUS at 02:24

## 2017-11-02 RX ADMIN — PROPOFOL 30 MCG/KG/MIN: 10 INJECTION, EMULSION INTRAVENOUS at 12:50

## 2017-11-02 RX ADMIN — HEPARIN SODIUM 5000 UNITS: 5000 INJECTION, SOLUTION INTRAVENOUS; SUBCUTANEOUS at 02:25

## 2017-11-02 RX ADMIN — METRONIDAZOLE 500 MG: 500 INJECTION, SOLUTION INTRAVENOUS at 17:35

## 2017-11-02 RX ADMIN — HEPARIN SODIUM 5000 UNITS: 5000 INJECTION, SOLUTION INTRAVENOUS; SUBCUTANEOUS at 21:58

## 2017-11-02 RX ADMIN — CEFEPIME HYDROCHLORIDE 2 G: 1 INJECTION, POWDER, FOR SOLUTION INTRAMUSCULAR; INTRAVENOUS at 03:00

## 2017-11-02 RX ADMIN — PROPOFOL 50 MCG/KG/MIN: 10 INJECTION, EMULSION INTRAVENOUS at 00:47

## 2017-11-02 RX ADMIN — PANTOPRAZOLE SODIUM 40 MG: 40 INJECTION, POWDER, FOR SOLUTION INTRAVENOUS at 04:15

## 2017-11-02 RX ADMIN — IPRATROPIUM BROMIDE AND ALBUTEROL SULFATE 3 ML: .5; 3 SOLUTION RESPIRATORY (INHALATION) at 18:26

## 2017-11-03 ENCOUNTER — APPOINTMENT (OUTPATIENT)
Dept: GENERAL RADIOLOGY | Facility: HOSPITAL | Age: 67
End: 2017-11-03

## 2017-11-03 LAB
A-A DO2: 79.7 MMHG (ref 0–300)
ALBUMIN SERPL-MCNC: 3.5 G/DL (ref 3.4–4.8)
ALBUMIN/GLOB SERPL: 1.1 G/DL (ref 1.5–2.5)
ALP SERPL-CCNC: 98 U/L (ref 35–104)
ALT SERPL W P-5'-P-CCNC: 20 U/L (ref 10–36)
ANION GAP SERPL CALCULATED.3IONS-SCNC: 11.7 MMOL/L (ref 3.6–11.2)
AST SERPL-CCNC: 23 U/L (ref 10–30)
ATMOSPHERIC PRESS: 728 MMHG
BASE EXCESS BLDV CALC-SCNC: 0.6 MMOL/L
BASOPHILS # BLD AUTO: 0.02 10*3/MM3 (ref 0–0.3)
BASOPHILS NFR BLD AUTO: 0.3 % (ref 0–2)
BDY SITE: NORMAL
BILIRUB SERPL-MCNC: 0.5 MG/DL (ref 0.2–1.8)
BODY TEMPERATURE: 98.6 C
BUN BLD-MCNC: 28 MG/DL (ref 7–21)
BUN/CREAT SERPL: 11 (ref 7–25)
CALCIUM SPEC-SCNC: 8 MG/DL (ref 7.7–10)
CHLORIDE SERPL-SCNC: 101 MMOL/L (ref 99–112)
CO2 SERPL-SCNC: 24.3 MMOL/L (ref 24.3–31.9)
CREAT BLD-MCNC: 2.55 MG/DL (ref 0.43–1.29)
CRP SERPL-MCNC: 1.78 MG/DL (ref 0–0.99)
DEPRECATED RDW RBC AUTO: 62.2 FL (ref 37–54)
EOSINOPHIL # BLD AUTO: 0 10*3/MM3 (ref 0–0.7)
EOSINOPHIL NFR BLD AUTO: 0 % (ref 0–7)
ERYTHROCYTE [DISTWIDTH] IN BLOOD BY AUTOMATED COUNT: 18.9 % (ref 11.5–14.5)
GFR SERPL CREATININE-BSD FRML MDRD: 19 ML/MIN/1.73
GLOBULIN UR ELPH-MCNC: 3.2 GM/DL
GLUCOSE BLD-MCNC: 147 MG/DL (ref 70–110)
GLUCOSE BLDC GLUCOMTR-MCNC: 157 MG/DL (ref 70–130)
GLUCOSE BLDC GLUCOMTR-MCNC: 181 MG/DL (ref 70–130)
GLUCOSE BLDC GLUCOMTR-MCNC: 188 MG/DL (ref 70–130)
GLUCOSE BLDC GLUCOMTR-MCNC: 248 MG/DL (ref 70–130)
HCO3 BLDV-SCNC: 22.9 MMOL/L
HCT VFR BLD AUTO: 36.2 % (ref 37–47)
HGB BLD-MCNC: 10.8 G/DL (ref 12–16)
HGB BLDA-MCNC: 12.4 G/DL (ref 12–16)
HOROWITZ INDEX BLD+IHG-RTO: 25 %
IMM GRANULOCYTES # BLD: 0.02 10*3/MM3 (ref 0–0.03)
IMM GRANULOCYTES NFR BLD: 0.3 % (ref 0–0.5)
LYMPHOCYTES # BLD AUTO: 1.21 10*3/MM3 (ref 1–3)
LYMPHOCYTES NFR BLD AUTO: 15.4 % (ref 16–46)
MAGNESIUM SERPL-MCNC: 2 MG/DL (ref 1.7–2.6)
MCH RBC QN AUTO: 27.4 PG (ref 27–33)
MCHC RBC AUTO-ENTMCNC: 29.8 G/DL (ref 33–37)
MCV RBC AUTO: 91.9 FL (ref 80–94)
MODALITY: NORMAL
MONOCYTES # BLD AUTO: 0.46 10*3/MM3 (ref 0.1–0.9)
MONOCYTES NFR BLD AUTO: 5.9 % (ref 0–12)
NEUTROPHILS # BLD AUTO: 6.15 10*3/MM3 (ref 1.4–6.5)
NEUTROPHILS NFR BLD AUTO: 78.1 % (ref 40–75)
OSMOLALITY SERPL CALC.SUM OF ELEC: 282 MOSM/KG (ref 273–305)
PCO2 BLDV: 29.6 MM HG
PEEP RESPIRATORY: 5 CM[H2O]
PH BLDV: 7.51 PH UNITS
PHOSPHATE SERPL-MCNC: 4.4 MG/DL (ref 2.7–4.5)
PLATELET # BLD AUTO: 197 10*3/MM3 (ref 130–400)
PMV BLD AUTO: 11.5 FL (ref 6–10)
PO2 BLDV: 55.4 MM HG
POTASSIUM BLD-SCNC: 4.1 MMOL/L (ref 3.5–5.3)
PROCALCITONIN SERPL-MCNC: 18.37 NG/ML (ref 0–0.08)
PROT SERPL-MCNC: 6.7 G/DL (ref 6–8)
RBC # BLD AUTO: 3.94 10*6/MM3 (ref 4.2–5.4)
SAO2 % BLDCOV: 88.8 %
SET MECH RESP RATE: 20
SODIUM BLD-SCNC: 137 MMOL/L (ref 135–153)
TOTAL RATE: 20 BREATHS/MINUTE
VANCOMYCIN SERPL-MCNC: 23.6 MCG/ML
VENTILATOR MODE: AC
VT ON VENT VENT: 450 ML
WBC NRBC COR # BLD: 7.86 10*3/MM3 (ref 4.5–12.5)

## 2017-11-03 PROCEDURE — 86140 C-REACTIVE PROTEIN: CPT | Performed by: INTERNAL MEDICINE

## 2017-11-03 PROCEDURE — 99291 CRITICAL CARE FIRST HOUR: CPT | Performed by: INTERNAL MEDICINE

## 2017-11-03 PROCEDURE — 83735 ASSAY OF MAGNESIUM: CPT | Performed by: NURSE PRACTITIONER

## 2017-11-03 PROCEDURE — 25010000002 METHYLPREDNISOLONE PER 40 MG: Performed by: HOSPITALIST

## 2017-11-03 PROCEDURE — 94799 UNLISTED PULMONARY SVC/PX: CPT

## 2017-11-03 PROCEDURE — 94003 VENT MGMT INPAT SUBQ DAY: CPT

## 2017-11-03 PROCEDURE — 71010 HC CHEST AP: CPT

## 2017-11-03 PROCEDURE — 25010000002 PROPOFOL 10 MG/ML EMULSION

## 2017-11-03 PROCEDURE — 25010000002 LORAZEPAM PER 2 MG: Performed by: INTERNAL MEDICINE

## 2017-11-03 PROCEDURE — 85025 COMPLETE CBC W/AUTO DIFF WBC: CPT | Performed by: INTERNAL MEDICINE

## 2017-11-03 PROCEDURE — 25010000002 HYDRALAZINE PER 20 MG

## 2017-11-03 PROCEDURE — 63710000001 INSULIN ASPART PER 5 UNITS: Performed by: HOSPITALIST

## 2017-11-03 PROCEDURE — 84100 ASSAY OF PHOSPHORUS: CPT | Performed by: NURSE PRACTITIONER

## 2017-11-03 PROCEDURE — 71010 XR CHEST AP: CPT | Performed by: RADIOLOGY

## 2017-11-03 PROCEDURE — 82805 BLOOD GASES W/O2 SATURATION: CPT | Performed by: INTERNAL MEDICINE

## 2017-11-03 PROCEDURE — 25010000002 PROPOFOL 1000 MG/ML EMULSION: Performed by: HOSPITALIST

## 2017-11-03 PROCEDURE — 99233 SBSQ HOSP IP/OBS HIGH 50: CPT | Performed by: INTERNAL MEDICINE

## 2017-11-03 PROCEDURE — 80053 COMPREHEN METABOLIC PANEL: CPT | Performed by: NURSE PRACTITIONER

## 2017-11-03 PROCEDURE — 80202 ASSAY OF VANCOMYCIN: CPT | Performed by: INTERNAL MEDICINE

## 2017-11-03 PROCEDURE — 25010000002 CEFEPIME PER 500 MG: Performed by: HOSPITALIST

## 2017-11-03 PROCEDURE — 25010000002 HEPARIN (PORCINE) PER 1000 UNITS: Performed by: HOSPITALIST

## 2017-11-03 PROCEDURE — 82962 GLUCOSE BLOOD TEST: CPT

## 2017-11-03 RX ORDER — LORAZEPAM 2 MG/ML
1 INJECTION INTRAMUSCULAR ONCE
Status: DISCONTINUED | OUTPATIENT
Start: 2017-11-03 | End: 2017-11-03

## 2017-11-03 RX ORDER — METOPROLOL TARTRATE 5 MG/5ML
5 INJECTION INTRAVENOUS ONCE
Status: COMPLETED | OUTPATIENT
Start: 2017-11-03 | End: 2017-11-03

## 2017-11-03 RX ORDER — LORAZEPAM 2 MG/ML
1 INJECTION INTRAMUSCULAR ONCE AS NEEDED
Status: COMPLETED | OUTPATIENT
Start: 2017-11-03 | End: 2017-11-03

## 2017-11-03 RX ORDER — METOPROLOL TARTRATE 5 MG/5ML
INJECTION INTRAVENOUS
Status: DISPENSED
Start: 2017-11-03 | End: 2017-11-03

## 2017-11-03 RX ORDER — METOPROLOL TARTRATE 5 MG/5ML
5 INJECTION INTRAVENOUS ONCE AS NEEDED
Status: COMPLETED | OUTPATIENT
Start: 2017-11-03 | End: 2017-11-05

## 2017-11-03 RX ORDER — NALOXONE HYDROCHLORIDE 1 MG/ML
2 INJECTION INTRAMUSCULAR; INTRAVENOUS; SUBCUTANEOUS ONCE
Status: COMPLETED | OUTPATIENT
Start: 2017-11-03 | End: 2017-11-03

## 2017-11-03 RX ORDER — METOPROLOL TARTRATE 5 MG/5ML
5 INJECTION INTRAVENOUS ONCE
Status: DISCONTINUED | OUTPATIENT
Start: 2017-11-03 | End: 2017-11-03

## 2017-11-03 RX ORDER — HYDRALAZINE HYDROCHLORIDE 20 MG/ML
10 INJECTION INTRAMUSCULAR; INTRAVENOUS EVERY 6 HOURS PRN
Status: DISCONTINUED | OUTPATIENT
Start: 2017-11-03 | End: 2017-11-03

## 2017-11-03 RX ORDER — HYDRALAZINE HYDROCHLORIDE 20 MG/ML
INJECTION INTRAMUSCULAR; INTRAVENOUS
Status: COMPLETED
Start: 2017-11-03 | End: 2017-11-03

## 2017-11-03 RX ADMIN — PROPOFOL 50 MCG/KG/MIN: 10 INJECTION, EMULSION INTRAVENOUS at 06:57

## 2017-11-03 RX ADMIN — INSULIN ASPART 4 UNITS: 100 INJECTION, SOLUTION INTRAVENOUS; SUBCUTANEOUS at 11:06

## 2017-11-03 RX ADMIN — INSULIN ASPART 2 UNITS: 100 INJECTION, SOLUTION INTRAVENOUS; SUBCUTANEOUS at 22:49

## 2017-11-03 RX ADMIN — IPRATROPIUM BROMIDE AND ALBUTEROL SULFATE 3 ML: .5; 3 SOLUTION RESPIRATORY (INHALATION) at 06:16

## 2017-11-03 RX ADMIN — PROPOFOL 50 MCG/KG/MIN: 10 INJECTION, EMULSION INTRAVENOUS at 02:18

## 2017-11-03 RX ADMIN — IPRATROPIUM BROMIDE AND ALBUTEROL SULFATE 3 ML: .5; 3 SOLUTION RESPIRATORY (INHALATION) at 12:52

## 2017-11-03 RX ADMIN — METHYLPREDNISOLONE SODIUM SUCCINATE 20 MG: 40 INJECTION, POWDER, FOR SOLUTION INTRAMUSCULAR; INTRAVENOUS at 13:34

## 2017-11-03 RX ADMIN — IPRATROPIUM BROMIDE AND ALBUTEROL SULFATE 3 ML: .5; 3 SOLUTION RESPIRATORY (INHALATION) at 18:40

## 2017-11-03 RX ADMIN — PROPOFOL 50 MCG/KG/MIN: 10 INJECTION, EMULSION INTRAVENOUS at 16:21

## 2017-11-03 RX ADMIN — HEPARIN SODIUM 5000 UNITS: 5000 INJECTION, SOLUTION INTRAVENOUS; SUBCUTANEOUS at 08:46

## 2017-11-03 RX ADMIN — METRONIDAZOLE 500 MG: 500 INJECTION, SOLUTION INTRAVENOUS at 02:08

## 2017-11-03 RX ADMIN — LORAZEPAM 1 MG: 2 INJECTION INTRAMUSCULAR; INTRAVENOUS at 10:08

## 2017-11-03 RX ADMIN — HYDRALAZINE HYDROCHLORIDE 10 MG: 20 INJECTION INTRAMUSCULAR; INTRAVENOUS at 08:15

## 2017-11-03 RX ADMIN — PROPOFOL 50 MCG/KG/MIN: 10 INJECTION, EMULSION INTRAVENOUS at 20:40

## 2017-11-03 RX ADMIN — CEFEPIME HYDROCHLORIDE 2 G: 1 INJECTION, POWDER, FOR SOLUTION INTRAMUSCULAR; INTRAVENOUS at 02:08

## 2017-11-03 RX ADMIN — METRONIDAZOLE 500 MG: 500 INJECTION, SOLUTION INTRAVENOUS at 19:04

## 2017-11-03 RX ADMIN — PANTOPRAZOLE SODIUM 40 MG: 40 INJECTION, POWDER, FOR SOLUTION INTRAVENOUS at 06:12

## 2017-11-03 RX ADMIN — NALOXONE HYDROCHLORIDE 2 MG: 1 INJECTION PARENTERAL at 10:27

## 2017-11-03 RX ADMIN — METRONIDAZOLE 500 MG: 500 INJECTION, SOLUTION INTRAVENOUS at 09:06

## 2017-11-03 RX ADMIN — METHYLPREDNISOLONE SODIUM SUCCINATE 20 MG: 40 INJECTION, POWDER, FOR SOLUTION INTRAMUSCULAR; INTRAVENOUS at 02:24

## 2017-11-03 RX ADMIN — HEPARIN SODIUM 5000 UNITS: 5000 INJECTION, SOLUTION INTRAVENOUS; SUBCUTANEOUS at 20:31

## 2017-11-03 RX ADMIN — METOPROLOL TARTRATE 5 MG: 5 INJECTION INTRAVENOUS at 08:39

## 2017-11-03 RX ADMIN — INSULIN ASPART 2 UNITS: 100 INJECTION, SOLUTION INTRAVENOUS; SUBCUTANEOUS at 16:21

## 2017-11-03 RX ADMIN — IPRATROPIUM BROMIDE AND ALBUTEROL SULFATE 3 ML: .5; 3 SOLUTION RESPIRATORY (INHALATION) at 00:01

## 2017-11-04 ENCOUNTER — APPOINTMENT (OUTPATIENT)
Dept: GENERAL RADIOLOGY | Facility: HOSPITAL | Age: 67
End: 2017-11-04

## 2017-11-04 LAB
A-A DO2: 47.4 MMHG (ref 0–300)
ALBUMIN SERPL-MCNC: 3.3 G/DL (ref 3.4–4.8)
ALBUMIN/GLOB SERPL: 1.1 G/DL (ref 1.5–2.5)
ALP SERPL-CCNC: 77 U/L (ref 35–104)
ALT SERPL W P-5'-P-CCNC: 18 U/L (ref 10–36)
ANION GAP SERPL CALCULATED.3IONS-SCNC: 11.6 MMOL/L (ref 3.6–11.2)
ARTERIAL PATENCY WRIST A: POSITIVE
AST SERPL-CCNC: 16 U/L (ref 10–30)
ATMOSPHERIC PRESS: 728 MMHG
BACTERIA SPEC AEROBE CULT: NO GROWTH
BASE EXCESS BLDA CALC-SCNC: -1.8 MMOL/L
BASOPHILS # BLD AUTO: 0.02 10*3/MM3 (ref 0–0.3)
BASOPHILS NFR BLD AUTO: 0.2 % (ref 0–2)
BDY SITE: ABNORMAL
BILIRUB SERPL-MCNC: 0.4 MG/DL (ref 0.2–1.8)
BODY TEMPERATURE: 98.6 C
BUN BLD-MCNC: 43 MG/DL (ref 7–21)
BUN/CREAT SERPL: 13 (ref 7–25)
CALCIUM SPEC-SCNC: 7.3 MG/DL (ref 7.7–10)
CHLORIDE SERPL-SCNC: 99 MMOL/L (ref 99–112)
CO2 SERPL-SCNC: 23.4 MMOL/L (ref 24.3–31.9)
COHGB MFR BLD: 1.5 % (ref 0–5)
CREAT BLD-MCNC: 3.32 MG/DL (ref 0.43–1.29)
CRP SERPL-MCNC: 1.58 MG/DL (ref 0–0.99)
DEPRECATED RDW RBC AUTO: 62.4 FL (ref 37–54)
EOSINOPHIL # BLD AUTO: 0 10*3/MM3 (ref 0–0.7)
EOSINOPHIL NFR BLD AUTO: 0 % (ref 0–7)
ERYTHROCYTE [DISTWIDTH] IN BLOOD BY AUTOMATED COUNT: 19.1 % (ref 11.5–14.5)
GFR SERPL CREATININE-BSD FRML MDRD: 14 ML/MIN/1.73
GLOBULIN UR ELPH-MCNC: 3.1 GM/DL
GLUCOSE BLD-MCNC: 196 MG/DL (ref 70–110)
GLUCOSE BLDC GLUCOMTR-MCNC: 145 MG/DL (ref 70–130)
GLUCOSE BLDC GLUCOMTR-MCNC: 163 MG/DL (ref 70–130)
GLUCOSE BLDC GLUCOMTR-MCNC: 191 MG/DL (ref 70–130)
GLUCOSE BLDC GLUCOMTR-MCNC: 219 MG/DL (ref 70–130)
GLUCOSE BLDC GLUCOMTR-MCNC: 235 MG/DL (ref 70–130)
HCO3 BLDA-SCNC: 23 MMOL/L (ref 22–26)
HCT VFR BLD AUTO: 36.5 % (ref 37–47)
HCT VFR BLD CALC: 35 % (ref 37–47)
HGB BLD-MCNC: 10.9 G/DL (ref 12–16)
HGB BLDA-MCNC: 11.8 G/DL (ref 12–16)
HOROWITZ INDEX BLD+IHG-RTO: 25 %
IMM GRANULOCYTES # BLD: 0.02 10*3/MM3 (ref 0–0.03)
IMM GRANULOCYTES NFR BLD: 0.2 % (ref 0–0.5)
LYMPHOCYTES # BLD AUTO: 1.28 10*3/MM3 (ref 1–3)
LYMPHOCYTES NFR BLD AUTO: 14.9 % (ref 16–46)
MAGNESIUM SERPL-MCNC: 2.1 MG/DL (ref 1.7–2.6)
MCH RBC QN AUTO: 27.8 PG (ref 27–33)
MCHC RBC AUTO-ENTMCNC: 29.9 G/DL (ref 33–37)
MCV RBC AUTO: 93.1 FL (ref 80–94)
METHGB BLD QL: 0.2 % (ref 0–3)
MODALITY: ABNORMAL
MONOCYTES # BLD AUTO: 0.65 10*3/MM3 (ref 0.1–0.9)
MONOCYTES NFR BLD AUTO: 7.6 % (ref 0–12)
NEUTROPHILS # BLD AUTO: 6.63 10*3/MM3 (ref 1.4–6.5)
NEUTROPHILS NFR BLD AUTO: 77.1 % (ref 40–75)
OSMOLALITY SERPL CALC.SUM OF ELEC: 284.5 MOSM/KG (ref 273–305)
OXYHGB MFR BLDV: 92.4 % (ref 85–100)
PCO2 BLDA: 38.9 MM HG (ref 35–45)
PEEP RESPIRATORY: 5 CM[H2O]
PH BLDA: 7.39 PH UNITS (ref 7.35–7.45)
PLATELET # BLD AUTO: 230 10*3/MM3 (ref 130–400)
PMV BLD AUTO: 11.6 FL (ref 6–10)
PO2 BLDA: 76.7 MM HG (ref 80–100)
POTASSIUM BLD-SCNC: 4.5 MMOL/L (ref 3.5–5.3)
PROT SERPL-MCNC: 6.4 G/DL (ref 6–8)
RBC # BLD AUTO: 3.92 10*6/MM3 (ref 4.2–5.4)
SAO2 % BLDCOA: 94 % (ref 90–100)
SET MECH RESP RATE: 10
SODIUM BLD-SCNC: 134 MMOL/L (ref 135–153)
TOTAL RATE: 22 BREATHS/MINUTE
VENTILATOR MODE: AC
VT ON VENT VENT: 300 ML
WBC NRBC COR # BLD: 8.6 10*3/MM3 (ref 4.5–12.5)

## 2017-11-04 PROCEDURE — 80053 COMPREHEN METABOLIC PANEL: CPT | Performed by: INTERNAL MEDICINE

## 2017-11-04 PROCEDURE — 71010 HC CHEST AP: CPT

## 2017-11-04 PROCEDURE — 94799 UNLISTED PULMONARY SVC/PX: CPT

## 2017-11-04 PROCEDURE — 63710000001 INSULIN ASPART PER 5 UNITS: Performed by: HOSPITALIST

## 2017-11-04 PROCEDURE — 82805 BLOOD GASES W/O2 SATURATION: CPT | Performed by: INTERNAL MEDICINE

## 2017-11-04 PROCEDURE — 82375 ASSAY CARBOXYHB QUANT: CPT | Performed by: INTERNAL MEDICINE

## 2017-11-04 PROCEDURE — 99233 SBSQ HOSP IP/OBS HIGH 50: CPT | Performed by: INTERNAL MEDICINE

## 2017-11-04 PROCEDURE — 25010000002 METHYLPREDNISOLONE PER 40 MG: Performed by: HOSPITALIST

## 2017-11-04 PROCEDURE — 25010000002 VANCOMYCIN PER 500 MG

## 2017-11-04 PROCEDURE — 71010 XR CHEST AP: CPT | Performed by: RADIOLOGY

## 2017-11-04 PROCEDURE — 36600 WITHDRAWAL OF ARTERIAL BLOOD: CPT | Performed by: INTERNAL MEDICINE

## 2017-11-04 PROCEDURE — 71010 XR CHEST 1 VW: CPT | Performed by: RADIOLOGY

## 2017-11-04 PROCEDURE — 25010000002 PROPOFOL 1000 MG/ML EMULSION: Performed by: HOSPITALIST

## 2017-11-04 PROCEDURE — 71010 HC CHEST PA OR AP: CPT

## 2017-11-04 PROCEDURE — 83050 HGB METHEMOGLOBIN QUAN: CPT | Performed by: INTERNAL MEDICINE

## 2017-11-04 PROCEDURE — 25010000002 ALBUMIN HUMAN 25% PER 50 ML: Performed by: INTERNAL MEDICINE

## 2017-11-04 PROCEDURE — 25010000002 CEFEPIME PER 500 MG

## 2017-11-04 PROCEDURE — P9046 ALBUMIN (HUMAN), 25%, 20 ML: HCPCS | Performed by: INTERNAL MEDICINE

## 2017-11-04 PROCEDURE — 94003 VENT MGMT INPAT SUBQ DAY: CPT

## 2017-11-04 PROCEDURE — 25010000002 HEPARIN (PORCINE) PER 1000 UNITS: Performed by: HOSPITALIST

## 2017-11-04 PROCEDURE — 25010000002 HYDRALAZINE PER 20 MG: Performed by: INTERNAL MEDICINE

## 2017-11-04 PROCEDURE — 85025 COMPLETE CBC W/AUTO DIFF WBC: CPT | Performed by: INTERNAL MEDICINE

## 2017-11-04 PROCEDURE — 86140 C-REACTIVE PROTEIN: CPT | Performed by: INTERNAL MEDICINE

## 2017-11-04 PROCEDURE — 82962 GLUCOSE BLOOD TEST: CPT

## 2017-11-04 PROCEDURE — 83735 ASSAY OF MAGNESIUM: CPT | Performed by: INTERNAL MEDICINE

## 2017-11-04 RX ORDER — FAMOTIDINE 10 MG/ML
20 INJECTION, SOLUTION INTRAVENOUS 2 TIMES DAILY
Status: DISCONTINUED | OUTPATIENT
Start: 2017-11-04 | End: 2017-11-05

## 2017-11-04 RX ORDER — HYDRALAZINE HYDROCHLORIDE 20 MG/ML
10 INJECTION INTRAMUSCULAR; INTRAVENOUS ONCE
Status: COMPLETED | OUTPATIENT
Start: 2017-11-04 | End: 2017-11-04

## 2017-11-04 RX ORDER — ALBUMIN (HUMAN) 12.5 G/50ML
25 SOLUTION INTRAVENOUS AS NEEDED
Status: DISPENSED | OUTPATIENT
Start: 2017-11-04 | End: 2017-11-05

## 2017-11-04 RX ORDER — MORPHINE SULFATE 2 MG/ML
1 INJECTION, SOLUTION INTRAMUSCULAR; INTRAVENOUS ONCE
Status: DISCONTINUED | OUTPATIENT
Start: 2017-11-04 | End: 2017-11-04

## 2017-11-04 RX ADMIN — PROPOFOL 30 MCG/KG/MIN: 10 INJECTION, EMULSION INTRAVENOUS at 19:15

## 2017-11-04 RX ADMIN — INSULIN ASPART 4 UNITS: 100 INJECTION, SOLUTION INTRAVENOUS; SUBCUTANEOUS at 17:29

## 2017-11-04 RX ADMIN — IPRATROPIUM BROMIDE AND ALBUTEROL SULFATE 3 ML: .5; 3 SOLUTION RESPIRATORY (INHALATION) at 06:56

## 2017-11-04 RX ADMIN — PROPOFOL 50 MCG/KG/MIN: 10 INJECTION, EMULSION INTRAVENOUS at 06:09

## 2017-11-04 RX ADMIN — IPRATROPIUM BROMIDE AND ALBUTEROL SULFATE 3 ML: .5; 3 SOLUTION RESPIRATORY (INHALATION) at 13:10

## 2017-11-04 RX ADMIN — PROPOFOL 50 MCG/KG/MIN: 10 INJECTION, EMULSION INTRAVENOUS at 00:54

## 2017-11-04 RX ADMIN — HEPARIN SODIUM 5000 UNITS: 5000 INJECTION, SOLUTION INTRAVENOUS; SUBCUTANEOUS at 09:13

## 2017-11-04 RX ADMIN — METRONIDAZOLE 500 MG: 500 INJECTION, SOLUTION INTRAVENOUS at 03:10

## 2017-11-04 RX ADMIN — ALBUMIN HUMAN 25 G: 0.25 SOLUTION INTRAVENOUS at 08:22

## 2017-11-04 RX ADMIN — IPRATROPIUM BROMIDE AND ALBUTEROL SULFATE 3 ML: .5; 3 SOLUTION RESPIRATORY (INHALATION) at 18:22

## 2017-11-04 RX ADMIN — SODIUM CHLORIDE 1000 ML: 9 INJECTION, SOLUTION INTRAVENOUS at 08:08

## 2017-11-04 RX ADMIN — INSULIN ASPART 2 UNITS: 100 INJECTION, SOLUTION INTRAVENOUS; SUBCUTANEOUS at 05:06

## 2017-11-04 RX ADMIN — METHYLPREDNISOLONE SODIUM SUCCINATE 20 MG: 40 INJECTION, POWDER, FOR SOLUTION INTRAMUSCULAR; INTRAVENOUS at 03:10

## 2017-11-04 RX ADMIN — FAMOTIDINE 20 MG: 10 INJECTION, SOLUTION INTRAVENOUS at 22:19

## 2017-11-04 RX ADMIN — HEPARIN SODIUM 5000 UNITS: 5000 INJECTION, SOLUTION INTRAVENOUS; SUBCUTANEOUS at 22:18

## 2017-11-04 RX ADMIN — PANTOPRAZOLE SODIUM 40 MG: 40 INJECTION, POWDER, FOR SOLUTION INTRAVENOUS at 05:00

## 2017-11-04 RX ADMIN — CEFEPIME HYDROCHLORIDE 1 G: 1 INJECTION, SOLUTION INTRAVENOUS at 04:00

## 2017-11-04 RX ADMIN — HYDRALAZINE HYDROCHLORIDE 10 MG: 20 INJECTION INTRAMUSCULAR; INTRAVENOUS at 15:21

## 2017-11-04 RX ADMIN — VANCOMYCIN HYDROCHLORIDE 1000 MG: 5 INJECTION, POWDER, LYOPHILIZED, FOR SOLUTION INTRAVENOUS at 13:06

## 2017-11-04 RX ADMIN — METRONIDAZOLE 500 MG: 500 INJECTION, SOLUTION INTRAVENOUS at 13:04

## 2017-11-04 RX ADMIN — INSULIN ASPART 4 UNITS: 100 INJECTION, SOLUTION INTRAVENOUS; SUBCUTANEOUS at 22:18

## 2017-11-04 RX ADMIN — PROPOFOL 50 MCG/KG/MIN: 10 INJECTION, EMULSION INTRAVENOUS at 11:04

## 2017-11-04 RX ADMIN — METHYLPREDNISOLONE SODIUM SUCCINATE 20 MG: 40 INJECTION, POWDER, FOR SOLUTION INTRAMUSCULAR; INTRAVENOUS at 13:07

## 2017-11-04 RX ADMIN — PROPOFOL 30 MCG/KG/MIN: 10 INJECTION, EMULSION INTRAVENOUS at 22:19

## 2017-11-04 RX ADMIN — IPRATROPIUM BROMIDE AND ALBUTEROL SULFATE 3 ML: .5; 3 SOLUTION RESPIRATORY (INHALATION) at 00:43

## 2017-11-04 RX ADMIN — METRONIDAZOLE 500 MG: 500 INJECTION, SOLUTION INTRAVENOUS at 17:27

## 2017-11-05 ENCOUNTER — APPOINTMENT (OUTPATIENT)
Dept: GENERAL RADIOLOGY | Facility: HOSPITAL | Age: 67
End: 2017-11-05

## 2017-11-05 LAB
A-A DO2: 51.8 MMHG (ref 0–300)
A-A DO2: 56.6 MMHG (ref 0–300)
ALBUMIN SERPL-MCNC: 3.6 G/DL (ref 3.4–4.8)
ALBUMIN/GLOB SERPL: 1.1 G/DL (ref 1.5–2.5)
ALP SERPL-CCNC: 83 U/L (ref 35–104)
ALT SERPL W P-5'-P-CCNC: 11 U/L (ref 10–36)
ANION GAP SERPL CALCULATED.3IONS-SCNC: 11.8 MMOL/L (ref 3.6–11.2)
ARTERIAL PATENCY WRIST A: POSITIVE
ARTERIAL PATENCY WRIST A: POSITIVE
AST SERPL-CCNC: 13 U/L (ref 10–30)
ATMOSPHERIC PRESS: 723 MMHG
ATMOSPHERIC PRESS: 725 MMHG
BASE EXCESS BLDA CALC-SCNC: 0.3 MMOL/L
BASE EXCESS BLDA CALC-SCNC: 1.1 MMOL/L
BASOPHILS # BLD AUTO: 0.01 10*3/MM3 (ref 0–0.3)
BASOPHILS NFR BLD AUTO: 0.1 % (ref 0–2)
BDY SITE: ABNORMAL
BDY SITE: ABNORMAL
BILIRUB SERPL-MCNC: 0.4 MG/DL (ref 0.2–1.8)
BODY TEMPERATURE: 98.6 C
BODY TEMPERATURE: 98.6 C
BUN BLD-MCNC: 32 MG/DL (ref 7–21)
BUN/CREAT SERPL: 12.5 (ref 7–25)
CALCIUM SPEC-SCNC: 7.9 MG/DL (ref 7.7–10)
CHLORIDE SERPL-SCNC: 99 MMOL/L (ref 99–112)
CO2 SERPL-SCNC: 26.2 MMOL/L (ref 24.3–31.9)
COHGB MFR BLD: 1.7 % (ref 0–5)
COHGB MFR BLD: 1.8 % (ref 0–5)
CREAT BLD-MCNC: 2.55 MG/DL (ref 0.43–1.29)
CRP SERPL-MCNC: 1.14 MG/DL (ref 0–0.99)
DEPRECATED RDW RBC AUTO: 60.4 FL (ref 37–54)
EOSINOPHIL # BLD AUTO: 0 10*3/MM3 (ref 0–0.7)
EOSINOPHIL NFR BLD AUTO: 0 % (ref 0–7)
ERYTHROCYTE [DISTWIDTH] IN BLOOD BY AUTOMATED COUNT: 18.7 % (ref 11.5–14.5)
GFR SERPL CREATININE-BSD FRML MDRD: 19 ML/MIN/1.73
GLOBULIN UR ELPH-MCNC: 3.2 GM/DL
GLUCOSE BLD-MCNC: 170 MG/DL (ref 70–110)
GLUCOSE BLDC GLUCOMTR-MCNC: 197 MG/DL (ref 70–130)
GLUCOSE BLDC GLUCOMTR-MCNC: 216 MG/DL (ref 70–130)
GLUCOSE BLDC GLUCOMTR-MCNC: 220 MG/DL (ref 70–130)
GLUCOSE BLDC GLUCOMTR-MCNC: 233 MG/DL (ref 70–130)
HCO3 BLDA-SCNC: 24.3 MMOL/L (ref 22–26)
HCO3 BLDA-SCNC: 24.6 MMOL/L (ref 22–26)
HCT VFR BLD AUTO: 35.2 % (ref 37–47)
HCT VFR BLD CALC: 34 % (ref 37–47)
HCT VFR BLD CALC: 36 % (ref 37–47)
HGB BLD-MCNC: 10.5 G/DL (ref 12–16)
HGB BLDA-MCNC: 11.5 G/DL (ref 12–16)
HGB BLDA-MCNC: 12.1 G/DL (ref 12–16)
HOROWITZ INDEX BLD+IHG-RTO: 25 %
HOROWITZ INDEX BLD+IHG-RTO: 25 %
IMM GRANULOCYTES # BLD: 0.02 10*3/MM3 (ref 0–0.03)
IMM GRANULOCYTES NFR BLD: 0.2 % (ref 0–0.5)
LYMPHOCYTES # BLD AUTO: 1.33 10*3/MM3 (ref 1–3)
LYMPHOCYTES NFR BLD AUTO: 11.2 % (ref 16–46)
MAGNESIUM SERPL-MCNC: 2.2 MG/DL (ref 1.7–2.6)
MCH RBC QN AUTO: 27.5 PG (ref 27–33)
MCHC RBC AUTO-ENTMCNC: 29.8 G/DL (ref 33–37)
MCV RBC AUTO: 92.1 FL (ref 80–94)
METHGB BLD QL: 0.2 % (ref 0–3)
METHGB BLD QL: 0.2 % (ref 0–3)
MODALITY: ABNORMAL
MODALITY: ABNORMAL
MONOCYTES # BLD AUTO: 1.13 10*3/MM3 (ref 0.1–0.9)
MONOCYTES NFR BLD AUTO: 9.5 % (ref 0–12)
NEUTROPHILS # BLD AUTO: 9.42 10*3/MM3 (ref 1.4–6.5)
NEUTROPHILS NFR BLD AUTO: 79 % (ref 40–75)
OSMOLALITY SERPL CALC.SUM OF ELEC: 284.7 MOSM/KG (ref 273–305)
OXYHGB MFR BLDV: 91.7 % (ref 85–100)
OXYHGB MFR BLDV: 92.1 % (ref 85–100)
PCO2 BLDA: 33.9 MM HG (ref 35–45)
PCO2 BLDA: 38.6 MM HG (ref 35–45)
PEEP RESPIRATORY: 5 CM[H2O]
PEEP RESPIRATORY: 5 CM[H2O]
PH BLDA: 7.42 PH UNITS (ref 7.35–7.45)
PH BLDA: 7.47 PH UNITS (ref 7.35–7.45)
PLATELET # BLD AUTO: 234 10*3/MM3 (ref 130–400)
PMV BLD AUTO: 11.3 FL (ref 6–10)
PO2 BLDA: 71.9 MM HG (ref 80–100)
PO2 BLDA: 72.1 MM HG (ref 80–100)
POTASSIUM BLD-SCNC: 3.7 MMOL/L (ref 3.5–5.3)
PROT SERPL-MCNC: 6.8 G/DL (ref 6–8)
PSV: 8 CMH2O
RBC # BLD AUTO: 3.82 10*6/MM3 (ref 4.2–5.4)
SAO2 % BLDCOA: 93.6 % (ref 90–100)
SAO2 % BLDCOA: 93.9 % (ref 90–100)
SET MECH RESP RATE: 10
SODIUM BLD-SCNC: 137 MMOL/L (ref 135–153)
TOTAL RATE: 18 BREATHS/MINUTE
VENTILATOR MODE: ABNORMAL
VENTILATOR MODE: AC
VT ON VENT VENT: 300 ML
WBC NRBC COR # BLD: 11.91 10*3/MM3 (ref 4.5–12.5)

## 2017-11-05 PROCEDURE — 71010 XR CHEST 1 VW: CPT | Performed by: RADIOLOGY

## 2017-11-05 PROCEDURE — 25010000002 CEFEPIME PER 500 MG

## 2017-11-05 PROCEDURE — 36600 WITHDRAWAL OF ARTERIAL BLOOD: CPT | Performed by: INTERNAL MEDICINE

## 2017-11-05 PROCEDURE — 94799 UNLISTED PULMONARY SVC/PX: CPT

## 2017-11-05 PROCEDURE — 71010 HC CHEST PA OR AP: CPT

## 2017-11-05 PROCEDURE — 82375 ASSAY CARBOXYHB QUANT: CPT | Performed by: INTERNAL MEDICINE

## 2017-11-05 PROCEDURE — 25010000002 METHYLPREDNISOLONE PER 40 MG: Performed by: HOSPITALIST

## 2017-11-05 PROCEDURE — 71010 XR CHEST AP: CPT | Performed by: RADIOLOGY

## 2017-11-05 PROCEDURE — 85025 COMPLETE CBC W/AUTO DIFF WBC: CPT | Performed by: INTERNAL MEDICINE

## 2017-11-05 PROCEDURE — 99291 CRITICAL CARE FIRST HOUR: CPT | Performed by: INTERNAL MEDICINE

## 2017-11-05 PROCEDURE — 71010 HC CHEST AP: CPT

## 2017-11-05 PROCEDURE — 82805 BLOOD GASES W/O2 SATURATION: CPT | Performed by: INTERNAL MEDICINE

## 2017-11-05 PROCEDURE — 82962 GLUCOSE BLOOD TEST: CPT

## 2017-11-05 PROCEDURE — 99233 SBSQ HOSP IP/OBS HIGH 50: CPT | Performed by: INTERNAL MEDICINE

## 2017-11-05 PROCEDURE — 80053 COMPREHEN METABOLIC PANEL: CPT | Performed by: INTERNAL MEDICINE

## 2017-11-05 PROCEDURE — 25010000002 PROPOFOL 1000 MG/ML EMULSION: Performed by: HOSPITALIST

## 2017-11-05 PROCEDURE — 25010000002 HEPARIN (PORCINE) PER 1000 UNITS: Performed by: HOSPITALIST

## 2017-11-05 PROCEDURE — 83050 HGB METHEMOGLOBIN QUAN: CPT | Performed by: INTERNAL MEDICINE

## 2017-11-05 PROCEDURE — 83735 ASSAY OF MAGNESIUM: CPT | Performed by: INTERNAL MEDICINE

## 2017-11-05 PROCEDURE — 63710000001 INSULIN ASPART PER 5 UNITS: Performed by: HOSPITALIST

## 2017-11-05 PROCEDURE — 86140 C-REACTIVE PROTEIN: CPT | Performed by: INTERNAL MEDICINE

## 2017-11-05 RX ORDER — FAMOTIDINE 10 MG/ML
20 INJECTION, SOLUTION INTRAVENOUS DAILY
Status: DISCONTINUED | OUTPATIENT
Start: 2017-11-06 | End: 2017-11-15 | Stop reason: HOSPADM

## 2017-11-05 RX ADMIN — IPRATROPIUM BROMIDE AND ALBUTEROL SULFATE 3 ML: .5; 3 SOLUTION RESPIRATORY (INHALATION) at 06:46

## 2017-11-05 RX ADMIN — METHYLPREDNISOLONE SODIUM SUCCINATE 20 MG: 40 INJECTION, POWDER, FOR SOLUTION INTRAMUSCULAR; INTRAVENOUS at 02:00

## 2017-11-05 RX ADMIN — METOROPROLOL TARTRATE 5 MG: 5 INJECTION, SOLUTION INTRAVENOUS at 23:26

## 2017-11-05 RX ADMIN — METHYLPREDNISOLONE SODIUM SUCCINATE 20 MG: 40 INJECTION, POWDER, FOR SOLUTION INTRAMUSCULAR; INTRAVENOUS at 13:52

## 2017-11-05 RX ADMIN — PROPOFOL 20 MCG/KG/MIN: 10 INJECTION, EMULSION INTRAVENOUS at 05:54

## 2017-11-05 RX ADMIN — INSULIN ASPART 2 UNITS: 100 INJECTION, SOLUTION INTRAVENOUS; SUBCUTANEOUS at 23:33

## 2017-11-05 RX ADMIN — IPRATROPIUM BROMIDE AND ALBUTEROL SULFATE 3 ML: .5; 3 SOLUTION RESPIRATORY (INHALATION) at 00:58

## 2017-11-05 RX ADMIN — METRONIDAZOLE 500 MG: 500 INJECTION, SOLUTION INTRAVENOUS at 02:00

## 2017-11-05 RX ADMIN — CEFEPIME HYDROCHLORIDE 1 G: 1 INJECTION, SOLUTION INTRAVENOUS at 03:41

## 2017-11-05 RX ADMIN — IPRATROPIUM BROMIDE AND ALBUTEROL SULFATE 3 ML: .5; 3 SOLUTION RESPIRATORY (INHALATION) at 18:39

## 2017-11-05 RX ADMIN — IPRATROPIUM BROMIDE AND ALBUTEROL SULFATE 3 ML: .5; 3 SOLUTION RESPIRATORY (INHALATION) at 14:08

## 2017-11-05 RX ADMIN — METRONIDAZOLE 500 MG: 500 INJECTION, SOLUTION INTRAVENOUS at 09:08

## 2017-11-05 RX ADMIN — INSULIN ASPART 4 UNITS: 100 INJECTION, SOLUTION INTRAVENOUS; SUBCUTANEOUS at 05:54

## 2017-11-05 RX ADMIN — HEPARIN SODIUM 5000 UNITS: 5000 INJECTION, SOLUTION INTRAVENOUS; SUBCUTANEOUS at 21:44

## 2017-11-05 RX ADMIN — METRONIDAZOLE 500 MG: 500 INJECTION, SOLUTION INTRAVENOUS at 19:37

## 2017-11-05 RX ADMIN — INSULIN ASPART 4 UNITS: 100 INJECTION, SOLUTION INTRAVENOUS; SUBCUTANEOUS at 11:19

## 2017-11-05 RX ADMIN — HEPARIN SODIUM 5000 UNITS: 5000 INJECTION, SOLUTION INTRAVENOUS; SUBCUTANEOUS at 09:09

## 2017-11-05 RX ADMIN — PANTOPRAZOLE SODIUM 40 MG: 40 INJECTION, POWDER, FOR SOLUTION INTRAVENOUS at 05:53

## 2017-11-05 RX ADMIN — FAMOTIDINE 20 MG: 10 INJECTION, SOLUTION INTRAVENOUS at 09:09

## 2017-11-06 ENCOUNTER — APPOINTMENT (OUTPATIENT)
Dept: GENERAL RADIOLOGY | Facility: HOSPITAL | Age: 67
End: 2017-11-06

## 2017-11-06 ENCOUNTER — APPOINTMENT (OUTPATIENT)
Dept: SPEECH THERAPY | Facility: HOSPITAL | Age: 67
End: 2017-11-06
Attending: INTERNAL MEDICINE

## 2017-11-06 LAB
A-A DO2: 84.6 MMHG (ref 0–300)
ALBUMIN SERPL-MCNC: 3.5 G/DL (ref 3.4–4.8)
ALBUMIN/GLOB SERPL: 1.1 G/DL (ref 1.5–2.5)
ALP SERPL-CCNC: 78 U/L (ref 35–104)
ALT SERPL W P-5'-P-CCNC: 10 U/L (ref 10–36)
ANION GAP SERPL CALCULATED.3IONS-SCNC: 17.5 MMOL/L (ref 3.6–11.2)
ARTERIAL PATENCY WRIST A: POSITIVE
AST SERPL-CCNC: 12 U/L (ref 10–30)
ATMOSPHERIC PRESS: 723 MMHG
BACTERIA SPEC AEROBE CULT: NORMAL
BACTERIA SPEC AEROBE CULT: NORMAL
BACTERIA SPEC RESP CULT: ABNORMAL
BACTERIA SPEC RESP CULT: ABNORMAL
BASE EXCESS BLDA CALC-SCNC: -2.9 MMOL/L
BASOPHILS # BLD AUTO: 0.01 10*3/MM3 (ref 0–0.3)
BASOPHILS NFR BLD AUTO: 0.1 % (ref 0–2)
BDY SITE: ABNORMAL
BILIRUB SERPL-MCNC: 0.6 MG/DL (ref 0.2–1.8)
BODY TEMPERATURE: 98.6 C
BUN BLD-MCNC: 52 MG/DL (ref 7–21)
BUN/CREAT SERPL: 15.7 (ref 7–25)
CALCIUM SPEC-SCNC: 7.7 MG/DL (ref 7.7–10)
CHLORIDE SERPL-SCNC: 100 MMOL/L (ref 99–112)
CK MB SERPL-CCNC: 0.92 NG/ML (ref 0–5)
CK MB SERPL-RTO: 3.7 % (ref 0–3)
CK SERPL-CCNC: 25 U/L (ref 24–173)
CO2 SERPL-SCNC: 22.5 MMOL/L (ref 24.3–31.9)
COHGB MFR BLD: 1.6 % (ref 0–5)
CREAT BLD-MCNC: 3.31 MG/DL (ref 0.43–1.29)
CRP SERPL-MCNC: 0.75 MG/DL (ref 0–0.99)
DEPRECATED RDW RBC AUTO: 59.6 FL (ref 37–54)
EOSINOPHIL # BLD AUTO: 0 10*3/MM3 (ref 0–0.7)
EOSINOPHIL NFR BLD AUTO: 0 % (ref 0–7)
ERYTHROCYTE [DISTWIDTH] IN BLOOD BY AUTOMATED COUNT: 18.4 % (ref 11.5–14.5)
GFR SERPL CREATININE-BSD FRML MDRD: 14 ML/MIN/1.73
GLOBULIN UR ELPH-MCNC: 3.2 GM/DL
GLUCOSE BLD-MCNC: 227 MG/DL (ref 70–110)
GLUCOSE BLDC GLUCOMTR-MCNC: 240 MG/DL (ref 70–130)
GLUCOSE BLDC GLUCOMTR-MCNC: 248 MG/DL (ref 70–130)
GLUCOSE BLDC GLUCOMTR-MCNC: 265 MG/DL (ref 70–130)
GRAM STN SPEC: ABNORMAL
GRAM STN SPEC: ABNORMAL
HCO3 BLDA-SCNC: 20.8 MMOL/L (ref 22–26)
HCT VFR BLD AUTO: 34.1 % (ref 37–47)
HCT VFR BLD CALC: 35 % (ref 37–47)
HGB BLD-MCNC: 10.1 G/DL (ref 12–16)
HGB BLDA-MCNC: 12 G/DL (ref 12–16)
HOROWITZ INDEX BLD+IHG-RTO: 28 %
IMM GRANULOCYTES # BLD: 0.03 10*3/MM3 (ref 0–0.03)
IMM GRANULOCYTES NFR BLD: 0.3 % (ref 0–0.5)
LYMPHOCYTES # BLD AUTO: 0.95 10*3/MM3 (ref 1–3)
LYMPHOCYTES NFR BLD AUTO: 7.9 % (ref 16–46)
MCH RBC QN AUTO: 27.2 PG (ref 27–33)
MCHC RBC AUTO-ENTMCNC: 29.6 G/DL (ref 33–37)
MCV RBC AUTO: 91.9 FL (ref 80–94)
METHGB BLD QL: 0.2 % (ref 0–3)
MODALITY: ABNORMAL
MONOCYTES # BLD AUTO: 0.68 10*3/MM3 (ref 0.1–0.9)
MONOCYTES NFR BLD AUTO: 5.7 % (ref 0–12)
MYOGLOBIN SERPL-MCNC: 171 NG/ML (ref 0–109)
NEUTROPHILS # BLD AUTO: 10.3 10*3/MM3 (ref 1.4–6.5)
NEUTROPHILS NFR BLD AUTO: 86 % (ref 40–75)
OSMOLALITY SERPL CALC.SUM OF ELEC: 300.6 MOSM/KG (ref 273–305)
OXYHGB MFR BLDV: 89.2 % (ref 85–100)
PCO2 BLDA: 32.8 MM HG (ref 35–45)
PH BLDA: 7.42 PH UNITS (ref 7.35–7.45)
PLATELET # BLD AUTO: 228 10*3/MM3 (ref 130–400)
PMV BLD AUTO: 10.5 FL (ref 6–10)
PO2 BLDA: 66 MM HG (ref 80–100)
POTASSIUM BLD-SCNC: 3.9 MMOL/L (ref 3.5–5.3)
PROT SERPL-MCNC: 6.7 G/DL (ref 6–8)
RBC # BLD AUTO: 3.71 10*6/MM3 (ref 4.2–5.4)
SAO2 % BLDCOA: 90.8 % (ref 90–100)
SODIUM BLD-SCNC: 140 MMOL/L (ref 135–153)
TROPONIN I SERPL-MCNC: 0.39 NG/ML
TROPONIN I SERPL-MCNC: 0.41 NG/ML
TROPONIN I SERPL-MCNC: 0.43 NG/ML
WBC NRBC COR # BLD: 11.97 10*3/MM3 (ref 4.5–12.5)

## 2017-11-06 PROCEDURE — G8998 SWALLOW D/C STATUS: HCPCS

## 2017-11-06 PROCEDURE — 25010000002 HEPARIN (PORCINE) PER 1000 UNITS: Performed by: HOSPITALIST

## 2017-11-06 PROCEDURE — 71010 HC CHEST AP: CPT

## 2017-11-06 PROCEDURE — 99233 SBSQ HOSP IP/OBS HIGH 50: CPT | Performed by: INTERNAL MEDICINE

## 2017-11-06 PROCEDURE — 25010000002 METHYLPREDNISOLONE PER 40 MG: Performed by: HOSPITALIST

## 2017-11-06 PROCEDURE — 93005 ELECTROCARDIOGRAM TRACING: CPT | Performed by: INTERNAL MEDICINE

## 2017-11-06 PROCEDURE — 82550 ASSAY OF CK (CPK): CPT | Performed by: INTERNAL MEDICINE

## 2017-11-06 PROCEDURE — 82962 GLUCOSE BLOOD TEST: CPT

## 2017-11-06 PROCEDURE — 94799 UNLISTED PULMONARY SVC/PX: CPT

## 2017-11-06 PROCEDURE — 71010 XR CHEST AP: CPT | Performed by: RADIOLOGY

## 2017-11-06 PROCEDURE — G8996 SWALLOW CURRENT STATUS: HCPCS

## 2017-11-06 PROCEDURE — 82805 BLOOD GASES W/O2 SATURATION: CPT | Performed by: INTERNAL MEDICINE

## 2017-11-06 PROCEDURE — 83874 ASSAY OF MYOGLOBIN: CPT | Performed by: INTERNAL MEDICINE

## 2017-11-06 PROCEDURE — 82375 ASSAY CARBOXYHB QUANT: CPT | Performed by: INTERNAL MEDICINE

## 2017-11-06 PROCEDURE — 63710000001 INSULIN ASPART PER 5 UNITS: Performed by: HOSPITALIST

## 2017-11-06 PROCEDURE — G8997 SWALLOW GOAL STATUS: HCPCS

## 2017-11-06 PROCEDURE — 84484 ASSAY OF TROPONIN QUANT: CPT | Performed by: INTERNAL MEDICINE

## 2017-11-06 PROCEDURE — 25010000002 CEFEPIME PER 500 MG

## 2017-11-06 PROCEDURE — 83050 HGB METHEMOGLOBIN QUAN: CPT | Performed by: INTERNAL MEDICINE

## 2017-11-06 PROCEDURE — 82553 CREATINE MB FRACTION: CPT | Performed by: INTERNAL MEDICINE

## 2017-11-06 PROCEDURE — 92612 ENDOSCOPY SWALLOW (FEES) VID: CPT

## 2017-11-06 PROCEDURE — 85025 COMPLETE CBC W/AUTO DIFF WBC: CPT | Performed by: INTERNAL MEDICINE

## 2017-11-06 PROCEDURE — 80053 COMPREHEN METABOLIC PANEL: CPT | Performed by: INTERNAL MEDICINE

## 2017-11-06 PROCEDURE — 36600 WITHDRAWAL OF ARTERIAL BLOOD: CPT | Performed by: INTERNAL MEDICINE

## 2017-11-06 PROCEDURE — 86140 C-REACTIVE PROTEIN: CPT | Performed by: INTERNAL MEDICINE

## 2017-11-06 RX ORDER — METRONIDAZOLE 250 MG/1
500 TABLET ORAL EVERY 8 HOURS SCHEDULED
Status: DISCONTINUED | OUTPATIENT
Start: 2017-11-06 | End: 2017-11-07

## 2017-11-06 RX ORDER — PANTOPRAZOLE SODIUM 40 MG/1
40 TABLET, DELAYED RELEASE ORAL
Status: DISCONTINUED | OUTPATIENT
Start: 2017-11-07 | End: 2017-11-06

## 2017-11-06 RX ORDER — ASPIRIN 81 MG/1
81 TABLET ORAL DAILY
Status: DISCONTINUED | OUTPATIENT
Start: 2017-11-06 | End: 2017-11-15 | Stop reason: HOSPADM

## 2017-11-06 RX ADMIN — IPRATROPIUM BROMIDE AND ALBUTEROL SULFATE 3 ML: .5; 3 SOLUTION RESPIRATORY (INHALATION) at 14:00

## 2017-11-06 RX ADMIN — METRONIDAZOLE 500 MG: 250 TABLET ORAL at 21:04

## 2017-11-06 RX ADMIN — PANTOPRAZOLE SODIUM 40 MG: 40 INJECTION, POWDER, FOR SOLUTION INTRAVENOUS at 05:50

## 2017-11-06 RX ADMIN — INSULIN ASPART 4 UNITS: 100 INJECTION, SOLUTION INTRAVENOUS; SUBCUTANEOUS at 05:57

## 2017-11-06 RX ADMIN — Medication 10 ML: at 05:51

## 2017-11-06 RX ADMIN — METOPROLOL TARTRATE 25 MG: 25 TABLET, FILM COATED ORAL at 21:04

## 2017-11-06 RX ADMIN — CEFEPIME HYDROCHLORIDE 1 G: 1 INJECTION, SOLUTION INTRAVENOUS at 03:52

## 2017-11-06 RX ADMIN — INSULIN ASPART 6 UNITS: 100 INJECTION, SOLUTION INTRAVENOUS; SUBCUTANEOUS at 23:37

## 2017-11-06 RX ADMIN — IPRATROPIUM BROMIDE AND ALBUTEROL SULFATE 3 ML: .5; 3 SOLUTION RESPIRATORY (INHALATION) at 00:40

## 2017-11-06 RX ADMIN — ASPIRIN 81 MG: 81 TABLET ORAL at 10:14

## 2017-11-06 RX ADMIN — METRONIDAZOLE 500 MG: 500 INJECTION, SOLUTION INTRAVENOUS at 02:01

## 2017-11-06 RX ADMIN — INSULIN ASPART 6 UNITS: 100 INJECTION, SOLUTION INTRAVENOUS; SUBCUTANEOUS at 10:37

## 2017-11-06 RX ADMIN — INSULIN ASPART 4 UNITS: 100 INJECTION, SOLUTION INTRAVENOUS; SUBCUTANEOUS at 17:21

## 2017-11-06 RX ADMIN — METHYLPREDNISOLONE SODIUM SUCCINATE 20 MG: 40 INJECTION, POWDER, FOR SOLUTION INTRAMUSCULAR; INTRAVENOUS at 02:00

## 2017-11-06 RX ADMIN — METRONIDAZOLE 500 MG: 500 INJECTION, SOLUTION INTRAVENOUS at 09:11

## 2017-11-06 RX ADMIN — IPRATROPIUM BROMIDE AND ALBUTEROL SULFATE 3 ML: .5; 3 SOLUTION RESPIRATORY (INHALATION) at 19:50

## 2017-11-06 RX ADMIN — HEPARIN SODIUM 5000 UNITS: 5000 INJECTION, SOLUTION INTRAVENOUS; SUBCUTANEOUS at 09:04

## 2017-11-06 RX ADMIN — HEPARIN SODIUM 5000 UNITS: 5000 INJECTION, SOLUTION INTRAVENOUS; SUBCUTANEOUS at 21:04

## 2017-11-06 RX ADMIN — FAMOTIDINE 20 MG: 10 INJECTION INTRAVENOUS at 09:04

## 2017-11-06 RX ADMIN — METHYLPREDNISOLONE SODIUM SUCCINATE 20 MG: 40 INJECTION, POWDER, FOR SOLUTION INTRAMUSCULAR; INTRAVENOUS at 17:15

## 2017-11-06 RX ADMIN — METOPROLOL TARTRATE 25 MG: 25 TABLET, FILM COATED ORAL at 10:14

## 2017-11-06 RX ADMIN — IPRATROPIUM BROMIDE AND ALBUTEROL SULFATE 3 ML: .5; 3 SOLUTION RESPIRATORY (INHALATION) at 07:38

## 2017-11-07 ENCOUNTER — APPOINTMENT (OUTPATIENT)
Dept: CARDIOLOGY | Facility: HOSPITAL | Age: 67
End: 2017-11-07
Attending: INTERNAL MEDICINE

## 2017-11-07 LAB
027 TOXIN: NORMAL
ALBUMIN SERPL-MCNC: 3.7 G/DL (ref 3.4–4.8)
ALBUMIN/GLOB SERPL: 1.1 G/DL (ref 1.5–2.5)
ALP SERPL-CCNC: 83 U/L (ref 35–104)
ALT SERPL W P-5'-P-CCNC: 11 U/L (ref 10–36)
ANION GAP SERPL CALCULATED.3IONS-SCNC: 10.6 MMOL/L (ref 3.6–11.2)
AST SERPL-CCNC: 14 U/L (ref 10–30)
BASOPHILS # BLD AUTO: 0 10*3/MM3 (ref 0–0.3)
BASOPHILS NFR BLD AUTO: 0 % (ref 0–2)
BH CV ECHO MEAS - % IVS THICK: 56 %
BH CV ECHO MEAS - % LVPW THICK: 76.9 %
BH CV ECHO MEAS - BSA(HAYCOCK): 1.6 M^2
BH CV ECHO MEAS - BSA: 1.6 M^2
BH CV ECHO MEAS - BZI_BMI: 26.2 KILOGRAMS/M^2
BH CV ECHO MEAS - BZI_METRIC_HEIGHT: 152.4 CM
BH CV ECHO MEAS - BZI_METRIC_WEIGHT: 60.8 KG
BH CV ECHO MEAS - CONTRAST EF 4CH: 64 ML/M^2
BH CV ECHO MEAS - EDV(CUBED): 103.4 ML
BH CV ECHO MEAS - EDV(MOD-SP4): 75 ML
BH CV ECHO MEAS - EDV(TEICH): 102 ML
BH CV ECHO MEAS - EF(CUBED): 73.7 %
BH CV ECHO MEAS - EF(MOD-SP4): 64 %
BH CV ECHO MEAS - EF(TEICH): 65.5 %
BH CV ECHO MEAS - ESV(CUBED): 27.2 ML
BH CV ECHO MEAS - ESV(MOD-SP4): 27 ML
BH CV ECHO MEAS - ESV(TEICH): 35.2 ML
BH CV ECHO MEAS - FS: 35.9 %
BH CV ECHO MEAS - IVS/LVPW: 0.96
BH CV ECHO MEAS - IVSD: 0.92 CM
BH CV ECHO MEAS - IVSS: 1.4 CM
BH CV ECHO MEAS - LV DIASTOLIC VOL/BSA (35-75): 47.6 ML/M^2
BH CV ECHO MEAS - LV MASS(C)D: 149.8 GRAMS
BH CV ECHO MEAS - LV MASS(C)DI: 95.1 GRAMS/M^2
BH CV ECHO MEAS - LV MASS(C)S: 169 GRAMS
BH CV ECHO MEAS - LV MASS(C)SI: 107.3 GRAMS/M^2
BH CV ECHO MEAS - LV SYSTOLIC VOL/BSA (12-30): 17.1 ML/M^2
BH CV ECHO MEAS - LVIDD: 4.7 CM
BH CV ECHO MEAS - LVIDS: 3 CM
BH CV ECHO MEAS - LVLD AP4: 7.2 CM
BH CV ECHO MEAS - LVLS AP4: 6.8 CM
BH CV ECHO MEAS - LVPWD: 0.95 CM
BH CV ECHO MEAS - LVPWS: 1.7 CM
BH CV ECHO MEAS - RVDD: 2.9 CM
BH CV ECHO MEAS - SI(CUBED): 48.4 ML/M^2
BH CV ECHO MEAS - SI(MOD-SP4): 30.5 ML/M^2
BH CV ECHO MEAS - SI(TEICH): 42.4 ML/M^2
BH CV ECHO MEAS - SV(CUBED): 76.2 ML
BH CV ECHO MEAS - SV(MOD-SP4): 48 ML
BH CV ECHO MEAS - SV(TEICH): 66.8 ML
BILIRUB SERPL-MCNC: 0.6 MG/DL (ref 0.2–1.8)
BUN BLD-MCNC: 69 MG/DL (ref 7–21)
BUN/CREAT SERPL: 17.5 (ref 7–25)
C DIFF TOX GENS STL QL NAA+PROBE: NEGATIVE
CALCIUM SPEC-SCNC: 7.8 MG/DL (ref 7.7–10)
CHLORIDE SERPL-SCNC: 102 MMOL/L (ref 99–112)
CHOLEST SERPL-MCNC: 144 MG/DL (ref 0–200)
CO2 SERPL-SCNC: 25.4 MMOL/L (ref 24.3–31.9)
CREAT BLD-MCNC: 3.95 MG/DL (ref 0.43–1.29)
DEPRECATED RDW RBC AUTO: 58.4 FL (ref 37–54)
EOSINOPHIL # BLD AUTO: 0 10*3/MM3 (ref 0–0.7)
EOSINOPHIL NFR BLD AUTO: 0 % (ref 0–7)
ERYTHROCYTE [DISTWIDTH] IN BLOOD BY AUTOMATED COUNT: 18.1 % (ref 11.5–14.5)
GFR SERPL CREATININE-BSD FRML MDRD: 11 ML/MIN/1.73
GLOBULIN UR ELPH-MCNC: 3.5 GM/DL
GLUCOSE BLD-MCNC: 221 MG/DL (ref 70–110)
GLUCOSE BLDC GLUCOMTR-MCNC: 146 MG/DL (ref 70–130)
GLUCOSE BLDC GLUCOMTR-MCNC: 213 MG/DL (ref 70–130)
GLUCOSE BLDC GLUCOMTR-MCNC: 227 MG/DL (ref 70–130)
GLUCOSE BLDC GLUCOMTR-MCNC: 229 MG/DL (ref 70–130)
GLUCOSE BLDC GLUCOMTR-MCNC: 258 MG/DL (ref 70–130)
HCT VFR BLD AUTO: 36.4 % (ref 37–47)
HDLC SERPL-MCNC: 44 MG/DL (ref 60–100)
HGB BLD-MCNC: 10.8 G/DL (ref 12–16)
IMM GRANULOCYTES # BLD: 0.02 10*3/MM3 (ref 0–0.03)
IMM GRANULOCYTES NFR BLD: 0.2 % (ref 0–0.5)
LDLC SERPL CALC-MCNC: 84 MG/DL (ref 0–100)
LDLC/HDLC SERPL: 1.91 {RATIO}
LV EF 2D ECHO EST: 65 %
LYMPHOCYTES # BLD AUTO: 0.65 10*3/MM3 (ref 1–3)
LYMPHOCYTES NFR BLD AUTO: 5.9 % (ref 16–46)
MAXIMAL PREDICTED HEART RATE: 153 BPM
MCH RBC QN AUTO: 27.1 PG (ref 27–33)
MCHC RBC AUTO-ENTMCNC: 29.7 G/DL (ref 33–37)
MCV RBC AUTO: 91.5 FL (ref 80–94)
MONOCYTES # BLD AUTO: 0.36 10*3/MM3 (ref 0.1–0.9)
MONOCYTES NFR BLD AUTO: 3.3 % (ref 0–12)
NEUTROPHILS # BLD AUTO: 9.96 10*3/MM3 (ref 1.4–6.5)
NEUTROPHILS NFR BLD AUTO: 90.6 % (ref 40–75)
OSMOLALITY SERPL CALC.SUM OF ELEC: 302.6 MOSM/KG (ref 273–305)
PLATELET # BLD AUTO: 272 10*3/MM3 (ref 130–400)
PMV BLD AUTO: 10.7 FL (ref 6–10)
POTASSIUM BLD-SCNC: 4 MMOL/L (ref 3.5–5.3)
PROT SERPL-MCNC: 7.2 G/DL (ref 6–8)
RBC # BLD AUTO: 3.98 10*6/MM3 (ref 4.2–5.4)
SODIUM BLD-SCNC: 138 MMOL/L (ref 135–153)
STRESS TARGET HR: 130 BPM
TRIGL SERPL-MCNC: 79 MG/DL (ref 0–150)
VANCOMYCIN TROUGH SERPL-MCNC: 25 MCG/ML (ref 5–15)
VLDLC SERPL-MCNC: 15.8 MG/DL
WBC NRBC COR # BLD: 10.99 10*3/MM3 (ref 4.5–12.5)

## 2017-11-07 PROCEDURE — 87493 C DIFF AMPLIFIED PROBE: CPT | Performed by: NURSE PRACTITIONER

## 2017-11-07 PROCEDURE — 25010000002 HEPARIN (PORCINE) PER 1000 UNITS: Performed by: HOSPITALIST

## 2017-11-07 PROCEDURE — 85025 COMPLETE CBC W/AUTO DIFF WBC: CPT | Performed by: INTERNAL MEDICINE

## 2017-11-07 PROCEDURE — 99233 SBSQ HOSP IP/OBS HIGH 50: CPT | Performed by: INTERNAL MEDICINE

## 2017-11-07 PROCEDURE — 80061 LIPID PANEL: CPT | Performed by: INTERNAL MEDICINE

## 2017-11-07 PROCEDURE — 25010000002 TIGECYCLINE PER 1 MG: Performed by: INTERNAL MEDICINE

## 2017-11-07 PROCEDURE — 93308 TTE F-UP OR LMTD: CPT | Performed by: INTERNAL MEDICINE

## 2017-11-07 PROCEDURE — 82962 GLUCOSE BLOOD TEST: CPT

## 2017-11-07 PROCEDURE — 99222 1ST HOSP IP/OBS MODERATE 55: CPT | Performed by: INTERNAL MEDICINE

## 2017-11-07 PROCEDURE — 80202 ASSAY OF VANCOMYCIN: CPT | Performed by: INTERNAL MEDICINE

## 2017-11-07 PROCEDURE — 25010000002 METHYLPREDNISOLONE PER 40 MG: Performed by: HOSPITALIST

## 2017-11-07 PROCEDURE — 80053 COMPREHEN METABOLIC PANEL: CPT | Performed by: INTERNAL MEDICINE

## 2017-11-07 PROCEDURE — 94799 UNLISTED PULMONARY SVC/PX: CPT

## 2017-11-07 PROCEDURE — 63710000001 INSULIN ASPART PER 5 UNITS: Performed by: HOSPITALIST

## 2017-11-07 PROCEDURE — 93308 TTE F-UP OR LMTD: CPT

## 2017-11-07 PROCEDURE — 93005 ELECTROCARDIOGRAM TRACING: CPT | Performed by: INTERNAL MEDICINE

## 2017-11-07 RX ORDER — PREDNISONE 20 MG/1
20 TABLET ORAL
Status: DISCONTINUED | OUTPATIENT
Start: 2017-11-08 | End: 2017-11-09

## 2017-11-07 RX ORDER — ATORVASTATIN CALCIUM 20 MG/1
20 TABLET, FILM COATED ORAL NIGHTLY
Status: DISCONTINUED | OUTPATIENT
Start: 2017-11-07 | End: 2017-11-15 | Stop reason: HOSPADM

## 2017-11-07 RX ORDER — NITROGLYCERIN 0.4 MG/1
0.4 TABLET SUBLINGUAL
Status: DISCONTINUED | OUTPATIENT
Start: 2017-11-07 | End: 2017-11-15 | Stop reason: HOSPADM

## 2017-11-07 RX ORDER — HYDRALAZINE HYDROCHLORIDE 10 MG/1
10 TABLET, FILM COATED ORAL EVERY 8 HOURS SCHEDULED
Status: DISCONTINUED | OUTPATIENT
Start: 2017-11-07 | End: 2017-11-15 | Stop reason: HOSPADM

## 2017-11-07 RX ORDER — AMLODIPINE BESYLATE 5 MG/1
5 TABLET ORAL
Status: DISCONTINUED | OUTPATIENT
Start: 2017-11-07 | End: 2017-11-08

## 2017-11-07 RX ORDER — METOPROLOL TARTRATE 50 MG/1
50 TABLET, FILM COATED ORAL EVERY 12 HOURS SCHEDULED
Status: DISCONTINUED | OUTPATIENT
Start: 2017-11-07 | End: 2017-11-15 | Stop reason: HOSPADM

## 2017-11-07 RX ADMIN — SODIUM CHLORIDE 1000 ML: 9 INJECTION, SOLUTION INTRAVENOUS at 07:40

## 2017-11-07 RX ADMIN — IPRATROPIUM BROMIDE AND ALBUTEROL SULFATE 3 ML: .5; 3 SOLUTION RESPIRATORY (INHALATION) at 19:04

## 2017-11-07 RX ADMIN — METOPROLOL TARTRATE 50 MG: 50 TABLET, FILM COATED ORAL at 21:05

## 2017-11-07 RX ADMIN — FAMOTIDINE 20 MG: 10 INJECTION INTRAVENOUS at 13:40

## 2017-11-07 RX ADMIN — HEPARIN SODIUM 5000 UNITS: 5000 INJECTION, SOLUTION INTRAVENOUS; SUBCUTANEOUS at 13:41

## 2017-11-07 RX ADMIN — HYDRALAZINE HYDROCHLORIDE 10 MG: 10 TABLET ORAL at 21:05

## 2017-11-07 RX ADMIN — IPRATROPIUM BROMIDE AND ALBUTEROL SULFATE 3 ML: .5; 3 SOLUTION RESPIRATORY (INHALATION) at 13:59

## 2017-11-07 RX ADMIN — METOPROLOL TARTRATE 50 MG: 50 TABLET, FILM COATED ORAL at 13:39

## 2017-11-07 RX ADMIN — INSULIN ASPART 4 UNITS: 100 INJECTION, SOLUTION INTRAVENOUS; SUBCUTANEOUS at 18:36

## 2017-11-07 RX ADMIN — ATORVASTATIN CALCIUM 20 MG: 20 TABLET, FILM COATED ORAL at 21:05

## 2017-11-07 RX ADMIN — METHYLPREDNISOLONE SODIUM SUCCINATE 20 MG: 40 INJECTION, POWDER, FOR SOLUTION INTRAMUSCULAR; INTRAVENOUS at 13:41

## 2017-11-07 RX ADMIN — INSULIN ASPART 4 UNITS: 100 INJECTION, SOLUTION INTRAVENOUS; SUBCUTANEOUS at 23:10

## 2017-11-07 RX ADMIN — HYDRALAZINE HYDROCHLORIDE 10 MG: 10 TABLET ORAL at 15:43

## 2017-11-07 RX ADMIN — HEPARIN SODIUM 5000 UNITS: 5000 INJECTION, SOLUTION INTRAVENOUS; SUBCUTANEOUS at 21:05

## 2017-11-07 RX ADMIN — AMLODIPINE BESYLATE 5 MG: 5 TABLET ORAL at 13:42

## 2017-11-07 RX ADMIN — IPRATROPIUM BROMIDE AND ALBUTEROL SULFATE 3 ML: .5; 3 SOLUTION RESPIRATORY (INHALATION) at 01:02

## 2017-11-07 RX ADMIN — SODIUM CHLORIDE 100 MG: 9 INJECTION, SOLUTION INTRAVENOUS at 15:43

## 2017-11-07 RX ADMIN — IPRATROPIUM BROMIDE AND ALBUTEROL SULFATE 3 ML: .5; 3 SOLUTION RESPIRATORY (INHALATION) at 06:32

## 2017-11-07 RX ADMIN — ASPIRIN 81 MG: 81 TABLET ORAL at 13:40

## 2017-11-07 RX ADMIN — METRONIDAZOLE 500 MG: 250 TABLET ORAL at 05:23

## 2017-11-07 RX ADMIN — METHYLPREDNISOLONE SODIUM SUCCINATE 20 MG: 40 INJECTION, POWDER, FOR SOLUTION INTRAMUSCULAR; INTRAVENOUS at 01:05

## 2017-11-07 RX ADMIN — INSULIN ASPART 4 UNITS: 100 INJECTION, SOLUTION INTRAVENOUS; SUBCUTANEOUS at 05:20

## 2017-11-08 ENCOUNTER — APPOINTMENT (OUTPATIENT)
Dept: GENERAL RADIOLOGY | Facility: HOSPITAL | Age: 67
End: 2017-11-08

## 2017-11-08 LAB
ALBUMIN SERPL-MCNC: 3.7 G/DL (ref 3.4–4.8)
ALBUMIN/GLOB SERPL: 1.1 G/DL (ref 1.5–2.5)
ALP SERPL-CCNC: 87 U/L (ref 35–104)
ALT SERPL W P-5'-P-CCNC: 9 U/L (ref 10–36)
ANION GAP SERPL CALCULATED.3IONS-SCNC: 11.6 MMOL/L (ref 3.6–11.2)
AST SERPL-CCNC: 15 U/L (ref 10–30)
BASOPHILS # BLD AUTO: 0 10*3/MM3 (ref 0–0.3)
BASOPHILS NFR BLD AUTO: 0 % (ref 0–2)
BILIRUB SERPL-MCNC: 0.6 MG/DL (ref 0.2–1.8)
BUN BLD-MCNC: 42 MG/DL (ref 7–21)
BUN/CREAT SERPL: 15 (ref 7–25)
CALCIUM SPEC-SCNC: 8.3 MG/DL (ref 7.7–10)
CHLORIDE SERPL-SCNC: 100 MMOL/L (ref 99–112)
CO2 SERPL-SCNC: 26.4 MMOL/L (ref 24.3–31.9)
CREAT BLD-MCNC: 2.8 MG/DL (ref 0.43–1.29)
DEPRECATED RDW RBC AUTO: 58.1 FL (ref 37–54)
EOSINOPHIL # BLD AUTO: 0 10*3/MM3 (ref 0–0.7)
EOSINOPHIL NFR BLD AUTO: 0 % (ref 0–7)
ERYTHROCYTE [DISTWIDTH] IN BLOOD BY AUTOMATED COUNT: 18.2 % (ref 11.5–14.5)
GFR SERPL CREATININE-BSD FRML MDRD: 17 ML/MIN/1.73
GLOBULIN UR ELPH-MCNC: 3.5 GM/DL
GLUCOSE BLD-MCNC: 178 MG/DL (ref 70–110)
GLUCOSE BLDC GLUCOMTR-MCNC: 140 MG/DL (ref 70–130)
GLUCOSE BLDC GLUCOMTR-MCNC: 186 MG/DL (ref 70–130)
GLUCOSE BLDC GLUCOMTR-MCNC: 197 MG/DL (ref 70–130)
GLUCOSE BLDC GLUCOMTR-MCNC: 198 MG/DL (ref 70–130)
HCT VFR BLD AUTO: 36.2 % (ref 37–47)
HGB BLD-MCNC: 10.7 G/DL (ref 12–16)
IMM GRANULOCYTES # BLD: 0.03 10*3/MM3 (ref 0–0.03)
IMM GRANULOCYTES NFR BLD: 0.3 % (ref 0–0.5)
LYMPHOCYTES # BLD AUTO: 1.55 10*3/MM3 (ref 1–3)
LYMPHOCYTES NFR BLD AUTO: 13.4 % (ref 16–46)
MAGNESIUM SERPL-MCNC: 2.3 MG/DL (ref 1.7–2.6)
MCH RBC QN AUTO: 27.2 PG (ref 27–33)
MCHC RBC AUTO-ENTMCNC: 29.6 G/DL (ref 33–37)
MCV RBC AUTO: 91.9 FL (ref 80–94)
MONOCYTES # BLD AUTO: 1.17 10*3/MM3 (ref 0.1–0.9)
MONOCYTES NFR BLD AUTO: 10.1 % (ref 0–12)
NEUTROPHILS # BLD AUTO: 8.8 10*3/MM3 (ref 1.4–6.5)
NEUTROPHILS NFR BLD AUTO: 76.2 % (ref 40–75)
OSMOLALITY SERPL CALC.SUM OF ELEC: 290.6 MOSM/KG (ref 273–305)
PHOSPHATE SERPL-MCNC: 5.4 MG/DL (ref 2.7–4.5)
PLATELET # BLD AUTO: 293 10*3/MM3 (ref 130–400)
PMV BLD AUTO: 11.4 FL (ref 6–10)
POTASSIUM BLD-SCNC: 3.6 MMOL/L (ref 3.5–5.3)
PROT SERPL-MCNC: 7.2 G/DL (ref 6–8)
RBC # BLD AUTO: 3.94 10*6/MM3 (ref 4.2–5.4)
SODIUM BLD-SCNC: 138 MMOL/L (ref 135–153)
WBC NRBC COR # BLD: 11.55 10*3/MM3 (ref 4.5–12.5)

## 2017-11-08 PROCEDURE — 94799 UNLISTED PULMONARY SVC/PX: CPT

## 2017-11-08 PROCEDURE — 83735 ASSAY OF MAGNESIUM: CPT | Performed by: INTERNAL MEDICINE

## 2017-11-08 PROCEDURE — 63710000001 INSULIN ASPART PER 5 UNITS: Performed by: HOSPITALIST

## 2017-11-08 PROCEDURE — 99232 SBSQ HOSP IP/OBS MODERATE 35: CPT | Performed by: INTERNAL MEDICINE

## 2017-11-08 PROCEDURE — 71010 HC CHEST PA OR AP: CPT

## 2017-11-08 PROCEDURE — 25010000002 TIGECYCLINE: Performed by: INTERNAL MEDICINE

## 2017-11-08 PROCEDURE — 80053 COMPREHEN METABOLIC PANEL: CPT | Performed by: INTERNAL MEDICINE

## 2017-11-08 PROCEDURE — 99233 SBSQ HOSP IP/OBS HIGH 50: CPT | Performed by: INTERNAL MEDICINE

## 2017-11-08 PROCEDURE — 25010000002 HEPARIN (PORCINE) PER 1000 UNITS: Performed by: HOSPITALIST

## 2017-11-08 PROCEDURE — 71010 XR CHEST 1 VW: CPT | Performed by: RADIOLOGY

## 2017-11-08 PROCEDURE — 82962 GLUCOSE BLOOD TEST: CPT

## 2017-11-08 PROCEDURE — 84100 ASSAY OF PHOSPHORUS: CPT | Performed by: PHYSICIAN ASSISTANT

## 2017-11-08 PROCEDURE — 25010000002 TIGECYCLINE PER 1 MG

## 2017-11-08 PROCEDURE — 25010000002 LORAZEPAM PER 2 MG: Performed by: INTERNAL MEDICINE

## 2017-11-08 PROCEDURE — 63710000001 PREDNISONE PER 1 MG: Performed by: INTERNAL MEDICINE

## 2017-11-08 PROCEDURE — 85025 COMPLETE CBC W/AUTO DIFF WBC: CPT | Performed by: INTERNAL MEDICINE

## 2017-11-08 RX ORDER — CLONAZEPAM 0.5 MG/1
0.5 TABLET ORAL 2 TIMES DAILY
Status: DISCONTINUED | OUTPATIENT
Start: 2017-11-08 | End: 2017-11-14

## 2017-11-08 RX ORDER — LORAZEPAM 2 MG/ML
0.5 INJECTION INTRAMUSCULAR ONCE
Status: COMPLETED | OUTPATIENT
Start: 2017-11-08 | End: 2017-11-08

## 2017-11-08 RX ORDER — AMLODIPINE BESYLATE 10 MG/1
10 TABLET ORAL
Status: DISCONTINUED | OUTPATIENT
Start: 2017-11-09 | End: 2017-11-13

## 2017-11-08 RX ORDER — CLONAZEPAM 0.5 MG/1
TABLET ORAL
Status: COMPLETED
Start: 2017-11-08 | End: 2017-11-08

## 2017-11-08 RX ORDER — GABAPENTIN 100 MG/1
100 CAPSULE ORAL NIGHTLY
Status: DISCONTINUED | OUTPATIENT
Start: 2017-11-08 | End: 2017-11-15 | Stop reason: HOSPADM

## 2017-11-08 RX ADMIN — AMLODIPINE BESYLATE 5 MG: 5 TABLET ORAL at 10:01

## 2017-11-08 RX ADMIN — METOPROLOL TARTRATE 50 MG: 50 TABLET, FILM COATED ORAL at 10:01

## 2017-11-08 RX ADMIN — DEXTROSE MONOHYDRATE 50 MG: 50 INJECTION, SOLUTION INTRAVENOUS at 15:11

## 2017-11-08 RX ADMIN — HYDRALAZINE HYDROCHLORIDE 10 MG: 10 TABLET ORAL at 15:11

## 2017-11-08 RX ADMIN — IPRATROPIUM BROMIDE AND ALBUTEROL SULFATE 3 ML: .5; 3 SOLUTION RESPIRATORY (INHALATION) at 20:39

## 2017-11-08 RX ADMIN — PREDNISONE 20 MG: 20 TABLET ORAL at 10:02

## 2017-11-08 RX ADMIN — HEPARIN SODIUM 5000 UNITS: 5000 INJECTION, SOLUTION INTRAVENOUS; SUBCUTANEOUS at 21:11

## 2017-11-08 RX ADMIN — CLONAZEPAM 0.5 MG: 0.5 TABLET ORAL at 17:24

## 2017-11-08 RX ADMIN — ASPIRIN 81 MG: 81 TABLET ORAL at 10:01

## 2017-11-08 RX ADMIN — IPRATROPIUM BROMIDE AND ALBUTEROL SULFATE 3 ML: .5; 3 SOLUTION RESPIRATORY (INHALATION) at 12:10

## 2017-11-08 RX ADMIN — GABAPENTIN 100 MG: 100 CAPSULE ORAL at 21:12

## 2017-11-08 RX ADMIN — FAMOTIDINE 20 MG: 10 INJECTION INTRAVENOUS at 10:02

## 2017-11-08 RX ADMIN — INSULIN ASPART 2 UNITS: 100 INJECTION, SOLUTION INTRAVENOUS; SUBCUTANEOUS at 12:04

## 2017-11-08 RX ADMIN — INSULIN ASPART 2 UNITS: 100 INJECTION, SOLUTION INTRAVENOUS; SUBCUTANEOUS at 17:23

## 2017-11-08 RX ADMIN — TIGECYCLINE 50 MG: 50 INJECTION, POWDER, LYOPHILIZED, FOR SOLUTION INTRAVENOUS at 01:08

## 2017-11-08 RX ADMIN — METOPROLOL TARTRATE 50 MG: 50 TABLET, FILM COATED ORAL at 21:12

## 2017-11-08 RX ADMIN — HYDRALAZINE HYDROCHLORIDE 10 MG: 10 TABLET ORAL at 21:12

## 2017-11-08 RX ADMIN — LORAZEPAM 0.5 MG: 2 INJECTION INTRAMUSCULAR; INTRAVENOUS at 02:22

## 2017-11-08 RX ADMIN — IPRATROPIUM BROMIDE AND ALBUTEROL SULFATE 3 ML: .5; 3 SOLUTION RESPIRATORY (INHALATION) at 00:33

## 2017-11-08 RX ADMIN — IPRATROPIUM BROMIDE AND ALBUTEROL SULFATE 3 ML: .5; 3 SOLUTION RESPIRATORY (INHALATION) at 07:41

## 2017-11-08 RX ADMIN — ATORVASTATIN CALCIUM 20 MG: 20 TABLET, FILM COATED ORAL at 21:12

## 2017-11-08 RX ADMIN — HYDRALAZINE HYDROCHLORIDE 10 MG: 10 TABLET ORAL at 05:23

## 2017-11-08 RX ADMIN — HEPARIN SODIUM 5000 UNITS: 5000 INJECTION, SOLUTION INTRAVENOUS; SUBCUTANEOUS at 10:01

## 2017-11-09 ENCOUNTER — APPOINTMENT (OUTPATIENT)
Dept: GENERAL RADIOLOGY | Facility: HOSPITAL | Age: 67
End: 2017-11-09

## 2017-11-09 LAB
ALBUMIN SERPL-MCNC: 3.7 G/DL (ref 3.4–4.8)
ALBUMIN/GLOB SERPL: 1.1 G/DL (ref 1.5–2.5)
ALP SERPL-CCNC: 86 U/L (ref 35–104)
ALT SERPL W P-5'-P-CCNC: 19 U/L (ref 10–36)
ANION GAP SERPL CALCULATED.3IONS-SCNC: 14.6 MMOL/L (ref 3.6–11.2)
AST SERPL-CCNC: 18 U/L (ref 10–30)
BASOPHILS # BLD AUTO: 0 10*3/MM3 (ref 0–0.3)
BASOPHILS NFR BLD AUTO: 0 % (ref 0–2)
BILIRUB SERPL-MCNC: 0.6 MG/DL (ref 0.2–1.8)
BUN BLD-MCNC: 77 MG/DL (ref 7–21)
BUN/CREAT SERPL: 20.2 (ref 7–25)
CALCIUM SPEC-SCNC: 8 MG/DL (ref 7.7–10)
CHLORIDE SERPL-SCNC: 99 MMOL/L (ref 99–112)
CO2 SERPL-SCNC: 24.4 MMOL/L (ref 24.3–31.9)
CREAT BLD-MCNC: 3.81 MG/DL (ref 0.43–1.29)
CRP SERPL-MCNC: 0.71 MG/DL (ref 0–0.99)
DEPRECATED RDW RBC AUTO: 60.4 FL (ref 37–54)
EOSINOPHIL # BLD AUTO: 0.01 10*3/MM3 (ref 0–0.7)
EOSINOPHIL NFR BLD AUTO: 0.1 % (ref 0–7)
ERYTHROCYTE [DISTWIDTH] IN BLOOD BY AUTOMATED COUNT: 19.2 % (ref 11.5–14.5)
GFR SERPL CREATININE-BSD FRML MDRD: 12 ML/MIN/1.73
GLOBULIN UR ELPH-MCNC: 3.4 GM/DL
GLUCOSE BLD-MCNC: 246 MG/DL (ref 70–110)
GLUCOSE BLDC GLUCOMTR-MCNC: 116 MG/DL (ref 70–130)
GLUCOSE BLDC GLUCOMTR-MCNC: 137 MG/DL (ref 70–130)
GLUCOSE BLDC GLUCOMTR-MCNC: 139 MG/DL (ref 70–130)
GLUCOSE BLDC GLUCOMTR-MCNC: 220 MG/DL (ref 70–130)
HCT VFR BLD AUTO: 37.1 % (ref 37–47)
HGB BLD-MCNC: 11.3 G/DL (ref 12–16)
IMM GRANULOCYTES # BLD: 0.04 10*3/MM3 (ref 0–0.03)
IMM GRANULOCYTES NFR BLD: 0.3 % (ref 0–0.5)
LYMPHOCYTES # BLD AUTO: 2.06 10*3/MM3 (ref 1–3)
LYMPHOCYTES NFR BLD AUTO: 14.5 % (ref 16–46)
MAGNESIUM SERPL-MCNC: 2.3 MG/DL (ref 1.7–2.6)
MCH RBC QN AUTO: 27.6 PG (ref 27–33)
MCHC RBC AUTO-ENTMCNC: 30.5 G/DL (ref 33–37)
MCV RBC AUTO: 90.5 FL (ref 80–94)
MONOCYTES # BLD AUTO: 1.05 10*3/MM3 (ref 0.1–0.9)
MONOCYTES NFR BLD AUTO: 7.4 % (ref 0–12)
NEUTROPHILS # BLD AUTO: 11 10*3/MM3 (ref 1.4–6.5)
NEUTROPHILS NFR BLD AUTO: 77.7 % (ref 40–75)
OSMOLALITY SERPL CALC.SUM OF ELEC: 306.8 MOSM/KG (ref 273–305)
PHOSPHATE SERPL-MCNC: 8.1 MG/DL (ref 2.7–4.5)
PLATELET # BLD AUTO: 316 10*3/MM3 (ref 130–400)
PMV BLD AUTO: 11.6 FL (ref 6–10)
POTASSIUM BLD-SCNC: 4 MMOL/L (ref 3.5–5.3)
PROT SERPL-MCNC: 7.1 G/DL (ref 6–8)
RBC # BLD AUTO: 4.1 10*6/MM3 (ref 4.2–5.4)
SODIUM BLD-SCNC: 138 MMOL/L (ref 135–153)
WBC NRBC COR # BLD: 14.16 10*3/MM3 (ref 4.5–12.5)

## 2017-11-09 PROCEDURE — 94799 UNLISTED PULMONARY SVC/PX: CPT

## 2017-11-09 PROCEDURE — 25010000002 HEPARIN (PORCINE) PER 1000 UNITS: Performed by: HOSPITALIST

## 2017-11-09 PROCEDURE — 83735 ASSAY OF MAGNESIUM: CPT | Performed by: PHYSICIAN ASSISTANT

## 2017-11-09 PROCEDURE — 80074 ACUTE HEPATITIS PANEL: CPT | Performed by: PHYSICIAN ASSISTANT

## 2017-11-09 PROCEDURE — 84100 ASSAY OF PHOSPHORUS: CPT | Performed by: PHYSICIAN ASSISTANT

## 2017-11-09 PROCEDURE — 71010 HC CHEST PA OR AP: CPT

## 2017-11-09 PROCEDURE — 99232 SBSQ HOSP IP/OBS MODERATE 35: CPT | Performed by: INTERNAL MEDICINE

## 2017-11-09 PROCEDURE — 86140 C-REACTIVE PROTEIN: CPT | Performed by: PHYSICIAN ASSISTANT

## 2017-11-09 PROCEDURE — 80053 COMPREHEN METABOLIC PANEL: CPT | Performed by: PHYSICIAN ASSISTANT

## 2017-11-09 PROCEDURE — 85025 COMPLETE CBC W/AUTO DIFF WBC: CPT | Performed by: PHYSICIAN ASSISTANT

## 2017-11-09 PROCEDURE — 82962 GLUCOSE BLOOD TEST: CPT

## 2017-11-09 PROCEDURE — 63710000001 PREDNISONE PER 1 MG: Performed by: INTERNAL MEDICINE

## 2017-11-09 PROCEDURE — 99233 SBSQ HOSP IP/OBS HIGH 50: CPT | Performed by: INTERNAL MEDICINE

## 2017-11-09 PROCEDURE — 25010000002 TIGECYCLINE PER 1 MG

## 2017-11-09 PROCEDURE — 63710000001 INSULIN ASPART PER 5 UNITS: Performed by: HOSPITALIST

## 2017-11-09 PROCEDURE — 25010000002 HALOPERIDOL LACTATE PER 5 MG

## 2017-11-09 PROCEDURE — 71010 XR CHEST 1 VW: CPT | Performed by: RADIOLOGY

## 2017-11-09 RX ORDER — PREDNISONE 10 MG/1
10 TABLET ORAL
Status: DISCONTINUED | OUTPATIENT
Start: 2017-11-10 | End: 2017-11-15 | Stop reason: HOSPADM

## 2017-11-09 RX ORDER — POTASSIUM CHLORIDE 1.5 G/1.77G
40 POWDER, FOR SOLUTION ORAL AS NEEDED
Status: DISCONTINUED | OUTPATIENT
Start: 2017-11-09 | End: 2017-11-14

## 2017-11-09 RX ORDER — LINEZOLID 600 MG/1
600 TABLET, FILM COATED ORAL EVERY 12 HOURS SCHEDULED
Status: COMPLETED | OUTPATIENT
Start: 2017-11-09 | End: 2017-11-12

## 2017-11-09 RX ORDER — ACETAMINOPHEN 325 MG/1
650 TABLET ORAL EVERY 6 HOURS PRN
Status: DISCONTINUED | OUTPATIENT
Start: 2017-11-09 | End: 2017-11-15 | Stop reason: HOSPADM

## 2017-11-09 RX ORDER — METRONIDAZOLE 250 MG/1
750 TABLET ORAL EVERY 8 HOURS SCHEDULED
Status: DISCONTINUED | OUTPATIENT
Start: 2017-11-09 | End: 2017-11-13

## 2017-11-09 RX ORDER — POTASSIUM CHLORIDE 7.45 MG/ML
10 INJECTION INTRAVENOUS
Status: DISCONTINUED | OUTPATIENT
Start: 2017-11-09 | End: 2017-11-14

## 2017-11-09 RX ORDER — HALOPERIDOL 5 MG/ML
5 INJECTION INTRAMUSCULAR ONCE
Status: COMPLETED | OUTPATIENT
Start: 2017-11-09 | End: 2017-11-09

## 2017-11-09 RX ORDER — HALOPERIDOL 5 MG/ML
INJECTION INTRAMUSCULAR
Status: COMPLETED
Start: 2017-11-09 | End: 2017-11-09

## 2017-11-09 RX ORDER — POTASSIUM CHLORIDE 750 MG/1
40 CAPSULE, EXTENDED RELEASE ORAL AS NEEDED
Status: DISCONTINUED | OUTPATIENT
Start: 2017-11-09 | End: 2017-11-14

## 2017-11-09 RX ORDER — POTASSIUM CHLORIDE 20 MEQ/1
40 TABLET, EXTENDED RELEASE ORAL EVERY 4 HOURS
Status: DISCONTINUED | OUTPATIENT
Start: 2017-11-09 | End: 2017-11-09 | Stop reason: ALTCHOICE

## 2017-11-09 RX ADMIN — INSULIN ASPART 4 UNITS: 100 INJECTION, SOLUTION INTRAVENOUS; SUBCUTANEOUS at 23:17

## 2017-11-09 RX ADMIN — PREDNISONE 20 MG: 20 TABLET ORAL at 07:44

## 2017-11-09 RX ADMIN — METOPROLOL TARTRATE 50 MG: 50 TABLET, FILM COATED ORAL at 22:19

## 2017-11-09 RX ADMIN — HEPARIN SODIUM 5000 UNITS: 5000 INJECTION, SOLUTION INTRAVENOUS; SUBCUTANEOUS at 22:19

## 2017-11-09 RX ADMIN — FAMOTIDINE 20 MG: 10 INJECTION INTRAVENOUS at 07:44

## 2017-11-09 RX ADMIN — AMLODIPINE BESYLATE 10 MG: 10 TABLET ORAL at 13:45

## 2017-11-09 RX ADMIN — IPRATROPIUM BROMIDE AND ALBUTEROL SULFATE 3 ML: .5; 3 SOLUTION RESPIRATORY (INHALATION) at 07:14

## 2017-11-09 RX ADMIN — METRONIDAZOLE 750 MG: 250 TABLET ORAL at 22:20

## 2017-11-09 RX ADMIN — HYDRALAZINE HYDROCHLORIDE 10 MG: 10 TABLET ORAL at 22:20

## 2017-11-09 RX ADMIN — CLONAZEPAM 0.5 MG: 0.5 TABLET ORAL at 22:20

## 2017-11-09 RX ADMIN — HALOPERIDOL 5 MG: 5 INJECTION INTRAMUSCULAR at 14:35

## 2017-11-09 RX ADMIN — INSULIN ASPART 2 UNITS: 100 INJECTION, SOLUTION INTRAVENOUS; SUBCUTANEOUS at 00:02

## 2017-11-09 RX ADMIN — LINEZOLID 600 MG: 600 TABLET, FILM COATED ORAL at 22:21

## 2017-11-09 RX ADMIN — HYDRALAZINE HYDROCHLORIDE 10 MG: 10 TABLET ORAL at 13:44

## 2017-11-09 RX ADMIN — GABAPENTIN 100 MG: 100 CAPSULE ORAL at 22:20

## 2017-11-09 RX ADMIN — ASPIRIN 81 MG: 81 TABLET ORAL at 07:44

## 2017-11-09 RX ADMIN — CLONAZEPAM 0.5 MG: 0.5 TABLET ORAL at 07:44

## 2017-11-09 RX ADMIN — HYDRALAZINE HYDROCHLORIDE 10 MG: 10 TABLET ORAL at 06:08

## 2017-11-09 RX ADMIN — ATORVASTATIN CALCIUM 20 MG: 20 TABLET, FILM COATED ORAL at 22:19

## 2017-11-09 RX ADMIN — DEXTROSE MONOHYDRATE 50 MG: 50 INJECTION, SOLUTION INTRAVENOUS at 02:06

## 2017-11-09 RX ADMIN — HALOPERIDOL LACTATE 5 MG: 5 INJECTION, SOLUTION INTRAMUSCULAR at 14:35

## 2017-11-09 RX ADMIN — HEPARIN SODIUM 5000 UNITS: 5000 INJECTION, SOLUTION INTRAVENOUS; SUBCUTANEOUS at 07:44

## 2017-11-09 RX ADMIN — SODIUM CHLORIDE 1000 ML: 9 INJECTION, SOLUTION INTRAVENOUS at 11:40

## 2017-11-09 RX ADMIN — ACETAMINOPHEN 650 MG: 325 TABLET ORAL at 04:43

## 2017-11-10 LAB
GLUCOSE BLDC GLUCOMTR-MCNC: 155 MG/DL (ref 70–130)
GLUCOSE BLDC GLUCOMTR-MCNC: 268 MG/DL (ref 70–130)
GLUCOSE BLDC GLUCOMTR-MCNC: 275 MG/DL (ref 70–130)
GLUCOSE BLDC GLUCOMTR-MCNC: 446 MG/DL (ref 70–130)

## 2017-11-10 PROCEDURE — 63710000001 INSULIN ASPART PER 5 UNITS: Performed by: INTERNAL MEDICINE

## 2017-11-10 PROCEDURE — 99232 SBSQ HOSP IP/OBS MODERATE 35: CPT | Performed by: INTERNAL MEDICINE

## 2017-11-10 PROCEDURE — 82962 GLUCOSE BLOOD TEST: CPT

## 2017-11-10 PROCEDURE — 94799 UNLISTED PULMONARY SVC/PX: CPT

## 2017-11-10 PROCEDURE — 99233 SBSQ HOSP IP/OBS HIGH 50: CPT | Performed by: INTERNAL MEDICINE

## 2017-11-10 PROCEDURE — 63710000001 INSULIN ASPART PER 5 UNITS: Performed by: HOSPITALIST

## 2017-11-10 PROCEDURE — 25010000002 HEPARIN (PORCINE) PER 1000 UNITS: Performed by: HOSPITALIST

## 2017-11-10 PROCEDURE — 63710000001 PREDNISONE PER 5 MG: Performed by: NURSE PRACTITIONER

## 2017-11-10 PROCEDURE — 99222 1ST HOSP IP/OBS MODERATE 55: CPT | Performed by: PSYCHIATRY & NEUROLOGY

## 2017-11-10 RX ORDER — OLANZAPINE 5 MG/1
5 TABLET ORAL 2 TIMES DAILY PRN
Status: DISCONTINUED | OUTPATIENT
Start: 2017-11-10 | End: 2017-11-15 | Stop reason: HOSPADM

## 2017-11-10 RX ORDER — CALCIUM ACETATE 667 MG/1
667 CAPSULE ORAL
Status: DISCONTINUED | OUTPATIENT
Start: 2017-11-10 | End: 2017-11-15 | Stop reason: HOSPADM

## 2017-11-10 RX ADMIN — LINEZOLID 600 MG: 600 TABLET, FILM COATED ORAL at 08:30

## 2017-11-10 RX ADMIN — ASPIRIN 81 MG: 81 TABLET ORAL at 08:29

## 2017-11-10 RX ADMIN — CALCIUM ACETATE 667 MG: 667 CAPSULE ORAL at 14:59

## 2017-11-10 RX ADMIN — HYDRALAZINE HYDROCHLORIDE 10 MG: 10 TABLET ORAL at 14:59

## 2017-11-10 RX ADMIN — METRONIDAZOLE 750 MG: 250 TABLET ORAL at 05:16

## 2017-11-10 RX ADMIN — GABAPENTIN 100 MG: 100 CAPSULE ORAL at 22:12

## 2017-11-10 RX ADMIN — AMLODIPINE BESYLATE 10 MG: 10 TABLET ORAL at 08:29

## 2017-11-10 RX ADMIN — CLONAZEPAM 0.5 MG: 0.5 TABLET ORAL at 08:30

## 2017-11-10 RX ADMIN — CLONAZEPAM 0.5 MG: 0.5 TABLET ORAL at 18:37

## 2017-11-10 RX ADMIN — METRONIDAZOLE 750 MG: 250 TABLET ORAL at 22:10

## 2017-11-10 RX ADMIN — IPRATROPIUM BROMIDE AND ALBUTEROL SULFATE 3 ML: .5; 3 SOLUTION RESPIRATORY (INHALATION) at 13:33

## 2017-11-10 RX ADMIN — INSULIN ASPART 9 UNITS: 100 INJECTION, SOLUTION INTRAVENOUS; SUBCUTANEOUS at 18:37

## 2017-11-10 RX ADMIN — PREDNISONE 10 MG: 10 TABLET ORAL at 08:33

## 2017-11-10 RX ADMIN — IPRATROPIUM BROMIDE AND ALBUTEROL SULFATE 3 ML: .5; 3 SOLUTION RESPIRATORY (INHALATION) at 19:03

## 2017-11-10 RX ADMIN — HEPARIN SODIUM 5000 UNITS: 5000 INJECTION, SOLUTION INTRAVENOUS; SUBCUTANEOUS at 22:11

## 2017-11-10 RX ADMIN — INSULIN ASPART 2 UNITS: 100 INJECTION, SOLUTION INTRAVENOUS; SUBCUTANEOUS at 05:16

## 2017-11-10 RX ADMIN — METOPROLOL TARTRATE 50 MG: 50 TABLET, FILM COATED ORAL at 08:29

## 2017-11-10 RX ADMIN — ATORVASTATIN CALCIUM 20 MG: 20 TABLET, FILM COATED ORAL at 22:10

## 2017-11-10 RX ADMIN — IPRATROPIUM BROMIDE AND ALBUTEROL SULFATE 3 ML: .5; 3 SOLUTION RESPIRATORY (INHALATION) at 06:39

## 2017-11-10 RX ADMIN — CALCIUM ACETATE 667 MG: 667 CAPSULE ORAL at 18:37

## 2017-11-10 RX ADMIN — METRONIDAZOLE 750 MG: 250 TABLET ORAL at 14:58

## 2017-11-10 RX ADMIN — LINEZOLID 600 MG: 600 TABLET, FILM COATED ORAL at 22:11

## 2017-11-10 RX ADMIN — INSULIN ASPART 6 UNITS: 100 INJECTION, SOLUTION INTRAVENOUS; SUBCUTANEOUS at 22:11

## 2017-11-10 RX ADMIN — HEPARIN SODIUM 5000 UNITS: 5000 INJECTION, SOLUTION INTRAVENOUS; SUBCUTANEOUS at 08:29

## 2017-11-11 LAB
ALBUMIN SERPL-MCNC: 3.6 G/DL (ref 3.4–4.8)
ALBUMIN/GLOB SERPL: 1.2 G/DL (ref 1.5–2.5)
ALP SERPL-CCNC: 82 U/L (ref 35–104)
ALT SERPL W P-5'-P-CCNC: 14 U/L (ref 10–36)
ANION GAP SERPL CALCULATED.3IONS-SCNC: 14.5 MMOL/L (ref 3.6–11.2)
AST SERPL-CCNC: 16 U/L (ref 10–30)
BASOPHILS # BLD AUTO: 0 10*3/MM3 (ref 0–0.3)
BASOPHILS NFR BLD AUTO: 0 % (ref 0–2)
BILIRUB SERPL-MCNC: 0.4 MG/DL (ref 0.2–1.8)
BUN BLD-MCNC: 79 MG/DL (ref 7–21)
BUN/CREAT SERPL: 18.8 (ref 7–25)
CALCIUM SPEC-SCNC: 8.2 MG/DL (ref 7.7–10)
CHLORIDE SERPL-SCNC: 99 MMOL/L (ref 99–112)
CO2 SERPL-SCNC: 22.5 MMOL/L (ref 24.3–31.9)
CREAT BLD-MCNC: 4.2 MG/DL (ref 0.43–1.29)
DEPRECATED RDW RBC AUTO: 57.3 FL (ref 37–54)
EOSINOPHIL # BLD AUTO: 0.01 10*3/MM3 (ref 0–0.7)
EOSINOPHIL NFR BLD AUTO: 0.1 % (ref 0–7)
ERYTHROCYTE [DISTWIDTH] IN BLOOD BY AUTOMATED COUNT: 17.8 % (ref 11.5–14.5)
GFR SERPL CREATININE-BSD FRML MDRD: 11 ML/MIN/1.73
GLOBULIN UR ELPH-MCNC: 3.1 GM/DL
GLUCOSE BLD-MCNC: 236 MG/DL (ref 70–110)
GLUCOSE BLDC GLUCOMTR-MCNC: 123 MG/DL (ref 70–130)
GLUCOSE BLDC GLUCOMTR-MCNC: 190 MG/DL (ref 70–130)
GLUCOSE BLDC GLUCOMTR-MCNC: 201 MG/DL (ref 70–130)
GLUCOSE BLDC GLUCOMTR-MCNC: 203 MG/DL (ref 70–130)
HAV IGM SERPL QL IA: NORMAL
HBV CORE IGM SERPL QL IA: NORMAL
HBV SURFACE AG SERPL QL IA: NORMAL
HCT VFR BLD AUTO: 34.3 % (ref 37–47)
HCV AB SER DONR QL: NORMAL
HGB BLD-MCNC: 10.4 G/DL (ref 12–16)
IMM GRANULOCYTES # BLD: 0.03 10*3/MM3 (ref 0–0.03)
IMM GRANULOCYTES NFR BLD: 0.2 % (ref 0–0.5)
LYMPHOCYTES # BLD AUTO: 0.86 10*3/MM3 (ref 1–3)
LYMPHOCYTES NFR BLD AUTO: 7.1 % (ref 16–46)
MAGNESIUM SERPL-MCNC: 2.2 MG/DL (ref 1.7–2.6)
MCH RBC QN AUTO: 27.5 PG (ref 27–33)
MCHC RBC AUTO-ENTMCNC: 30.3 G/DL (ref 33–37)
MCV RBC AUTO: 90.7 FL (ref 80–94)
MONOCYTES # BLD AUTO: 0.32 10*3/MM3 (ref 0.1–0.9)
MONOCYTES NFR BLD AUTO: 2.6 % (ref 0–12)
NEUTROPHILS # BLD AUTO: 10.96 10*3/MM3 (ref 1.4–6.5)
NEUTROPHILS NFR BLD AUTO: 90 % (ref 40–75)
OSMOLALITY SERPL CALC.SUM OF ELEC: 303.3 MOSM/KG (ref 273–305)
PHOSPHATE SERPL-MCNC: 7.2 MG/DL (ref 2.7–4.5)
PLATELET # BLD AUTO: 249 10*3/MM3 (ref 130–400)
PMV BLD AUTO: 10.9 FL (ref 6–10)
POTASSIUM BLD-SCNC: 4.5 MMOL/L (ref 3.5–5.3)
PROT SERPL-MCNC: 6.7 G/DL (ref 6–8)
RBC # BLD AUTO: 3.78 10*6/MM3 (ref 4.2–5.4)
SODIUM BLD-SCNC: 136 MMOL/L (ref 135–153)
WBC NRBC COR # BLD: 12.18 10*3/MM3 (ref 4.5–12.5)

## 2017-11-11 PROCEDURE — 99233 SBSQ HOSP IP/OBS HIGH 50: CPT | Performed by: INTERNAL MEDICINE

## 2017-11-11 PROCEDURE — 84100 ASSAY OF PHOSPHORUS: CPT | Performed by: PHYSICIAN ASSISTANT

## 2017-11-11 PROCEDURE — 82962 GLUCOSE BLOOD TEST: CPT

## 2017-11-11 PROCEDURE — 25010000002 HEPARIN (PORCINE) PER 1000 UNITS: Performed by: HOSPITALIST

## 2017-11-11 PROCEDURE — 94799 UNLISTED PULMONARY SVC/PX: CPT

## 2017-11-11 PROCEDURE — 63710000001 PREDNISONE PER 5 MG: Performed by: NURSE PRACTITIONER

## 2017-11-11 PROCEDURE — 83735 ASSAY OF MAGNESIUM: CPT | Performed by: PHYSICIAN ASSISTANT

## 2017-11-11 PROCEDURE — 80053 COMPREHEN METABOLIC PANEL: CPT | Performed by: PHYSICIAN ASSISTANT

## 2017-11-11 PROCEDURE — 63710000001 INSULIN ASPART PER 5 UNITS: Performed by: INTERNAL MEDICINE

## 2017-11-11 PROCEDURE — 99232 SBSQ HOSP IP/OBS MODERATE 35: CPT | Performed by: INTERNAL MEDICINE

## 2017-11-11 PROCEDURE — 85025 COMPLETE CBC W/AUTO DIFF WBC: CPT | Performed by: PHYSICIAN ASSISTANT

## 2017-11-11 RX ORDER — DIPHENOXYLATE HYDROCHLORIDE AND ATROPINE SULFATE 2.5; .025 MG/1; MG/1
1 TABLET ORAL 4 TIMES DAILY PRN
Status: DISCONTINUED | OUTPATIENT
Start: 2017-11-11 | End: 2017-11-15 | Stop reason: HOSPADM

## 2017-11-11 RX ADMIN — AMLODIPINE BESYLATE 10 MG: 10 TABLET ORAL at 08:02

## 2017-11-11 RX ADMIN — CLONAZEPAM 0.5 MG: 0.5 TABLET ORAL at 18:17

## 2017-11-11 RX ADMIN — HEPARIN SODIUM 5000 UNITS: 5000 INJECTION, SOLUTION INTRAVENOUS; SUBCUTANEOUS at 08:02

## 2017-11-11 RX ADMIN — CALCIUM ACETATE 667 MG: 667 CAPSULE ORAL at 08:02

## 2017-11-11 RX ADMIN — METRONIDAZOLE 750 MG: 250 TABLET ORAL at 05:15

## 2017-11-11 RX ADMIN — ATORVASTATIN CALCIUM 20 MG: 20 TABLET, FILM COATED ORAL at 21:25

## 2017-11-11 RX ADMIN — ASPIRIN 81 MG: 81 TABLET ORAL at 08:02

## 2017-11-11 RX ADMIN — SODIUM CHLORIDE 1000 ML: 9 INJECTION, SOLUTION INTRAVENOUS at 15:18

## 2017-11-11 RX ADMIN — METOPROLOL TARTRATE 50 MG: 50 TABLET, FILM COATED ORAL at 08:02

## 2017-11-11 RX ADMIN — METOPROLOL TARTRATE 50 MG: 50 TABLET, FILM COATED ORAL at 21:25

## 2017-11-11 RX ADMIN — LINEZOLID 600 MG: 600 TABLET, FILM COATED ORAL at 21:24

## 2017-11-11 RX ADMIN — IPRATROPIUM BROMIDE AND ALBUTEROL SULFATE 3 ML: .5; 3 SOLUTION RESPIRATORY (INHALATION) at 19:03

## 2017-11-11 RX ADMIN — CALCIUM ACETATE 667 MG: 667 CAPSULE ORAL at 18:17

## 2017-11-11 RX ADMIN — INSULIN ASPART 4 UNITS: 100 INJECTION, SOLUTION INTRAVENOUS; SUBCUTANEOUS at 08:03

## 2017-11-11 RX ADMIN — PREDNISONE 10 MG: 10 TABLET ORAL at 08:02

## 2017-11-11 RX ADMIN — CALCIUM ACETATE 667 MG: 667 CAPSULE ORAL at 12:22

## 2017-11-11 RX ADMIN — IPRATROPIUM BROMIDE AND ALBUTEROL SULFATE 3 ML: .5; 3 SOLUTION RESPIRATORY (INHALATION) at 06:48

## 2017-11-11 RX ADMIN — LINEZOLID 600 MG: 600 TABLET, FILM COATED ORAL at 08:01

## 2017-11-11 RX ADMIN — HEPARIN SODIUM 5000 UNITS: 5000 INJECTION, SOLUTION INTRAVENOUS; SUBCUTANEOUS at 21:25

## 2017-11-11 RX ADMIN — IPRATROPIUM BROMIDE AND ALBUTEROL SULFATE 3 ML: .5; 3 SOLUTION RESPIRATORY (INHALATION) at 01:53

## 2017-11-11 RX ADMIN — HYDRALAZINE HYDROCHLORIDE 10 MG: 10 TABLET ORAL at 21:25

## 2017-11-11 RX ADMIN — HYDRALAZINE HYDROCHLORIDE 10 MG: 10 TABLET ORAL at 05:17

## 2017-11-11 RX ADMIN — METRONIDAZOLE 750 MG: 250 TABLET ORAL at 14:14

## 2017-11-11 RX ADMIN — IPRATROPIUM BROMIDE AND ALBUTEROL SULFATE 3 ML: .5; 3 SOLUTION RESPIRATORY (INHALATION) at 12:37

## 2017-11-11 RX ADMIN — DIPHENOXYLATE HYDROCHLORIDE AND ATROPINE SULFATE 1 TABLET: 2.5; .025 TABLET ORAL at 18:17

## 2017-11-11 RX ADMIN — METRONIDAZOLE 750 MG: 250 TABLET ORAL at 21:24

## 2017-11-11 RX ADMIN — GABAPENTIN 100 MG: 100 CAPSULE ORAL at 21:24

## 2017-11-11 RX ADMIN — INSULIN ASPART 2 UNITS: 100 INJECTION, SOLUTION INTRAVENOUS; SUBCUTANEOUS at 12:22

## 2017-11-11 RX ADMIN — INSULIN ASPART 4 UNITS: 100 INJECTION, SOLUTION INTRAVENOUS; SUBCUTANEOUS at 21:26

## 2017-11-11 RX ADMIN — CLONAZEPAM 0.5 MG: 0.5 TABLET ORAL at 08:02

## 2017-11-12 LAB
GLUCOSE BLDC GLUCOMTR-MCNC: 178 MG/DL (ref 70–130)
GLUCOSE BLDC GLUCOMTR-MCNC: 214 MG/DL (ref 70–130)
GLUCOSE BLDC GLUCOMTR-MCNC: 326 MG/DL (ref 70–130)
GLUCOSE BLDC GLUCOMTR-MCNC: 330 MG/DL (ref 70–130)

## 2017-11-12 PROCEDURE — 99232 SBSQ HOSP IP/OBS MODERATE 35: CPT | Performed by: INTERNAL MEDICINE

## 2017-11-12 PROCEDURE — 82962 GLUCOSE BLOOD TEST: CPT

## 2017-11-12 PROCEDURE — 94799 UNLISTED PULMONARY SVC/PX: CPT

## 2017-11-12 PROCEDURE — 25010000002 HEPARIN (PORCINE) PER 1000 UNITS: Performed by: HOSPITALIST

## 2017-11-12 PROCEDURE — 25010000002 HALOPERIDOL LACTATE PER 5 MG: Performed by: INTERNAL MEDICINE

## 2017-11-12 PROCEDURE — 63710000001 INSULIN ASPART PER 5 UNITS: Performed by: INTERNAL MEDICINE

## 2017-11-12 PROCEDURE — 63710000001 PREDNISONE PER 5 MG: Performed by: NURSE PRACTITIONER

## 2017-11-12 PROCEDURE — 99233 SBSQ HOSP IP/OBS HIGH 50: CPT | Performed by: INTERNAL MEDICINE

## 2017-11-12 RX ORDER — HALOPERIDOL 5 MG/ML
5 INJECTION INTRAMUSCULAR ONCE
Status: COMPLETED | OUTPATIENT
Start: 2017-11-12 | End: 2017-11-12

## 2017-11-12 RX ADMIN — HEPARIN SODIUM 5000 UNITS: 5000 INJECTION, SOLUTION INTRAVENOUS; SUBCUTANEOUS at 09:43

## 2017-11-12 RX ADMIN — METRONIDAZOLE 750 MG: 250 TABLET ORAL at 07:27

## 2017-11-12 RX ADMIN — HALOPERIDOL LACTATE 5 MG: 5 INJECTION, SOLUTION INTRAMUSCULAR at 12:52

## 2017-11-12 RX ADMIN — IPRATROPIUM BROMIDE AND ALBUTEROL SULFATE 3 ML: .5; 3 SOLUTION RESPIRATORY (INHALATION) at 07:58

## 2017-11-12 RX ADMIN — INSULIN ASPART 4 UNITS: 100 INJECTION, SOLUTION INTRAVENOUS; SUBCUTANEOUS at 12:52

## 2017-11-12 RX ADMIN — INSULIN ASPART 7 UNITS: 100 INJECTION, SOLUTION INTRAVENOUS; SUBCUTANEOUS at 18:39

## 2017-11-12 RX ADMIN — AMLODIPINE BESYLATE 10 MG: 10 TABLET ORAL at 09:43

## 2017-11-12 RX ADMIN — CALCIUM ACETATE 667 MG: 667 CAPSULE ORAL at 12:52

## 2017-11-12 RX ADMIN — ASPIRIN 81 MG: 81 TABLET ORAL at 09:44

## 2017-11-12 RX ADMIN — IPRATROPIUM BROMIDE AND ALBUTEROL SULFATE 3 ML: .5; 3 SOLUTION RESPIRATORY (INHALATION) at 19:25

## 2017-11-12 RX ADMIN — CALCIUM ACETATE 667 MG: 667 CAPSULE ORAL at 18:34

## 2017-11-12 RX ADMIN — CLONAZEPAM 0.5 MG: 0.5 TABLET ORAL at 18:34

## 2017-11-12 RX ADMIN — LINEZOLID 600 MG: 600 TABLET, FILM COATED ORAL at 09:43

## 2017-11-12 RX ADMIN — HYDRALAZINE HYDROCHLORIDE 10 MG: 10 TABLET ORAL at 07:27

## 2017-11-12 RX ADMIN — IPRATROPIUM BROMIDE AND ALBUTEROL SULFATE 3 ML: .5; 3 SOLUTION RESPIRATORY (INHALATION) at 12:23

## 2017-11-12 RX ADMIN — INSULIN ASPART 2 UNITS: 100 INJECTION, SOLUTION INTRAVENOUS; SUBCUTANEOUS at 09:43

## 2017-11-12 RX ADMIN — CALCIUM ACETATE 667 MG: 667 CAPSULE ORAL at 09:44

## 2017-11-12 RX ADMIN — GABAPENTIN 100 MG: 100 CAPSULE ORAL at 22:20

## 2017-11-12 RX ADMIN — METOPROLOL TARTRATE 50 MG: 50 TABLET, FILM COATED ORAL at 22:20

## 2017-11-12 RX ADMIN — INSULIN ASPART 7 UNITS: 100 INJECTION, SOLUTION INTRAVENOUS; SUBCUTANEOUS at 22:21

## 2017-11-12 RX ADMIN — METOPROLOL TARTRATE 50 MG: 50 TABLET, FILM COATED ORAL at 09:44

## 2017-11-12 RX ADMIN — PREDNISONE 10 MG: 10 TABLET ORAL at 09:44

## 2017-11-12 RX ADMIN — METRONIDAZOLE 750 MG: 250 TABLET ORAL at 22:20

## 2017-11-12 RX ADMIN — METRONIDAZOLE 750 MG: 250 TABLET ORAL at 15:15

## 2017-11-12 RX ADMIN — ATORVASTATIN CALCIUM 20 MG: 20 TABLET, FILM COATED ORAL at 22:20

## 2017-11-12 RX ADMIN — CLONAZEPAM 0.5 MG: 0.5 TABLET ORAL at 09:43

## 2017-11-12 RX ADMIN — HYDRALAZINE HYDROCHLORIDE 10 MG: 10 TABLET ORAL at 22:21

## 2017-11-12 RX ADMIN — IPRATROPIUM BROMIDE AND ALBUTEROL SULFATE 3 ML: .5; 3 SOLUTION RESPIRATORY (INHALATION) at 01:18

## 2017-11-13 ENCOUNTER — APPOINTMENT (OUTPATIENT)
Dept: GENERAL RADIOLOGY | Facility: HOSPITAL | Age: 67
End: 2017-11-13

## 2017-11-13 LAB
GLUCOSE BLDC GLUCOMTR-MCNC: 242 MG/DL (ref 70–130)
GLUCOSE BLDC GLUCOMTR-MCNC: 255 MG/DL (ref 70–130)
GLUCOSE BLDC GLUCOMTR-MCNC: 296 MG/DL (ref 70–130)
GLUCOSE BLDC GLUCOMTR-MCNC: 302 MG/DL (ref 70–130)

## 2017-11-13 PROCEDURE — 94799 UNLISTED PULMONARY SVC/PX: CPT

## 2017-11-13 PROCEDURE — 71010 HC CHEST PA OR AP: CPT

## 2017-11-13 PROCEDURE — 71010 XR CHEST 1 VW: CPT | Performed by: RADIOLOGY

## 2017-11-13 PROCEDURE — 63710000001 INSULIN ASPART PER 5 UNITS: Performed by: INTERNAL MEDICINE

## 2017-11-13 PROCEDURE — 25010000002 HEPARIN (PORCINE) PER 1000 UNITS: Performed by: HOSPITALIST

## 2017-11-13 PROCEDURE — 63710000001 PREDNISONE PER 5 MG: Performed by: NURSE PRACTITIONER

## 2017-11-13 PROCEDURE — 32555 ASPIRATE PLEURA W/ IMAGING: CPT | Performed by: INTERNAL MEDICINE

## 2017-11-13 PROCEDURE — 82962 GLUCOSE BLOOD TEST: CPT

## 2017-11-13 PROCEDURE — 99233 SBSQ HOSP IP/OBS HIGH 50: CPT | Performed by: INTERNAL MEDICINE

## 2017-11-13 PROCEDURE — 0W9930Z DRAINAGE OF RIGHT PLEURAL CAVITY WITH DRAINAGE DEVICE, PERCUTANEOUS APPROACH: ICD-10-PCS | Performed by: INTERNAL MEDICINE

## 2017-11-13 PROCEDURE — 0W993ZZ DRAINAGE OF RIGHT PLEURAL CAVITY, PERCUTANEOUS APPROACH: ICD-10-PCS | Performed by: INTERNAL MEDICINE

## 2017-11-13 PROCEDURE — 63710000001 INSULIN DETEMIR PER 5 UNITS: Performed by: INTERNAL MEDICINE

## 2017-11-13 PROCEDURE — 32551 INSERTION OF CHEST TUBE: CPT | Performed by: INTERNAL MEDICINE

## 2017-11-13 RX ORDER — MAGNESIUM HYDROXIDE 1200 MG/15ML
LIQUID ORAL
Status: DISCONTINUED
Start: 2017-11-13 | End: 2017-11-15 | Stop reason: HOSPADM

## 2017-11-13 RX ORDER — AMLODIPINE BESYLATE 5 MG/1
5 TABLET ORAL
Status: DISCONTINUED | OUTPATIENT
Start: 2017-11-14 | End: 2017-11-15 | Stop reason: HOSPADM

## 2017-11-13 RX ADMIN — INSULIN ASPART 2 UNITS: 100 INJECTION, SOLUTION INTRAVENOUS; SUBCUTANEOUS at 09:13

## 2017-11-13 RX ADMIN — METOPROLOL TARTRATE 50 MG: 50 TABLET, FILM COATED ORAL at 22:17

## 2017-11-13 RX ADMIN — HYDRALAZINE HYDROCHLORIDE 10 MG: 10 TABLET ORAL at 05:58

## 2017-11-13 RX ADMIN — INSULIN ASPART 6 UNITS: 100 INJECTION, SOLUTION INTRAVENOUS; SUBCUTANEOUS at 13:14

## 2017-11-13 RX ADMIN — GABAPENTIN 100 MG: 100 CAPSULE ORAL at 22:17

## 2017-11-13 RX ADMIN — AMLODIPINE BESYLATE 10 MG: 10 TABLET ORAL at 09:08

## 2017-11-13 RX ADMIN — INSULIN DETEMIR 5 UNITS: 100 INJECTION, SOLUTION SUBCUTANEOUS at 22:16

## 2017-11-13 RX ADMIN — PREDNISONE 10 MG: 10 TABLET ORAL at 09:14

## 2017-11-13 RX ADMIN — CALCIUM ACETATE 667 MG: 667 CAPSULE ORAL at 18:58

## 2017-11-13 RX ADMIN — ACETAMINOPHEN 650 MG: 325 TABLET ORAL at 23:47

## 2017-11-13 RX ADMIN — ASPIRIN 81 MG: 81 TABLET ORAL at 09:08

## 2017-11-13 RX ADMIN — IPRATROPIUM BROMIDE AND ALBUTEROL SULFATE 3 ML: .5; 3 SOLUTION RESPIRATORY (INHALATION) at 19:10

## 2017-11-13 RX ADMIN — CLONAZEPAM 0.5 MG: 0.5 TABLET ORAL at 18:58

## 2017-11-13 RX ADMIN — HYDRALAZINE HYDROCHLORIDE 10 MG: 10 TABLET ORAL at 22:17

## 2017-11-13 RX ADMIN — CLONAZEPAM 0.5 MG: 0.5 TABLET ORAL at 09:09

## 2017-11-13 RX ADMIN — IPRATROPIUM BROMIDE AND ALBUTEROL SULFATE 3 ML: .5; 3 SOLUTION RESPIRATORY (INHALATION) at 12:48

## 2017-11-13 RX ADMIN — METRONIDAZOLE 750 MG: 250 TABLET ORAL at 05:58

## 2017-11-13 RX ADMIN — IPRATROPIUM BROMIDE AND ALBUTEROL SULFATE 3 ML: .5; 3 SOLUTION RESPIRATORY (INHALATION) at 07:00

## 2017-11-13 RX ADMIN — HEPARIN SODIUM 5000 UNITS: 5000 INJECTION, SOLUTION INTRAVENOUS; SUBCUTANEOUS at 09:09

## 2017-11-13 RX ADMIN — ATORVASTATIN CALCIUM 20 MG: 20 TABLET, FILM COATED ORAL at 22:17

## 2017-11-13 RX ADMIN — IPRATROPIUM BROMIDE AND ALBUTEROL SULFATE 3 ML: .5; 3 SOLUTION RESPIRATORY (INHALATION) at 01:40

## 2017-11-13 RX ADMIN — CALCIUM ACETATE 667 MG: 667 CAPSULE ORAL at 13:15

## 2017-11-13 RX ADMIN — HEPARIN SODIUM 5000 UNITS: 5000 INJECTION, SOLUTION INTRAVENOUS; SUBCUTANEOUS at 22:20

## 2017-11-13 RX ADMIN — CALCIUM ACETATE 667 MG: 667 CAPSULE ORAL at 09:09

## 2017-11-13 RX ADMIN — HEPARIN SODIUM 5000 UNITS: 5000 INJECTION, SOLUTION INTRAVENOUS; SUBCUTANEOUS at 01:31

## 2017-11-13 RX ADMIN — INSULIN ASPART 0 UNITS: 100 INJECTION, SOLUTION INTRAVENOUS; SUBCUTANEOUS at 22:18

## 2017-11-14 ENCOUNTER — APPOINTMENT (OUTPATIENT)
Dept: GENERAL RADIOLOGY | Facility: HOSPITAL | Age: 67
End: 2017-11-14

## 2017-11-14 ENCOUNTER — APPOINTMENT (OUTPATIENT)
Dept: CT IMAGING | Facility: HOSPITAL | Age: 67
End: 2017-11-14

## 2017-11-14 LAB
A-A DO2: >300 MMHG (ref 0–300)
ANION GAP SERPL CALCULATED.3IONS-SCNC: 9.4 MMOL/L (ref 3.6–11.2)
ANISOCYTOSIS BLD QL: NORMAL
ARTERIAL PATENCY WRIST A: POSITIVE
ATMOSPHERIC PRESS: 735 MMHG
BASE EXCESS BLDA CALC-SCNC: -2.1 MMOL/L
BASOPHILS # BLD AUTO: 0.01 10*3/MM3 (ref 0–0.3)
BASOPHILS NFR BLD AUTO: 0.1 % (ref 0–2)
BDY SITE: ABNORMAL
BODY TEMPERATURE: 98.6 C
BUN BLD-MCNC: 82 MG/DL (ref 7–21)
BUN/CREAT SERPL: 19.8 (ref 7–25)
CALCIUM SPEC-SCNC: 8 MG/DL (ref 7.7–10)
CHLORIDE SERPL-SCNC: 102 MMOL/L (ref 99–112)
CK MB SERPL-CCNC: 0.82 NG/ML (ref 0–5)
CK MB SERPL-CCNC: 1.1 NG/ML (ref 0–5)
CK MB SERPL-RTO: 3.3 % (ref 0–3)
CK MB SERPL-RTO: 3.4 % (ref 0–3)
CK SERPL-CCNC: 24 U/L (ref 24–173)
CK SERPL-CCNC: 33 U/L (ref 24–173)
CO2 SERPL-SCNC: 25.6 MMOL/L (ref 24.3–31.9)
COHGB MFR BLD: 1 % (ref 0–5)
CREAT BLD-MCNC: 4.15 MG/DL (ref 0.43–1.29)
DEPRECATED RDW RBC AUTO: 57.4 FL (ref 37–54)
EOSINOPHIL # BLD AUTO: 0.01 10*3/MM3 (ref 0–0.7)
EOSINOPHIL NFR BLD AUTO: 0.1 % (ref 0–7)
ERYTHROCYTE [DISTWIDTH] IN BLOOD BY AUTOMATED COUNT: 17.9 % (ref 11.5–14.5)
GFR SERPL CREATININE-BSD FRML MDRD: 11 ML/MIN/1.73
GLUCOSE BLD-MCNC: 210 MG/DL (ref 70–110)
GLUCOSE BLDC GLUCOMTR-MCNC: 105 MG/DL (ref 70–130)
GLUCOSE BLDC GLUCOMTR-MCNC: 110 MG/DL (ref 70–130)
GLUCOSE BLDC GLUCOMTR-MCNC: 116 MG/DL (ref 70–130)
GLUCOSE BLDC GLUCOMTR-MCNC: 194 MG/DL (ref 70–130)
GLUCOSE BLDC GLUCOMTR-MCNC: 256 MG/DL (ref 70–130)
HCO3 BLDA-SCNC: 24.3 MMOL/L (ref 22–26)
HCT VFR BLD AUTO: 34.2 % (ref 37–47)
HCT VFR BLD CALC: 33 % (ref 37–47)
HGB BLD-MCNC: 9.7 G/DL (ref 12–16)
HGB BLDA-MCNC: 11.1 G/DL (ref 12–16)
HOROWITZ INDEX BLD+IHG-RTO: 100 %
HYPOCHROMIA BLD QL: NORMAL
IMM GRANULOCYTES # BLD: 0.05 10*3/MM3 (ref 0–0.03)
IMM GRANULOCYTES NFR BLD: 0.4 % (ref 0–0.5)
INR PPP: 1.09 (ref 0.9–1.1)
LYMPHOCYTES # BLD AUTO: 1.58 10*3/MM3 (ref 1–3)
LYMPHOCYTES NFR BLD AUTO: 11.5 % (ref 16–46)
MAGNESIUM SERPL-MCNC: 2 MG/DL (ref 1.7–2.6)
MCH RBC QN AUTO: 26.4 PG (ref 27–33)
MCHC RBC AUTO-ENTMCNC: 28.4 G/DL (ref 33–37)
MCV RBC AUTO: 93.2 FL (ref 80–94)
METHGB BLD QL: 0.3 % (ref 0–3)
MODALITY: ABNORMAL
MONOCYTES # BLD AUTO: 0.96 10*3/MM3 (ref 0.1–0.9)
MONOCYTES NFR BLD AUTO: 7 % (ref 0–12)
NEUTROPHILS # BLD AUTO: 11.11 10*3/MM3 (ref 1.4–6.5)
NEUTROPHILS NFR BLD AUTO: 80.9 % (ref 40–75)
OSMOLALITY SERPL CALC.SUM OF ELEC: 304.8 MOSM/KG (ref 273–305)
OXYHGB MFR BLDV: 96.5 % (ref 85–100)
PCO2 BLDA: 49.1 MM HG (ref 35–45)
PH BLDA: 7.31 PH UNITS (ref 7.35–7.45)
PHOSPHATE SERPL-MCNC: 5.6 MG/DL (ref 2.7–4.5)
PLAT MORPH BLD: NORMAL
PLATELET # BLD AUTO: 214 10*3/MM3 (ref 130–400)
PMV BLD AUTO: 12.4 FL (ref 6–10)
PO2 BLDA: 108.3 MM HG (ref 80–100)
POTASSIUM BLD-SCNC: 5.1 MMOL/L (ref 3.5–5.3)
PROTHROMBIN TIME: 14.2 SECONDS (ref 11–15.4)
RBC # BLD AUTO: 3.67 10*6/MM3 (ref 4.2–5.4)
SAO2 % BLDCOA: 97.8 % (ref 90–100)
SODIUM BLD-SCNC: 137 MMOL/L (ref 135–153)
TROPONIN I SERPL-MCNC: 0.06 NG/ML
TROPONIN I SERPL-MCNC: 0.07 NG/ML
WBC NRBC COR # BLD: 13.72 10*3/MM3 (ref 4.5–12.5)

## 2017-11-14 PROCEDURE — 84484 ASSAY OF TROPONIN QUANT: CPT | Performed by: INTERNAL MEDICINE

## 2017-11-14 PROCEDURE — 71010 XR CHEST AP: CPT | Performed by: RADIOLOGY

## 2017-11-14 PROCEDURE — 71010 HC CHEST PA OR AP: CPT

## 2017-11-14 PROCEDURE — 85007 BL SMEAR W/DIFF WBC COUNT: CPT | Performed by: NURSE PRACTITIONER

## 2017-11-14 PROCEDURE — 94799 UNLISTED PULMONARY SVC/PX: CPT

## 2017-11-14 PROCEDURE — 82962 GLUCOSE BLOOD TEST: CPT

## 2017-11-14 PROCEDURE — 25010000002 HEPARIN (PORCINE) PER 1000 UNITS: Performed by: HOSPITALIST

## 2017-11-14 PROCEDURE — 71010 XR CHEST 1 VW: CPT | Performed by: RADIOLOGY

## 2017-11-14 PROCEDURE — 85610 PROTHROMBIN TIME: CPT | Performed by: NURSE PRACTITIONER

## 2017-11-14 PROCEDURE — 82550 ASSAY OF CK (CPK): CPT | Performed by: HOSPITALIST

## 2017-11-14 PROCEDURE — 70450 CT HEAD/BRAIN W/O DYE: CPT | Performed by: RADIOLOGY

## 2017-11-14 PROCEDURE — 82553 CREATINE MB FRACTION: CPT | Performed by: HOSPITALIST

## 2017-11-14 PROCEDURE — 93005 ELECTROCARDIOGRAM TRACING: CPT | Performed by: INTERNAL MEDICINE

## 2017-11-14 PROCEDURE — 84100 ASSAY OF PHOSPHORUS: CPT | Performed by: NURSE PRACTITIONER

## 2017-11-14 PROCEDURE — 93010 ELECTROCARDIOGRAM REPORT: CPT | Performed by: INTERNAL MEDICINE

## 2017-11-14 PROCEDURE — 93005 ELECTROCARDIOGRAM TRACING: CPT | Performed by: HOSPITALIST

## 2017-11-14 PROCEDURE — 85025 COMPLETE CBC W/AUTO DIFF WBC: CPT | Performed by: NURSE PRACTITIONER

## 2017-11-14 PROCEDURE — 71010 HC CHEST AP: CPT

## 2017-11-14 PROCEDURE — 25010000002 KETOROLAC TROMETHAMINE PER 15 MG

## 2017-11-14 PROCEDURE — 36600 WITHDRAWAL OF ARTERIAL BLOOD: CPT | Performed by: INTERNAL MEDICINE

## 2017-11-14 PROCEDURE — 80048 BASIC METABOLIC PNL TOTAL CA: CPT | Performed by: NURSE PRACTITIONER

## 2017-11-14 PROCEDURE — 25010000002 MORPHINE PER 10 MG: Performed by: INTERNAL MEDICINE

## 2017-11-14 PROCEDURE — 84484 ASSAY OF TROPONIN QUANT: CPT | Performed by: HOSPITALIST

## 2017-11-14 PROCEDURE — 83050 HGB METHEMOGLOBIN QUAN: CPT | Performed by: INTERNAL MEDICINE

## 2017-11-14 PROCEDURE — 82805 BLOOD GASES W/O2 SATURATION: CPT | Performed by: INTERNAL MEDICINE

## 2017-11-14 PROCEDURE — 82375 ASSAY CARBOXYHB QUANT: CPT | Performed by: INTERNAL MEDICINE

## 2017-11-14 PROCEDURE — 99233 SBSQ HOSP IP/OBS HIGH 50: CPT | Performed by: INTERNAL MEDICINE

## 2017-11-14 PROCEDURE — 82553 CREATINE MB FRACTION: CPT | Performed by: INTERNAL MEDICINE

## 2017-11-14 PROCEDURE — 63710000001 INSULIN DETEMIR PER 5 UNITS: Performed by: INTERNAL MEDICINE

## 2017-11-14 PROCEDURE — 83735 ASSAY OF MAGNESIUM: CPT | Performed by: NURSE PRACTITIONER

## 2017-11-14 PROCEDURE — 99291 CRITICAL CARE FIRST HOUR: CPT | Performed by: INTERNAL MEDICINE

## 2017-11-14 PROCEDURE — 82550 ASSAY OF CK (CPK): CPT | Performed by: INTERNAL MEDICINE

## 2017-11-14 PROCEDURE — 63710000001 INSULIN ASPART PER 5 UNITS: Performed by: INTERNAL MEDICINE

## 2017-11-14 PROCEDURE — 70450 CT HEAD/BRAIN W/O DYE: CPT

## 2017-11-14 PROCEDURE — 25010000002 NALOXONE PER 1 MG

## 2017-11-14 RX ORDER — NALOXONE HYDROCHLORIDE 1 MG/ML
0.4 INJECTION INTRAMUSCULAR; INTRAVENOUS; SUBCUTANEOUS ONCE
Status: DISCONTINUED | OUTPATIENT
Start: 2017-11-14 | End: 2017-11-15 | Stop reason: HOSPADM

## 2017-11-14 RX ORDER — NALOXONE HYDROCHLORIDE 0.4 MG/ML
INJECTION, SOLUTION INTRAMUSCULAR; INTRAVENOUS; SUBCUTANEOUS
Status: COMPLETED
Start: 2017-11-14 | End: 2017-11-14

## 2017-11-14 RX ORDER — CLONAZEPAM 0.5 MG/1
0.5 TABLET ORAL EVERY 12 HOURS SCHEDULED
Status: DISCONTINUED | OUTPATIENT
Start: 2017-11-14 | End: 2017-11-15 | Stop reason: HOSPADM

## 2017-11-14 RX ORDER — FLUMAZENIL 0.1 MG/ML
INJECTION INTRAVENOUS
Status: DISCONTINUED
Start: 2017-11-14 | End: 2017-11-14 | Stop reason: WASHOUT

## 2017-11-14 RX ORDER — KETOROLAC TROMETHAMINE 30 MG/ML
15 INJECTION, SOLUTION INTRAMUSCULAR; INTRAVENOUS ONCE
Status: COMPLETED | OUTPATIENT
Start: 2017-11-14 | End: 2017-11-14

## 2017-11-14 RX ORDER — KETOROLAC TROMETHAMINE 30 MG/ML
INJECTION, SOLUTION INTRAMUSCULAR; INTRAVENOUS
Status: COMPLETED
Start: 2017-11-14 | End: 2017-11-14

## 2017-11-14 RX ORDER — NALOXONE HCL 0.4 MG/ML
0.4 VIAL (ML) INJECTION
Status: DISCONTINUED | OUTPATIENT
Start: 2017-11-14 | End: 2017-11-15 | Stop reason: HOSPADM

## 2017-11-14 RX ORDER — MORPHINE SULFATE 2 MG/ML
1 INJECTION, SOLUTION INTRAMUSCULAR; INTRAVENOUS ONCE
Status: COMPLETED | OUTPATIENT
Start: 2017-11-14 | End: 2017-11-14

## 2017-11-14 RX ADMIN — CALCIUM ACETATE 667 MG: 667 CAPSULE ORAL at 17:41

## 2017-11-14 RX ADMIN — INSULIN ASPART 6 UNITS: 100 INJECTION, SOLUTION INTRAVENOUS; SUBCUTANEOUS at 20:49

## 2017-11-14 RX ADMIN — HEPARIN SODIUM 5000 UNITS: 5000 INJECTION, SOLUTION INTRAVENOUS; SUBCUTANEOUS at 20:40

## 2017-11-14 RX ADMIN — IPRATROPIUM BROMIDE AND ALBUTEROL SULFATE 3 ML: .5; 3 SOLUTION RESPIRATORY (INHALATION) at 06:52

## 2017-11-14 RX ADMIN — SODIUM CHLORIDE 1000 ML: 9 INJECTION, SOLUTION INTRAVENOUS at 14:00

## 2017-11-14 RX ADMIN — INSULIN DETEMIR 5 UNITS: 100 INJECTION, SOLUTION SUBCUTANEOUS at 20:50

## 2017-11-14 RX ADMIN — IPRATROPIUM BROMIDE AND ALBUTEROL SULFATE 3 ML: .5; 3 SOLUTION RESPIRATORY (INHALATION) at 00:30

## 2017-11-14 RX ADMIN — NALOXONE HYDROCHLORIDE 0.4 MG: 0.4 INJECTION, SOLUTION INTRAMUSCULAR; INTRAVENOUS; SUBCUTANEOUS at 11:13

## 2017-11-14 RX ADMIN — HYDRALAZINE HYDROCHLORIDE 10 MG: 10 TABLET ORAL at 06:38

## 2017-11-14 RX ADMIN — KETOROLAC TROMETHAMINE 15 MG: 30 INJECTION, SOLUTION INTRAMUSCULAR; INTRAVENOUS at 04:13

## 2017-11-14 RX ADMIN — MORPHINE SULFATE 1 MG: 2 INJECTION, SOLUTION INTRAMUSCULAR; INTRAVENOUS at 06:34

## 2017-11-14 RX ADMIN — METOPROLOL TARTRATE 50 MG: 50 TABLET, FILM COATED ORAL at 20:40

## 2017-11-14 RX ADMIN — HYDRALAZINE HYDROCHLORIDE 10 MG: 10 TABLET ORAL at 21:39

## 2017-11-14 RX ADMIN — NALOXONE HYDROCHLORIDE 0.4 MG: 0.4 INJECTION, SOLUTION INTRAMUSCULAR; INTRAVENOUS; SUBCUTANEOUS at 09:32

## 2017-11-14 RX ADMIN — IPRATROPIUM BROMIDE AND ALBUTEROL SULFATE 3 ML: .5; 3 SOLUTION RESPIRATORY (INHALATION) at 18:27

## 2017-11-14 RX ADMIN — GABAPENTIN 100 MG: 100 CAPSULE ORAL at 20:40

## 2017-11-14 RX ADMIN — ATORVASTATIN CALCIUM 20 MG: 20 TABLET, FILM COATED ORAL at 20:40

## 2017-11-14 RX ADMIN — HEPARIN SODIUM 5000 UNITS: 5000 INJECTION, SOLUTION INTRAVENOUS; SUBCUTANEOUS at 12:59

## 2017-11-14 RX ADMIN — INSULIN ASPART 2 UNITS: 100 INJECTION, SOLUTION INTRAVENOUS; SUBCUTANEOUS at 17:40

## 2017-11-14 RX ADMIN — HYDRALAZINE HYDROCHLORIDE 10 MG: 10 TABLET ORAL at 17:41

## 2017-11-14 RX ADMIN — FAMOTIDINE 20 MG: 10 INJECTION INTRAVENOUS at 10:23

## 2017-11-15 ENCOUNTER — APPOINTMENT (OUTPATIENT)
Dept: GENERAL RADIOLOGY | Facility: HOSPITAL | Age: 67
End: 2017-11-15

## 2017-11-15 VITALS
BODY MASS INDEX: 23.56 KG/M2 | OXYGEN SATURATION: 91 % | TEMPERATURE: 98.4 F | HEIGHT: 60 IN | RESPIRATION RATE: 18 BRPM | HEART RATE: 81 BPM | SYSTOLIC BLOOD PRESSURE: 178 MMHG | WEIGHT: 120 LBS | DIASTOLIC BLOOD PRESSURE: 94 MMHG

## 2017-11-15 LAB
ALBUMIN SERPL-MCNC: 3.2 G/DL (ref 3.4–4.8)
ALBUMIN/GLOB SERPL: 1.1 G/DL (ref 1.5–2.5)
ALP SERPL-CCNC: 85 U/L (ref 35–104)
ALT SERPL W P-5'-P-CCNC: 12 U/L (ref 10–36)
ANION GAP SERPL CALCULATED.3IONS-SCNC: 6.4 MMOL/L (ref 3.6–11.2)
ANISOCYTOSIS BLD QL: NORMAL
AST SERPL-CCNC: 21 U/L (ref 10–30)
BASOPHILS # BLD AUTO: 0.01 10*3/MM3 (ref 0–0.3)
BASOPHILS NFR BLD AUTO: 0.1 % (ref 0–2)
BILIRUB SERPL-MCNC: 0.4 MG/DL (ref 0.2–1.8)
BUN BLD-MCNC: 40 MG/DL (ref 7–21)
BUN/CREAT SERPL: 15.6 (ref 7–25)
CALCIUM SPEC-SCNC: 7.9 MG/DL (ref 7.7–10)
CHLORIDE SERPL-SCNC: 103 MMOL/L (ref 99–112)
CO2 SERPL-SCNC: 28.6 MMOL/L (ref 24.3–31.9)
CREAT BLD-MCNC: 2.56 MG/DL (ref 0.43–1.29)
DEPRECATED RDW RBC AUTO: 57.8 FL (ref 37–54)
EOSINOPHIL # BLD AUTO: 0.14 10*3/MM3 (ref 0–0.7)
EOSINOPHIL NFR BLD AUTO: 1.2 % (ref 0–7)
ERYTHROCYTE [DISTWIDTH] IN BLOOD BY AUTOMATED COUNT: 17.6 % (ref 11.5–14.5)
GFR SERPL CREATININE-BSD FRML MDRD: 19 ML/MIN/1.73
GLOBULIN UR ELPH-MCNC: 2.9 GM/DL
GLUCOSE BLD-MCNC: 154 MG/DL (ref 70–110)
GLUCOSE BLDC GLUCOMTR-MCNC: 117 MG/DL (ref 70–130)
HCT VFR BLD AUTO: 32.2 % (ref 37–47)
HGB BLD-MCNC: 9.3 G/DL (ref 12–16)
HYPOCHROMIA BLD QL: NORMAL
IMM GRANULOCYTES # BLD: 0.03 10*3/MM3 (ref 0–0.03)
IMM GRANULOCYTES NFR BLD: 0.3 % (ref 0–0.5)
LYMPHOCYTES # BLD AUTO: 1.69 10*3/MM3 (ref 1–3)
LYMPHOCYTES NFR BLD AUTO: 14.9 % (ref 16–46)
MCH RBC QN AUTO: 27.1 PG (ref 27–33)
MCHC RBC AUTO-ENTMCNC: 28.9 G/DL (ref 33–37)
MCV RBC AUTO: 93.9 FL (ref 80–94)
MONOCYTES # BLD AUTO: 0.87 10*3/MM3 (ref 0.1–0.9)
MONOCYTES NFR BLD AUTO: 7.6 % (ref 0–12)
NEUTROPHILS # BLD AUTO: 8.64 10*3/MM3 (ref 1.4–6.5)
NEUTROPHILS NFR BLD AUTO: 75.9 % (ref 40–75)
OSMOLALITY SERPL CALC.SUM OF ELEC: 288.5 MOSM/KG (ref 273–305)
PLAT MORPH BLD: NORMAL
PLATELET # BLD AUTO: 144 10*3/MM3 (ref 130–400)
PMV BLD AUTO: 11.7 FL (ref 6–10)
POTASSIUM BLD-SCNC: 4.4 MMOL/L (ref 3.5–5.3)
PROT SERPL-MCNC: 6.1 G/DL (ref 6–8)
RBC # BLD AUTO: 3.43 10*6/MM3 (ref 4.2–5.4)
SODIUM BLD-SCNC: 138 MMOL/L (ref 135–153)
WBC NRBC COR # BLD: 11.38 10*3/MM3 (ref 4.5–12.5)

## 2017-11-15 PROCEDURE — 99239 HOSP IP/OBS DSCHRG MGMT >30: CPT | Performed by: INTERNAL MEDICINE

## 2017-11-15 PROCEDURE — 94799 UNLISTED PULMONARY SVC/PX: CPT

## 2017-11-15 PROCEDURE — 25010000002 HEPARIN (PORCINE) PER 1000 UNITS: Performed by: HOSPITALIST

## 2017-11-15 PROCEDURE — 80053 COMPREHEN METABOLIC PANEL: CPT | Performed by: INTERNAL MEDICINE

## 2017-11-15 PROCEDURE — 71010 XR CHEST 1 VW: CPT | Performed by: RADIOLOGY

## 2017-11-15 PROCEDURE — 63710000001 PREDNISONE PER 5 MG: Performed by: NURSE PRACTITIONER

## 2017-11-15 PROCEDURE — 71010 HC CHEST PA OR AP: CPT

## 2017-11-15 PROCEDURE — 85025 COMPLETE CBC W/AUTO DIFF WBC: CPT | Performed by: INTERNAL MEDICINE

## 2017-11-15 PROCEDURE — 82962 GLUCOSE BLOOD TEST: CPT

## 2017-11-15 PROCEDURE — 85007 BL SMEAR W/DIFF WBC COUNT: CPT | Performed by: INTERNAL MEDICINE

## 2017-11-15 PROCEDURE — 99233 SBSQ HOSP IP/OBS HIGH 50: CPT | Performed by: INTERNAL MEDICINE

## 2017-11-15 RX ORDER — ASPIRIN 81 MG/1
81 TABLET ORAL DAILY
Qty: 30 TABLET | Refills: 0 | Status: ON HOLD | OUTPATIENT
Start: 2017-11-16 | End: 2018-06-21

## 2017-11-15 RX ORDER — HYDRALAZINE HYDROCHLORIDE 10 MG/1
10 TABLET, FILM COATED ORAL EVERY 8 HOURS SCHEDULED
Qty: 90 TABLET | Refills: 0 | Status: SHIPPED | OUTPATIENT
Start: 2017-11-15 | End: 2018-04-03 | Stop reason: HOSPADM

## 2017-11-15 RX ORDER — AMLODIPINE BESYLATE 5 MG/1
5 TABLET ORAL
Qty: 30 TABLET | Refills: 0 | Status: SHIPPED | OUTPATIENT
Start: 2017-11-16 | End: 2018-01-14 | Stop reason: HOSPADM

## 2017-11-15 RX ORDER — METOPROLOL TARTRATE 50 MG/1
50 TABLET, FILM COATED ORAL EVERY 12 HOURS SCHEDULED
Qty: 60 TABLET | Refills: 0 | Status: ON HOLD | OUTPATIENT
Start: 2017-11-15 | End: 2018-01-14

## 2017-11-15 RX ORDER — CALCIUM ACETATE 667 MG/1
667 CAPSULE ORAL
Qty: 180 CAPSULE | Refills: 0 | Status: ON HOLD | OUTPATIENT
Start: 2017-11-15 | End: 2018-03-29

## 2017-11-15 RX ADMIN — HEPARIN SODIUM 5000 UNITS: 5000 INJECTION, SOLUTION INTRAVENOUS; SUBCUTANEOUS at 08:46

## 2017-11-15 RX ADMIN — METOPROLOL TARTRATE 50 MG: 50 TABLET, FILM COATED ORAL at 08:46

## 2017-11-15 RX ADMIN — CLONAZEPAM 0.5 MG: 0.5 TABLET ORAL at 08:47

## 2017-11-15 RX ADMIN — HYDRALAZINE HYDROCHLORIDE 10 MG: 10 TABLET ORAL at 05:49

## 2017-11-15 RX ADMIN — IPRATROPIUM BROMIDE AND ALBUTEROL SULFATE 3 ML: .5; 3 SOLUTION RESPIRATORY (INHALATION) at 06:10

## 2017-11-15 RX ADMIN — CALCIUM ACETATE 667 MG: 667 CAPSULE ORAL at 08:46

## 2017-11-15 RX ADMIN — FAMOTIDINE 20 MG: 10 INJECTION INTRAVENOUS at 08:47

## 2017-11-15 RX ADMIN — PREDNISONE 10 MG: 10 TABLET ORAL at 08:46

## 2017-11-15 RX ADMIN — ASPIRIN 81 MG: 81 TABLET ORAL at 08:47

## 2017-11-15 RX ADMIN — AMLODIPINE BESYLATE 5 MG: 5 TABLET ORAL at 08:46

## 2017-12-05 ENCOUNTER — LAB REQUISITION (OUTPATIENT)
Dept: LAB | Facility: HOSPITAL | Age: 67
End: 2017-12-05

## 2017-12-05 DIAGNOSIS — N39.0 URINARY TRACT INFECTION: ICD-10-CM

## 2017-12-05 LAB
BACTERIA UR QL AUTO: ABNORMAL /HPF
BILIRUB UR QL STRIP: NEGATIVE
CLARITY UR: ABNORMAL
COLOR UR: YELLOW
GLUCOSE UR STRIP-MCNC: ABNORMAL MG/DL
HGB UR QL STRIP.AUTO: ABNORMAL
HYALINE CASTS UR QL AUTO: ABNORMAL /LPF
KETONES UR QL STRIP: NEGATIVE
LEUKOCYTE ESTERASE UR QL STRIP.AUTO: ABNORMAL
NITRITE UR QL STRIP: NEGATIVE
PH UR STRIP.AUTO: <=5 [PH] (ref 5–8)
PROT UR QL STRIP: ABNORMAL
RBC # UR: ABNORMAL /HPF
REF LAB TEST METHOD: ABNORMAL
SP GR UR STRIP: 1.02 (ref 1–1.03)
SQUAMOUS #/AREA URNS HPF: ABNORMAL /HPF
UROBILINOGEN UR QL STRIP: ABNORMAL
WBC UR QL AUTO: ABNORMAL /HPF
YEAST URNS QL MICRO: ABNORMAL /HPF

## 2017-12-05 PROCEDURE — 87086 URINE CULTURE/COLONY COUNT: CPT | Performed by: FAMILY MEDICINE

## 2017-12-05 PROCEDURE — 81001 URINALYSIS AUTO W/SCOPE: CPT | Performed by: FAMILY MEDICINE

## 2017-12-07 LAB — BACTERIA SPEC AEROBE CULT: NORMAL

## 2017-12-15 ENCOUNTER — OFFICE VISIT (OUTPATIENT)
Dept: PULMONOLOGY | Facility: CLINIC | Age: 67
End: 2017-12-15

## 2017-12-15 VITALS
DIASTOLIC BLOOD PRESSURE: 58 MMHG | TEMPERATURE: 98.2 F | HEIGHT: 60 IN | BODY MASS INDEX: 24.62 KG/M2 | SYSTOLIC BLOOD PRESSURE: 104 MMHG | HEART RATE: 66 BPM | WEIGHT: 125.4 LBS | OXYGEN SATURATION: 90 %

## 2017-12-15 DIAGNOSIS — J18.9 PNEUMONIA DUE TO INFECTIOUS ORGANISM, UNSPECIFIED LATERALITY, UNSPECIFIED PART OF LUNG: ICD-10-CM

## 2017-12-15 DIAGNOSIS — R06.02 SHORTNESS OF BREATH: Primary | ICD-10-CM

## 2017-12-15 DIAGNOSIS — G47.33 OSA (OBSTRUCTIVE SLEEP APNEA): ICD-10-CM

## 2017-12-15 DIAGNOSIS — Z72.0 TOBACCO USE: ICD-10-CM

## 2017-12-15 PROCEDURE — 99214 OFFICE O/P EST MOD 30 MIN: CPT | Performed by: NURSE PRACTITIONER

## 2017-12-15 PROCEDURE — 99407 BEHAV CHNG SMOKING > 10 MIN: CPT | Performed by: NURSE PRACTITIONER

## 2017-12-15 NOTE — PROGRESS NOTES
Subjective    Sara Cardona presents for the following hospital f/u  .    History of Present Illness     Interval history since last visit:    Mrs. Cardona is a 67 year old female who pulmonary initially evaluated in the hospital for her respiratory failure which required mechanical ventilation. She is a long time smoker and continues to smoke. She is accompanied by her . While in the hospital she developed a pneumothorax as the result of a thoracentesis, it resolved quickly without any major complications, her breathing is at baseline today.     Recent hospitalizations:    Investigations (imaging, PFT's, labs, sleep study, record requests, etc.)    Have you had the Influenza Vaccine? no  Would you like to receive this Vaccine today? no    Have you had the Pneumonia Vaccine?  no  Would you like to receive this Vaccine today? no    Review of Systems   Constitutional: Negative for appetite change, chills, diaphoresis and unexpected weight change.   HENT: Negative for sore throat, trouble swallowing and voice change.    Eyes: Negative for visual disturbance.   Respiratory: Positive for chest tightness and shortness of breath. Negative for apnea, cough, choking and wheezing.    Cardiovascular: Negative for chest pain, palpitations and leg swelling.   Gastrointestinal: Negative for abdominal pain, constipation, diarrhea, nausea and vomiting.   Endocrine: Negative for cold intolerance, heat intolerance, polydipsia, polyphagia and polyuria.   Genitourinary: Negative for difficulty urinating and dysuria.   Musculoskeletal: Negative for gait problem.   Skin: Negative for rash and wound.   Neurological: Negative for syncope and light-headedness.   Hematological: Negative for adenopathy.   Psychiatric/Behavioral: Negative for agitation, behavioral problems and confusion.   All other systems reviewed and are negative.      Active Problems:  Problem List Items Addressed This Visit     Shortness of breath - Primary     Relevant Orders    Pulmonary Function Test    Tobacco use    STEPHANIE (obstructive sleep apnea)      Other Visit Diagnoses     Pneumonia due to infectious organism, unspecified laterality, unspecified part of lung        Relevant Orders    XR Chest 2 View          Past Medical History:  Past Medical History:   Diagnosis Date   • Anxiety    • Aortic stenosis    • ASCVD (arteriosclerotic cardiovascular disease)    • Breast cancer    • CHF (congestive heart failure) 7/24/2017   • Chronic pain    • COPD (chronic obstructive pulmonary disease)    • Diabetic neuropathy    • Dyslipidemia    • Essential hypertension    • Insulin dependent diabetes mellitus    • Left carotid bruit    • Recurrent pneumonia    • Renal failure        Family History:  Family History   Problem Relation Age of Onset   • Diabetes Mother    • Lung cancer Father        Social History:  Social History   Substance Use Topics   • Smoking status: Current Every Day Smoker     Packs/day: 0.25     Types: Cigarettes   • Smokeless tobacco: Never Used   • Alcohol use No       Current Medications:  Current Outpatient Prescriptions   Medication Sig Dispense Refill   • albuterol (PROVENTIL HFA;VENTOLIN HFA) 108 (90 BASE) MCG/ACT inhaler Inhale 2 puffs every 6 (six) hours as needed for wheezing. (Patient taking differently: Inhale 2 puffs Every 6 (Six) Hours.) 3.7 g 0   • amLODIPine (NORVASC) 5 MG tablet Take 1 tablet by mouth Daily. 30 tablet 0   • aspirin 81 MG EC tablet Take 1 tablet by mouth Daily. 30 tablet 0   • budesonide-formoterol (SYMBICORT) 80-4.5 MCG/ACT inhaler Inhale 2 puffs 2 (Two) Times a Day.     • calcitriol (ROCALTROL) 0.25 MCG capsule Take 0.25 mcg by mouth Daily.     • calcium acetate (PHOS BINDER,) 667 MG capsule capsule Take 1 capsule by mouth 3 (Three) Times a Day With Meals. 180 capsule 0   • calcium carbonate (OS-MARIA ELENA) 600 MG tablet Take 1,000 mg by mouth Daily.     • clonazePAM (KlonoPIN) 0.5 MG tablet Take 0.5 mg by mouth 2 (Two) Times a Day.  "    • ferrous sulfate 325 (65 FE) MG tablet Take 325 mg by mouth 2 (Two) Times a Day With Meals.     • gabapentin (NEURONTIN) 100 MG capsule Take 1 capsule by mouth every night at bedtime. 30 capsule 0   • hydrALAZINE (APRESOLINE) 10 MG tablet Take 1 tablet by mouth Every 8 (Eight) Hours. 90 tablet 0   • insulin glargine (LANTUS) 100 UNIT/ML injection Inject 5 Units under the skin Every Night. Patient spouse says she was given 20 units on 11/1 due to her higher than normal glucose reading     • metoprolol tartrate (LOPRESSOR) 50 MG tablet Take 1 tablet by mouth Every 12 (Twelve) Hours. 60 tablet 0     No current facility-administered medications for this visit.        Allergies:  No Known Allergies    Vitals:  /58 (BP Location: Left arm, Patient Position: Sitting)  Pulse 66  Temp 98.2 °F (36.8 °C)  Ht 152.4 cm (60\")  Wt 56.9 kg (125 lb 6.4 oz)  SpO2 90%  BMI 24.49 kg/m2    Imaging:    Imaging Results (most recent)     None          Pulmonary Functions Testing Results:    No results found for: FEV1, FVC, YNY5ALU, TLC, DLCO    Results for orders placed or performed in visit on 12/05/17   Urine Culture   Result Value Ref Range    Urine Culture >100,000 CFU/mL Normal Urogenital Andreina    Urinalysis With / Culture If Indicated   Result Value Ref Range    Color, UA Yellow Yellow, Straw    Appearance, UA Turbid (A) Clear    pH, UA <=5.0 5.0 - 8.0    Specific Gravity, UA 1.022 1.005 - 1.030    Glucose,  mg/dL (1+) (A) Negative    Ketones, UA Negative Negative    Bilirubin, UA Negative Negative    Blood, UA Small (1+) (A) Negative    Protein, UA >=300 mg/dL (3+) (A) Negative    Leuk Esterase, UA Large (3+) (A) Negative    Nitrite, UA Negative Negative    Urobilinogen, UA 0.2 E.U./dL 0.2 - 1.0 E.U./dL   Urinalysis, Microscopic Only   Result Value Ref Range    RBC, UA 3-6 (A) None Seen, 0-2 /HPF    WBC, UA Too Numerous to Count (A) None Seen, 0-2 /HPF    Bacteria, UA Trace (A) None Seen /HPF    Squamous " "Epithelial Cells, UA 0-2 None Seen, 0-2 /HPF    Yeast, UA Small/1+ Budding Yeast None Seen /HPF    Hyaline Casts, UA None Seen None Seen /LPF    Methodology Automated Microscopy        Objective   Physical Exam   Constitutional: She is oriented to person, place, and time. She appears well-developed and well-nourished.   HENT:   Head: Normocephalic.   Eyes: Pupils are equal, round, and reactive to light.   Neck: Normal range of motion. Neck supple.   Cardiovascular: Normal rate, regular rhythm and normal heart sounds.    Pulmonary/Chest: Effort normal.   Abdominal: Soft.   Musculoskeletal: Normal range of motion.   Neurological: She is alert and oriented to person, place, and time.   Skin: Skin is warm and dry.   Psychiatric: She has a normal mood and affect. Her behavior is normal. Judgment and thought content normal.   Vitals reviewed.      Assessment/Plan      Shortness of breath: likely related to ESRD, long hx of tobacco use, underlying lung disease, COPD, and deconditioning. Will order PFT. Breathing is at baseline today, she is in no distress on exam.    Pneumonia: patient with recent pneumonia during hospital stay, will order follow up chest x ray for 2 weeks, they v/u.      Hypoxia: patient wears home oxygen and is complaint, Sp02 is 90% initially today on evaluation repeated in room while sitting and is 95%. Patient and  educated on the dangers of hypoxia.     ESRD: patient is on Tuesday, Thursday, Saturday HD, managed by nephrology. I have stressed the importance of not skipping dialysis sessions.     STEPHANIE: patient reports she uses her CPAP \"sometimes\" I have encouraged her to use it at least 4-6 hours at night.     Educated patient on the complications associated with untreated sleep apnea -- that it increases risk of MI, stroke, depression, hypertension, heart failure, arrhythmias, coronary artery disease, and potential death due to complication of that mentioned previously.     Also patient was " educated  about the hazards of driving if there sleep deprived and has sleep apnea.  Was instructed not to involving driving or any activity which involves higher level of mental function- like operating a machine.    Tobacco use: patient continues to smoke but has drastically decreased the amount she smokes a day, patient was educated and counseled on the dangers of smoking and urged to quit, >10 minutes spent.     Lung Cancer screening: CT of chest was completed in patient, 11/1/17, no nodules, repeat in 1 year.         ICD-10-CM ICD-9-CM   1. Shortness of breath R06.02 786.05   2. Tobacco use Z72.0 305.1   3. STEPHANIE (obstructive sleep apnea) G47.33 327.23   4. Pneumonia due to infectious organism, unspecified laterality, unspecified part of lung J18.9 136.9     484.8       Return in about 3 months (around 3/15/2018).

## 2018-01-02 ENCOUNTER — HOSPITAL ENCOUNTER (OUTPATIENT)
Dept: GENERAL RADIOLOGY | Facility: HOSPITAL | Age: 68
Discharge: HOME | End: 2018-01-02
Admitting: NURSE PRACTITIONER

## 2018-01-02 DIAGNOSIS — J18.9 PNEUMONIA DUE TO INFECTIOUS ORGANISM, UNSPECIFIED LATERALITY, UNSPECIFIED PART OF LUNG: Primary | ICD-10-CM

## 2018-01-02 DIAGNOSIS — J18.9 PNEUMONIA DUE TO INFECTIOUS ORGANISM, UNSPECIFIED LATERALITY, UNSPECIFIED PART OF LUNG: ICD-10-CM

## 2018-01-02 PROCEDURE — 71046 X-RAY EXAM CHEST 2 VIEWS: CPT | Performed by: RADIOLOGY

## 2018-01-02 PROCEDURE — 71046 X-RAY EXAM CHEST 2 VIEWS: CPT

## 2018-01-02 RX ORDER — AMOXICILLIN AND CLAVULANATE POTASSIUM 875; 125 MG/1; MG/1
1 TABLET, FILM COATED ORAL EVERY 12 HOURS SCHEDULED
Qty: 14 TABLET | Refills: 0 | Status: SHIPPED | OUTPATIENT
Start: 2018-01-02 | End: 2018-01-10

## 2018-01-02 NOTE — PROGRESS NOTES
Spoke with patient, Augmentin PO BID sent to pharmacy for chest xray completed today and was showing some RLL consolidation. She has not had a fever, breathing is at baseline and is overall feeling very well. Will repeat chest xray on Friday due to showing some effusion. I have discussed this with her in detail and to report to the ER or office ASAP if shortness of breath occurs, fever or cough, she v/u.

## 2018-01-10 ENCOUNTER — HOSPITAL ENCOUNTER (OUTPATIENT)
Dept: RESPIRATORY THERAPY | Facility: HOSPITAL | Age: 68
Discharge: HOME | End: 2018-01-10

## 2018-01-11 ENCOUNTER — APPOINTMENT (OUTPATIENT)
Dept: GENERAL RADIOLOGY | Facility: HOSPITAL | Age: 68
End: 2018-01-11

## 2018-01-11 ENCOUNTER — HOSPITAL ENCOUNTER (EMERGENCY)
Facility: HOSPITAL | Age: 68
Discharge: LEFT BEFORE DISCHARGE | End: 2018-01-11

## 2018-01-11 VITALS
SYSTOLIC BLOOD PRESSURE: 123 MMHG | BODY MASS INDEX: 21.9 KG/M2 | DIASTOLIC BLOOD PRESSURE: 75 MMHG | RESPIRATION RATE: 20 BRPM | HEIGHT: 61 IN | WEIGHT: 116 LBS | HEART RATE: 92 BPM | OXYGEN SATURATION: 94 % | TEMPERATURE: 98.2 F

## 2018-01-11 LAB
ALBUMIN SERPL-MCNC: 3.9 G/DL (ref 3.4–4.8)
ALBUMIN/GLOB SERPL: 1.1 G/DL (ref 1.5–2.5)
ALP SERPL-CCNC: 104 U/L (ref 35–104)
ALT SERPL W P-5'-P-CCNC: 12 U/L (ref 10–36)
ANION GAP SERPL CALCULATED.3IONS-SCNC: 8.3 MMOL/L (ref 3.6–11.2)
AST SERPL-CCNC: 21 U/L (ref 10–30)
BASOPHILS # BLD AUTO: 0.03 10*3/MM3 (ref 0–0.3)
BASOPHILS NFR BLD AUTO: 0.3 % (ref 0–2)
BILIRUB SERPL-MCNC: 0.4 MG/DL (ref 0.2–1.8)
BUN BLD-MCNC: 16 MG/DL (ref 7–21)
BUN/CREAT SERPL: 9.5 (ref 7–25)
CALCIUM SPEC-SCNC: 8.6 MG/DL (ref 7.7–10)
CHLORIDE SERPL-SCNC: 97 MMOL/L (ref 99–112)
CO2 SERPL-SCNC: 29.7 MMOL/L (ref 24.3–31.9)
CREAT BLD-MCNC: 1.69 MG/DL (ref 0.43–1.29)
DEPRECATED RDW RBC AUTO: 58.4 FL (ref 37–54)
EOSINOPHIL # BLD AUTO: 0.15 10*3/MM3 (ref 0–0.7)
EOSINOPHIL NFR BLD AUTO: 1.7 % (ref 0–7)
ERYTHROCYTE [DISTWIDTH] IN BLOOD BY AUTOMATED COUNT: 17.4 % (ref 11.5–14.5)
GFR SERPL CREATININE-BSD FRML MDRD: 30 ML/MIN/1.73
GLOBULIN UR ELPH-MCNC: 3.5 GM/DL
GLUCOSE BLD-MCNC: 154 MG/DL (ref 70–110)
HCT VFR BLD AUTO: 37.9 % (ref 37–47)
HGB BLD-MCNC: 11.5 G/DL (ref 12–16)
HOLD SPECIMEN: NORMAL
HOLD SPECIMEN: NORMAL
IMM GRANULOCYTES # BLD: 0.02 10*3/MM3 (ref 0–0.03)
IMM GRANULOCYTES NFR BLD: 0.2 % (ref 0–0.5)
LYMPHOCYTES # BLD AUTO: 1.82 10*3/MM3 (ref 1–3)
LYMPHOCYTES NFR BLD AUTO: 21 % (ref 16–46)
MCH RBC QN AUTO: 28 PG (ref 27–33)
MCHC RBC AUTO-ENTMCNC: 30.3 G/DL (ref 33–37)
MCV RBC AUTO: 92.2 FL (ref 80–94)
MONOCYTES # BLD AUTO: 0.59 10*3/MM3 (ref 0.1–0.9)
MONOCYTES NFR BLD AUTO: 6.8 % (ref 0–12)
NEUTROPHILS # BLD AUTO: 6.04 10*3/MM3 (ref 1.4–6.5)
NEUTROPHILS NFR BLD AUTO: 70 % (ref 40–75)
OSMOLALITY SERPL CALC.SUM OF ELEC: 274.4 MOSM/KG (ref 273–305)
PLATELET # BLD AUTO: 238 10*3/MM3 (ref 130–400)
PMV BLD AUTO: 10.6 FL (ref 6–10)
POTASSIUM BLD-SCNC: 3.2 MMOL/L (ref 3.5–5.3)
PROT SERPL-MCNC: 7.4 G/DL (ref 6–8)
RBC # BLD AUTO: 4.11 10*6/MM3 (ref 4.2–5.4)
SODIUM BLD-SCNC: 135 MMOL/L (ref 135–153)
TROPONIN I SERPL-MCNC: 0.04 NG/ML
WBC NRBC COR # BLD: 8.65 10*3/MM3 (ref 4.5–12.5)
WHOLE BLOOD HOLD SPECIMEN: NORMAL
WHOLE BLOOD HOLD SPECIMEN: NORMAL

## 2018-01-11 PROCEDURE — 71046 X-RAY EXAM CHEST 2 VIEWS: CPT | Performed by: RADIOLOGY

## 2018-01-11 PROCEDURE — 93005 ELECTROCARDIOGRAM TRACING: CPT | Performed by: EMERGENCY MEDICINE

## 2018-01-11 PROCEDURE — 80053 COMPREHEN METABOLIC PANEL: CPT | Performed by: EMERGENCY MEDICINE

## 2018-01-11 PROCEDURE — 71046 X-RAY EXAM CHEST 2 VIEWS: CPT

## 2018-01-11 PROCEDURE — 84484 ASSAY OF TROPONIN QUANT: CPT | Performed by: EMERGENCY MEDICINE

## 2018-01-11 PROCEDURE — 99211 OFF/OP EST MAY X REQ PHY/QHP: CPT

## 2018-01-11 PROCEDURE — 93010 ELECTROCARDIOGRAM REPORT: CPT | Performed by: INTERNAL MEDICINE

## 2018-01-11 PROCEDURE — 85025 COMPLETE CBC W/AUTO DIFF WBC: CPT | Performed by: EMERGENCY MEDICINE

## 2018-01-12 ENCOUNTER — APPOINTMENT (OUTPATIENT)
Dept: GENERAL RADIOLOGY | Facility: HOSPITAL | Age: 68
End: 2018-01-12

## 2018-01-12 ENCOUNTER — HOSPITAL ENCOUNTER (OUTPATIENT)
Facility: HOSPITAL | Age: 68
Setting detail: OBSERVATION
Discharge: HOME | End: 2018-01-14
Attending: EMERGENCY MEDICINE | Admitting: INTERNAL MEDICINE

## 2018-01-12 DIAGNOSIS — R07.9 CHEST PAIN AT REST: Primary | ICD-10-CM

## 2018-01-12 LAB
ALBUMIN SERPL-MCNC: 3.8 G/DL (ref 3.4–4.8)
ALBUMIN/GLOB SERPL: 1 G/DL (ref 1.5–2.5)
ALP SERPL-CCNC: 111 U/L (ref 35–104)
ALT SERPL W P-5'-P-CCNC: 14 U/L (ref 10–36)
ANION GAP SERPL CALCULATED.3IONS-SCNC: 11 MMOL/L (ref 3.6–11.2)
AST SERPL-CCNC: 23 U/L (ref 10–30)
BASOPHILS # BLD AUTO: 0.05 10*3/MM3 (ref 0–0.3)
BASOPHILS NFR BLD AUTO: 0.6 % (ref 0–2)
BILIRUB SERPL-MCNC: 0.3 MG/DL (ref 0.2–1.8)
BNP SERPL-MCNC: 2101 PG/ML (ref 0–100)
BUN BLD-MCNC: 29 MG/DL (ref 7–21)
BUN/CREAT SERPL: 8.9 (ref 7–25)
CALCIUM SPEC-SCNC: 8.8 MG/DL (ref 7.7–10)
CHLORIDE SERPL-SCNC: 98 MMOL/L (ref 99–112)
CK MB SERPL-CCNC: 2.23 NG/ML (ref 0–5)
CK MB SERPL-CCNC: 2.26 NG/ML (ref 0–5)
CK MB SERPL-RTO: 6.3 % (ref 0–3)
CK MB SERPL-RTO: 6.6 % (ref 0–3)
CK SERPL-CCNC: 34 U/L (ref 24–173)
CK SERPL-CCNC: 36 U/L (ref 24–173)
CO2 SERPL-SCNC: 23 MMOL/L (ref 24.3–31.9)
CREAT BLD-MCNC: 3.25 MG/DL (ref 0.43–1.29)
CRP SERPL-MCNC: 0.96 MG/DL (ref 0–0.99)
DEPRECATED RDW RBC AUTO: 60.4 FL (ref 37–54)
EOSINOPHIL # BLD AUTO: 0.1 10*3/MM3 (ref 0–0.7)
EOSINOPHIL NFR BLD AUTO: 1.1 % (ref 0–7)
ERYTHROCYTE [DISTWIDTH] IN BLOOD BY AUTOMATED COUNT: 17.8 % (ref 11.5–14.5)
GFR SERPL CREATININE-BSD FRML MDRD: 14 ML/MIN/1.73
GLOBULIN UR ELPH-MCNC: 3.9 GM/DL
GLUCOSE BLD-MCNC: 335 MG/DL (ref 70–110)
GLUCOSE BLDC GLUCOMTR-MCNC: 133 MG/DL (ref 70–130)
HAV IGM SERPL QL IA: NORMAL
HBA1C MFR BLD: 7.2 % (ref 4.5–5.7)
HBV CORE IGM SERPL QL IA: NORMAL
HBV SURFACE AG SERPL QL IA: NORMAL
HCT VFR BLD AUTO: 41.1 % (ref 37–47)
HCV AB SER DONR QL: NORMAL
HGB BLD-MCNC: 12.2 G/DL (ref 12–16)
HOLD SPECIMEN: NORMAL
HOLD SPECIMEN: NORMAL
IMM GRANULOCYTES # BLD: 0.03 10*3/MM3 (ref 0–0.03)
IMM GRANULOCYTES NFR BLD: 0.3 % (ref 0–0.5)
INR PPP: 1.08 (ref 0.9–1.1)
LYMPHOCYTES # BLD AUTO: 1.12 10*3/MM3 (ref 1–3)
LYMPHOCYTES NFR BLD AUTO: 12.4 % (ref 16–46)
MCH RBC QN AUTO: 28.8 PG (ref 27–33)
MCHC RBC AUTO-ENTMCNC: 29.7 G/DL (ref 33–37)
MCV RBC AUTO: 97.2 FL (ref 80–94)
MONOCYTES # BLD AUTO: 0.5 10*3/MM3 (ref 0.1–0.9)
MONOCYTES NFR BLD AUTO: 5.5 % (ref 0–12)
NEUTROPHILS # BLD AUTO: 7.26 10*3/MM3 (ref 1.4–6.5)
NEUTROPHILS NFR BLD AUTO: 80.1 % (ref 40–75)
OSMOLALITY SERPL CALC.SUM OF ELEC: 283.5 MOSM/KG (ref 273–305)
PLATELET # BLD AUTO: 159 10*3/MM3 (ref 130–400)
PMV BLD AUTO: 10.5 FL (ref 6–10)
POTASSIUM BLD-SCNC: 3.8 MMOL/L (ref 3.5–5.3)
PROT SERPL-MCNC: 7.7 G/DL (ref 6–8)
PROTHROMBIN TIME: 14.1 SECONDS (ref 11–15.4)
RBC # BLD AUTO: 4.23 10*6/MM3 (ref 4.2–5.4)
SODIUM BLD-SCNC: 132 MMOL/L (ref 135–153)
TROPONIN I SERPL-MCNC: 0.04 NG/ML
TROPONIN I SERPL-MCNC: 0.06 NG/ML
TROPONIN I SERPL-MCNC: 0.06 NG/ML
TSH SERPL DL<=0.05 MIU/L-ACNC: 1.1 MIU/ML (ref 0.55–4.78)
WBC NRBC COR # BLD: 9.06 10*3/MM3 (ref 4.5–12.5)
WHOLE BLOOD HOLD SPECIMEN: NORMAL
WHOLE BLOOD HOLD SPECIMEN: NORMAL

## 2018-01-12 PROCEDURE — 80074 ACUTE HEPATITIS PANEL: CPT | Performed by: INTERNAL MEDICINE

## 2018-01-12 PROCEDURE — 84443 ASSAY THYROID STIM HORMONE: CPT | Performed by: PHYSICIAN ASSISTANT

## 2018-01-12 PROCEDURE — 93010 ELECTROCARDIOGRAM REPORT: CPT | Performed by: INTERNAL MEDICINE

## 2018-01-12 PROCEDURE — 71045 X-RAY EXAM CHEST 1 VIEW: CPT

## 2018-01-12 PROCEDURE — 84484 ASSAY OF TROPONIN QUANT: CPT | Performed by: PHYSICIAN ASSISTANT

## 2018-01-12 PROCEDURE — 94640 AIRWAY INHALATION TREATMENT: CPT

## 2018-01-12 PROCEDURE — 83880 ASSAY OF NATRIURETIC PEPTIDE: CPT | Performed by: HOSPITALIST

## 2018-01-12 PROCEDURE — 82962 GLUCOSE BLOOD TEST: CPT

## 2018-01-12 PROCEDURE — 63710000001 INSULIN DETEMIR PER 5 UNITS: Performed by: HOSPITALIST

## 2018-01-12 PROCEDURE — 85025 COMPLETE CBC W/AUTO DIFF WBC: CPT | Performed by: PHYSICIAN ASSISTANT

## 2018-01-12 PROCEDURE — 83036 HEMOGLOBIN GLYCOSYLATED A1C: CPT | Performed by: PHYSICIAN ASSISTANT

## 2018-01-12 PROCEDURE — 86140 C-REACTIVE PROTEIN: CPT | Performed by: HOSPITALIST

## 2018-01-12 PROCEDURE — G0378 HOSPITAL OBSERVATION PER HR: HCPCS

## 2018-01-12 PROCEDURE — 99214 OFFICE O/P EST MOD 30 MIN: CPT | Performed by: INTERNAL MEDICINE

## 2018-01-12 PROCEDURE — 82553 CREATINE MB FRACTION: CPT | Performed by: PHYSICIAN ASSISTANT

## 2018-01-12 PROCEDURE — 71045 X-RAY EXAM CHEST 1 VIEW: CPT | Performed by: RADIOLOGY

## 2018-01-12 PROCEDURE — 82550 ASSAY OF CK (CPK): CPT | Performed by: PHYSICIAN ASSISTANT

## 2018-01-12 PROCEDURE — 80053 COMPREHEN METABOLIC PANEL: CPT | Performed by: PHYSICIAN ASSISTANT

## 2018-01-12 PROCEDURE — 99220 PR INITIAL OBSERVATION CARE/DAY 70 MINUTES: CPT | Performed by: HOSPITALIST

## 2018-01-12 PROCEDURE — 96372 THER/PROPH/DIAG INJ SC/IM: CPT

## 2018-01-12 PROCEDURE — 94799 UNLISTED PULMONARY SVC/PX: CPT

## 2018-01-12 PROCEDURE — 85610 PROTHROMBIN TIME: CPT | Performed by: PHYSICIAN ASSISTANT

## 2018-01-12 PROCEDURE — 93005 ELECTROCARDIOGRAM TRACING: CPT | Performed by: PHYSICIAN ASSISTANT

## 2018-01-12 PROCEDURE — 25010000002 HEPARIN (PORCINE) PER 1000 UNITS: Performed by: PHYSICIAN ASSISTANT

## 2018-01-12 PROCEDURE — 99284 EMERGENCY DEPT VISIT MOD MDM: CPT

## 2018-01-12 RX ORDER — NITROGLYCERIN 0.4 MG/1
0.4 TABLET SUBLINGUAL
Status: DISCONTINUED | OUTPATIENT
Start: 2018-01-12 | End: 2018-01-14 | Stop reason: HOSPADM

## 2018-01-12 RX ORDER — GABAPENTIN 100 MG/1
100 CAPSULE ORAL NIGHTLY
Status: DISCONTINUED | OUTPATIENT
Start: 2018-01-12 | End: 2018-01-14 | Stop reason: HOSPADM

## 2018-01-12 RX ORDER — CALCITRIOL 0.25 UG/1
0.25 CAPSULE, LIQUID FILLED ORAL DAILY
Status: DISCONTINUED | OUTPATIENT
Start: 2018-01-12 | End: 2018-01-14 | Stop reason: HOSPADM

## 2018-01-12 RX ORDER — CLONAZEPAM 0.5 MG/1
0.25 TABLET ORAL 2 TIMES DAILY PRN
Status: DISCONTINUED | OUTPATIENT
Start: 2018-01-12 | End: 2018-01-14 | Stop reason: HOSPADM

## 2018-01-12 RX ORDER — CALCIUM ACETATE 667 MG/1
667 CAPSULE ORAL
Status: DISCONTINUED | OUTPATIENT
Start: 2018-01-13 | End: 2018-01-14 | Stop reason: HOSPADM

## 2018-01-12 RX ORDER — CLOPIDOGREL BISULFATE 75 MG/1
75 TABLET ORAL DAILY
Status: DISCONTINUED | OUTPATIENT
Start: 2018-01-12 | End: 2018-01-14 | Stop reason: HOSPADM

## 2018-01-12 RX ORDER — CALCIUM ACETATE 667 MG/1
667 CAPSULE ORAL
Status: CANCELLED | OUTPATIENT
Start: 2018-01-12

## 2018-01-12 RX ORDER — AMLODIPINE BESYLATE 5 MG/1
5 TABLET ORAL
Status: CANCELLED | OUTPATIENT
Start: 2018-01-12

## 2018-01-12 RX ORDER — ALBUTEROL SULFATE 2.5 MG/3ML
2.5 SOLUTION RESPIRATORY (INHALATION) EVERY 6 HOURS PRN
Status: CANCELLED | OUTPATIENT
Start: 2018-01-12

## 2018-01-12 RX ORDER — HEPARIN SODIUM 5000 [USP'U]/ML
5000 INJECTION, SOLUTION INTRAVENOUS; SUBCUTANEOUS EVERY 12 HOURS SCHEDULED
Status: DISCONTINUED | OUTPATIENT
Start: 2018-01-12 | End: 2018-01-14 | Stop reason: HOSPADM

## 2018-01-12 RX ORDER — ASPIRIN 325 MG
325 TABLET ORAL ONCE
Status: COMPLETED | OUTPATIENT
Start: 2018-01-12 | End: 2018-01-12

## 2018-01-12 RX ORDER — CALCITRIOL 0.25 UG/1
0.25 CAPSULE, LIQUID FILLED ORAL DAILY
Status: CANCELLED | OUTPATIENT
Start: 2018-01-12

## 2018-01-12 RX ORDER — ATORVASTATIN CALCIUM 40 MG/1
40 TABLET, FILM COATED ORAL NIGHTLY
Status: DISCONTINUED | OUTPATIENT
Start: 2018-01-12 | End: 2018-01-14 | Stop reason: HOSPADM

## 2018-01-12 RX ORDER — FERROUS SULFATE 325(65) MG
325 TABLET ORAL 2 TIMES DAILY WITH MEALS
Status: DISCONTINUED | OUTPATIENT
Start: 2018-01-12 | End: 2018-01-12

## 2018-01-12 RX ORDER — SODIUM CHLORIDE 0.9 % (FLUSH) 0.9 %
10 SYRINGE (ML) INJECTION AS NEEDED
Status: DISCONTINUED | OUTPATIENT
Start: 2018-01-12 | End: 2018-01-14 | Stop reason: HOSPADM

## 2018-01-12 RX ORDER — AMLODIPINE BESYLATE 5 MG/1
5 TABLET ORAL
Status: DISCONTINUED | OUTPATIENT
Start: 2018-01-12 | End: 2018-01-13

## 2018-01-12 RX ORDER — DEXTROSE MONOHYDRATE 25 G/50ML
25 INJECTION, SOLUTION INTRAVENOUS
Status: DISCONTINUED | OUTPATIENT
Start: 2018-01-12 | End: 2018-01-14 | Stop reason: HOSPADM

## 2018-01-12 RX ORDER — HYDRALAZINE HYDROCHLORIDE 10 MG/1
10 TABLET, FILM COATED ORAL EVERY 8 HOURS SCHEDULED
Status: DISCONTINUED | OUTPATIENT
Start: 2018-01-12 | End: 2018-01-14 | Stop reason: HOSPADM

## 2018-01-12 RX ORDER — NITROGLYCERIN 0.4 MG/1
0.4 TABLET SUBLINGUAL
Status: ON HOLD | COMMUNITY
End: 2018-01-14

## 2018-01-12 RX ORDER — FERROUS SULFATE 325(65) MG
325 TABLET ORAL 2 TIMES DAILY WITH MEALS
Status: CANCELLED | OUTPATIENT
Start: 2018-01-12

## 2018-01-12 RX ORDER — NITROGLYCERIN 0.4 MG/1
0.4 TABLET SUBLINGUAL
Status: DISCONTINUED | OUTPATIENT
Start: 2018-01-12 | End: 2018-01-12

## 2018-01-12 RX ORDER — CALCIUM CARBONATE 500(1250)
1000 TABLET ORAL DAILY
Status: CANCELLED | OUTPATIENT
Start: 2018-01-12

## 2018-01-12 RX ORDER — BUDESONIDE AND FORMOTEROL FUMARATE DIHYDRATE 80; 4.5 UG/1; UG/1
2 AEROSOL RESPIRATORY (INHALATION)
Status: CANCELLED | OUTPATIENT
Start: 2018-01-12

## 2018-01-12 RX ORDER — NITROGLYCERIN 0.4 MG/1
0.4 TABLET SUBLINGUAL
Status: CANCELLED | OUTPATIENT
Start: 2018-01-12

## 2018-01-12 RX ORDER — BUDESONIDE AND FORMOTEROL FUMARATE DIHYDRATE 80; 4.5 UG/1; UG/1
2 AEROSOL RESPIRATORY (INHALATION)
Status: DISCONTINUED | OUTPATIENT
Start: 2018-01-12 | End: 2018-01-14 | Stop reason: HOSPADM

## 2018-01-12 RX ORDER — METOPROLOL TARTRATE 50 MG/1
50 TABLET, FILM COATED ORAL EVERY 12 HOURS SCHEDULED
Status: DISCONTINUED | OUTPATIENT
Start: 2018-01-12 | End: 2018-01-14

## 2018-01-12 RX ORDER — GABAPENTIN 100 MG/1
100 CAPSULE ORAL NIGHTLY
Status: CANCELLED | OUTPATIENT
Start: 2018-01-12

## 2018-01-12 RX ORDER — ASPIRIN 81 MG/1
81 TABLET ORAL DAILY
Status: CANCELLED | OUTPATIENT
Start: 2018-01-12

## 2018-01-12 RX ORDER — IPRATROPIUM BROMIDE AND ALBUTEROL SULFATE 2.5; .5 MG/3ML; MG/3ML
3 SOLUTION RESPIRATORY (INHALATION)
Status: DISCONTINUED | OUTPATIENT
Start: 2018-01-12 | End: 2018-01-12

## 2018-01-12 RX ORDER — NICOTINE POLACRILEX 4 MG
15 LOZENGE BUCCAL
Status: DISCONTINUED | OUTPATIENT
Start: 2018-01-12 | End: 2018-01-14 | Stop reason: HOSPADM

## 2018-01-12 RX ORDER — ASPIRIN 81 MG/1
81 TABLET ORAL DAILY
Status: DISCONTINUED | OUTPATIENT
Start: 2018-01-13 | End: 2018-01-14 | Stop reason: HOSPADM

## 2018-01-12 RX ORDER — IPRATROPIUM BROMIDE AND ALBUTEROL SULFATE 2.5; .5 MG/3ML; MG/3ML
3 SOLUTION RESPIRATORY (INHALATION) EVERY 6 HOURS PRN
Status: DISCONTINUED | OUTPATIENT
Start: 2018-01-12 | End: 2018-01-14 | Stop reason: HOSPADM

## 2018-01-12 RX ORDER — METOPROLOL TARTRATE 50 MG/1
50 TABLET, FILM COATED ORAL EVERY 12 HOURS SCHEDULED
Status: CANCELLED | OUTPATIENT
Start: 2018-01-12

## 2018-01-12 RX ORDER — SODIUM CHLORIDE 0.9 % (FLUSH) 0.9 %
1-10 SYRINGE (ML) INJECTION AS NEEDED
Status: DISCONTINUED | OUTPATIENT
Start: 2018-01-12 | End: 2018-01-14 | Stop reason: HOSPADM

## 2018-01-12 RX ORDER — HYDRALAZINE HYDROCHLORIDE 10 MG/1
10 TABLET, FILM COATED ORAL EVERY 8 HOURS SCHEDULED
Status: CANCELLED | OUTPATIENT
Start: 2018-01-12

## 2018-01-12 RX ORDER — CALCIUM CARBONATE 500(1250)
500 TABLET ORAL 2 TIMES DAILY
Status: DISCONTINUED | OUTPATIENT
Start: 2018-01-12 | End: 2018-01-14

## 2018-01-12 RX ORDER — CLONAZEPAM 0.5 MG/1
0.5 TABLET ORAL EVERY 12 HOURS SCHEDULED
Status: CANCELLED | OUTPATIENT
Start: 2018-01-12

## 2018-01-12 RX ADMIN — AMLODIPINE BESYLATE 5 MG: 5 TABLET ORAL at 22:02

## 2018-01-12 RX ADMIN — HEPARIN SODIUM 5000 UNITS: 5000 INJECTION, SOLUTION INTRAVENOUS; SUBCUTANEOUS at 20:07

## 2018-01-12 RX ADMIN — CLOPIDOGREL BISULFATE 75 MG: 75 TABLET, FILM COATED ORAL at 22:03

## 2018-01-12 RX ADMIN — METOPROLOL TARTRATE 50 MG: 50 TABLET, FILM COATED ORAL at 22:02

## 2018-01-12 RX ADMIN — GABAPENTIN 100 MG: 100 CAPSULE ORAL at 20:12

## 2018-01-12 RX ADMIN — NITROGLYCERIN 1 INCH: 20 OINTMENT TOPICAL at 13:02

## 2018-01-12 RX ADMIN — INSULIN DETEMIR 10 UNITS: 100 INJECTION, SOLUTION SUBCUTANEOUS at 22:03

## 2018-01-12 RX ADMIN — CLONAZEPAM 0.25 MG: 0.5 TABLET ORAL at 20:12

## 2018-01-12 RX ADMIN — HYDRALAZINE HYDROCHLORIDE 10 MG: 10 TABLET ORAL at 22:03

## 2018-01-12 RX ADMIN — CALCITRIOL 0.25 MCG: 0.25 CAPSULE, LIQUID FILLED ORAL at 22:03

## 2018-01-12 RX ADMIN — NITROGLYCERIN 0.4 MG: 0.4 TABLET SUBLINGUAL at 12:46

## 2018-01-12 RX ADMIN — BUDESONIDE AND FORMOTEROL FUMARATE DIHYDRATE 2 PUFF: 80; 4.5 AEROSOL RESPIRATORY (INHALATION) at 22:32

## 2018-01-12 RX ADMIN — ASPIRIN 325 MG: 325 TABLET ORAL at 12:37

## 2018-01-12 RX ADMIN — NITROGLYCERIN 0.4 MG: 0.4 TABLET SUBLINGUAL at 12:39

## 2018-01-12 RX ADMIN — IPRATROPIUM BROMIDE AND ALBUTEROL SULFATE 3 ML: .5; 3 SOLUTION RESPIRATORY (INHALATION) at 18:29

## 2018-01-12 RX ADMIN — ATORVASTATIN CALCIUM 40 MG: 40 TABLET, FILM COATED ORAL at 20:07

## 2018-01-12 RX ADMIN — Medication 500 MG: at 22:04

## 2018-01-12 RX ADMIN — NITROGLYCERIN 1 INCH: 20 OINTMENT TOPICAL at 18:31

## 2018-01-12 NOTE — H&P
Patient Identification:  Name:  Sara Cardona  Age:  67 y.o.  Sex:  female  :  1950  MRN:  [id number]   Visit Number:  [id number]  Primary Care Physician:  Marcelino Sheehan MD    I have seen the patient in conjunction with Norma Sims PA-C and I agree with the following statements:     Chief complaint: Chest pain     History of presenting illness: Patient is a 67 y.o. female well known to this facility with history of CAD, COPD, ESRD on hemodialysis (,, Sat), DM2-ID, ASCVD, hypertension, and hyperlipidemia. She wears 2 liters of oxygen via NC at home.   She reports she has been having right-sided chest pain that is relieved by nitroglycerin.  She reports it radiates to the back and into her bilateral arms.  She states that she has had many episodes of chest pain, but they usually do not radiate in this nature. She reports that she first started getting the chest pain during dialysis earlier in the week. It resolved after a day or so but recurred during dialysis yesterday. She came to the ED afterwards yesterday but left AMA because she could not be seen soon enough. However, her pain persisted overnight and today so she came back today to be further evaluated. She reports she has recently been treated for pneumonia by her pulmonologist Dr. Martin.  She apparently has home CPAP that she does not use per review of pulmonology note.  Patient states she has stopped smoking.  Her  states she has not stopped smoking.  She then states she may smokes one or two cigarettes a day. In any event, at the time of evaluation, she reports she is chest pain free. Her pain was relieved by nitroglycerin in the ED. She reports she was told at dialysis that her chest pain may be caused from removing too much fluid at times.  She has been admitted for further evaluation and treatment.      Oft note, her last cardiac catheterization was in  at this facility performed by Dr. Parham.  She was felt at that  time to have native 3-vessel coronary artery disease and medical management was recommended.  It was mentioned that if she continued to have anginal symtpoms, it would not be unreseaonable to consider percutaneous revascularization of her LAD and circumflex arteries.     Last echocardiogram:     Results for orders placed during the hospital encounter of 11/01/17   Adult Transthoracic Echo Limited W/ Cont if Necessary Per Protocol    Narrative · The study is technically adequate for diagnosis.  · Left ventricular systolic function is normal. Estimated EF = 65%.  · There are no significant valvular abnormalities noted.  · There is no evidence of pericardial effusion.          ---------------------------------------------------------------------------------------------------------------------   Review of Systems   Constitutional: Negative for chills, diaphoresis and fever.   HENT: Negative for congestion and drooling.    Eyes: Negative for pain and discharge.   Respiratory: Positive for shortness of breath. Negative for cough and wheezing.    Cardiovascular: Positive for chest pain and leg swelling. Negative for palpitations.   Gastrointestinal: Positive for nausea. Negative for abdominal distention and abdominal pain.   Endocrine: Negative for cold intolerance and heat intolerance.   Genitourinary: Negative for difficulty urinating, dysuria, hematuria and pelvic pain.   Musculoskeletal: Negative for arthralgias, gait problem, myalgias and neck pain.   Skin: Positive for wound (left anterior shin, chronic). Negative for color change, pallor and rash.   Allergic/Immunologic: Negative for environmental allergies and food allergies.   Neurological: Positive for numbness (chronic neuropathy b/l lower ext). Negative for dizziness, tremors, seizures, syncope, weakness, light-headedness and headaches.   Hematological: Negative for adenopathy. Does not bruise/bleed easily.   Psychiatric/Behavioral: Negative for agitation,  behavioral problems and confusion.        Chronic anxiety      ---------------------------------------------------------------------------------------------------------------------   Past Medical History:   Diagnosis Date   • Anxiety    • Aortic stenosis    • ASCVD (arteriosclerotic cardiovascular disease)    • Breast cancer    • CHF (congestive heart failure) 7/24/2017   • Chronic pain    • COPD (chronic obstructive pulmonary disease)    • Diabetic neuropathy    • Dyslipidemia    • Essential hypertension    • Insulin dependent diabetes mellitus    • Left carotid bruit    • Recurrent pneumonia    • Renal failure      Past Surgical History:   Procedure Laterality Date   • APPENDECTOMY     • CARDIAC CATHETERIZATION     • COLONOSCOPY     • COLONOSCOPY N/A 7/10/2017    Procedure: COLONOSCOPY;  Surgeon: Zheng Suarez III, MD;  Location: [facility];  Service:    • ENDOSCOPY N/A 7/10/2017    Procedure: ESOPHAGOGASTRODUODENOSCOPY;  Surgeon: Zheng Suarez III, MD;  Location: [facility];  Service:    • HYSTERECTOMY     • MASTECTOMY Right    • RHINOPLASTY       Family History   Problem Relation Age of Onset   • Diabetes Mother    • Lung cancer Father      Social History     Social History   • Marital status:      Spouse name: N/A   • Number of children: N/A   • Years of education: N/A     Social History Main Topics   • Smoking status: Current Every Day Smoker     Packs/day: 0.25     Types: Cigarettes   • Smokeless tobacco: Never Used   • Alcohol use No   • Drug use: No   • Sexual activity: Defer     Other Topics Concern   • None     Social History Narrative     ---------------------------------------------------------------------------------------------------------------------   Allergies:  Review of patient's allergies indicates no known allergies.  ---------------------------------------------------------------------------------------------------------------------   Prior to Admission Medications      Prescriptions Last Dose Informant Patient Reported? Taking?    albuterol (PROVENTIL HFA;VENTOLIN HFA) 108 (90 BASE) MCG/ACT inhaler  Spouse/Significant Other No No    Inhale 2 puffs every 6 (six) hours as needed for wheezing.    Patient taking differently:  Inhale 2 puffs Every 6 (Six) Hours.    amLODIPine (NORVASC) 5 MG tablet   No No    Take 1 tablet by mouth Daily.    aspirin 81 MG EC tablet   No No    Take 1 tablet by mouth Daily.    budesonide-formoterol (SYMBICORT) 80-4.5 MCG/ACT inhaler  Spouse/Significant Other Yes No    Inhale 2 puffs 2 (Two) Times a Day.    calcitriol (ROCALTROL) 0.25 MCG capsule  Spouse/Significant Other Yes No    Take 0.25 mcg by mouth Daily.    calcium acetate (PHOS BINDER,) 667 MG capsule capsule   No No    Take 1 capsule by mouth 3 (Three) Times a Day With Meals.    calcium carbonate (OS-MARIA ELENA) 600 MG tablet   Yes No    Take 1,000 mg by mouth Daily.    clonazePAM (KlonoPIN) 0.5 MG tablet  Spouse/Significant Other Yes No    Take 0.5 mg by mouth 2 (Two) Times a Day.    ferrous sulfate 325 (65 FE) MG tablet  Spouse/Significant Other Yes No    Take 325 mg by mouth 2 (Two) Times a Day With Meals.    gabapentin (NEURONTIN) 100 MG capsule  Spouse/Significant Other No No    Take 1 capsule by mouth every night at bedtime.    hydrALAZINE (APRESOLINE) 10 MG tablet   No No    Take 1 tablet by mouth Every 8 (Eight) Hours.    insulin glargine (LANTUS) 100 UNIT/ML injection  Spouse/Significant Other Yes No    Inject 5 Units under the skin Every Night. Patient spouse says she was given 20 units on 11/1 due to her higher than normal glucose reading    metoprolol tartrate (LOPRESSOR) 50 MG tablet   No No    Take 1 tablet by mouth Every 12 (Twelve) Hours.        Hospital Scheduled Meds:    [START ON 1/13/2018] aspirin 81 mg Oral Daily   atorvastatin 40 mg Oral Nightly   heparin (porcine) 5,000 Units Subcutaneous Q12H   insulin aspart 0-9 Units Subcutaneous 4x Daily AC & at Bedtime   ipratropium-albuterol  3 mL Nebulization 4x Daily - RT   nitroglycerin 1 inch Topical Q6H        ---------------------------------------------------------------------------------------------------------------------   Vital Signs:  Temp:  [97.8 °F (36.6 °C)-98.6 °F (37 °C)] 98.4 °F (36.9 °C)  Heart Rate:  [70-99] 85  Resp:  [18-20] 18  BP: (135-177)/() 165/77  Last 3 weights    01/12/18  1113 01/12/18  1438   Weight: 52.6 kg (116 lb) 59.2 kg (130 lb 7 oz)     Body mass index is 24.65 kg/(m^2).  ---------------------------------------------------------------------------------------------------------------------       Physical Exam    Physical Exam:  Constitutional: Chronically ill-appearing.  No acute distress. Appears anxious.     HENT:  Head: Normocephalic and atraumatic.  Mouth:  Moist mucous membranes.    Eyes:  Conjunctivae and EOM are normal.  Pupils are equal, round, and reactive to light.  No scleral icterus.  Neck:  Neck supple.  No JVD present.    Cardiovascular:  Normal rate, regular rhythm. Normal s1/s2 with no murmur.  Pulmonary/Chest:  Diminished breath sounds bilaterally, with bibasilar rales.  Abdominal:  Soft.  Bowel sounds are present.  No distension and no tenderness.   Musculoskeletal:  No tenderness and no deformity.  No red or swollen joints anywhere.    Neurological:  Alert and oriented to person, place, and time.  No cranial nerve deficit.  No tongue deviation.  No facial droop.  No slurred speech.   Skin:  Skin is warm and dry.  No rash noted.  No pallor. Small ulceration left anterior shin, chronic, does not appear infected.   Psychiatric:  Normal mood and affect.  Behavior is normal.  Judgment and thought content normal.   Peripheral vascular:  1-2+ pitting edema b/l lower ext. Palpable pulses on all 4 extremities.  ---------------------------------------------------------------------------------------------------------------------  EKG:  Sinus tachycardia, PAC pairs. Left atrial enlargement. Nonspecific ST  and T changes. No overt ST changes to suggest acute ischemia.        ---------------------------------------------------------------------------------------------------------------------     Results from last 7 days  Lab Units 01/12/18  1139 01/11/18  1017   WBC 10*3/mm3 9.06 8.65   HEMOGLOBIN g/dL 12.2 11.5*   HEMATOCRIT % 41.1 37.9   MCV fL 97.2* 92.2   MCHC g/dL 29.7* 30.3*   PLATELETS 10*3/mm3 159 238   INR  1.08  --            Results from last 7 days  Lab Units 01/12/18  1139 01/11/18  1017   SODIUM mmol/L 132* 135   POTASSIUM mmol/L 3.8 3.2*   CHLORIDE mmol/L 98* 97*   CO2 mmol/L 23.0* 29.7   BUN mg/dL 29* 16   CREATININE mg/dL 3.25* 1.69*   EGFR IF NONAFRICN AM mL/min/1.73 14* 30*   CALCIUM mg/dL 8.8 8.6   GLUCOSE mg/dL 335* 154*   ALBUMIN g/dL 3.80 3.90   BILIRUBIN mg/dL 0.3 0.4   ALK PHOS U/L 111* 104   AST (SGOT) U/L 23 21   ALT (SGPT) U/L 14 12   Estimated Creatinine Clearance: 13.9 mL/min (by C-G formula based on Cr of 3.25).  No results found for: AMMONIA    Results from last 7 days  Lab Units 01/12/18  1139 01/11/18  1017   TROPONIN I ng/mL 0.035 0.037         Lab Results   Component Value Date    HGBA1C 6.50 (H) 11/01/2017     Lab Results   Component Value Date    TSH 0.862 07/03/2017    FREET4 0.94 08/03/2015     No results found for: PREGTESTUR, PREGSERUM, HCG, HCGQUANT  Pain Management Panel     Pain Management Panel Latest Ref Rng & Units 11/1/2017 7/31/2017    CREATININE UR mg/dL - 49.0    AMPHETAMINES SCREEN, URINE Negative Negative -    BARBITURATES SCREEN Negative Negative -    BENZODIAZEPINE SCREEN, URINE Negative Positive(A) -    BUPRENORPHINE Negative Negative -    COCAINE SCREEN, URINE Negative Negative -    METHADONE SCREEN, URINE Negative Negative -                        ---------------------------------------------------------------------------------------------------------------------  Imaging Results (last 7 days)     Procedure Component Value Units Date/Time    XR Chest 1 View  [[id number]] Collected:  01/12/18 1237     Updated:  01/12/18 1303    Narrative:       EXAMINATION: XR CHEST 1 VW-      CLINICAL INDICATION:     Chest pain protocol     TECHNIQUE:  XR CHEST 1 VW-      COMPARISON: 1/11/2018      FINDINGS:   Bibasilar airspace disease and CHF changes are stable. Lung volumes  stable. No effusion or pneumothorax.       Impression:       Stable chest. Persistent CHF changes with basilar airspace  disease     This report was finalized on 1/12/2018 12:37 PM by Dr. Junior Angel MD.                                      I have personally reviewed the radiology images and read the final radiology report.  ---------------------------------------------------------------------------------------------------------------------  Assessment and Plan:    -Chest pain with typical and atypical symptoms in a patient with known multi-vessel CAD:  Admit to telemetry floor.  Cardiology consultation placed given known history of CAD. Serial cardiac enxymes have been ordered. Aspirin, plavix, beta blocker, atorvastatin ordered.  Lipid panel and hemoglobin a1c pending. TSH ordered as well. Nitropaste ordered q6 hours with avoiding opiates. Spoke with Dr Eng; in light of persistent anginal symptoms patient may need repeat cath and stent placement. However, for now he feels fluid overload from inadequate dialysis is playing some role in chest pain and dyspnea. Adding ranexa to optimize antianginal therapy. If pain persists after adequately dialyzed, will evaluate further with stress test followed by cath depending on results.     -Diastolic CHF exacerbation: again, felt to be secondary to fluid overload from inadequate dialysis.     -ESRD on hemodialysis: Nephrology consultation has been placed to arrange dialysis for tomorrow.    -Diabetes mellitus type II, ID:  FSBG ACHS. Hemoglobin a1c, SSI PRN low dose ordered.      -COPD, stable without exacerbation:  Will continue to follow. Will monitor pulse  oximetry.  Continue supplemental home oxygen.      -Hypertension:  Resume norvasc, hydralazine, and metoprolol with hold parameters.    -DVT prophylaxis with SQ heparin    Plan of care discussed with patient and lead MARILY Ybarra on [facility], along with Dr Eng, cardiology.    * Patient felt to be high risk due to chest pain in setting of known multi-vessel CAD, diastolic CHF exacerbation, ESRD on HD, Type II DM insulin dependent    Norma Sims PA-C  01/12/18  3:06 PM  ---------------------------------------------------------------------------------------------------------------------     * I have seen the patient in conjunction with Norma Sims PA-C, and have amended her note to reflect my own findings, assessment and plan.

## 2018-01-12 NOTE — ED NOTES
Pt sitting up in chair nad noted skin pink/w/d. Informed pt awaiting available rm.     Jessa Gomez, MARILY  01/11/18 [id number]

## 2018-01-12 NOTE — ED PROVIDER NOTES
Subjective   Patient is a 67 y.o. female presenting with chest pain.   Chest Pain   Pain location:  Substernal area  Pain quality: aching    Pain radiates to:  Neck, upper back, L arm and R arm  Onset quality:  Gradual  Duration:  1 day  Timing:  Intermittent  Progression:  Worsening  Chronicity:  New  Context comment:  Patient reports that she typically does not get chest pain.  She reports having intermittent episodes of chest discomfort yesterday.  She was given 2 sublingual nitros which resolved her pain.  She returns today due to CP that has lasted approximate  Relieved by:  Nothing  Worsened by:  Nothing  Associated symptoms: cough and shortness of breath    Associated symptoms: no abdominal pain, no fever and no nausea        Review of Systems   Constitutional: Negative.  Negative for fever.   HENT: Negative.    Respiratory: Positive for cough and shortness of breath.    Cardiovascular: Positive for chest pain.   Gastrointestinal: Negative.  Negative for abdominal pain and nausea.   Endocrine: Negative.    Genitourinary: Negative.  Negative for dysuria.   Skin: Negative.    Neurological: Negative.    Psychiatric/Behavioral: Negative.    All other systems reviewed and are negative.      Past Medical History:   Diagnosis Date   • Anxiety    • Aortic stenosis    • ASCVD (arteriosclerotic cardiovascular disease)    • Breast cancer    • CHF (congestive heart failure) 7/24/2017   • Chronic pain    • COPD (chronic obstructive pulmonary disease)    • Diabetic neuropathy    • Dyslipidemia    • Essential hypertension    • Insulin dependent diabetes mellitus    • Left carotid bruit    • Recurrent pneumonia    • Renal failure        No Known Allergies    Past Surgical History:   Procedure Laterality Date   • APPENDECTOMY     • CARDIAC CATHETERIZATION     • COLONOSCOPY     • COLONOSCOPY N/A 7/10/2017    Procedure: COLONOSCOPY;  Surgeon: Zheng Suarez III, MD;  Location: [facility];  Service:    • ENDOSCOPY N/A  7/10/2017    Procedure: ESOPHAGOGASTRODUODENOSCOPY;  Surgeon: Zheng Suarez III, MD;  Location: [facility] OR;  Service:    • HYSTERECTOMY     • MASTECTOMY Right    • RHINOPLASTY         Family History   Problem Relation Age of Onset   • Diabetes Mother    • Lung cancer Father        Social History     Social History   • Marital status:      Spouse name: N/A   • Number of children: N/A   • Years of education: N/A     Social History Main Topics   • Smoking status: Current Every Day Smoker     Packs/day: 0.25     Types: Cigarettes   • Smokeless tobacco: Never Used   • Alcohol use No   • Drug use: No   • Sexual activity: Defer     Other Topics Concern   • None     Social History Narrative           Objective   Physical Exam   Constitutional: She is oriented to person, place, and time. She appears well-developed and well-nourished. No distress.   HENT:   Head: Normocephalic and atraumatic.   Right Ear: External ear normal.   Left Ear: External ear normal.   Nose: Nose normal.   Eyes: Conjunctivae and EOM are normal. Pupils are equal, round, and reactive to light.   Neck: Normal range of motion. Neck supple. No JVD present. No tracheal deviation present.   Cardiovascular: Normal rate, regular rhythm and normal heart sounds.    No murmur heard.  Pulmonary/Chest: Effort normal. No respiratory distress. She has no wheezes. She has rales.   Diminished in right base.   Abdominal: Soft. Bowel sounds are normal. There is no tenderness.   Musculoskeletal: Normal range of motion. She exhibits no edema or deformity.   Neurological: She is alert and oriented to person, place, and time. No cranial nerve deficit.   Skin: Skin is warm and dry. No rash noted. She is not diaphoretic. No erythema. No pallor.   Psychiatric: She has a normal mood and affect. Her behavior is normal. Thought content normal.   Nursing note and vitals reviewed.      Procedures         ED Course  ED Course   Value Comment By Time   ECG 12 Lead Sinus  tachycardia Q ST changes per EMAGAN Ivey 01/12 1303                  MDM  Number of Diagnoses or Management Options  Chest pain at rest: new and requires workup     Amount and/or Complexity of Data Reviewed  Clinical lab tests: reviewed and ordered  Tests in the radiology section of CPT®: reviewed and ordered  Tests in the medicine section of CPT®: reviewed and ordered  Decide to obtain previous medical records or to obtain history from someone other than the patient: yes  Discuss the patient with other providers: yes  Independent visualization of images, tracings, or specimens: yes    Risk of Complications, Morbidity, and/or Mortality  Presenting problems: moderate        Final diagnoses:   Chest pain at rest            MEAGAN Millan  01/12/18 [id number]

## 2018-01-12 NOTE — CONSULTS
Date of Admit: 1/12/2018  Date of Consult: 01/12/18  Tee Travis*      Active Problems:    Chest pain at rest        Assessment:    1. Chest pains with some typical and some atypical features for CCS class IV angina.  2. ASCVD with coronary angiography in October 2015 by Dr. Dr. Parham revealing 70% stenosis in the mid segment of the LAD, 70% stenosis in the proximal left circumflex and about 80% stenosis in the midportion of the posterior descending artery branch.  She was left on medical therapy at that time with apparently no further cardiac events since then.  3. Acute diastolic heart failure.  4. Continued smoking of about a pack a day and has been smoker for 44 years and smoked up to 3 packs a day at one point.  5. End-stage renal disease, on hemodialysis reportedly since October 2017 on Tuesday or Thursday and Saturdays.  6. Type 2 diabetes mellitus (insulin-dependent) diagnoses reportedly about 15 years ago.  7. Dyslipidemia.  8. Essential hypertension.  9. COPD.      Recommendations:    1. Treat with aspirin, Plavix, Nitropaste, beta blocker and statin for now.  2. Continue to follow the troponin trend.  3. We'll continue to optimize her antianginal therapy by addition of Ranexa.  4. Would need dialysis tomorrow for her heart failure.  5. If she continues to have recurrent anginal symptoms, we'll evaluate further with a Lexiscan sestamibi study and if this reveals significant areas of myocardial ischemia then we'll have to consider further revascularization accordingly.  6. I have strongly emphasized for her to quit smoking.    Reason for consultation: Recurrent chest pains.    Subjective     History of Present Illness: Ms. Cardona is a pleasant 67-year-old  female with history of known ASCVD with cardiac catheterization in October 2015 by Dr. Dr. Parham revealing apparently 70% stenosis in the mid LAD, 70% stenosis in the proximal left circumflex and about 80% stenosis in the  midportion of the PDA, has been left on medical therapy due to calcified coronary lesions making it difficult for PCI.  She has apparently done fairly well since then.  She now presents with complaints of having recurrent chest pains over the last few days.  She described this pains as being felt as sharp to dull pains in the right side of her chest which radiated to both arms and hands and to the upper back associated with some shortness of breath but no diaphoresis.  These chest pains would be relieved with sublingual nitroglycerin ×2.  She has chronic dyspnea with mild exertion with no PND or orthopnea.  She currently denies any significant chest pain or discomfort.  She started to have chest pain earlier today while she was on dialysis.    Last Echo:  Sara Cardona   Echocardiogram   Reading physician: Geovanni Galvez MD Ordering physician:   Geovanni Galvez MD Study date: 11/7/17   Interpretation Summary   · The study is technically adequate for diagnosis.  · Left ventricular systolic function is normal. Estimated EF = 65%.  · There are no significant valvular abnormalities noted.  · There is no evidence of pericardial effusion.     Last Cath: In October 2015 by Dr. Dr. Parham revealed:  FINDINGS IN DETAIL:   LEFT MAIN CORONARY ARTERY: The left main coronary is a large vessel which  bifurcates into LAD and circumflex arteries. Left main coronary artery is  relatively free of atherosclerotic disease.      LEFT ANTERIOR DESCENDING ARTERY: The left anterior descending artery is a large  vessel which reaches beyond the apex of the left ventricle and gives rise to 2  small diagonal branches. The LAD itself is noted to have a diffuse 70% lesion  in its midportion.      CIRCUMFLEX ARTERY: The circumflex is a large vessel which gives rise to 3  obtuse marginal, the first of which is a large branching vessel and the next 2  which are small. There is a 70% lesion in the proximal portion of  the  circumflex artery.      RIGHT CORONARY ARTERY: The right coronary artery is a relatively small vessel  which gives rise to the posterior descending artery and several posterolateral  branches. There is a diffuse 80% lesion noted in the midportion of the  posterior descending artery.      FINAL IMPRESSION AND PLAN: Overall, it is my impression that the patient does  present with native 3-vessel coronary artery disease; however, she has  significant COPD and was unable to lie still on the table during the procedure,  furthermore, her vessels are relatively calcified and it was clear that it  would be a lengthy procedure to try to revascularize any of her vessels. At  this point in time, I feel that the best course of action would be to try to  optimize her from a medical point of view. If she continues to have anginal  symptoms, it would not be unreasonable to consider percutaneous  revascularization of her LAD and circumflex arteries. She is not a great  surgical candidate.       D:   SF 10/13/2015 08:14:29  T:   hf 10/14/2015 00:03:11    Past Medical History:   Diagnosis Date   • Anxiety    • Aortic stenosis    • ASCVD (arteriosclerotic cardiovascular disease)    • Breast cancer    • CHF (congestive heart failure) 7/24/2017   • Chronic pain    • COPD (chronic obstructive pulmonary disease)    • Diabetic neuropathy    • Dyslipidemia    • Essential hypertension    • Insulin dependent diabetes mellitus    • Left carotid bruit    • Recurrent pneumonia    • Renal failure      Past Surgical History:   Procedure Laterality Date   • APPENDECTOMY     • CARDIAC CATHETERIZATION     • COLONOSCOPY     • COLONOSCOPY N/A 7/10/2017    Procedure: COLONOSCOPY;  Surgeon: Zheng Suarez III, MD;  Location: [facility];  Service:    • ENDOSCOPY N/A 7/10/2017    Procedure: ESOPHAGOGASTRODUODENOSCOPY;  Surgeon: Zheng Suarez III, MD;  [facility] OR;  Service:    • HYSTERECTOMY     • MASTECTOMY Right    • RHINOPLASTY        Family History   Problem Relation Age of Onset   • Diabetes Mother    • Lung cancer Father      Social History   Substance Use Topics   • Smoking status: Current Every Day Smoker     Packs/day: 0.25     Types: Cigarettes   • Smokeless tobacco: Never Used   • Alcohol use No     Prescriptions Prior to Admission   Medication Sig Dispense Refill Last Dose   • albuterol (PROVENTIL HFA;VENTOLIN HFA) 108 (90 BASE) MCG/ACT inhaler Inhale 2 puffs every 6 (six) hours as needed for wheezing. (Patient taking differently: Inhale 2 puffs Every 6 (Six) Hours.) 3.7 g 0 1/11/2018 at Unknown time   • amLODIPine (NORVASC) 5 MG tablet Take 1 tablet by mouth Daily. 30 tablet 0 1/11/2018 at Unknown time   • aspirin 81 MG EC tablet Take 1 tablet by mouth Daily. 30 tablet 0 1/11/2018 at Unknown time   • budesonide-formoterol (SYMBICORT) 80-4.5 MCG/ACT inhaler Inhale 2 puffs 2 (Two) Times a Day.   1/11/2018 at Unknown time   • calcitriol (ROCALTROL) 0.25 MCG capsule Take 0.25 mcg by mouth Daily.   1/11/2018 at Unknown time   • calcium acetate (PHOS BINDER,) 667 MG capsule capsule Take 1 capsule by mouth 3 (Three) Times a Day With Meals. 180 capsule 0 1/11/2018 at Unknown time   • calcium carbonate (OS-MARIA ELENA) 600 MG tablet Take 1,000 mg by mouth Daily.   1/11/2018 at Unknown time   • clonazePAM (KlonoPIN) 0.5 MG tablet Take 0.5 mg by mouth 2 (Two) Times a Day.   1/11/2018 at Unknown time   • ferrous sulfate 325 (65 FE) MG tablet Take 325 mg by mouth 2 (Two) Times a Day With Meals.   1/11/2018 at Unknown time   • gabapentin (NEURONTIN) 100 MG capsule Take 1 capsule by mouth every night at bedtime. 30 capsule 0 1/11/2018 at Unknown time   • hydrALAZINE (APRESOLINE) 10 MG tablet Take 1 tablet by mouth Every 8 (Eight) Hours. 90 tablet 0 1/11/2018 at Unknown time   • insulin glargine (LANTUS) 100 UNIT/ML injection Inject 10 Units under the skin Every Night. Patient spouse says she was given 20 units on 11/1 due to her higher than normal glucose  reading   1/11/2018 at Unknown time   • metoprolol tartrate (LOPRESSOR) 50 MG tablet Take 1 tablet by mouth Every 12 (Twelve) Hours. 60 tablet 0 1/11/2018 at Unknown time   • nitroglycerin (NITROSTAT) 0.4 MG SL tablet Place 0.4 mg under the tongue Every 5 (Five) Minutes As Needed for Chest Pain. Take no more than 3 doses in 15 minutes.   Unknown at Unknown time     Allergies:  Review of patient's allergies indicates no known allergies.    Review of Systems   Constitutional: Negative for appetite change, chills and fever.   HENT: Negative for congestion, ear discharge, ear pain and sore throat.    Eyes: Negative for pain and redness.   Respiratory: Positive for shortness of breath. Negative for cough and wheezing.    Cardiovascular: Positive for chest pain. Negative for palpitations and leg swelling.   Gastrointestinal: Negative for abdominal pain, diarrhea, nausea and vomiting.   Endocrine: Negative for cold intolerance, heat intolerance, polydipsia and polyuria.   Genitourinary: Negative for dysuria and hematuria.   Skin: Negative for rash.   Neurological: Negative for seizures, syncope and headaches.   Psychiatric/Behavioral: Negative for confusion. The patient is not nervous/anxious.        Objective      Vital Signs  Temp:  [97.8 °F (36.6 °C)-98.6 °F (37 °C)] 97.8 °F (36.6 °C)  Heart Rate:  [70-99] 80  Resp:  [18-20] 18  BP: (135-177)/() 148/88  Vital Signs (last 72 hrs)       01/09 0700  -  01/10 0659 01/10 0700  -  01/11 0659 01/11 0700  -  01/12 0659 01/12 0700  -  01/12 1854   Most Recent    Temp (°F)       98.2    97.8 -  98.6     97.8 (36.6)    Heart Rate       92    70 -  99     80    Resp       20    18 -  20     18    BP       123/75    (!) 135/101 -  (!) 177/107     148/88    SpO2 (%)       94    97 -  100     98        Body mass index is 24.65 kg/(m^2).  No intake or output data in the 24 hours ending 01/12/18 1854  Physical Exam   Constitutional: She appears well-developed and well-nourished.    Chronically ill-looking  female who was alert, oriented ×3 and is in no apparent distress at this time.   HENT:   Head: Normocephalic and atraumatic.   Eyes: EOM are normal. No scleral icterus.   Neck: JVD present. No tracheal deviation present. No thyromegaly present.   Cardiovascular: Normal rate, regular rhythm and normal heart sounds.  Exam reveals no gallop and no friction rub.    No murmur heard.  Pulmonary/Chest: No respiratory distress. She has no wheezes. She has rales (bibasilar rales.).   Abdominal: She exhibits no distension and no mass. There is no tenderness. There is no guarding.   Musculoskeletal: She exhibits edema (1+ chronic bilateral leg edema.).   Neurological: She is alert. No cranial nerve deficit.   Skin: Skin is warm and dry.   Psychiatric: She has a normal mood and affect.         Results Review:    I reviewed the patient's new clinical results.    Results from last 7 days  Lab Units 01/12/18  1458 01/12/18  1139 01/11/18  1017   CK TOTAL U/L 34  --   --    TROPONIN I ng/mL 0.059* 0.035 0.037   CKMB ng/mL 2.23  --   --        Results from last 7 days  Lab Units 01/12/18  1139 01/11/18  1017   WBC 10*3/mm3 9.06 8.65   HEMOGLOBIN g/dL 12.2 11.5*   PLATELETS 10*3/mm3 159 238       Results from last 7 days  Lab Units 01/12/18  1139 01/11/18  1017   SODIUM mmol/L 132* 135   POTASSIUM mmol/L 3.8 3.2*   CHLORIDE mmol/L 98* 97*   CO2 mmol/L 23.0* 29.7   BUN mg/dL 29* 16   CREATININE mg/dL 3.25* 1.69*   CALCIUM mg/dL 8.8 8.6   GLUCOSE mg/dL 335* 154*   ALT (SGPT) U/L 14 12   AST (SGOT) U/L 23 21       Lab Results   Component Value Date    MG 2.0 11/14/2017    MG 2.2 11/11/2017    MG 2.3 11/09/2017     Lab Results   Component Value Date    TSH 1.100 01/12/2018    CHLPL 152 10/09/2015    TRIG 79 11/07/2017    HDL 44 (L) 11/07/2017    LDL 79 10/09/2015      Lab Results   Component Value Date    BNP 3358.0 (H) 11/01/2017    .0 (H) 07/25/2017    .0 (H) 07/24/2017        ECG      ECG/EMG Results (last 24 hours)     Procedure Component Value Units Date/Time    ECG 12 Lead [[id number]] Collected:  01/12/18 1054     Updated:  01/12/18 1322    Narrative:       Test Reason : Chest Pain  Blood Pressure : **/** mmHG  Vent. Rate : 102 BPM     Atrial Rate : 293 BPM     P-R Int : 000 ms          QRS Dur : 084 ms      QT Int : 388 ms       P-R-T Axes : 028 -21 068 degrees     QTc Int : 505 ms    sinus rhythm, PAC pairs  Left atrial enlargement  Nonspecific ST and T changes  Abnormal ECG  When compared with ECG of 11-JAN-2018 09:59,  Atrial flutter has replaced Sinus rhythm  Confirmed by Kiera Li ([id number]) on 1/12/2018 1:22:05 PM    Referred By:  DAVID           Confirmed By:Kiera Li              Imaging Results (last 72 hours)     Procedure Component Value Units Date/Time    XR Chest 1 View [[id number]] Collected:  01/12/18 1237     Updated:  01/12/18 1303    Narrative:       EXAMINATION: XR CHEST 1 VW-      CLINICAL INDICATION:     Chest pain protocol     TECHNIQUE:  XR CHEST 1 VW-      COMPARISON: 1/11/2018      FINDINGS:   Bibasilar airspace disease and CHF changes are stable. Lung volumes  stable. No effusion or pneumothorax.       Impression:       Stable chest. Persistent CHF changes with basilar airspace  disease     This report was finalized on 1/12/2018 12:37 PM by Dr. Junior Angel MD.              Echo   Lab Results   Component Value Date    ECHOEFEST 65 11/07/2017       Thank you very much for asking us to be involved in this patient's care.  We will follow along with you.      Erick Eng MD,[facility]  01/12/18  6:54 PM    Dragon disclaimer:  Much of this encounter note is an electronic transcription/translation of spoken language to printed text. The electronic translation of spoken language may permit erroneous, or at times, nonsensical words or phrases to be inadvertently transcribed; Although I have reviewed the note for such errors, some may still  exist.

## 2018-01-12 NOTE — PLAN OF CARE
Problem: Patient Care Overview (Adult)  Goal: Plan of Care Review  Outcome: Ongoing (interventions implemented as appropriate)    Goal: Adult Individualization and Mutuality  Outcome: Ongoing (interventions implemented as appropriate)    Goal: Discharge Needs Assessment  Outcome: Ongoing (interventions implemented as appropriate)      Problem: Pain, Acute (Adult)  Goal: Identify Related Risk Factors and Signs and Symptoms  Outcome: Ongoing (interventions implemented as appropriate)    Goal: Acceptable Pain Control/Comfort Level  Outcome: Ongoing (interventions implemented as appropriate)      Problem: Diabetes, Type 2 (Adult)  Goal: Signs and Symptoms of Listed Potential Problems Will be Absent or Manageable (Diabetes, Type 2)  Outcome: Ongoing (interventions implemented as appropriate)

## 2018-01-13 LAB
ANION GAP SERPL CALCULATED.3IONS-SCNC: 6 MMOL/L (ref 3.6–11.2)
BASOPHILS # BLD AUTO: 0.05 10*3/MM3 (ref 0–0.3)
BASOPHILS NFR BLD AUTO: 0.6 % (ref 0–2)
BUN BLD-MCNC: 36 MG/DL (ref 7–21)
BUN/CREAT SERPL: 10.3 (ref 7–25)
CALCIUM SPEC-SCNC: 9.1 MG/DL (ref 7.7–10)
CHLORIDE SERPL-SCNC: 96 MMOL/L (ref 99–112)
CHOLEST SERPL-MCNC: 130 MG/DL (ref 0–200)
CK MB SERPL-CCNC: 2.67 NG/ML (ref 0–5)
CK MB SERPL-RTO: 7 % (ref 0–3)
CK SERPL-CCNC: 38 U/L (ref 24–173)
CO2 SERPL-SCNC: 28 MMOL/L (ref 24.3–31.9)
CREAT BLD-MCNC: 3.51 MG/DL (ref 0.43–1.29)
DEPRECATED RDW RBC AUTO: 57.7 FL (ref 37–54)
EOSINOPHIL # BLD AUTO: 0.2 10*3/MM3 (ref 0–0.7)
EOSINOPHIL NFR BLD AUTO: 2.3 % (ref 0–7)
ERYTHROCYTE [DISTWIDTH] IN BLOOD BY AUTOMATED COUNT: 17.3 % (ref 11.5–14.5)
GFR SERPL CREATININE-BSD FRML MDRD: 13 ML/MIN/1.73
GLUCOSE BLD-MCNC: 152 MG/DL (ref 70–110)
GLUCOSE BLDC GLUCOMTR-MCNC: 100 MG/DL (ref 70–130)
GLUCOSE BLDC GLUCOMTR-MCNC: 117 MG/DL (ref 70–130)
GLUCOSE BLDC GLUCOMTR-MCNC: 124 MG/DL (ref 70–130)
GLUCOSE BLDC GLUCOMTR-MCNC: 160 MG/DL (ref 70–130)
GLUCOSE BLDC GLUCOMTR-MCNC: 212 MG/DL (ref 70–130)
HCT VFR BLD AUTO: 38.4 % (ref 37–47)
HDLC SERPL-MCNC: 54 MG/DL (ref 60–100)
HGB BLD-MCNC: 11.4 G/DL (ref 12–16)
IMM GRANULOCYTES # BLD: 0.02 10*3/MM3 (ref 0–0.03)
IMM GRANULOCYTES NFR BLD: 0.2 % (ref 0–0.5)
LDLC SERPL CALC-MCNC: 60 MG/DL (ref 0–100)
LDLC/HDLC SERPL: 1.11 {RATIO}
LYMPHOCYTES # BLD AUTO: 2.25 10*3/MM3 (ref 1–3)
LYMPHOCYTES NFR BLD AUTO: 26.3 % (ref 16–46)
MCH RBC QN AUTO: 28.3 PG (ref 27–33)
MCHC RBC AUTO-ENTMCNC: 29.7 G/DL (ref 33–37)
MCV RBC AUTO: 95.3 FL (ref 80–94)
MONOCYTES # BLD AUTO: 0.67 10*3/MM3 (ref 0.1–0.9)
MONOCYTES NFR BLD AUTO: 7.8 % (ref 0–12)
NEUTROPHILS # BLD AUTO: 5.38 10*3/MM3 (ref 1.4–6.5)
NEUTROPHILS NFR BLD AUTO: 62.8 % (ref 40–75)
OSMOLALITY SERPL CALC.SUM OF ELEC: 272.1 MOSM/KG (ref 273–305)
PLATELET # BLD AUTO: 239 10*3/MM3 (ref 130–400)
PMV BLD AUTO: 10.8 FL (ref 6–10)
POTASSIUM BLD-SCNC: 3.7 MMOL/L (ref 3.5–5.3)
RBC # BLD AUTO: 4.03 10*6/MM3 (ref 4.2–5.4)
SODIUM BLD-SCNC: 130 MMOL/L (ref 135–153)
TRIGL SERPL-MCNC: 80 MG/DL (ref 0–150)
TROPONIN I SERPL-MCNC: 0.05 NG/ML
VLDLC SERPL-MCNC: 16 MG/DL
WBC NRBC COR # BLD: 8.57 10*3/MM3 (ref 4.5–12.5)

## 2018-01-13 PROCEDURE — 99213 OFFICE O/P EST LOW 20 MIN: CPT | Performed by: INTERNAL MEDICINE

## 2018-01-13 PROCEDURE — 82553 CREATINE MB FRACTION: CPT | Performed by: PHYSICIAN ASSISTANT

## 2018-01-13 PROCEDURE — P9046 ALBUMIN (HUMAN), 25%, 20 ML: HCPCS | Performed by: INTERNAL MEDICINE

## 2018-01-13 PROCEDURE — 63710000001 INSULIN ASPART PER 5 UNITS: Performed by: PHYSICIAN ASSISTANT

## 2018-01-13 PROCEDURE — 25010000002 ALBUMIN HUMAN 25% PER 50 ML: Performed by: INTERNAL MEDICINE

## 2018-01-13 PROCEDURE — 82962 GLUCOSE BLOOD TEST: CPT

## 2018-01-13 PROCEDURE — 25010000002 HEPARIN (PORCINE) PER 1000 UNITS: Performed by: PHYSICIAN ASSISTANT

## 2018-01-13 PROCEDURE — 96372 THER/PROPH/DIAG INJ SC/IM: CPT

## 2018-01-13 PROCEDURE — 82550 ASSAY OF CK (CPK): CPT | Performed by: PHYSICIAN ASSISTANT

## 2018-01-13 PROCEDURE — G0378 HOSPITAL OBSERVATION PER HR: HCPCS

## 2018-01-13 PROCEDURE — 80061 LIPID PANEL: CPT | Performed by: PHYSICIAN ASSISTANT

## 2018-01-13 PROCEDURE — 80048 BASIC METABOLIC PNL TOTAL CA: CPT | Performed by: PHYSICIAN ASSISTANT

## 2018-01-13 PROCEDURE — 94799 UNLISTED PULMONARY SVC/PX: CPT

## 2018-01-13 PROCEDURE — 99226 PR SBSQ OBSERVATION CARE/DAY 35 MINUTES: CPT | Performed by: INTERNAL MEDICINE

## 2018-01-13 PROCEDURE — 84484 ASSAY OF TROPONIN QUANT: CPT | Performed by: PHYSICIAN ASSISTANT

## 2018-01-13 PROCEDURE — 85025 COMPLETE CBC W/AUTO DIFF WBC: CPT | Performed by: PHYSICIAN ASSISTANT

## 2018-01-13 PROCEDURE — 90945 DIALYSIS ONE EVALUATION: CPT

## 2018-01-13 PROCEDURE — 63710000001 INSULIN DETEMIR PER 5 UNITS: Performed by: HOSPITALIST

## 2018-01-13 PROCEDURE — G0257 UNSCHED DIALYSIS ESRD PT HOS: HCPCS

## 2018-01-13 RX ORDER — ALBUMIN (HUMAN) 12.5 G/50ML
25 SOLUTION INTRAVENOUS AS NEEDED
Status: DISCONTINUED | OUTPATIENT
Start: 2018-01-13 | End: 2018-01-14 | Stop reason: HOSPADM

## 2018-01-13 RX ORDER — EMOLLIENT COMBINATION NO.92
LOTION (ML) TOPICAL 2 TIMES DAILY
Status: DISCONTINUED | OUTPATIENT
Start: 2018-01-13 | End: 2018-01-14 | Stop reason: HOSPADM

## 2018-01-13 RX ORDER — ISOSORBIDE MONONITRATE 60 MG/1
60 TABLET, EXTENDED RELEASE ORAL
Status: DISCONTINUED | OUTPATIENT
Start: 2018-01-13 | End: 2018-01-14 | Stop reason: HOSPADM

## 2018-01-13 RX ORDER — LOSARTAN POTASSIUM 50 MG/1
50 TABLET ORAL
Status: DISCONTINUED | OUTPATIENT
Start: 2018-01-13 | End: 2018-01-14

## 2018-01-13 RX ORDER — RANOLAZINE 500 MG/1
500 TABLET, EXTENDED RELEASE ORAL EVERY 12 HOURS SCHEDULED
Status: DISCONTINUED | OUTPATIENT
Start: 2018-01-13 | End: 2018-01-14 | Stop reason: HOSPADM

## 2018-01-13 RX ADMIN — INSULIN DETEMIR 10 UNITS: 100 INJECTION, SOLUTION SUBCUTANEOUS at 22:01

## 2018-01-13 RX ADMIN — CALCIUM ACETATE 667 MG: 667 CAPSULE ORAL at 08:22

## 2018-01-13 RX ADMIN — AMLODIPINE BESYLATE 5 MG: 5 TABLET ORAL at 08:22

## 2018-01-13 RX ADMIN — CALCIUM ACETATE 667 MG: 667 CAPSULE ORAL at 12:27

## 2018-01-13 RX ADMIN — IPRATROPIUM BROMIDE AND ALBUTEROL SULFATE 3 ML: .5; 3 SOLUTION RESPIRATORY (INHALATION) at 00:44

## 2018-01-13 RX ADMIN — NITROGLYCERIN 1 INCH: 20 OINTMENT TOPICAL at 05:24

## 2018-01-13 RX ADMIN — HEPARIN SODIUM 5000 UNITS: 5000 INJECTION, SOLUTION INTRAVENOUS; SUBCUTANEOUS at 20:51

## 2018-01-13 RX ADMIN — Medication 500 MG: at 08:22

## 2018-01-13 RX ADMIN — CLONAZEPAM 0.25 MG: 0.5 TABLET ORAL at 20:51

## 2018-01-13 RX ADMIN — METOPROLOL TARTRATE 50 MG: 50 TABLET, FILM COATED ORAL at 08:22

## 2018-01-13 RX ADMIN — CLONAZEPAM 0.25 MG: 0.5 TABLET ORAL at 08:31

## 2018-01-13 RX ADMIN — NITROGLYCERIN 1 INCH: 20 OINTMENT TOPICAL at 12:27

## 2018-01-13 RX ADMIN — Medication: at 18:24

## 2018-01-13 RX ADMIN — HYDRALAZINE HYDROCHLORIDE 10 MG: 10 TABLET ORAL at 20:52

## 2018-01-13 RX ADMIN — SODIUM CHLORIDE 1000 ML: 9 INJECTION, SOLUTION INTRAVENOUS at 14:10

## 2018-01-13 RX ADMIN — BUDESONIDE AND FORMOTEROL FUMARATE DIHYDRATE 2 PUFF: 80; 4.5 AEROSOL RESPIRATORY (INHALATION) at 18:29

## 2018-01-13 RX ADMIN — LOSARTAN POTASSIUM 50 MG: 50 TABLET, FILM COATED ORAL at 12:27

## 2018-01-13 RX ADMIN — INSULIN ASPART 4 UNITS: 100 INJECTION, SOLUTION INTRAVENOUS; SUBCUTANEOUS at 20:51

## 2018-01-13 RX ADMIN — Medication 500 MG: at 20:52

## 2018-01-13 RX ADMIN — HYDRALAZINE HYDROCHLORIDE 10 MG: 10 TABLET ORAL at 05:24

## 2018-01-13 RX ADMIN — ASPIRIN 81 MG: 81 TABLET ORAL at 08:22

## 2018-01-13 RX ADMIN — CALCITRIOL 0.25 MCG: 0.25 CAPSULE, LIQUID FILLED ORAL at 08:22

## 2018-01-13 RX ADMIN — RANOLAZINE 500 MG: 500 TABLET, FILM COATED, EXTENDED RELEASE ORAL at 20:52

## 2018-01-13 RX ADMIN — IPRATROPIUM BROMIDE AND ALBUTEROL SULFATE 3 ML: .5; 3 SOLUTION RESPIRATORY (INHALATION) at 07:18

## 2018-01-13 RX ADMIN — BUDESONIDE AND FORMOTEROL FUMARATE DIHYDRATE 2 PUFF: 80; 4.5 AEROSOL RESPIRATORY (INHALATION) at 07:19

## 2018-01-13 RX ADMIN — HYDRALAZINE HYDROCHLORIDE 10 MG: 10 TABLET ORAL at 12:27

## 2018-01-13 RX ADMIN — IPRATROPIUM BROMIDE AND ALBUTEROL SULFATE 3 ML: .5; 3 SOLUTION RESPIRATORY (INHALATION) at 12:49

## 2018-01-13 RX ADMIN — CALCIUM ACETATE 667 MG: 667 CAPSULE ORAL at 18:24

## 2018-01-13 RX ADMIN — ALBUMIN HUMAN 25 G: 0.25 SOLUTION INTRAVENOUS at 15:17

## 2018-01-13 RX ADMIN — METOPROLOL TARTRATE 50 MG: 50 TABLET, FILM COATED ORAL at 20:52

## 2018-01-13 RX ADMIN — GABAPENTIN 100 MG: 100 CAPSULE ORAL at 20:52

## 2018-01-13 RX ADMIN — ATORVASTATIN CALCIUM 40 MG: 40 TABLET, FILM COATED ORAL at 20:52

## 2018-01-13 NOTE — PROGRESS NOTES
Subjective     History:   Sara Cardona is a 67 y.o. female admitted on 1/12/2018 secondary to Chest pain at rest     Procedures: None    Patient seen and examined with Autumn RN. Awake and alert, currently eating lunch. Denies any further episodes of CP. Denies significant cough or dyspnea. Denies nausea or vomiting. No acute events overnight per RN.     History taken from: patient, chart, and RN.      Objective     Vital Signs  Temp:  [97.2 °F (36.2 °C)-98.4 °F (36.9 °C)] 97.2 °F (36.2 °C)  Heart Rate:  [58-92] 58  Resp:  [18-20] 18  BP: (110-169)/() 140/74    Intake/Output Summary (Last 24 hours) at 01/13/18 1229  Last data filed at 01/13/18 1103   Gross per 24 hour   Intake              240 ml   Output              100 ml   Net              140 ml         Physical Exam:  General:    Awake, alert, in no acute distress, chronically ill appearing   Heart:      Normal S1 and S2. Regular rate and rhythm. No significant murmur, rubs or gallops appreciated.   Lungs:     Respirations regular, even and unlabored. Bibasilar rales.   Abdomen:   Soft and nontender. No guarding, rebound tenderness or  organomegaly noted. Bowel sounds present x 4.   Extremities:  2+ LE pitting edema noted. Moves UE and LE equally B/L.     Results Review:      Results from last 7 days  Lab Units 01/13/18  0304 01/12/18  1139 01/11/18  1017   WBC 10*3/mm3 8.57 9.06 8.65   HEMOGLOBIN g/dL 11.4* 12.2 11.5*   PLATELETS 10*3/mm3 239 159 238       Results from last 7 days  Lab Units 01/13/18  0304 01/12/18  1139 01/11/18  1017   SODIUM mmol/L 130* 132* 135   POTASSIUM mmol/L 3.7 3.8 3.2*   CHLORIDE mmol/L 96* 98* 97*   CO2 mmol/L 28.0 23.0* 29.7   BUN mg/dL 36* 29* 16   CREATININE mg/dL 3.51* 3.25* 1.69*   CALCIUM mg/dL 9.1 8.8 8.6   GLUCOSE mg/dL 152* 335* 154*       Results from last 7 days  Lab Units 01/12/18  1139 01/11/18  1017   BILIRUBIN mg/dL 0.3 0.4   ALK PHOS U/L 111* 104   AST (SGOT) U/L 23 21   ALT (SGPT) U/L 14 12            Results from last 7 days  Lab Units 01/12/18  1139   INR  1.08       Results from last 7 days  Lab Units 01/13/18  0304 01/12/18  1925 01/12/18  1458   CK TOTAL U/L 38 36 34   TROPONIN I ng/mL 0.053* 0.061* 0.059*   CK MB INDEX % 7.0* 6.3* 6.6*       Imaging Results (last 24 hours)     Procedure Component Value Units Date/Time    XR Chest 1 View [[id number]] Collected:  01/12/18 1237     Updated:  01/12/18 1303    Narrative:       EXAMINATION: XR CHEST 1 VW-      CLINICAL INDICATION:     Chest pain protocol     TECHNIQUE:  XR CHEST 1 VW-      COMPARISON: 1/11/2018      FINDINGS:   Bibasilar airspace disease and CHF changes are stable. Lung volumes  stable. No effusion or pneumothorax.       Impression:       Stable chest. Persistent CHF changes with basilar airspace  disease     This report was finalized on 1/12/2018 12:37 PM by Dr. Junior Angel MD.               Medications:    aspirin 81 mg Oral Daily   atorvastatin 40 mg Oral Nightly   budesonide-formoterol 2 puff Inhalation BID - RT   calcitriol 0.25 mcg Oral Daily   calcium acetate 667 mg Oral TID With Meals   calcium carbonate 500 mg Oral BID   clopidogrel 75 mg Oral Daily   gabapentin 100 mg Oral Nightly   heparin (porcine) 5,000 Units Subcutaneous Q12H   hydrALAZINE 10 mg Oral Q8H   insulin aspart 0-9 Units Subcutaneous 4x Daily AC & at Bedtime   insulin detemir 10 Units Subcutaneous Nightly   losartan 50 mg Oral Q24H   lubrisoft  Apply externally BID   metoprolol tartrate 50 mg Oral Q12H   nitroglycerin 1 inch Topical Q6H   ranolazine 500 mg Oral Q12H              Assessment/Plan   Chest pain with indeterminate range troponin elevation in the setting of known multi-vessel CAD: Nonspecific changes on EKG. Serial CE's have trended downward. TSH is normal. Echo obtained in 11/17 revealing an EF of 65%. Cardiology consulted. Add Ranexa per their recs. Will procced with HD today. If she continues to have CP, cardiology planning to proceed with a nuclear  stress test. Cont to monitor on telemetry. Cardiology input appreciated.     Acute diastolic CHF: Pt stopped HD early 2/2 CP. Hx of noncompliance with fluid restriction. Nephrology planning on HD today and will attempt 4 Kg UF. Cardiology and nephrology input appreciated.     ESRD on HD: Cont HD per nephrology.     DM II, insulin dependent: HgbA1c is 7.2. BG levels currently stable. Cont current insulin regimen and monitor.     COPD in the setting of chronic hypoxic respiratory failure and ongoing tobacco abuse: Appears stable at this time with no acute exacerbation. Cont supplemental O2.     Essential HTN: Nephrology has stopped Norvasc in the setting of edema and added losartan.     DVT PPX: SQ heparin    Discussed with Dr. Eng.     Pt is at high risk 2/2 chest pain with known multi-vessel CAD, diastolic CHF exacerbation, ESRD on HD, DM and COPD.       Fredis Cintron DO  01/13/18  12:29 PM

## 2018-01-13 NOTE — PROGRESS NOTES
LOS: 0 days     Name: Sara Cardona  Age/Sex: 67 y.o. female  :  1950        PCP: Marcelino Sheehan MD  REF: Tee Whaley*    Principal Problem:    Chest pain at rest      Reason for follow-up: Chest pains.    Subjective  Ms. Cardona is a pleasant 67-year-old  female with history of known ASCVD with cardiac catheterization in 2015 by Dr. Dr. Parham revealing apparently 70% stenosis in the mid LAD, 70% stenosis in the proximal left circumflex and about 80% stenosis in the midportion of the PDA, has been left on medical therapy due to calcified coronary lesions making it difficult for PCI.  She has apparently done fairly well since then.  She now presents with complaints of having recurrent chest pains over the last few days.  She described this pains as being felt as sharp to dull pains in the right side of her chest which radiated to both arms and hands and to the upper back associated with some shortness of breath but no diaphoresis.  These chest pains would be relieved with sublingual nitroglycerin ×2.  She has chronic dyspnea with mild exertion with no PND or orthopnea.  She currently denies any significant chest pain or discomfort.  She started to have chest pain earlier today while she was on dialysis.      Interval History: Ms. Cardona states that she is feeling much better.  She denies any further chest pains.  She says she is breathing much better.  She is anxious to go home.    ROS    Vital Signs  Temp:  [97.2 °F (36.2 °C)-98.1 °F (36.7 °C)] 98.1 °F (36.7 °C)  Heart Rate:  [55-80] 55  Resp:  [18-20] 20  BP: (105-151)/(52-88) 151/52  Vital Signs (last 72 hrs)       01/10 07  -   0659  07  -   0659  07  -   0659 700  -   1612   Most Recent    Temp (°F)     98.2    97.2 -  98.6      98.1     98.1 (36.7)    Heart Rate     92    64 -  99    55 -  73     55    Resp     20    18 -  20    18 -  20     20    BP     123/75     110/64 -  (!) 177/107    105/57 -  151/52     151/52    SpO2 (%)     94    97 -  100    92 -  98     92        Body mass index is 24.98 kg/(m^2).    Intake/Output Summary (Last 24 hours) at 01/13/18 1612  Last data filed at 01/13/18 1103   Gross per 24 hour   Intake              240 ml   Output              100 ml   Net              140 ml     Objective        Physical Exam:     General Appearance:    Alert, cooperative, in no acute distress   Head:    Normocephalic, without obvious abnormality, atraumatic   Eyes:            Conjunctivae and sclerae normal, no   icterus, no pallor, corneas clear.   Neck:   No adenopathy, supple, trachea midline, no thyromegaly, no   carotid bruit, no JVD   Lungs:     Clear to auscultation,respirations regular, even and                  unlabored    Heart:    Regular rhythm and normal rate, normal S1 and S2, no            murmur, no gallop, no rub, no click   Chest Wall:    No abnormalities observed   Abdomen:     Normal bowel sounds, no masses, no organomegaly, soft        non-tender, non-distended, no guarding, no rebound                tenderness   Extremities:   Moves all extremities well, no edema, no cyanosis, no             redness   Pulses:   Pulses palpable and equal bilaterally   Skin:   No bleeding, bruising or rash              Procedures    Results Review:     Results from last 7 days  Lab Units 01/13/18  0304 01/12/18  1139 01/11/18  1017   WBC 10*3/mm3 8.57 9.06 8.65   HEMOGLOBIN g/dL 11.4* 12.2 11.5*   PLATELETS 10*3/mm3 239 159 238       Results from last 7 days  Lab Units 01/13/18  0304 01/12/18  1139 01/11/18  1017   SODIUM mmol/L 130* 132* 135   POTASSIUM mmol/L 3.7 3.8 3.2*   CHLORIDE mmol/L 96* 98* 97*   CO2 mmol/L 28.0 23.0* 29.7   BUN mg/dL 36* 29* 16   CREATININE mg/dL 3.51* 3.25* 1.69*   CALCIUM mg/dL 9.1 8.8 8.6   GLUCOSE mg/dL 152* 335* 154*   ALT (SGPT) U/L  --  14 12   AST (SGOT) U/L  --  23 21       Results from last 7 days  Lab Units 01/13/18  0304  01/12/18  1925 01/12/18  1458 01/12/18  1139 01/11/18  1017   CK TOTAL U/L 38 36 34  --   --    TROPONIN I ng/mL 0.053* 0.061* 0.059* 0.035 0.037   CKMB ng/mL 2.67 2.26 2.23  --   --      Lab Results   Component Value Date    INR 1.08 01/12/2018    INR 1.09 11/14/2017    INR 1.08 09/24/2017    INR 0.96 07/24/2017    INR 0.93 08/08/2016    INR 0.93 10/09/2015    INR 0.95 10/09/2015     Lab Results   Component Value Date    MG 2.0 11/14/2017    MG 2.2 11/11/2017    MG 2.3 11/09/2017     Lab Results   Component Value Date    TSH 1.100 01/12/2018    CHLPL 152 10/09/2015    TRIG 80 01/13/2018    HDL 54 (L) 01/13/2018    LDL 79 10/09/2015      Lab Results   Component Value Date    BNP 2101.0 (H) 01/12/2018    BNP 3358.0 (H) 11/01/2017    .0 (H) 07/25/2017         ECG      Echo   Lab Results   Component Value Date    ECHOEFEST 65 11/07/2017       Nuclear Stress Test  No components found for: NUCEF     I reviewed the patient's new clinical results.    Telemetry:Sinus rhythm 60s to 80s with occasional PVCs.       Medication Review:     aspirin 81 mg Oral Daily   atorvastatin 40 mg Oral Nightly   budesonide-formoterol 2 puff Inhalation BID - RT   calcitriol 0.25 mcg Oral Daily   calcium acetate 667 mg Oral TID With Meals   calcium carbonate 500 mg Oral BID   clopidogrel 75 mg Oral Daily   gabapentin 100 mg Oral Nightly   heparin (porcine) 5,000 Units Subcutaneous Q12H   hydrALAZINE 10 mg Oral Q8H   insulin aspart 0-9 Units Subcutaneous 4x Daily AC & at Bedtime   insulin detemir 10 Units Subcutaneous Nightly   losartan 50 mg Oral Q24H   lubrisoft  Apply externally BID   metoprolol tartrate 50 mg Oral Q12H   nitroglycerin 1 inch Topical Q6H   ranolazine 500 mg Oral Q12H            Assessment:  1. Chest pains with some typical and some atypical features for CCS class 3 angina, resolved..  2. ASCVD with coronary angiography in October 2015 by Dr. Dr. Parham revealing 70% stenosis in the mid segment of the LAD, 70%  stenosis in the proximal left circumflex and about 80% stenosis in the midportion of the posterior descending artery branch.  She was left on medical therapy at that time with apparently no further cardiac events since then.  3. Acute diastolic heart failure, improving.  4. Continued smoking of about a pack a day and has been smoker for 44 years and smoked up to 3 packs a day at one point.  5. End-stage renal disease, on hemodialysis reportedly since October 2017 on Tuesday or Thursday and Saturdays.  6. Type 2 diabetes mellitus (insulin-dependent) diagnoses reportedly about 15 years ago.  7. Dyslipidemia.  8. Essential hypertension.  9. COPD.     Recommendations:    1. Ranexa has been added.  2. We'll discontinue the nitro paste and started her on Imdur 60 mg daily.  3. Agree with ultrafiltration for her acute diastolic heart failure.  4. If she remains asymptomatic with the current medications, she could be discharged home in a.m. and follow up in our office in about 3-4 weeks.    I discussed the patients findings and my recommendations with patient and family        PETEY Bains  01/13/18  4:12 PM    Dragon disclaimer:  Much of this encounter note is an electronic transcription/translation of spoken language to printed text. The electronic translation of spoken language may permit erroneous, or at times, nonsensical words or phrases to be inadvertently transcribed; Although I have reviewed the note for such errors, some may still exist.

## 2018-01-13 NOTE — PLAN OF CARE
Problem: Patient Care Overview (Adult)  Goal: Plan of Care Review  Outcome: Ongoing (interventions implemented as appropriate)    Goal: Adult Individualization and Mutuality  Outcome: Ongoing (interventions implemented as appropriate)    Goal: Discharge Needs Assessment  Outcome: Ongoing (interventions implemented as appropriate)      Problem: Pain, Acute (Adult)  Goal: Identify Related Risk Factors and Signs and Symptoms  Outcome: Ongoing (interventions implemented as appropriate)    Goal: Acceptable Pain Control/Comfort Level  Outcome: Ongoing (interventions implemented as appropriate)      Problem: Diabetes, Type 2 (Adult)  Goal: Signs and Symptoms of Listed Potential Problems Will be Absent or Manageable (Diabetes, Type 2)  Outcome: Ongoing (interventions implemented as appropriate)      Problem: Skin Integrity Impairment, Risk/Actual (Adult)  Goal: Identify Related Risk Factors and Signs and Symptoms  Outcome: Ongoing (interventions implemented as appropriate)    Goal: Skin Integrity/Wound Healing  Outcome: Ongoing (interventions implemented as appropriate)

## 2018-01-13 NOTE — PLAN OF CARE
Problem: Diabetes, Type 2 (Adult)  Goal: Signs and Symptoms of Listed Potential Problems Will be Absent or Manageable (Diabetes, Type 2)  Outcome: Ongoing (interventions implemented as appropriate)      Problem: Skin Integrity Impairment, Risk/Actual (Adult)  Goal: Identify Related Risk Factors and Signs and Symptoms  Outcome: Ongoing (interventions implemented as appropriate)    Goal: Skin Integrity/Wound Healing  Outcome: Ongoing (interventions implemented as appropriate)

## 2018-01-13 NOTE — NURSING NOTE
Patient has a small 1x1cm ulceration to left lower leg covered in moist yellow slough.  Bilat legs and feet are dry and flaking, reddened, and swollen.  Treatment ordered.

## 2018-01-13 NOTE — PROGRESS NOTES
Nephrology Progress Note      Subjective     Patient is ESRD on HD TTS. She was admitted yesterday with complains of chest pain . She complains of right chest pain usually on hemodialysis and it is relieved with NTG. It radiates to arms and back. She denies nausea, vomiting or diarrhea. She has chronic edema and is non complaint with fluid restriction. She is an active smoker and has been for several years. She denies chest pain on my evaluation. This is a concurrent visit    Objective       Vital signs :     Temp:  [97.2 °F (36.2 °C)-98.4 °F (36.9 °C)] 97.2 °F (36.2 °C)  Heart Rate:  [58-92] 58  Resp:  [18-20] 18  BP: (110-177)/() 140/74    I/O last 3 completed shifts:  In: 240 [P.O.:240]  Out: -   I/O this shift:  In: -   Out: 100 [Urine:100]    Physical Exam:    General Appearance : alert, pleasant, appears stated age, cooperative and chronically ill  Head  :  normocephalic, without obvious abnormality and atraumatic  Eyes  :  conjunctivae and sclerae normal, no icterus, no pallor and PERRLA  Neck  :  no adenopathy, suppple, no carotid bruit, no JVD   Lungs : rhonchi at bases  Heart :  regular rhythm & normal rate, normal S1, S2 and no murmur, no rub  Abdomen : normal bowel sounds, soft non-tender and no guarding  Extremities : moves extremities well, 2+ edema, no cyanosis and no redness  Skin :  no bleeding, bruising or rash  Neurologic :   orientated to person, place, time and situation, Grossly no focal deficits  Acess : left arm AVF thrill and bruit +      Laboratory Data :       WBC WBC   Date Value Ref Range Status   01/13/2018 8.57 4.50 - 12.50 10*3/mm3 Final   01/12/2018 9.06 4.50 - 12.50 10*3/mm3 Final   01/11/2018 8.65 4.50 - 12.50 10*3/mm3 Final      HGB Hemoglobin   Date Value Ref Range Status   01/13/2018 11.4 (L) 12.0 - 16.0 g/dL Final   01/12/2018 12.2 12.0 - 16.0 g/dL Final   01/11/2018 11.5 (L) 12.0 - 16.0 g/dL Final      HCT Hematocrit   Date Value Ref Range Status   01/13/2018 38.4 37.0 -  47.0 % Final   01/12/2018 41.1 37.0 - 47.0 % Final   01/11/2018 37.9 37.0 - 47.0 % Final      Platlets No results found for: LABPLAT   MCV MCV   Date Value Ref Range Status   01/13/2018 95.3 (H) 80.0 - 94.0 fL Final   01/12/2018 97.2 (H) 80.0 - 94.0 fL Final   01/11/2018 92.2 80.0 - 94.0 fL Final          Sodium Sodium   Date Value Ref Range Status   01/13/2018 130 (L) 135 - 153 mmol/L Final   01/12/2018 132 (L) 135 - 153 mmol/L Final   01/11/2018 135 135 - 153 mmol/L Final      Potassium Potassium   Date Value Ref Range Status   01/13/2018 3.7 3.5 - 5.3 mmol/L Final   01/12/2018 3.8 3.5 - 5.3 mmol/L Final   01/11/2018 3.2 (L) 3.5 - 5.3 mmol/L Final      Chloride Chloride   Date Value Ref Range Status   01/13/2018 96 (L) 99 - 112 mmol/L Final   01/12/2018 98 (L) 99 - 112 mmol/L Final   01/11/2018 97 (L) 99 - 112 mmol/L Final      CO2 CO2   Date Value Ref Range Status   01/13/2018 28.0 24.3 - 31.9 mmol/L Final   01/12/2018 23.0 (L) 24.3 - 31.9 mmol/L Final   01/11/2018 29.7 24.3 - 31.9 mmol/L Final      BUN BUN   Date Value Ref Range Status   01/13/2018 36 (H) 7 - 21 mg/dL Final   01/12/2018 29 (H) 7 - 21 mg/dL Final   01/11/2018 16 7 - 21 mg/dL Final      Creatinine Creatinine   Date Value Ref Range Status   01/13/2018 3.51 (H) 0.43 - 1.29 mg/dL Final   01/12/2018 3.25 (H) 0.43 - 1.29 mg/dL Final   01/11/2018 1.69 (H) 0.43 - 1.29 mg/dL Final      Calcium Calcium   Date Value Ref Range Status   01/13/2018 9.1 7.7 - 10.0 mg/dL Final   01/12/2018 8.8 7.7 - 10.0 mg/dL Final   01/11/2018 8.6 7.7 - 10.0 mg/dL Final      PO4 No results found for: CAPO4   Albumin Albumin   Date Value Ref Range Status   01/12/2018 3.80 3.40 - 4.80 g/dL Final   01/11/2018 3.90 3.40 - 4.80 g/dL Final      Magnesium No results found for: MG       PTH               No results found for: PTH      Medications :       amLODIPine 5 mg Oral Q24H   aspirin 81 mg Oral Daily   atorvastatin 40 mg Oral Nightly   budesonide-formoterol 2 puff Inhalation BID  - RT   calcitriol 0.25 mcg Oral Daily   calcium acetate 667 mg Oral TID With Meals   calcium carbonate 500 mg Oral BID   clopidogrel 75 mg Oral Daily   gabapentin 100 mg Oral Nightly   heparin (porcine) 5,000 Units Subcutaneous Q12H   hydrALAZINE 10 mg Oral Q8H   insulin aspart 0-9 Units Subcutaneous 4x Daily AC & at Bedtime   insulin detemir 10 Units Subcutaneous Nightly   metoprolol tartrate 50 mg Oral Q12H   nitroglycerin 1 inch Topical Q6H            Assessment/Plan     1. ESRD : continue HD TTS. Will attempt 4 Kg UF today    2. Anemia : Hb in range, no epogen    3. Chest pain/CAD : cardiology on case    4. HTN : sI will dc amlodipine due to edema and add losartan    5. Diastolic CHF : UF as tolerated     Discussed with patient      Jai Chavez MD  01/13/18  11:11 AM

## 2018-01-13 NOTE — PROGRESS NOTES
Discharge Planning Assessment   Horacio     Patient Name: Sara Cardona  MRN: [id number]  Today's Date: 1/13/2018    Admit Date: 1/12/2018          Discharge Needs Assessment       01/13/18 0959    Living Environment    Lives With spouse   Pt admitted on 1/12/18. Pt lives at home with her spouse Ángel and plans to return home at discharge.     Transportation Available car;family or friend will provide   Pt's  will provide transportation for pt at discharge.     Living Environment    Quality Of Family Relationships supportive    Able to Return to Prior Living Arrangements yes    Discharge Needs Assessment    Equipment Currently Used at Home bipap/ cpap;commode;walker, rolling;wheelchair;oxygen   Pt utilizes bipap, bedside commode, home 02, rolling walker, and wheelchair from [facility].     Equipment Needed After Discharge nebulizer   Pt request for a nebulizer to be arranged prior to discharge. With MD order, nebulizer to be arranged with [facility].     Discharge Facility/Level Of Care Needs home with home health   Pt utilizes [facility]. HH to be contacted at discharge, [phone].            Discharge Plan       01/13/18 1002    Case Management/Social Work Plan    Plan SS spoke with pt on this date. Pt lives at home and plans to return home at discharge. Pt has support from her spouse at home. Pt utilizes DME from [facility]. Pt does request for a nebulizer to be arranged prior to discharge. Pt utilizes [facility]. HH to be contacted at discharge. Pt utilizes [facility] with no issues at this time. SS will follow and assist as needed.     Patient/Family In Agreement With Plan yes        Discharge Placement     No information found        Demographic Summary       01/13/18 0959    Referral Information    Referral Source nursing    Reason For Consult discharge planning    Primary Care Physician Information    Name Marcelino Sheehan               01/13/18 0959    Legal    Legal Comments Pt does have a living will that is on file at [facility].           Livier Stewart

## 2018-01-14 VITALS
RESPIRATION RATE: 18 BRPM | SYSTOLIC BLOOD PRESSURE: 98 MMHG | DIASTOLIC BLOOD PRESSURE: 52 MMHG | OXYGEN SATURATION: 96 % | BODY MASS INDEX: 24.51 KG/M2 | HEART RATE: 71 BPM | HEIGHT: 61 IN | TEMPERATURE: 97.8 F | WEIGHT: 129.8 LBS

## 2018-01-14 LAB
ANION GAP SERPL CALCULATED.3IONS-SCNC: 9.6 MMOL/L (ref 3.6–11.2)
BASOPHILS # BLD AUTO: 0.05 10*3/MM3 (ref 0–0.3)
BASOPHILS NFR BLD AUTO: 0.6 % (ref 0–2)
BUN BLD-MCNC: 31 MG/DL (ref 7–21)
BUN/CREAT SERPL: 12.1 (ref 7–25)
CALCIUM SPEC-SCNC: 8.8 MG/DL (ref 7.7–10)
CHLORIDE SERPL-SCNC: 100 MMOL/L (ref 99–112)
CO2 SERPL-SCNC: 24.4 MMOL/L (ref 24.3–31.9)
CREAT BLD-MCNC: 2.56 MG/DL (ref 0.43–1.29)
DEPRECATED RDW RBC AUTO: 58.4 FL (ref 37–54)
EOSINOPHIL # BLD AUTO: 0.2 10*3/MM3 (ref 0–0.7)
EOSINOPHIL NFR BLD AUTO: 2.3 % (ref 0–7)
ERYTHROCYTE [DISTWIDTH] IN BLOOD BY AUTOMATED COUNT: 17.5 % (ref 11.5–14.5)
GFR SERPL CREATININE-BSD FRML MDRD: 19 ML/MIN/1.73
GLUCOSE BLD-MCNC: 161 MG/DL (ref 70–110)
GLUCOSE BLDC GLUCOMTR-MCNC: 143 MG/DL (ref 70–130)
GLUCOSE BLDC GLUCOMTR-MCNC: 205 MG/DL (ref 70–130)
HCT VFR BLD AUTO: 36.8 % (ref 37–47)
HGB BLD-MCNC: 10.9 G/DL (ref 12–16)
IMM GRANULOCYTES # BLD: 0.02 10*3/MM3 (ref 0–0.03)
IMM GRANULOCYTES NFR BLD: 0.2 % (ref 0–0.5)
LYMPHOCYTES # BLD AUTO: 1.79 10*3/MM3 (ref 1–3)
LYMPHOCYTES NFR BLD AUTO: 20.9 % (ref 16–46)
MCH RBC QN AUTO: 28.5 PG (ref 27–33)
MCHC RBC AUTO-ENTMCNC: 29.6 G/DL (ref 33–37)
MCV RBC AUTO: 96.3 FL (ref 80–94)
MONOCYTES # BLD AUTO: 0.79 10*3/MM3 (ref 0.1–0.9)
MONOCYTES NFR BLD AUTO: 9.2 % (ref 0–12)
NEUTROPHILS # BLD AUTO: 5.7 10*3/MM3 (ref 1.4–6.5)
NEUTROPHILS NFR BLD AUTO: 66.8 % (ref 40–75)
OSMOLALITY SERPL CALC.SUM OF ELEC: 278.3 MOSM/KG (ref 273–305)
PLATELET # BLD AUTO: 204 10*3/MM3 (ref 130–400)
PMV BLD AUTO: 10.7 FL (ref 6–10)
POTASSIUM BLD-SCNC: 4.6 MMOL/L (ref 3.5–5.3)
RBC # BLD AUTO: 3.82 10*6/MM3 (ref 4.2–5.4)
SODIUM BLD-SCNC: 134 MMOL/L (ref 135–153)
WBC NRBC COR # BLD: 8.55 10*3/MM3 (ref 4.5–12.5)

## 2018-01-14 PROCEDURE — 99217 PR OBSERVATION CARE DISCHARGE MANAGEMENT: CPT | Performed by: INTERNAL MEDICINE

## 2018-01-14 PROCEDURE — 85025 COMPLETE CBC W/AUTO DIFF WBC: CPT | Performed by: INTERNAL MEDICINE

## 2018-01-14 PROCEDURE — G0378 HOSPITAL OBSERVATION PER HR: HCPCS

## 2018-01-14 PROCEDURE — 94799 UNLISTED PULMONARY SVC/PX: CPT

## 2018-01-14 PROCEDURE — 82962 GLUCOSE BLOOD TEST: CPT

## 2018-01-14 PROCEDURE — 80048 BASIC METABOLIC PNL TOTAL CA: CPT | Performed by: INTERNAL MEDICINE

## 2018-01-14 RX ORDER — NITROGLYCERIN 0.4 MG/1
0.4 TABLET SUBLINGUAL
Qty: 15 TABLET | Refills: 0 | Status: SHIPPED | OUTPATIENT
Start: 2018-01-14

## 2018-01-14 RX ORDER — CLOPIDOGREL BISULFATE 75 MG/1
75 TABLET ORAL DAILY
Qty: 30 TABLET | Refills: 0 | Status: SHIPPED | OUTPATIENT
Start: 2018-01-14 | End: 2018-04-03 | Stop reason: HOSPADM

## 2018-01-14 RX ORDER — LOSARTAN POTASSIUM 25 MG/1
25 TABLET ORAL
Qty: 30 TABLET | Refills: 0 | Status: SHIPPED | OUTPATIENT
Start: 2018-01-14 | End: 2018-04-03 | Stop reason: HOSPADM

## 2018-01-14 RX ORDER — LOSARTAN POTASSIUM 25 MG/1
25 TABLET ORAL
Status: DISCONTINUED | OUTPATIENT
Start: 2018-01-14 | End: 2018-01-14 | Stop reason: HOSPADM

## 2018-01-14 RX ORDER — ISOSORBIDE MONONITRATE 60 MG/1
60 TABLET, EXTENDED RELEASE ORAL
Qty: 30 TABLET | Refills: 0 | Status: SHIPPED | OUTPATIENT
Start: 2018-01-14 | End: 2018-04-03 | Stop reason: HOSPADM

## 2018-01-14 RX ORDER — RANOLAZINE 500 MG/1
500 TABLET, EXTENDED RELEASE ORAL EVERY 12 HOURS SCHEDULED
Qty: 60 TABLET | Refills: 0 | Status: SHIPPED | OUTPATIENT
Start: 2018-01-14

## 2018-01-14 RX ADMIN — Medication: at 08:01

## 2018-01-14 RX ADMIN — Medication 500 MG: at 08:00

## 2018-01-14 RX ADMIN — ASPIRIN 81 MG: 81 TABLET ORAL at 08:00

## 2018-01-14 RX ADMIN — CLONAZEPAM 0.25 MG: 0.5 TABLET ORAL at 07:59

## 2018-01-14 RX ADMIN — HYDRALAZINE HYDROCHLORIDE 10 MG: 10 TABLET ORAL at 04:46

## 2018-01-14 RX ADMIN — CALCITRIOL 0.25 MCG: 0.25 CAPSULE, LIQUID FILLED ORAL at 08:00

## 2018-01-14 RX ADMIN — CLOPIDOGREL BISULFATE 75 MG: 75 TABLET, FILM COATED ORAL at 07:59

## 2018-01-14 RX ADMIN — CALCIUM ACETATE 667 MG: 667 CAPSULE ORAL at 08:00

## 2018-01-14 NOTE — PROGRESS NOTES
Nephrology Progress Note      Subjective     She denies chest pain or SOB. Tolerated HD yesterday    Objective       Vital signs :     Temp:  [97.6 °F (36.4 °C)-99.4 °F (37.4 °C)] 97.6 °F (36.4 °C)  Heart Rate:  [55-71] 71  Resp:  [18-20] 20  BP: ()/(48-74) 96/48    I/O last 3 completed shifts:  In: 1220 [P.O.:1220]  Out: 150 [Urine:150]  I/O this shift:  In: 360 [P.O.:360]  Out: -     Physical Exam:    General Appearance : alert, pleasant, appears stated age, cooperative and chronically ill  Head  :  normocephalic  Eyes  :  no pallor   Neck  :  no JVD   Lungs : rhonchi at bases  Heart :  regular rhythm & normal rate, normal S1, S2 and no murmur  Abdomen : normal bowel sounds, soft non-tender and no guarding  Extremities : 2+ edema  Neurologic :   orientated to person, place, time and situation, Grossly no focal deficits  Acess : left arm AVF thrill and bruit +      Laboratory Data :       WBC WBC   Date Value Ref Range Status   01/14/2018 8.55 4.50 - 12.50 10*3/mm3 Final   01/13/2018 8.57 4.50 - 12.50 10*3/mm3 Final   01/12/2018 9.06 4.50 - 12.50 10*3/mm3 Final      HGB Hemoglobin   Date Value Ref Range Status   01/14/2018 10.9 (L) 12.0 - 16.0 g/dL Final   01/13/2018 11.4 (L) 12.0 - 16.0 g/dL Final   01/12/2018 12.2 12.0 - 16.0 g/dL Final      HCT Hematocrit   Date Value Ref Range Status   01/14/2018 36.8 (L) 37.0 - 47.0 % Final   01/13/2018 38.4 37.0 - 47.0 % Final   01/12/2018 41.1 37.0 - 47.0 % Final      Platlets No results found for: LABPLAT   MCV MCV   Date Value Ref Range Status   01/14/2018 96.3 (H) 80.0 - 94.0 fL Final   01/13/2018 95.3 (H) 80.0 - 94.0 fL Final   01/12/2018 97.2 (H) 80.0 - 94.0 fL Final          Sodium Sodium   Date Value Ref Range Status   01/14/2018 134 (L) 135 - 153 mmol/L Final   01/13/2018 130 (L) 135 - 153 mmol/L Final   01/12/2018 132 (L) 135 - 153 mmol/L Final      Potassium Potassium   Date Value Ref Range Status   01/14/2018 4.6 3.5 - 5.3 mmol/L Final   01/13/2018 3.7 3.5 -  5.3 mmol/L Final   01/12/2018 3.8 3.5 - 5.3 mmol/L Final      Chloride Chloride   Date Value Ref Range Status   01/14/2018 100 99 - 112 mmol/L Final   01/13/2018 96 (L) 99 - 112 mmol/L Final   01/12/2018 98 (L) 99 - 112 mmol/L Final      CO2 CO2   Date Value Ref Range Status   01/14/2018 24.4 24.3 - 31.9 mmol/L Final   01/13/2018 28.0 24.3 - 31.9 mmol/L Final   01/12/2018 23.0 (L) 24.3 - 31.9 mmol/L Final      BUN BUN   Date Value Ref Range Status   01/14/2018 31 (H) 7 - 21 mg/dL Final   01/13/2018 36 (H) 7 - 21 mg/dL Final   01/12/2018 29 (H) 7 - 21 mg/dL Final      Creatinine Creatinine   Date Value Ref Range Status   01/14/2018 2.56 (H) 0.43 - 1.29 mg/dL Final   01/13/2018 3.51 (H) 0.43 - 1.29 mg/dL Final   01/12/2018 3.25 (H) 0.43 - 1.29 mg/dL Final      Calcium Calcium   Date Value Ref Range Status   01/14/2018 8.8 7.7 - 10.0 mg/dL Final   01/13/2018 9.1 7.7 - 10.0 mg/dL Final   01/12/2018 8.8 7.7 - 10.0 mg/dL Final      PO4 No results found for: CAPO4   Albumin Albumin   Date Value Ref Range Status   01/12/2018 3.80 3.40 - 4.80 g/dL Final      Magnesium No results found for: MG       PTH               No results found for: PTH      Medications :       aspirin 81 mg Oral Daily   atorvastatin 40 mg Oral Nightly   budesonide-formoterol 2 puff Inhalation BID - RT   calcitriol 0.25 mcg Oral Daily   calcium acetate 667 mg Oral TID With Meals   clopidogrel 75 mg Oral Daily   gabapentin 100 mg Oral Nightly   heparin (porcine) 5,000 Units Subcutaneous Q12H   hydrALAZINE 10 mg Oral Q8H   insulin aspart 0-9 Units Subcutaneous 4x Daily AC & at Bedtime   insulin detemir 10 Units Subcutaneous Nightly   isosorbide mononitrate 60 mg Oral Q24H   losartan 50 mg Oral Q24H   lubrisoft  Apply externally BID   metoprolol tartrate 25 mg Oral Q12H   ranolazine 500 mg Oral Q12H            Assessment/Plan     1. ESRD : continue HD TTS. Dc calcium carbonate as Ca is normal    2. Anemia : Hb in range, no epogen    3. Chest pain/CAD :  cardiology on case    4. HTN : BP on lower side, reduce losartan    5. Diastolic CHF      Discussed with patient, Dr Abdulaziz Chavez MD  01/14/18  10:22 AM

## 2018-01-14 NOTE — DISCHARGE SUMMARY
Date of Admission: 1/12/2018    Date of Discharge:  1/14/2018    PCP: Marcelino Sheehan MD    Admission Diagnosis:   Please see admission H&P    Discharge Diagnosis:   Chest pain with indeterminate range troponin elevation in the setting of known multivessel CAD  Acute diastolic CHF  ESRD on HD  DM II, insulin dependent  COPD in the setting of chronic hypoxic respiratory failure and ongoing tobacco abuse  Essential HTN  Dyslipidemia      Procedures Performed:  None     Consults:   Consults     Date and Time Order Name Status Description    1/12/2018 1439 Inpatient Consult to Nephrology      1/12/2018 1439 Inpatient Consult to Cardiology      1/12/2018 1312 Hospitalist (on-call MD unless specified)              History of Present Illness:  Sara Cardona is a 67 y.o. female who presented to [facility] ED with CC of chest pain. Please see admission H&P for complete details.     In the ED, EKG revealed sinus rhythm with nonspecific ST and T changes. Initial CE's were in the indeterminate range. CXR revealed changes consistent with CHF. Her CP resolved with SL nitroglycerin. Nitropaste was then applied.        Hospital Course  Sara Cardona was admitted to the telemetry floor for further evaluation and treatment. ASA, Plavix, beta blocker and a statin were ordered in addition to the nitropaste. Cardiology and nephrology were consulted for further input.     Serial CE's were followed and remained in the indeterminate range but trended downward. TSH was normal. An echo had been obtained in 11/17 revealing an EF of 65%. Her acute diastolic CHF was thought to be 2/2 inadequate HD as she had stopped her HD session early due to chest pain in addition to noncompliance with her fluid restriction. She underwent HD with UF per nephrology and tolerated this well. Nephrology stopped Norvasc in the setting of her peripheral edema and started losartan.     Her cardiac cath from 10/15 was reviewed revealing 70% stenosis in the mid LAD, 70%  stenosis in the proximal left circumflex and about 80% stenosis in the midportion of the PDA. However, she was left on medical therapy due to calcified coronary lesions making it difficult for PCI. Cardiology recommended to add Ranexa while later stopping the Nitropaste and transitioning to Imdur as well. They recommended to proceed with a nuclear stress test if she continued to have chest pain.     Mrs. Cardona was seen and examined with MARILY Leyva on 1/14/18. Her BP was borderline low and the losartan was reduced per nephrology recs. Routine AM labs remained stable. She felt much improved from upon admission with no further episodes of CP. She was deemed medically stable for discharge after discussion with cardiology and nephrology. She was given prescriptions for losartan, Ranexa and Imdur. She was instructed to follow up with her PCP in 1 week and cardiology in 3-4 weeks.     Condition on Discharge:  Stable    Vital Signs  Vitals:    01/14/18 0744   BP:    Pulse: 71   Resp: 20   Temp:    SpO2: 96%       Physical Exam:  General:    Awake, alert, in no acute distress, chronically ill appearing   Heart:      Normal S1 and S2. Regular rate and rhythm. No significant murmur, rubs or gallops appreciated.   Lungs:     Respirations regular, even and unlabored. Bibasilar rales.   Abdomen:   Soft and nontender. No guarding, rebound tenderness or  organomegaly noted. Bowel sounds present x 4.   Extremities:  2+ LE pitting edema noted. Moves UE and LE equally B/L.     Discharge Disposition:   home      Discharge Medications:   Sara Cardona   Home Medication Instructions CHATO:[id number]    Printed on:01/14/18 1055   Medication Information                      albuterol (PROVENTIL HFA;VENTOLIN HFA) 108 (90 BASE) MCG/ACT inhaler  Inhale 2 puffs every 6 (six) hours as needed for wheezing.             aspirin 81 MG EC tablet  Take 1 tablet by mouth Daily.             budesonide-formoterol (SYMBICORT) 80-4.5 MCG/ACT  inhaler  Inhale 2 puffs 2 (Two) Times a Day.             calcitriol (ROCALTROL) 0.25 MCG capsule  Take 0.25 mcg by mouth Daily.             calcium acetate (PHOS BINDER,) 667 MG capsule capsule  Take 1 capsule by mouth 3 (Three) Times a Day With Meals.             clonazePAM (KlonoPIN) 0.5 MG tablet  Take 0.5 mg by mouth 2 (Two) Times a Day.             clopidogrel (PLAVIX) 75 MG tablet  Take 1 tablet by mouth Daily.             ferrous sulfate 325 (65 FE) MG tablet  Take 325 mg by mouth 2 (Two) Times a Day With Meals.             gabapentin (NEURONTIN) 100 MG capsule  Take 1 capsule by mouth every night at bedtime.             hydrALAZINE (APRESOLINE) 10 MG tablet  Take 1 tablet by mouth Every 8 (Eight) Hours.             insulin glargine (LANTUS) 100 UNIT/ML injection  Inject 10 Units under the skin Every Night. Patient spouse says she was given 20 units on 11/1 due to her higher than normal glucose reading             isosorbide mononitrate (IMDUR) 60 MG 24 hr tablet  Take 1 tablet by mouth Daily.             losartan (COZAAR) 25 MG tablet  Take 1 tablet by mouth Daily.             metoprolol tartrate (LOPRESSOR) 25 MG tablet  Take 1 tablet by mouth Every 12 (Twelve) Hours.             nitroglycerin (NITROSTAT) 0.4 MG SL tablet  Place 1 tablet under the tongue Every 5 (Five) Minutes As Needed for Chest Pain. Take no more than 3 doses in 15 minutes.             ranolazine (RANEXA) 500 MG 12 hr tablet  Take 1 tablet by mouth Every 12 (Twelve) Hours.                   Discharge Diet:        Dietary Orders            Start     Ordered    01/12/18 1440  Diet Regular; Cardiac, Consistent Carbohydrate, Renal  Diet Effective Now     Question Answer Comment   Diet Texture / Consistency Regular    Common Modifiers Cardiac    Common Modifiers Consistent Carbohydrate    Common Modifiers Renal        01/12/18 1439          Activity at Discharge:  activity as tolerated    Follow-up Appointments:  Additional Instructions for  the Follow-ups that You Need to Schedule     Discharge Follow-up with PCP    As directed    Follow Up Details:  Follow up with Dr. Sheehan in 1 week.           Discharge Follow-up with Specified Provider: Follow up with Dr. Eng in 3-4 weeks.    As directed    To:  Follow up with Dr. Eng in 3-4 weeks.                 Follow-up Information     Follow up with Marcelino Sheehan MD .    Specialty:  Family Medicine    Why:  Follow up with Dr. Sheehan in 1 week.    Contact information:    [address]  [address]  [facility] [address]  [phone]          Your Scheduled Appointments     Jan 19, 2018 12:00 PM EST   Full PFT with  [facility] PULM LAB ROOM   [facility] CARDIOPULMONARY ([place])    [address] [address]   [phone]            Feb 05, 2018  8:20 AM EST   Follow Up with MEAGAN Soriano   [facility] (--)    [address] [facility] [id number] [phone]           Arrive 15 minutes prior to appointment.            Mar 16, 2018  1:00 PM EDT   Follow Up with Devin Martin MD   [facility] PULMONOLOGY CRITICAL [facility]ARE (--)    [address]  [facility] [address]   [phone]           Arrive 15 minutes prior to appointment.                    Test Results Pending at Discharge:  None     Fredis Cintron DO  01/14/18  10:51 AM

## 2018-01-15 NOTE — PROGRESS NOTES
Case Management/Social Work    Patient Name:  Sara Cardona  YOB: 1950  MRN: [id number]  Admit Date:  1/12/2018 1/14/18--SS received call via on-call per lead nurse who states pt is being discharged and they were not able to get in contact with [facility] to make them aware. SS advised lead nurse to continue trying to contact [facility] to make them aware pt was being discharged home and to give report. SS faxed pt's information to [facility] [phone] on this date. No other needs identified.     Electronically signed by:  Zara Rueda  01/15/18 7:13 AM

## 2018-01-15 NOTE — DISCHARGE PLACEMENT REQUEST
"DulceSara hui (67 y.o. 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)   Code Status:  Prior    Ht:  154.9 cm (61\")   Wt:  58.9 kg (129 lb 12.8 oz)    Admission Cmt:  None   Principal Problem:  Chest pain at rest [R07.9]                 Active Insurance as of 1/12/2018     Primary Coverage     Payor Plan Insurance Group Employer/Plan Group    MEDICARE MEDICARE B ONLY      Payor Plan Address Payor Plan Phone Number Effective From Effective To    [address] [address] [phone] 7/1/2015     [place] [address]       Subscriber Name Subscriber Birth Date Member ID       SARA CARDONA 1950 [id number]           Secondary Coverage     Payor Plan Insurance Group Employer/Plan Group    [employer]ICA[place]      Payor Plan Address Payor Plan Phone Number Effective From Effective To    [address] [address] [phone] 6/30/2016     [place] [address]       Subscriber Name Subscriber Birth Date Member ID       SARA CARDONA 1950 [id number]                 Emergency Contacts      (Rel.) Home Phone Work Phone Mobile Phone    Ángel Cardona (Spouse) [phone] -- --            Emergency Contact Information     Name Relation Home Work Mobile    Ángel Cardona Spouse [phone]            Insurance Information                MEDICARE/MEDICARE B ONLY Phone: [phone]    Subscriber: Sara Cardona Subscriber#: [id number]    Group#:  Precert#: "         [facility]/WELLCARE MEDICAID Phone: [phone]    Subscriber: Sara Cardona Subscriber#: [id number]    Group#:  Precert#:           Treatment Team     Provider Relationship Specialty Contact    Estephanie Ledesma, RRT Respiratory Therapist --      Autumn Mckinney, RN Registered Nurse --      Caryl Caicedo Patient Care Technician --            Problem List           Codes Noted - Resolved       Hospital    * (Principal)Chest pain at rest ICD-10-CM: R07.9  ICD-9-CM: 786.50 2018 - Present       Non-Hospital    Shortness of breath ICD-10-CM: R06.02  ICD-9-CM: 786.05 12/15/2017 - Present    Tobacco use ICD-10-CM: Z72.0  ICD-9-CM: 305.1 12/15/2017 - Present    STEPHANIE (obstructive sleep apnea) ICD-10-CM: G47.33  ICD-9-CM: 327.23 12/15/2017 - Present    Acute on chronic respiratory failure ICD-10-CM: J96.20  ICD-9-CM: 518.84 2017 - Present    Acute on chronic renal failure ICD-10-CM: N17.9, N18.9  ICD-9-CM: 584.9, 585.9 2017 - Present    CHF (congestive heart failure) ICD-10-CM: I50.9  ICD-9-CM: 428.0 2017 - Present    Acute renal failure ICD-10-CM: N17.9  ICD-9-CM: 584.9 2016 - Present    Essential hypertension ICD-10-CM: I10  ICD-9-CM: 401.9 Unknown - Present    Dyslipidemia ICD-10-CM: E78.5  ICD-9-CM: 272.4 Unknown - Present    Diabetic neuropathy ICD-10-CM: E11.40  ICD-9-CM: 250.60, 357.2 Unknown - Present    Chronic pain ICD-10-CM: G89.29  ICD-9-CM: 338.29 Unknown - Present    Anxiety ICD-10-CM: F41.9  ICD-9-CM: 300.00 Unknown - Present    Anemia ICD-10-CM: D64.9  ICD-9-CM: 285.9 2016 - Present             History & Physical      Edward Solorzano MD at 2018  2:23 PM          Patient Identification:  Name:  Sara Cardona  Age:  67 y.o.  Sex:  female  :  1950  MRN:  [id number]   Visit Number:  [id number]  Primary Care Physician:  Marcelino Sheehan MD    I have seen the patient in conjunction with Norma Sims PA-C and I agree with the  following statements:     Chief complaint: Chest pain     History of presenting illness: Patient is a 67 y.o. female well known to this facility with history of CAD, COPD, ESRD on hemodialysis (Tu,Th, Sat), DM2-ID, ASCVD, hypertension, and hyperlipidemia. She wears 2 liters of oxygen via NC at home.   She reports she has been having right-sided chest pain that is relieved by nitroglycerin.  She reports it radiates to the back and into her bilateral arms.  She states that she has had many episodes of chest pain, but they usually do not radiate in this nature. She reports that she first started getting the chest pain during dialysis earlier in the week. It resolved after a day or so but recurred during dialysis yesterday. She came to the ED afterwards yesterday but left AMA because she could not be seen soon enough. However, her pain persisted overnight and today so she came back today to be further evaluated. She reports she has recently been treated for pneumonia by her pulmonologist Dr. Martin.  She apparently has home CPAP that she does not use per review of pulmonology note.  Patient states she has stopped smoking.  Her  states she has not stopped smoking.  She then states she may smokes one or two cigarettes a day. In any event, at the time of evaluation, she reports she is chest pain free. Her pain was relieved by nitroglycerin in the ED. She reports she was told at dialysis that her chest pain may be caused from removing too much fluid at times.  She has been admitted for further evaluation and treatment.      Oft note, her last cardiac catheterization was in 2015 at this facility performed by Dr. Parham.  She was felt at that time to have native 3-vessel coronary artery disease and medical management was recommended.  It was mentioned that if she continued to have anginal symtpoms, it would not be unreseaonable to consider percutaneous revascularization of her LAD and circumflex arteries.     Last  echocardiogram:     Results for orders placed during the hospital encounter of 11/01/17   Adult Transthoracic Echo Limited W/ Cont if Necessary Per Protocol    Narrative · The study is technically adequate for diagnosis.  · Left ventricular systolic function is normal. Estimated EF = 65%.  · There are no significant valvular abnormalities noted.  · There is no evidence of pericardial effusion.          ---------------------------------------------------------------------------------------------------------------------   Review of Systems   Constitutional: Negative for chills, diaphoresis and fever.   HENT: Negative for congestion and drooling.    Eyes: Negative for pain and discharge.   Respiratory: Positive for shortness of breath. Negative for cough and wheezing.    Cardiovascular: Positive for chest pain and leg swelling. Negative for palpitations.   Gastrointestinal: Positive for nausea. Negative for abdominal distention and abdominal pain.   Endocrine: Negative for cold intolerance and heat intolerance.   Genitourinary: Negative for difficulty urinating, dysuria, hematuria and pelvic pain.   Musculoskeletal: Negative for arthralgias, gait problem, myalgias and neck pain.   Skin: Positive for wound (left anterior shin, chronic). Negative for color change, pallor and rash.   Allergic/Immunologic: Negative for environmental allergies and food allergies.   Neurological: Positive for numbness (chronic neuropathy b/l lower ext). Negative for dizziness, tremors, seizures, syncope, weakness, light-headedness and headaches.   Hematological: Negative for adenopathy. Does not bruise/bleed easily.   Psychiatric/Behavioral: Negative for agitation, behavioral problems and confusion.        Chronic anxiety      ---------------------------------------------------------------------------------------------------------------------   Past Medical History:   Diagnosis Date   • Anxiety    • Aortic stenosis    • ASCVD  (arteriosclerotic cardiovascular disease)    • Breast cancer    • CHF (congestive heart failure) 7/24/2017   • Chronic pain    • COPD (chronic obstructive pulmonary disease)    • Diabetic neuropathy    • Dyslipidemia    • Essential hypertension    • Insulin dependent diabetes mellitus    • Left carotid bruit    • Recurrent pneumonia    • Renal failure      Past Surgical History:   Procedure Laterality Date   • APPENDECTOMY     • CARDIAC CATHETERIZATION     • COLONOSCOPY     • COLONOSCOPY N/A 7/10/2017    Procedure: COLONOSCOPY;  Surgeon: Zheng Suarez III, MD;  Location: [facility] OR;  Service:    • ENDOSCOPY N/A 7/10/2017    Procedure: ESOPHAGOGASTRODUODENOSCOPY;  Surgeon: Zheng Suarez III, MD;  Location: [facility] OR;  Service:    • HYSTERECTOMY     • MASTECTOMY Right    • RHINOPLASTY       Family History   Problem Relation Age of Onset   • Diabetes Mother    • Lung cancer Father      Social History     Social History   • Marital status:      Spouse name: N/A   • Number of children: N/A   • Years of education: N/A     Social History Main Topics   • Smoking status: Current Every Day Smoker     Packs/day: 0.25     Types: Cigarettes   • Smokeless tobacco: Never Used   • Alcohol use No   • Drug use: No   • Sexual activity: Defer     Other Topics Concern   • None     Social History Narrative     ---------------------------------------------------------------------------------------------------------------------   Allergies:  Review of patient's allergies indicates no known allergies.  ---------------------------------------------------------------------------------------------------------------------   Prior to Admission Medications     Prescriptions Last Dose Informant Patient Reported? Taking?    albuterol (PROVENTIL HFA;VENTOLIN HFA) 108 (90 BASE) MCG/ACT inhaler  Spouse/Significant Other No No    Inhale 2 puffs every 6 (six) hours as needed for wheezing.    Patient taking differently:  Inhale 2 puffs  Every 6 (Six) Hours.    amLODIPine (NORVASC) 5 MG tablet   No No    Take 1 tablet by mouth Daily.    aspirin 81 MG EC tablet   No No    Take 1 tablet by mouth Daily.    budesonide-formoterol (SYMBICORT) 80-4.5 MCG/ACT inhaler  Spouse/Significant Other Yes No    Inhale 2 puffs 2 (Two) Times a Day.    calcitriol (ROCALTROL) 0.25 MCG capsule  Spouse/Significant Other Yes No    Take 0.25 mcg by mouth Daily.    calcium acetate (PHOS BINDER,) 667 MG capsule capsule   No No    Take 1 capsule by mouth 3 (Three) Times a Day With Meals.    calcium carbonate (OS-MARIA ELENA) 600 MG tablet   Yes No    Take 1,000 mg by mouth Daily.    clonazePAM (KlonoPIN) 0.5 MG tablet  Spouse/Significant Other Yes No    Take 0.5 mg by mouth 2 (Two) Times a Day.    ferrous sulfate 325 (65 FE) MG tablet  Spouse/Significant Other Yes No    Take 325 mg by mouth 2 (Two) Times a Day With Meals.    gabapentin (NEURONTIN) 100 MG capsule  Spouse/Significant Other No No    Take 1 capsule by mouth every night at bedtime.    hydrALAZINE (APRESOLINE) 10 MG tablet   No No    Take 1 tablet by mouth Every 8 (Eight) Hours.    insulin glargine (LANTUS) 100 UNIT/ML injection  Spouse/Significant Other Yes No    Inject 5 Units under the skin Every Night. Patient spouse says she was given 20 units on 11/1 due to her higher than normal glucose reading    metoprolol tartrate (LOPRESSOR) 50 MG tablet   No No    Take 1 tablet by mouth Every 12 (Twelve) Hours.        Hospital Scheduled Meds:    [START ON 1/13/2018] aspirin 81 mg Oral Daily   atorvastatin 40 mg Oral Nightly   heparin (porcine) 5,000 Units Subcutaneous Q12H   insulin aspart 0-9 Units Subcutaneous 4x Daily AC & at Bedtime   ipratropium-albuterol 3 mL Nebulization 4x Daily - RT   nitroglycerin 1 inch Topical Q6H        ---------------------------------------------------------------------------------------------------------------------   Vital Signs:  Temp:  [97.8 °F (36.6 °C)-98.6 °F (37 °C)] 98.4 °F (36.9  °C)  Heart Rate:  [70-99] 85  Resp:  [18-20] 18  BP: (135-177)/() 165/77  Last 3 weights    01/12/18  1113 01/12/18  1438   Weight: 52.6 kg (116 lb) 59.2 kg (130 lb 7 oz)     Body mass index is 24.65 kg/(m^2).  ---------------------------------------------------------------------------------------------------------------------       Physical Exam    Physical Exam:  Constitutional: Chronically ill-appearing.  No acute distress. Appears anxious.     HENT:  Head: Normocephalic and atraumatic.  Mouth:  Moist mucous membranes.    Eyes:  Conjunctivae and EOM are normal.  Pupils are equal, round, and reactive to light.  No scleral icterus.  Neck:  Neck supple.  No JVD present.    Cardiovascular:  Normal rate, regular rhythm. Normal s1/s2 with no murmur.  Pulmonary/Chest:  Diminished breath sounds bilaterally, with bibasilar rales.  Abdominal:  Soft.  Bowel sounds are present.  No distension and no tenderness.   Musculoskeletal:  No tenderness and no deformity.  No red or swollen joints anywhere.    Neurological:  Alert and oriented to person, place, and time.  No cranial nerve deficit.  No tongue deviation.  No facial droop.  No slurred speech.   Skin:  Skin is warm and dry.  No rash noted.  No pallor. Small ulceration left anterior shin, chronic, does not appear infected.   Psychiatric:  Normal mood and affect.  Behavior is normal.  Judgment and thought content normal.   Peripheral vascular:  1-2+ pitting edema b/l lower ext. Palpable pulses on all 4 extremities.  ---------------------------------------------------------------------------------------------------------------------  EKG:  Sinus tachycardia, PAC pairs. Left atrial enlargement. Nonspecific ST and T changes. No overt ST changes to suggest acute ischemia.        ---------------------------------------------------------------------------------------------------------------------     Results from last 7 days  Lab Units 01/12/18  1139 01/11/18  1017   WBC  10*3/mm3 9.06 8.65   HEMOGLOBIN g/dL 12.2 11.5*   HEMATOCRIT % 41.1 37.9   MCV fL 97.2* 92.2   MCHC g/dL 29.7* 30.3*   PLATELETS 10*3/mm3 159 238   INR  1.08  --            Results from last 7 days  Lab Units 01/12/18  1139 01/11/18  1017   SODIUM mmol/L 132* 135   POTASSIUM mmol/L 3.8 3.2*   CHLORIDE mmol/L 98* 97*   CO2 mmol/L 23.0* 29.7   BUN mg/dL 29* 16   CREATININE mg/dL 3.25* 1.69*   EGFR IF NONAFRICN AM mL/min/1.73 14* 30*   CALCIUM mg/dL 8.8 8.6   GLUCOSE mg/dL 335* 154*   ALBUMIN g/dL 3.80 3.90   BILIRUBIN mg/dL 0.3 0.4   ALK PHOS U/L 111* 104   AST (SGOT) U/L 23 21   ALT (SGPT) U/L 14 12   Estimated Creatinine Clearance: 13.9 mL/min (by C-G formula based on Cr of 3.25).  No results found for: AMMONIA    Results from last 7 days  Lab Units 01/12/18  1139 01/11/18  1017   TROPONIN I ng/mL 0.035 0.037         Lab Results   Component Value Date    HGBA1C 6.50 (H) 11/01/2017     Lab Results   Component Value Date    TSH 0.862 07/03/2017    FREET4 0.94 08/03/2015     No results found for: PREGTESTUR, PREGSERUM, HCG, HCGQUANT  Pain Management Panel     Pain Management Panel Latest Ref Rng & Units 11/1/2017 7/31/2017    CREATININE UR mg/dL - 49.0    AMPHETAMINES SCREEN, URINE Negative Negative -    BARBITURATES SCREEN Negative Negative -    BENZODIAZEPINE SCREEN, URINE Negative Positive(A) -    BUPRENORPHINE Negative Negative -    COCAINE SCREEN, URINE Negative Negative -    METHADONE SCREEN, URINE Negative Negative -                        ---------------------------------------------------------------------------------------------------------------------  Imaging Results (last 7 days)     Procedure Component Value Units Date/Time    XR Chest 1 View [[id number]] Collected:  01/12/18 1237     Updated:  01/12/18 8897    Narrative:       EXAMINATION: XR CHEST 1 VW-      CLINICAL INDICATION:     Chest pain protocol     TECHNIQUE:  XR CHEST 1 VW-      COMPARISON: 1/11/2018      FINDINGS:   Bibasilar airspace disease  and CHF changes are stable. Lung volumes  stable. No effusion or pneumothorax.       Impression:       Stable chest. Persistent CHF changes with basilar airspace  disease     This report was finalized on 1/12/2018 12:37 PM by Dr. Junior Angel MD.                                      I have personally reviewed the radiology images and read the final radiology report.  ---------------------------------------------------------------------------------------------------------------------  Assessment and Plan:    -Chest pain with typical and atypical symptoms in a patient with known multi-vessel CAD:  Admit to telemetry floor.  Cardiology consultation placed given known history of CAD. Serial cardiac enxymes have been ordered. Aspirin, plavix, beta blocker, atorvastatin ordered.  Lipid panel and hemoglobin a1c pending. TSH ordered as well. Nitropaste ordered q6 hours with avoiding opiates. Spoke with Dr Eng; in light of persistent anginal symptoms patient may need repeat cath and stent placement. However, for now he feels fluid overload from inadequate dialysis is playing some role in chest pain and dyspnea. Adding ranexa to optimize antianginal therapy. If pain persists after adequately dialyzed, will evaluate further with stress test followed by cath depending on results.     -Diastolic CHF exacerbation: again, felt to be secondary to fluid overload from inadequate dialysis.     -ESRD on hemodialysis: Nephrology consultation has been placed to arrange dialysis for tomorrow.    -Diabetes mellitus type II, ID:  FSBG ACHS. Hemoglobin a1c, SSI PRN low dose ordered.      -COPD, stable without exacerbation:  Will continue to follow. Will monitor pulse oximetry.  Continue supplemental home oxygen.      -Hypertension:  Resume norvasc, hydralazine, and metoprolol with hold parameters.    -DVT prophylaxis with SQ heparin    Plan of care discussed with patient and lead MARILY Ybarra on [facility], along with Dr Eng,  cardiology.    * Patient felt to be high risk due to chest pain in setting of known multi-vessel CAD, diastolic CHF exacerbation, ESRD on HD, Type II DM insulin dependent    Norma Sims PA-C  01/12/18  3:06 PM  ---------------------------------------------------------------------------------------------------------------------     * I have seen the patient in conjunction with Norma Sims PA-C, and have amended her note to reflect my own findings, assessment and plan.       Electronically signed by Edward Solorzano MD at 1/12/2018  8:17 PM        Vital Signs (last 24 hours)       01/14 0700  -  01/15 0659 01/15 0700  -  01/15 0711   Most Recent    Temp (°F)   97.8       97.8 (36.6)    Heart Rate   71       71    Resp 18 -  20       18    BP 96/48 -  98/52       98/52    SpO2 (%)   96       96          Intake & Output (last day)       01/14 0701 - 01/15 0700 01/15 0701 - 01/16 0700    P.O. 360     Total Intake(mL/kg) 360 (6.1)     Urine (mL/kg/hr)      Total Output        Net +360                Hospital Medications (active)       Dose Frequency Start End    albumin human 25 % IV SOLN 25 g (Discontinued) 25 g As Needed 1/13/2018 1/14/2018    Sig - Route: Infuse 100 mL into a venous catheter As Needed (Hypotension During Dialysis). - Intravenous    Reason for Discontinue: Patient Discharge    aspirin EC tablet 81 mg (Discontinued) 81 mg Daily 1/13/2018 1/14/2018    Sig - Route: Take 1 tablet by mouth Daily. - Oral    Reason for Discontinue: Patient Discharge    Cosign for Ordering: Accepted by Fredis Cintron DO on 1/12/2018 11:05 PM    atorvastatin (LIPITOR) tablet 40 mg (Discontinued) 40 mg Nightly 1/12/2018 1/14/2018    Sig - Route: Take 1 tablet by mouth Every Night. - Oral    Reason for Discontinue: Patient Discharge    Cosign for Ordering: Accepted by Fredis Cintron DO on 1/12/2018 11:05 PM    budesonide-formoterol (SYMBICORT) 80-4.5 MCG/ACT inhaler 2 puff (Discontinued) 2 puff 2  Times Daily - RT 1/12/2018 1/14/2018    Sig - Route: Inhale 2 puffs 2 (Two) Times a Day. - Inhalation    Reason for Discontinue: Patient Discharge    calcitriol (ROCALTROL) capsule 0.25 mcg (Discontinued) 0.25 mcg Daily 1/12/2018 1/14/2018    Sig - Route: Take 1 capsule by mouth Daily. - Oral    Reason for Discontinue: Patient Discharge    calcium acetate (PHOS BINDER)) capsule 667 mg (Discontinued) 667 mg 3 Times Daily With Meals 1/13/2018 1/14/2018    Sig - Route: Take 1 capsule by mouth 3 (Three) Times a Day With Meals. - Oral    Reason for Discontinue: Patient Discharge    calcium carbonate tablet 500 mg (Discontinued) 500 mg 2 Times Daily (BID) 1/12/2018 1/14/2018    Sig - Route: Take 1 tablet by mouth 2 (Two) Times a Day. - Oral    clonazePAM (KlonoPIN) tablet 0.25 mg (Discontinued) 0.25 mg 2 Times Daily PRN 1/12/2018 1/14/2018    Sig - Route: Take 0.5 tablets by mouth 2 (Two) Times a Day As Needed for Anxiety (Hold for oversedation). - Oral    Reason for Discontinue: Patient Discharge    clopidogrel (PLAVIX) tablet 75 mg (Discontinued) 75 mg Daily 1/12/2018 1/14/2018    Sig - Route: Take 1 tablet by mouth Daily. - Oral    Reason for Discontinue: Patient Discharge    dextrose (D50W) solution 25 g (Discontinued) 25 g Every 15 Minutes PRN 1/12/2018 1/14/2018    Sig - Route: Infuse 50 mL into a venous catheter Every 15 (Fifteen) Minutes As Needed for Low Blood Sugar (Blood Sugar Less Than 70, Patient Has IV Access - Unresponsive, NPO or Unable To Safely Swallow). - Intravenous    Reason for Discontinue: Patient Discharge    Cosign for Ordering: Accepted by Fredis Cintron DO on 1/12/2018 11:05 PM    dextrose (GLUTOSE) oral gel 15 g (Discontinued) 15 g Every 15 Minutes PRN 1/12/2018 1/14/2018    Sig - Route: Take 15 g by mouth Every 15 (Fifteen) Minutes As Needed for Low Blood Sugar (Blood Sugar Less Than 70, Patient Alert, Is Not NPO & Can Safely Swallow). - Oral    Reason for Discontinue: Patient  Discharge    Cosign for Ordering: Accepted by Fredis Cintron DO on 1/12/2018 11:05 PM    gabapentin (NEURONTIN) capsule 100 mg (Discontinued) 100 mg Nightly 1/12/2018 1/14/2018    Sig - Route: Take 1 capsule by mouth Every Night. - Oral    Reason for Discontinue: Patient Discharge    glucagon (human recombinant) (GLUCAGEN DIAGNOSTIC) injection 1 mg (Discontinued) 1 mg Every 15 Minutes PRN 1/12/2018 1/14/2018    Sig - Route: Inject 1 mg under the skin Every 15 (Fifteen) Minutes As Needed (Blood Glucose Less Than 70 - Patient Without IV Access - Unresponsive, NPO or Unable To Safely Swallow). - Subcutaneous    Reason for Discontinue: Patient Discharge    Cosign for Ordering: Accepted by Fredis Cintron DO on 1/12/2018 11:05 PM    heparin (porcine) 5000 UNIT/ML injection 5,000 Units (Discontinued) 5,000 Units Every 12 Hours Scheduled 1/12/2018 1/14/2018    Sig - Route: Inject 1 mL under the skin Every 12 (Twelve) Hours. - Subcutaneous    Reason for Discontinue: Patient Discharge    Cosign for Ordering: Accepted by Fredis Cintron DO on 1/12/2018 11:05 PM    hydrALAZINE (APRESOLINE) tablet 10 mg (Discontinued) 10 mg Every 8 Hours Scheduled 1/12/2018 1/14/2018    Sig - Route: Take 1 tablet by mouth Every 8 (Eight) Hours. - Oral    Reason for Discontinue: Patient Discharge    insulin aspart (novoLOG) injection 0-9 Units (Discontinued) 0-9 Units 4 Times Daily Before Meals & Nightly 1/12/2018 1/14/2018    Sig - Route: Inject 0-9 Units under the skin 4 (Four) Times a Day Before Meals & at Bedtime. - Subcutaneous    Reason for Discontinue: Patient Discharge    Cosign for Ordering: Accepted by Fredis Cintron DO on 1/12/2018 11:05 PM    insulin detemir (LEVEMIR) injection 10 Units (Discontinued) 10 Units Nightly 1/12/2018 1/14/2018    Sig - Route: Inject 10 Units under the skin Every Night. - Subcutaneous    Reason for Discontinue: Patient Discharge    ipratropium-albuterol (DUO-NEB) nebulizer  solution 3 mL (Discontinued) 3 mL Every 6 Hours PRN 1/12/2018 1/14/2018    Sig - Route: Take 3 mL by nebulization Every 6 (Six) Hours As Needed for Shortness of Air. - Nebulization    Reason for Discontinue: Patient Discharge    isosorbide mononitrate (IMDUR) 24 hr tablet 60 mg (Discontinued) 60 mg Every 24 Hours Scheduled 1/13/2018 1/14/2018    Sig - Route: Take 1 tablet by mouth Daily. - Oral    Reason for Discontinue: Patient Discharge    losartan (COZAAR) tablet 25 mg (Discontinued) 25 mg Every 24 Hours Scheduled 1/14/2018 1/14/2018    Sig - Route: Take 1 tablet by mouth Daily. - Oral    Reason for Discontinue: Patient Discharge    losartan (COZAAR) tablet 50 mg (Discontinued) 50 mg Every 24 Hours Scheduled 1/13/2018 1/14/2018    Sig - Route: Take 1 tablet by mouth Daily. - Oral    lubrisoft lotion (Discontinued)  2 Times Daily (BID) 1/13/2018 1/14/2018    Sig - Route: Apply  topically 2 (Two) Times a Day. - Apply externally    Reason for Discontinue: Patient Discharge    metoprolol tartrate (LOPRESSOR) tablet 25 mg (Discontinued) 25 mg Every 12 Hours Scheduled 1/14/2018 1/14/2018    Sig - Route: Take 1 tablet by mouth Every 12 (Twelve) Hours. - Oral    Reason for Discontinue: Patient Discharge    metoprolol tartrate (LOPRESSOR) tablet 50 mg (Discontinued) 50 mg Every 12 Hours Scheduled 1/12/2018 1/14/2018    Sig - Route: Take 1 tablet by mouth Every 12 (Twelve) Hours. - Oral    nitroglycerin (NITROSTAT) SL tablet 0.4 mg (Discontinued) 0.4 mg Every 5 Minutes PRN 1/12/2018 1/14/2018    Sig - Route: Place 1 tablet under the tongue Every 5 (Five) Minutes As Needed for Chest Pain. - Sublingual    Reason for Discontinue: Patient Discharge    Cosign for Ordering: Accepted by Tee Travis MD on 1/12/2018  5:52 PM    ranolazine (RANEXA) 12 hr tablet 500 mg (Discontinued) 500 mg Every 12 Hours Scheduled 1/13/2018 1/14/2018    Sig - Route: Take 1 tablet by mouth Every 12 (Twelve) Hours. - Oral    Reason for  Discontinue: Patient Discharge    sodium chloride 0.9 % bolus 1,000 mL (Discontinued) 1,000 mL As Needed 1/13/2018 1/14/2018    Sig - Route: Infuse 1,000 mL into a venous catheter As Needed (to maintain SBP > 90 mmHG). - Intravenous    Notes to Pharmacy: Machine setup    Reason for Discontinue: Patient Discharge    sodium chloride 0.9 % flush 1-10 mL (Discontinued) 1-10 mL As Needed 1/12/2018 1/14/2018    Sig - Route: Infuse 1-10 mL into a venous catheter As Needed for Line Care. - Intravenous    Reason for Discontinue: Patient Discharge    Cosign for Ordering: Accepted by Fredis Cintron DO on 1/12/2018 11:05 PM    sodium chloride 0.9 % flush 10 mL (Discontinued) 10 mL As Needed 1/12/2018 1/14/2018    Sig - Route: Infuse 10 mL into a venous catheter As Needed for Line Care. - Intravenous    Reason for Discontinue: Patient Discharge    Cosign for Ordering: Accepted by Tee Travis MD on 1/12/2018  5:52 PM            Lab Results (last 24 hours)     Procedure Component Value Units Date/Time    POC Glucose Once [[id number]]  (Normal) Collected:  01/13/18 1612    Specimen:  Blood Updated:  01/13/18 1620     Glucose 124 mg/dL     Narrative:       Meter: [id number] : [id number] MICHELLE RUSSELL    POC Glucose Once [[id number]]  (Abnormal) Collected:  01/13/18 1932    Specimen:  Blood Updated:  01/13/18 1940     Glucose 212 (H) mg/dL     Narrative:       Meter: [id number] : [id number] KRISTI CHUN    CBC & Differential [[id number]] Collected:  01/14/18 0450    Specimen:  Blood Updated:  01/14/18 0505    Narrative:       The following orders were created for panel order CBC & Differential.  Procedure                               Abnormality         Status                     ---------                               -----------         ------                     CBC Auto Differential[[id number]]        Abnormal            Final result                 Please view results for these tests on the  individual orders.    CBC Auto Differential [[id number]]  (Abnormal) Collected:  01/14/18 0450    Specimen:  Blood Updated:  01/14/18 0505     WBC 8.55 10*3/mm3      RBC 3.82 (L) 10*6/mm3      Hemoglobin 10.9 (L) g/dL      Hematocrit 36.8 (L) %      MCV 96.3 (H) fL      MCH 28.5 pg      MCHC 29.6 (L) g/dL      RDW 17.5 (H) %      RDW-SD 58.4 (H) fl      MPV 10.7 (H) fL      Platelets 204 10*3/mm3      Neutrophil % 66.8 %      Lymphocyte % 20.9 %      Monocyte % 9.2 %      Eosinophil % 2.3 %      Basophil % 0.6 %      Immature Grans % 0.2 %      Neutrophils, Absolute 5.70 10*3/mm3      Lymphocytes, Absolute 1.79 10*3/mm3      Monocytes, Absolute 0.79 10*3/mm3      Eosinophils, Absolute 0.20 10*3/mm3      Basophils, Absolute 0.05 10*3/mm3      Immature Grans, Absolute 0.02 10*3/mm3     Basic Metabolic Panel [[id number]]  (Abnormal) Collected:  01/14/18 0450    Specimen:  Blood Updated:  01/14/18 0526     Glucose 161 (H) mg/dL      BUN 31 (H) mg/dL      Creatinine 2.56 (H) mg/dL      Sodium 134 (L) mmol/L      Potassium 4.6 mmol/L      Chloride 100 mmol/L      CO2 24.4 mmol/L      Calcium 8.8 mg/dL      eGFR Non African Amer 19 (L) mL/min/1.73      BUN/Creatinine Ratio 12.1     Anion Gap 9.6 mmol/L     Narrative:       GFR Normal >60  Chronic Kidney Disease <60  Kidney Failure <15    Osmolality, Calculated [[id number]]  (Normal) Collected:  01/14/18 0450    Specimen:  Blood Updated:  01/14/18 0526     Osmolality Calc 278.3 mOsm/kg     POC Glucose Once [[id number]]  (Abnormal) Collected:  01/14/18 0631    Specimen:  Blood Updated:  01/14/18 0716     Glucose 143 (H) mg/dL     Narrative:       Meter: [id number] : [id number] Marifer Hutson    POC Glucose Once [[id number]]  (Abnormal) Collected:  01/14/18 1043    Specimen:  Blood Updated:  01/14/18 1104     Glucose 205 (H) mg/dL     Narrative:       Meter: [id number] : [id number] Marifer Hutson        Imaging Results (last 24 hours)     ** No results found for the last  24 hours. **        ECG/EMG Results (last 24 hours)     ** No results found for the last 24 hours. **        Operative/Procedure Notes (last 24 hours) (Notes from 1/14/2018  7:11 AM through 1/15/2018  7:11 AM)     No notes of this type exist for this encounter.           Physician Progress Notes (last 24 hours) (Notes from 1/14/2018  7:11 AM through 1/15/2018  7:11 AM)      Jai Chavez MD at 1/14/2018 10:22 AM  Version 1 of 1         Nephrology Progress Note      Subjective     She denies chest pain or SOB. Tolerated HD yesterday    Objective       Vital signs :     Temp:  [97.6 °F (36.4 °C)-99.4 °F (37.4 °C)] 97.6 °F (36.4 °C)  Heart Rate:  [55-71] 71  Resp:  [18-20] 20  BP: ()/(48-74) 96/48    I/O last 3 completed shifts:  In: 1220 [P.O.:1220]  Out: 150 [Urine:150]  I/O this shift:  In: 360 [P.O.:360]  Out: -     Physical Exam:    General Appearance : alert, pleasant, appears stated age, cooperative and chronically ill  Head  :  normocephalic  Eyes  :  no pallor   Neck  :  no JVD   Lungs : rhonchi at bases  Heart :  regular rhythm & normal rate, normal S1, S2 and no murmur  Abdomen : normal bowel sounds, soft non-tender and no guarding  Extremities : 2+ edema  Neurologic :   orientated to person, place, time and situation, Grossly no focal deficits  Acess : left arm AVF thrill and bruit +      Laboratory Data :       WBC WBC   Date Value Ref Range Status   01/14/2018 8.55 4.50 - 12.50 10*3/mm3 Final   01/13/2018 8.57 4.50 - 12.50 10*3/mm3 Final   01/12/2018 9.06 4.50 - 12.50 10*3/mm3 Final      HGB Hemoglobin   Date Value Ref Range Status   01/14/2018 10.9 (L) 12.0 - 16.0 g/dL Final   01/13/2018 11.4 (L) 12.0 - 16.0 g/dL Final   01/12/2018 12.2 12.0 - 16.0 g/dL Final      HCT Hematocrit   Date Value Ref Range Status   01/14/2018 36.8 (L) 37.0 - 47.0 % Final   01/13/2018 38.4 37.0 - 47.0 % Final   01/12/2018 41.1 37.0 - 47.0 % Final      Platlets No results found for: LABPLAT   MCV MCV   Date Value Ref Range  Status   01/14/2018 96.3 (H) 80.0 - 94.0 fL Final   01/13/2018 95.3 (H) 80.0 - 94.0 fL Final   01/12/2018 97.2 (H) 80.0 - 94.0 fL Final          Sodium Sodium   Date Value Ref Range Status   01/14/2018 134 (L) 135 - 153 mmol/L Final   01/13/2018 130 (L) 135 - 153 mmol/L Final   01/12/2018 132 (L) 135 - 153 mmol/L Final      Potassium Potassium   Date Value Ref Range Status   01/14/2018 4.6 3.5 - 5.3 mmol/L Final   01/13/2018 3.7 3.5 - 5.3 mmol/L Final   01/12/2018 3.8 3.5 - 5.3 mmol/L Final      Chloride Chloride   Date Value Ref Range Status   01/14/2018 100 99 - 112 mmol/L Final   01/13/2018 96 (L) 99 - 112 mmol/L Final   01/12/2018 98 (L) 99 - 112 mmol/L Final      CO2 CO2   Date Value Ref Range Status   01/14/2018 24.4 24.3 - 31.9 mmol/L Final   01/13/2018 28.0 24.3 - 31.9 mmol/L Final   01/12/2018 23.0 (L) 24.3 - 31.9 mmol/L Final      BUN BUN   Date Value Ref Range Status   01/14/2018 31 (H) 7 - 21 mg/dL Final   01/13/2018 36 (H) 7 - 21 mg/dL Final   01/12/2018 29 (H) 7 - 21 mg/dL Final      Creatinine Creatinine   Date Value Ref Range Status   01/14/2018 2.56 (H) 0.43 - 1.29 mg/dL Final   01/13/2018 3.51 (H) 0.43 - 1.29 mg/dL Final   01/12/2018 3.25 (H) 0.43 - 1.29 mg/dL Final      Calcium Calcium   Date Value Ref Range Status   01/14/2018 8.8 7.7 - 10.0 mg/dL Final   01/13/2018 9.1 7.7 - 10.0 mg/dL Final   01/12/2018 8.8 7.7 - 10.0 mg/dL Final      PO4 No results found for: CAPO4   Albumin Albumin   Date Value Ref Range Status   01/12/2018 3.80 3.40 - 4.80 g/dL Final      Magnesium No results found for: MG       PTH               No results found for: PTH      Medications :       aspirin 81 mg Oral Daily   atorvastatin 40 mg Oral Nightly   budesonide-formoterol 2 puff Inhalation BID - RT   calcitriol 0.25 mcg Oral Daily   calcium acetate 667 mg Oral TID With Meals   clopidogrel 75 mg Oral Daily   gabapentin 100 mg Oral Nightly   heparin (porcine) 5,000 Units Subcutaneous Q12H   hydrALAZINE 10 mg Oral Q8H    insulin aspart 0-9 Units Subcutaneous 4x Daily AC & at Bedtime   insulin detemir 10 Units Subcutaneous Nightly   isosorbide mononitrate 60 mg Oral Q24H   losartan 50 mg Oral Q24H   lubrisoft  Apply externally BID   metoprolol tartrate 25 mg Oral Q12H   ranolazine 500 mg Oral Q12H            Assessment/Plan     1. ESRD : continue HD TTS. Dc calcium carbonate as Ca is normal    2. Anemia : Hb in range, no epogen    3. Chest pain/CAD : cardiology on case    4. HTN : BP on lower side, reduce losartan    5. Diastolic CHF      Discussed with patient, Dr Abdulaziz Chavez MD  01/14/18  10:22 AM       Electronically signed by Jai Chavez MD at 1/14/2018 10:23 AM        Nutrition Notes (last 24 hours) (Notes from 1/14/2018  7:11 AM through 1/15/2018  7:11 AM)     No notes of this type exist for this encounter.        Physical Therapy Notes (last 24 hours) (Notes from 1/14/2018  7:11 AM through 1/15/2018  7:11 AM)     No notes of this type exist for this encounter.        Occupational Therapy Notes (last 24 hours) (Notes from 1/14/2018  7:11 AM through 1/15/2018  7:11 AM)     No notes of this type exist for this encounter.        Speech Language Pathology Notes (last 24 hours) (Notes from 1/14/2018  7:11 AM through 1/15/2018  7:11 AM)     No notes of this type exist for this encounter.        Respiratory Therapy Notes (last 24 hours) (Notes from 1/14/2018  7:11 AM through 1/15/2018  7:11 AM)     No notes of this type exist for this encounter.        Discharge Summary     No notes of this type exist for this encounter.        Discharge Order     Start     Ordered    01/14/18 1051  Discharge patient  Once     Expected Discharge Date:  01/14/18    Discharge Disposition:  Home or Self Care        01/14/18 1051

## 2018-01-19 ENCOUNTER — HOSPITAL ENCOUNTER (OUTPATIENT)
Dept: RESPIRATORY THERAPY | Facility: HOSPITAL | Age: 68
Discharge: HOME | End: 2018-01-19
Admitting: NURSE PRACTITIONER

## 2018-01-19 DIAGNOSIS — R06.02 SHORTNESS OF BREATH: ICD-10-CM

## 2018-01-19 PROCEDURE — 94727 GAS DIL/WSHOT DETER LNG VOL: CPT

## 2018-01-19 PROCEDURE — 94729 DIFFUSING CAPACITY: CPT

## 2018-01-19 PROCEDURE — 94010 BREATHING CAPACITY TEST: CPT

## 2018-01-19 PROCEDURE — 94727 GAS DIL/WSHOT DETER LNG VOL: CPT | Performed by: INTERNAL MEDICINE

## 2018-01-19 PROCEDURE — 94010 BREATHING CAPACITY TEST: CPT | Performed by: INTERNAL MEDICINE

## 2018-01-25 ENCOUNTER — HOSPITAL ENCOUNTER (OUTPATIENT)
Dept: ULTRASOUND IMAGING | Facility: HOSPITAL | Age: 68
Discharge: HOME | End: 2018-01-25

## 2018-01-25 ENCOUNTER — HOSPITAL ENCOUNTER (OUTPATIENT)
Dept: MAMMOGRAPHY | Facility: HOSPITAL | Age: 68
Discharge: HOME | End: 2018-01-25
Admitting: RADIOLOGY

## 2018-01-25 ENCOUNTER — HOSPITAL ENCOUNTER (OUTPATIENT)
Dept: ULTRASOUND IMAGING | Facility: HOSPITAL | Age: 68
End: 2018-01-25
Attending: INTERNAL MEDICINE

## 2018-01-25 ENCOUNTER — TRANSCRIBE ORDERS (OUTPATIENT)
Dept: ADMINISTRATIVE | Facility: HOSPITAL | Age: 68
End: 2018-01-25

## 2018-01-25 DIAGNOSIS — R22.9 LOCALIZED SUPERFICIAL SWELLING, MASS, OR LUMP: ICD-10-CM

## 2018-01-25 DIAGNOSIS — R60.9 SWELLING: Primary | ICD-10-CM

## 2018-01-25 DIAGNOSIS — IMO0002 LUMP: ICD-10-CM

## 2018-01-25 PROCEDURE — 93931 UPPER EXTREMITY STUDY: CPT | Performed by: RADIOLOGY

## 2018-01-25 PROCEDURE — 93931 UPPER EXTREMITY STUDY: CPT

## 2018-02-05 ENCOUNTER — OFFICE VISIT (OUTPATIENT)
Dept: CARDIOLOGY | Facility: CLINIC | Age: 68
End: 2018-02-05

## 2018-02-05 VITALS
HEART RATE: 62 BPM | WEIGHT: 126 LBS | HEIGHT: 61 IN | BODY MASS INDEX: 23.79 KG/M2 | RESPIRATION RATE: 16 BRPM | SYSTOLIC BLOOD PRESSURE: 113 MMHG | DIASTOLIC BLOOD PRESSURE: 65 MMHG

## 2018-02-05 DIAGNOSIS — I10 ESSENTIAL HYPERTENSION: ICD-10-CM

## 2018-02-05 DIAGNOSIS — I50.32 CHRONIC DIASTOLIC CONGESTIVE HEART FAILURE (HCC): Primary | ICD-10-CM

## 2018-02-05 DIAGNOSIS — H53.8 BLURRED VISION, BILATERAL: ICD-10-CM

## 2018-02-05 DIAGNOSIS — I65.23 BILATERAL CAROTID ARTERY STENOSIS: ICD-10-CM

## 2018-02-05 DIAGNOSIS — I25.10 ASCVD (ARTERIOSCLEROTIC CARDIOVASCULAR DISEASE): ICD-10-CM

## 2018-02-05 DIAGNOSIS — Z72.0 TOBACCO ABUSE: ICD-10-CM

## 2018-02-05 PROCEDURE — 99213 OFFICE O/P EST LOW 20 MIN: CPT | Performed by: PHYSICIAN ASSISTANT

## 2018-02-05 NOTE — PROGRESS NOTES
Marcelino Sheehan MD  Sara Cardona  1950 02/05/2018    Patient Active Problem List   Diagnosis   • Acute renal failure   • Essential hypertension   • Dyslipidemia   • Diabetic neuropathy   • Chronic pain   • Anxiety   • Anemia   • Acute on chronic renal failure   • CHF (congestive heart failure)   • Acute on chronic respiratory failure   • Shortness of breath   • Tobacco use   • STEPHANIE (obstructive sleep apnea)   • Chest pain at rest     Dear Marcelino Sheehan MD:    Chief Complaint   Patient presents with   • Chest Pain     rated 10, occurs often time with dialysis, recent hosp stay   • Palpitations     races   • Shortness of Breath     O2 use    • Chronic Kidney Disease     s/p hemodialysis   • Edema     LE   • meds     list provided     Subjective     Sara Cardona is a 67 y.o. female with a past medical history significant for atherosclerotic cardiovascular disease with last heart catheterization October 2015 revealing 70% stenosis in the midsegment of the LAD, 70% in the proximal left circumflex, and 80% in the midportion of the posterior descending artery.  She also has end-stage renal disease on hemodialysis since October 2017, insulin-dependent diabetes mellitus, chronic diastolic heart failure, dyslipidemia, hypertension, and COPD.  She was just recently discharged from [facility] After being admitted with chest pains and acute diastolic heart failure.  She had some minimal troponin elevation which was felt to be secondary to her end-stage renal disease.  Her anti-anginal medications were adjusted and her symptoms resolved.  Her diastolic heart failure was treated with dialysis.  She has been doing well with dialysis and states that she rarely misses an appointment.  She goes on Tuesdays, Thursdays, and Saturdays.  She has been doing well and maintaining a weight at about 126 pounds which is what she is in our office today.  Swelling is much better compared to during her recent  admission per the patient.  Since discharge, she has only had one episode of chest discomfort which occurred during dialysis when she had taken her medications late.  Since then, she has tried to take her medications a little earlier prior to dialysis and she has done well with this.  She does report recent blurring of her vision bilaterally which has been ongoing for the last 2-3 weeks.  No slurred speech, facial flattening, or unilateral weakness.  No headaches.  She was also having some dizziness at home but then realized that she was still taking the 50 mg hydralazine in addition to the 10 mg that she was taking at discharge.  She stopped the 50 mg tablet and has not had any further dizziness.       Current Outpatient Prescriptions:   •  albuterol (PROVENTIL HFA;VENTOLIN HFA) 108 (90 BASE) MCG/ACT inhaler, Inhale 2 puffs every 6 (six) hours as needed for wheezing. (Patient taking differently: Inhale 2 puffs Every 6 (Six) Hours.), Disp: 3.7 g, Rfl: 0  •  aspirin 81 MG EC tablet, Take 1 tablet by mouth Daily., Disp: 30 tablet, Rfl: 0  •  budesonide-formoterol (SYMBICORT) 80-4.5 MCG/ACT inhaler, Inhale 2 puffs 2 (Two) Times a Day., Disp: , Rfl:   •  calcitriol (ROCALTROL) 0.25 MCG capsule, Take 0.25 mcg by mouth Daily., Disp: , Rfl:   •  calcium acetate (PHOS BINDER,) 667 MG capsule capsule, Take 1 capsule by mouth 3 (Three) Times a Day With Meals., Disp: 180 capsule, Rfl: 0  •  clonazePAM (KlonoPIN) 0.5 MG tablet, Take 0.5 mg by mouth 2 (Two) Times a Day., Disp: , Rfl:   •  clopidogrel (PLAVIX) 75 MG tablet, Take 1 tablet by mouth Daily., Disp: 30 tablet, Rfl: 0  •  gabapentin (NEURONTIN) 100 MG capsule, Take 1 capsule by mouth every night at bedtime., Disp: 30 capsule, Rfl: 0  •  hydrALAZINE (APRESOLINE) 10 MG tablet, Take 1 tablet by mouth Every 8 (Eight) Hours., Disp: 90 tablet, Rfl: 0  •  insulin glargine (LANTUS) 100 UNIT/ML injection, Inject 10 Units under the skin Every Night. Patient spouse says she was  "given 20 units on 11/1 due to her higher than normal glucose reading, Disp: , Rfl:   •  isosorbide mononitrate (IMDUR) 60 MG 24 hr tablet, Take 1 tablet by mouth Daily., Disp: 30 tablet, Rfl: 0  •  losartan (COZAAR) 25 MG tablet, Take 1 tablet by mouth Daily., Disp: 30 tablet, Rfl: 0  •  metoprolol tartrate (LOPRESSOR) 25 MG tablet, Take 1 tablet by mouth Every 12 (Twelve) Hours., Disp: 60 tablet, Rfl: 0  •  nitroglycerin (NITROSTAT) 0.4 MG SL tablet, Place 1 tablet under the tongue Every 5 (Five) Minutes As Needed for Chest Pain. Take no more than 3 doses in 15 minutes., Disp: 15 tablet, Rfl: 0  •  ranolazine (RANEXA) 500 MG 12 hr tablet, Take 1 tablet by mouth Every 12 (Twelve) Hours., Disp: 60 tablet, Rfl: 0  •  ferrous sulfate 325 (65 FE) MG tablet, Take 325 mg by mouth 2 (Two) Times a Day With Meals., Disp: , Rfl:     The following portions of the patient's history were reviewed and updated as appropriate: allergies, current medications, past family history, past medical history, past social history, past surgical history and problem list.    Social History     Social History   • Marital status:      Spouse name: N/A   • Number of children: N/A   • Years of education: N/A     Occupational History   • Not on file.     Social History Main Topics   • Smoking status: Current Every Day Smoker     Packs/day: 0.25     Types: Cigarettes   • Smokeless tobacco: Never Used   • Alcohol use No   • Drug use: No   • Sexual activity: Defer     Other Topics Concern   • Not on file     Social History Narrative     Review of Systems   Cardiovascular: Positive for chest pain, leg swelling and palpitations.   Respiratory: Positive for shortness of breath.      Objective   Blood pressure 113/65, pulse 62, resp. rate 16, height 154.9 cm (61\"), weight 57.2 kg (126 lb).  Body mass index is 23.81 kg/(m^2).    Physical Exam   Constitutional: She is oriented to person, place, and time. She appears well-developed and " well-nourished. No distress.   HENT:   Head: Normocephalic and atraumatic.   Eyes: Conjunctivae are normal. Right eye exhibits no discharge. Left eye exhibits no discharge.   Neck: Normal range of motion. Neck supple. Carotid bruit is not present.   Cardiovascular: Normal rate, regular rhythm and normal heart sounds.  Exam reveals no gallop and no friction rub.    No murmur heard.  Pulmonary/Chest: Effort normal. No respiratory distress. She has no wheezes. She has rales (Few scattered rales.). She exhibits no tenderness.   Musculoskeletal: Normal range of motion. She exhibits edema (1+ chronic-appearing edema.).   AV fistula in the left upper extremity.   Neurological: She is alert and oriented to person, place, and time.   Skin: Skin is warm and dry. No rash noted. She is not diaphoretic. No erythema. No pallor.   Psychiatric: She has a normal mood and affect. Her behavior is normal.   Nursing note and vitals reviewed.    Procedures   Left Heart Catheterization 10/13/15  FINDINGS IN DETAIL:   LEFT MAIN CORONARY ARTERY: The left main coronary is a large vessel which  bifurcates into LAD and circumflex arteries. Left main coronary artery is  relatively free of atherosclerotic disease.      LEFT ANTERIOR DESCENDING ARTERY: The left anterior descending artery is a large  vessel which reaches beyond the apex of the left ventricle and gives rise to 2  small diagonal branches. The LAD itself is noted to have a diffuse 70% lesion  in its midportion.      CIRCUMFLEX ARTERY: The circumflex is a large vessel which gives rise to 3  obtuse marginal, the first of which is a large branching vessel and the next 2  which are small. There is a 70% lesion in the proximal portion of the  circumflex artery.      RIGHT CORONARY ARTERY: The right coronary artery is a relatively small vessel  which gives rise to the posterior descending artery and several posterolateral  branches. There is a diffuse 80% lesion noted in the midportion of  the  posterior descending artery.      FINAL IMPRESSION AND PLAN: Overall, it is my impression that the patient does  present with native 3-vessel coronary artery disease; however, she has  significant COPD and was unable to lie still on the table during the procedure,  furthermore, her vessels are relatively calcified and it was clear that it  would be a lengthy procedure to try to revascularize any of her vessels. At  this point in time, I feel that the best course of action would be to try to  optimize her from a medical point of view. If she continues to have anginal  symptoms, it would not be unreasonable to consider percutaneous  revascularization of her LAD and circumflex arteries. She is not a great  surgical candidate.      D:   SF 10/13/2015 08:14:29  T:   hf 10/14/2015 00:03:11  JOB ID:[id number]  [id number]  Revision Count:     0  cc:       Signature:__________________________            Michael Parham MD  DO NOT TEXT EDIT THIS LINE :[id number]:  Authenticated by MICHAEL PARHAM M.D. On 10/15/2015 05:16:38 AM     Fasting lipid panel 1/13/18     Ref Range & Units 3wk ago     Total Cholesterol 0 - 200 mg/dL 130   Triglycerides 0 - 150 mg/dL 80   HDL Cholesterol 60 - 100 mg/dL 54 (L)   LDL Cholesterol  0 - 100 mg/dL 60   VLDL Cholesterol mg/dL 16   LDL/HDL Ratio  1.11   Resulting Agency   [facility]     Transthoracic echocardiogram-limited 11/7/17  · The study is technically adequate for diagnosis.  · Left ventricular systolic function is normal. Estimated EF = 65%.  · There are no significant valvular abnormalities noted.  · There is no evidence of pericardial effusion.  Assessment:          Diagnosis Plan   1. Chronic diastolic congestive heart failure     2. ASCVD (arteriosclerotic cardiovascular disease)     3. Essential hypertension     4. Bilateral carotid artery stenosis     5. Blurred vision, bilateral     6. Tobacco abuse          Plan:       1. Continue aspirin, Plavix, metoprolol, isosorbide  mononitrate, and Ranexa.  2. We will reevaluate her degree of carotid stenosis with a carotid Doppler.  3. I have asked her to get in to see her eye doctor as soon as possible due to having several weeks of blurring of vision.  She is agreeable. No acute signs of stroke.  I have also encouraged her that should she have sudden onset of any other neurological changes, to go directly to the emergency department.  4. Recent fasting lipid panel revealed an LDL cholesterol of 60 and a total cholesterol of 130, hence will not add a statin this time.  5. Encouraged her on avoiding sodium in the diet and to keep her regular visits with dialysis.  6. She was provided with educational materials on how to quit smoking and the hazards of continued tobacco abuse.  7. I have asked her to make sure that her medications that she is taking at home match her discharge instructions that she has with her today and to call if she has any questions or concerns.  8. We will follow-up in 2 months or sooner if needed.    No Follow-up on file.    I appreciate the opportunity to participate in this patient's cardiovascular care.    Best Regards,    Norma Solorzano PA-C

## 2018-02-05 NOTE — PATIENT INSTRUCTIONS
Smoking Hazards    Smoking cigarettes is extremely bad for your health. Tobacco smoke has over 200 known poisons in it. It contains the poisonous gases nitrogen oxide and carbon monoxide. There are over 60 chemicals in tobacco smoke that cause cancer. Some of the chemicals found in cigarette smoke include:   · Cyanide.    · Benzene.    · Formaldehyde.    · Methanol (wood alcohol).    · Acetylene (fuel used in welding torches).    · Ammonia.    Even smoking lightly shortens your life expectancy by several years. You can greatly reduce the risk of medical problems for you and your family by stopping now. Smoking is the most preventable cause of death and disease in our society. Within days of quitting smoking, your circulation improves, you decrease the risk of having a heart attack, and your lung capacity improves. There may be some increased phlegm in the first few days after quitting, and it may take months for your lungs to clear up completely. Quitting for 10 years reduces your risk of developing lung cancer to almost that of a nonsmoker.   WHAT ARE THE RISKS OF SMOKING?  Cigarette smokers have an increased risk of many serious medical problems, including:  · Lung cancer.    · Lung disease (such as pneumonia, bronchitis, and emphysema).    · Heart attack and chest pain due to the heart not getting enough oxygen (angina).    · Heart disease and peripheral blood vessel disease.    · Hypertension.    · Stroke.    · Oral cancer (cancer of the lip, mouth, or voice box).    · Bladder cancer.    · Pancreatic cancer.    · Cervical cancer.    · Pregnancy complications, including premature birth.    · Stillbirths and smaller  babies, birth defects, and genetic damage to sperm.    · Early menopause.    · Lower estrogen level for women.    · Infertility.    · Facial wrinkles.    · Blindness.    · Increased risk of broken bones (fractures).    · Senile dementia.    · Stomach ulcers and internal bleeding.    · Delayed  wound healing and increased risk of complications during surgery.  Because of secondhand smoke exposure, children of smokers have an increased risk of the following:   · Sudden infant death syndrome (SIDS).    · Respiratory infections.    · Lung cancer.    · Heart disease.    · Ear infections.    WHY IS SMOKING ADDICTIVE?  Nicotine is the chemical agent in tobacco that is capable of causing addiction or dependence. When you smoke and inhale, nicotine is absorbed rapidly into the bloodstream through your lungs. Both inhaled and noninhaled nicotine may be addictive.   WHAT ARE THE BENEFITS OF QUITTING?   There are many health benefits to quitting smoking. Some are:   · The likelihood of developing cancer and heart disease decreases. Health improvements are seen almost immediately.    · Blood pressure, pulse rate, and breathing patterns start returning to normal soon after quitting.    · People who quit may see an improvement in their overall quality of life.    HOW DO YOU QUIT SMOKING?  Smoking is an addiction with both physical and psychological effects, and longtime habits can be hard to change. Your health care provider can recommend:  · Programs and community resources, which may include group support, education, or therapy.  · Replacement products, such as patches, gum, and nasal sprays. Use these products only as directed. Do not replace cigarette smoking with electronic cigarettes (commonly called e-cigarettes). The safety of e-cigarettes is unknown, and some may contain harmful chemicals.  FOR MORE INFORMATION  · American Lung Association: www.lung.org  · American Cancer Society: www.cancer.org     This information is not intended to replace advice given to you by your health care provider. Make sure you discuss any questions you have with your health care provider.     Document Released: 01/25/2006 Document Revised: 04/10/2017 Document Reviewed: 06/09/2014  Elsevier Interactive Patient Education ©2017  [employer]           Steps to Quit Smoking     Smoking tobacco can be harmful to your health and can affect almost every organ in your body. Smoking puts you, and those around you, at risk for developing many serious chronic diseases. Quitting smoking is difficult, but it is one of the best things that you can do for your health. It is never too late to quit.  WHAT ARE THE BENEFITS OF QUITTING SMOKING?  When you quit smoking, you lower your risk of developing serious diseases and conditions, such as:  · Lung cancer or lung disease, such as COPD.  · Heart disease.  · Stroke.  · Heart attack.  · Infertility.  · Osteoporosis and bone fractures.  Additionally, symptoms such as coughing, wheezing, and shortness of breath may get better when you quit. You may also find that you get sick less often because your body is stronger at fighting off colds and infections. If you are pregnant, quitting smoking can help to reduce your chances of having a baby of low birth weight.  HOW DO I GET READY TO QUIT?  When you decide to quit smoking, create a plan to make sure that you are successful. Before you quit:  · Pick a date to quit. Set a date within the next two weeks to give you time to prepare.  · Write down the reasons why you are quitting. Keep this list in places where you will see it often, such as on your bathroom mirror or in your car or wallet.  · Identify the people, places, things, and activities that make you want to smoke (triggers) and avoid them. Make sure to take these actions:  ¨ Throw away all cigarettes at home, at work, and in your car.  ¨ Throw away smoking accessories, such as ashtrays and lighters.  ¨ Clean your car and make sure to empty the ashtray.  ¨ Clean your home, including curtains and carpets.  · Tell your family, friends, and coworkers that you are quitting. Support from your loved ones can make quitting easier.  · Talk with your health care provider about your options for quitting  "smoking.  · Find out what treatment options are covered by your health insurance.  WHAT STRATEGIES CAN I USE TO QUIT SMOKING?   Talk with your healthcare provider about different strategies to quit smoking. Some strategies include:  · Quitting smoking altogether instead of gradually lessening how much you smoke over a period of time. Research shows that quitting \"cold turkey\" is more successful than gradually quitting.  · Attending in-person counseling to help you build problem-solving skills. You are more likely to have success in quitting if you attend several counseling sessions. Even short sessions of 10 minutes can be effective.  · Finding resources and support systems that can help you to quit smoking and remain smoke-free after you quit. These resources are most helpful when you use them often. They can include:  ¨ Online chats with a counselor.  ¨ Telephone quitlines.  ¨ Printed self-help materials.  ¨ Support groups or group counseling.  ¨ Text messaging programs.  ¨ Mobile phone applications.  · Taking medicines to help you quit smoking. (If you are pregnant or breastfeeding, talk with your health care provider first.) Some medicines contain nicotine and some do not. Both types of medicines help with cravings, but the medicines that include nicotine help to relieve withdrawal symptoms. Your health care provider may recommend:  ¨ Nicotine patches, gum, or lozenges.  ¨ Nicotine inhalers or sprays.  ¨ Non-nicotine medicine that is taken by mouth.  Talk with your health care provider about combining strategies, such as taking medicines while you are also receiving in-person counseling. Using these two strategies together makes you more likely to succeed in quitting than if you used either strategy on its own.  If you are pregnant or breastfeeding, talk with your health care provider about finding counseling or other support strategies to quit smoking. Do not take medicine to help you quit smoking unless told " to do so by your health care provider.  WHAT THINGS CAN I DO TO MAKE IT EASIER TO QUIT?  Quitting smoking might feel overwhelming at first, but there is a lot that you can do to make it easier. Take these important actions:  · Reach out to your family and friends and ask that they support and encourage you during this time. Call telephone quitlines, reach out to support groups, or work with a counselor for support.  · Ask people who smoke to avoid smoking around you.  · Avoid places that trigger you to smoke, such as bars, parties, or smoke-break areas at work.  · Spend time around people who do not smoke.  · Lessen stress in your life, because stress can be a smoking trigger for some people. To lessen stress, try:  ¨ Exercising regularly.  ¨ Deep-breathing exercises.  ¨ Yoga.  ¨ Meditating.  ¨ Performing a body scan. This involves closing your eyes, scanning your body from head to toe, and noticing which parts of your body are particularly tense. Purposefully relax the muscles in those areas.  · Download or purchase mobile phone or tablet apps (applications) that can help you stick to your quit plan by providing reminders, tips, and encouragement. There are many free apps, such as QuitGuide from the CDC (Centers for Disease Control and Prevention). You can find other support for quitting smoking (smoking cessation) through smokefree.gov and other websites.  HOW WILL I FEEL WHEN I QUIT SMOKING?  Within the first 24 hours of quitting smoking, you may start to feel some withdrawal symptoms. These symptoms are usually most noticeable 2-3 days after quitting, but they usually do not last beyond 2-3 weeks. Changes or symptoms that you might experience include:  · Mood swings.  · Restlessness, anxiety, or irritation.  · Difficulty concentrating.  · Dizziness.  · Strong cravings for sugary foods in addition to nicotine.  · Mild weight gain.  · Constipation.  · Nausea.  · Coughing or a sore throat.  · Changes in how your  medicines work in your body.  · A depressed mood.  · Difficulty sleeping (insomnia).  After the first 2-3 weeks of quitting, you may start to notice more positive results, such as:  · Improved sense of smell and taste.  · Decreased coughing and sore throat.  · Slower heart rate.  · Lower blood pressure.  · Clearer skin.  · The ability to breathe more easily.  · Fewer sick days.  Quitting smoking is very challenging for most people. Do not get discouraged if you are not successful the first time. Some people need to make many attempts to quit before they achieve long-term success. Do your best to stick to your quit plan, and talk with your health care provider if you have any questions or concerns.     This information is not intended to replace advice given to you by your health care provider. Make sure you discuss any questions you have with your health care provider.     Document Released: 12/12/2002 Document Revised: 05/03/2016 Document Reviewed: 05/03/2016  Elsevier Interactive Patient Education ©2017 [employer] Inc.

## 2018-03-12 ENCOUNTER — LAB REQUISITION (OUTPATIENT)
Dept: LAB | Facility: HOSPITAL | Age: 68
End: 2018-03-12

## 2018-03-12 DIAGNOSIS — N39.0 URINARY TRACT INFECTION: ICD-10-CM

## 2018-03-12 LAB
BACTERIA UR QL AUTO: ABNORMAL /HPF
BILIRUB UR QL STRIP: NEGATIVE
CLARITY UR: ABNORMAL
COLOR UR: ABNORMAL
GLUCOSE UR STRIP-MCNC: ABNORMAL MG/DL
HGB UR QL STRIP.AUTO: ABNORMAL
HYALINE CASTS UR QL AUTO: ABNORMAL /LPF
KETONES UR QL STRIP: NEGATIVE
LEUKOCYTE ESTERASE UR QL STRIP.AUTO: ABNORMAL
NITRITE UR QL STRIP: NEGATIVE
PH UR STRIP.AUTO: 6 [PH] (ref 5–8)
PROT UR QL STRIP: ABNORMAL
RBC # UR: ABNORMAL /HPF
REF LAB TEST METHOD: ABNORMAL
SP GR UR STRIP: 1.02 (ref 1–1.03)
SQUAMOUS #/AREA URNS HPF: ABNORMAL /HPF
UROBILINOGEN UR QL STRIP: ABNORMAL
WBC UR QL AUTO: ABNORMAL /HPF

## 2018-03-12 PROCEDURE — 87086 URINE CULTURE/COLONY COUNT: CPT | Performed by: FAMILY MEDICINE

## 2018-03-12 PROCEDURE — 81001 URINALYSIS AUTO W/SCOPE: CPT | Performed by: FAMILY MEDICINE

## 2018-03-14 LAB — BACTERIA SPEC AEROBE CULT: NORMAL

## 2018-03-16 ENCOUNTER — OFFICE VISIT (OUTPATIENT)
Dept: PULMONOLOGY | Facility: CLINIC | Age: 68
End: 2018-03-16

## 2018-03-16 VITALS
WEIGHT: 126.2 LBS | HEIGHT: 61 IN | DIASTOLIC BLOOD PRESSURE: 67 MMHG | OXYGEN SATURATION: 91 % | TEMPERATURE: 98.4 F | BODY MASS INDEX: 23.83 KG/M2 | HEART RATE: 65 BPM | SYSTOLIC BLOOD PRESSURE: 126 MMHG

## 2018-03-16 DIAGNOSIS — R06.02 SHORTNESS OF BREATH: ICD-10-CM

## 2018-03-16 DIAGNOSIS — Z72.0 TOBACCO USE: ICD-10-CM

## 2018-03-16 DIAGNOSIS — G47.33 OSA (OBSTRUCTIVE SLEEP APNEA): ICD-10-CM

## 2018-03-16 DIAGNOSIS — N17.9 ACUTE RENAL FAILURE, UNSPECIFIED ACUTE RENAL FAILURE TYPE (HCC): Primary | ICD-10-CM

## 2018-03-16 PROCEDURE — 99214 OFFICE O/P EST MOD 30 MIN: CPT | Performed by: INTERNAL MEDICINE

## 2018-03-16 PROCEDURE — 99407 BEHAV CHNG SMOKING > 10 MIN: CPT | Performed by: INTERNAL MEDICINE

## 2018-03-16 RX ORDER — AMOXICILLIN 500 MG/1
500 CAPSULE ORAL 3 TIMES DAILY
Status: ON HOLD | COMMUNITY
End: 2018-03-29

## 2018-03-16 NOTE — PROGRESS NOTES
Interval history since last visit:SOB, COUGHIN    Recent hospitalizations:     Investigations (imaging, PFT's, labs, sleep study, record requests, etc.) chest XR, PFT    Have you had the Influenza Vaccine? yes    Would you like to receive this Vaccine today? no    Have you had the Pneumonia Vaccine?  yes   Would you like to receive this Vaccine today? no    Subjective    Sara Cardona presents for the following Shortness of Breath and Results  .    History of Present Illness   Had chest pain since last visit and was in ER.   No change in her sob and says she has quit smoking.    Review of Systems   Constitutional: Negative for appetite change, chills, diaphoresis and unexpected weight change.   HENT: Negative for sore throat, trouble swallowing and voice change.    Eyes: Negative for visual disturbance.   Respiratory: Positive for cough and shortness of breath. Negative for apnea, choking and wheezing.    Cardiovascular: Negative for chest pain, palpitations and leg swelling.   Gastrointestinal: Negative for abdominal pain, constipation, diarrhea, nausea and vomiting.   Endocrine: Negative for cold intolerance, heat intolerance, polydipsia, polyphagia and polyuria.   Genitourinary: Negative for difficulty urinating and dysuria.   Musculoskeletal: Negative for gait problem.   Skin: Negative for rash and wound.   Neurological: Negative for syncope and light-headedness.   Hematological: Negative for adenopathy.   Psychiatric/Behavioral: Negative for agitation, behavioral problems and confusion.   All other systems reviewed and are negative.      Active Problems:  Problem List Items Addressed This Visit        Respiratory    Shortness of breath    Relevant Orders    Pulmonary Function Test    STEPHANIE (obstructive sleep apnea)       Genitourinary    Acute renal failure - Primary       Other    Tobacco use      Other Visit Diagnoses    None.         Past Medical History:  Past Medical History:   Diagnosis Date   • Angina at  rest    • Anxiety    • Aortic stenosis    • ASCVD (arteriosclerotic cardiovascular disease)    • Breast cancer    • CHF (congestive heart failure) 7/24/2017   • Chronic pain    • COPD (chronic obstructive pulmonary disease)    • Diabetic neuropathy    • Dyslipidemia    • Essential hypertension    • Insulin dependent diabetes mellitus    • Left carotid bruit    • Recurrent pneumonia    • Renal failure        Family History:  Family History   Problem Relation Age of Onset   • Diabetes Mother    • Lung cancer Father        Social History:  Social History   Substance Use Topics   • Smoking status: Former Smoker     Packs/day: 0.25     Years: 40.00     Types: Cigarettes     Quit date: 02/2018   • Smokeless tobacco: Never Used   • Alcohol use No       Current Medications:  Current Outpatient Prescriptions   Medication Sig Dispense Refill   • albuterol (PROVENTIL HFA;VENTOLIN HFA) 108 (90 BASE) MCG/ACT inhaler Inhale 2 puffs every 6 (six) hours as needed for wheezing. (Patient taking differently: Inhale 2 puffs Every 6 (Six) Hours.) 3.7 g 0   • amoxicillin (AMOXIL) 500 MG capsule Take 500 mg by mouth 3 (Three) Times a Day.     • aspirin 81 MG EC tablet Take 1 tablet by mouth Daily. 30 tablet 0   • budesonide-formoterol (SYMBICORT) 80-4.5 MCG/ACT inhaler Inhale 2 puffs 2 (Two) Times a Day.     • calcitriol (ROCALTROL) 0.25 MCG capsule Take 0.25 mcg by mouth Daily.     • calcium acetate (PHOS BINDER,) 667 MG capsule capsule Take 1 capsule by mouth 3 (Three) Times a Day With Meals. 180 capsule 0   • clonazePAM (KlonoPIN) 0.5 MG tablet Take 0.5 mg by mouth 2 (Two) Times a Day.     • clopidogrel (PLAVIX) 75 MG tablet Take 1 tablet by mouth Daily. 30 tablet 0   • ferrous sulfate 325 (65 FE) MG tablet Take 325 mg by mouth 2 (Two) Times a Day With Meals.     • gabapentin (NEURONTIN) 100 MG capsule Take 1 capsule by mouth every night at bedtime. 30 capsule 0   • hydrALAZINE (APRESOLINE) 10 MG tablet Take 1 tablet by mouth Every 8  "(Eight) Hours. 90 tablet 0   • insulin glargine (LANTUS) 100 UNIT/ML injection Inject 10 Units under the skin Every Night. Patient spouse says she was given 20 units on 11/1 due to her higher than normal glucose reading     • isosorbide mononitrate (IMDUR) 60 MG 24 hr tablet Take 1 tablet by mouth Daily. 30 tablet 0   • losartan (COZAAR) 25 MG tablet Take 1 tablet by mouth Daily. 30 tablet 0   • metoprolol tartrate (LOPRESSOR) 25 MG tablet Take 1 tablet by mouth Every 12 (Twelve) Hours. 60 tablet 0   • nitroglycerin (NITROSTAT) 0.4 MG SL tablet Place 1 tablet under the tongue Every 5 (Five) Minutes As Needed for Chest Pain. Take no more than 3 doses in 15 minutes. 15 tablet 0   • ranolazine (RANEXA) 500 MG 12 hr tablet Take 1 tablet by mouth Every 12 (Twelve) Hours. 60 tablet 0     No current facility-administered medications for this visit.        Allergies:  No Known Allergies    Vitals:  /67   Pulse 65   Temp 98.4 °F (36.9 °C)   Ht 154.9 cm (60.98\")   Wt 57.2 kg (126 lb 3.2 oz)   SpO2 91%   BMI 23.86 kg/m²     Imaging:    Imaging Results (most recent)     None          Pulmonary Functions Testing Results:    No results found for: FEV1, FVC, FVE2OCN, TLC, DLCO    Results for orders placed or performed in visit on 03/12/18   Urine Culture   Result Value Ref Range    Urine Culture >100,000 CFU/mL Normal Urogenital Andreina    Urinalysis With / Culture If Indicated   Result Value Ref Range    Color, UA Dark Yellow (A) Yellow, Straw    Appearance, UA Turbid (A) Clear    pH, UA 6.0 5.0 - 8.0    Specific Gravity, UA 1.024 1.005 - 1.030    Glucose,  mg/dL (1+) (A) Negative    Ketones, UA Negative Negative    Bilirubin, UA Negative Negative    Blood, UA Large (3+) (A) Negative    Protein, UA >=300 mg/dL (3+) (A) Negative    Leuk Esterase, UA Large (3+) (A) Negative    Nitrite, UA Negative Negative    Urobilinogen, UA 1.0 E.U./dL 0.2 - 1.0 E.U./dL   Urinalysis, Microscopic Only   Result Value Ref Range    " RBC, UA 21-30 (A) None Seen, 0-2 /HPF    WBC, UA Too Numerous to Count (A) None Seen, 0-2 /HPF    Bacteria, UA 1+ (A) None Seen /HPF    Squamous Epithelial Cells, UA 13-20 (A) None Seen, 0-2 /HPF    Hyaline Casts, UA None Seen None Seen /LPF    Methodology Manual Light Microscopy        Objective   Physical Exam   General- normal in appearance, not in any acute distress    HEENT- pupils equally reactive to light, normal in size, no scleral icterus    Neck-supple    Respiratory-respirations normal-on auscultation no wheezing no crackles,     Cardiovascular-  Normal S1 and S2. No S3, S4 or murmurs. No JVD, no carotid bruit and no edema, pulses normal bilaterally     GI-nontender nondistended bowel sounds positive    CNS-nonfocal    Musculoskeletal -no edema    Psychiatric-mood good, good eye contact, alert awake oriented        Assessment/Plan    Shortness of breath: Likely related to her ESRD, long history of smoking and underlying lung disease-has advanced COPD.  PFT was done and showed-Spirometry suggests restriction.  Lung volumes are not done as patient was unable to complete the test.  DLCO is not done as patient wasn't able to complete the test.  Flow volumeabnormal likely due to patient's efforts.  Recommend repeating the test.    PFT ordered    Pneumonia:Chest x-ray was reviewed and does not show any pneumonia.  Likely has volume overloaded recommend taking more fluid off during the dialysis.      Hypoxia:Patient not wearing oxygen today.  Pulse ox was around 90%.  Educated patient on the importance of wearing oxygen as prescribed, otherwise could become hypoxic, faint, fall, hit his/her head, cause a brain hemorrhage, and potentially die.      ESRD:  continue hemodialysis.  Recommend patient is on Tuesday, Thursday, Saturday HD, managed by nephrology. I have stressed the importance of not skipping dialysis sessions.     STEPHANIE:On compliant with CPAP.  Educated patient on the complications associated with  untreated sleep apnea -- that it increases risk of MI, stroke, depression, hypertension, heart failure, arrhythmias, coronary artery disease, and potential death due to complication of that mentioned previously.    Also patient was educated  about the hazards of driving if there sleep deprived and has sleep apnea.  Was instructed not to involving driving or any activity which involves higher level of mental function- like operating a machine.    Tobacco use: I advised the patient of the risks in continuing to use tobacco, and I provided this patient with smoking cessation educational materials.    During this visit, I spent > 10 minutes counseling the patient regarding smoking cessation.    Lung Cancer screening: CT of chest was completed in patient, 11/1/17, no nodules, repeat in 1 year.     Currently vaccinated-pneumonia vaccine        ICD-10-CM ICD-9-CM   1. Acute renal failure, unspecified acute renal failure type N17.9 584.9   2. STEPHANIE (obstructive sleep apnea) G47.33 327.23   3. Shortness of breath R06.02 786.05   4. Tobacco use Z72.0 305.1       Return in about 6 months (around 9/16/2018).

## 2018-03-26 ENCOUNTER — TELEPHONE (OUTPATIENT)
Dept: CARDIOLOGY | Facility: CLINIC | Age: 68
End: 2018-03-26

## 2018-03-26 DIAGNOSIS — I73.9 PERIPHERAL ARTERY DISEASE (HCC): ICD-10-CM

## 2018-03-26 DIAGNOSIS — H53.453 OTHER LOCALIZED VISUAL FIELD DEFECT, BILATERAL: ICD-10-CM

## 2018-03-26 DIAGNOSIS — H53.9 VISUAL CHANGES: ICD-10-CM

## 2018-03-26 DIAGNOSIS — I65.29 STENOSIS OF CAROTID ARTERY, UNSPECIFIED LATERALITY: Primary | ICD-10-CM

## 2018-03-26 NOTE — TELEPHONE ENCOUNTER
Pt's   called and said that she was supposed to have doppler study ordered and it was never scheduled but I looked into the chart and never seen where we had placed the order, can someone look into this to see if we were supposed to do this?      Thank you

## 2018-03-28 ENCOUNTER — HOSPITAL ENCOUNTER (INPATIENT)
Facility: HOSPITAL | Age: 68
LOS: 5 days | Discharge: HOME | End: 2018-04-03
Attending: EMERGENCY MEDICINE | Admitting: INTERNAL MEDICINE

## 2018-03-28 DIAGNOSIS — L08.9 INFECTED DECUBITUS ULCER, UNSPECIFIED PRESSURE ULCER STAGE: ICD-10-CM

## 2018-03-28 DIAGNOSIS — L03.115 BILATERAL LOWER LEG CELLULITIS: Primary | ICD-10-CM

## 2018-03-28 DIAGNOSIS — L89.90 INFECTED DECUBITUS ULCER, UNSPECIFIED PRESSURE ULCER STAGE: ICD-10-CM

## 2018-03-28 DIAGNOSIS — I95.9 HYPOTENSION, UNSPECIFIED HYPOTENSION TYPE: ICD-10-CM

## 2018-03-28 DIAGNOSIS — L03.116 BILATERAL LOWER LEG CELLULITIS: Primary | ICD-10-CM

## 2018-03-28 DIAGNOSIS — N18.9 CHRONIC KIDNEY DISEASE (CKD), ACTIVE MEDICAL MANAGEMENT WITHOUT DIALYSIS, UNSPECIFIED STAGE: ICD-10-CM

## 2018-03-28 LAB
ALBUMIN SERPL-MCNC: 3.1 G/DL (ref 3.4–4.8)
ALBUMIN/GLOB SERPL: 0.8 G/DL (ref 1.5–2.5)
ALP SERPL-CCNC: 142 U/L (ref 35–104)
ALT SERPL W P-5'-P-CCNC: 7 U/L (ref 10–36)
ANION GAP SERPL CALCULATED.3IONS-SCNC: 12.3 MMOL/L (ref 3.6–11.2)
AST SERPL-CCNC: 17 U/L (ref 10–30)
BASOPHILS # BLD AUTO: 0.03 10*3/MM3 (ref 0–0.3)
BASOPHILS NFR BLD AUTO: 0.2 % (ref 0–2)
BILIRUB SERPL-MCNC: 0.5 MG/DL (ref 0.2–1.8)
BUN BLD-MCNC: 40 MG/DL (ref 7–21)
BUN/CREAT SERPL: 6.8 (ref 7–25)
CALCIUM SPEC-SCNC: 8.3 MG/DL (ref 7.7–10)
CHLORIDE SERPL-SCNC: 95 MMOL/L (ref 99–112)
CO2 SERPL-SCNC: 21.7 MMOL/L (ref 24.3–31.9)
CREAT BLD-MCNC: 5.87 MG/DL (ref 0.43–1.29)
DEPRECATED RDW RBC AUTO: 61.3 FL (ref 37–54)
EOSINOPHIL # BLD AUTO: 0.04 10*3/MM3 (ref 0–0.7)
EOSINOPHIL NFR BLD AUTO: 0.3 % (ref 0–7)
ERYTHROCYTE [DISTWIDTH] IN BLOOD BY AUTOMATED COUNT: 18.7 % (ref 11.5–14.5)
ERYTHROCYTE [SEDIMENTATION RATE] IN BLOOD: 11 MM/HR (ref 0–30)
GFR SERPL CREATININE-BSD FRML MDRD: 7 ML/MIN/1.73
GLOBULIN UR ELPH-MCNC: 3.7 GM/DL
GLUCOSE BLD-MCNC: 138 MG/DL (ref 70–110)
HCT VFR BLD AUTO: 36.4 % (ref 37–47)
HGB BLD-MCNC: 11.5 G/DL (ref 12–16)
IMM GRANULOCYTES # BLD: 0.04 10*3/MM3 (ref 0–0.03)
IMM GRANULOCYTES NFR BLD: 0.3 % (ref 0–0.5)
LYMPHOCYTES # BLD AUTO: 1.34 10*3/MM3 (ref 1–3)
LYMPHOCYTES NFR BLD AUTO: 9.7 % (ref 16–46)
MAGNESIUM SERPL-MCNC: 2.1 MG/DL (ref 1.7–2.6)
MCH RBC QN AUTO: 29.7 PG (ref 27–33)
MCHC RBC AUTO-ENTMCNC: 31.6 G/DL (ref 33–37)
MCV RBC AUTO: 94.1 FL (ref 80–94)
MONOCYTES # BLD AUTO: 1.22 10*3/MM3 (ref 0.1–0.9)
MONOCYTES NFR BLD AUTO: 8.9 % (ref 0–12)
NEUTROPHILS # BLD AUTO: 11.09 10*3/MM3 (ref 1.4–6.5)
NEUTROPHILS NFR BLD AUTO: 80.6 % (ref 40–75)
OSMOLALITY SERPL CALC.SUM OF ELEC: 270.9 MOSM/KG (ref 273–305)
PHOSPHATE SERPL-MCNC: 7.8 MG/DL (ref 2.7–4.5)
PLATELET # BLD AUTO: 213 10*3/MM3 (ref 130–400)
PMV BLD AUTO: 10.5 FL (ref 6–10)
POTASSIUM BLD-SCNC: 3.9 MMOL/L (ref 3.5–5.3)
PROT SERPL-MCNC: 6.8 G/DL (ref 6–8)
RBC # BLD AUTO: 3.87 10*6/MM3 (ref 4.2–5.4)
SODIUM BLD-SCNC: 129 MMOL/L (ref 135–153)
WBC NRBC COR # BLD: 13.76 10*3/MM3 (ref 4.5–12.5)

## 2018-03-28 PROCEDURE — 80053 COMPREHEN METABOLIC PANEL: CPT | Performed by: EMERGENCY MEDICINE

## 2018-03-28 PROCEDURE — 83735 ASSAY OF MAGNESIUM: CPT | Performed by: EMERGENCY MEDICINE

## 2018-03-28 PROCEDURE — 99285 EMERGENCY DEPT VISIT HI MDM: CPT

## 2018-03-28 PROCEDURE — 85652 RBC SED RATE AUTOMATED: CPT | Performed by: EMERGENCY MEDICINE

## 2018-03-28 PROCEDURE — 85025 COMPLETE CBC W/AUTO DIFF WBC: CPT | Performed by: EMERGENCY MEDICINE

## 2018-03-28 PROCEDURE — 36415 COLL VENOUS BLD VENIPUNCTURE: CPT

## 2018-03-28 PROCEDURE — 84100 ASSAY OF PHOSPHORUS: CPT | Performed by: EMERGENCY MEDICINE

## 2018-03-28 RX ORDER — SODIUM CHLORIDE 9 MG/ML
INJECTION, SOLUTION INTRAVENOUS
Status: DISPENSED
Start: 2018-03-28 | End: 2018-03-29

## 2018-03-28 RX ORDER — SODIUM CHLORIDE 0.9 % (FLUSH) 0.9 %
10 SYRINGE (ML) INJECTION AS NEEDED
Status: DISCONTINUED | OUTPATIENT
Start: 2018-03-28 | End: 2018-04-03 | Stop reason: HOSPADM

## 2018-03-28 RX ORDER — SODIUM CHLORIDE 9 MG/ML
50 INJECTION, SOLUTION INTRAVENOUS CONTINUOUS
Status: DISCONTINUED | OUTPATIENT
Start: 2018-03-28 | End: 2018-03-30

## 2018-03-28 RX ADMIN — SODIUM CHLORIDE 500 ML: 900 INJECTION, SOLUTION INTRAVENOUS at 23:14

## 2018-03-29 ENCOUNTER — APPOINTMENT (OUTPATIENT)
Dept: ULTRASOUND IMAGING | Facility: HOSPITAL | Age: 68
End: 2018-03-29

## 2018-03-29 ENCOUNTER — APPOINTMENT (OUTPATIENT)
Dept: GENERAL RADIOLOGY | Facility: HOSPITAL | Age: 68
End: 2018-03-29

## 2018-03-29 ENCOUNTER — APPOINTMENT (OUTPATIENT)
Dept: CARDIOLOGY | Facility: HOSPITAL | Age: 68
End: 2018-03-29
Attending: INTERNAL MEDICINE

## 2018-03-29 ENCOUNTER — APPOINTMENT (OUTPATIENT)
Dept: ULTRASOUND IMAGING | Facility: HOSPITAL | Age: 68
End: 2018-03-29
Attending: SURGERY

## 2018-03-29 PROBLEM — A41.9 SEPSIS AFFECTING SKIN: Status: ACTIVE | Noted: 2018-03-29

## 2018-03-29 LAB
A-A DO2: 111 MMHG (ref 0–300)
ARTERIAL PATENCY WRIST A: ABNORMAL
ATMOSPHERIC PRESS: 728 MMHG
BASE EXCESS BLDA CALC-SCNC: -10.8 MMOL/L
BDY SITE: ABNORMAL
BODY TEMPERATURE: 98.6 C
CK MB SERPL-CCNC: 1.64 NG/ML (ref 0–5)
CK MB SERPL-RTO: 1.5 % (ref 0–3)
CK SERPL-CCNC: 109 U/L (ref 24–173)
COHGB MFR BLD: 4.6 % (ref 0–5)
CRP SERPL-MCNC: 11.03 MG/DL (ref 0–0.99)
CRP SERPL-MCNC: 9.81 MG/DL (ref 0–0.99)
D-LACTATE SERPL-SCNC: 1.5 MMOL/L (ref 0.5–2)
GAS FLOW AIRWAY: 4 LPM
GLUCOSE BLDC GLUCOMTR-MCNC: 143 MG/DL (ref 70–130)
HCO3 BLDA-SCNC: 16 MMOL/L (ref 22–26)
HCT VFR BLD CALC: 39 % (ref 37–47)
HGB BLDA-MCNC: 13.4 G/DL (ref 12–16)
HOROWITZ INDEX BLD+IHG-RTO: 36 %
METHGB BLD QL: 0.1 % (ref 0–3)
MODALITY: ABNORMAL
OXYHGB MFR BLDV: 91.1 % (ref 85–100)
PCO2 BLDA: 38.9 MM HG (ref 35–45)
PH BLDA: 7.23 PH UNITS (ref 7.35–7.45)
PO2 BLDA: 89 MM HG (ref 80–100)
SAO2 % BLDCOA: 95.6 % (ref 90–100)
TROPONIN I SERPL-MCNC: 0.03 NG/ML
TROPONIN I SERPL-MCNC: 0.03 NG/ML
TROPONIN I SERPL-MCNC: 0.04 NG/ML

## 2018-03-29 PROCEDURE — 74018 RADEX ABDOMEN 1 VIEW: CPT

## 2018-03-29 PROCEDURE — 94799 UNLISTED PULMONARY SVC/PX: CPT

## 2018-03-29 PROCEDURE — 83605 ASSAY OF LACTIC ACID: CPT | Performed by: EMERGENCY MEDICINE

## 2018-03-29 PROCEDURE — 99221 1ST HOSP IP/OBS SF/LOW 40: CPT | Performed by: SURGERY

## 2018-03-29 PROCEDURE — 93923 UPR/LXTR ART STDY 3+ LVLS: CPT | Performed by: RADIOLOGY

## 2018-03-29 PROCEDURE — 94660 CPAP INITIATION&MGMT: CPT

## 2018-03-29 PROCEDURE — 93306 TTE W/DOPPLER COMPLETE: CPT

## 2018-03-29 PROCEDURE — P9046 ALBUMIN (HUMAN), 25%, 20 ML: HCPCS | Performed by: INTERNAL MEDICINE

## 2018-03-29 PROCEDURE — 82962 GLUCOSE BLOOD TEST: CPT

## 2018-03-29 PROCEDURE — G8998 SWALLOW D/C STATUS: HCPCS

## 2018-03-29 PROCEDURE — 82805 BLOOD GASES W/O2 SATURATION: CPT | Performed by: INTERNAL MEDICINE

## 2018-03-29 PROCEDURE — 25010000002 VANCOMYCIN PER 500 MG

## 2018-03-29 PROCEDURE — 99292 CRITICAL CARE ADDL 30 MIN: CPT | Performed by: INTERNAL MEDICINE

## 2018-03-29 PROCEDURE — 87040 BLOOD CULTURE FOR BACTERIA: CPT | Performed by: EMERGENCY MEDICINE

## 2018-03-29 PROCEDURE — 25010000002 NALOXONE PER 1 MG

## 2018-03-29 PROCEDURE — 93923 UPR/LXTR ART STDY 3+ LVLS: CPT

## 2018-03-29 PROCEDURE — 25010000002 ALBUMIN HUMAN 25% PER 50 ML: Performed by: INTERNAL MEDICINE

## 2018-03-29 PROCEDURE — 99291 CRITICAL CARE FIRST HOUR: CPT | Performed by: INTERNAL MEDICINE

## 2018-03-29 PROCEDURE — 71045 X-RAY EXAM CHEST 1 VIEW: CPT | Performed by: RADIOLOGY

## 2018-03-29 PROCEDURE — 84484 ASSAY OF TROPONIN QUANT: CPT | Performed by: INTERNAL MEDICINE

## 2018-03-29 PROCEDURE — 94640 AIRWAY INHALATION TREATMENT: CPT

## 2018-03-29 PROCEDURE — 83050 HGB METHEMOGLOBIN QUAN: CPT | Performed by: INTERNAL MEDICINE

## 2018-03-29 PROCEDURE — 82553 CREATINE MB FRACTION: CPT | Performed by: INTERNAL MEDICINE

## 2018-03-29 PROCEDURE — 5A1D70Z PERFORMANCE OF URINARY FILTRATION, INTERMITTENT, LESS THAN 6 HOURS PER DAY: ICD-10-PCS | Performed by: INTERNAL MEDICINE

## 2018-03-29 PROCEDURE — 74018 RADEX ABDOMEN 1 VIEW: CPT | Performed by: RADIOLOGY

## 2018-03-29 PROCEDURE — G8996 SWALLOW CURRENT STATUS: HCPCS

## 2018-03-29 PROCEDURE — 36600 WITHDRAWAL OF ARTERIAL BLOOD: CPT | Performed by: INTERNAL MEDICINE

## 2018-03-29 PROCEDURE — G8997 SWALLOW GOAL STATUS: HCPCS

## 2018-03-29 PROCEDURE — 25010000002 PIPERACILLIN-TAZOBACTAM: Performed by: INTERNAL MEDICINE

## 2018-03-29 PROCEDURE — 92610 EVALUATE SWALLOWING FUNCTION: CPT

## 2018-03-29 PROCEDURE — 82550 ASSAY OF CK (CPK): CPT | Performed by: INTERNAL MEDICINE

## 2018-03-29 PROCEDURE — 71045 X-RAY EXAM CHEST 1 VIEW: CPT

## 2018-03-29 PROCEDURE — 25010000002 VANCOMYCIN PER 500 MG: Performed by: INTERNAL MEDICINE

## 2018-03-29 PROCEDURE — 82375 ASSAY CARBOXYHB QUANT: CPT | Performed by: INTERNAL MEDICINE

## 2018-03-29 PROCEDURE — 25010000002 HEPARIN (PORCINE) PER 1000 UNITS: Performed by: INTERNAL MEDICINE

## 2018-03-29 PROCEDURE — 86140 C-REACTIVE PROTEIN: CPT | Performed by: INTERNAL MEDICINE

## 2018-03-29 PROCEDURE — 86140 C-REACTIVE PROTEIN: CPT | Performed by: EMERGENCY MEDICINE

## 2018-03-29 RX ORDER — ASPIRIN 81 MG/1
81 TABLET ORAL DAILY
Status: DISCONTINUED | OUTPATIENT
Start: 2018-03-29 | End: 2018-04-03 | Stop reason: HOSPADM

## 2018-03-29 RX ORDER — SODIUM CHLORIDE 0.9 % (FLUSH) 0.9 %
10 SYRINGE (ML) INJECTION AS NEEDED
Status: DISCONTINUED | OUTPATIENT
Start: 2018-03-29 | End: 2018-04-03 | Stop reason: HOSPADM

## 2018-03-29 RX ORDER — NALOXONE HCL 0.4 MG/ML
VIAL (ML) INJECTION
Status: COMPLETED
Start: 2018-03-29 | End: 2018-03-29

## 2018-03-29 RX ORDER — NALOXONE HYDROCHLORIDE 1 MG/ML
0.4 INJECTION INTRAMUSCULAR; INTRAVENOUS; SUBCUTANEOUS ONCE
Status: COMPLETED | OUTPATIENT
Start: 2018-03-29 | End: 2018-03-29

## 2018-03-29 RX ORDER — DEXTROSE MONOHYDRATE 25 G/50ML
25 INJECTION, SOLUTION INTRAVENOUS
Status: DISCONTINUED | OUTPATIENT
Start: 2018-03-29 | End: 2018-04-03 | Stop reason: HOSPADM

## 2018-03-29 RX ORDER — CLONAZEPAM 0.5 MG/1
0.5 TABLET ORAL 2 TIMES DAILY
Status: DISCONTINUED | OUTPATIENT
Start: 2018-03-29 | End: 2018-04-03 | Stop reason: HOSPADM

## 2018-03-29 RX ORDER — SODIUM CHLORIDE 0.9 % (FLUSH) 0.9 %
10 SYRINGE (ML) INJECTION EVERY 12 HOURS SCHEDULED
Status: DISCONTINUED | OUTPATIENT
Start: 2018-03-29 | End: 2018-04-03 | Stop reason: HOSPADM

## 2018-03-29 RX ORDER — ALBUMIN (HUMAN) 12.5 G/50ML
25 SOLUTION INTRAVENOUS AS NEEDED
Status: DISPENSED | OUTPATIENT
Start: 2018-03-29 | End: 2018-03-30

## 2018-03-29 RX ORDER — IPRATROPIUM BROMIDE AND ALBUTEROL SULFATE 2.5; .5 MG/3ML; MG/3ML
3 SOLUTION RESPIRATORY (INHALATION)
Status: DISCONTINUED | OUTPATIENT
Start: 2018-03-29 | End: 2018-04-03 | Stop reason: HOSPADM

## 2018-03-29 RX ORDER — BUDESONIDE AND FORMOTEROL FUMARATE DIHYDRATE 80; 4.5 UG/1; UG/1
2 AEROSOL RESPIRATORY (INHALATION)
Status: DISCONTINUED | OUTPATIENT
Start: 2018-03-29 | End: 2018-04-03 | Stop reason: HOSPADM

## 2018-03-29 RX ORDER — L.ACID,PARA/B.BIFIDUM/S.THERM 8B CELL
1 CAPSULE ORAL DAILY
Status: DISCONTINUED | OUTPATIENT
Start: 2018-03-29 | End: 2018-04-03 | Stop reason: HOSPADM

## 2018-03-29 RX ORDER — NICOTINE POLACRILEX 4 MG
15 LOZENGE BUCCAL
Status: DISCONTINUED | OUTPATIENT
Start: 2018-03-29 | End: 2018-04-03 | Stop reason: HOSPADM

## 2018-03-29 RX ORDER — NITROGLYCERIN 0.4 MG/1
0.4 TABLET SUBLINGUAL
Status: DISCONTINUED | OUTPATIENT
Start: 2018-03-29 | End: 2018-04-03 | Stop reason: HOSPADM

## 2018-03-29 RX ORDER — RANOLAZINE 500 MG/1
500 TABLET, EXTENDED RELEASE ORAL EVERY 12 HOURS SCHEDULED
Status: DISCONTINUED | OUTPATIENT
Start: 2018-03-29 | End: 2018-04-03 | Stop reason: HOSPADM

## 2018-03-29 RX ORDER — ALBUTEROL SULFATE 2.5 MG/3ML
2.5 SOLUTION RESPIRATORY (INHALATION) EVERY 6 HOURS PRN
Status: CANCELLED | OUTPATIENT
Start: 2018-03-29

## 2018-03-29 RX ORDER — SODIUM CHLORIDE 0.9 % (FLUSH) 0.9 %
1-10 SYRINGE (ML) INJECTION AS NEEDED
Status: DISCONTINUED | OUTPATIENT
Start: 2018-03-29 | End: 2018-04-03 | Stop reason: HOSPADM

## 2018-03-29 RX ORDER — NALOXONE HYDROCHLORIDE 1 MG/ML
1 INJECTION INTRAMUSCULAR; INTRAVENOUS; SUBCUTANEOUS ONCE
Status: DISCONTINUED | OUTPATIENT
Start: 2018-03-29 | End: 2018-03-29

## 2018-03-29 RX ORDER — HEPARIN SODIUM 5000 [USP'U]/ML
5000 INJECTION, SOLUTION INTRAVENOUS; SUBCUTANEOUS EVERY 12 HOURS SCHEDULED
Status: DISCONTINUED | OUTPATIENT
Start: 2018-03-29 | End: 2018-04-03 | Stop reason: HOSPADM

## 2018-03-29 RX ORDER — CLOPIDOGREL BISULFATE 75 MG/1
75 TABLET ORAL DAILY
Status: DISCONTINUED | OUTPATIENT
Start: 2018-03-29 | End: 2018-03-29

## 2018-03-29 RX ORDER — GABAPENTIN 100 MG/1
100 CAPSULE ORAL NIGHTLY
Status: CANCELLED | OUTPATIENT
Start: 2018-03-29

## 2018-03-29 RX ADMIN — SODIUM CHLORIDE 50 ML/HR: 900 INJECTION, SOLUTION INTRAVENOUS at 20:36

## 2018-03-29 RX ADMIN — BUDESONIDE AND FORMOTEROL FUMARATE DIHYDRATE 2 PUFF: 80; 4.5 AEROSOL RESPIRATORY (INHALATION) at 10:28

## 2018-03-29 RX ADMIN — Medication 1 CAPSULE: at 09:52

## 2018-03-29 RX ADMIN — NALOXONE HYDROCHLORIDE 0.4 MG: 0.4 INJECTION, SOLUTION INTRAMUSCULAR; INTRAVENOUS; SUBCUTANEOUS at 04:58

## 2018-03-29 RX ADMIN — ALBUMIN HUMAN 25 G: 0.25 SOLUTION INTRAVENOUS at 12:10

## 2018-03-29 RX ADMIN — PIPERACILLIN SODIUM,TAZOBACTAM SODIUM 3.38 G: 3; .375 INJECTION, POWDER, FOR SOLUTION INTRAVENOUS at 04:19

## 2018-03-29 RX ADMIN — PIPERACILLIN SODIUM,TAZOBACTAM SODIUM 3.38 G: 3; .375 INJECTION, POWDER, FOR SOLUTION INTRAVENOUS at 17:14

## 2018-03-29 RX ADMIN — RANOLAZINE 500 MG: 500 TABLET, FILM COATED, EXTENDED RELEASE ORAL at 09:52

## 2018-03-29 RX ADMIN — CLONAZEPAM 0.5 MG: 0.5 TABLET ORAL at 20:37

## 2018-03-29 RX ADMIN — ASPIRIN 81 MG: 81 TABLET ORAL at 09:52

## 2018-03-29 RX ADMIN — NALOXONE HYDROCHLORIDE 0.4 MG: 1 INJECTION PARENTERAL at 05:45

## 2018-03-29 RX ADMIN — RANOLAZINE 500 MG: 500 TABLET, FILM COATED, EXTENDED RELEASE ORAL at 20:37

## 2018-03-29 RX ADMIN — DOXYCYCLINE 100 MG: 100 INJECTION, POWDER, LYOPHILIZED, FOR SOLUTION INTRAVENOUS at 00:14

## 2018-03-29 RX ADMIN — Medication 10 ML: at 20:39

## 2018-03-29 RX ADMIN — SODIUM CHLORIDE 125 ML/HR: 900 INJECTION, SOLUTION INTRAVENOUS at 05:07

## 2018-03-29 RX ADMIN — Medication 10 ML: at 11:00

## 2018-03-29 RX ADMIN — BUDESONIDE AND FORMOTEROL FUMARATE DIHYDRATE 2 PUFF: 80; 4.5 AEROSOL RESPIRATORY (INHALATION) at 18:56

## 2018-03-29 RX ADMIN — NOREPINEPHRINE BITARTRATE 0.02 MCG/KG/MIN: 1 INJECTION INTRAVENOUS at 04:19

## 2018-03-29 RX ADMIN — VANCOMYCIN HYDROCHLORIDE 1000 MG: 5 INJECTION, POWDER, LYOPHILIZED, FOR SOLUTION INTRAVENOUS at 22:37

## 2018-03-29 RX ADMIN — SODIUM CHLORIDE 1000 ML: 9 INJECTION, SOLUTION INTRAVENOUS at 12:11

## 2018-03-29 RX ADMIN — CLONAZEPAM 0.5 MG: 0.5 TABLET ORAL at 09:55

## 2018-03-29 RX ADMIN — VANCOMYCIN HYDROCHLORIDE 1000 MG: 5 INJECTION, POWDER, LYOPHILIZED, FOR SOLUTION INTRAVENOUS at 02:54

## 2018-03-29 RX ADMIN — SODIUM CHLORIDE 1000 ML: 900 INJECTION, SOLUTION INTRAVENOUS at 01:22

## 2018-03-29 RX ADMIN — IPRATROPIUM BROMIDE AND ALBUTEROL SULFATE 3 ML: .5; 3 SOLUTION RESPIRATORY (INHALATION) at 18:51

## 2018-03-29 RX ADMIN — HEPARIN SODIUM 5000 UNITS: 5000 INJECTION, SOLUTION INTRAVENOUS; SUBCUTANEOUS at 20:38

## 2018-03-29 RX ADMIN — SODIUM CHLORIDE 1000 ML: 900 INJECTION, SOLUTION INTRAVENOUS at 04:03

## 2018-03-29 RX ADMIN — HEPARIN SODIUM 5000 UNITS: 5000 INJECTION, SOLUTION INTRAVENOUS; SUBCUTANEOUS at 09:52

## 2018-03-30 ENCOUNTER — APPOINTMENT (OUTPATIENT)
Dept: ULTRASOUND IMAGING | Facility: HOSPITAL | Age: 68
End: 2018-03-30

## 2018-03-30 LAB
ANION GAP SERPL CALCULATED.3IONS-SCNC: 11.2 MMOL/L (ref 3.6–11.2)
BASOPHILS # BLD AUTO: 0.04 10*3/MM3 (ref 0–0.3)
BASOPHILS NFR BLD AUTO: 0.3 % (ref 0–2)
BUN BLD-MCNC: 26 MG/DL (ref 7–21)
BUN/CREAT SERPL: 6.8 (ref 7–25)
CALCIUM SPEC-SCNC: 7.5 MG/DL (ref 7.7–10)
CHLORIDE SERPL-SCNC: 100 MMOL/L (ref 99–112)
CO2 SERPL-SCNC: 27.8 MMOL/L (ref 24.3–31.9)
CORTIS AM PEAK SERPL-MCNC: 25.4 MCG/DL (ref 4.3–22.4)
CREAT BLD-MCNC: 3.82 MG/DL (ref 0.43–1.29)
DEPRECATED RDW RBC AUTO: 63.3 FL (ref 37–54)
EOSINOPHIL # BLD AUTO: 0.07 10*3/MM3 (ref 0–0.7)
EOSINOPHIL NFR BLD AUTO: 0.6 % (ref 0–7)
ERYTHROCYTE [DISTWIDTH] IN BLOOD BY AUTOMATED COUNT: 19.2 % (ref 11.5–14.5)
GFR SERPL CREATININE-BSD FRML MDRD: 12 ML/MIN/1.73
GLUCOSE BLD-MCNC: 204 MG/DL (ref 70–110)
GLUCOSE BLDC GLUCOMTR-MCNC: 173 MG/DL (ref 70–130)
GLUCOSE BLDC GLUCOMTR-MCNC: 178 MG/DL (ref 70–130)
HCT VFR BLD AUTO: 37.4 % (ref 37–47)
HGB BLD-MCNC: 11.6 G/DL (ref 12–16)
IMM GRANULOCYTES # BLD: 0.04 10*3/MM3 (ref 0–0.03)
IMM GRANULOCYTES NFR BLD: 0.3 % (ref 0–0.5)
LYMPHOCYTES # BLD AUTO: 1.1 10*3/MM3 (ref 1–3)
LYMPHOCYTES NFR BLD AUTO: 8.7 % (ref 16–46)
MCH RBC QN AUTO: 29.7 PG (ref 27–33)
MCHC RBC AUTO-ENTMCNC: 31 G/DL (ref 33–37)
MCV RBC AUTO: 95.9 FL (ref 80–94)
MONOCYTES # BLD AUTO: 1.42 10*3/MM3 (ref 0.1–0.9)
MONOCYTES NFR BLD AUTO: 11.3 % (ref 0–12)
NEUTROPHILS # BLD AUTO: 9.91 10*3/MM3 (ref 1.4–6.5)
NEUTROPHILS NFR BLD AUTO: 78.8 % (ref 40–75)
OSMOLALITY SERPL CALC.SUM OF ELEC: 288.2 MOSM/KG (ref 273–305)
PLATELET # BLD AUTO: 187 10*3/MM3 (ref 130–400)
PMV BLD AUTO: 10.4 FL (ref 6–10)
POTASSIUM BLD-SCNC: 3.5 MMOL/L (ref 3.5–5.3)
RBC # BLD AUTO: 3.9 10*6/MM3 (ref 4.2–5.4)
SODIUM BLD-SCNC: 139 MMOL/L (ref 135–153)
VANCOMYCIN SERPL-MCNC: 46 MCG/ML
WBC NRBC COR # BLD: 12.58 10*3/MM3 (ref 4.5–12.5)

## 2018-03-30 PROCEDURE — 94799 UNLISTED PULMONARY SVC/PX: CPT

## 2018-03-30 PROCEDURE — 99233 SBSQ HOSP IP/OBS HIGH 50: CPT | Performed by: INTERNAL MEDICINE

## 2018-03-30 PROCEDURE — 25010000002 PIPERACILLIN-TAZOBACTAM: Performed by: INTERNAL MEDICINE

## 2018-03-30 PROCEDURE — 85025 COMPLETE CBC W/AUTO DIFF WBC: CPT | Performed by: INTERNAL MEDICINE

## 2018-03-30 PROCEDURE — 99291 CRITICAL CARE FIRST HOUR: CPT | Performed by: INTERNAL MEDICINE

## 2018-03-30 PROCEDURE — 93922 UPR/L XTREMITY ART 2 LEVELS: CPT | Performed by: RADIOLOGY

## 2018-03-30 PROCEDURE — 92526 ORAL FUNCTION THERAPY: CPT

## 2018-03-30 PROCEDURE — 25010000002 HEPARIN (PORCINE) PER 1000 UNITS: Performed by: INTERNAL MEDICINE

## 2018-03-30 PROCEDURE — 80202 ASSAY OF VANCOMYCIN: CPT

## 2018-03-30 PROCEDURE — 93922 UPR/L XTREMITY ART 2 LEVELS: CPT

## 2018-03-30 PROCEDURE — 80048 BASIC METABOLIC PNL TOTAL CA: CPT | Performed by: INTERNAL MEDICINE

## 2018-03-30 PROCEDURE — 82962 GLUCOSE BLOOD TEST: CPT

## 2018-03-30 PROCEDURE — 82533 TOTAL CORTISOL: CPT | Performed by: INTERNAL MEDICINE

## 2018-03-30 PROCEDURE — 94660 CPAP INITIATION&MGMT: CPT

## 2018-03-30 RX ADMIN — IPRATROPIUM BROMIDE AND ALBUTEROL SULFATE 3 ML: .5; 3 SOLUTION RESPIRATORY (INHALATION) at 13:46

## 2018-03-30 RX ADMIN — RANOLAZINE 500 MG: 500 TABLET, FILM COATED, EXTENDED RELEASE ORAL at 08:25

## 2018-03-30 RX ADMIN — Medication 1 CAPSULE: at 08:25

## 2018-03-30 RX ADMIN — BUDESONIDE AND FORMOTEROL FUMARATE DIHYDRATE 2 PUFF: 80; 4.5 AEROSOL RESPIRATORY (INHALATION) at 06:21

## 2018-03-30 RX ADMIN — PIPERACILLIN SODIUM,TAZOBACTAM SODIUM 3.38 G: 3; .375 INJECTION, POWDER, FOR SOLUTION INTRAVENOUS at 16:08

## 2018-03-30 RX ADMIN — RANOLAZINE 500 MG: 500 TABLET, FILM COATED, EXTENDED RELEASE ORAL at 20:53

## 2018-03-30 RX ADMIN — HEPARIN SODIUM 5000 UNITS: 5000 INJECTION, SOLUTION INTRAVENOUS; SUBCUTANEOUS at 08:26

## 2018-03-30 RX ADMIN — CLONAZEPAM 0.5 MG: 0.5 TABLET ORAL at 20:54

## 2018-03-30 RX ADMIN — ASPIRIN 81 MG: 81 TABLET ORAL at 08:25

## 2018-03-30 RX ADMIN — HEPARIN SODIUM 5000 UNITS: 5000 INJECTION, SOLUTION INTRAVENOUS; SUBCUTANEOUS at 20:54

## 2018-03-30 RX ADMIN — IPRATROPIUM BROMIDE AND ALBUTEROL SULFATE 3 ML: .5; 3 SOLUTION RESPIRATORY (INHALATION) at 00:43

## 2018-03-30 RX ADMIN — PIPERACILLIN SODIUM,TAZOBACTAM SODIUM 3.38 G: 3; .375 INJECTION, POWDER, FOR SOLUTION INTRAVENOUS at 03:52

## 2018-03-30 RX ADMIN — IPRATROPIUM BROMIDE AND ALBUTEROL SULFATE 3 ML: .5; 3 SOLUTION RESPIRATORY (INHALATION) at 06:21

## 2018-03-30 RX ADMIN — Medication 10 ML: at 08:34

## 2018-03-30 RX ADMIN — IPRATROPIUM BROMIDE AND ALBUTEROL SULFATE 3 ML: .5; 3 SOLUTION RESPIRATORY (INHALATION) at 19:27

## 2018-03-30 RX ADMIN — Medication 10 ML: at 20:54

## 2018-03-30 RX ADMIN — CLONAZEPAM 0.5 MG: 0.5 TABLET ORAL at 08:25

## 2018-03-30 RX ADMIN — BUDESONIDE AND FORMOTEROL FUMARATE DIHYDRATE 2 PUFF: 80; 4.5 AEROSOL RESPIRATORY (INHALATION) at 19:27

## 2018-03-31 ENCOUNTER — APPOINTMENT (OUTPATIENT)
Dept: GENERAL RADIOLOGY | Facility: HOSPITAL | Age: 68
End: 2018-03-31

## 2018-03-31 LAB
ANION GAP SERPL CALCULATED.3IONS-SCNC: 10.9 MMOL/L (ref 3.6–11.2)
BASOPHILS # BLD AUTO: 0.07 10*3/MM3 (ref 0–0.3)
BASOPHILS NFR BLD AUTO: 0.6 % (ref 0–2)
BH CV ECHO MEAS - % IVS THICK: -6.9 %
BH CV ECHO MEAS - % LVPW THICK: 23.5 %
BH CV ECHO MEAS - ACS: 1.9 CM
BH CV ECHO MEAS - AO MAX PG: 8.3 MMHG
BH CV ECHO MEAS - AO MEAN PG: 3.6 MMHG
BH CV ECHO MEAS - AO ROOT AREA (BSA CORRECTED): 2.1
BH CV ECHO MEAS - AO ROOT AREA: 9.1 CM^2
BH CV ECHO MEAS - AO ROOT DIAM: 3.4 CM
BH CV ECHO MEAS - AO V2 MAX: 143.9 CM/SEC
BH CV ECHO MEAS - AO V2 MEAN: 87.3 CM/SEC
BH CV ECHO MEAS - AO V2 VTI: 34.7 CM
BH CV ECHO MEAS - BSA(HAYCOCK): 1.6 M^2
BH CV ECHO MEAS - BSA: 1.6 M^2
BH CV ECHO MEAS - BZI_BMI: 25.7 KILOGRAMS/M^2
BH CV ECHO MEAS - BZI_METRIC_HEIGHT: 154.9 CM
BH CV ECHO MEAS - BZI_METRIC_WEIGHT: 61.7 KG
BH CV ECHO MEAS - CONTRAST EF 4CH: 73 ML/M^2
BH CV ECHO MEAS - EDV(CUBED): 52.7 ML
BH CV ECHO MEAS - EDV(MOD-SP4): 37 ML
BH CV ECHO MEAS - EDV(TEICH): 60 ML
BH CV ECHO MEAS - EF(CUBED): 89.9 %
BH CV ECHO MEAS - EF(TEICH): 85 %
BH CV ECHO MEAS - ESV(CUBED): 5.4 ML
BH CV ECHO MEAS - ESV(MOD-SP4): 10 ML
BH CV ECHO MEAS - ESV(TEICH): 9 ML
BH CV ECHO MEAS - FS: 53.4 %
BH CV ECHO MEAS - IVS/LVPW: 1.1
BH CV ECHO MEAS - IVSD: 1.5 CM
BH CV ECHO MEAS - IVSS: 1.4 CM
BH CV ECHO MEAS - LA DIMENSION: 4.7 CM
BH CV ECHO MEAS - LA/AO: 1.4
BH CV ECHO MEAS - LV DIASTOLIC VOL/BSA (35-75): 23.1 ML/M^2
BH CV ECHO MEAS - LV MASS(C)D: 194.1 GRAMS
BH CV ECHO MEAS - LV MASS(C)DI: 121 GRAMS/M^2
BH CV ECHO MEAS - LV MASS(C)S: 87.7 GRAMS
BH CV ECHO MEAS - LV MASS(C)SI: 54.7 GRAMS/M^2
BH CV ECHO MEAS - LV SYSTOLIC VOL/BSA (12-30): 6.2 ML/M^2
BH CV ECHO MEAS - LVIDD: 3.8 CM
BH CV ECHO MEAS - LVIDS: 1.7 CM
BH CV ECHO MEAS - LVLD AP4: 5.9 CM
BH CV ECHO MEAS - LVLS AP4: 5.6 CM
BH CV ECHO MEAS - LVOT AREA (M): 4.2 CM^2
BH CV ECHO MEAS - LVOT AREA: 4.1 CM^2
BH CV ECHO MEAS - LVOT DIAM: 2.3 CM
BH CV ECHO MEAS - LVPWD: 1.3 CM
BH CV ECHO MEAS - LVPWS: 1.6 CM
BH CV ECHO MEAS - MV A MAX VEL: 112.3 CM/SEC
BH CV ECHO MEAS - MV E MAX VEL: 151.8 CM/SEC
BH CV ECHO MEAS - MV E/A: 1.4
BH CV ECHO MEAS - PA ACC SLOPE: 1212 CM/SEC^2
BH CV ECHO MEAS - PA ACC TIME: 0.1 SEC
BH CV ECHO MEAS - PA PR(ACCEL): 34.6 MMHG
BH CV ECHO MEAS - RAP SYSTOLE: 10 MMHG
BH CV ECHO MEAS - RVDD: 2.3 CM
BH CV ECHO MEAS - RVSP: 69.1 MMHG
BH CV ECHO MEAS - SI(AO): 197.8 ML/M^2
BH CV ECHO MEAS - SI(CUBED): 29.6 ML/M^2
BH CV ECHO MEAS - SI(MOD-SP4): 16.8 ML/M^2
BH CV ECHO MEAS - SI(TEICH): 31.8 ML/M^2
BH CV ECHO MEAS - SV(AO): 317.2 ML
BH CV ECHO MEAS - SV(CUBED): 47.4 ML
BH CV ECHO MEAS - SV(MOD-SP4): 27 ML
BH CV ECHO MEAS - SV(TEICH): 51 ML
BH CV ECHO MEAS - TR MAX VEL: 384.3 CM/SEC
BUN BLD-MCNC: 30 MG/DL (ref 7–21)
BUN/CREAT SERPL: 7.1 (ref 7–25)
CALCIUM SPEC-SCNC: 7.6 MG/DL (ref 7.7–10)
CHLORIDE SERPL-SCNC: 101 MMOL/L (ref 99–112)
CO2 SERPL-SCNC: 27.1 MMOL/L (ref 24.3–31.9)
CORTIS AM PEAK SERPL-MCNC: 24.9 MCG/DL (ref 4.3–22.4)
CREAT BLD-MCNC: 4.25 MG/DL (ref 0.43–1.29)
CRP SERPL-MCNC: 7.61 MG/DL (ref 0–0.99)
DEPRECATED RDW RBC AUTO: 65.8 FL (ref 37–54)
EOSINOPHIL # BLD AUTO: 0.14 10*3/MM3 (ref 0–0.7)
EOSINOPHIL NFR BLD AUTO: 1.2 % (ref 0–7)
ERYTHROCYTE [DISTWIDTH] IN BLOOD BY AUTOMATED COUNT: 19.4 % (ref 11.5–14.5)
GFR SERPL CREATININE-BSD FRML MDRD: 10 ML/MIN/1.73
GLUCOSE BLD-MCNC: 115 MG/DL (ref 70–110)
GLUCOSE BLDC GLUCOMTR-MCNC: 161 MG/DL (ref 70–130)
GLUCOSE BLDC GLUCOMTR-MCNC: 162 MG/DL (ref 70–130)
HCT VFR BLD AUTO: 38 % (ref 37–47)
HGB BLD-MCNC: 11.5 G/DL (ref 12–16)
IMM GRANULOCYTES # BLD: 0.03 10*3/MM3 (ref 0–0.03)
IMM GRANULOCYTES NFR BLD: 0.3 % (ref 0–0.5)
LYMPHOCYTES # BLD AUTO: 1.5 10*3/MM3 (ref 1–3)
LYMPHOCYTES NFR BLD AUTO: 13 % (ref 16–46)
MCH RBC QN AUTO: 29.5 PG (ref 27–33)
MCHC RBC AUTO-ENTMCNC: 30.3 G/DL (ref 33–37)
MCV RBC AUTO: 97.4 FL (ref 80–94)
MONOCYTES # BLD AUTO: 1.24 10*3/MM3 (ref 0.1–0.9)
MONOCYTES NFR BLD AUTO: 10.7 % (ref 0–12)
NEUTROPHILS # BLD AUTO: 8.57 10*3/MM3 (ref 1.4–6.5)
NEUTROPHILS NFR BLD AUTO: 74.2 % (ref 40–75)
OSMOLALITY SERPL CALC.SUM OF ELEC: 284.6 MOSM/KG (ref 273–305)
PLATELET # BLD AUTO: 161 10*3/MM3 (ref 130–400)
PMV BLD AUTO: 10.4 FL (ref 6–10)
POTASSIUM BLD-SCNC: 3.8 MMOL/L (ref 3.5–5.3)
RBC # BLD AUTO: 3.9 10*6/MM3 (ref 4.2–5.4)
SODIUM BLD-SCNC: 139 MMOL/L (ref 135–153)
VANCOMYCIN SERPL-MCNC: 36.9 MCG/ML
WBC NRBC COR # BLD: 11.55 10*3/MM3 (ref 4.5–12.5)

## 2018-03-31 PROCEDURE — 99233 SBSQ HOSP IP/OBS HIGH 50: CPT | Performed by: INTERNAL MEDICINE

## 2018-03-31 PROCEDURE — 94799 UNLISTED PULMONARY SVC/PX: CPT

## 2018-03-31 PROCEDURE — 85025 COMPLETE CBC W/AUTO DIFF WBC: CPT | Performed by: INTERNAL MEDICINE

## 2018-03-31 PROCEDURE — 25010000002 PIPERACILLIN-TAZOBACTAM: Performed by: INTERNAL MEDICINE

## 2018-03-31 PROCEDURE — 82962 GLUCOSE BLOOD TEST: CPT

## 2018-03-31 PROCEDURE — 71045 X-RAY EXAM CHEST 1 VIEW: CPT

## 2018-03-31 PROCEDURE — 25010000002 HEPARIN (PORCINE) PER 1000 UNITS: Performed by: INTERNAL MEDICINE

## 2018-03-31 PROCEDURE — 71045 X-RAY EXAM CHEST 1 VIEW: CPT | Performed by: RADIOLOGY

## 2018-03-31 PROCEDURE — 82533 TOTAL CORTISOL: CPT | Performed by: INTERNAL MEDICINE

## 2018-03-31 PROCEDURE — 80202 ASSAY OF VANCOMYCIN: CPT | Performed by: INTERNAL MEDICINE

## 2018-03-31 PROCEDURE — 94660 CPAP INITIATION&MGMT: CPT

## 2018-03-31 PROCEDURE — 80048 BASIC METABOLIC PNL TOTAL CA: CPT | Performed by: INTERNAL MEDICINE

## 2018-03-31 PROCEDURE — 86140 C-REACTIVE PROTEIN: CPT | Performed by: INTERNAL MEDICINE

## 2018-03-31 PROCEDURE — 25010000002 PROMETHAZINE PER 50 MG: Performed by: INTERNAL MEDICINE

## 2018-03-31 RX ADMIN — BUDESONIDE AND FORMOTEROL FUMARATE DIHYDRATE 2 PUFF: 80; 4.5 AEROSOL RESPIRATORY (INHALATION) at 06:57

## 2018-03-31 RX ADMIN — NITROGLYCERIN 0.4 MG: 0.4 TABLET SUBLINGUAL at 08:49

## 2018-03-31 RX ADMIN — NOREPINEPHRINE BITARTRATE 0.02 MCG/KG/MIN: 1 INJECTION INTRAVENOUS at 22:54

## 2018-03-31 RX ADMIN — IPRATROPIUM BROMIDE AND ALBUTEROL SULFATE 3 ML: .5; 3 SOLUTION RESPIRATORY (INHALATION) at 12:28

## 2018-03-31 RX ADMIN — IPRATROPIUM BROMIDE AND ALBUTEROL SULFATE 3 ML: .5; 3 SOLUTION RESPIRATORY (INHALATION) at 19:13

## 2018-03-31 RX ADMIN — CLONAZEPAM 0.5 MG: 0.5 TABLET ORAL at 08:42

## 2018-03-31 RX ADMIN — CLONAZEPAM 0.5 MG: 0.5 TABLET ORAL at 20:33

## 2018-03-31 RX ADMIN — Medication 10 ML: at 08:44

## 2018-03-31 RX ADMIN — Medication 1 CAPSULE: at 08:42

## 2018-03-31 RX ADMIN — Medication 10 ML: at 20:34

## 2018-03-31 RX ADMIN — NITROGLYCERIN 0.4 MG: 0.4 TABLET SUBLINGUAL at 08:41

## 2018-03-31 RX ADMIN — BUDESONIDE AND FORMOTEROL FUMARATE DIHYDRATE 2 PUFF: 80; 4.5 AEROSOL RESPIRATORY (INHALATION) at 19:24

## 2018-03-31 RX ADMIN — ASPIRIN 81 MG: 81 TABLET ORAL at 08:42

## 2018-03-31 RX ADMIN — IPRATROPIUM BROMIDE AND ALBUTEROL SULFATE 3 ML: .5; 3 SOLUTION RESPIRATORY (INHALATION) at 06:58

## 2018-03-31 RX ADMIN — PROMETHAZINE HYDROCHLORIDE 12.5 MG: 25 INJECTION INTRAMUSCULAR; INTRAVENOUS at 13:50

## 2018-03-31 RX ADMIN — PIPERACILLIN SODIUM,TAZOBACTAM SODIUM 3.38 G: 3; .375 INJECTION, POWDER, FOR SOLUTION INTRAVENOUS at 17:02

## 2018-03-31 RX ADMIN — SODIUM CHLORIDE 1000 ML: 900 INJECTION, SOLUTION INTRAVENOUS at 07:52

## 2018-03-31 RX ADMIN — RANOLAZINE 500 MG: 500 TABLET, FILM COATED, EXTENDED RELEASE ORAL at 08:42

## 2018-03-31 RX ADMIN — RANOLAZINE 500 MG: 500 TABLET, FILM COATED, EXTENDED RELEASE ORAL at 20:33

## 2018-03-31 RX ADMIN — HEPARIN SODIUM 5000 UNITS: 5000 INJECTION, SOLUTION INTRAVENOUS; SUBCUTANEOUS at 08:43

## 2018-03-31 RX ADMIN — PIPERACILLIN SODIUM,TAZOBACTAM SODIUM 3.38 G: 3; .375 INJECTION, POWDER, FOR SOLUTION INTRAVENOUS at 05:18

## 2018-03-31 RX ADMIN — IPRATROPIUM BROMIDE AND ALBUTEROL SULFATE 3 ML: .5; 3 SOLUTION RESPIRATORY (INHALATION) at 01:09

## 2018-03-31 RX ADMIN — HEPARIN SODIUM 5000 UNITS: 5000 INJECTION, SOLUTION INTRAVENOUS; SUBCUTANEOUS at 20:33

## 2018-04-01 LAB
ALBUMIN SERPL-MCNC: 2.8 G/DL (ref 3.4–4.8)
ALBUMIN/GLOB SERPL: 0.8 G/DL (ref 1.5–2.5)
ALP SERPL-CCNC: 102 U/L (ref 35–104)
ALT SERPL W P-5'-P-CCNC: 8 U/L (ref 10–36)
ANION GAP SERPL CALCULATED.3IONS-SCNC: 7 MMOL/L (ref 3.6–11.2)
AST SERPL-CCNC: 12 U/L (ref 10–30)
BASOPHILS # BLD AUTO: 0.03 10*3/MM3 (ref 0–0.3)
BASOPHILS NFR BLD AUTO: 0.3 % (ref 0–2)
BILIRUB SERPL-MCNC: 0.8 MG/DL (ref 0.2–1.8)
BUN BLD-MCNC: 18 MG/DL (ref 7–21)
BUN/CREAT SERPL: 6.1 (ref 7–25)
CALCIUM SPEC-SCNC: 7.7 MG/DL (ref 7.7–10)
CHLORIDE SERPL-SCNC: 98 MMOL/L (ref 99–112)
CO2 SERPL-SCNC: 30 MMOL/L (ref 24.3–31.9)
CREAT BLD-MCNC: 2.95 MG/DL (ref 0.43–1.29)
CRP SERPL-MCNC: 9.02 MG/DL (ref 0–0.99)
DEPRECATED RDW RBC AUTO: 66 FL (ref 37–54)
EOSINOPHIL # BLD AUTO: 0.13 10*3/MM3 (ref 0–0.7)
EOSINOPHIL NFR BLD AUTO: 1.2 % (ref 0–7)
ERYTHROCYTE [DISTWIDTH] IN BLOOD BY AUTOMATED COUNT: 19.4 % (ref 11.5–14.5)
GFR SERPL CREATININE-BSD FRML MDRD: 16 ML/MIN/1.73
GLOBULIN UR ELPH-MCNC: 3.3 GM/DL
GLUCOSE BLD-MCNC: 132 MG/DL (ref 70–110)
GLUCOSE BLDC GLUCOMTR-MCNC: 186 MG/DL (ref 70–130)
GLUCOSE BLDC GLUCOMTR-MCNC: 233 MG/DL (ref 70–130)
HCT VFR BLD AUTO: 37.7 % (ref 37–47)
HGB BLD-MCNC: 11.6 G/DL (ref 12–16)
IMM GRANULOCYTES # BLD: 0.03 10*3/MM3 (ref 0–0.03)
IMM GRANULOCYTES NFR BLD: 0.3 % (ref 0–0.5)
LYMPHOCYTES # BLD AUTO: 1.39 10*3/MM3 (ref 1–3)
LYMPHOCYTES NFR BLD AUTO: 12.7 % (ref 16–46)
MAGNESIUM SERPL-MCNC: 1.8 MG/DL (ref 1.7–2.6)
MCH RBC QN AUTO: 29.5 PG (ref 27–33)
MCHC RBC AUTO-ENTMCNC: 30.8 G/DL (ref 33–37)
MCV RBC AUTO: 95.9 FL (ref 80–94)
MONOCYTES # BLD AUTO: 1.18 10*3/MM3 (ref 0.1–0.9)
MONOCYTES NFR BLD AUTO: 10.8 % (ref 0–12)
NEUTROPHILS # BLD AUTO: 8.17 10*3/MM3 (ref 1.4–6.5)
NEUTROPHILS NFR BLD AUTO: 74.7 % (ref 40–75)
OSMOLALITY SERPL CALC.SUM OF ELEC: 273.9 MOSM/KG (ref 273–305)
PLATELET # BLD AUTO: 172 10*3/MM3 (ref 130–400)
PMV BLD AUTO: 10.7 FL (ref 6–10)
POTASSIUM BLD-SCNC: 3.6 MMOL/L (ref 3.5–5.3)
PROT SERPL-MCNC: 6.1 G/DL (ref 6–8)
RBC # BLD AUTO: 3.93 10*6/MM3 (ref 4.2–5.4)
SODIUM BLD-SCNC: 135 MMOL/L (ref 135–153)
VANCOMYCIN SERPL-MCNC: 19.1 MCG/ML
WBC NRBC COR # BLD: 10.93 10*3/MM3 (ref 4.5–12.5)

## 2018-04-01 PROCEDURE — 94799 UNLISTED PULMONARY SVC/PX: CPT

## 2018-04-01 PROCEDURE — 85025 COMPLETE CBC W/AUTO DIFF WBC: CPT | Performed by: INTERNAL MEDICINE

## 2018-04-01 PROCEDURE — 94660 CPAP INITIATION&MGMT: CPT

## 2018-04-01 PROCEDURE — 82962 GLUCOSE BLOOD TEST: CPT

## 2018-04-01 PROCEDURE — 25010000002 HEPARIN (PORCINE) PER 1000 UNITS: Performed by: INTERNAL MEDICINE

## 2018-04-01 PROCEDURE — 80053 COMPREHEN METABOLIC PANEL: CPT | Performed by: INTERNAL MEDICINE

## 2018-04-01 PROCEDURE — 80202 ASSAY OF VANCOMYCIN: CPT | Performed by: INTERNAL MEDICINE

## 2018-04-01 PROCEDURE — 83735 ASSAY OF MAGNESIUM: CPT | Performed by: INTERNAL MEDICINE

## 2018-04-01 PROCEDURE — 25010000002 PROMETHAZINE PER 50 MG: Performed by: INTERNAL MEDICINE

## 2018-04-01 PROCEDURE — 25010000002 PIPERACILLIN-TAZOBACTAM: Performed by: INTERNAL MEDICINE

## 2018-04-01 PROCEDURE — 99233 SBSQ HOSP IP/OBS HIGH 50: CPT | Performed by: INTERNAL MEDICINE

## 2018-04-01 PROCEDURE — 25010000002 VANCOMYCIN PER 500 MG: Performed by: INTERNAL MEDICINE

## 2018-04-01 PROCEDURE — 86140 C-REACTIVE PROTEIN: CPT | Performed by: INTERNAL MEDICINE

## 2018-04-01 RX ORDER — HYDROCODONE BITARTRATE AND ACETAMINOPHEN 5; 325 MG/1; MG/1
1 TABLET ORAL EVERY 6 HOURS PRN
Status: DISCONTINUED | OUTPATIENT
Start: 2018-04-01 | End: 2018-04-03 | Stop reason: HOSPADM

## 2018-04-01 RX ORDER — BUMETANIDE 0.25 MG/ML
0.5 INJECTION INTRAMUSCULAR; INTRAVENOUS ONCE
Status: DISCONTINUED | OUTPATIENT
Start: 2018-04-01 | End: 2018-04-01

## 2018-04-01 RX ORDER — SODIUM CHLORIDE 9 MG/ML
INJECTION, SOLUTION INTRAVENOUS
Status: DISPENSED
Start: 2018-04-01 | End: 2018-04-01

## 2018-04-01 RX ADMIN — ASPIRIN 81 MG: 81 TABLET ORAL at 08:34

## 2018-04-01 RX ADMIN — IPRATROPIUM BROMIDE AND ALBUTEROL SULFATE 3 ML: .5; 3 SOLUTION RESPIRATORY (INHALATION) at 06:28

## 2018-04-01 RX ADMIN — Medication 10 ML: at 08:34

## 2018-04-01 RX ADMIN — HEPARIN SODIUM 5000 UNITS: 5000 INJECTION, SOLUTION INTRAVENOUS; SUBCUTANEOUS at 21:03

## 2018-04-01 RX ADMIN — PROMETHAZINE HYDROCHLORIDE 12.5 MG: 25 INJECTION INTRAMUSCULAR; INTRAVENOUS at 03:53

## 2018-04-01 RX ADMIN — IPRATROPIUM BROMIDE AND ALBUTEROL SULFATE 3 ML: .5; 3 SOLUTION RESPIRATORY (INHALATION) at 18:48

## 2018-04-01 RX ADMIN — RANOLAZINE 500 MG: 500 TABLET, FILM COATED, EXTENDED RELEASE ORAL at 21:03

## 2018-04-01 RX ADMIN — RANOLAZINE 500 MG: 500 TABLET, FILM COATED, EXTENDED RELEASE ORAL at 09:59

## 2018-04-01 RX ADMIN — PIPERACILLIN SODIUM,TAZOBACTAM SODIUM 3.38 G: 3; .375 INJECTION, POWDER, FOR SOLUTION INTRAVENOUS at 03:54

## 2018-04-01 RX ADMIN — HYDROCODONE BITARTRATE AND ACETAMINOPHEN 1 TABLET: 5; 325 TABLET ORAL at 16:44

## 2018-04-01 RX ADMIN — HEPARIN SODIUM 5000 UNITS: 5000 INJECTION, SOLUTION INTRAVENOUS; SUBCUTANEOUS at 08:35

## 2018-04-01 RX ADMIN — Medication 1 CAPSULE: at 08:34

## 2018-04-01 RX ADMIN — PIPERACILLIN SODIUM,TAZOBACTAM SODIUM 3.38 G: 3; .375 INJECTION, POWDER, FOR SOLUTION INTRAVENOUS at 16:35

## 2018-04-01 RX ADMIN — VANCOMYCIN HYDROCHLORIDE 1000 MG: 5 INJECTION, POWDER, LYOPHILIZED, FOR SOLUTION INTRAVENOUS at 11:27

## 2018-04-01 RX ADMIN — BUDESONIDE AND FORMOTEROL FUMARATE DIHYDRATE 2 PUFF: 80; 4.5 AEROSOL RESPIRATORY (INHALATION) at 07:20

## 2018-04-01 RX ADMIN — CLONAZEPAM 0.5 MG: 0.5 TABLET ORAL at 21:04

## 2018-04-01 RX ADMIN — IPRATROPIUM BROMIDE AND ALBUTEROL SULFATE 3 ML: .5; 3 SOLUTION RESPIRATORY (INHALATION) at 01:11

## 2018-04-01 RX ADMIN — BUDESONIDE AND FORMOTEROL FUMARATE DIHYDRATE 2 PUFF: 80; 4.5 AEROSOL RESPIRATORY (INHALATION) at 19:08

## 2018-04-01 RX ADMIN — Medication 10 ML: at 21:04

## 2018-04-01 RX ADMIN — HYDROCODONE BITARTRATE AND ACETAMINOPHEN 1 TABLET: 5; 325 TABLET ORAL at 09:32

## 2018-04-01 RX ADMIN — CLONAZEPAM 0.5 MG: 0.5 TABLET ORAL at 08:34

## 2018-04-01 RX ADMIN — IPRATROPIUM BROMIDE AND ALBUTEROL SULFATE 3 ML: .5; 3 SOLUTION RESPIRATORY (INHALATION) at 12:11

## 2018-04-01 NOTE — PROGRESS NOTES
[facility]     Patient Identification:  Name:  Sara Cardona  Age:  67 y.o.  Sex:  female  :  1950  MRN:  [id number]  Visit Number:  [id number]  Primary Care Provider:  Marcelino Sheehan MD    Length of stay:  3    Chief complaint:  Belly sore    Subjective:  She still has some upper abdominal soreness that's worse with cough.  There are no other new associated symptoms.  The nursing staff was just able to wean her off the Levophed earlier this morning.  No new issues of been identified by the nursing staff.  ----------------------------------------------------------------------------------------------------------------------  Current Hospital Meds:    aspirin 81 mg Oral Daily   budesonide-formoterol 2 puff Inhalation BID - RT   clonazePAM 0.5 mg Oral BID   heparin (porcine) 5,000 Units Subcutaneous Q12H   ipratropium-albuterol 3 mL Nebulization 4x Daily - RT   lactobacillus acidophilus 1 capsule Oral Daily   piperacillin-tazobactam 3.375 g Intravenous Q12H   ranolazine 500 mg Oral Q12H   sodium chloride 10 mL Intracatheter Q12H   sodium chloride      Vancomycin Pharmacy Intermittent Dosing  Does not apply Daily   Vancomycin Pharmacy Intermittent Dosing  Does not apply Daily       norepinephrine 0.02-0.3 mcg/kg/min Last Rate: 0.02 mcg/kg/min (18 2254)     ----------------------------------------------------------------------------------------------------------------------  Vital Signs:  Temp:  [97.8 °F (36.6 °C)-98.9 °F (37.2 °C)] 98.9 °F (37.2 °C)  Heart Rate:  [] 86  Resp:  [14-26] 21  BP: ()/() 129/90  FiO2 (%):  [60 %] 60 %  1    183 18  0320   Weight: 54.4 kg (120 lb) 61.8 kg (136 lb 3.2 oz)     Body mass index is 25.73 kg/m².    Intake/Output Summary (Last 24 hours) at 18 0853  Last data filed at 18 035   Gross per 24 hour   Intake             1400 ml   Output             2500 ml   Net            -1100 ml      Diet Soft Texture; Ground; Thin; Renal Dialysis ([facility] Only)  ----------------------------------------------------------------------------------------------------------------------  Physical exam:  Constitutional:  Chronically ill-appearing elderly female.  No apparent distress. Appropriately interactive.  Mood appears normal.     HENT:  Head:  Normocephalic and atraumatic.  Mouth:  Moist mucous membranes.    Eyes:  Conjunctivae and EOM are normal.  No scleral icterus.    Neck:  Neck supple.  No JVD present.    Cardiovascular:  Normal rate, regular rhythm and normal heart sounds with no murmur.  Pulmonary/Chest:  Diminished throughout with no crackles or wheezes.  Mildly labored respiratory pattern  Abdominal:  Soft.  Bowel sounds are normal.  No distension.  Mild tenderness in the abdominal musculature of the upper abdomen along the costal margins   Musculoskeletal:  No edema, no tenderness, and no deformity.  No red or swollen joints anywhere.    Neurological:  Alert and oriented to person, place, and time.  Moves all extremities with equal strength.  No tongue deviation.  No facial droop.  No slurred speech.   Skin:  Skin is warm and dry. No rash noted. No pallor.   Peripheral vascular:  Palpable pulses in all 4 extremities with no clubbing, no cyanosis, no edema.  Genitourinary:  ----------------------------------------------------------------------------------------------------------------------  Tele:  Sinus rhythm without events  ----------------------------------------------------------------------------------------------------------------------    Results from last 7 days  Lab Units 03/29/18  2241 03/29/18  1635 03/29/18  1106   CK TOTAL U/L  --   --  109   CKMB ng/mL  --   --  1.64   CK MB INDEX %  --   --  1.5   TROPONIN I ng/mL 0.041* 0.027 0.030       Results from last 7 days  Lab Units 04/01/18  0348 03/31/18  0458 03/30/18  0702 03/29/18  1234 03/29/18  0000   CRP mg/dL 9.02* 7.61*  --  9.81*  11.03*   LACTATE mmol/L  --   --   --   --  1.5   WBC 10*3/mm3 10.93 11.55 12.58*  --   --    HEMOGLOBIN g/dL 11.6* 11.5* 11.6*  --   --    HEMATOCRIT % 37.7 38.0 37.4  --   --    MCV fL 95.9* 97.4* 95.9*  --   --    MCHC g/dL 30.8* 30.3* 31.0*  --   --    PLATELETS 10*3/mm3 172 161 187  --   --        Results from last 7 days  Lab Units 03/29/18  0445   PH, ARTERIAL pH units 7.233*   PO2 ART mm Hg 89.0   PCO2, ARTERIAL mm Hg 38.9   HCO3 ART mmol/L 16.0*       Results from last 7 days  Lab Units 04/01/18  0348 03/31/18  0458 03/30/18  0702 03/28/18  2246   SODIUM mmol/L 135 139 139 129*   POTASSIUM mmol/L 3.6 3.8 3.5 3.9   MAGNESIUM mg/dL 1.8  --   --  2.1   CHLORIDE mmol/L 98* 101 100 95*   CO2 mmol/L 30.0 27.1 27.8 21.7*   BUN mg/dL 18 30* 26* 40*   CREATININE mg/dL 2.95* 4.25* 3.82* 5.87*   EGFR IF NONAFRICN AM mL/min/1.73 16* 10* 12* 7*   CALCIUM mg/dL 7.7 7.6* 7.5* 8.3   GLUCOSE mg/dL 132* 115* 204* 138*   ALBUMIN g/dL 2.80*  --   --  3.10*   BILIRUBIN mg/dL 0.8  --   --  0.5   ALK PHOS U/L 102  --   --  142*   AST (SGOT) U/L 12  --   --  17   ALT (SGPT) U/L 8*  --   --  7*   Estimated Creatinine Clearance: 15.6 mL/min (by C-G formula based on SCr of 2.95 mg/dL (H)).    No results found for: AMMONIA      Blood Culture   Date Value Ref Range Status   03/29/2018 No growth at 3 days  Preliminary   03/29/2018 No growth at 3 days  Preliminary                I have personally looked at the labs and they are summarized above.  ----------------------------------------------------------------------------------------------------------------------  Imaging Results (last 24 hours)     Procedure Component Value Units Date/Time    XR Chest 1 View [[id number]] Collected:  03/31/18 1002     Updated:  03/31/18 1011    Narrative:       XR CHEST 1 VW-     CLINICAL INDICATION: shortness of breath; L03.116-Cellulitis of left  lower limb; L03.115-Cellulitis of right lower limb; L89.90-Pressure  ulcer of unspecified site, unspecified  stage; L08.9-Local infection of  the skin and subcutaneous tissue, unspecified; N18.9-Chronic kidney  disease, unspecified; I95.9-Hypotension, unspecified          COMPARISON: 03/29/2018      TECHNIQUE: Single frontal view of the chest.     FINDINGS:     Bibasilar effusions, bibasilar consolidation, and pulmonary congestion.       There is no evidence of an acute osseous abnormality.   There are no suspicious-appearing parenchymal soft tissue nodules.            Impression:       Bibasilar effusions, bibasilar consolidation, and pulmonary congestion           This report was finalized on 3/31/2018 10:08 AM by Dr. Roger Varma MD.           ----------------------------------------------------------------------------------------------------------------------  Assessment and Plan:  Cellulitis bilateral lower legs  Septic shock  Metabolic encephalopathy, resolved  Just came off Levophed this morning  Continue the vancomycin  Cultures with no growth yet  Wound care  Check morning CBC and CRP     End-stage renal disease on hemodialysis  Anemia of chronic kidney disease  Metabolic acidosis  Hyponatremia  Nephrology following  Renally dose meds  BMP again in the morning     COPD  Clinically stable  Continue home inhaler regimen     DVT prophylaxis  Subcutaneous heparin      Eric Degroot MD  04/01/18  8:53 AM

## 2018-04-01 NOTE — PROGRESS NOTES
Kinetics :  Vancomycin  Day 4 / 9    The vancomycin level this am was reported as 19.10 mcg/ml which will permit supplemental dosing today.  Will administer vancomycin 1gm x 1 and follow with pre-HD level on Tuesday morning and evaluate when available.

## 2018-04-01 NOTE — PLAN OF CARE
Problem: Skin Injury Risk (Adult)  Goal: Skin Health and Integrity   04/01/18 1007   Skin Injury Risk (Adult)   Skin Health and Integrity making progress toward outcome

## 2018-04-01 NOTE — PROGRESS NOTES
Nephrology Progress Note      Subjective     Patient has some abdominal pain but no nausea vomiting was to walk    Objective       Vital signs :     Temp:  [97.8 °F (36.6 °C)-98.9 °F (37.2 °C)] 98.9 °F (37.2 °C)  Heart Rate:  [] 86  Resp:  [14-26] 21  BP: ()/() 129/90  FiO2 (%):  [60 %] 60 %    I/O last 3 completed shifts:  In: 1556.3 [P.O.:1200; I.V.:56.3; IV Piggyback:300]  Out: 2500 [Other:2500]  No intake/output data recorded.    Physical Exam:    General Appearance : awake, no distress  Head  :  normocephalic, without obvious abnormality and atraumatic  Eyes  :   no pallor  Neck  : no JVD   Lungs : clear to auscultation  Heart :  regular rhythm & normal rate, normal S1, S2   Abdomen : normal bowel sounds, soft non-tender   Extremities : 1+ edema  Skin :  Bilateral lower extremity erythema  Neurologic :   Grossly no focal deficits  Acess : left arm AVG thrill and bruit +    Laboratory Data :       WBC WBC   Date Value Ref Range Status   04/01/2018 10.93 4.50 - 12.50 10*3/mm3 Final   03/31/2018 11.55 4.50 - 12.50 10*3/mm3 Final   03/30/2018 12.58 (H) 4.50 - 12.50 10*3/mm3 Final      HGB Hemoglobin   Date Value Ref Range Status   04/01/2018 11.6 (L) 12.0 - 16.0 g/dL Final   03/31/2018 11.5 (L) 12.0 - 16.0 g/dL Final   03/30/2018 11.6 (L) 12.0 - 16.0 g/dL Final      HCT Hematocrit   Date Value Ref Range Status   04/01/2018 37.7 37.0 - 47.0 % Final   03/31/2018 38.0 37.0 - 47.0 % Final   03/30/2018 37.4 37.0 - 47.0 % Final      Platlets No results found for: LABPLAT   MCV MCV   Date Value Ref Range Status   04/01/2018 95.9 (H) 80.0 - 94.0 fL Final   03/31/2018 97.4 (H) 80.0 - 94.0 fL Final   03/30/2018 95.9 (H) 80.0 - 94.0 fL Final          Sodium Sodium   Date Value Ref Range Status   04/01/2018 135 135 - 153 mmol/L Final   03/31/2018 139 135 - 153 mmol/L Final   03/30/2018 139 135 - 153 mmol/L Final      Potassium Potassium   Date Value Ref Range Status   04/01/2018 3.6 3.5 - 5.3 mmol/L Final    03/31/2018 3.8 3.5 - 5.3 mmol/L Final   03/30/2018 3.5 3.5 - 5.3 mmol/L Final      Chloride Chloride   Date Value Ref Range Status   04/01/2018 98 (L) 99 - 112 mmol/L Final   03/31/2018 101 99 - 112 mmol/L Final   03/30/2018 100 99 - 112 mmol/L Final      CO2 CO2   Date Value Ref Range Status   04/01/2018 30.0 24.3 - 31.9 mmol/L Final   03/31/2018 27.1 24.3 - 31.9 mmol/L Final   03/30/2018 27.8 24.3 - 31.9 mmol/L Final      BUN BUN   Date Value Ref Range Status   04/01/2018 18 7 - 21 mg/dL Final   03/31/2018 30 (H) 7 - 21 mg/dL Final   03/30/2018 26 (H) 7 - 21 mg/dL Final      Creatinine Creatinine   Date Value Ref Range Status   04/01/2018 2.95 (H) 0.43 - 1.29 mg/dL Final   03/31/2018 4.25 (H) 0.43 - 1.29 mg/dL Final   03/30/2018 3.82 (H) 0.43 - 1.29 mg/dL Final      Calcium Calcium   Date Value Ref Range Status   04/01/2018 7.7 7.7 - 10.0 mg/dL Final   03/31/2018 7.6 (L) 7.7 - 10.0 mg/dL Final   03/30/2018 7.5 (L) 7.7 - 10.0 mg/dL Final      PO4 No results found for: CAPO4   Albumin Albumin   Date Value Ref Range Status   04/01/2018 2.80 (L) 3.40 - 4.80 g/dL Final      Magnesium Magnesium   Date Value Ref Range Status   04/01/2018 1.8 1.7 - 2.6 mg/dL Final          PTH               No results found for: PTH      Medications :       aspirin 81 mg Oral Daily   budesonide-formoterol 2 puff Inhalation BID - RT   clonazePAM 0.5 mg Oral BID   heparin (porcine) 5,000 Units Subcutaneous Q12H   ipratropium-albuterol 3 mL Nebulization 4x Daily - RT   lactobacillus acidophilus 1 capsule Oral Daily   piperacillin-tazobactam 3.375 g Intravenous Q12H   ranolazine 500 mg Oral Q12H   sodium chloride 10 mL Intracatheter Q12H   sodium chloride      Vancomycin Pharmacy Intermittent Dosing  Does not apply Daily   Vancomycin Pharmacy Intermittent Dosing  Does not apply Daily       norepinephrine 0.02-0.3 mcg/kg/min Last Rate: 0.02 mcg/kg/min (03/31/18 3364)         Assessment/Plan     1. ESRD : tolerating HD. Will continue    2.  Hypotension/shock :  2-D echo is pending cortisol is normal    3. Metabolic acidosis : resolved after HD    4. Diastolic CHF : compensated    5. Peripheral vascular disease : surgery following     Discussed with patient, RN,       S Rosas Wheeler MD  04/01/18  8:29 AM

## 2018-04-01 NOTE — PLAN OF CARE
Problem: Skin Injury Risk (Adult)  Goal: Identify Related Risk Factors and Signs and Symptoms  Outcome: Outcome(s) achieved Date Met: 04/01/18    Goal: Skin Health and Integrity  Outcome: Ongoing (interventions implemented as appropriate)      Problem: Wound (Includes Pressure Injury) (Adult)  Goal: Signs and Symptoms of Listed Potential Problems Will be Absent, Minimized or Managed (Wound)  Outcome: Ongoing (interventions implemented as appropriate)      Problem: Sepsis/Septic Shock (Adult)  Goal: Signs and Symptoms of Listed Potential Problems Will be Absent, Minimized or Managed (Sepsis/Septic Shock)  Outcome: Ongoing (interventions implemented as appropriate)      Problem: Renal Failure/Kidney Injury, Acute (Adult)  Goal: Signs and Symptoms of Listed Potential Problems Will be Absent, Minimized or Managed (Renal Failure/Kidney Injury, Acute)  Outcome: Ongoing (interventions implemented as appropriate)

## 2018-04-01 NOTE — PLAN OF CARE
Problem: NPPV/CPAP (Adult)  Goal: Signs and Symptoms of Listed Potential Problems Will be Absent, Minimized or Managed (NPPV/CPAP)  Outcome: Ongoing (interventions implemented as appropriate)

## 2018-04-01 NOTE — PLAN OF CARE
Problem: Skin Injury Risk (Adult)  Goal: Identify Related Risk Factors and Signs and Symptoms  Outcome: Ongoing (interventions implemented as appropriate)    Goal: Skin Health and Integrity  Outcome: Ongoing (interventions implemented as appropriate)      Problem: Wound (Includes Pressure Injury) (Adult)  Goal: Signs and Symptoms of Listed Potential Problems Will be Absent, Minimized or Managed (Wound)  Outcome: Ongoing (interventions implemented as appropriate)      Problem: Sepsis/Septic Shock (Adult)  Goal: Signs and Symptoms of Listed Potential Problems Will be Absent, Minimized or Managed (Sepsis/Septic Shock)  Outcome: Ongoing (interventions implemented as appropriate)      Problem: Renal Failure/Kidney Injury, Acute (Adult)  Goal: Signs and Symptoms of Listed Potential Problems Will be Absent, Minimized or Managed (Renal Failure/Kidney Injury, Acute)  Outcome: Ongoing (interventions implemented as appropriate)      Problem: Fall Risk (Adult)  Goal: Identify Related Risk Factors and Signs and Symptoms  Outcome: Ongoing (interventions implemented as appropriate)    Goal: Absence of Fall  Outcome: Ongoing (interventions implemented as appropriate)      Problem: Skin and Soft Tissue Infection (Adult)  Goal: Signs and Symptoms of Listed Potential Problems Will be Absent, Minimized or Managed (Skin and Soft Tissue Infection)  Outcome: Ongoing (interventions implemented as appropriate)      Problem: Patient Care Overview  Goal: Individualization and Mutuality  Outcome: Ongoing (interventions implemented as appropriate)    Goal: Discharge Needs Assessment  Outcome: Ongoing (interventions implemented as appropriate)    Goal: Interprofessional Rounds/Family Conf  Outcome: Ongoing (interventions implemented as appropriate)      Problem: NPPV/CPAP (Adult)  Goal: Signs and Symptoms of Listed Potential Problems Will be Absent, Minimized or Managed (NPPV/CPAP)  Outcome: Ongoing (interventions implemented as  appropriate)

## 2018-04-02 ENCOUNTER — APPOINTMENT (OUTPATIENT)
Dept: GENERAL RADIOLOGY | Facility: HOSPITAL | Age: 68
End: 2018-04-02

## 2018-04-02 ENCOUNTER — APPOINTMENT (OUTPATIENT)
Dept: CT IMAGING | Facility: HOSPITAL | Age: 68
End: 2018-04-02

## 2018-04-02 LAB
ALBUMIN SERPL-MCNC: 2.8 G/DL (ref 3.4–4.8)
ALBUMIN/GLOB SERPL: 0.8 G/DL (ref 1.5–2.5)
ALP SERPL-CCNC: 95 U/L (ref 35–104)
ALT SERPL W P-5'-P-CCNC: 4 U/L (ref 10–36)
ANION GAP SERPL CALCULATED.3IONS-SCNC: 10 MMOL/L (ref 3.6–11.2)
AST SERPL-CCNC: 12 U/L (ref 10–30)
BASOPHILS # BLD AUTO: 0.02 10*3/MM3 (ref 0–0.3)
BASOPHILS NFR BLD AUTO: 0.2 % (ref 0–2)
BILIRUB SERPL-MCNC: 0.6 MG/DL (ref 0.2–1.8)
BUN BLD-MCNC: 25 MG/DL (ref 7–21)
BUN/CREAT SERPL: 7.4 (ref 7–25)
CALCIUM SPEC-SCNC: 7.7 MG/DL (ref 7.7–10)
CHLORIDE SERPL-SCNC: 100 MMOL/L (ref 99–112)
CO2 SERPL-SCNC: 27 MMOL/L (ref 24.3–31.9)
CREAT BLD-MCNC: 3.37 MG/DL (ref 0.43–1.29)
CRP SERPL-MCNC: 8.72 MG/DL (ref 0–0.99)
DEPRECATED RDW RBC AUTO: 65.6 FL (ref 37–54)
EOSINOPHIL # BLD AUTO: 0.23 10*3/MM3 (ref 0–0.7)
EOSINOPHIL NFR BLD AUTO: 2.1 % (ref 0–7)
ERYTHROCYTE [DISTWIDTH] IN BLOOD BY AUTOMATED COUNT: 19.3 % (ref 11.5–14.5)
GFR SERPL CREATININE-BSD FRML MDRD: 14 ML/MIN/1.73
GLOBULIN UR ELPH-MCNC: 3.4 GM/DL
GLUCOSE BLD-MCNC: 135 MG/DL (ref 70–110)
GLUCOSE BLDC GLUCOMTR-MCNC: 115 MG/DL (ref 70–130)
GLUCOSE BLDC GLUCOMTR-MCNC: 182 MG/DL (ref 70–130)
GLUCOSE BLDC GLUCOMTR-MCNC: 203 MG/DL (ref 70–130)
GLUCOSE BLDC GLUCOMTR-MCNC: 214 MG/DL (ref 70–130)
HCT VFR BLD AUTO: 37.6 % (ref 37–47)
HGB BLD-MCNC: 11.4 G/DL (ref 12–16)
IMM GRANULOCYTES # BLD: 0.04 10*3/MM3 (ref 0–0.03)
IMM GRANULOCYTES NFR BLD: 0.4 % (ref 0–0.5)
LYMPHOCYTES # BLD AUTO: 1.32 10*3/MM3 (ref 1–3)
LYMPHOCYTES NFR BLD AUTO: 11.9 % (ref 16–46)
MCH RBC QN AUTO: 29.2 PG (ref 27–33)
MCHC RBC AUTO-ENTMCNC: 30.3 G/DL (ref 33–37)
MCV RBC AUTO: 96.4 FL (ref 80–94)
MONOCYTES # BLD AUTO: 1.09 10*3/MM3 (ref 0.1–0.9)
MONOCYTES NFR BLD AUTO: 9.8 % (ref 0–12)
NEUTROPHILS # BLD AUTO: 8.37 10*3/MM3 (ref 1.4–6.5)
NEUTROPHILS NFR BLD AUTO: 75.6 % (ref 40–75)
OSMOLALITY SERPL CALC.SUM OF ELEC: 280.2 MOSM/KG (ref 273–305)
PLATELET # BLD AUTO: 169 10*3/MM3 (ref 130–400)
PMV BLD AUTO: 10.8 FL (ref 6–10)
POTASSIUM BLD-SCNC: 3.7 MMOL/L (ref 3.5–5.3)
PROT SERPL-MCNC: 6.2 G/DL (ref 6–8)
RBC # BLD AUTO: 3.9 10*6/MM3 (ref 4.2–5.4)
SODIUM BLD-SCNC: 137 MMOL/L (ref 135–153)
WBC NRBC COR # BLD: 11.07 10*3/MM3 (ref 4.5–12.5)

## 2018-04-02 PROCEDURE — 25010000002 HEPARIN (PORCINE) PER 1000 UNITS: Performed by: INTERNAL MEDICINE

## 2018-04-02 PROCEDURE — 70450 CT HEAD/BRAIN W/O DYE: CPT | Performed by: RADIOLOGY

## 2018-04-02 PROCEDURE — 71046 X-RAY EXAM CHEST 2 VIEWS: CPT

## 2018-04-02 PROCEDURE — 70450 CT HEAD/BRAIN W/O DYE: CPT

## 2018-04-02 PROCEDURE — 80053 COMPREHEN METABOLIC PANEL: CPT | Performed by: INTERNAL MEDICINE

## 2018-04-02 PROCEDURE — 94799 UNLISTED PULMONARY SVC/PX: CPT

## 2018-04-02 PROCEDURE — 99233 SBSQ HOSP IP/OBS HIGH 50: CPT | Performed by: INTERNAL MEDICINE

## 2018-04-02 PROCEDURE — 71046 X-RAY EXAM CHEST 2 VIEWS: CPT | Performed by: RADIOLOGY

## 2018-04-02 PROCEDURE — P9046 ALBUMIN (HUMAN), 25%, 20 ML: HCPCS | Performed by: INTERNAL MEDICINE

## 2018-04-02 PROCEDURE — 86140 C-REACTIVE PROTEIN: CPT | Performed by: INTERNAL MEDICINE

## 2018-04-02 PROCEDURE — 25010000002 ALBUMIN HUMAN 25% PER 50 ML: Performed by: INTERNAL MEDICINE

## 2018-04-02 PROCEDURE — 25010000002 PIPERACILLIN-TAZOBACTAM: Performed by: INTERNAL MEDICINE

## 2018-04-02 PROCEDURE — 63710000001 INSULIN ASPART PER 5 UNITS: Performed by: INTERNAL MEDICINE

## 2018-04-02 PROCEDURE — 82962 GLUCOSE BLOOD TEST: CPT

## 2018-04-02 PROCEDURE — 99291 CRITICAL CARE FIRST HOUR: CPT | Performed by: INTERNAL MEDICINE

## 2018-04-02 PROCEDURE — 85025 COMPLETE CBC W/AUTO DIFF WBC: CPT | Performed by: INTERNAL MEDICINE

## 2018-04-02 RX ORDER — NICOTINE POLACRILEX 4 MG
15 LOZENGE BUCCAL
Status: DISCONTINUED | OUTPATIENT
Start: 2018-04-02 | End: 2018-04-03 | Stop reason: HOSPADM

## 2018-04-02 RX ORDER — DEXTROSE MONOHYDRATE 25 G/50ML
25 INJECTION, SOLUTION INTRAVENOUS
Status: DISCONTINUED | OUTPATIENT
Start: 2018-04-02 | End: 2018-04-03 | Stop reason: HOSPADM

## 2018-04-02 RX ORDER — GUAIFENESIN 600 MG/1
1200 TABLET, EXTENDED RELEASE ORAL 2 TIMES DAILY
Status: DISCONTINUED | OUTPATIENT
Start: 2018-04-02 | End: 2018-04-03 | Stop reason: HOSPADM

## 2018-04-02 RX ORDER — ALBUMIN (HUMAN) 12.5 G/50ML
25 SOLUTION INTRAVENOUS ONCE
Status: COMPLETED | OUTPATIENT
Start: 2018-04-02 | End: 2018-04-02

## 2018-04-02 RX ADMIN — BUDESONIDE AND FORMOTEROL FUMARATE DIHYDRATE 2 PUFF: 80; 4.5 AEROSOL RESPIRATORY (INHALATION) at 07:17

## 2018-04-02 RX ADMIN — HEPARIN SODIUM 5000 UNITS: 5000 INJECTION, SOLUTION INTRAVENOUS; SUBCUTANEOUS at 21:34

## 2018-04-02 RX ADMIN — INSULIN ASPART 3 UNITS: 100 INJECTION, SOLUTION INTRAVENOUS; SUBCUTANEOUS at 11:15

## 2018-04-02 RX ADMIN — IPRATROPIUM BROMIDE AND ALBUTEROL SULFATE 3 ML: .5; 3 SOLUTION RESPIRATORY (INHALATION) at 18:12

## 2018-04-02 RX ADMIN — CLONAZEPAM 0.5 MG: 0.5 TABLET ORAL at 21:34

## 2018-04-02 RX ADMIN — ASPIRIN 81 MG: 81 TABLET ORAL at 08:46

## 2018-04-02 RX ADMIN — INSULIN ASPART 2 UNITS: 100 INJECTION, SOLUTION INTRAVENOUS; SUBCUTANEOUS at 21:34

## 2018-04-02 RX ADMIN — BUDESONIDE AND FORMOTEROL FUMARATE DIHYDRATE 2 PUFF: 80; 4.5 AEROSOL RESPIRATORY (INHALATION) at 20:36

## 2018-04-02 RX ADMIN — INSULIN ASPART 3 UNITS: 100 INJECTION, SOLUTION INTRAVENOUS; SUBCUTANEOUS at 16:57

## 2018-04-02 RX ADMIN — Medication 10 ML: at 21:35

## 2018-04-02 RX ADMIN — HYDROCODONE BITARTRATE AND ACETAMINOPHEN 1 TABLET: 5; 325 TABLET ORAL at 17:56

## 2018-04-02 RX ADMIN — GUAIFENESIN 1200 MG: 600 TABLET, EXTENDED RELEASE ORAL at 21:34

## 2018-04-02 RX ADMIN — HYDROCODONE BITARTRATE AND ACETAMINOPHEN 1 TABLET: 5; 325 TABLET ORAL at 04:22

## 2018-04-02 RX ADMIN — HYDROCODONE BITARTRATE AND ACETAMINOPHEN 1 TABLET: 5; 325 TABLET ORAL at 11:18

## 2018-04-02 RX ADMIN — RANOLAZINE 500 MG: 500 TABLET, FILM COATED, EXTENDED RELEASE ORAL at 21:34

## 2018-04-02 RX ADMIN — RANOLAZINE 500 MG: 500 TABLET, FILM COATED, EXTENDED RELEASE ORAL at 08:46

## 2018-04-02 RX ADMIN — SODIUM CHLORIDE 250 ML: 9 INJECTION, SOLUTION INTRAVENOUS at 09:32

## 2018-04-02 RX ADMIN — Medication 10 ML: at 08:47

## 2018-04-02 RX ADMIN — IPRATROPIUM BROMIDE AND ALBUTEROL SULFATE 3 ML: .5; 3 SOLUTION RESPIRATORY (INHALATION) at 07:17

## 2018-04-02 RX ADMIN — CLONAZEPAM 0.5 MG: 0.5 TABLET ORAL at 08:46

## 2018-04-02 RX ADMIN — PIPERACILLIN SODIUM,TAZOBACTAM SODIUM 3.38 G: 3; .375 INJECTION, POWDER, FOR SOLUTION INTRAVENOUS at 16:54

## 2018-04-02 RX ADMIN — IPRATROPIUM BROMIDE AND ALBUTEROL SULFATE 3 ML: .5; 3 SOLUTION RESPIRATORY (INHALATION) at 00:57

## 2018-04-02 RX ADMIN — IPRATROPIUM BROMIDE AND ALBUTEROL SULFATE 3 ML: .5; 3 SOLUTION RESPIRATORY (INHALATION) at 13:19

## 2018-04-02 RX ADMIN — PIPERACILLIN SODIUM,TAZOBACTAM SODIUM 3.38 G: 3; .375 INJECTION, POWDER, FOR SOLUTION INTRAVENOUS at 04:22

## 2018-04-02 RX ADMIN — GUAIFENESIN 1200 MG: 600 TABLET, EXTENDED RELEASE ORAL at 09:31

## 2018-04-02 RX ADMIN — ALBUMIN HUMAN 25 G: 0.25 SOLUTION INTRAVENOUS at 09:32

## 2018-04-02 RX ADMIN — HEPARIN SODIUM 5000 UNITS: 5000 INJECTION, SOLUTION INTRAVENOUS; SUBCUTANEOUS at 08:46

## 2018-04-02 RX ADMIN — Medication 1 CAPSULE: at 08:46

## 2018-04-02 NOTE — PROGRESS NOTES
Nephrology Progress Note      Subjective     She complains of cough. No nausea, vomiting or diarrhea. She complains of hands weakness today and dropping things for 2 days    Objective       Vital signs :     Temp:  [97.9 °F (36.6 °C)-98.5 °F (36.9 °C)] 98.1 °F (36.7 °C)  Heart Rate:  [75-94] 94  Resp:  [14-22] 18  BP: ()/() 135/90  FiO2 (%):  [60 %] 60 %    I/O last 3 completed shifts:  In: 1741.2 [P.O.:1140; I.V.:51.2; IV Piggyback:550]  Out: 100 [Urine:100]  I/O this shift:  In: 610 [P.O.:360; IV Piggyback:250]  Out: -     Physical Exam:    General Appearance : awake, no distress  Head  :  normocephalic, without obvious abnormality and atraumatic  Eyes  :   no pallor  Neck  : no JVD   Lungs : clear to auscultation  Heart :  regular rhythm & normal rate, normal S1, S2   Abdomen : normal bowel sounds, soft non-tender   Extremities : 1+ edema  Skin :  Bilateral lower extremity erythema much better  Neurologic :   Grossly no focal deficits, strength 5/5 all extremities. No nystagmus, proprioception intact  Acess : left arm AVG thrill and bruit +    Laboratory Data :       WBC WBC   Date Value Ref Range Status   04/02/2018 11.07 4.50 - 12.50 10*3/mm3 Final   04/01/2018 10.93 4.50 - 12.50 10*3/mm3 Final   03/31/2018 11.55 4.50 - 12.50 10*3/mm3 Final      HGB Hemoglobin   Date Value Ref Range Status   04/02/2018 11.4 (L) 12.0 - 16.0 g/dL Final   04/01/2018 11.6 (L) 12.0 - 16.0 g/dL Final   03/31/2018 11.5 (L) 12.0 - 16.0 g/dL Final      HCT Hematocrit   Date Value Ref Range Status   04/02/2018 37.6 37.0 - 47.0 % Final   04/01/2018 37.7 37.0 - 47.0 % Final   03/31/2018 38.0 37.0 - 47.0 % Final      Platlets No results found for: LABPLAT   MCV MCV   Date Value Ref Range Status   04/02/2018 96.4 (H) 80.0 - 94.0 fL Final   04/01/2018 95.9 (H) 80.0 - 94.0 fL Final   03/31/2018 97.4 (H) 80.0 - 94.0 fL Final          Sodium Sodium   Date Value Ref Range Status   04/02/2018 137 135 - 153 mmol/L Final   04/01/2018 135  135 - 153 mmol/L Final   03/31/2018 139 135 - 153 mmol/L Final      Potassium Potassium   Date Value Ref Range Status   04/02/2018 3.7 3.5 - 5.3 mmol/L Final   04/01/2018 3.6 3.5 - 5.3 mmol/L Final   03/31/2018 3.8 3.5 - 5.3 mmol/L Final      Chloride Chloride   Date Value Ref Range Status   04/02/2018 100 99 - 112 mmol/L Final   04/01/2018 98 (L) 99 - 112 mmol/L Final   03/31/2018 101 99 - 112 mmol/L Final      CO2 CO2   Date Value Ref Range Status   04/02/2018 27.0 24.3 - 31.9 mmol/L Final   04/01/2018 30.0 24.3 - 31.9 mmol/L Final   03/31/2018 27.1 24.3 - 31.9 mmol/L Final      BUN BUN   Date Value Ref Range Status   04/02/2018 25 (H) 7 - 21 mg/dL Final   04/01/2018 18 7 - 21 mg/dL Final   03/31/2018 30 (H) 7 - 21 mg/dL Final      Creatinine Creatinine   Date Value Ref Range Status   04/02/2018 3.37 (H) 0.43 - 1.29 mg/dL Final   04/01/2018 2.95 (H) 0.43 - 1.29 mg/dL Final   03/31/2018 4.25 (H) 0.43 - 1.29 mg/dL Final      Calcium Calcium   Date Value Ref Range Status   04/02/2018 7.7 7.7 - 10.0 mg/dL Final   04/01/2018 7.7 7.7 - 10.0 mg/dL Final   03/31/2018 7.6 (L) 7.7 - 10.0 mg/dL Final      PO4 No results found for: CAPO4   Albumin Albumin   Date Value Ref Range Status   04/02/2018 2.80 (L) 3.40 - 4.80 g/dL Final   04/01/2018 2.80 (L) 3.40 - 4.80 g/dL Final      Magnesium Magnesium   Date Value Ref Range Status   04/01/2018 1.8 1.7 - 2.6 mg/dL Final          PTH               No results found for: PTH      Medications :       aspirin 81 mg Oral Daily   budesonide-formoterol 2 puff Inhalation BID - RT   clonazePAM 0.5 mg Oral BID   guaiFENesin 1,200 mg Oral BID   heparin (porcine) 5,000 Units Subcutaneous Q12H   insulin aspart 0-7 Units Subcutaneous 4x Daily AC & at Bedtime   ipratropium-albuterol 3 mL Nebulization 4x Daily - RT   lactobacillus acidophilus 1 capsule Oral Daily   piperacillin-tazobactam 3.375 g Intravenous Q12H   ranolazine 500 mg Oral Q12H   sodium chloride 10 mL Intracatheter Q12H   Vancomycin  Pharmacy Intermittent Dosing  Does not apply Daily            Assessment/Plan     1. ESRD : continue HD TTS    2. Hypotension/shock :  resolved    3. Hand weakness : check Ct scan of head    4. Diastolic CHF : compensated    5. Peripheral vascular disease : surgery following     Discussed with patient, RN      Jai Chavez MD  04/02/18  2:55 PM

## 2018-04-02 NOTE — PROGRESS NOTES
Nutrition Services    Patient Name:  Sara Cardona  YOB: 1950  MRN: [id number]  Admit Date:  3/28/2018    Rec Renal MVI daily to aide with wound healing.    Electronically signed by:  Peggy Gomez RD  04/02/18 12:06 PM

## 2018-04-02 NOTE — PROGRESS NOTES
Subjective     History:   Sara Cardona is a 67 y.o. female admitted on 3/28/2018 secondary to Sepsis affecting skin     Procedures:   3/29/18: Right femoral central line placement in the ED    Patient seen and examined with MARILY Hanley. Awake and alert. Appears to be resting comfortably in bed. Reports a nonproductive cough this AM. Reports episodes of chest pain associated with her cough. Denies significant dyspnea. Reports some abdominal discomfort. BP borderline low this AM but overall stable. She has remained hemodynamically stable off vasopressor support since yesterday. No acute events overnight per RN.     History taken from: patient, chart, and RN.      Objective     Vital Signs  Temp:  [97.9 °F (36.6 °C)-98.5 °F (36.9 °C)] 97.9 °F (36.6 °C)  Heart Rate:  [] 94  Resp:  [14-24] 16  BP: ()/() 96/74  FiO2 (%):  [60 %] 60 %    Intake/Output Summary (Last 24 hours) at 04/02/18 0929  Last data filed at 04/02/18 0422   Gross per 24 hour   Intake          1221.16 ml   Output              100 ml   Net          1121.16 ml         Physical Exam:  General:    Awake, alert, in no acute distress, chronically ill appearing   Heart:      Normal S1 and S2. Regular rate and rhythm. No significant murmur, rubs or gallops appreciated.   Lungs:     Respirations regular, even and unlabored. Decreased bibasilar breath sounds. No wheezes, rales or rhonchi.   Abdomen:   Soft and nontender. No guarding, rebound tenderness or  organomegaly noted. Bowel sounds present x 4.   Extremities:  Tr LE edema noted with mild erythema. Moves UE and LE equally B/L.     Results Review:      Results from last 7 days  Lab Units 04/02/18  0113 04/01/18  0348 03/31/18  0458 03/30/18  0702 03/28/18  2246   WBC 10*3/mm3 11.07 10.93 11.55 12.58* 13.76*   HEMOGLOBIN g/dL 11.4* 11.6* 11.5* 11.6* 11.5*   PLATELETS 10*3/mm3 169 172 161 187 213       Results from last 7 days  Lab Units 04/02/18  0113 04/01/18  0348 03/31/18  0458  03/30/18  0702 03/28/18  2246   SODIUM mmol/L 137 135 139 139 129*   POTASSIUM mmol/L 3.7 3.6 3.8 3.5 3.9   CHLORIDE mmol/L 100 98* 101 100 95*   CO2 mmol/L 27.0 30.0 27.1 27.8 21.7*   BUN mg/dL 25* 18 30* 26* 40*   CREATININE mg/dL 3.37* 2.95* 4.25* 3.82* 5.87*   CALCIUM mg/dL 7.7 7.7 7.6* 7.5* 8.3   GLUCOSE mg/dL 135* 132* 115* 204* 138*       Results from last 7 days  Lab Units 04/02/18  0113 04/01/18  0348 03/28/18  2246   BILIRUBIN mg/dL 0.6 0.8 0.5   ALK PHOS U/L 95 102 142*   AST (SGOT) U/L 12 12 17   ALT (SGPT) U/L 4* 8* 7*       Results from last 7 days  Lab Units 04/01/18  0348 03/28/18  2246   MAGNESIUM mg/dL 1.8 2.1           Results from last 7 days  Lab Units 03/29/18  2241 03/29/18  1635 03/29/18  1106   CK TOTAL U/L  --   --  109   TROPONIN I ng/mL 0.041* 0.027 0.030   CK MB INDEX %  --   --  1.5       Imaging Results (last 24 hours)     ** No results found for the last 24 hours. **            Medications:    albumin human 25 g Intravenous Once   aspirin 81 mg Oral Daily   budesonide-formoterol 2 puff Inhalation BID - RT   clonazePAM 0.5 mg Oral BID   guaiFENesin 1,200 mg Oral BID   heparin (porcine) 5,000 Units Subcutaneous Q12H   ipratropium-albuterol 3 mL Nebulization 4x Daily - RT   lactobacillus acidophilus 1 capsule Oral Daily   piperacillin-tazobactam 3.375 g Intravenous Q12H   ranolazine 500 mg Oral Q12H   sodium chloride 250 mL Intravenous Once   sodium chloride 10 mL Intracatheter Q12H   Vancomycin Pharmacy Intermittent Dosing  Does not apply Daily              Assessment/Plan   Septic shock: Likely 2/2 bilateral lower extremity cellulitis and bibasilar pneumonia. Afebrile and hemodynamically stable. She has been off vasopressors since yesterday. Leukocytosis has resolved with stable WBC this AM. CRP improving. Cultures with NGTD. Currently on Vanc and Zosyn. Cont to follow cultures and repeat labs in the AM.     Bilateral lower extremity cellulitis: Cont antibiotics as above.    Bibasilar  pneumonia: Pt meets HCAP criteria. No evidence of aspiration on speech eval. Cont broad spectrum IV antibiotics. Repeat CXR today. Pulm following with input appreciated.     Metabolic encephalopathy: Likely 2/2 above. Now resolved.     ESRD on HD: Cont management per nephrology. Nephrology input appreciated.    Metabolic acidosis: Resolved after HD. Cont management per nephrology.     Diastolic CHF: Appears compensated at present.     COPD: Appears stable at present with no acute exacerbation.     CAD: Pt reports some pleuritic CP at present. Cont current medical management and monitor on telemetry.     DVT PPX: SQ heparin    Discussed with Dr. Martin.    Transfer to telemetry.    Pt is at high risk 2/2 septic shock, pneumonia, lower extremity cellulitis, metabolic encephalopathy and multiple comorbidities.      Fredis Cintron DO  04/02/18  9:29 AM

## 2018-04-02 NOTE — PROGRESS NOTES
Discharge Planning Assessment  [facility]     Patient Name: Sara Cardona  MRN: [id number]  Today's Date: 4/2/2018    Admit Date: 3/28/2018      Discharge Plan     Row Name 04/02/18 1547       Plan    Plan Pt transferred from CCU to .  Pt lives at home with her spouse, Ángel Cardona.  Pt uses [facility].  Updated information will need to be sent at discharge.  Pt has a wheelchair, rolling walker, home oxygen and a nebulizer via [facility]. Pt receives dialysis at [facility].  Pt will be transported home via private auto.  SS will continue to follow.      Patient/Family in Agreement with Plan yes        Expected Discharge Date and Time     Expected Discharge Date Expected Discharge Time    Apr 4, 2018           Zandra Sun

## 2018-04-02 NOTE — PROGRESS NOTES
LOS: 4 days     Chief Complaint:  Pulmonology is following for generalized weakness    Subjective     Interval History:     Mrs. Cardona is doing well this morning, she is off pressors, no distress noted on exam. No family present during rounds.     History taken from: patient chart    Review of Systems:   Review of Systems   Constitutional: Negative for chills and fever.   HENT: Negative for congestion and rhinorrhea.    Eyes: Negative for visual disturbance.   Respiratory: Negative for cough, shortness of breath and wheezing.    Cardiovascular: Negative for chest pain and leg swelling.   Gastrointestinal: Negative for abdominal pain.   Endocrine: Negative for cold intolerance and heat intolerance.   Genitourinary: Negative for difficulty urinating.   Musculoskeletal: Negative for arthralgias and myalgias.   Skin: Negative for color change and pallor.   Allergic/Immunologic: Negative for environmental allergies.   Neurological: Negative for dizziness, weakness and light-headedness.   Psychiatric/Behavioral: Negative for agitation, behavioral problems and confusion.                     Objective     Vital Signs  Temp:  [97.9 °F (36.6 °C)-98.5 °F (36.9 °C)] 97.9 °F (36.6 °C)  Heart Rate:  [] 94  Resp:  [14-24] 16  BP: ()/() 96/74  FiO2 (%):  [60 %] 60 %  Body mass index is 25.73 kg/m².    Intake/Output Summary (Last 24 hours) at 04/02/18 0950  Last data filed at 04/02/18 0932   Gross per 24 hour   Intake          1471.16 ml   Output              100 ml   Net          1371.16 ml     I/O this shift:  In: 250 [IV Piggyback:250]  Out: -     Physical Exam:  GENERAL APPEARANCE: Well developed, well nourished, alert and cooperative, and appears to be in no acute distress.    HEAD: normocephalic.    EYES: PERRL    NECK: Neck supple.     CARDIAC: Normal S1 and S2. No S3, S4 or murmurs. Rhythm is regular. There is no peripheral edema, cyanosis or pallor. Extremities are warm and well perfused. Capillary  refill is less than 2 seconds. No carotid bruits.    Respiratory: Clear to auscultation and percussion without rales, rhonchi, wheezing or diminished breath sounds.    GI: Positive bowel sounds. Soft, nondistended, nontender.     Musculoskeletal: No significant deformity or joint abnormality.cellulitis of bilateral lower ext.     NEUROLOGICAL: Strength and sensation symmetric and intact throughout.     PSYCHIATRIC: The mental examination revealed the patient was oriented to person, place, and time.                 Results Review:                I reviewed the patient's new clinical results.  I reviewed the patient's new imaging results and agree with the interpretation.    Results from last 7 days  Lab Units 04/02/18  0113 04/01/18  0348 03/31/18  0458   WBC 10*3/mm3 11.07 10.93 11.55   HEMOGLOBIN g/dL 11.4* 11.6* 11.5*   PLATELETS 10*3/mm3 169 172 161       Results from last 7 days  Lab Units 04/02/18  0113 04/01/18 0348 03/31/18 0458  03/28/18  2246   SODIUM mmol/L 137 135 139  < > 129*   POTASSIUM mmol/L 3.7 3.6 3.8  < > 3.9   CHLORIDE mmol/L 100 98* 101  < > 95*   CO2 mmol/L 27.0 30.0 27.1  < > 21.7*   BUN mg/dL 25* 18 30*  < > 40*   CREATININE mg/dL 3.37* 2.95* 4.25*  < > 5.87*   CALCIUM mg/dL 7.7 7.7 7.6*  < > 8.3   GLUCOSE mg/dL 135* 132* 115*  < > 138*   MAGNESIUM mg/dL  --  1.8  --   --  2.1   < > = values in this interval not displayed.  Lab Results   Component Value Date    INR 1.08 01/12/2018    INR 1.09 11/14/2017    INR 1.08 09/24/2017    PROTIME 14.1 01/12/2018    PROTIME 14.2 11/14/2017    PROTIME 14.2 09/24/2017       Results from last 7 days  Lab Units 04/02/18  0113 04/01/18 0348 03/28/18  2246   ALK PHOS U/L 95 102 142*   BILIRUBIN mg/dL 0.6 0.8 0.5   ALT (SGPT) U/L 4* 8* 7*   AST (SGOT) U/L 12 12 17       Results from last 7 days  Lab Units 03/29/18  0445   PH, ARTERIAL pH units 7.233*   PO2 ART mm Hg 89.0   PCO2, ARTERIAL mm Hg 38.9   HCO3 ART mmol/L 16.0*     Imaging Results (last 24 hours)      ** No results found for the last 24 hours. **             Medication Review:   Scheduled Medications:    albumin human 25 g Intravenous Once   aspirin 81 mg Oral Daily   budesonide-formoterol 2 puff Inhalation BID - RT   clonazePAM 0.5 mg Oral BID   guaiFENesin 1,200 mg Oral BID   heparin (porcine) 5,000 Units Subcutaneous Q12H   insulin aspart 0-7 Units Subcutaneous 4x Daily AC & at Bedtime   ipratropium-albuterol 3 mL Nebulization 4x Daily - RT   lactobacillus acidophilus 1 capsule Oral Daily   piperacillin-tazobactam 3.375 g Intravenous Q12H   ranolazine 500 mg Oral Q12H   sodium chloride 250 mL Intravenous Once   sodium chloride 10 mL Intracatheter Q12H   Vancomycin Pharmacy Intermittent Dosing  Does not apply Daily     Continuous infusions:       Assessment/Plan      Neuro: Patient is alert and awake. No concerns currently.      Respiratory:  no distress noted, continue supplemental oxygen to maintain Sp02 >92-94%. Continue scheduled inhalants and nebulizer's. Continue diet per SLP recommendatinos. Continue BiPap at HS.           ID:  WBC is 11, neutrophils are 75.  Continue current antibiotics. Continue to monitor lab trends. Would recommend consulting ID for bilateral lower ext cellulitis.     Microbiology Results (last 10 days)     Procedure Component Value - Date/Time    Blood Culture - Blood, [[id number]]  (Normal) Collected:  03/29/18 0045    Lab Status:  Preliminary result Specimen:  Blood from Hand, Right Updated:  04/02/18 0101     Blood Culture No growth at 4 days    Blood Culture - Blood, [[id number]]  (Normal) Collected:  03/29/18 0000    Lab Status:  Preliminary result Specimen:  Blood from Arm, Right Updated:  04/02/18 0016     Blood Culture No growth at 4 days           Hypotension:SHOCK-  off pressors, BP was in 90s in AM , will give 250 cc bolus of NS along with Albumin. HR 76, /70,  continue continuous ECG and v/s monitoring, maintain MAP >65.     ESRD requiring HD: Creatinine 3.37 ,  continue strict I&O, electrolytes WNL. Dialysis is on Tues, Thursday, Friday. Nephrology on board and managing.      Endocrine: monitor glucose targeting 140-180.     GI: Continue current diet.  Continue GI prophylaxis.     DVT prophylaxis:  Continue heparin.     IV access:  central line 3/29/18 in right groin, surgery was consulted last week to have line replaced at another site, she has a fistula in right arm and mastectomy in her left, nurses are unable to place peripheral line.     IF blood pressure remains good can be transferred out of CCU.  Case d/w dr velazquez, icu nurse and RT    Patient Active Problem List   Diagnosis Code   • Acute renal failure N17.9   • Essential hypertension I10   • Dyslipidemia E78.5   • Diabetic neuropathy E11.40   • Chronic pain G89.29   • Anxiety F41.9   • Anemia D64.9   • Acute on chronic renal failure N17.9, N18.9   • CHF (congestive heart failure) I50.9   • Acute on chronic respiratory failure J96.20   • Shortness of breath R06.02   • Tobacco use Z72.0   • STEPHANIE (obstructive sleep apnea) G47.33   • Chest pain at rest R07.9   • Sepsis affecting skin A41.9       CLARA Cardona  04/02/18  9:50 AM      Scribed for Dr. Martin by CLARA Hair.     IDevin M.D. attest that the above note accurately reflects the work and decisions made  by me.  Patient was seen and evaluated by Dr. Martin, including history of present illness, physical exam, assessment, and treatment plan.  The above note was reviewed and edited by Dr. Martin. Cc time 32 minutes

## 2018-04-02 NOTE — PLAN OF CARE
Problem: Skin Injury Risk (Adult)  Goal: Skin Health and Integrity  Outcome: Ongoing (interventions implemented as appropriate)      Problem: Wound (Includes Pressure Injury) (Adult)  Goal: Signs and Symptoms of Listed Potential Problems Will be Absent, Minimized or Managed (Wound)  Outcome: Ongoing (interventions implemented as appropriate)      Problem: Sepsis/Septic Shock (Adult)  Goal: Signs and Symptoms of Listed Potential Problems Will be Absent, Minimized or Managed (Sepsis/Septic Shock)  Outcome: Ongoing (interventions implemented as appropriate)      Problem: Renal Failure/Kidney Injury, Acute (Adult)  Goal: Signs and Symptoms of Listed Potential Problems Will be Absent, Minimized or Managed (Renal Failure/Kidney Injury, Acute)  Outcome: Ongoing (interventions implemented as appropriate)      Problem: Fall Risk (Adult)  Goal: Identify Related Risk Factors and Signs and Symptoms  Outcome: Ongoing (interventions implemented as appropriate)    Goal: Absence of Fall  Outcome: Ongoing (interventions implemented as appropriate)      Problem: Skin and Soft Tissue Infection (Adult)  Goal: Signs and Symptoms of Listed Potential Problems Will be Absent, Minimized or Managed (Skin and Soft Tissue Infection)  Outcome: Ongoing (interventions implemented as appropriate)      Problem: Patient Care Overview  Goal: Individualization and Mutuality  Outcome: Ongoing (interventions implemented as appropriate)    Goal: Discharge Needs Assessment  Outcome: Ongoing (interventions implemented as appropriate)    Goal: Interprofessional Rounds/Family Conf  Outcome: Ongoing (interventions implemented as appropriate)      Problem: NPPV/CPAP (Adult)  Goal: Signs and Symptoms of Listed Potential Problems Will be Absent, Minimized or Managed (NPPV/CPAP)  Outcome: Ongoing (interventions implemented as appropriate)

## 2018-04-03 VITALS
DIASTOLIC BLOOD PRESSURE: 78 MMHG | RESPIRATION RATE: 20 BRPM | HEART RATE: 101 BPM | HEIGHT: 61 IN | OXYGEN SATURATION: 90 % | WEIGHT: 148.7 LBS | BODY MASS INDEX: 28.07 KG/M2 | TEMPERATURE: 98.5 F | SYSTOLIC BLOOD PRESSURE: 133 MMHG

## 2018-04-03 LAB
ALBUMIN SERPL-MCNC: 3.2 G/DL (ref 3.4–4.8)
ALBUMIN/GLOB SERPL: 0.9 G/DL (ref 1.5–2.5)
ALP SERPL-CCNC: 100 U/L (ref 35–104)
ALT SERPL W P-5'-P-CCNC: 8 U/L (ref 10–36)
ANION GAP SERPL CALCULATED.3IONS-SCNC: 10.3 MMOL/L (ref 3.6–11.2)
AST SERPL-CCNC: 13 U/L (ref 10–30)
BACTERIA SPEC AEROBE CULT: NORMAL
BACTERIA SPEC AEROBE CULT: NORMAL
BASOPHILS # BLD AUTO: 0.03 10*3/MM3 (ref 0–0.3)
BASOPHILS NFR BLD AUTO: 0.2 % (ref 0–2)
BILIRUB SERPL-MCNC: 0.5 MG/DL (ref 0.2–1.8)
BUN BLD-MCNC: 30 MG/DL (ref 7–21)
BUN/CREAT SERPL: 8 (ref 7–25)
CALCIUM SPEC-SCNC: 8.3 MG/DL (ref 7.7–10)
CHLORIDE SERPL-SCNC: 101 MMOL/L (ref 99–112)
CO2 SERPL-SCNC: 27.7 MMOL/L (ref 24.3–31.9)
CREAT BLD-MCNC: 3.75 MG/DL (ref 0.43–1.29)
CRP SERPL-MCNC: 7.06 MG/DL (ref 0–0.99)
DEPRECATED RDW RBC AUTO: 64.9 FL (ref 37–54)
EOSINOPHIL # BLD AUTO: 0.37 10*3/MM3 (ref 0–0.7)
EOSINOPHIL NFR BLD AUTO: 2.9 % (ref 0–7)
ERYTHROCYTE [DISTWIDTH] IN BLOOD BY AUTOMATED COUNT: 18.9 % (ref 11.5–14.5)
GFR SERPL CREATININE-BSD FRML MDRD: 12 ML/MIN/1.73
GLOBULIN UR ELPH-MCNC: 3.6 GM/DL
GLUCOSE BLD-MCNC: 93 MG/DL (ref 70–110)
GLUCOSE BLDC GLUCOMTR-MCNC: 162 MG/DL (ref 70–130)
GLUCOSE BLDC GLUCOMTR-MCNC: 184 MG/DL (ref 70–130)
HBA1C MFR BLD: 7 % (ref 4.5–5.7)
HCT VFR BLD AUTO: 37.6 % (ref 37–47)
HGB BLD-MCNC: 11.2 G/DL (ref 12–16)
IMM GRANULOCYTES # BLD: 0.03 10*3/MM3 (ref 0–0.03)
IMM GRANULOCYTES NFR BLD: 0.2 % (ref 0–0.5)
LYMPHOCYTES # BLD AUTO: 1.54 10*3/MM3 (ref 1–3)
LYMPHOCYTES NFR BLD AUTO: 12 % (ref 16–46)
MCH RBC QN AUTO: 28.9 PG (ref 27–33)
MCHC RBC AUTO-ENTMCNC: 29.8 G/DL (ref 33–37)
MCV RBC AUTO: 97.2 FL (ref 80–94)
MONOCYTES # BLD AUTO: 1.25 10*3/MM3 (ref 0.1–0.9)
MONOCYTES NFR BLD AUTO: 9.7 % (ref 0–12)
NEUTROPHILS # BLD AUTO: 9.63 10*3/MM3 (ref 1.4–6.5)
NEUTROPHILS NFR BLD AUTO: 75 % (ref 40–75)
OSMOLALITY SERPL CALC.SUM OF ELEC: 283.4 MOSM/KG (ref 273–305)
PLATELET # BLD AUTO: 173 10*3/MM3 (ref 130–400)
PMV BLD AUTO: 10.5 FL (ref 6–10)
POTASSIUM BLD-SCNC: 3.5 MMOL/L (ref 3.5–5.3)
PROT SERPL-MCNC: 6.8 G/DL (ref 6–8)
RBC # BLD AUTO: 3.87 10*6/MM3 (ref 4.2–5.4)
SODIUM BLD-SCNC: 139 MMOL/L (ref 135–153)
VANCOMYCIN SERPL-MCNC: 28 MCG/ML
WBC NRBC COR # BLD: 12.85 10*3/MM3 (ref 4.5–12.5)

## 2018-04-03 PROCEDURE — 82962 GLUCOSE BLOOD TEST: CPT

## 2018-04-03 PROCEDURE — 94799 UNLISTED PULMONARY SVC/PX: CPT

## 2018-04-03 PROCEDURE — 99239 HOSP IP/OBS DSCHRG MGMT >30: CPT | Performed by: INTERNAL MEDICINE

## 2018-04-03 PROCEDURE — 83036 HEMOGLOBIN GLYCOSYLATED A1C: CPT | Performed by: INTERNAL MEDICINE

## 2018-04-03 PROCEDURE — 94660 CPAP INITIATION&MGMT: CPT

## 2018-04-03 PROCEDURE — 99231 SBSQ HOSP IP/OBS SF/LOW 25: CPT | Performed by: INTERNAL MEDICINE

## 2018-04-03 PROCEDURE — 80053 COMPREHEN METABOLIC PANEL: CPT | Performed by: INTERNAL MEDICINE

## 2018-04-03 PROCEDURE — 80202 ASSAY OF VANCOMYCIN: CPT | Performed by: INTERNAL MEDICINE

## 2018-04-03 PROCEDURE — 86140 C-REACTIVE PROTEIN: CPT | Performed by: INTERNAL MEDICINE

## 2018-04-03 PROCEDURE — 25010000002 PIPERACILLIN-TAZOBACTAM: Performed by: INTERNAL MEDICINE

## 2018-04-03 PROCEDURE — 85025 COMPLETE CBC W/AUTO DIFF WBC: CPT | Performed by: INTERNAL MEDICINE

## 2018-04-03 RX ORDER — DOXYCYCLINE HYCLATE 100 MG
100 TABLET ORAL 2 TIMES DAILY
Qty: 10 TABLET | Refills: 0 | Status: SHIPPED | OUTPATIENT
Start: 2018-04-03 | End: 2018-04-08

## 2018-04-03 RX ORDER — CEFDINIR 300 MG/1
300 CAPSULE ORAL
Qty: 2 CAPSULE | Refills: 0 | Status: SHIPPED | OUTPATIENT
Start: 2018-04-03 | End: 2018-04-08

## 2018-04-03 RX ADMIN — IPRATROPIUM BROMIDE AND ALBUTEROL SULFATE 3 ML: .5; 3 SOLUTION RESPIRATORY (INHALATION) at 00:14

## 2018-04-03 RX ADMIN — Medication 1 CAPSULE: at 11:45

## 2018-04-03 RX ADMIN — ASPIRIN 81 MG: 81 TABLET ORAL at 11:45

## 2018-04-03 RX ADMIN — HYDROCODONE BITARTRATE AND ACETAMINOPHEN 1 TABLET: 5; 325 TABLET ORAL at 04:34

## 2018-04-03 RX ADMIN — GUAIFENESIN 1200 MG: 600 TABLET, EXTENDED RELEASE ORAL at 11:45

## 2018-04-03 RX ADMIN — PIPERACILLIN SODIUM,TAZOBACTAM SODIUM 3.38 G: 3; .375 INJECTION, POWDER, FOR SOLUTION INTRAVENOUS at 02:50

## 2018-04-03 RX ADMIN — CLONAZEPAM 0.5 MG: 0.5 TABLET ORAL at 06:26

## 2018-04-03 RX ADMIN — BUDESONIDE AND FORMOTEROL FUMARATE DIHYDRATE 2 PUFF: 80; 4.5 AEROSOL RESPIRATORY (INHALATION) at 06:32

## 2018-04-03 RX ADMIN — NITROGLYCERIN 0.4 MG: 0.4 TABLET SUBLINGUAL at 11:49

## 2018-04-03 RX ADMIN — SODIUM CHLORIDE 1000 ML: 9 INJECTION, SOLUTION INTRAVENOUS at 08:08

## 2018-04-03 RX ADMIN — NITROGLYCERIN 0.4 MG: 0.4 TABLET SUBLINGUAL at 11:44

## 2018-04-03 RX ADMIN — RANOLAZINE 500 MG: 500 TABLET, FILM COATED, EXTENDED RELEASE ORAL at 11:45

## 2018-04-03 RX ADMIN — IPRATROPIUM BROMIDE AND ALBUTEROL SULFATE 3 ML: .5; 3 SOLUTION RESPIRATORY (INHALATION) at 06:32

## 2018-04-03 RX ADMIN — HYDROCODONE BITARTRATE AND ACETAMINOPHEN 1 TABLET: 5; 325 TABLET ORAL at 11:47

## 2018-04-03 NOTE — DISCHARGE PLACEMENT REQUEST
"DulceSara hui (67 y.o. Female)     Date of Birth Social Security Number Address Home Phone MRN    1950  [id number] [facility] [address] [phone] [id number]    Sikhism Marital Status          Yarsani        Admission Date Admission Type Admitting Provider Attending Provider Department, Room/Bed    3/28/18 Emergency Linda Pool MD Troxell, Christopher A, DO [address], 3307/1S    Discharge Date Discharge Disposition Discharge Destination         Home or Self Care              Attending Provider:  Fredis Cintron DO    Allergies:  No Known Allergies    Isolation:  None   Infection:  None   Code Status:  FULL    Ht:  154.9 cm (61\")   Wt:  67.4 kg (148 lb 11.2 oz)    Admission Cmt:  None   Principal Problem:  Sepsis affecting skin [A41.9]                 Active Insurance as of 3/28/2018     Primary Coverage     Payor Plan Insurance Group Employer/Plan Group    MEDICARE MEDICARE B ONLY      Payor Plan Address Payor Plan Phone Number Effective From Effective To    [address] [address] [phone] 7/1/2015     [place] [address]       Subscriber Name Subscriber Birth Date Member ID       SARA CARDONA 1950 [id number]           Secondary Coverage     Payor Plan Insurance Group Employer/Plan Group    [employer]LL[place]      Payor Plan Address Payor Plan Phone Number Effective From Effective To    [address] [address] [phone] 6/30/2016     [place] [address]       Subscriber Name Subscriber Birth Date Member ID       SARA CARDONA 1950 [id number]                 Emergency Contacts      (Rel.) Home Phone Work Phone Mobile Phone    Ángel Cardona (Spouse) [phone] -- --            Emergency Contact Information     Name Relation Home Work Mobile    Ángel Cardona Spouse [phone]            Insurance Information                MEDICARE/MEDICARE B ONLY Phone: [phone]    Subscriber: Sara Cardona Subscriber#: [id number] "    Group#:  Precert#:         [facility]/WELLCARE MEDICAID Phone: [phone]    Subscriber: Sara Cardona Subscriber#: [id number]    Group#:  Precert#:           Problem List           Codes Noted - Resolved       Hospital    * (Principal)Sepsis affecting skin ICD-10-CM: A41.9  ICD-9-CM: 682.9 3/29/2018 - Present       Non-Hospital    Chest pain at rest ICD-10-CM: R07.9  ICD-9-CM: 786.50 2018 - Present    Shortness of breath ICD-10-CM: R06.02  ICD-9-CM: 786.05 12/15/2017 - Present    Tobacco use ICD-10-CM: Z72.0  ICD-9-CM: 305.1 12/15/2017 - Present    STEPHANIE (obstructive sleep apnea) ICD-10-CM: G47.33  ICD-9-CM: 327.23 12/15/2017 - Present    Acute on chronic respiratory failure ICD-10-CM: J96.20  ICD-9-CM: 518.84 2017 - Present    Acute on chronic renal failure ICD-10-CM: N17.9, N18.9  ICD-9-CM: 584.9, 585.9 2017 - Present    CHF (congestive heart failure) ICD-10-CM: I50.9  ICD-9-CM: 428.0 2017 - Present    Acute renal failure ICD-10-CM: N17.9  ICD-9-CM: 584.9 2016 - Present    Essential hypertension ICD-10-CM: I10  ICD-9-CM: 401.9 Unknown - Present    Dyslipidemia ICD-10-CM: E78.5  ICD-9-CM: 272.4 Unknown - Present    Diabetic neuropathy ICD-10-CM: E11.40  ICD-9-CM: 250.60, 357.2 Unknown - Present    Chronic pain ICD-10-CM: G89.29  ICD-9-CM: 338.29 Unknown - Present    Anxiety ICD-10-CM: F41.9  ICD-9-CM: 300.00 Unknown - Present    Anemia ICD-10-CM: D64.9  ICD-9-CM: 285.9 2016 - Present             History & Physical      Linda Pool MD at 3/29/2018  6:29 AM              [facility]    Patient Identification:  Name:  Sara Cardona  Age:  67 y.o.  Sex:  female  :  1950  MRN:  [id number]   Visit Number:  [id number]  Room number:  CC05/1C  Primary Care Physician:  Marcelino Sheehan MD     Chief complaint:  generalized weakness    History of presenting illness:  67 y.o. female who receives hemodialysis through a left arm  fistula who presented to [facility] emergency department with generalized weakness.  The patient currently is confused and not able to answer questions about the history of presenting illness.  When I evaluated the patient, there was no family members at bedside.  In the emergency department, the patient was found to have 4 ulcerations on her neck as well as bilateral red and swollen legs.  The patient was noted to have a low blood pressures in the emergency and the central line was placed in the right groin.  She received the rest of her sepsis protocol bolus while in the CCU and shortly after arrival she was started on Levophed.  ---------------------------------------------------------------------------------------------------------------------   Review of Systems   Unable to perform ROS: Mental status change   HENT: Negative for ear discharge.    Gastrointestinal: Negative for vomiting.   ---------------------------------------------------------------------------------------------------------------------   Past Medical History:   Diagnosis Date   • Angina at rest    • Anxiety    • Aortic stenosis    • ASCVD (arteriosclerotic cardiovascular disease)    • Breast cancer    • CHF (congestive heart failure) 7/24/2017   • Chronic pain    • COPD (chronic obstructive pulmonary disease)    • Diabetic neuropathy    • Dyslipidemia    • Essential hypertension    • Insulin dependent diabetes mellitus    • Left carotid bruit    • Recurrent pneumonia    • Renal failure      Past Surgical History:   Procedure Laterality Date   • APPENDECTOMY     • CARDIAC CATHETERIZATION     • COLONOSCOPY     • COLONOSCOPY N/A 7/10/2017    Procedure: COLONOSCOPY;  Surgeon: Zheng Suarez III, MD;  Location: [facility] OR;  Service:    • ENDOSCOPY N/A 7/10/2017    Procedure: ESOPHAGOGASTRODUODENOSCOPY;  Surgeon: Zheng Suarez III, MD;  Location: [facility] OR;  Service:    • HYSTERECTOMY     • MASTECTOMY Right    • RHINOPLASTY        Family History   Problem Relation Age of Onset   • Diabetes Mother    • Lung cancer Father      Social History   • Marital status:      Social History Main Topics   • Smoking status: Former Smoker     Packs/day: 0.25     Years: 40.00     Types: Cigarettes     Quit date: 02/2018   • Smokeless tobacco: Never Used   • Alcohol use No   • Drug use: No   • Sexual activity: Defer   ---------------------------------------------------------------------------------------------------------------------   Allergies:  Review of patient's allergies indicates no known allergies.  ---------------------------------------------------------------------------------------------------------------------   Prior to Admission Medications     Prescriptions Last Dose Informant Patient Reported? Taking?    albuterol (PROVENTIL HFA;VENTOLIN HFA) 108 (90 BASE) MCG/ACT inhaler 3/28/2018 Spouse/Significant Other No Yes    Inhale 2 puffs every 6 (six) hours as needed for wheezing.    Patient taking differently:  Inhale 2 puffs Every 6 (Six) Hours.    aspirin 81 MG EC tablet 3/28/2018 Spouse/Significant Other No Yes    Take 1 tablet by mouth Daily.    budesonide-formoterol (SYMBICORT) 80-4.5 MCG/ACT inhaler 3/28/2018 Spouse/Significant Other Yes Yes    Inhale 2 puffs 2 (Two) Times a Day.    clonazePAM (KlonoPIN) 0.5 MG tablet 3/28/2018 Spouse/Significant Other Yes Yes    Take 0.5 mg by mouth 2 (Two) Times a Day.    gabapentin (NEURONTIN) 100 MG capsule 3/28/2018 Spouse/Significant Other No Yes    Take 1 capsule by mouth every night at bedtime.    hydrALAZINE (APRESOLINE) 10 MG tablet 3/28/2018 Spouse/Significant Other No Yes    Take 1 tablet by mouth Every 8 (Eight) Hours.    Patient taking differently:  Take 10 mg by mouth 3 (Three) Times a Day.    insulin glargine (LANTUS) 100 UNIT/ML injection Past Week Spouse/Significant Other Yes Yes    Inject 10 Units under the skin Every Night.    isosorbide mononitrate (IMDUR) 60 MG 24 hr tablet  3/28/2018 Spouse/Significant Other No Yes    Take 1 tablet by mouth Daily.    Patient taking differently:  Take 60 mg by mouth Daily.    losartan (COZAAR) 25 MG tablet 3/28/2018 Spouse/Significant Other No Yes    Take 1 tablet by mouth Daily.    Patient taking differently:  Take 25 mg by mouth Daily.    metoprolol tartrate (LOPRESSOR) 25 MG tablet 3/28/2018 Spouse/Significant Other No Yes    Take 1 tablet by mouth Every 12 (Twelve) Hours.    ranolazine (RANEXA) 500 MG 12 hr tablet 3/28/2018 Spouse/Significant Other No Yes    Take 1 tablet by mouth Every 12 (Twelve) Hours.    clopidogrel (PLAVIX) 75 MG tablet   No No    Take 1 tablet by mouth Daily.    nitroglycerin (NITROSTAT) 0.4 MG SL tablet Unknown Spouse/Significant Other No No    Place 1 tablet under the tongue Every 5 (Five) Minutes As Needed for Chest Pain. Take no more than 3 doses in 15 minutes.        ---------------------------------------------------------------------------------------------------------------------   Vital Signs:  Temp:  [97.4 °F (36.3 °C)-98.6 °F (37 °C)] 97.5 °F (36.4 °C)  Heart Rate:  [55-68] 61  Resp:  [14-24] 18  BP: ()/(32-99) 105/53    Mean Arterial Pressure (Non-Invasive) for the past 24 hrs (Last 3 readings):   Noninvasive MAP (mmHg)   03/29/18 0615 73   03/29/18 0605 73   03/29/18 0545 86     SpO2:  [86 %-98 %] 98 %    Device (Oxygen Therapy): nasal cannula  Body mass index is 25.73 kg/m².    Wt Readings from Last 1 Encounters:   03/29/18 0320 61.8 kg (136 lb 3.2 oz)   03/28/18 2213 54.4 kg (120 lb)     Wt Readings from Last 3 Encounters:   03/29/18 61.8 kg (136 lb 3.2 oz)   03/16/18 57.2 kg (126 lb 3.2 oz)   02/05/18 57.2 kg (126 lb)               ---------------------------------------------------------------------------------------------------------------------   Physical Exam:  Constitutional:  Well-developed and well-nourished.  Moderate to severe respiratory distress.      HENT:  Head: Normocephalic and atraumatic.   Mouth:  Moist mucous membranes.    Eyes:  Conjunctivae and EOM are normal.  Pupils are equal, round, and reactive to light.  No scleral icterus.  Neck:  Neck supple.  No JVD present.    Cardiovascular:  Normal rate, regular rhythm and normal heart sounds with no murmur.  Pulmonary/Chest:  moderate to respiratory distress, no wheezes, no crackles, with normal breath sounds and good air movement.  Abdominal:  Soft.  Bowel sounds are normal.  No distension and no tenderness.   Musculoskeletal:  No edema, no tenderness, and no deformity.  No red or swollen joints anywhere.    Neurological:  Drowsy, able to answer some questions but not really following commands.    Skin:  Bilateral legs are red, warm to touch, and edematous with no open lesions on the legs.  The posterior neck has 4 ulcerations without any cellulitis.  Peripheral vascular:  No edema and strong pulses on all 4 extremities.  Genitourinary:  no Orellana catheter currently  ---------------------------------------------------------------------------------------------------------------------  EKG:  sinus tachycardia with a heart rate of 102, QTc of 505 ms, and nonspecific T-wave changes.  Telemetry:  sinus bradycardia in the 50s.  I have personally looked at both the EKG and the telemetry strips.            --------------------------------------------------------------------------------------------------------------------    Results from last 7 days  Lab Units 03/29/18  0000 03/28/18  0326   CRP mg/dL 11.03*  --    LACTATE mmol/L 1.5  --    WBC 10*3/mm3  --  13.76*   HEMOGLOBIN g/dL  --  11.5*   HEMATOCRIT %  --  36.4*   MCV fL  --  94.1*   MCHC g/dL  --  31.6*   PLATELETS 10*3/mm3  --  213     Results from last 7 days  Lab Units 03/28/18  2246   SODIUM mmol/L 129*   POTASSIUM mmol/L 3.9   MAGNESIUM mg/dL 2.1   CHLORIDE mmol/L 95*   CO2 mmol/L 21.7*   BUN mg/dL 40*   CREATININE mg/dL 5.87*   EGFR IF NONAFRICN AM mL/min/1.73 7*   CALCIUM mg/dL 8.3   GLUCOSE  mg/dL 138*   ALBUMIN g/dL 3.10*   BILIRUBIN mg/dL 0.5   ALK PHOS U/L 142*   AST (SGOT) U/L 17   ALT (SGPT) U/L 7*   Estimated Creatinine Clearance: 7.8 mL/min (by C-G formula based on SCr of 5.87 mg/dL (H)).            pH pH, Arterial   Date Value Ref Range Status   03/29/2018 7.233 (C) 7.350 - 7.450 pH units Final      pO2 pO2, Arterial   Date Value Ref Range Status   03/29/2018 89.0 80.0 - 100.0 mm Hg Final      pCO2 pCO2, Arterial   Date Value Ref Range Status   03/29/2018 38.9 35.0 - 45.0 mm Hg Final      HCO3 HCO3, Arterial   Date Value Ref Range Status   03/29/2018 16.0 (C) 22.0 - 26.0 mmol/L Final        I have personally looked at the labs and they are summarized above.  ----------------------------------------------------------------------------------------------------------------------  Imaging Results (last 24 hours)     Procedure Component Value Units Date/Time    XR Abdomen KUB [[id number]]:   Updated:  03/29/18 0317        I have personally looked at the KUB and there is a nonspecific bowel gas pattern.       I await the final radiology report; there is not a preliminary report.  ----------------------------------------------------------------------------------------------------------------------  Assessment and Plan:  -Septic shock and metabolic encephalopathy due to bilateral leg cellulitis  -Acute hypoxic respiratory failure versus chronic hypoxic respiratory failure  -End-stage renal disease with hemodialysis given Tuesday, Thursday, and Saturday through a left before meals fistula  -Acute Hyponatremia  -Primary metabolic acidosis  -Prolonged QTc of 505 ms  -Hypoalbuminemia  -Macrocytic anemia    I have started patient on vancomycin and Zosyn with pharmacy to dose.  The IV fluids have been turned down to 50 mils per hour.  We will continue the Levophed as long as her mean arterial pressures below 65 mmHg.  We will repeat her blood work in the morning.  We will closely monitor the electrolytes  because of the prolonged QTc and we will avoid medications that prolong the QTC.  I'm consulting pulmonary and nephrology.    ICU time is 120 minutes.    Linda Pool MD  03/29/18  6:29 AM      Electronically signed by Linda Pool MD at 3/29/2018  8:02 AM       Hospital Medications (active)       Dose Frequency Start End    albumin human 25 % IV SOLN 25 g 25 g Once 4/2/2018 4/2/2018    Sig - Route: Infuse 100 mL into a venous catheter 1 (One) Time. - Intravenous    aspirin EC tablet 81 mg 81 mg Daily 3/29/2018     Sig - Route: Take 1 tablet by mouth Daily. - Oral    budesonide-formoterol (SYMBICORT) 80-4.5 MCG/ACT inhaler 2 puff 2 puff 2 Times Daily - RT 3/29/2018     Sig - Route: Inhale 2 puffs 2 (Two) Times a Day. - Inhalation    clonazePAM (KlonoPIN) tablet 0.5 mg 0.5 mg 2 Times Daily 3/29/2018     Sig - Route: Take 1 tablet by mouth 2 (Two) Times a Day. - Oral    dextrose (D50W) solution 25 g 25 g Every 15 Minutes PRN 3/29/2018     Sig - Route: Infuse 50 mL into a venous catheter Every 15 (Fifteen) Minutes As Needed for Low Blood Sugar (Blood Sugar Less Than 70). - Intravenous    dextrose (D50W) solution 25 g 25 g Every 15 Minutes PRN 4/2/2018     Sig - Route: Infuse 50 mL into a venous catheter Every 15 (Fifteen) Minutes As Needed for Low Blood Sugar (Blood Sugar Less Than 70). - Intravenous    dextrose (GLUTOSE) oral gel 15 g 15 g Every 15 Minutes PRN 3/29/2018     Sig - Route: Take 15 g by mouth Every 15 (Fifteen) Minutes As Needed for Low Blood Sugar (Blood sugar less than 70). - Oral    dextrose (GLUTOSE) oral gel 15 g 15 g Every 15 Minutes PRN 4/2/2018     Sig - Route: Take 15 g by mouth Every 15 (Fifteen) Minutes As Needed for Low Blood Sugar (Blood sugar less than 70). - Oral    glucagon (human recombinant) (GLUCAGEN DIAGNOSTIC) injection 1 mg 1 mg As Needed 3/29/2018     Sig - Route: Inject 1 mg under the skin As Needed (Blood Glucose Less Than 70). - Subcutaneous    glucagon (human  recombinant) (GLUCAGEN DIAGNOSTIC) injection 1 mg 1 mg As Needed 4/2/2018     Sig - Route: Inject 1 mg under the skin As Needed (Blood Glucose Less Than 70). - Subcutaneous    guaiFENesin (MUCINEX) 12 hr tablet 1,200 mg 1,200 mg 2 Times Daily 4/2/2018     Sig - Route: Take 2 tablets by mouth 2 (Two) Times a Day. - Oral    heparin (porcine) 5000 UNIT/ML injection 5,000 Units 5,000 Units Every 12 Hours Scheduled 3/29/2018     Sig - Route: Inject 1 mL under the skin Every 12 (Twelve) Hours. - Subcutaneous    HYDROcodone-acetaminophen (NORCO) 5-325 MG per tablet 1 tablet 1 tablet Every 6 Hours PRN 4/1/2018 4/11/2018    Sig - Route: Take 1 tablet by mouth Every 6 (Six) Hours As Needed for Moderate Pain . - Oral    insulin aspart (novoLOG) injection 0-7 Units 0-7 Units 4 Times Daily Before Meals & Nightly 4/2/2018     Sig - Route: Inject 0-7 Units under the skin 4 (Four) Times a Day Before Meals & at Bedtime. - Subcutaneous    ipratropium-albuterol (DUO-NEB) nebulizer solution 3 mL 3 mL 4 Times Daily - RT 3/29/2018     Sig - Route: Take 3 mL by nebulization 4 (Four) Times a Day. - Nebulization    lactobacillus acidophilus (RISAQUAD) capsule 1 capsule 1 capsule Daily 3/29/2018     Sig - Route: Take 1 capsule by mouth Daily. - Oral    nitroglycerin (NITROSTAT) SL tablet 0.4 mg 0.4 mg Every 5 Minutes PRN 3/29/2018     Sig - Route: Place 1 tablet under the tongue Every 5 (Five) Minutes As Needed for Chest Pain. - Sublingual    piperacillin-tazobactam (ZOSYN) 3.375 g/100 mL 0.9% NS IVPB (mbp) 3.375 g Every 12 Hours 3/29/2018 4/5/2018    Sig - Route: Infuse 100 mL into a venous catheter Every 12 (Twelve) Hours. - Intravenous    promethazine (PHENERGAN) 12.5 mg in sodium chloride 0.9 % 50 mL 12.5 mg Every 6 Hours PRN 3/31/2018     Sig - Route: Infuse 12.5 mg into a venous catheter Every 6 (Six) Hours As Needed for Nausea or Vomiting. - Intravenous    ranolazine (RANEXA) 12 hr tablet 500 mg 500 mg Every 12 Hours Scheduled  "3/29/2018     Sig - Route: Take 1 tablet by mouth Every 12 (Twelve) Hours. - Oral    sodium chloride 0.9 % bolus 1,000 mL 1,000 mL Once in Dialysis 4/3/2018     Sig - Route: Infuse 1,000 mL into a venous catheter Once. - Intravenous    sodium chloride 0.9 % flush 1-10 mL 1-10 mL As Needed 3/29/2018     Sig - Route: Infuse 1-10 mL into a venous catheter As Needed for Line Care. - Intravenous    sodium chloride 0.9 % flush 10 mL 10 mL As Needed 3/28/2018     Sig - Route: Infuse 10 mL into a venous catheter As Needed for Line Care. - Intravenous    Linked Group 1:  \"And\" Linked Group Details        sodium chloride 0.9 % flush 10 mL 10 mL Every 12 Hours Scheduled 3/29/2018     Sig - Route: 10 mL by Intracatheter route Every 12 (Twelve) Hours. - Intracatheter    sodium chloride 0.9 % flush 10 mL 10 mL As Needed 3/29/2018     Sig - Route: 10 mL by Intracatheter route As Needed for Line Care (After Medication Administration or Blood Draw). - Intracatheter    sodium chloride 0.9 % flush 10 mL 10 mL As Needed 3/29/2018     Sig - Route: 10 mL by Intracatheter route As Needed for Line Care (After Medication Administration or Blood Draw). - Intracatheter    sodium chloride 0.9 % flush 10 mL 10 mL As Needed 3/29/2018     Sig - Route: 10 mL by Intracatheter route As Needed for Line Care (After Medication Administration or Blood Draw). - Intracatheter    sodium chloride 0.9 % flush 10 mL 10 mL As Needed 3/29/2018     Sig - Route: 10 mL by Intracatheter route As Needed for Line Care (After Medication Administration or Blood Draw). - Intracatheter    sodium chloride 0.9 % flush 10 mL 10 mL As Needed 3/29/2018     Sig - Route: 10 mL by Intracatheter route As Needed for Line Care (After Medication Administration or Blood Draw). - Intracatheter    Vancomycin Pharmacy Intermittent Dosing  Daily 3/31/2018 4/6/2018    Sig - Route: Daily. - Does not apply            Lab Results (last 24 hours)     Procedure Component Value Units Date/Time "    POC Glucose Once [[id number]]  (Abnormal) Collected:  04/03/18 0619    Specimen:  Blood Updated:  04/03/18 0627     Glucose 162 (H) mg/dL     Narrative:       Meter: [id number] : [id number] LORENZOPA JAMIL    Hemoglobin A1c [[id number]]  (Abnormal) Collected:  04/03/18 0047    Specimen:  Blood Updated:  04/03/18 0232     Hemoglobin A1C 7.00 (H) %     Vancomycin, Random [[id number]] Collected:  04/03/18 0047    Specimen:  Blood Updated:  04/03/18 0157     Vancomycin Random 28.00 mcg/mL     Comprehensive Metabolic Panel [[id number]]  (Abnormal) Collected:  04/03/18 0047    Specimen:  Blood Updated:  04/03/18 0150     Glucose 93 mg/dL      BUN 30 (H) mg/dL      Creatinine 3.75 (H) mg/dL      Sodium 139 mmol/L      Potassium 3.5 mmol/L      Chloride 101 mmol/L      CO2 27.7 mmol/L      Calcium 8.3 mg/dL      Total Protein 6.8 g/dL      Albumin 3.20 (L) g/dL      ALT (SGPT) 8 (L) U/L      AST (SGOT) 13 U/L      Alkaline Phosphatase 100 U/L      Comment: Note New Reference Ranges        Total Bilirubin 0.5 mg/dL      eGFR Non African Amer 12 (L) mL/min/1.73      Globulin 3.6 gm/dL      A/G Ratio 0.9 (L) g/dL      BUN/Creatinine Ratio 8.0     Anion Gap 10.3 mmol/L     Osmolality, Calculated [[id number]]  (Normal) Collected:  04/03/18 0047    Specimen:  Blood Updated:  04/03/18 0150     Osmolality Calc 283.4 mOsm/kg     C-reactive Protein [[id number]]  (Abnormal) Collected:  04/03/18 0047    Specimen:  Blood Updated:  04/03/18 0149     C-Reactive Protein 7.06 (H) mg/dL     CBC & Differential [[id number]] Collected:  04/03/18 0047    Specimen:  Blood Updated:  04/03/18 0128    Narrative:       The following orders were created for panel order CBC & Differential.  Procedure                               Abnormality         Status                     ---------                               -----------         ------                     CBC Auto Differential[[id number]]        Abnormal            Final result                 Please  view results for these tests on the individual orders.    CBC Auto Differential [[id number]]  (Abnormal) Collected:  04/03/18 0047    Specimen:  Blood Updated:  04/03/18 0128     WBC 12.85 (H) 10*3/mm3      RBC 3.87 (L) 10*6/mm3      Hemoglobin 11.2 (L) g/dL      Hematocrit 37.6 %      MCV 97.2 (H) fL      MCH 28.9 pg      MCHC 29.8 (L) g/dL      RDW 18.9 (H) %      RDW-SD 64.9 (H) fl      MPV 10.5 (H) fL      Platelets 173 10*3/mm3      Neutrophil % 75.0 %      Lymphocyte % 12.0 (L) %      Monocyte % 9.7 %      Eosinophil % 2.9 %      Basophil % 0.2 %      Immature Grans % 0.2 %      Neutrophils, Absolute 9.63 (H) 10*3/mm3      Lymphocytes, Absolute 1.54 10*3/mm3      Monocytes, Absolute 1.25 (H) 10*3/mm3      Eosinophils, Absolute 0.37 10*3/mm3      Basophils, Absolute 0.03 10*3/mm3      Immature Grans, Absolute 0.03 10*3/mm3     Blood Culture - Blood, [[id number]]  (Normal) Collected:  03/29/18 0045    Specimen:  Blood from Hand, Right Updated:  04/03/18 0101     Blood Culture No growth at 5 days    Blood Culture - Blood, [[id number]]  (Normal) Collected:  03/29/18 0000    Specimen:  Blood from Arm, Right Updated:  04/03/18 0016     Blood Culture No growth at 5 days    POC Glucose Once [[id number]]  (Abnormal) Collected:  04/02/18 2007    Specimen:  Blood Updated:  04/02/18 2027     Glucose 182 (H) mg/dL     Narrative:       Meter: [id number] : [id number] etelvina dela cruz    POC Glucose Once [[id number]]  (Abnormal) Collected:  04/02/18 1607    Specimen:  Blood Updated:  04/02/18 1616     Glucose 214 (H) mg/dL     Narrative:       Meter: [id number] : [id number] susy lacy    POC Glucose Once [[id number]]  (Abnormal) Collected:  04/02/18 1051    Specimen:  Blood Updated:  04/02/18 1059     Glucose 203 (H) mg/dL     Narrative:       Meter: [id number] : [id number] susy lacy        Operative/Procedure Notes (last 24 hours) (Notes from 4/2/2018 10:27 AM through 4/3/2018 10:27 AM)     No notes of this  type exist for this encounter.           Physician Progress Notes (last 24 hours) (Notes from 4/2/2018 10:27 AM through 4/3/2018 10:27 AM)      Jai Chavez MD at 4/2/2018  2:55 PM          Nephrology Progress Note      Subjective     She complains of cough. No nausea, vomiting or diarrhea. She complains of hands weakness today and dropping things for 2 days    Objective       Vital signs :     Temp:  [97.9 °F (36.6 °C)-98.5 °F (36.9 °C)] 98.1 °F (36.7 °C)  Heart Rate:  [75-94] 94  Resp:  [14-22] 18  BP: ()/() 135/90  FiO2 (%):  [60 %] 60 %    I/O last 3 completed shifts:  In: 1741.2 [P.O.:1140; I.V.:51.2; IV Piggyback:550]  Out: 100 [Urine:100]  I/O this shift:  In: 610 [P.O.:360; IV Piggyback:250]  Out: -     Physical Exam:    General Appearance : awake, no distress  Head  :  normocephalic, without obvious abnormality and atraumatic  Eyes  :   no pallor  Neck  : no JVD   Lungs : clear to auscultation  Heart :  regular rhythm & normal rate, normal S1, S2   Abdomen : normal bowel sounds, soft non-tender   Extremities : 1+ edema  Skin :  Bilateral lower extremity erythema much better  Neurologic :   Grossly no focal deficits, strength 5/5 all extremities. No nystagmus, proprioception intact  Acess : left arm AVG thrill and bruit +    Laboratory Data :       WBC WBC   Date Value Ref Range Status   04/02/2018 11.07 4.50 - 12.50 10*3/mm3 Final   04/01/2018 10.93 4.50 - 12.50 10*3/mm3 Final   03/31/2018 11.55 4.50 - 12.50 10*3/mm3 Final      HGB Hemoglobin   Date Value Ref Range Status   04/02/2018 11.4 (L) 12.0 - 16.0 g/dL Final   04/01/2018 11.6 (L) 12.0 - 16.0 g/dL Final   03/31/2018 11.5 (L) 12.0 - 16.0 g/dL Final      HCT Hematocrit   Date Value Ref Range Status   04/02/2018 37.6 37.0 - 47.0 % Final   04/01/2018 37.7 37.0 - 47.0 % Final   03/31/2018 38.0 37.0 - 47.0 % Final      Platlets No results found for: LABPLAT   MCV MCV   Date Value Ref Range Status   04/02/2018 96.4 (H) 80.0 - 94.0 fL Final    04/01/2018 95.9 (H) 80.0 - 94.0 fL Final   03/31/2018 97.4 (H) 80.0 - 94.0 fL Final          Sodium Sodium   Date Value Ref Range Status   04/02/2018 137 135 - 153 mmol/L Final   04/01/2018 135 135 - 153 mmol/L Final   03/31/2018 139 135 - 153 mmol/L Final      Potassium Potassium   Date Value Ref Range Status   04/02/2018 3.7 3.5 - 5.3 mmol/L Final   04/01/2018 3.6 3.5 - 5.3 mmol/L Final   03/31/2018 3.8 3.5 - 5.3 mmol/L Final      Chloride Chloride   Date Value Ref Range Status   04/02/2018 100 99 - 112 mmol/L Final   04/01/2018 98 (L) 99 - 112 mmol/L Final   03/31/2018 101 99 - 112 mmol/L Final      CO2 CO2   Date Value Ref Range Status   04/02/2018 27.0 24.3 - 31.9 mmol/L Final   04/01/2018 30.0 24.3 - 31.9 mmol/L Final   03/31/2018 27.1 24.3 - 31.9 mmol/L Final      BUN BUN   Date Value Ref Range Status   04/02/2018 25 (H) 7 - 21 mg/dL Final   04/01/2018 18 7 - 21 mg/dL Final   03/31/2018 30 (H) 7 - 21 mg/dL Final      Creatinine Creatinine   Date Value Ref Range Status   04/02/2018 3.37 (H) 0.43 - 1.29 mg/dL Final   04/01/2018 2.95 (H) 0.43 - 1.29 mg/dL Final   03/31/2018 4.25 (H) 0.43 - 1.29 mg/dL Final      Calcium Calcium   Date Value Ref Range Status   04/02/2018 7.7 7.7 - 10.0 mg/dL Final   04/01/2018 7.7 7.7 - 10.0 mg/dL Final   03/31/2018 7.6 (L) 7.7 - 10.0 mg/dL Final      PO4 No results found for: CAPO4   Albumin Albumin   Date Value Ref Range Status   04/02/2018 2.80 (L) 3.40 - 4.80 g/dL Final   04/01/2018 2.80 (L) 3.40 - 4.80 g/dL Final      Magnesium Magnesium   Date Value Ref Range Status   04/01/2018 1.8 1.7 - 2.6 mg/dL Final          PTH               No results found for: PTH      Medications :       aspirin 81 mg Oral Daily   budesonide-formoterol 2 puff Inhalation BID - RT   clonazePAM 0.5 mg Oral BID   guaiFENesin 1,200 mg Oral BID   heparin (porcine) 5,000 Units Subcutaneous Q12H   insulin aspart 0-7 Units Subcutaneous 4x Daily AC & at Bedtime   ipratropium-albuterol 3 mL Nebulization 4x  Daily - RT   lactobacillus acidophilus 1 capsule Oral Daily   piperacillin-tazobactam 3.375 g Intravenous Q12H   ranolazine 500 mg Oral Q12H   sodium chloride 10 mL Intracatheter Q12H   Vancomycin Pharmacy Intermittent Dosing  Does not apply Daily            Assessment/Plan     1. ESRD : continue HD TTS    2. Hypotension/shock :  resolved    3. Hand weakness : check Ct scan of head    4. Diastolic CHF : compensated    5. Peripheral vascular disease : surgery following     Discussed with patient, RN      Jai Chavez MD  04/02/18  2:55 PM      Electronically signed by Jai Chavez MD at 4/2/2018  2:57 PM       Consult Notes (last 24 hours) (Notes from 4/2/2018 10:27 AM through 4/3/2018 10:27 AM)     No notes of this type exist for this encounter.           Nursing Assessments (last 24 hours)      Adult PCS Body System     Row Name 04/03/18 0600 04/03/18 0504 04/03/18 0434 04/03/18 0351 04/03/18 0200       Pain/Comfort/Sleep    Presence of Pain complains of pain/discomfort non-verbal indicators absent complains of pain/discomfort non-verbal indicators absent non-verbal indicators absent    Preferred Pain Scale number (Numeric Rating Pain Scale) FACES (Chandra-Baker FACES Pain Rating Scale) number (Numeric Rating Pain Scale) FACES (Chandra-Baker FACES Pain Rating Scale) FACES (Chandra-Baker FACES Pain Rating Scale)    Pain Body Location - Side Bilateral  --  --  --  --    Pain Body Location foot  -- abdomen  --  --    Pain Rating (0-10): Rest 4  --  --  --  --    FACES Pain Rating: Rest  -- 0-->no hurt  -- 0-->no hurt 0-->no hurt    Frequency constant  -- frequent  --  --    Quality burning;aching  -- aching  --  --    Pain Management Interventions pillow support provided;pain management plan reviewed with patient/caregiver  -- see MAR;quiet environment facilitated;pillow support provided  --  --    POSS (Pasero Opioid-Induced Sed Scale) 1 - Awake and alert  -- 1 - Awake and alert  --  --    Sleep/Rest/Relaxation awake appears  asleep awake appears asleep appears asleep       Wound 03/29/18 1052 posterior neck unspecified;other (see comments)    Wound - Properties Group Date first assessed: 03/29/18 Time first assessed: 1052 Present On Admission : yes Orientation: posterior Location: neck Type: unspecified;other (see comments) , Yellow scabbed abrasion        CVC Triple Lumen 03/29/18 Tunneled Right Femoral    CVC Properties Placement Date: 03/29/18 Placement Time: 0230 Hand Hygiene Performed Prior to CVC Insertion: Yes Local Anesthetic: Injectable CVC Line Catheter Size (Fr): 8 Fr CVC Type: Tunneled Orientation: Right Location: Femoral Technique: Guidewire Inserted by: Dr. Blokc Total insertion attempts: 1 Securement Method: Sutured Number of Sutures Placed: 2 Patient Tolerance: Tolerated well Placement Verification: X-ray    Proximal Lumen Status Normal saline locked  --  -- Normal saline locked Normal saline locked    Medial Lumen Status Infusing  --  -- Infusing Normal saline locked    Distal Lumen Status Normal saline locked  --  -- Normal saline locked Normal saline locked       Safety    Safety WDL WDL  --  -- WDL WDL    All Alarms alarm(s) activated and audible  --  -- alarm(s) activated and audible alarm(s) activated and audible    Safety/Security Measures bed alarm set  --  -- bed alarm set bed alarm set       Safety Management    Safety Promotion/Fall Prevention safety round/check completed;fall prevention program maintained  --  -- safety round/check completed;fall prevention program maintained safety round/check completed;fall prevention program maintained    Environmental Safety Modification assistive device/personal items within reach;clutter free environment maintained;room near unit station;room organization consistent  --  -- assistive device/personal items within reach;clutter free environment maintained;room organization consistent;room near unit station  --    Row Name 04/02/18 7995 04/02/18 5087 04/02/18 0773  04/02/18 1826 04/02/18 1600       Pain/Comfort/Sleep    Presence of Pain non-verbal indicators absent denies pain/discomfort denies pain/discomfort denies pain/discomfort  --    Preferred Pain Scale FACES (Chandra-Barbosa FACES Pain Rating Scale) number (Numeric Rating Pain Scale) number (Numeric Rating Pain Scale) number (Numeric Rating Pain Scale)  --    Pain Rating (0-10): Rest  -- 0 0 0  --    Pain Rating (0-10): Activity  --  --  -- 0  --    FACES Pain Rating: Rest 0-->no hurt 0-->no hurt 0-->no hurt  --  --    Pain Management Interventions  --  -- quiet environment facilitated;pillow support provided  --  --    POSS (Pasero Opioid-Induced Sed Scale)  --  -- 2 - Slightly drowsy, easily aroused  --  --    Sleep/Rest/Relaxation sleeping between care awake;no problem identified awake;no problem identified  --  --       Pain/Comfort/Sleep Interventions    Sleep/Rest Enhancement  --  -- awakenings minimized;consistent schedule promoted;regular sleep/rest pattern promoted;relaxation techniques promoted  --  --       Coping/Psychosocial    Observed Emotional State  --  -- cooperative  --  --    Verbalized Emotional State  --  -- acceptance  --  --    Plan of Care Reviewed With  --  -- patient  --  --       Psychosocial Support    Trust Relationship/Rapport  --  -- care explained;thoughts/feelings acknowledged;questions answered;questions encouraged  --  --    Diversional Activities  --  -- television  --  --    Family/Support System Care  --  -- support provided  --  --       Involvement in Care    Family/Support System, Persons  --  -- family  --  --    Involvement in Care  --  -- not present at bedside  --  --       Coping/Psychosocial Interventions    Supportive Measures  --  -- active listening utilized;verbalization of feelings encouraged;relaxation techniques promoted  --  --    Environmental Support  --  -- calm environment promoted  --  --       HEENT    HEENT WDL  --  -- WDL  --  --    Oral Mucosa Symptoms  --  --  intact;moist;pink  --  --       Mouth/Teeth WDL    Mouth/Teeth WDL  --  -- WDL except  --  --    Teeth Symptoms  --  -- no dental appliances present  --  --       Cognitive    Cognitive/Neuro/Behavioral WDL  --  -- WDL  --  --    Level of Consciousness  --  -- Alert  --  --    Arousal Level  --  -- opens eyes spontaneously  --  --    Orientation  --  -- oriented x 4  --  --       Pupils    Pupil PERRLA  --  -- yes  --  --       Toni Coma Scale    Best Eye Response  --  -- 4-->(E4) spontaneous  --  --    Best Motor Response  --  -- 6-->(M6) obeys commands  --  --    Best Verbal Response  --  -- 5-->(V5) oriented  --  --    Toni Coma Scale Score  --  -- 15  --  --       Motor Response    Motor Response  --  -- general motor response  --  --    General Motor Response  --  -- purposeful motor response  --  --       Hand /Ankle Strength    Hand , Left  --  -- moderate  --  --    Hand , Right  --  -- moderate  --  --    Dorsiflexion, Left  --  -- weak  --  --    Dorsiflexion, Right  --  -- weak  --  --    Plantarflexion, Left  --  -- weak  --  --    Plantarflexion, Right  --  -- weak  --  --       Respiratory    Respiratory WDL  --  -- WDL except;cough  --  --    Cough Frequency  --  -- frequent  --  --    Cough Type  --  -- congested  --  --    Cough And Deep Breathing  --  -- done with encouragement  --  --       Breath Sounds    Breath Sounds  --  -- All Fields  --  --    All Lung Fields Breath Sounds  --  -- coarse;wheezes, expiratory  --  --       Oxygen Therapy    Flow (L/min)  --  -- 6  --  --    Device (Oxygen Therapy)  --  -- nasal cannula  --  --       Cardiac    Cardiac WDL  --  -- WDL  --  --    Cardiac Rhythm  --  -- radial pulse regular  --  --    Heart Sounds  --  -- S1, S2  --  --       ECG    Lead Monitored  --  -- Lead II  --  --    Rhythm  --  -- normal sinus rhythm  --  --       Chest Pain Assessment    Chest Pain Location  --  -- --   Denies chest pain at this time  --  --    Rating  (0-10)  --  -- 0  --  --       Peripheral Neurovascular    Peripheral Neurovascular WDL  --  -- WDL  --  --    Capillary Refill, General  --  -- less than/equal to 3 secs  --  --    VTE Prevention/Management  --  -- other (see comments)   heparin subcutaneous  --  --       Neurovascular Assessment    Neurovascular Assessment  --  -- LLE;RLE  --  --    LLE Temperature  --  -- warm  --  --    LLE Color  --  -- sugey  --  --    LLE Sensation  --  -- tingling present  --  --    RLE Temperature  --  -- warm  --  --    RLE Color  --  -- sugey  --  --    RLE Sensation  --  -- tingling present  --  --       Pulse Radial    Left Radial Pulse  --  -- 2+ (normal)  --  --    Right Radial Pulse  --  -- 2+ (normal)  --  --       Pulse Dorsalis Pedis    Left Dorsalis Pedis Pulse  --  -- 1+ (weak)  --  --    Right Dorsalis Pedis Pulse  --  -- 1+ (weak)  --  --       Edema    Edema  --  -- leg, left;leg, right  --  --    Leg, Left Edema  --  -- 1+ (Trace)  --  --    Leg, Right Edema  --  -- 1+ (Trace)  --  --    Foot, Left Edema  --  -- 1+ (Trace)  --  --    Foot, Right Edema  --  -- 1+ (Trace)  --  --       Gastrointestinal    GI WDL  --  -- WDL  --  --    Stool Amount  --  -- --   none to assess at this time  --  --       Nausea/Vomiting    Nausea/Vomiting Signs/Symptoms  --  -- --   denies n/v currently  --  --       Genitourinary    Genitourinary WDL  --  -- WDL except;voiding ability/characteristics  --  --    Voiding Characteristics  --  -- patient on hemodialysis  --  --       Breasts WDL    Breast WDL  --  -- WDL except  --  --    Right Breast Symptoms  --  -- other (see comments)   right mastectomy in 1990's  --  --       Skin    Skin WDL  --  -- WDL except;color;characteristics  --  --    Skin Color/Characteristics  --  -- redness blanchable  --  --    Skin Temperature  --  -- warm  --  --    Skin Moisture  --  -- dry  --  --    Skin Elasticity  --  -- quick return to original state  --  --    Skin Integrity  --  --  scab;abrasion;wound   scabs from scratching, behind neck scabs red blanching heels  --  --       Saul Risk Assessment (If Saul score </= 18, add the appropriate CPG to the care plan)    Sensory Perception  --  -- 3-->slightly limited  --  --    Moisture  --  -- 3-->occasionally moist  --  --    Activity  --  -- 2-->chairfast  --  --    Mobility  --  -- 2-->very limited  --  --    Nutrition  --  -- 2-->probably inadequate  --  --    Friction and Shear  --  -- 2-->potential problem  --  --    Saul Score  --  -- 14  --  --       Wound 03/29/18 1052 posterior neck unspecified;other (see comments)    Wound - Properties Group Date first assessed: 03/29/18 Time first assessed: 1052 Present On Admission : yes Orientation: posterior Location: neck Type: unspecified;other (see comments) , Yellow scabbed abrasion     Dressing Appearance  --  -- dry;intact  --  --    Base  --  -- dry;red/granulating;scab  --  --    Periwound  --  -- redness;blanchable  --  --    Edges  --  -- irregular  --  --    Care, Wound  --  -- cleansed with;sterile normal saline;honey applied  --  --    Dressing Care, Wound  --  -- dressing changed   mepilex  --  --    Periwound Care, Wound  --  -- dry periwound area maintained  --  --       Skin Interventions    Pressure Reduction Devices  --  -- positioning supports utilized;pressure-redistributing mattress utilized  --  --    Pressure Reduction Techniques  --  -- frequent weight shift encouraged;weight shift assistance provided  --  --    Skin Protection  --  -- tubing/devices free from skin contact  --  --       Musculoskeletal    Musculoskeletal WDL  --  -- WDL except  --  --    General Mobility  --  -- generalized weakness;moderately impaired  --  --       Functional Screen (every 3 days/change)    Ambulation  --  -- 3 - assistive equipment and person  --  --    Transferring  --  -- 3 - assistive equipment and person  --  --    Toileting  --  -- 3 - assistive equipment and person  --  --     Bathing  --  -- 2 - assistive person  --  --    Dressing  --  -- 2 - assistive person  --  --    Eating  --  -- 2 - assistive person  --  --    Communication  --  -- 0 - understands/communicates without difficulty  --  --    Swallowing  --  -- 0 - swallows foods/liquids without difficulty  --  --       Nutrition    Diet/Nutrition Received  --  -- consistent carb/diabetic diet   soft textured/ground, thin liquids  --  --    Specialty Diet/Nutrition Received  --  -- renal diet  --  --    Diet/Feeding Assistance  --  -- tray set-up  --  --       Nutrition Interventions    Glycemic Management  --  -- blood glucose monitoring  --  --       CVC Triple Lumen 03/29/18 Tunneled Right Femoral    CVC Properties Placement Date: 03/29/18 Placement Time: 0230 Hand Hygiene Performed Prior to CVC Insertion: Yes Local Anesthetic: Injectable CVC Line Catheter Size (Fr): 8 Fr CVC Type: Tunneled Orientation: Right Location: Femoral Technique: Guidewire Inserted by: Dr. Block Total insertion attempts: 1 Securement Method: Sutured Number of Sutures Placed: 2 Patient Tolerance: Tolerated well Placement Verification: X-ray    Site Assessment  --  -- Clean;Dry;Intact  --  --    Proximal Lumen Status Flushed;Blood return noted;Normal saline locked Flushed;Blood return noted;Normal saline locked Flushed;Blood return noted;Normal saline locked  --  --    Medial Lumen Status Blood return noted;Flushed;Normal saline locked Blood return noted;Flushed;Normal saline locked Blood return noted;Flushed;Normal saline locked  --  --    Distal Lumen Status Blood return noted;Flushed;Normal saline locked Blood return noted;Flushed;Normal saline locked Blood return noted;Flushed;Normal saline locked  --  --    Dressing Type  --  -- Transparent;Securing device  --  --    Dressing Status  --  -- Intact;Dry;Clean  --  --       Hemodialysis AV Access     Hemodialysis AV Access Properties Orientation: Left Access Location: Upper arm Access Type: AV fistula     AV Fistula  --  -- Present;Thrill;Bruit  --  --    Site Assessment  --  -- Clean;Dry;Intact  --  --    Status  --  -- --  --  --       Sepsis Screening Tool    Previous screen positive?  --  -- yes: do not screen  --  --       Safety    Safety WDL WDL WDL WDL  -- WDL except;safety factors    Safety Factors  --  -- ID band on;upper side rails raised x 2;call light in reach;wheels locked;bed in low position  -- ID band on;upper side rails raised x 2;call light in reach;wheels locked;upper side rails raised x 2, lower side rail raised x 1;side rails raised on one side only    All Alarms alarm(s) activated and audible alarm(s) activated and audible alarm(s) activated and audible  -- alarm(s) activated and audible    Safety/Security Measures bed alarm set bed alarm set bed alarm set  -- bed alarm set    Infection Prevention  --  --  --  -- cohorting utilized       [facility] High Risk Falls Assessment (If Fall score is >/=13, add the Fall Risk CPG to the care plan)     Fallen in past 6 months  --  -- 0--> No  --  --    Mental Status  --  -- 0--> no mental status change  --  --    Elimination  --  -- 0--> No elimination issues  --  --    Mobility  --  -- 2--> Requires assistance- transfer, walker, etc.  --  --    Medications  --  -- 1--> Narcotics;1--> Insulin/ Oral hypoglycemic  --  --    Nurses' Clinical Judgement  --  -- 10  --  --    Total Fall Risk Score  --  -- 15  --  --       Safety Management    Safety Promotion/Fall Prevention safety round/check completed;fall prevention program maintained safety round/check completed;fall prevention program maintained safety round/check completed;fall prevention program maintained;nonskid shoes/slippers when out of bed  -- safety round/check completed;nonskid shoes/slippers when out of bed;fall prevention program maintained    Medication Review/Management  --  -- medications reviewed  -- medications reviewed;high risk medications identified    Environmental Safety  Modification assistive device/personal items within reach;clutter free environment maintained;room near unit station  -- assistive device/personal items within reach;clutter free environment maintained;room near unit station  -- assistive device/personal items within reach;clutter free environment maintained;lighting adjusted       Safety Interventions    Bleeding Precautions  --  -- blood pressure closely monitored  --  --    Infection Management  --  -- aseptic technique maintained  --  --    Row Name 04/02/18 1400 04/02/18 1319 04/02/18 1148             Pain/Comfort/Sleep    Presence of Pain  --  -- denies pain/discomfort      Preferred Pain Scale  --  -- number (Numeric Rating Pain Scale)      Pain Rating (0-10): Rest  --  -- 0      Pain Rating (0-10): Activity  --  -- 0         Skin    Skin WDL  -- WDL  --         Wound 03/29/18 1052 posterior neck unspecified;other (see comments)    Wound - Properties Group Date first assessed: 03/29/18 Time first assessed: 1052 Present On Admission : yes Orientation: posterior Location: neck Type: unspecified;other (see comments) , Yellow scabbed abrasion        CVC Triple Lumen 03/29/18 Tunneled Right Femoral    CVC Properties Placement Date: 03/29/18 Placement Time: 0230 Hand Hygiene Performed Prior to CVC Insertion: Yes Local Anesthetic: Injectable CVC Line Catheter Size (Fr): 8 Fr CVC Type: Tunneled Orientation: Right Location: Femoral Technique: Guidewire Inserted by: Dr. Block Total insertion attempts: 1 Securement Method: Sutured Number of Sutures Placed: 2 Patient Tolerance: Tolerated well Placement Verification: X-ray       Safety    Safety WDL WDL except;safety factors  --  --      Safety Factors upper side rails raised x 2, lower side rail raised x 1;side rails raised on one side only;ID band on;upper side rails raised x 2;call light in reach  --  --      All Alarms alarm(s) activated and audible  --  --      Safety/Security Measures bed alarm set  --  --       Infection Prevention environmental surveillance performed  --  --         Safety Management    Safety Promotion/Fall Prevention safety round/check completed;nonskid shoes/slippers when out of bed;fall prevention program maintained  --  --      Medication Review/Management medications reviewed;high risk medications identified  --  --      Environmental Safety Modification assistive device/personal items within reach;clutter free environment maintained;lighting adjusted  --  --            Discharge Summary     No notes of this type exist for this encounter.        Discharge Order     Start     Ordered    04/03/18 1017  Discharge patient  Once     Expected Discharge Date:  04/03/18    Discharge Disposition:  Home or Self Care        04/03/18 1020

## 2018-04-03 NOTE — PROGRESS NOTES
Discharge Planning Assessment   [place]     Patient Name: Sara Cardona  MRN: [id number]  Today's Date: 4/3/2018    Admit Date: 3/28/2018          Discharge Needs Assessment    No documentation.           Discharge Plan     Row Name 04/03/18 1030       Plan    Final Discharge Disposition Code 06 - home with home health care    Final Note Pt to be discharged home on this date with continued [facility].  SS notified [facility] and faxed pt information to agency.  [facility] RN to call report prior to discharge.  No further intervention needed.         Destination     No service coordination in this encounter.      Durable Medical Equipment     No service coordination in this encounter.      Dialysis/Infusion     No service coordination in this encounter.      Home Medical Care     No service coordination in this encounter.      Social Care     No service coordination in this encounter.        Expected Discharge Date and Time     Expected Discharge Date Expected Discharge Time    Apr 3, 2018               Demographic Summary    No documentation.           Functional Status    No documentation.           Psychosocial    No documentation.           Abuse/Neglect    No documentation.           Legal    No documentation.           Substance Abuse    No documentation.           Patient Forms    No documentation.         La Simmons

## 2018-04-03 NOTE — DISCHARGE SUMMARY
Date of Admission: 3/28/2018    Date of Discharge: 4/3/2018     PCP: Marcelino Sheehan MD    Admission Diagnosis:   Please see admission H&P    Discharge Diagnosis:   Septic shock  Bilateral lower extremity cellulitis  Bibasilar pneumonia, HCAP  Metabolic encephalopathy  ESRD on HD  Metabolic acidosis  Diastolic CHF, compensated  COPD, stable with no acute exacerbation  CAD  PVD  DM II, insulin dependent     Procedures Performed:  3/29/18: Right femoral central line placement in the ED     Consults:   Consults     Date and Time Order Name Status Description    3/29/2018 0938 Inpatient General Surgery Consult      3/29/2018 0938 Inpatient Pulmonology Consult Completed     3/29/2018 0746 Inpatient Nephrology Consult              History of Present Illness:  Sara Cardona is a 67 y.o. female who presented to [facility] ED with CC of generalized weakness. Please see admission H&P for complete details.     In the ED, she was confused upon arrival and unable to provide any history. Workup revealed sepsis 2/2 bilateral lower extremity cellulitis. She remained hypotensive despite a fluid bolus and she was started on vasopressors once a central line had been placed. Cultures were obtained and broad spectrum IV antibiotics initiated.      Hospital Course  Sara Cardona was admitted to the CCU for further evaluation and treatment. She was continued on broad spectrum IV antibiotics including Vanc and Zosyn. She was continued on Levophed as well. Pulm/CC, nephrology and general surgery were consulted for further input.     Additional workup revealed bibasilar pneumonia and pulm recommended to continue broad spectrum antibiotics. General surgery recommended to continue treatment with antibiotics in the setting of her PVD but did not recommend any additional intervention. It was recommended that she follow up with vascular surgery upon discharge. Her encephalopathy resolved and her hypotension began improving over the course of a few  days. Nephrology resumed her HD with low flows initially in the setting of hypotension and she tolerated this well. Vasopressor support was weaned off and her BP remained borderline low but overall stable.     Once her shock began resolving she was transferred out of the CCU to the telemetry floor. Her lower extremity edema significantly improved and her respiratory status remained stable. She reported some hand weakness that quickly resolved. CT head was negative for any acute changes. She was seen and examined with MARILY Moralez while in dialysis on 4/3/18. She remained hemodynamically stable with stable routine AM labs. She felt improved from upon admission and was deemed medically stable for discharge after discussion with nephrology and pulmonology. She was prescribed a course of Doxycycline and Omnicef for treatment of her cellulitis and pneumonia. Her home antihypertensives were held upon discharge as her BP remained stable and controlled. Her Plavix was also held as she reported bloody stools prior to admission while on Plavix. She stated she had tolerated ASA without any signs of bleeding. She was instructed to follow up with her PCP in 1 week.     Condition on Discharge:  Stable    Vital Signs  Vitals:    04/03/18 0730   BP: 122/72   Pulse: 80   Resp: 17   Temp: 97.7 °F (36.5 °C)   SpO2:        Physical Exam:  General:    Awake, alert, in no acute distress, chronically ill appearing   Heart:      Normal S1 and S2. Regular rate and rhythm. No significant murmur, rubs or gallops appreciated.   Lungs:     Respirations regular, even and unlabored. Decreased bibasilar breath sounds. No wheezes, rales or rhonchi.   Abdomen:   Soft and nontender. No guarding, rebound tenderness or  organomegaly noted. Bowel sounds present x 4.   Extremities:  Tr LE edema noted with mild erythema (overall much improved). Moves UE and LE equally B/L.     Discharge Disposition:   home      Discharge Medications:   Sara Cardona  Medication Instructions [facility]:[id number]    Printed on:04/03/18 1026   Medication Information                      albuterol (PROVENTIL HFA;VENTOLIN HFA) 108 (90 BASE) MCG/ACT inhaler  Inhale 2 puffs every 6 (six) hours as needed for wheezing.             aspirin 81 MG EC tablet  Take 1 tablet by mouth Daily.             budesonide-formoterol (SYMBICORT) 80-4.5 MCG/ACT inhaler  Inhale 2 puffs 2 (Two) Times a Day.             cefdinir (OMNICEF) 300 MG capsule  Take 1 capsule by mouth Every Other Day for 5 days.             clonazePAM (KlonoPIN) 0.5 MG tablet  Take 0.5 mg by mouth 2 (Two) Times a Day.             doxycycline (VIBRAMYICN) 100 MG tablet  Take 1 tablet by mouth 2 (Two) Times a Day for 5 days.             gabapentin (NEURONTIN) 100 MG capsule  Take 1 capsule by mouth every night at bedtime.             insulin glargine (LANTUS) 100 UNIT/ML injection  Inject 10 Units under the skin Every Night.             nitroglycerin (NITROSTAT) 0.4 MG SL tablet  Place 1 tablet under the tongue Every 5 (Five) Minutes As Needed for Chest Pain. Take no more than 3 doses in 15 minutes.             ranolazine (RANEXA) 500 MG 12 hr tablet  Take 1 tablet by mouth Every 12 (Twelve) Hours.                   Discharge Diet:   Dietary Orders     Start     Ordered    03/29/18 1800  Dietary Nutrition Supplements Nepro  2 Times Daily     Comments:  Lunch and dinner   Question:  Select Supplement  Answer:  Nepro    03/29/18 1152    03/29/18 1206  Diet Soft Texture; Ground; Thin; Renal Dialysis ([facility] Only)  Diet Effective Now     Question Answer Comment   Diet Texture / Consistency Soft Texture    Select Texture: Ground    Fluid Consistency Thin    Common Modifiers Renal Dialysis ([facility] Only)        03/29/18 1206          Activity at Discharge:  activity as tolerated    Follow-up Appointments:  Additional Instructions for the Follow-ups that You Need to Schedule     Discharge Follow-up with PCP    As directed      Follow Up Details:   Follow up with Dr. Sheehan in 1 week.           Follow-up Information     Marcelino Sheehan MD .    Specialty:  Family Medicine  Why:  Follow up with Dr. Sheehan in 1 week.  Contact information:  [address]  [address]  [facility] [address]  [phone]                 Your Scheduled Appointments    Apr 11, 2018  8:00 AM EDT  Full PFT with  COR PULM LAB ROOM  [facility] CARDIOPULMONARY ([place]) [address] [facility] [id number] [phone]   Apr 16, 2018  8:40 AM EDT  Follow Up with MEAGAN Soriano  [facility] MEDICAL [facility] CARDIOLOGY (--) [address]  [facility] [address] [phone]   Arrive 15 minutes prior to appointment.   Sep 05, 2018  1:20 PM EDT  Follow Up with CLARA Cardona  [facility] PULMONOLOGY CRITICAL CARE (--) [address]  [facility] [id number] [phone]   Arrive 15 minutes prior to appointment.            Test Results Pending at Discharge:  None     Fredis Cintron DO  04/03/18  10:20 AM      Time: Greater than 30 minutes spent on this discharge.

## 2018-04-03 NOTE — PROGRESS NOTES
Nephrology Progress Note      Subjective     Seen on HD . Tolerating well    Objective       Vital signs :     Temp:  [97.3 °F (36.3 °C)-98.5 °F (36.9 °C)] 98.5 °F (36.9 °C)  Heart Rate:  [] 101  Resp:  [17-22] 20  BP: (121-151)/() 133/78  FiO2 (%):  [60 %] 60 %    I/O last 3 completed shifts:  In: 770 [P.O.:420; IV Piggyback:350]  Out: -   No intake/output data recorded.    Physical Exam:    General Appearance : awake, no distress  Head  :  normocephalic, without obvious abnormality and atraumatic  Eyes  :   no pallor  Neck  : no JVD   Lungs : clear to auscultation  Heart :  regular rhythm & normal rate, normal S1, S2   Abdomen : normal bowel sounds, soft non-tender   Extremities : 1+ edema  Skin :  Bilateral lower extremity erythema much better  Neurologic :   Grossly no focal deficits  Acess : left arm AVG thrill and bruit +    Laboratory Data :       WBC WBC   Date Value Ref Range Status   04/03/2018 12.85 (H) 4.50 - 12.50 10*3/mm3 Final   04/02/2018 11.07 4.50 - 12.50 10*3/mm3 Final   04/01/2018 10.93 4.50 - 12.50 10*3/mm3 Final      HGB Hemoglobin   Date Value Ref Range Status   04/03/2018 11.2 (L) 12.0 - 16.0 g/dL Final   04/02/2018 11.4 (L) 12.0 - 16.0 g/dL Final   04/01/2018 11.6 (L) 12.0 - 16.0 g/dL Final      HCT Hematocrit   Date Value Ref Range Status   04/03/2018 37.6 37.0 - 47.0 % Final   04/02/2018 37.6 37.0 - 47.0 % Final   04/01/2018 37.7 37.0 - 47.0 % Final      Platlets No results found for: LABPLAT   MCV MCV   Date Value Ref Range Status   04/03/2018 97.2 (H) 80.0 - 94.0 fL Final   04/02/2018 96.4 (H) 80.0 - 94.0 fL Final   04/01/2018 95.9 (H) 80.0 - 94.0 fL Final          Sodium Sodium   Date Value Ref Range Status   04/03/2018 139 135 - 153 mmol/L Final   04/02/2018 137 135 - 153 mmol/L Final   04/01/2018 135 135 - 153 mmol/L Final      Potassium Potassium   Date Value Ref Range Status   04/03/2018 3.5 3.5 - 5.3 mmol/L Final   04/02/2018 3.7 3.5 - 5.3 mmol/L Final   04/01/2018 3.6  3.5 - 5.3 mmol/L Final      Chloride Chloride   Date Value Ref Range Status   04/03/2018 101 99 - 112 mmol/L Final   04/02/2018 100 99 - 112 mmol/L Final   04/01/2018 98 (L) 99 - 112 mmol/L Final      CO2 CO2   Date Value Ref Range Status   04/03/2018 27.7 24.3 - 31.9 mmol/L Final   04/02/2018 27.0 24.3 - 31.9 mmol/L Final   04/01/2018 30.0 24.3 - 31.9 mmol/L Final      BUN BUN   Date Value Ref Range Status   04/03/2018 30 (H) 7 - 21 mg/dL Final   04/02/2018 25 (H) 7 - 21 mg/dL Final   04/01/2018 18 7 - 21 mg/dL Final      Creatinine Creatinine   Date Value Ref Range Status   04/03/2018 3.75 (H) 0.43 - 1.29 mg/dL Final   04/02/2018 3.37 (H) 0.43 - 1.29 mg/dL Final   04/01/2018 2.95 (H) 0.43 - 1.29 mg/dL Final      Calcium Calcium   Date Value Ref Range Status   04/03/2018 8.3 7.7 - 10.0 mg/dL Final   04/02/2018 7.7 7.7 - 10.0 mg/dL Final   04/01/2018 7.7 7.7 - 10.0 mg/dL Final      PO4 No results found for: CAPO4   Albumin Albumin   Date Value Ref Range Status   04/03/2018 3.20 (L) 3.40 - 4.80 g/dL Final   04/02/2018 2.80 (L) 3.40 - 4.80 g/dL Final   04/01/2018 2.80 (L) 3.40 - 4.80 g/dL Final      Magnesium Magnesium   Date Value Ref Range Status   04/01/2018 1.8 1.7 - 2.6 mg/dL Final          PTH               No results found for: PTH      Medications :       aspirin 81 mg Oral Daily   budesonide-formoterol 2 puff Inhalation BID - RT   clonazePAM 0.5 mg Oral BID   guaiFENesin 1,200 mg Oral BID   heparin (porcine) 5,000 Units Subcutaneous Q12H   insulin aspart 0-7 Units Subcutaneous 4x Daily AC & at Bedtime   ipratropium-albuterol 3 mL Nebulization 4x Daily - RT   lactobacillus acidophilus 1 capsule Oral Daily   piperacillin-tazobactam 3.375 g Intravenous Q12H   ranolazine 500 mg Oral Q12H   sodium chloride 10 mL Intracatheter Q12H   Vancomycin Pharmacy Intermittent Dosing  Does not apply Daily            Assessment/Plan     1. ESRD : continue HD TTS    2. Hypotension/shock :  BP stable    3. Hand weakness :  resolved.  Ct scan of head did not show acute pathology    4. Diastolic CHF : compensated    5. Peripheral vascular disease : surgery following     Discussed with patient, RN. Dr Abdulaziz Chavez MD  04/03/18  12:47 PM

## 2018-04-04 NOTE — PAYOR COMM NOTE
"[facility]  NPI: [id number]    Utilization Review   Contact:Maryse FLORIAN, RN  Phone: [phone]  Fax: [phone]    [employer]/ attn: Vielka  Discharge Notification  REF# [id number]  D/c home on 4-3      Sara Cardona (67 y.o. Female)     Date of Birth Social Security Number Address Home Phone MRN    1950  [id number] [address] [phone] [id number]    Rastafarian Marital Status          Tenriism        Admission Date Admission Type Admitting Provider Attending Provider Department, Room/Bed    3/28/18 Emergency Linda Pool MD  [facility] 3 [facility], 3307/1S    Discharge Date Discharge Disposition Discharge Destination        4/3/2018 Home or Self Care              Attending Provider:  (none)   Allergies:  No Known Allergies    Isolation:  None   Infection:  None   Code Status:  Prior    Ht:  154.9 cm (61\")   Wt:  67.4 kg (148 lb 11.2 oz)    Admission Cmt:  None   Principal Problem:  Sepsis affecting skin [A41.9]                 Active Insurance as of 3/28/2018     Primary Coverage     Payor Plan Insurance Group Employer/Plan Group    MEDICARE MEDICARE B ONLY      Payor Plan Address Payor Plan Phone Number Effective From Effective To    [address] [address] [phone] 7/1/2015     [place] [address]       Subscriber Name Subscriber Birth Date Member ID       SARA CARDONA 1950 [id number]           Secondary Coverage     Payor Plan Insurance Group Employer/Plan Group    [employer] [place] [employer] [place]ICAID      Payor Plan Address Payor Plan Phone Number Effective From Effective To    [address] [address] [phone] 6/30/2016     [place] [address]       Subscriber Name Subscriber Birth Date Member ID       SARA CARDONA 1950 [id number]                 Emergency Contacts      (Rel.) Home Phone Work Phone Mobile Phone    DulceÁngel (Spouse) [phone] -- --            "

## 2018-04-06 ENCOUNTER — HOSPITAL ENCOUNTER (OUTPATIENT)
Dept: CARDIOLOGY | Facility: HOSPITAL | Age: 68
Discharge: HOME | End: 2018-04-06
Admitting: PHYSICIAN ASSISTANT

## 2018-04-06 DIAGNOSIS — I65.29 STENOSIS OF CAROTID ARTERY, UNSPECIFIED LATERALITY: ICD-10-CM

## 2018-04-06 DIAGNOSIS — H53.9 VISUAL CHANGES: ICD-10-CM

## 2018-04-06 DIAGNOSIS — I73.9 PERIPHERAL ARTERY DISEASE (HCC): ICD-10-CM

## 2018-04-06 DIAGNOSIS — H53.453 OTHER LOCALIZED VISUAL FIELD DEFECT, BILATERAL: ICD-10-CM

## 2018-04-06 PROCEDURE — 93880 EXTRACRANIAL BILAT STUDY: CPT | Performed by: RADIOLOGY

## 2018-04-06 PROCEDURE — 93880 EXTRACRANIAL BILAT STUDY: CPT

## 2018-04-16 ENCOUNTER — OFFICE VISIT (OUTPATIENT)
Dept: CARDIOLOGY | Facility: CLINIC | Age: 68
End: 2018-04-16

## 2018-04-16 VITALS
RESPIRATION RATE: 16 BRPM | SYSTOLIC BLOOD PRESSURE: 140 MMHG | BODY MASS INDEX: 24.55 KG/M2 | WEIGHT: 130 LBS | HEART RATE: 101 BPM | HEIGHT: 61 IN | DIASTOLIC BLOOD PRESSURE: 81 MMHG

## 2018-04-16 DIAGNOSIS — R07.2 PRECORDIAL PAIN: Primary | ICD-10-CM

## 2018-04-16 DIAGNOSIS — I25.10 ASCVD (ARTERIOSCLEROTIC CARDIOVASCULAR DISEASE): ICD-10-CM

## 2018-04-16 DIAGNOSIS — I65.23 BILATERAL CAROTID ARTERY STENOSIS: ICD-10-CM

## 2018-04-16 DIAGNOSIS — N18.6 ESRD (END STAGE RENAL DISEASE) ON DIALYSIS (HCC): ICD-10-CM

## 2018-04-16 DIAGNOSIS — Z99.2 ESRD (END STAGE RENAL DISEASE) ON DIALYSIS (HCC): ICD-10-CM

## 2018-04-16 DIAGNOSIS — J42 CHRONIC BRONCHITIS, UNSPECIFIED CHRONIC BRONCHITIS TYPE (HCC): ICD-10-CM

## 2018-04-16 DIAGNOSIS — I73.9 PERIPHERAL ARTERIAL DISEASE (HCC): ICD-10-CM

## 2018-04-16 PROCEDURE — 99214 OFFICE O/P EST MOD 30 MIN: CPT | Performed by: PHYSICIAN ASSISTANT

## 2018-04-16 PROCEDURE — 93000 ELECTROCARDIOGRAM COMPLETE: CPT | Performed by: PHYSICIAN ASSISTANT

## 2018-04-16 RX ORDER — CLOPIDOGREL BISULFATE 75 MG/1
75 TABLET ORAL DAILY
Qty: 30 TABLET | Refills: 0 | Status: ON HOLD
Start: 2018-04-16 | End: 2018-06-21

## 2018-04-16 RX ORDER — CEFDINIR 300 MG/1
300 CAPSULE ORAL 2 TIMES DAILY
Status: ON HOLD | COMMUNITY
End: 2018-06-21

## 2018-04-16 RX ORDER — ATORVASTATIN CALCIUM 10 MG/1
10 TABLET, FILM COATED ORAL DAILY
Qty: 30 TABLET | Refills: 5 | Status: SHIPPED | OUTPATIENT
Start: 2018-04-16 | End: 2018-06-15 | Stop reason: SDUPTHER

## 2018-04-16 RX ORDER — DOXYCYCLINE HYCLATE 100 MG/1
100 CAPSULE ORAL 2 TIMES DAILY
Status: ON HOLD | COMMUNITY
End: 2018-06-21

## 2018-04-16 RX ORDER — ISOSORBIDE MONONITRATE 60 MG/1
60 TABLET, EXTENDED RELEASE ORAL DAILY
Qty: 30 TABLET | Refills: 0
Start: 2018-04-16 | End: 2018-05-16

## 2018-04-16 NOTE — PROGRESS NOTES
Marcelino Sheehan MD  Sara Cardona  1950 04/16/2018    Patient Active Problem List   Diagnosis   • Acute renal failure   • Essential hypertension   • Dyslipidemia   • Diabetic neuropathy   • Chronic pain   • Anxiety   • Anemia   • Acute on chronic renal failure   • CHF (congestive heart failure)   • Acute on chronic respiratory failure   • Shortness of breath   • Tobacco use   • STEPHANIE (obstructive sleep apnea)   • Chest pain at rest   • Sepsis affecting skin   • ASCVD (arteriosclerotic cardiovascular disease)   • Peripheral arterial disease   • Bilateral carotid artery stenosis     Dear Marcelino Sheehan MD:    Chief Complaint   Patient presents with   • Follow-up     carotid u/s , recent hosp stay for cellulitis   • Chest Pain     after dialysis   • Edema     s/p renal failure/hemodialysis   • Other     meds, list provided     Subjective     Sara Cardona is a 67 y.o. female with a past medical history significant for recent admission with septic shock with bilateral lower extremity cellulitis as well as hospital-acquired bibasilar pneumonia.  She has end-stage renal disease and is on hemodialysis as well as chronic diastolic congestive heart failure, COPD, insulin-dependent diabetes mellitus, and coronary artery disease noted on previous left heart catheterization in October 2015.  She recently had a carotid doppler on 4/6/18 revealing evidence of hemodynamically significant stenosis in the bilateral internal carotid arteries which was highest on the left and similar to previous exam in 2016. She presents back to the office today for follow-up visit.  She reports complaints of intermittent chest pains that occur with and without exertion.  She has not tried any sublingual nitroglycerin.  She has associated shortness of breath and the pain radiates down both arms.  She has chronic shortness of breath which is stable.  Edema has also been stable.  At her recent discharge, her isosorbide as well as Plavix  were discontinued.  Isosorbide likely discontinued secondary to baseline low blood pressure.  Plavix was discontinued secondary to complaints of bloody stools that admission which resolved with stopping Plavix.  She denies any further rectal bleeding, and less she has to strain to have a bowel movement.  She does notice bleeding, it is mild.  No large amounts of blood appreciated in the stool and no black stools.       Current Outpatient Prescriptions:   •  albuterol (PROVENTIL HFA;VENTOLIN HFA) 108 (90 BASE) MCG/ACT inhaler, Inhale 2 puffs every 6 (six) hours as needed for wheezing. (Patient taking differently: Inhale 2 puffs Every 6 (Six) Hours.), Disp: 3.7 g, Rfl: 0  •  aspirin 81 MG EC tablet, Take 1 tablet by mouth Daily., Disp: 30 tablet, Rfl: 0  •  budesonide-formoterol (SYMBICORT) 80-4.5 MCG/ACT inhaler, Inhale 2 puffs 2 (Two) Times a Day., Disp: , Rfl:   •  cefdinir (OMNICEF) 300 MG capsule, Take 300 mg by mouth 2 (Two) Times a Day., Disp: , Rfl:   •  clonazePAM (KlonoPIN) 0.5 MG tablet, Take 0.5 mg by mouth 2 (Two) Times a Day., Disp: , Rfl:   •  doxycycline (VIBRAMYCIN) 100 MG capsule, Take 100 mg by mouth 2 (Two) Times a Day., Disp: , Rfl:   •  gabapentin (NEURONTIN) 100 MG capsule, Take 1 capsule by mouth every night at bedtime., Disp: 30 capsule, Rfl: 0  •  insulin glargine (LANTUS) 100 UNIT/ML injection, Inject 10 Units under the skin Every Night., Disp: , Rfl:   •  nitroglycerin (NITROSTAT) 0.4 MG SL tablet, Place 1 tablet under the tongue Every 5 (Five) Minutes As Needed for Chest Pain. Take no more than 3 doses in 15 minutes., Disp: 15 tablet, Rfl: 0  •  ranolazine (RANEXA) 500 MG 12 hr tablet, Take 1 tablet by mouth Every 12 (Twelve) Hours., Disp: 60 tablet, Rfl: 0  •  atorvastatin (LIPITOR) 10 MG tablet, Take 1 tablet by mouth Daily., Disp: 30 tablet, Rfl: 5  •  clopidogrel (PLAVIX) 75 MG tablet, Take 1 tablet by mouth Daily., Disp: 30 tablet, Rfl: 0  •  isosorbide mononitrate (IMDUR) 60 MG 24 hr  "tablet, Take 1 tablet by mouth Daily for 30 days., Disp: 30 tablet, Rfl: 0    The following portions of the patient's history were reviewed and updated as appropriate: allergies, current medications, past family history, past medical history, past social history, past surgical history and problem list.    Social History     Social History   • Marital status:      Spouse name: N/A   • Number of children: N/A   • Years of education: N/A     Occupational History   • Not on file.     Social History Main Topics   • Smoking status: Former Smoker     Packs/day: 0.25     Years: 40.00     Types: Cigarettes     Quit date: 02/2018   • Smokeless tobacco: Never Used   • Alcohol use No   • Drug use: No   • Sexual activity: Defer     Other Topics Concern   • Not on file     Social History Narrative   • No narrative on file     Review of Systems   Cardiovascular: Positive for chest pain and leg swelling. Negative for palpitations.   Respiratory: Negative for shortness of breath.      Objective   Blood pressure 140/81, pulse 101, resp. rate 16, height 154.9 cm (61\"), weight 59 kg (130 lb).   Body mass index is 24.56 kg/m².    Physical Exam   Constitutional: She is oriented to person, place, and time. She appears well-developed and well-nourished. No distress.   HENT:   Head: Normocephalic and atraumatic.   Eyes: Conjunctivae are normal. Right eye exhibits no discharge. Left eye exhibits no discharge.   Neck: Normal range of motion. Neck supple. Carotid bruit is present (Left).   Cardiovascular: Normal rate, regular rhythm and normal heart sounds.  Exam reveals no gallop and no friction rub.    No murmur heard.  Decreased pedal pulses bilaterally.    Pulmonary/Chest: Effort normal and breath sounds normal. No respiratory distress. She has no wheezes. She has no rales. She exhibits no tenderness.   Musculoskeletal: Normal range of motion. She exhibits edema (Chronic appearing 1+ edema bilateral lower extremities. ). "   Neurological: She is alert and oriented to person, place, and time.   Skin: Skin is warm and dry. No rash noted. She is not diaphoretic. No erythema. No pallor.   Psychiatric: She has a normal mood and affect. Her behavior is normal.   Nursing note and vitals reviewed.      ECG 12 Lead  Date/Time: 4/16/2018 5:15 PM  Performed by: RAAD TAYLOR  Authorized by: RAAD TAYLOR   Comparison: compared with previous ECG   Similar to previous ECG  Rhythm: sinus rhythm  Rate: normal  BPM: 86  QRS axis: left  Other findings: PRWP        Left heart catheterization 10/15/15     FINDINGS IN DETAIL:   LEFT MAIN CORONARY ARTERY: The left main coronary is a large vessel which  bifurcates into LAD and circumflex arteries. Left main coronary artery is  relatively free of atherosclerotic disease.      LEFT ANTERIOR DESCENDING ARTERY: The left anterior descending artery is a large  vessel which reaches beyond the apex of the left ventricle and gives rise to 2  small diagonal branches. The LAD itself is noted to have a diffuse 70% lesion  in its midportion.      CIRCUMFLEX ARTERY: The circumflex is a large vessel which gives rise to 3  obtuse marginal, the first of which is a large branching vessel and the next 2  which are small. There is a 70% lesion in the proximal portion of the  circumflex artery.      RIGHT CORONARY ARTERY: The right coronary artery is a relatively small vessel  which gives rise to the posterior descending artery and several posterolateral  branches. There is a diffuse 80% lesion noted in the midportion of the  posterior descending artery.      FINAL IMPRESSION AND PLAN: Overall, it is my impression that the patient does  present with native 3-vessel coronary artery disease; however, she has  significant COPD and was unable to lie still on the table during the procedure,  furthermore, her vessels are relatively calcified and it was clear that it  would be a lengthy procedure to try to  revascularize any of her vessels. At  this point in time, I feel that the best course of action would be to try to  optimize her from a medical point of view. If she continues to have anginal  symptoms, it would not be unreasonable to consider percutaneous  revascularization of her LAD and circumflex arteries. She is not a great  surgical candidate.         D:   SF 10/13/2015 08:14:29  T:   hf 10/14/2015 00:03:11  JOB ID:[id number]  [id number]  Revision Count:     0  cc:       Signature:__________________________            Michael Parham MD  DO NOT TEXT EDIT THIS LINE :[id number]:  Authenticated by MICHAEL PARHAM M.D. On 10/15/2015 05:16:38 AM     DUPLEX CAROTID BILATERAL CAR-     HISTORY:  Carotid stenosis, PAD     Today's ultrasound is compared with a previous exam done 02/26/2016.     RIGHT SIDE: The common carotid artery is somewhat tortuous in its  course; there are some superficial plaque in the common carotid. Peak  systolic and diastolic velocities were 76 and 8 cm/s. At the carotid  bifurcation, there is stenosis in the internal carotid artery. Peak  systolic and diastolic velocities were 299 and 37 cm/s. This would  correspond to approximately a 4:1 internal to common ratio. This is not  significantly changed from the previous exam in 2016; vertebral artery  flow is antegrade.     LEFT SIDE: The common carotid artery is normal in caliber. Peak systolic  and diastolic velocities are 55 and 12 cm/s. At the carotid bifurcation,  there was tortuosity and a moderate amount of calcific plaque. The  velocity is markedly elevated in the ICA proximally. Peak systolic  velocity was 456 cm/sec and the diastolic velocity was 52. This would  correspond to an 8.521 internal common ratio. Vertebral artery flow is  antegrade. The velocity was also elevated in the left internal carotid  artery very similar in 2016.     IMPRESSION:  Hemodynamically significant stenoses are demonstrated in  both the internal carotid  arteries. The velocity is highest on the left  where there is a 8.5:1 internal to common ratio. These are very similar  to the exam done in 2016. Both vertebral arteries show antegrade flow.  The findings were called to Dr. Eng     This report was finalized on 4/6/2018 1:40 PM by Dr. Madi Garcia II, MD.       EXAMINATION: CT HEAD WO CONTRAST-      CLINICAL INDICATION:     Neuro deficit(s), subacute; L03.116-Cellulitis  of left lower limb; L03.115-Cellulitis of right lower limb;  L89.90-Pressure ulcer of unspecified site, unspecified stage;  L08.9-Local infection of the skin and subcutaneous tissue, unspecified;  N18.9-Chronic kidney disease, unspecified; I95.9-Hypotension,  unspecified     COMPARISON:    11/14/2017     Technique: Multiple CT axial images were obtained through the level of  the brain without IV contrast administration. Reformatted images in the  coronal and/or sagittal plane(s) were generated from the axial data set  to facilitate diagnostic accuracy and/or surgical planning.     Radiation dose reduction techniques were utilized per ALARA protocol.  Automated exposure control was initiated through either or CareDose or  DoseRight software packages by  protocol.       DOSE (DLP mGy-cm):         FINDINGS:   Moderate chronic small vessel ischemic disease is stable. No  acute intracranial abnormality. No midline shift or mass effect. No  hydrocephalus or intracranial hemorrhage. There is no CT evidence of  acute vascular territory infarct.  Bone windows show no acute osseous  abnormality.     IMPRESSION:  Stable exam. No CT evidence of acute intracranial  abnormality.     This report was finalized on 4/2/2018 3:54 PM by Dr. Junior Angel MD.      Ref Range & Units 3mo ago   Total Cholesterol 0 - 200 mg/dL 130    Triglycerides 0 - 150 mg/dL 80    HDL Cholesterol 60 - 100 mg/dL 54     LDL Cholesterol  0 - 100 mg/dL 60    VLDL Cholesterol mg/dL 16    LDL/HDL Ratio  1.11    Resulting  [facility]     Assessment:        Diagnosis Plan   1. Precordial pain  Stress Test With Myocardial Perfusion (1 Day)    Ambulatory Referral to Cardiovascular Surgery    With some typical and some atypical features of angina.   2. ASCVD (arteriosclerotic cardiovascular disease)  Stress Test With Myocardial Perfusion (1 Day)    Ambulatory Referral to Cardiovascular Surgery    With three-vessel disease appreciated onheart catheterization in October 2015.  Patient felt to be a poor candidate for surgery and difficult PCI candidate.   3. Peripheral arterial disease     4. Bilateral carotid artery stenosis     5. ESRD (end stage renal disease) on dialysis     6. Chronic bronchitis, unspecified chronic bronchitis type          Plan:       1. Continue aspirin and restart Plavix 75mg QD.  Plavix was discontinued during recent admission secondary to complaints of bloody stools.  She states that she usually sees some minimal bleeding with straining to have a bowel movement.  No black stools or major bleeding appreciated.   2. Recheck a CBC, CMP, and magnesium level in 1 week.   3. Since her blood pressure has improved and she has developed recurrent chest pains, restart isosorbide mononitrate 60 mg daily.  4. Due to her complaints of chest pains concerning for angina with known history of significant coronary artery disease, we'll repeat a Lexiscan stress test to evaluate degree of ischemia and consider repeat left heart catheterization if she continues to be symptomatic or severe area of ischemia noted.  This will be if she is able to lie flat which was part of the issue with her last cardiac catheterization in 2015.   5. Due to her hemodynamically significant stenosis in the carotid arteries with recent complaints of blurred vision as well as peripheral arterial disease with monophasic flow in the left lower extremity with complaints of claudication, will refer to Dr. Rafa Field with cardiovascular surgery for  evaluation.  Patient is high risk for intervention due to advanced COPD, known coronary artery disease, end-stage renal disease.   6. Even though her lipids were well controlled in January, will start low-dose atorvastatin 10 mg daily due to her significant peripheral vascular disease as well as coronary artery disease.  7. She already has the Plavix and isosorbide mononitrate at home, I have asked them to call if they start getting low.   8. Avoid QT prolonging drugs.  9. Follow up 3-4 weeks or sooner if needed.    No Follow-up on file.    Patient is high risk due to multiple medical problems including significant coronary artery disease, significant peripheral arterial disease of the lower extremities and carotids, end-stage renal disease on hemodialysis, and advanced COPD.    I appreciate the opportunity to participate in this patient's cardiovascular care.    Best Regards,    Norma Solorzano PA-C

## 2018-04-27 ENCOUNTER — HOSPITAL ENCOUNTER (OUTPATIENT)
Dept: RESPIRATORY THERAPY | Facility: HOSPITAL | Age: 68
Discharge: HOME | End: 2018-04-27
Attending: INTERNAL MEDICINE | Admitting: INTERNAL MEDICINE

## 2018-04-27 DIAGNOSIS — R06.02 SHORTNESS OF BREATH: ICD-10-CM

## 2018-04-27 PROCEDURE — 94727 GAS DIL/WSHOT DETER LNG VOL: CPT | Performed by: INTERNAL MEDICINE

## 2018-04-27 PROCEDURE — 94010 BREATHING CAPACITY TEST: CPT | Performed by: INTERNAL MEDICINE

## 2018-04-27 PROCEDURE — 94727 GAS DIL/WSHOT DETER LNG VOL: CPT

## 2018-04-27 PROCEDURE — 94729 DIFFUSING CAPACITY: CPT | Performed by: INTERNAL MEDICINE

## 2018-04-27 PROCEDURE — 94010 BREATHING CAPACITY TEST: CPT

## 2018-05-01 ENCOUNTER — TRANSCRIBE ORDERS (OUTPATIENT)
Dept: ADMINISTRATIVE | Facility: HOSPITAL | Age: 68
End: 2018-05-01

## 2018-05-01 ENCOUNTER — LAB (OUTPATIENT)
Dept: LAB | Facility: HOSPITAL | Age: 68
End: 2018-05-01
Attending: INTERNAL MEDICINE

## 2018-05-01 DIAGNOSIS — R30.0 DYSURIA: ICD-10-CM

## 2018-05-01 DIAGNOSIS — R30.0 DYSURIA: Primary | ICD-10-CM

## 2018-05-01 PROCEDURE — 87086 URINE CULTURE/COLONY COUNT: CPT

## 2018-05-03 LAB — BACTERIA SPEC AEROBE CULT: NORMAL

## 2018-05-09 ENCOUNTER — APPOINTMENT (OUTPATIENT)
Dept: NUCLEAR MEDICINE | Facility: HOSPITAL | Age: 68
End: 2018-05-09

## 2018-05-09 ENCOUNTER — APPOINTMENT (OUTPATIENT)
Dept: CARDIOLOGY | Facility: HOSPITAL | Age: 68
End: 2018-05-09

## 2018-05-18 ENCOUNTER — HOSPITAL ENCOUNTER (OUTPATIENT)
Dept: CARDIOLOGY | Facility: HOSPITAL | Age: 68
End: 2018-05-18

## 2018-05-18 ENCOUNTER — HOSPITAL ENCOUNTER (OUTPATIENT)
Dept: NUCLEAR MEDICINE | Facility: HOSPITAL | Age: 68
End: 2018-05-18

## 2018-05-18 ENCOUNTER — HOSPITAL ENCOUNTER (OUTPATIENT)
Dept: NUCLEAR MEDICINE | Facility: HOSPITAL | Age: 68
Discharge: HOME | End: 2018-05-18

## 2018-05-18 DIAGNOSIS — R07.2 PRECORDIAL PAIN: ICD-10-CM

## 2018-05-18 DIAGNOSIS — I25.10 ASCVD (ARTERIOSCLEROTIC CARDIOVASCULAR DISEASE): ICD-10-CM

## 2018-05-23 ENCOUNTER — HOSPITAL ENCOUNTER (OUTPATIENT)
Dept: CARDIOLOGY | Facility: HOSPITAL | Age: 68
Discharge: HOME | End: 2018-05-23

## 2018-05-23 ENCOUNTER — HOSPITAL ENCOUNTER (OUTPATIENT)
Dept: NUCLEAR MEDICINE | Facility: HOSPITAL | Age: 68
Discharge: HOME | End: 2018-05-23

## 2018-05-23 LAB
BH CV NUCLEAR PRIOR STUDY: 3
BH CV STRESS BP STAGE 1: NORMAL
BH CV STRESS BP STAGE 2: NORMAL
BH CV STRESS COMMENTS STAGE 1: NORMAL
BH CV STRESS COMMENTS STAGE 2: NORMAL
BH CV STRESS DOSE REGADENOSON STAGE 1: 0.4
BH CV STRESS DURATION MIN STAGE 1: 0
BH CV STRESS DURATION MIN STAGE 2: 4
BH CV STRESS DURATION SEC STAGE 1: 10
BH CV STRESS DURATION SEC STAGE 2: 0
BH CV STRESS HR STAGE 1: 112
BH CV STRESS HR STAGE 2: 106
BH CV STRESS PROTOCOL 1: NORMAL
BH CV STRESS RECOVERY BP: NORMAL MMHG
BH CV STRESS RECOVERY HR: 106 BPM
BH CV STRESS STAGE 1: 1
BH CV STRESS STAGE 2: 2
LV EF NUC BP: 42 %
MAXIMAL PREDICTED HEART RATE: 153 BPM
PERCENT MAX PREDICTED HR: 73.2 %
STRESS BASELINE BP: NORMAL MMHG
STRESS BASELINE HR: 102 BPM
STRESS PERCENT HR: 86 %
STRESS POST PEAK BP: NORMAL MMHG
STRESS POST PEAK HR: 112 BPM
STRESS TARGET HR: 130 BPM

## 2018-05-23 PROCEDURE — 93017 CV STRESS TEST TRACING ONLY: CPT

## 2018-05-23 PROCEDURE — 78452 HT MUSCLE IMAGE SPECT MULT: CPT

## 2018-05-23 PROCEDURE — 25010000002 REGADENOSON 0.4 MG/5ML SOLUTION: Performed by: PHYSICIAN ASSISTANT

## 2018-05-23 PROCEDURE — A9500 TC99M SESTAMIBI: HCPCS | Performed by: PHYSICIAN ASSISTANT

## 2018-05-23 PROCEDURE — 78452 HT MUSCLE IMAGE SPECT MULT: CPT | Performed by: INTERNAL MEDICINE

## 2018-05-23 PROCEDURE — 0 TECHNETIUM SESTAMIBI: Performed by: PHYSICIAN ASSISTANT

## 2018-05-23 PROCEDURE — 25010000002 AMINOPHYLLINE PER 250 MG: Performed by: PHYSICIAN ASSISTANT

## 2018-05-23 PROCEDURE — 93018 CV STRESS TEST I&R ONLY: CPT | Performed by: INTERNAL MEDICINE

## 2018-05-23 RX ORDER — AMINOPHYLLINE DIHYDRATE 25 MG/ML
125 INJECTION, SOLUTION INTRAVENOUS
Status: COMPLETED | OUTPATIENT
Start: 2018-05-23 | End: 2018-05-23

## 2018-05-23 RX ADMIN — TECHNETIUM TC 99M SESTAMIBI 1 DOSE: 1 INJECTION INTRAVENOUS at 08:45

## 2018-05-23 RX ADMIN — AMINOPHYLLINE 75 MG: 25 INJECTION, SOLUTION INTRAVENOUS at 08:55

## 2018-05-23 RX ADMIN — REGADENOSON 0.4 MG: 0.08 INJECTION, SOLUTION INTRAVENOUS at 08:45

## 2018-05-23 RX ADMIN — TECHNETIUM TC 99M SESTAMIBI 1 DOSE: 1 INJECTION INTRAVENOUS at 07:30

## 2018-05-30 ENCOUNTER — OFFICE VISIT (OUTPATIENT)
Dept: CARDIOLOGY | Facility: CLINIC | Age: 68
End: 2018-05-30

## 2018-05-30 ENCOUNTER — OFFICE VISIT (OUTPATIENT)
Dept: CARDIAC SURGERY | Facility: CLINIC | Age: 68
End: 2018-05-30

## 2018-05-30 VITALS
RESPIRATION RATE: 16 BRPM | HEIGHT: 60 IN | WEIGHT: 121 LBS | SYSTOLIC BLOOD PRESSURE: 145 MMHG | HEART RATE: 56 BPM | DIASTOLIC BLOOD PRESSURE: 102 MMHG | BODY MASS INDEX: 23.75 KG/M2

## 2018-05-30 VITALS
DIASTOLIC BLOOD PRESSURE: 72 MMHG | WEIGHT: 121 LBS | SYSTOLIC BLOOD PRESSURE: 132 MMHG | HEART RATE: 89 BPM | HEIGHT: 60 IN | BODY MASS INDEX: 23.75 KG/M2

## 2018-05-30 DIAGNOSIS — I73.9 PERIPHERAL ARTERIAL DISEASE (HCC): ICD-10-CM

## 2018-05-30 DIAGNOSIS — I10 ESSENTIAL HYPERTENSION: ICD-10-CM

## 2018-05-30 DIAGNOSIS — E78.5 DYSLIPIDEMIA: ICD-10-CM

## 2018-05-30 DIAGNOSIS — I25.10 ASCVD (ARTERIOSCLEROTIC CARDIOVASCULAR DISEASE): Primary | ICD-10-CM

## 2018-05-30 DIAGNOSIS — N18.6 END STAGE RENAL DISEASE (HCC): ICD-10-CM

## 2018-05-30 DIAGNOSIS — R94.39 ABNORMAL NUCLEAR STRESS TEST: ICD-10-CM

## 2018-05-30 DIAGNOSIS — I65.23 BILATERAL CAROTID ARTERY STENOSIS: Primary | ICD-10-CM

## 2018-05-30 PROCEDURE — 99203 OFFICE O/P NEW LOW 30 MIN: CPT | Performed by: THORACIC SURGERY (CARDIOTHORACIC VASCULAR SURGERY)

## 2018-05-30 PROCEDURE — 99214 OFFICE O/P EST MOD 30 MIN: CPT | Performed by: INTERNAL MEDICINE

## 2018-05-30 RX ORDER — ISOSORBIDE MONONITRATE 60 MG/1
60 TABLET, EXTENDED RELEASE ORAL EVERY MORNING
Qty: 30 TABLET | Refills: 5 | Status: SHIPPED | OUTPATIENT
Start: 2018-05-30

## 2018-05-30 NOTE — PROGRESS NOTES
Marcelino Sheehan MD  Sara Cardona  1950 05/30/2018    Patient Active Problem List   Diagnosis   • Acute renal failure   • Essential hypertension   • Dyslipidemia   • Diabetic neuropathy   • Chronic pain   • Anxiety   • Anemia   • Acute on chronic renal failure   • CHF (congestive heart failure)   • Acute on chronic respiratory failure   • Shortness of breath   • Tobacco use   • STEPHANIE (obstructive sleep apnea)   • Chest pain at rest   • Sepsis affecting skin   • ASCVD (arteriosclerotic cardiovascular disease) with 70% stenosis of the LAD, left circumflex and about 80% stenosis in the mid PDA on coronary angiography in October 2015.   • Peripheral arterial disease   • Bilateral carotid artery stenosis   • End stage renal disease, on hemodialysis Tuesdays, Thursdays and Saturdays.       Dear Marcelino Sheehan MD:    Andrey Cardona is a 67 y.o. female with the problems as listed above, presents    Chief complaint: Follow-up of chest pains and recent abnormal nuclear stress test.    History of Present Illness:  is a pleasant 67-year-old  female with history of known coronary artery disease with cardiac catheterization in October 2015 by Dr. Parham revealing apparently 70% stenosis in the LAD, 70% stenosis in the proximal circumflex and about 80% stenosis in the mid posterior descending artery branch.  She was left on medical therapy at that time due to calcification of the lesions and was thought to be a technically difficult situation to do intervention at that time.  Patient has been on aspirin, Plavix, ranolazine and atorvastatin.  Lately she's been having recurrent episodes of chest pains which she describes as dull pains across her chest starting on the right side of her chest.  These seem to occur with no relation to exertion and not relieved with sublingual nitroglycerin.  She is a chronic dialysis patient with dialysis on Tuesday, Thursdays and Saturdays.  She has a  long history of smoking up to 2 packs a day but has reportedly quit since October 2017.  She had a recent abnormal nuclear stress test on 5/23/2018 that revealed a medium size, mild degree of ischemia noted in the inferior wall, lateral wall and LV apex.  She has dyspnea with mild exertion with no PND or orthopnea.    No Known Allergies:      Current Outpatient Prescriptions:   •  albuterol (PROVENTIL HFA;VENTOLIN HFA) 108 (90 BASE) MCG/ACT inhaler, Inhale 2 puffs every 6 (six) hours as needed for wheezing. (Patient taking differently: Inhale 2 puffs Every 6 (Six) Hours.), Disp: 3.7 g, Rfl: 0  •  aspirin 81 MG EC tablet, Take 1 tablet by mouth Daily., Disp: 30 tablet, Rfl: 0  •  atorvastatin (LIPITOR) 10 MG tablet, Take 1 tablet by mouth Daily., Disp: 30 tablet, Rfl: 5  •  budesonide-formoterol (SYMBICORT) 80-4.5 MCG/ACT inhaler, Inhale 2 puffs 2 (Two) Times a Day., Disp: , Rfl:   •  cefdinir (OMNICEF) 300 MG capsule, Take 300 mg by mouth 2 (Two) Times a Day., Disp: , Rfl:   •  clonazePAM (KlonoPIN) 0.5 MG tablet, Take 0.5 mg by mouth 2 (Two) Times a Day., Disp: , Rfl:   •  clopidogrel (PLAVIX) 75 MG tablet, Take 1 tablet by mouth Daily., Disp: 30 tablet, Rfl: 0  •  doxycycline (VIBRAMYCIN) 100 MG capsule, Take 100 mg by mouth 2 (Two) Times a Day., Disp: , Rfl:   •  gabapentin (NEURONTIN) 100 MG capsule, Take 1 capsule by mouth every night at bedtime., Disp: 30 capsule, Rfl: 0  •  insulin glargine (LANTUS) 100 UNIT/ML injection, Inject 10 Units under the skin Every Night., Disp: , Rfl:   •  nitroglycerin (NITROSTAT) 0.4 MG SL tablet, Place 1 tablet under the tongue Every 5 (Five) Minutes As Needed for Chest Pain. Take no more than 3 doses in 15 minutes., Disp: 15 tablet, Rfl: 0  •  ranolazine (RANEXA) 500 MG 12 hr tablet, Take 1 tablet by mouth Every 12 (Twelve) Hours., Disp: 60 tablet, Rfl: 0  •  isosorbide mononitrate (IMDUR) 60 MG 24 hr tablet, Take 1 tablet by mouth Every Morning., Disp: 30 tablet, Rfl:  "5      The following portions of the patient's history were reviewed and updated as appropriate: allergies, current medications, past family history, past medical history, past social history, past surgical history and problem list.    Social History   Substance Use Topics   • Smoking status: Former Smoker     Packs/day: 0.25     Years: 40.00     Types: Cigarettes     Quit date: 02/2018   • Smokeless tobacco: Never Used   • Alcohol use No       Review of Systems   Cardiovascular: Positive for chest pain and leg swelling. Negative for palpitations and syncope.   Respiratory: Negative for shortness of breath.    Neurological: Negative for dizziness.       Objective   Vitals:    05/30/18 1328   BP: (!) 145/102   BP Location: Right arm   Pulse: 56   Resp: 16   Weight: 54.9 kg (121 lb)   Height: 152.4 cm (60\")     Body mass index is 23.63 kg/m².        Physical Exam   Constitutional: She is oriented to person, place, and time. She appears well-developed and well-nourished.   HENT:   Mouth/Throat: Oropharynx is clear and moist.   Eyes: EOM are normal. Pupils are equal, round, and reactive to light.   Neck: Neck supple. No JVD present. No tracheal deviation present. No thyromegaly present.   Cardiovascular: Normal rate, regular rhythm, S1 normal and S2 normal.  Exam reveals no gallop and no friction rub.    No murmur heard.  She has AV fistula and is in her left arm.   Pulmonary/Chest: Effort normal and breath sounds normal.   Abdominal: Soft. Bowel sounds are normal. She exhibits no mass. There is no tenderness.   Musculoskeletal: Normal range of motion. She exhibits no edema.   Lymphadenopathy:     She has no cervical adenopathy.   Neurological: She is alert and oriented to person, place, and time.   Skin: Skin is warm and dry. No rash noted.   Psychiatric: She has a normal mood and affect.       Lab Results   Component Value Date     04/03/2018    K 3.5 04/03/2018     04/03/2018    CO2 27.7 04/03/2018    BUN " 30 (H) 2018    CREATININE 3.75 (H) 2018    GLUCOSE 93 2018    CALCIUM 8.3 2018    AST 13 2018    ALT 8 (L) 2018    ALKPHOS 100 2018    LABIL2 0.9 (L) 2018     Lab Results   Component Value Date    CKTOTAL 109 2018     Lab Results   Component Value Date    WBC 12.85 (H) 2018    HGB 11.2 (L) 2018    HCT 37.6 2018     2018     Lab Results   Component Value Date    INR 1.08 2018    INR 1.09 2017    INR 1.08 2017     Lab Results   Component Value Date    MG 1.8 2018     Lab Results   Component Value Date    TSH 1.100 2018    CHLPL 152 10/09/2015    TRIG 80 2018    HDL 54 (L) 2018    LDL 60 2018      Lab Results   Component Value Date    BNP 2,101.0 (H) 2018     Sara Cardona   Regadenoson Stress Test With Myocardial Perfusion SPECT (Multi Study)   Order# [id number]   Reading physician:   Erick Eng MD Ordering physician:   MEAGAN Soriano Study date: 18   Patient Information     Patient Name  Sara Cardona MRN  [id number] Sex  Female  (Age)  1950 (67 y.o.)   Interpretation Summary     · Findings consistent with a normal ECG stress test.  · Myocardial perfusion imaging indicates a medium-sized, mild degree of ischemia located in the apex, inferior wall and septal wall.  · Normal LV cavity size. Abnormal LV wall motion consistent with mild to moderate hypokinesis.  · Left ventricular ejection fraction is mildly reduced (Calculated EF = 42%).  · Impressions are consistent with an intermediate risk study.        Cardiac catheterization in 2015 revealed:  FINDINGS IN DETAIL:   LEFT MAIN CORONARY ARTERY: The left main coronary is a large vessel which  bifurcates into LAD and circumflex arteries. Left main coronary artery is  relatively free of atherosclerotic disease.      LEFT ANTERIOR DESCENDING ARTERY: The left anterior descending artery is a  large  vessel which reaches beyond the apex of the left ventricle and gives rise to 2  small diagonal branches. The LAD itself is noted to have a diffuse 70% lesion  in its midportion.      CIRCUMFLEX ARTERY: The circumflex is a large vessel which gives rise to 3  obtuse marginal, the first of which is a large branching vessel and the next 2  which are small. There is a 70% lesion in the proximal portion of the  circumflex artery.      RIGHT CORONARY ARTERY: The right coronary artery is a relatively small vessel  which gives rise to the posterior descending artery and several posterolateral  branches. There is a diffuse 80% lesion noted in the midportion of the  posterior descending artery.      FINAL IMPRESSION AND PLAN: Overall, it is my impression that the patient does  present with native 3-vessel coronary artery disease; however, she has  significant COPD and was unable to lie still on the table during the procedure,  furthermore, her vessels are relatively calcified and it was clear that it  would be a lengthy procedure to try to revascularize any of her vessels. At  this point in time, I feel that the best course of action would be to try to  optimize her from a medical point of view. If she continues to have anginal  symptoms, it would not be unreasonable to consider percutaneous  revascularization of her LAD and circumflex arteries. She is not a great  surgical candidate.          D:   SF 10/13/2015 08:14:29  T:   hf 10/14/2015 00:03:11      Procedures      Assessment/Plan    Diagnosis Plan   1. ASCVD (arteriosclerotic cardiovascular disease) with 70% stenosis of the LAD, left circumflex and about 80% stenosis in the mid PDA on coronary angiography in October 2015.  Ambulatory Referral to Cardiology   2.  Abnormal nuclear stress test with medium size inferior, lateral and apical myocardial ischemia  Ambulatory Referral to Interventional cardiology   3. Peripheral arterial disease     4. End stage renal  disease, on hemodialysis Tuesdays, Thursdays and Saturdays.     5. Dyslipidemia     6. Essential hypertension with elevated blood pressures.        Recommendations:  1.  I've discussed the results of the nuclear stress test with the patient.  2. In view of her recurrent episodes of class III angina and previously known coronary artery disease and recent abnormal nuclear stress test, I think she would need further coronary intervention.  Will refer her to interventional cardiologist for further cardiac evaluation and intervention is feasible.  3. Would continue with aspirin, Plavix, ranolazine and atorvastatin.  4. Add Imdur 60 mg daily.  5. She states her blood pressure usually runs normal and she takes hydralazine for her hypertension.  Hence we will not add any other antihypertensives at this time and have her monitor her blood pressure at home and follow-up with her PCP or nephrologist for further adjustment of her antihypertensive medications.    Return in about 4 weeks (around 6/27/2018).    As always, I appreciate very much the opportunity to participate in the cardiovascular care of your patients.      With Best Regards,    Erick Eng MD, [facility]    Dragon disclaimer:  Much of this encounter note is an electronic transcription/translation of spoken language to printed text. The electronic translation of spoken language may permit erroneous, or at times, nonsensical words or phrases to be inadvertently transcribed; Although I have reviewed the note for such errors, some may still exist.

## 2018-06-13 ENCOUNTER — TELEPHONE (OUTPATIENT)
Dept: CARDIAC SURGERY | Facility: CLINIC | Age: 68
End: 2018-06-13

## 2018-06-13 NOTE — TELEPHONE ENCOUNTER
S/w pt and confirmed she is on hemo-dialysis , we will coordinate scan after her appointment with the interventional cardiologist regarding her abnormal stress test on 6/18.

## 2018-06-15 RX ORDER — ATORVASTATIN CALCIUM 10 MG/1
10 TABLET, FILM COATED ORAL DAILY
Qty: 90 TABLET | Refills: 0 | Status: SHIPPED | OUTPATIENT
Start: 2018-06-15

## 2018-06-18 ENCOUNTER — LAB (OUTPATIENT)
Dept: LAB | Facility: HOSPITAL | Age: 68
End: 2018-06-18

## 2018-06-18 ENCOUNTER — OFFICE VISIT (OUTPATIENT)
Dept: CARDIOLOGY | Facility: CLINIC | Age: 68
End: 2018-06-18

## 2018-06-18 VITALS
BODY MASS INDEX: 22.64 KG/M2 | SYSTOLIC BLOOD PRESSURE: 128 MMHG | HEART RATE: 102 BPM | OXYGEN SATURATION: 96 % | HEIGHT: 61 IN | DIASTOLIC BLOOD PRESSURE: 78 MMHG | WEIGHT: 119.9 LBS

## 2018-06-18 DIAGNOSIS — I50.32 CHRONIC DIASTOLIC CONGESTIVE HEART FAILURE (HCC): ICD-10-CM

## 2018-06-18 DIAGNOSIS — I25.10 ASCVD (ARTERIOSCLEROTIC CARDIOVASCULAR DISEASE): ICD-10-CM

## 2018-06-18 DIAGNOSIS — I65.23 BILATERAL CAROTID ARTERY STENOSIS: ICD-10-CM

## 2018-06-18 DIAGNOSIS — I73.9 PERIPHERAL ARTERIAL DISEASE (HCC): ICD-10-CM

## 2018-06-18 DIAGNOSIS — I10 ESSENTIAL HYPERTENSION: Primary | ICD-10-CM

## 2018-06-18 LAB
ALBUMIN SERPL-MCNC: 3.5 G/DL (ref 3.4–4.8)
ALBUMIN/GLOB SERPL: 0.8 G/DL (ref 1.5–2.5)
ALP SERPL-CCNC: 173 U/L (ref 35–104)
ALT SERPL W P-5'-P-CCNC: 5 U/L (ref 10–36)
ANION GAP SERPL CALCULATED.3IONS-SCNC: 9.6 MMOL/L (ref 3.6–11.2)
AST SERPL-CCNC: 16 U/L (ref 10–30)
BILIRUB SERPL-MCNC: 0.3 MG/DL (ref 0.2–1.8)
BUN BLD-MCNC: 32 MG/DL (ref 7–21)
BUN/CREAT SERPL: 8.2 (ref 7–25)
CALCIUM SPEC-SCNC: 9.4 MG/DL (ref 7.7–10)
CHLORIDE SERPL-SCNC: 101 MMOL/L (ref 99–112)
CO2 SERPL-SCNC: 23.4 MMOL/L (ref 24.3–31.9)
CREAT BLD-MCNC: 3.89 MG/DL (ref 0.43–1.29)
DEPRECATED RDW RBC AUTO: 57.2 FL (ref 37–54)
ERYTHROCYTE [DISTWIDTH] IN BLOOD BY AUTOMATED COUNT: 16.8 % (ref 11.5–14.5)
GFR SERPL CREATININE-BSD FRML MDRD: 12 ML/MIN/1.73
GLOBULIN UR ELPH-MCNC: 4.5 GM/DL
GLUCOSE BLD-MCNC: 233 MG/DL (ref 70–110)
HCT VFR BLD AUTO: 39.2 % (ref 37–47)
HGB BLD-MCNC: 11.9 G/DL (ref 12–16)
MCH RBC QN AUTO: 29.5 PG (ref 27–33)
MCHC RBC AUTO-ENTMCNC: 30.4 G/DL (ref 33–37)
MCV RBC AUTO: 97 FL (ref 80–94)
OSMOLALITY SERPL CALC.SUM OF ELEC: 282.6 MOSM/KG (ref 273–305)
PLATELET # BLD AUTO: 246 10*3/MM3 (ref 130–400)
PMV BLD AUTO: 10.3 FL (ref 6–10)
POTASSIUM BLD-SCNC: 4.3 MMOL/L (ref 3.5–5.3)
PROT SERPL-MCNC: 8 G/DL (ref 6–8)
RBC # BLD AUTO: 4.04 10*6/MM3 (ref 4.2–5.4)
SODIUM BLD-SCNC: 134 MMOL/L (ref 135–153)
WBC NRBC COR # BLD: 12.84 10*3/MM3 (ref 4.5–12.5)

## 2018-06-18 PROCEDURE — 85027 COMPLETE CBC AUTOMATED: CPT

## 2018-06-18 PROCEDURE — 80053 COMPREHEN METABOLIC PANEL: CPT

## 2018-06-18 PROCEDURE — 36415 COLL VENOUS BLD VENIPUNCTURE: CPT

## 2018-06-18 PROCEDURE — 99215 OFFICE O/P EST HI 40 MIN: CPT | Performed by: INTERNAL MEDICINE

## 2018-06-18 NOTE — PROGRESS NOTES
[facility] CARDIOLOGY  [address] [facility] [address]  [place] [address]  Phone: [phone]  Fax: [phone]    06/18/2018    Chief Complaint: New Patient, Abnormal stress test    History:   Sara Cardona is a 67 y.o. female seen in consultation, referred by Dr.Pramod SHIREEN Eng MD  for abnormal stress test.  Ms Cardona has a past medical history significant for ASCVD with 70% stenosis of the LAD, left circumflex and about 80% stenosis in the mid PDA on coronary angiography in October 2015, congestive heart failure, essential hypertension, peripheral arterial disease, bilateral carotid artery stenosis, diabetes, end-stage renal disease sees on hemodialysis Tuesdays Thursdays and Saturdays, and obstructive sleep apnea.    Ms. Hardin does report chest pain during and after dialysis, occurring 3-4 times weekly.  Generally pain is relieved with nitroglycerin, however often takes 2-3 doses to completely relieve the pain.  She also reports bilateral lower extremity swelling with cellulitis.    Past Medical History:   Diagnosis Date   • Angina at rest    • Anxiety    • Aortic stenosis    • ASCVD (arteriosclerotic cardiovascular disease)    • Breast cancer    • CHF (congestive heart failure) 7/24/2017   • Chronic pain    • COPD (chronic obstructive pulmonary disease)    • Diabetic neuropathy    • Dyslipidemia    • Essential hypertension    • Insulin dependent diabetes mellitus    • Left carotid bruit    • Memory deficits    • Recurrent pneumonia    • Renal failure        Review of Systems:  Please see HPI  Constitution: No chills, no rigors, no unexplained weight loss or weight gain  Eyes:  No diplopia, no blurred vision, no loss of vision, conjunctiva is pink and sclera is anicteric  ENT:  No tinnitus, no otorrhea, no epistaxis, no sore throat   Respiratory: No cough, no hemoptysis  Cardiovascular: see HPI  Gastrointestinal: No nausea, no vomiting, no hematemesis, no diarrhea or constipation, no  melena  Genitourinary: No frequency of dysuria no hematuria  Integument: No pruritis and  no skin rash  Hematologic / Lymphatic: No excessive bleeding, easy bruising, fatigue, lymphadenopathy and petechiae  Musculoskeletal: No joint pain, joint stiffness, joint swelling, muscle pain, muscle weakness and neck pain  Neurological: No dizziness, headaches, light headedness, seizures and vertigo  Endocrine: No frequent urination and nocturia, temperature intolerance, weight gain, unintended and weight loss, unintended        Past Social History:  Social History     Social History   • Marital status:    • Number of children: 2     Occupational History   • Retired       and      Social History Main Topics   • Smoking status: Former Smoker     Packs/day: 0.25     Years: 40.00     Types: Cigarettes     Quit date: 02/2018   • Smokeless tobacco: Never Used   • Alcohol use No   • Drug use: No   • Sexual activity: Defer     Other Topics Concern   • Not on file       Past Family History:  Family History   Problem Relation Age of Onset   • Diabetes Mother    • Lung cancer Father    • Heart attack Father        Current Outpatient Prescriptions on File Prior to Visit   Medication Sig Dispense Refill   • albuterol (PROVENTIL HFA;VENTOLIN HFA) 108 (90 BASE) MCG/ACT inhaler Inhale 2 puffs every 6 (six) hours as needed for wheezing. (Patient taking differently: Inhale 2 puffs Every 6 (Six) Hours.) 3.7 g 0   • aspirin 81 MG EC tablet Take 1 tablet by mouth Daily. 30 tablet 0   • atorvastatin (LIPITOR) 10 MG tablet Take 1 tablet by mouth Daily. 90 tablet 0   • budesonide-formoterol (SYMBICORT) 80-4.5 MCG/ACT inhaler Inhale 2 puffs 2 (Two) Times a Day.     • clonazePAM (KlonoPIN) 0.5 MG tablet Take 0.5 mg by mouth 2 (Two) Times a Day.     • clopidogrel (PLAVIX) 75 MG tablet Take 1 tablet by mouth Daily. 30 tablet 0   • gabapentin (NEURONTIN) 100 MG capsule Take 1 capsule by mouth every night at bedtime. 30 capsule  0   • isosorbide mononitrate (IMDUR) 60 MG 24 hr tablet Take 1 tablet by mouth Every Morning. 30 tablet 5   • nitroglycerin (NITROSTAT) 0.4 MG SL tablet Place 1 tablet under the tongue Every 5 (Five) Minutes As Needed for Chest Pain. Take no more than 3 doses in 15 minutes. 15 tablet 0   • ranolazine (RANEXA) 500 MG 12 hr tablet Take 1 tablet by mouth Every 12 (Twelve) Hours. 60 tablet 0   • cefdinir (OMNICEF) 300 MG capsule Take 300 mg by mouth 2 (Two) Times a Day.     • doxycycline (VIBRAMYCIN) 100 MG capsule Take 100 mg by mouth 2 (Two) Times a Day.     • insulin glargine (LANTUS) 100 UNIT/ML injection Inject 10 Units under the skin Every Night.       No current facility-administered medications on file prior to visit.        No Known Allergies    Objective:  Vitals:    06/18/18 1548   BP: 128/78   Pulse: 102   SpO2: 96%         Comfortable NAD  PERRL, conjunctiva clear  Neck supple, no JVD or thyromegaly appreciated  S1/S2 RRR, no m/r/g  Lungs CTA B, normal effort  Abdomen S/NT/ND (+) BS, no HSM appreciated  Extremities:  Left lower extremity with 1-2+ edema, skin taut, evidence of 4 healed ulcers, no palpable pulses, poor reflex capillary refill; right lower extremity with 1-2+ edema, skin taut, no evidence of ulcers, no palpable pulses, poor reflex capillary refill.  Gait:  Pt wheelchair bound  No visible or palpable skin lesions  A/Ox4, mood and affect appropriate  Pulse exam: Vince's: not done    DATA:       Results for orders placed during the hospital encounter of 03/28/18   Adult Transthoracic Echo Complete W/ Cont if Necessary Per Protocol    Addendum · Left ventricular wall thickness is consistent with mild concentric  hypertrophy. · Left ventricular systolic function is normal. Estimated EF appears to be  in the range of 56 - 60% · Left ventricular diastolic dysfunction (grade II) consistent with  pseudonormalization. · All left ventricular wall segments contract normally · Left atrial cavity size is  mildly dilated. · There is mild calcification of the aortic valve mainly affecting the  non, left and right coronary cusp(s).No evidence of aortic stenosis or  regurgitation. · Mild mitral valve regurgitation is present · Mild to moderate tricuspid valve regurgitation is present. · Present. Severe pulmonary hypertension is present. RV systolic pressure  = 70 mm Hg. · There is no evidence of pericardial effusion        Julian Carroll MD 3/31/2018  8:26 AM          Narrative · Left ventricular wall thickness is consistent with mild concentric   hypertrophy.  · Left ventricular systolic function is normal. Estimated EF appears to be   in the range of 56 - 60%  · Left ventricular diastolic dysfunction (grade I) consistent with   impaired relaxation.  · All left ventricular wall segments contract normally  · Left atrial cavity size is mildly dilated.  · There is mild calcification of the aortic valve mainly affecting the   non, left and right coronary cusp(s).No evidence of aortic stenosis or   regurgitation.  · Mild mitral valve regurgitation is present  · Mild to moderate tricuspid valve regurgitation is present.  · Present. Severe pulmonary hypertension is present. RV systolic pressure   = 70 mm Hg.  · There is no evidence of pericardial effusion          Results for orders placed during the hospital encounter of 05/18/18   Stress Test With Myocardial Perfusion (1 Day)    Narrative · Findings consistent with a normal ECG stress test.  · Myocardial perfusion imaging indicates a medium-sized, mild degree of   ischemia located in the apex, inferior wall and septal wall.  · Normal LV cavity size. Abnormal LV wall motion consistent with mild to   moderate hypokinesis.  · Left ventricular ejection fraction is mildly reduced (Calculated EF =   42%).  · Impressions are consistent with an intermediate risk study.          Results for orders placed during the hospital encounter of 05/18/18   Stress Test With Myocardial  Perfusion (1 Day)    Narrative · Findings consistent with a normal ECG stress test.  · Myocardial perfusion imaging indicates a medium-sized, mild degree of   ischemia located in the apex, inferior wall and septal wall.  · Normal LV cavity size. Abnormal LV wall motion consistent with mild to   moderate hypokinesis.  · Left ventricular ejection fraction is mildly reduced (Calculated EF =   42%).  · Impressions are consistent with an intermediate risk study.          Results for orders placed during the hospital encounter of 10/09/15   Cardiac catheterization    Narrative [place], [place] [address]  [phone]    NAME:                    IRINA BOX  ROOM NUMBER:             [id number]  MEDICAL RECORD NUMBER:   [id number]  VISIT NUMBER:            [id number]  PHYSICIAN:                    Nathan Parham MD  PRIMARY CARE PROVIDER:     DATE OF PROCEDURE:           YOB: 1950    FINAL IMPRESSION:   1.  Right dominant coronary artery system with native 2 vessel disease  involving the LAD and circumflex arteries.   2.  Native 3-vessel disease involving the LAD, circumflex and distal right  coronary artery.     PROCEDURE PERFORMED:  Coronary angiography.     HISTORY OF PRESENT ILLNESS: The patient is a pleasant 65-year-old woman who  presents with acute onset shortness of breath, and heart failure symptoms. She  was also subsequently noted to have non-ST-elevation myocardial infarction and  was referred for a cardiac catheterization. Prior to the procedure, the patient  was given informed consent apprising her of the risk of heart attack, stroke,  and death. The patient understood these risks and agreed to proceed.     PROCEDURE IN DETAIL: The patient was brought to the cardiac catheterization  laboratory and prepped and draped in the usual sterile fashion. Adequate  anesthesia was obtained by infiltrating the right groin with local lidocaine.  Using a modified  Seldinger technique, a 4-Emirati sheath was placed in the right  femoral artery. A 4-Emirati JL-4 catheter was used to engage the ostium of the  left main coronary artery. Angiography was performed in varying views using  hand injection of contrast material. After this, a 4-Emirati JR-4 catheter was  used to engage the ostium of the right coronary artery. Angiography was again  performed in varying views using hand injection of contrast material. At the  conclusion of the procedure, the patient was returned to the luna without  evidence of complication.     FINDINGS IN DETAIL:   LEFT MAIN CORONARY ARTERY: The left main coronary is a large vessel which  bifurcates into LAD and circumflex arteries. Left main coronary artery is  relatively free of atherosclerotic disease.     LEFT ANTERIOR DESCENDING ARTERY: The left anterior descending artery is a large  vessel which reaches beyond the apex of the left ventricle and gives rise to 2  small diagonal branches. The LAD itself is noted to have a diffuse 70% lesion  in its midportion.     CIRCUMFLEX ARTERY: The circumflex is a large vessel which gives rise to 3  obtuse marginal, the first of which is a large branching vessel and the next 2  which are small. There is a 70% lesion in the proximal portion of the  circumflex artery.     RIGHT CORONARY ARTERY: The right coronary artery is a relatively small vessel  which gives rise to the posterior descending artery and several posterolateral  branches. There is a diffuse 80% lesion noted in the midportion of the  posterior descending artery.     FINAL IMPRESSION AND PLAN: Overall, it is my impression that the patient does  present with native 3-vessel coronary artery disease; however, she has  significant COPD and was unable to lie still on the table during the procedure,  furthermore, her vessels are relatively calcified and it was clear that it  would be a lengthy procedure to try to revascularize any of her vessels. At  this  point in time, I feel that the best course of action would be to try to  optimize her from a medical point of view. If she continues to have anginal  symptoms, it would not be unreasonable to consider percutaneous  revascularization of her LAD and circumflex arteries. She is not a great  surgical candidate.         D:   SF 10/13/2015 08:14:29  T:   hf 10/14/2015 00:03:11  JOB ID:[id number]  [id number]  Revision Count:     0  cc:      Signature:__________________________            Michael Parham MD  DO NOT TEXT EDIT THIS LINE :[id number]:  Authenticated by MICHAEL PARHAM M.D. On 10/15/2015 05:16:38 AM        Procedures     A/P:    Abnormal stress test with known coronary artery disease, per cardiac catheterization in 2015: We'll proceed with cardiac catheterization with right femoral approach.  Risks including hematoma, hemorrhage, MI, death and benefits of catheterization discussed with the patient.  She elected to proceed.  We'll plan on doing limited bilateral lower extremity angiogram during catheterization.    She has angina during each dialysis and I did note the difficult anatomy as per her previous cardiac cath report. She will be higher risk than usual.    Thank you for allowing me to participate in the care of Sara Cardona. Feel free to contact me directly with any further questions or concerns.

## 2018-06-18 NOTE — PROGRESS NOTES
Subjective     Chief Complaint: Congestive Heart Failure; ASCVD; Hypertension; and ABNORMAL STRESS TEST    History of Present Illness   Sara Cardona is a 67 y.o. female who presents   On dialysis three days a week, insulin requiring diabetes,   R LEG PAIN  Greater than left.  Left with erythema and non l    Chest pain with dialysis for the last year.  Nitro for the pain  Activity:  Smoking:  Diet:  Medication compliance:      Current Outpatient Prescriptions:   •  albuterol (PROVENTIL HFA;VENTOLIN HFA) 108 (90 BASE) MCG/ACT inhaler, Inhale 2 puffs every 6 (six) hours as needed for wheezing. (Patient taking differently: Inhale 2 puffs Every 6 (Six) Hours.), Disp: 3.7 g, Rfl: 0  •  aspirin 81 MG EC tablet, Take 1 tablet by mouth Daily., Disp: 30 tablet, Rfl: 0  •  atorvastatin (LIPITOR) 10 MG tablet, Take 1 tablet by mouth Daily., Disp: 90 tablet, Rfl: 0  •  budesonide-formoterol (SYMBICORT) 80-4.5 MCG/ACT inhaler, Inhale 2 puffs 2 (Two) Times a Day., Disp: , Rfl:   •  calcium acetate (PHOSLO) 667 MG capsule, Take 667 mg by mouth 3 (Three) Times a Day With Meals., Disp: , Rfl:   •  clonazePAM (KlonoPIN) 0.5 MG tablet, Take 0.5 mg by mouth 2 (Two) Times a Day., Disp: , Rfl:   •  clopidogrel (PLAVIX) 75 MG tablet, Take 1 tablet by mouth Daily., Disp: 30 tablet, Rfl: 0  •  gabapentin (NEURONTIN) 100 MG capsule, Take 1 capsule by mouth every night at bedtime., Disp: 30 capsule, Rfl: 0  •  isosorbide mononitrate (IMDUR) 60 MG 24 hr tablet, Take 1 tablet by mouth Every Morning., Disp: 30 tablet, Rfl: 5  •  nitroglycerin (NITROSTAT) 0.4 MG SL tablet, Place 1 tablet under the tongue Every 5 (Five) Minutes As Needed for Chest Pain. Take no more than 3 doses in 15 minutes., Disp: 15 tablet, Rfl: 0  •  ranolazine (RANEXA) 500 MG 12 hr tablet, Take 1 tablet by mouth Every 12 (Twelve) Hours., Disp: 60 tablet, Rfl: 0  •  cefdinir (OMNICEF) 300 MG capsule, Take 300 mg by mouth 2 (Two) Times a Day., Disp: , Rfl:   •  doxycycline  "(VIBRAMYCIN) 100 MG capsule, Take 100 mg by mouth 2 (Two) Times a Day., Disp: , Rfl:   •  insulin glargine (LANTUS) 100 UNIT/ML injection, Inject 10 Units under the skin Every Night., Disp: , Rfl:      The following portions of the patient's history were reviewed and updated as appropriate: allergies, current medications, past family history, past medical history, past social history, past surgical history and problem list.    Review of Systems   Constitution: Positive for decreased appetite, weakness and malaise/fatigue.   HENT: Negative.    Eyes: Negative.    Cardiovascular: Positive for chest pain and leg swelling.   Endocrine: Negative.    Skin: Negative.    Musculoskeletal: Negative.    Gastrointestinal: Negative.    Genitourinary: Negative.    Psychiatric/Behavioral: Negative.    Allergic/Immunologic: Negative.          Objective     /78 (BP Location: Right arm)   Pulse 102   Ht 154.9 cm (61\")   Wt 54.4 kg (119 lb 14.4 oz)   SpO2 96%   BMI 22.65 kg/m²     Physical Exam   Cardiovascular:   Left leg taut, foot is warm, severely delayed cap refill, no palpable pulses, 4 small healed ulcers anterior calf, 1-2+ pitting edema.    Right leg taut, severely delayed cap refill, no palpable pulses, no ulcers, 1-2+ pitting edema.           Procedures      Assessment/Plan       Sara was seen today for congestive heart failure, ascvd, hypertension and abnormal stress test.    Diagnoses and all orders for this visit:    Essential hypertension    Chronic diastolic congestive heart failure    ASCVD (arteriosclerotic cardiovascular disease) with 70% stenosis of the LAD, left circumflex and about 80% stenosis in the mid PDA on coronary angiography in October 2015.    Peripheral arterial disease    Bilateral carotid artery stenosis          Assessment:  1.       Plan:  1. Will discuss with Dr Field to proceed with peripheral angiogram.  2.       No Follow-up on file.      Recommended increase activity to 30 minutes " of walking daily, most days of the week.  Counseled the patient for *** regarding smoking cessation.  Discussed tobacco use relationship to cardiac disease progression.  Offered smoking cessation medications.  Discussed diet and weight loss with patient.      CLARA Nazario

## 2018-06-21 ENCOUNTER — HOSPITAL ENCOUNTER (OUTPATIENT)
Facility: HOSPITAL | Age: 68
Discharge: HOME | End: 2018-06-22
Attending: INTERNAL MEDICINE | Admitting: INTERNAL MEDICINE

## 2018-06-21 DIAGNOSIS — I25.10 ASCVD (ARTERIOSCLEROTIC CARDIOVASCULAR DISEASE): ICD-10-CM

## 2018-06-21 LAB
ACT BLD: 213 SECONDS (ref 82–152)
ANION GAP SERPL CALCULATED.3IONS-SCNC: 9.4 MMOL/L (ref 3.6–11.2)
BUN BLD-MCNC: 38 MG/DL (ref 7–21)
BUN/CREAT SERPL: 9.2 (ref 7–25)
CALCIUM SPEC-SCNC: 8.9 MG/DL (ref 7.7–10)
CHLORIDE SERPL-SCNC: 101 MMOL/L (ref 99–112)
CO2 SERPL-SCNC: 20.6 MMOL/L (ref 24.3–31.9)
CREAT BLD-MCNC: 4.13 MG/DL (ref 0.43–1.29)
GFR SERPL CREATININE-BSD FRML MDRD: 11 ML/MIN/1.73
GLUCOSE BLD-MCNC: 194 MG/DL (ref 70–110)
GLUCOSE BLDC GLUCOMTR-MCNC: 146 MG/DL (ref 70–130)
GLUCOSE BLDC GLUCOMTR-MCNC: 190 MG/DL (ref 70–130)
OSMOLALITY SERPL CALC.SUM OF ELEC: 277 MOSM/KG (ref 273–305)
POTASSIUM BLD-SCNC: 4.6 MMOL/L (ref 3.5–5.3)
SODIUM BLD-SCNC: 131 MMOL/L (ref 135–153)

## 2018-06-21 PROCEDURE — C1725 CATH, TRANSLUMIN NON-LASER: HCPCS | Performed by: INTERNAL MEDICINE

## 2018-06-21 PROCEDURE — 63710000001 RANOLAZINE 500 MG TABLET SUSTAINED-RELEASE 12 HOUR: Performed by: INTERNAL MEDICINE

## 2018-06-21 PROCEDURE — A9270 NON-COVERED ITEM OR SERVICE: HCPCS | Performed by: INTERNAL MEDICINE

## 2018-06-21 PROCEDURE — 63710000001 METOPROLOL TARTRATE 25 MG TABLET: Performed by: INTERNAL MEDICINE

## 2018-06-21 PROCEDURE — 25010000002 HEPARIN (PORCINE) PER 1000 UNITS: Performed by: INTERNAL MEDICINE

## 2018-06-21 PROCEDURE — A9270 NON-COVERED ITEM OR SERVICE: HCPCS

## 2018-06-21 PROCEDURE — 63710000001 TICAGRELOR 90 MG TABLET: Performed by: INTERNAL MEDICINE

## 2018-06-21 PROCEDURE — C1769 GUIDE WIRE: HCPCS | Performed by: INTERNAL MEDICINE

## 2018-06-21 PROCEDURE — 99153 MOD SED SAME PHYS/QHP EA: CPT | Performed by: INTERNAL MEDICINE

## 2018-06-21 PROCEDURE — 80048 BASIC METABOLIC PNL TOTAL CA: CPT | Performed by: INTERNAL MEDICINE

## 2018-06-21 PROCEDURE — 63710000001 INSULIN ASPART PER 5 UNITS

## 2018-06-21 PROCEDURE — 99152 MOD SED SAME PHYS/QHP 5/>YRS: CPT | Performed by: INTERNAL MEDICINE

## 2018-06-21 PROCEDURE — 25010000002 FENTANYL CITRATE (PF) 100 MCG/2ML SOLUTION: Performed by: INTERNAL MEDICINE

## 2018-06-21 PROCEDURE — 93005 ELECTROCARDIOGRAM TRACING: CPT | Performed by: INTERNAL MEDICINE

## 2018-06-21 PROCEDURE — C1874 STENT, COATED/COV W/DEL SYS: HCPCS | Performed by: INTERNAL MEDICINE

## 2018-06-21 PROCEDURE — C1760 CLOSURE DEV, VASC: HCPCS | Performed by: INTERNAL MEDICINE

## 2018-06-21 PROCEDURE — 25010000002 MIDAZOLAM PER 1 MG: Performed by: INTERNAL MEDICINE

## 2018-06-21 PROCEDURE — 63710000001 CLONAZEPAM 0.5 MG TABLET: Performed by: INTERNAL MEDICINE

## 2018-06-21 PROCEDURE — 85347 COAGULATION TIME ACTIVATED: CPT

## 2018-06-21 PROCEDURE — 63710000001 GABAPENTIN 100 MG CAPSULE: Performed by: INTERNAL MEDICINE

## 2018-06-21 PROCEDURE — C1887 CATHETER, GUIDING: HCPCS | Performed by: INTERNAL MEDICINE

## 2018-06-21 PROCEDURE — 0 IOPAMIDOL PER 1 ML: Performed by: INTERNAL MEDICINE

## 2018-06-21 PROCEDURE — 63710000001 INSULIN DETEMIR PER 5 UNITS: Performed by: INTERNAL MEDICINE

## 2018-06-21 PROCEDURE — 93454 CORONARY ARTERY ANGIO S&I: CPT | Performed by: INTERNAL MEDICINE

## 2018-06-21 PROCEDURE — 92928 PRQ TCAT PLMT NTRAC ST 1 LES: CPT | Performed by: INTERNAL MEDICINE

## 2018-06-21 PROCEDURE — 82962 GLUCOSE BLOOD TEST: CPT

## 2018-06-21 PROCEDURE — 63710000001 MUPIROCIN 2 % OINTMENT 22 G TUBE: Performed by: INTERNAL MEDICINE

## 2018-06-21 PROCEDURE — C1894 INTRO/SHEATH, NON-LASER: HCPCS | Performed by: INTERNAL MEDICINE

## 2018-06-21 PROCEDURE — 63710000001 CALCIUM ACETATE 667 MG CAPSULE: Performed by: INTERNAL MEDICINE

## 2018-06-21 PROCEDURE — C9600 PERC DRUG-EL COR STENT SING: HCPCS | Performed by: INTERNAL MEDICINE

## 2018-06-21 DEVICE — XIENCE ALPINE EVEROLIMUS ELUTING CORONARY STENT SYSTEM 2.75 MM X 12 MM / RAPID-EXCHANGE
Type: IMPLANTABLE DEVICE | Status: FUNCTIONAL
Brand: XIENCE ALPINE

## 2018-06-21 DEVICE — XIENCE ALPINE EVEROLIMUS ELUTING CORONARY STENT SYSTEM 2.75 MM X 18 MM / RAPID-EXCHANGE
Type: IMPLANTABLE DEVICE | Status: FUNCTIONAL
Brand: XIENCE ALPINE

## 2018-06-21 RX ORDER — OXYCODONE HYDROCHLORIDE AND ACETAMINOPHEN 5; 325 MG/1; MG/1
1 TABLET ORAL EVERY 6 HOURS PRN
Status: DISCONTINUED | OUTPATIENT
Start: 2018-06-21 | End: 2018-06-22 | Stop reason: HOSPADM

## 2018-06-21 RX ORDER — ONDANSETRON 4 MG/1
4 TABLET, ORALLY DISINTEGRATING ORAL EVERY 6 HOURS PRN
Status: DISCONTINUED | OUTPATIENT
Start: 2018-06-21 | End: 2018-06-22 | Stop reason: HOSPADM

## 2018-06-21 RX ORDER — HYDRALAZINE HYDROCHLORIDE 10 MG/1
10 TABLET, FILM COATED ORAL 3 TIMES DAILY
Status: ON HOLD | COMMUNITY
End: 2018-06-21

## 2018-06-21 RX ORDER — CLOPIDOGREL BISULFATE 75 MG/1
75 TABLET ORAL DAILY
COMMUNITY
End: 2018-06-22 | Stop reason: HOSPADM

## 2018-06-21 RX ORDER — ISOSORBIDE MONONITRATE 60 MG/1
60 TABLET, EXTENDED RELEASE ORAL EVERY MORNING
Status: DISCONTINUED | OUTPATIENT
Start: 2018-06-22 | End: 2018-06-22 | Stop reason: HOSPADM

## 2018-06-21 RX ORDER — FENTANYL CITRATE 50 UG/ML
INJECTION, SOLUTION INTRAMUSCULAR; INTRAVENOUS AS NEEDED
Status: DISCONTINUED | OUTPATIENT
Start: 2018-06-21 | End: 2018-06-21 | Stop reason: HOSPADM

## 2018-06-21 RX ORDER — ZOLPIDEM TARTRATE 5 MG/1
5 TABLET ORAL NIGHTLY PRN
Status: DISCONTINUED | OUTPATIENT
Start: 2018-06-21 | End: 2018-06-22 | Stop reason: HOSPADM

## 2018-06-21 RX ORDER — MIDAZOLAM HYDROCHLORIDE 1 MG/ML
INJECTION INTRAMUSCULAR; INTRAVENOUS AS NEEDED
Status: DISCONTINUED | OUTPATIENT
Start: 2018-06-21 | End: 2018-06-21 | Stop reason: HOSPADM

## 2018-06-21 RX ORDER — BUDESONIDE AND FORMOTEROL FUMARATE DIHYDRATE 80; 4.5 UG/1; UG/1
2 AEROSOL RESPIRATORY (INHALATION)
Status: DISCONTINUED | OUTPATIENT
Start: 2018-06-21 | End: 2018-06-22 | Stop reason: HOSPADM

## 2018-06-21 RX ORDER — ASPIRIN 81 MG/1
81 TABLET, CHEWABLE ORAL DAILY
Status: CANCELLED | OUTPATIENT
Start: 2018-06-21

## 2018-06-21 RX ORDER — SODIUM CHLORIDE 9 MG/ML
INJECTION, SOLUTION INTRAVENOUS CONTINUOUS PRN
Status: COMPLETED | OUTPATIENT
Start: 2018-06-21 | End: 2018-06-21

## 2018-06-21 RX ORDER — GABAPENTIN 100 MG/1
100 CAPSULE ORAL NIGHTLY
Status: DISCONTINUED | OUTPATIENT
Start: 2018-06-21 | End: 2018-06-22 | Stop reason: HOSPADM

## 2018-06-21 RX ORDER — METOPROLOL TARTRATE 5 MG/5ML
INJECTION INTRAVENOUS AS NEEDED
Status: DISCONTINUED | OUTPATIENT
Start: 2018-06-21 | End: 2018-06-21 | Stop reason: HOSPADM

## 2018-06-21 RX ORDER — CALCIUM ACETATE 667 MG/1
2001 CAPSULE ORAL
Status: DISCONTINUED | OUTPATIENT
Start: 2018-06-21 | End: 2018-06-22 | Stop reason: HOSPADM

## 2018-06-21 RX ORDER — ASPIRIN 81 MG/1
81 TABLET ORAL DAILY
Status: DISCONTINUED | OUTPATIENT
Start: 2018-06-22 | End: 2018-06-22 | Stop reason: HOSPADM

## 2018-06-21 RX ORDER — RANOLAZINE 500 MG/1
500 TABLET, EXTENDED RELEASE ORAL EVERY 12 HOURS SCHEDULED
Status: DISCONTINUED | OUTPATIENT
Start: 2018-06-21 | End: 2018-06-22 | Stop reason: HOSPADM

## 2018-06-21 RX ORDER — LIDOCAINE HYDROCHLORIDE 20 MG/ML
INJECTION, SOLUTION INFILTRATION; PERINEURAL AS NEEDED
Status: DISCONTINUED | OUTPATIENT
Start: 2018-06-21 | End: 2018-06-21 | Stop reason: HOSPADM

## 2018-06-21 RX ORDER — ALBUTEROL SULFATE 2.5 MG/3ML
2.5 SOLUTION RESPIRATORY (INHALATION) EVERY 6 HOURS PRN
Status: DISCONTINUED | OUTPATIENT
Start: 2018-06-21 | End: 2018-06-22 | Stop reason: HOSPADM

## 2018-06-21 RX ORDER — ONDANSETRON 2 MG/ML
4 INJECTION INTRAMUSCULAR; INTRAVENOUS EVERY 6 HOURS PRN
Status: DISCONTINUED | OUTPATIENT
Start: 2018-06-21 | End: 2018-06-22 | Stop reason: HOSPADM

## 2018-06-21 RX ORDER — HEPARIN SODIUM 1000 [USP'U]/ML
INJECTION, SOLUTION INTRAVENOUS; SUBCUTANEOUS AS NEEDED
Status: DISCONTINUED | OUTPATIENT
Start: 2018-06-21 | End: 2018-06-21 | Stop reason: HOSPADM

## 2018-06-21 RX ORDER — ATORVASTATIN CALCIUM 10 MG/1
10 TABLET, FILM COATED ORAL DAILY
Status: DISCONTINUED | OUTPATIENT
Start: 2018-06-22 | End: 2018-06-21

## 2018-06-21 RX ORDER — ALBUTEROL SULFATE 2.5 MG/3ML
2.5 SOLUTION RESPIRATORY (INHALATION) EVERY 4 HOURS PRN
Status: CANCELLED | OUTPATIENT
Start: 2018-06-21

## 2018-06-21 RX ORDER — HYDROCODONE BITARTRATE AND ACETAMINOPHEN 5; 325 MG/1; MG/1
1 TABLET ORAL EVERY 4 HOURS PRN
Status: DISCONTINUED | OUTPATIENT
Start: 2018-06-21 | End: 2018-06-22 | Stop reason: HOSPADM

## 2018-06-21 RX ORDER — CLONAZEPAM 0.5 MG/1
0.5 TABLET ORAL EVERY 12 HOURS SCHEDULED
Status: DISCONTINUED | OUTPATIENT
Start: 2018-06-21 | End: 2018-06-22 | Stop reason: HOSPADM

## 2018-06-21 RX ORDER — DEXTROSE MONOHYDRATE 25 G/50ML
25 INJECTION, SOLUTION INTRAVENOUS
Status: DISCONTINUED | OUTPATIENT
Start: 2018-06-21 | End: 2018-06-22 | Stop reason: HOSPADM

## 2018-06-21 RX ORDER — LORAZEPAM 1 MG/1
1 TABLET ORAL EVERY 6 HOURS PRN
Status: DISCONTINUED | OUTPATIENT
Start: 2018-06-21 | End: 2018-06-22 | Stop reason: HOSPADM

## 2018-06-21 RX ORDER — LOSARTAN POTASSIUM 25 MG/1
25 TABLET ORAL DAILY
Status: ON HOLD | COMMUNITY
End: 2018-06-21

## 2018-06-21 RX ORDER — ALBUTEROL SULFATE 90 UG/1
2 AEROSOL, METERED RESPIRATORY (INHALATION) EVERY 4 HOURS PRN
COMMUNITY

## 2018-06-21 RX ORDER — OXYCODONE HYDROCHLORIDE AND ACETAMINOPHEN 5; 325 MG/1; MG/1
1 TABLET ORAL EVERY 6 HOURS PRN
COMMUNITY

## 2018-06-21 RX ORDER — LOSARTAN POTASSIUM 50 MG/1
50 TABLET ORAL DAILY
Status: DISCONTINUED | OUTPATIENT
Start: 2018-06-21 | End: 2018-06-21

## 2018-06-21 RX ORDER — NICOTINE POLACRILEX 4 MG
15 LOZENGE BUCCAL
Status: DISCONTINUED | OUTPATIENT
Start: 2018-06-21 | End: 2018-06-22 | Stop reason: HOSPADM

## 2018-06-21 RX ORDER — ONDANSETRON 4 MG/1
4 TABLET, FILM COATED ORAL EVERY 6 HOURS PRN
Status: DISCONTINUED | OUTPATIENT
Start: 2018-06-21 | End: 2018-06-22 | Stop reason: HOSPADM

## 2018-06-21 RX ORDER — CLOPIDOGREL BISULFATE 75 MG/1
75 TABLET ORAL DAILY
Status: CANCELLED | OUTPATIENT
Start: 2018-06-21

## 2018-06-21 RX ORDER — CEFDINIR 300 MG/1
300 CAPSULE ORAL
Status: DISCONTINUED | OUTPATIENT
Start: 2018-06-21 | End: 2018-06-21

## 2018-06-21 RX ORDER — ASPIRIN 81 MG/1
81 TABLET, CHEWABLE ORAL DAILY
COMMUNITY

## 2018-06-21 RX ORDER — CALCIUM ACETATE 667 MG/1
667 CAPSULE ORAL
Status: DISCONTINUED | OUTPATIENT
Start: 2018-06-21 | End: 2018-06-21

## 2018-06-21 RX ORDER — NITROGLYCERIN 0.4 MG/1
0.4 TABLET SUBLINGUAL
Status: DISCONTINUED | OUTPATIENT
Start: 2018-06-21 | End: 2018-06-22 | Stop reason: HOSPADM

## 2018-06-21 RX ADMIN — CLONAZEPAM 0.5 MG: 0.5 TABLET ORAL at 20:52

## 2018-06-21 RX ADMIN — GABAPENTIN 100 MG: 100 CAPSULE ORAL at 20:52

## 2018-06-21 RX ADMIN — MUPIROCIN 1 APPLICATION: 20 OINTMENT TOPICAL at 22:13

## 2018-06-21 RX ADMIN — INSULIN ASPART 2 UNITS: 100 INJECTION, SOLUTION INTRAVENOUS; SUBCUTANEOUS at 17:18

## 2018-06-21 RX ADMIN — METOPROLOL TARTRATE 25 MG: 25 TABLET, FILM COATED ORAL at 20:53

## 2018-06-21 RX ADMIN — CLONAZEPAM 0.5 MG: 0.5 TABLET ORAL at 16:17

## 2018-06-21 RX ADMIN — CALCIUM ACETATE 2001 MG: 667 CAPSULE ORAL at 17:17

## 2018-06-21 RX ADMIN — RANOLAZINE 500 MG: 500 TABLET, FILM COATED, EXTENDED RELEASE ORAL at 22:09

## 2018-06-21 RX ADMIN — TICAGRELOR 90 MG: 90 TABLET ORAL at 20:52

## 2018-06-21 RX ADMIN — METOPROLOL TARTRATE 25 MG: 25 TABLET, FILM COATED ORAL at 16:18

## 2018-06-21 RX ADMIN — INSULIN DETEMIR 10 UNITS: 100 INJECTION, SOLUTION SUBCUTANEOUS at 20:53

## 2018-06-21 NOTE — NURSING NOTE
Time [id number]  Time Out 1430        Order received for Cardiac Rehab Consultation.     CR staff will follow up with patient      Information discussed with: Patient/Spouse        Educated on: Benefits of Exercise,  Educated on Cardiac Rehab and Program Protocol, Brochure and/or educational material provided, Contact information given and Teach Back Verified            Comments: spoke with pt and spouse she states has dialysis on m/w/f and busy with dr's appointments on the other days of week. Told pt if she changes her mind to give us a call and we will gladly schedule her.      Thank you for the referral. Please contact the Cardiac Rehab Dept. (ext. [phone]) with any further questions or concerns.

## 2018-06-21 NOTE — H&P (VIEW-ONLY)
[facility] CARDIOLOGY  [address]  [place] [address]  Phone: [phone]  Fax: [phone]    06/18/2018    Chief Complaint: New Patient, Abnormal stress test    History:   Sara Cardona is a 67 y.o. female seen in consultation, referred by Dr.Pramod SHIREEN Eng MD  for abnormal stress test.  Ms Cardona has a past medical history significant for ASCVD with 70% stenosis of the LAD, left circumflex and about 80% stenosis in the mid PDA on coronary angiography in October 2015, congestive heart failure, essential hypertension, peripheral arterial disease, bilateral carotid artery stenosis, diabetes, end-stage renal disease sees on hemodialysis Tuesdays Thursdays and Saturdays, and obstructive sleep apnea.    Ms. Hardin does report chest pain during and after dialysis, occurring 3-4 times weekly.  Generally pain is relieved with nitroglycerin, however often takes 2-3 doses to completely relieve the pain.  She also reports bilateral lower extremity swelling with cellulitis.    Past Medical History:   Diagnosis Date   • Angina at rest    • Anxiety    • Aortic stenosis    • ASCVD (arteriosclerotic cardiovascular disease)    • Breast cancer    • CHF (congestive heart failure) 7/24/2017   • Chronic pain    • COPD (chronic obstructive pulmonary disease)    • Diabetic neuropathy    • Dyslipidemia    • Essential hypertension    • Insulin dependent diabetes mellitus    • Left carotid bruit    • Memory deficits    • Recurrent pneumonia    • Renal failure        Review of Systems:  Please see HPI  Constitution: No chills, no rigors, no unexplained weight loss or weight gain  Eyes:  No diplopia, no blurred vision, no loss of vision, conjunctiva is pink and sclera is anicteric  ENT:  No tinnitus, no otorrhea, no epistaxis, no sore throat   Respiratory: No cough, no hemoptysis  Cardiovascular: see HPI  Gastrointestinal: No nausea, no vomiting, no hematemesis, no diarrhea or constipation, no  melena  Genitourinary: No frequency of dysuria no hematuria  Integument: No pruritis and  no skin rash  Hematologic / Lymphatic: No excessive bleeding, easy bruising, fatigue, lymphadenopathy and petechiae  Musculoskeletal: No joint pain, joint stiffness, joint swelling, muscle pain, muscle weakness and neck pain  Neurological: No dizziness, headaches, light headedness, seizures and vertigo  Endocrine: No frequent urination and nocturia, temperature intolerance, weight gain, unintended and weight loss, unintended        Past Social History:  Social History     Social History   • Marital status:    • Number of children: 2     Occupational History   • Retired       and      Social History Main Topics   • Smoking status: Former Smoker     Packs/day: 0.25     Years: 40.00     Types: Cigarettes     Quit date: 02/2018   • Smokeless tobacco: Never Used   • Alcohol use No   • Drug use: No   • Sexual activity: Defer     Other Topics Concern   • Not on file       Past Family History:  Family History   Problem Relation Age of Onset   • Diabetes Mother    • Lung cancer Father    • Heart attack Father        Current Outpatient Prescriptions on File Prior to Visit   Medication Sig Dispense Refill   • albuterol (PROVENTIL HFA;VENTOLIN HFA) 108 (90 BASE) MCG/ACT inhaler Inhale 2 puffs every 6 (six) hours as needed for wheezing. (Patient taking differently: Inhale 2 puffs Every 6 (Six) Hours.) 3.7 g 0   • aspirin 81 MG EC tablet Take 1 tablet by mouth Daily. 30 tablet 0   • atorvastatin (LIPITOR) 10 MG tablet Take 1 tablet by mouth Daily. 90 tablet 0   • budesonide-formoterol (SYMBICORT) 80-4.5 MCG/ACT inhaler Inhale 2 puffs 2 (Two) Times a Day.     • clonazePAM (KlonoPIN) 0.5 MG tablet Take 0.5 mg by mouth 2 (Two) Times a Day.     • clopidogrel (PLAVIX) 75 MG tablet Take 1 tablet by mouth Daily. 30 tablet 0   • gabapentin (NEURONTIN) 100 MG capsule Take 1 capsule by mouth every night at bedtime. 30 capsule  0   • isosorbide mononitrate (IMDUR) 60 MG 24 hr tablet Take 1 tablet by mouth Every Morning. 30 tablet 5   • nitroglycerin (NITROSTAT) 0.4 MG SL tablet Place 1 tablet under the tongue Every 5 (Five) Minutes As Needed for Chest Pain. Take no more than 3 doses in 15 minutes. 15 tablet 0   • ranolazine (RANEXA) 500 MG 12 hr tablet Take 1 tablet by mouth Every 12 (Twelve) Hours. 60 tablet 0   • cefdinir (OMNICEF) 300 MG capsule Take 300 mg by mouth 2 (Two) Times a Day.     • doxycycline (VIBRAMYCIN) 100 MG capsule Take 100 mg by mouth 2 (Two) Times a Day.     • insulin glargine (LANTUS) 100 UNIT/ML injection Inject 10 Units under the skin Every Night.       No current facility-administered medications on file prior to visit.        No Known Allergies    Objective:  Vitals:    06/18/18 1548   BP: 128/78   Pulse: 102   SpO2: 96%         Comfortable NAD  PERRL, conjunctiva clear  Neck supple, no JVD or thyromegaly appreciated  S1/S2 RRR, no m/r/g  Lungs CTA B, normal effort  Abdomen S/NT/ND (+) BS, no HSM appreciated  Extremities:  Left lower extremity with 1-2+ edema, skin taut, evidence of 4 healed ulcers, no palpable pulses, poor reflex capillary refill; right lower extremity with 1-2+ edema, skin taut, no evidence of ulcers, no palpable pulses, poor reflex capillary refill.  Gait:  Pt wheelchair bound  No visible or palpable skin lesions  A/Ox4, mood and affect appropriate  Pulse exam: Vince's: not done    DATA:       Results for orders placed during the hospital encounter of 03/28/18   Adult Transthoracic Echo Complete W/ Cont if Necessary Per Protocol    Addendum · Left ventricular wall thickness is consistent with mild concentric  hypertrophy. · Left ventricular systolic function is normal. Estimated EF appears to be  in the range of 56 - 60% · Left ventricular diastolic dysfunction (grade II) consistent with  pseudonormalization. · All left ventricular wall segments contract normally · Left atrial cavity size is  mildly dilated. · There is mild calcification of the aortic valve mainly affecting the  non, left and right coronary cusp(s).No evidence of aortic stenosis or  regurgitation. · Mild mitral valve regurgitation is present · Mild to moderate tricuspid valve regurgitation is present. · Present. Severe pulmonary hypertension is present. RV systolic pressure  = 70 mm Hg. · There is no evidence of pericardial effusion        Julian Carroll MD 3/31/2018  8:26 AM          Narrative · Left ventricular wall thickness is consistent with mild concentric   hypertrophy.  · Left ventricular systolic function is normal. Estimated EF appears to be   in the range of 56 - 60%  · Left ventricular diastolic dysfunction (grade I) consistent with   impaired relaxation.  · All left ventricular wall segments contract normally  · Left atrial cavity size is mildly dilated.  · There is mild calcification of the aortic valve mainly affecting the   non, left and right coronary cusp(s).No evidence of aortic stenosis or   regurgitation.  · Mild mitral valve regurgitation is present  · Mild to moderate tricuspid valve regurgitation is present.  · Present. Severe pulmonary hypertension is present. RV systolic pressure   = 70 mm Hg.  · There is no evidence of pericardial effusion          Results for orders placed during the hospital encounter of 05/18/18   Stress Test With Myocardial Perfusion (1 Day)    Narrative · Findings consistent with a normal ECG stress test.  · Myocardial perfusion imaging indicates a medium-sized, mild degree of   ischemia located in the apex, inferior wall and septal wall.  · Normal LV cavity size. Abnormal LV wall motion consistent with mild to   moderate hypokinesis.  · Left ventricular ejection fraction is mildly reduced (Calculated EF =   42%).  · Impressions are consistent with an intermediate risk study.          Results for orders placed during the hospital encounter of 05/18/18   Stress Test With Myocardial  Perfusion (1 Day)    Narrative · Findings consistent with a normal ECG stress test.  · Myocardial perfusion imaging indicates a medium-sized, mild degree of   ischemia located in the apex, inferior wall and septal wall.  · Normal LV cavity size. Abnormal LV wall motion consistent with mild to   moderate hypokinesis.  · Left ventricular ejection fraction is mildly reduced (Calculated EF =   42%).  · Impressions are consistent with an intermediate risk study.          Results for orders placed during the hospital encounter of 10/09/15   Cardiac catheterization    Narrative [place], [place] [address]  [phone]    NAME:                    IRINA BOX  ROOM NUMBER:             [id number]  MEDICAL RECORD NUMBER:   [id number]  VISIT NUMBER:            [id number]  PHYSICIAN:                    Nathan Parham MD  PRIMARY CARE PROVIDER:     DATE OF PROCEDURE:           YOB: 1950    FINAL IMPRESSION:   1.  Right dominant coronary artery system with native 2 vessel disease  involving the LAD and circumflex arteries.   2.  Native 3-vessel disease involving the LAD, circumflex and distal right  coronary artery.     PROCEDURE PERFORMED:  Coronary angiography.     HISTORY OF PRESENT ILLNESS: The patient is a pleasant 65-year-old woman who  presents with acute onset shortness of breath, and heart failure symptoms. She  was also subsequently noted to have non-ST-elevation myocardial infarction and  was referred for a cardiac catheterization. Prior to the procedure, the patient  was given informed consent apprising her of the risk of heart attack, stroke,  and death. The patient understood these risks and agreed to proceed.     PROCEDURE IN DETAIL: The patient was brought to the cardiac catheterization  laboratory and prepped and draped in the usual sterile fashion. Adequate  anesthesia was obtained by infiltrating the right groin with local lidocaine.  Using a modified  Seldinger technique, a 4-Somali sheath was placed in the right  femoral artery. A 4-Somali JL-4 catheter was used to engage the ostium of the  left main coronary artery. Angiography was performed in varying views using  hand injection of contrast material. After this, a 4-Somali JR-4 catheter was  used to engage the ostium of the right coronary artery. Angiography was again  performed in varying views using hand injection of contrast material. At the  conclusion of the procedure, the patient was returned to the luna without  evidence of complication.     FINDINGS IN DETAIL:   LEFT MAIN CORONARY ARTERY: The left main coronary is a large vessel which  bifurcates into LAD and circumflex arteries. Left main coronary artery is  relatively free of atherosclerotic disease.     LEFT ANTERIOR DESCENDING ARTERY: The left anterior descending artery is a large  vessel which reaches beyond the apex of the left ventricle and gives rise to 2  small diagonal branches. The LAD itself is noted to have a diffuse 70% lesion  in its midportion.     CIRCUMFLEX ARTERY: The circumflex is a large vessel which gives rise to 3  obtuse marginal, the first of which is a large branching vessel and the next 2  which are small. There is a 70% lesion in the proximal portion of the  circumflex artery.     RIGHT CORONARY ARTERY: The right coronary artery is a relatively small vessel  which gives rise to the posterior descending artery and several posterolateral  branches. There is a diffuse 80% lesion noted in the midportion of the  posterior descending artery.     FINAL IMPRESSION AND PLAN: Overall, it is my impression that the patient does  present with native 3-vessel coronary artery disease; however, she has  significant COPD and was unable to lie still on the table during the procedure,  furthermore, her vessels are relatively calcified and it was clear that it  would be a lengthy procedure to try to revascularize any of her vessels. At  this  point in time, I feel that the best course of action would be to try to  optimize her from a medical point of view. If she continues to have anginal  symptoms, it would not be unreasonable to consider percutaneous  revascularization of her LAD and circumflex arteries. She is not a great  surgical candidate.         D:   SF 10/13/2015 08:14:29  T:   hf 10/14/2015 00:03:11  JOB ID:[id number]  [id number]  Revision Count:     0  cc:      Signature:__________________________            Michael Parham MD  DO NOT TEXT EDIT THIS LINE :RCC:[id number]:  Authenticated by MICHAEL PARHAM M.D. On 10/15/2015 05:16:38 AM        Procedures     A/P:    Abnormal stress test with known coronary artery disease, per cardiac catheterization in 2015: We'll proceed with cardiac catheterization with right femoral approach.  Risks including hematoma, hemorrhage, MI, death and benefits of catheterization discussed with the patient.  She elected to proceed.  We'll plan on doing limited bilateral lower extremity angiogram during catheterization.    She has angina during each dialysis and I did note the difficult anatomy as per her previous cardiac cath report. She will be higher risk than usual.    Thank you for allowing me to participate in the care of Sara Cardona. Feel free to contact me directly with any further questions or concerns.

## 2018-06-21 NOTE — NURSING NOTE
Notified per Wesley Young that patient could not go to [address] [place] [address] due to receiving coronary intervention.  House Supervisor made aware.  Awaiting new bed assignment.

## 2018-06-22 VITALS
TEMPERATURE: 98.7 F | OXYGEN SATURATION: 98 % | DIASTOLIC BLOOD PRESSURE: 60 MMHG | SYSTOLIC BLOOD PRESSURE: 124 MMHG | HEIGHT: 61 IN | RESPIRATION RATE: 18 BRPM | WEIGHT: 118.3 LBS | HEART RATE: 72 BPM | BODY MASS INDEX: 22.34 KG/M2

## 2018-06-22 LAB
ANION GAP SERPL CALCULATED.3IONS-SCNC: 7.9 MMOL/L (ref 3.6–11.2)
BUN BLD-MCNC: 46 MG/DL (ref 7–21)
BUN/CREAT SERPL: 10.6 (ref 7–25)
CALCIUM SPEC-SCNC: 8.6 MG/DL (ref 7.7–10)
CHLORIDE SERPL-SCNC: 100 MMOL/L (ref 99–112)
CO2 SERPL-SCNC: 23.1 MMOL/L (ref 24.3–31.9)
CREAT BLD-MCNC: 4.33 MG/DL (ref 0.43–1.29)
DEPRECATED RDW RBC AUTO: 52.8 FL (ref 37–54)
ERYTHROCYTE [DISTWIDTH] IN BLOOD BY AUTOMATED COUNT: 16.3 % (ref 11.5–14.5)
GFR SERPL CREATININE-BSD FRML MDRD: 10 ML/MIN/1.73
GLUCOSE BLD-MCNC: 95 MG/DL (ref 70–110)
GLUCOSE BLDC GLUCOMTR-MCNC: 111 MG/DL (ref 70–130)
GLUCOSE BLDC GLUCOMTR-MCNC: 59 MG/DL (ref 70–130)
GLUCOSE BLDC GLUCOMTR-MCNC: 78 MG/DL (ref 70–130)
HCT VFR BLD AUTO: 36.9 % (ref 37–47)
HGB BLD-MCNC: 11.4 G/DL (ref 12–16)
MCH RBC QN AUTO: 29.1 PG (ref 27–33)
MCHC RBC AUTO-ENTMCNC: 30.9 G/DL (ref 33–37)
MCV RBC AUTO: 94.1 FL (ref 80–94)
OSMOLALITY SERPL CALC.SUM OF ELEC: 274.4 MOSM/KG (ref 273–305)
PLATELET # BLD AUTO: 304 10*3/MM3 (ref 130–400)
PMV BLD AUTO: 10.2 FL (ref 6–10)
POTASSIUM BLD-SCNC: 4.4 MMOL/L (ref 3.5–5.3)
RBC # BLD AUTO: 3.92 10*6/MM3 (ref 4.2–5.4)
SODIUM BLD-SCNC: 131 MMOL/L (ref 135–153)
WBC NRBC COR # BLD: 14.98 10*3/MM3 (ref 4.5–12.5)

## 2018-06-22 PROCEDURE — 63710000001 HYDROCODONE-ACETAMINOPHEN 5-325 MG TABLET: Performed by: INTERNAL MEDICINE

## 2018-06-22 PROCEDURE — 94799 UNLISTED PULMONARY SVC/PX: CPT

## 2018-06-22 PROCEDURE — 63710000001 CALCIUM ACETATE 667 MG CAPSULE: Performed by: INTERNAL MEDICINE

## 2018-06-22 PROCEDURE — A9270 NON-COVERED ITEM OR SERVICE: HCPCS | Performed by: INTERNAL MEDICINE

## 2018-06-22 PROCEDURE — 94640 AIRWAY INHALATION TREATMENT: CPT

## 2018-06-22 PROCEDURE — 63710000001 ISOSORBIDE MONONITRATE 60 MG TABLET SUSTAINED-RELEASE 24 HOUR: Performed by: INTERNAL MEDICINE

## 2018-06-22 PROCEDURE — 63710000001 CLONAZEPAM 0.5 MG TABLET: Performed by: INTERNAL MEDICINE

## 2018-06-22 PROCEDURE — 63710000001 ASPIRIN 81 MG TABLET DELAYED-RELEASE: Performed by: INTERNAL MEDICINE

## 2018-06-22 PROCEDURE — 99214 OFFICE O/P EST MOD 30 MIN: CPT | Performed by: INTERNAL MEDICINE

## 2018-06-22 PROCEDURE — G0257 UNSCHED DIALYSIS ESRD PT HOS: HCPCS

## 2018-06-22 PROCEDURE — 80048 BASIC METABOLIC PNL TOTAL CA: CPT | Performed by: INTERNAL MEDICINE

## 2018-06-22 PROCEDURE — 63710000001 METOPROLOL TARTRATE 25 MG TABLET: Performed by: INTERNAL MEDICINE

## 2018-06-22 PROCEDURE — 63710000001 TICAGRELOR 90 MG TABLET: Performed by: INTERNAL MEDICINE

## 2018-06-22 PROCEDURE — 63710000001 BUDESONIDE-FORMOTEROL 80-4.5 MCG/ACT AEROSOL 6.9 G INHALER: Performed by: INTERNAL MEDICINE

## 2018-06-22 PROCEDURE — 63710000001 RANOLAZINE 500 MG TABLET SUSTAINED-RELEASE 12 HOUR: Performed by: INTERNAL MEDICINE

## 2018-06-22 PROCEDURE — 93005 ELECTROCARDIOGRAM TRACING: CPT | Performed by: INTERNAL MEDICINE

## 2018-06-22 PROCEDURE — 82962 GLUCOSE BLOOD TEST: CPT

## 2018-06-22 PROCEDURE — 63710000001 LORAZEPAM 1 MG TABLET: Performed by: INTERNAL MEDICINE

## 2018-06-22 PROCEDURE — 85027 COMPLETE CBC AUTOMATED: CPT | Performed by: INTERNAL MEDICINE

## 2018-06-22 RX ORDER — TICAGRELOR 90 MG/1
90 TABLET ORAL 2 TIMES DAILY
Qty: 60 TABLET | Refills: 11 | Status: SHIPPED | OUTPATIENT
Start: 2018-06-22 | End: 2018-07-09 | Stop reason: ALTCHOICE

## 2018-06-22 RX ADMIN — RANOLAZINE 500 MG: 500 TABLET, FILM COATED, EXTENDED RELEASE ORAL at 07:29

## 2018-06-22 RX ADMIN — CALCIUM ACETATE 2001 MG: 667 CAPSULE ORAL at 11:21

## 2018-06-22 RX ADMIN — ASPIRIN 81 MG: 81 TABLET, DELAYED RELEASE ORAL at 07:29

## 2018-06-22 RX ADMIN — LORAZEPAM 1 MG: 1 TABLET ORAL at 01:11

## 2018-06-22 RX ADMIN — ALBUTEROL SULFATE 2.5 MG: 2.5 SOLUTION RESPIRATORY (INHALATION) at 01:03

## 2018-06-22 RX ADMIN — CALCIUM ACETATE 2001 MG: 667 CAPSULE ORAL at 07:29

## 2018-06-22 RX ADMIN — TICAGRELOR 90 MG: 90 TABLET ORAL at 07:29

## 2018-06-22 RX ADMIN — ISOSORBIDE MONONITRATE 60 MG: 60 TABLET, EXTENDED RELEASE ORAL at 07:29

## 2018-06-22 RX ADMIN — BUDESONIDE AND FORMOTEROL FUMARATE DIHYDRATE 2 PUFF: 80; 4.5 AEROSOL RESPIRATORY (INHALATION) at 06:58

## 2018-06-22 RX ADMIN — HYDROCODONE BITARTRATE AND ACETAMINOPHEN 1 TABLET: 5; 325 TABLET ORAL at 07:36

## 2018-06-22 RX ADMIN — METOPROLOL TARTRATE 25 MG: 25 TABLET, FILM COATED ORAL at 07:29

## 2018-06-22 RX ADMIN — CLONAZEPAM 0.5 MG: 0.5 TABLET ORAL at 07:28

## 2018-06-22 RX ADMIN — ALBUTEROL SULFATE 2.5 MG: 2.5 SOLUTION RESPIRATORY (INHALATION) at 06:58

## 2018-06-22 RX ADMIN — MUPIROCIN 1 APPLICATION: 20 OINTMENT TOPICAL at 07:31

## 2018-06-22 RX ADMIN — SODIUM CHLORIDE 1000 ML: 9 INJECTION, SOLUTION INTRAVENOUS at 08:17

## 2018-06-22 NOTE — DISCHARGE SUMMARY
Date of Discharge:  6/22/2018    Discharge Diagnosis: Coronary artery disease    Presenting Problem/History of Present Illness  ASCVD (arteriosclerotic cardiovascular disease) [I25.10]  ASCVD (arteriosclerotic cardiovascular disease) [I25.10]       Hospital Course  Patient is a 67 y.o. female presented with a history of triple vessel coronary disease based on prior cardiac catheterization.  The patient has end-stage renal disease on hemodialysis.  Lately she's been having increasing anginal discomfort during and after dialysis occurring several times weekly recent nuclear stress test demonstrated inferior wall ischemia the patient was admitted for cardiac catheterization.  Catheterization revealed again triple vessel coronary disease with insignificant disease in the left main coronary artery.  The LAD had approximately 70-80% stenosis.  Circumflex also had about a 70-80% stenosis.  There was modest disease in the right coronary artery but the posterior descending artery has severe diffuse disease with a discrete 90% stenosis.  Successful percutaneous coronary intervention was provided to the posterior descending artery of the right coronary artery with placement of tandem 2.75 x 12 mm millimeter and 2.75 mm x 18 mm drug-eluting Alpine stents.  The patient did well post coronary intervention there were no post PCI complications the right groin was well-healed with no hematoma no vascular compromise the patient underwent hemodialysis the following day on the day of discharge without any anginal discomfort ambulated without a problem.  The patient will be discharged to home.  The patient will follow-up in the office in approximately 1-2 weeks her antiplatelet medication was switched from Plavix to Brilinta.  Brilinta has been E prescribed to the pharmacy.  Condition on discharge good.      Procedures Performed  Procedure(s):  Left Heart Cath  Percutaneous Coronary Intervention       Consults:   Consults     Date and  Time Order Name Status Description    6/21/2018 1124 Inpatient Nephrology Consult            Pertinent Test Results:     Lab Results (last 24 hours)     Procedure Component Value Units Date/Time    POC Glucose Once [[id number]]  (Normal) Collected:  06/22/18 1115    Specimen:  Blood Updated:  06/22/18 1128     Glucose 111 mg/dL     Narrative:       Meter: [id number] : [id number] Tinkoff DigitalALA    POC Glucose Once [[id number]]  (Normal) Collected:  06/22/18 0753    Specimen:  Blood Updated:  06/22/18 0807     Glucose 78 mg/dL     Narrative:       Meter: [id number] : [id number] LAKSHMI DYALA    POC Glucose Once [[id number]]  (Abnormal) Collected:  06/22/18 0717    Specimen:  Blood Updated:  06/22/18 0726     Glucose 59 (L) mg/dL     Narrative:       Meter: [id number] : [id number] LAKSHMI YOUNG    Osmolality, Calculated [[id number]]  (Normal) Collected:  06/22/18 0154    Specimen:  Blood Updated:  06/22/18 0303     Osmolality Calc 274.4 mOsm/kg     Basic Metabolic Panel [[id number]]  (Abnormal) Collected:  06/22/18 0154    Specimen:  Blood Updated:  06/22/18 0303     Glucose 95 mg/dL      BUN 46 (H) mg/dL      Creatinine 4.33 (H) mg/dL      Sodium 131 (L) mmol/L      Potassium 4.4 mmol/L      Chloride 100 mmol/L      CO2 23.1 (L) mmol/L      Calcium 8.6 mg/dL      eGFR Non African Amer 10 (L) mL/min/1.73      BUN/Creatinine Ratio 10.6     Anion Gap 7.9 mmol/L     Narrative:       GFR Normal >60  Chronic Kidney Disease <60  Kidney Failure <15    CBC (No Diff) [[id number]]  (Abnormal) Collected:  06/22/18 0154    Specimen:  Blood Updated:  06/22/18 0239     WBC 14.98 (H) 10*3/mm3      RBC 3.92 (L) 10*6/mm3      Hemoglobin 11.4 (L) g/dL      Hematocrit 36.9 (L) %      MCV 94.1 (H) fL      MCH 29.1 pg      MCHC 30.9 (L) g/dL      RDW 16.3 (H) %      RDW-SD 52.8 fl      MPV 10.2 (H) fL      Platelets 304 10*3/mm3     POC Glucose Once [[id number]]  (Abnormal) Collected:  06/21/18 1943    Specimen:  Blood Updated:  06/21/18  1958     Glucose 146 (H) mg/dL     Narrative:       Meter: [id number] : [id number] QUINCY MEDELLIN    POC Glucose Once [[id number]]  (Abnormal) Collected:  06/21/18 1700    Specimen:  Blood Updated:  06/21/18 1851     Glucose 190 (H) mg/dL     Narrative:       Meter: [id number] : [id number] otf denae    Basic Metabolic Panel [[id number]]  (Abnormal) Collected:  06/21/18 1514    Specimen:  Blood Updated:  06/21/18 1552     Glucose 194 (H) mg/dL      BUN 38 (H) mg/dL      Creatinine 4.13 (H) mg/dL      Sodium 131 (L) mmol/L      Potassium 4.6 mmol/L      Chloride 101 mmol/L      CO2 20.6 (L) mmol/L      Calcium 8.9 mg/dL      eGFR Non African Amer 11 (L) mL/min/1.73      BUN/Creatinine Ratio 9.2     Anion Gap 9.4 mmol/L     Narrative:       GFR Normal >60  Chronic Kidney Disease <60  Kidney Failure <15    Osmolality, Calculated [[id number]]  (Normal) Collected:  06/21/18 1514    Specimen:  Blood Updated:  06/21/18 1552     Osmolality Calc 277.0 mOsm/kg           Imaging Results (last 24 hours)     ** No results found for the last 24 hours. **          ECG/EMG Results (last 24 hours)     Procedure Component Value Units Date/Time    ECG 12 Lead [[id number]] Collected:  06/22/18 0643     Updated:  06/22/18 0645    Narrative:       Test Reason : Post Cath  Blood Pressure : **/** mmHG  Vent. Rate : 073 BPM     Atrial Rate : 073 BPM     P-R Int : 188 ms          QRS Dur : 094 ms      QT Int : 408 ms       P-R-T Axes : 079 006 031 degrees     QTc Int : 449 ms    Normal sinus rhythm  Possible Anterior infarct , age undetermined  Abnormal ECG  When compared with ECG of 21-JUN-2018 11:34, (Unconfirmed)  No significant change was found    Referred By:  JOSE MMED           Confirmed By:           Condition on Discharge: Good    Vital Signs  Temp:  [97.5 °F (36.4 °C)-98.3 °F (36.8 °C)] 97.7 °F (36.5 °C)  Heart Rate:  [64-86] 71  Resp:  [15-20] 18  BP: (100-153)/(58-94) 115/64    Discharge Disposition  Home or Self  Care    Hospital Medications    aspirin 81 mg Oral Daily   budesonide-formoterol 2 puff Inhalation BID - RT   calcium acetate 2,001 mg Oral TID With Meals   clonazePAM 0.5 mg Oral Q12H   gabapentin 100 mg Oral Nightly   insulin aspart 0-7 Units Subcutaneous 4x Daily AC & at Bedtime   insulin detemir 10 Units Subcutaneous Nightly   isosorbide mononitrate 60 mg Oral QAM   metoprolol tartrate 25 mg Oral Q12H   mupirocin 1 application Topical BID   ranolazine 500 mg Oral Q12H   ticagrelor 90 mg Oral BID       Discharge Medications     Discharge Medications      New Medications      Instructions Start Date   BRILINTA 90 MG tablet tablet  Generic drug:  ticagrelor   90 mg, Oral, 2 Times Daily         Continue These Medications      Instructions Start Date   albuterol 108 (90 Base) MCG/ACT inhaler  Commonly known as:  PROVENTIL HFA;VENTOLIN HFA   2 puffs, Inhalation, Every 4 Hours PRN      aspirin 81 MG chewable tablet   81 mg, Oral, Daily      atorvastatin 10 MG tablet  Commonly known as:  LIPITOR   10 mg, Oral, Daily      budesonide-formoterol 80-4.5 MCG/ACT inhaler  Commonly known as:  SYMBICORT   2 puffs, Inhalation, 2 Times Daily - RT      calcium acetate 667 MG capsule  Commonly known as:  PHOSLO   2,001 mg, Oral, 3 Times Daily With Meals      clonazePAM 0.5 MG tablet  Commonly known as:  KlonoPIN   0.5 mg, Oral, 2 Times Daily      gabapentin 100 MG capsule  Commonly known as:  NEURONTIN   100 mg, Oral, Every Night at Bedtime      insulin glargine 100 UNIT/ML injection  Commonly known as:  LANTUS   10 Units, Subcutaneous, Nightly      isosorbide mononitrate 60 MG 24 hr tablet  Commonly known as:  IMDUR   60 mg, Oral, Every Morning      mupirocin 2 % ointment  Commonly known as:  BACTROBAN   1 application, Topical, 2 Times Daily, Applies to back for cellulitis      nitroglycerin 0.4 MG SL tablet  Commonly known as:  NITROSTAT   0.4 mg, Sublingual, Every 5 Minutes PRN, Take no more than 3 doses in 15 minutes.        oxyCODONE-acetaminophen 5-325 MG per tablet  Commonly known as:  PERCOCET   1 tablet, Oral, Every 6 Hours PRN      ranolazine 500 MG 12 hr tablet  Commonly known as:  RANEXA   500 mg, Oral, Every 12 Hours Scheduled         Stop These Medications    clopidogrel 75 MG tablet  Commonly known as:  PLAVIX            Discharge Diet:   Diet Instructions     Diet: Renal       Discharge Diet:  Renal          Activity at Discharge:   Activity Instructions     Activity as Tolerated             Follow-up Appointments  Future Appointments  Date Time Provider Department Center   7/6/2018 9:40 AM CLARA Roque [facility] None   7/9/2018 11:30 AM Erick Eng MD [facility] None   8/8/2018 11:30 AM Rafa Field MD [facility] None   9/5/2018 2:00 PM Devin Martin MD [facility] None     Additional Instructions for the Follow-ups that You Need to Schedule     Discharge Follow-up with Specialty: Appointment made with Elena Gonzales NP    As directed      Specialty:  Appointment made with Elena Gonzales NP               Test Results Pending at Discharge       Murali Del Real MD  06/22/18  12:43 PM

## 2018-06-22 NOTE — PHARMACY PATIENT ASSISTANCE
Pharmacy reviewed coverage and pricing for eliquis.  According to her insurance, her copay will be $8.35.    Thank You;  Marva Lamb [facility]  06/22/18  3:56 PM

## 2018-06-22 NOTE — PROGRESS NOTES
Discharge Planning Assessment  MEENAKSHI Leonard     Patient Name: Sara Cardona  MRN: [id number]  Today's Date: 6/22/2018    Admit Date: 6/21/2018      Discharge Plan     Row Name 06/22/18 1346       Plan    Final Discharge Disposition Code 01 - home or self-care    Final Note SS received consult on this date for HH. Pt is being discharged home on this date. Pt does not have home health services at this time. Pt was discharged from [facility] in May. Pt utilizes home 02, rolling walker, nebulizer from [facility]. Pt's family to provide transportation. No further needs at this time        Expected Discharge Date and Time     Expected Discharge Date Expected Discharge Time    Jun 22, 2018             Livier Stewart

## 2018-06-22 NOTE — NURSING NOTE
Pt family member states he has portable oxygen tank and canula in vehicle for patient for transport

## 2018-06-22 NOTE — PROGRESS NOTES
Nephrology Progress Note      Subjective     Patient is ESRD on hemodialysis Tuesday Thursday and Saturday. She was admitted yesterday for elective heart cath for CAD. she is s/p PCI to RCA. She is seen on HD. Denies chest pain or SOB. No nausea, vomiting or diarrhea. She missed HD yesterday due to coronary angiogram    Objective       Vital signs :     Temp:  [97.5 °F (36.4 °C)-98.3 °F (36.8 °C)] 97.7 °F (36.5 °C)  Heart Rate:  [64-87] 71  Resp:  [14-20] 18  BP: (100-153)/(55-94) 115/64    I/O last 3 completed shifts:  In: 157.9 [I.V.:157.9]  Out: -   I/O this shift:  In: 360 [P.O.:360]  Out: -     Physical Exam:    General Appearance : alert, pleasant, appears stated age, cooperative and chronically ill  Head  :  normocephalic, without obvious abnormality and atraumatic  Eyes  :  no pallor  Neck  :  no JVD   Lungs : clear to auscultation, respirations regular  Heart :  regular rhythm & normal rate, normal S1, S2 and no murmur, no rub  Abdomen : normal bowel sounds, no masses, no hepatomegaly, no splenomegaly, soft non-tender and no guarding  Extremities : moves extremities well, 1+ edema, no cyanosis and no redness  Skin :  no bleeding, bruising or rash  Neurologic :   orientated to person, place, time and situation, Grossly no focal deficits  Acess : left arm AVG, thrill and bruit +      Laboratory Data :       WBC WBC   Date Value Ref Range Status   06/22/2018 14.98 (H) 4.50 - 12.50 10*3/mm3 Final      HGB Hemoglobin   Date Value Ref Range Status   06/22/2018 11.4 (L) 12.0 - 16.0 g/dL Final      HCT Hematocrit   Date Value Ref Range Status   06/22/2018 36.9 (L) 37.0 - 47.0 % Final      Platlets No results found for: LABPLAT   MCV MCV   Date Value Ref Range Status   06/22/2018 94.1 (H) 80.0 - 94.0 fL Final          Sodium Sodium   Date Value Ref Range Status   06/22/2018 131 (L) 135 - 153 mmol/L Final   06/21/2018 131 (L) 135 - 153 mmol/L Final      Potassium Potassium   Date Value Ref Range Status   06/22/2018  4.4 3.5 - 5.3 mmol/L Final   06/21/2018 4.6 3.5 - 5.3 mmol/L Final      Chloride Chloride   Date Value Ref Range Status   06/22/2018 100 99 - 112 mmol/L Final   06/21/2018 101 99 - 112 mmol/L Final      CO2 CO2   Date Value Ref Range Status   06/22/2018 23.1 (L) 24.3 - 31.9 mmol/L Final   06/21/2018 20.6 (L) 24.3 - 31.9 mmol/L Final      BUN BUN   Date Value Ref Range Status   06/22/2018 46 (H) 7 - 21 mg/dL Final   06/21/2018 38 (H) 7 - 21 mg/dL Final      Creatinine Creatinine   Date Value Ref Range Status   06/22/2018 4.33 (H) 0.43 - 1.29 mg/dL Final   06/21/2018 4.13 (H) 0.43 - 1.29 mg/dL Final      Calcium Calcium   Date Value Ref Range Status   06/22/2018 8.6 7.7 - 10.0 mg/dL Final   06/21/2018 8.9 7.7 - 10.0 mg/dL Final      PO4 No results found for: CAPO4   Albumin No results found for: ALBUMIN   Magnesium No results found for: MG       PTH               No results found for: PTH      Medications :       aspirin 81 mg Oral Daily   budesonide-formoterol 2 puff Inhalation BID - RT   calcium acetate 2,001 mg Oral TID With Meals   clonazePAM 0.5 mg Oral Q12H   gabapentin 100 mg Oral Nightly   insulin aspart 0-7 Units Subcutaneous 4x Daily AC & at Bedtime   insulin detemir 10 Units Subcutaneous Nightly   isosorbide mononitrate 60 mg Oral QAM   metoprolol tartrate 25 mg Oral Q12H   mupirocin 1 application Topical BID   ranolazine 500 mg Oral Q12H   sodium chloride 1,000 mL Intravenous Once in Dialysis   ticagrelor 90 mg Oral BID            Assessment/Plan     1. ESRD : continue HD TIW. If she is still in hospital will plan for HD tomorrow to resume TTS schedule    2. Anemia : Hb above range, no epogen    3. CAD : s/p PCI to RCA    4. HTN : stable    5. Type 2 Dm stable     Discussed with patient, RN      Jai Chavez MD  06/22/18  9:23 AM

## 2018-06-22 NOTE — PLAN OF CARE
Problem: Patient Care Overview  Goal: Plan of Care Review  Outcome: Ongoing (interventions implemented as appropriate)    Goal: Individualization and Mutuality  Outcome: Ongoing (interventions implemented as appropriate)    Goal: Discharge Needs Assessment  Outcome: Ongoing (interventions implemented as appropriate)    Goal: Interprofessional Rounds/Family Conf  Outcome: Ongoing (interventions implemented as appropriate)      Problem: Cardiac Catheterization (Diagnostic/Interventional) (Adult)  Goal: Signs and Symptoms of Listed Potential Problems Will be Absent, Minimized or Managed (Cardiac Catheterization)  Outcome: Ongoing (interventions implemented as appropriate)    Goal: Anesthesia/Sedation Recovery  Outcome: Ongoing (interventions implemented as appropriate)

## 2018-06-25 ENCOUNTER — TELEPHONE (OUTPATIENT)
Dept: CARDIOLOGY | Facility: CLINIC | Age: 68
End: 2018-06-25

## 2018-06-25 DIAGNOSIS — K92.1: Primary | ICD-10-CM

## 2018-06-25 NOTE — TELEPHONE ENCOUNTER
Sara called to say that she has experienced some bleeding from her rectum.  She was placed on Brilinta s/p stenting last week.  She reports that in the past she has had this problem, but it has never been worrisome and is likely due to hemorrhoids.        I have asked her to stop the Brilinta and start Plavix.  She has already taken her morning dose of Brilinta.  I have instructed her to take the Plavix this evening.      I have referred her to Dr Mckeon (GI) for further eval and work up.

## 2018-07-09 ENCOUNTER — OFFICE VISIT (OUTPATIENT)
Dept: CARDIOLOGY | Facility: CLINIC | Age: 68
End: 2018-07-09

## 2018-07-09 VITALS
BODY MASS INDEX: 24.35 KG/M2 | RESPIRATION RATE: 16 BRPM | WEIGHT: 129 LBS | DIASTOLIC BLOOD PRESSURE: 61 MMHG | HEART RATE: 94 BPM | SYSTOLIC BLOOD PRESSURE: 94 MMHG | HEIGHT: 61 IN

## 2018-07-09 DIAGNOSIS — I10 ESSENTIAL HYPERTENSION: ICD-10-CM

## 2018-07-09 DIAGNOSIS — N18.6 END STAGE RENAL DISEASE (HCC): ICD-10-CM

## 2018-07-09 DIAGNOSIS — I73.9 PERIPHERAL ARTERIAL DISEASE (HCC): ICD-10-CM

## 2018-07-09 DIAGNOSIS — I25.10 ASCVD (ARTERIOSCLEROTIC CARDIOVASCULAR DISEASE): Primary | ICD-10-CM

## 2018-07-09 PROCEDURE — 99214 OFFICE O/P EST MOD 30 MIN: CPT | Performed by: INTERNAL MEDICINE

## 2018-07-09 RX ORDER — CLOPIDOGREL BISULFATE 75 MG/1
75 TABLET ORAL DAILY
COMMUNITY

## 2018-07-09 NOTE — PROGRESS NOTES
Marcelino Sheehan MD  Sara Cardona  1950 07/09/2018    Patient Active Problem List   Diagnosis   • Acute renal failure (CMS/[facility])   • Essential hypertension   • Dyslipidemia   • Diabetic neuropathy (CMS/[facility])   • Chronic pain   • Anxiety   • Anemia   • Acute on chronic renal failure (CMS/[facility])   • CHF (congestive heart failure) (CMS/[facility])   • Acute on chronic respiratory failure (CMS/[facility])   • Shortness of breath   • Tobacco use   • STEPHANIE (obstructive sleep apnea)   • Chest pain at rest   • Sepsis affecting skin (CMS/[facility])   • ASCVD (arteriosclerotic cardiovascular disease) with 70% stenosis of the LAD, left circumflex and about 80% stenosis in the mid PDA on coronary angiography in October 2015.   • Peripheral arterial disease (CMS/[facility])   • Bilateral carotid artery stenosis   • End stage renal disease, on hemodialysis Tuesdays, Thursdays and Saturdays.       Dear Marcelino Sheehan MD:    Subjective     Sara Cardona is a 67 y.o. female with the problems as listed above, presents    Chief complaint: Follow-up of coronary artery disease, status post recent stenting of the right coronary artery on 6/21/2018.    History of Present Illness:  is a pleasant 67-year-old  female with history of known ASCVD, status post recent stenting of the RCA in June 2018, is here for a cardiology follow-up.her recent coronary angiography revealed in addition to severe stenosis in the right coronary artery that had intervention, she also apparently had significant stenosis in the LAD and left circumflex coronary artery.  She still complains of intermittent chest pain or discomfort. She has dyspnea with mild exertion which has been chronic.  She is taking her aspirin and Plavix regularly.    No Known Allergies:      Current Outpatient Prescriptions:   •  albuterol (PROVENTIL HFA;VENTOLIN HFA) 108 (90 Base) MCG/ACT inhaler, Inhale 2 puffs Every 4 (Four) Hours As Needed for Wheezing., Disp: , Rfl:   •  aspirin 81 MG  chewable tablet, Chew 81 mg Daily., Disp: , Rfl:   •  atorvastatin (LIPITOR) 10 MG tablet, Take 1 tablet by mouth Daily., Disp: 90 tablet, Rfl: 0  •  budesonide-formoterol (SYMBICORT) 80-4.5 MCG/ACT inhaler, Inhale 2 puffs 2 (Two) Times a Day., Disp: , Rfl:   •  calcium acetate (PHOSLO) 667 MG capsule, Take 2,001 mg by mouth 3 (Three) Times a Day With Meals., Disp: , Rfl:   •  clonazePAM (KlonoPIN) 0.5 MG tablet, Take 0.5 mg by mouth 2 (Two) Times a Day., Disp: , Rfl:   •  clopidogrel (PLAVIX) 75 MG tablet, Take 75 mg by mouth Daily., Disp: , Rfl:   •  gabapentin (NEURONTIN) 100 MG capsule, Take 1 capsule by mouth every night at bedtime., Disp: 30 capsule, Rfl: 0  •  insulin glargine (LANTUS) 100 UNIT/ML injection, Inject 10 Units under the skin Every Night., Disp: , Rfl:   •  isosorbide mononitrate (IMDUR) 60 MG 24 hr tablet, Take 1 tablet by mouth Every Morning., Disp: 30 tablet, Rfl: 5  •  metoprolol tartrate (LOPRESSOR) 25 MG tablet, Take 1 tablet by mouth Every 12 (Twelve) Hours., Disp: 60 tablet, Rfl: 11  •  mupirocin (BACTROBAN) 2 % ointment, Apply 1 application topically 2 (Two) Times a Day. Applies to back for cellulitis, Disp: , Rfl:   •  nitroglycerin (NITROSTAT) 0.4 MG SL tablet, Place 1 tablet under the tongue Every 5 (Five) Minutes As Needed for Chest Pain. Take no more than 3 doses in 15 minutes., Disp: 15 tablet, Rfl: 0  •  ranolazine (RANEXA) 500 MG 12 hr tablet, Take 1 tablet by mouth Every 12 (Twelve) Hours., Disp: 60 tablet, Rfl: 0  •  oxyCODONE-acetaminophen (PERCOCET) 5-325 MG per tablet, Take 1 tablet by mouth Every 6 (Six) Hours As Needed for Moderate Pain ., Disp: , Rfl:       The following portions of the patient's history were reviewed and updated as appropriate: allergies, current medications, past family history, past medical history, past social history, past surgical history and problem list.    Social History   Substance Use Topics   • Smoking status: Former Smoker     Packs/day: 0.25  "    Years: 40.00     Types: Cigarettes     Quit date: 02/2018   • Smokeless tobacco: Never Used   • Alcohol use No       Review of Systems   Constitution: Negative for chills and fever.   HENT: Negative for nosebleeds and sore throat.    Cardiovascular: Positive for chest pain, claudication and leg swelling.   Respiratory: Positive for shortness of breath. Negative for cough, hemoptysis and wheezing.    Gastrointestinal: Negative for abdominal pain, hematemesis, hematochezia, melena, nausea and vomiting.   Genitourinary: Negative for dysuria and hematuria.   Neurological: Negative for headaches.       Objective   Vitals:    07/09/18 1140   BP: 94/61   Pulse: 94   Resp: 16   Weight: 58.5 kg (129 lb)   Height: 154.9 cm (61\")     Body mass index is 24.37 kg/m².        Physical Exam    Lab Results   Component Value Date     (L) 06/22/2018    K 4.4 06/22/2018     06/22/2018    CO2 23.1 (L) 06/22/2018    BUN 46 (H) 06/22/2018    CREATININE 4.33 (H) 06/22/2018    GLUCOSE 95 06/22/2018    CALCIUM 8.6 06/22/2018    AST 16 06/18/2018    ALT 5 (L) 06/18/2018    ALKPHOS 173 (H) 06/18/2018    LABIL2 0.8 (L) 06/18/2018     Lab Results   Component Value Date    CKTOTAL 109 03/29/2018     Lab Results   Component Value Date    WBC 14.98 (H) 06/22/2018    HGB 11.4 (L) 06/22/2018    HCT 36.9 (L) 06/22/2018     06/22/2018     Lab Results   Component Value Date    INR 1.08 01/12/2018    INR 1.09 11/14/2017    INR 1.08 09/24/2017     Lab Results   Component Value Date    MG 1.8 04/01/2018     Lab Results   Component Value Date    TSH 1.100 01/12/2018    CHLPL 152 10/09/2015    TRIG 80 01/13/2018    HDL 54 (L) 01/13/2018    LDL 60 01/13/2018      Lab Results   Component Value Date    BNP 2,101.0 (H) 01/12/2018     Cardiac catheterization and coronary intervention on 6/21/2018::     FINDINGS:     CORONARY ANGIOGRAPHY: Right dominant coronary artery system.     The left main coronary artery bifurcated into the LAD and " left circumflex coronary.  The LAD coursed in the anterior interventricular groove, gave rise to diagonal branches and reached the apex.     Left circumflex coursed in the left atrial ventricular groove and gives rise to several marginal branches.     The right coronary artery course in the right atrial ventricular groove I gave rise to several acute marginal branches.     Left main: Separate ostia for both LAD and circumflex are noted     LAD: Separate ostium with heavy proximal calcified disease.  Mid LAD shows a focal 75-80% stenosis.  Ramus intermedius: None present   Left circumflex:  Large vessel separate ostium.  Mid segment shows a 75-80% supplying a large ranching marginal branch   RCA: This is a dominant vessel with gag tortuosity and heavy calcification.  There is a proximal 60% stenosis followed by a mid segment 90% stenosis on a curve which is unfavorable for her stents   PDA: Large PDA and posterolateral system with moderate diffuse disease.  There is a focal 90% stenosis in the mid PDA as well.     Final impression:   Multivessel CAD with normal LV function  Patient is on dialysis and does have high risk anatomy options are to proceed with single-vessel stenting of the mid RCA and medical treatment for the rest.  If she fails medical therapy then she would be a candidate for consideration for CABG.     Carter Avelar MD  06/21/18  11:11 AM     During this visit the following were done:  Radiology Reports Reviewed [x]    Hospital Records Reviewed [x]       Procedures    Assessment/Plan :     Diagnosis Plan   1. ASCVD (arteriosclerotic cardiovascular disease) with 70% stenosis of the LAD, left circumflex and about 80% stenosis in the mid PDA on coronary angiography in October 2015.  Ambulatory Referral to Cardiothoracic Surgery   2. Peripheral arterial disease (CMS/HCC)     3. Essential hypertension     4. End stage renal disease, on hemodialysis Tuesdays, Thursdays and Saturdays.           Recommendations:    1. Continue with aspirin, Plavix, atorvastatin, isosorbide mononitrate and metoprolol current doses.  2. Will refer to Dr. Field for her residual coronary artery disease as she still has intermittent anginal symptomsto see if she would be a candidate for CABG.  Dr. Field apparently is already been seeing her for her peripheral arterial disease.  3. Follow up in to 3 months.    Return in about 3 months (around 10/9/2018).    As always, I appreciate very much the opportunity to participate in the cardiovascular care of your patients.      With Best Regards,    Erick Eng MD, [facility]    Dragon disclaimer:  Much of this encounter note is an electronic transcription/translation of spoken language to printed text. The electronic translation of spoken language may permit erroneous, or at times, nonsensical words or phrases to be inadvertently transcribed; Although I have reviewed the note for such errors, some may still exist.

## 2018-08-08 ENCOUNTER — OFFICE VISIT (OUTPATIENT)
Dept: CARDIAC SURGERY | Facility: CLINIC | Age: 68
End: 2018-08-08

## 2018-08-08 VITALS
HEIGHT: 61 IN | SYSTOLIC BLOOD PRESSURE: 101 MMHG | DIASTOLIC BLOOD PRESSURE: 69 MMHG | WEIGHT: 130.8 LBS | HEART RATE: 95 BPM | BODY MASS INDEX: 24.7 KG/M2

## 2018-08-08 DIAGNOSIS — I73.9 PERIPHERAL ARTERIAL DISEASE (HCC): Primary | ICD-10-CM

## 2018-08-08 DIAGNOSIS — I25.10 ASCVD (ARTERIOSCLEROTIC CARDIOVASCULAR DISEASE): ICD-10-CM

## 2018-08-08 PROCEDURE — 99214 OFFICE O/P EST MOD 30 MIN: CPT | Performed by: THORACIC SURGERY (CARDIOTHORACIC VASCULAR SURGERY)

## 2018-08-08 NOTE — PROGRESS NOTES
08/08/2018  Patient Information  Sara Cardona                                                                                          [address] [facility] [place] [address]   1950  'PCP/Referring Physician'  Marcelino Sheehan MD  [phone]  No ref. provider found    Chief Complaint   Patient presents with   • Follow-up     Follow up after cath w/ angio. Angio not done. Patient needs to be seen for CAD for CABG. Patient complains of chest pain    • Peripheral Vascular Disease   • Coronary Artery Disease       History of Present Illness:  68-year-old female with a history of hypertension, dyslipidemia, diabetes, chronic pain, chronic renal failure, congestive heart failure and tobacco abuse who presented to me in May with bilateral calf pain.  She had a cardiac catheterization for chest pain and is referred for possible CABG.  Ms Cardona continues to have chest pain and left lateral thigh pain at rest.  The patient says that her calf pain is horrible at this time.  She is unable to bathe herself and other physicians have told her that she is an extremely high risk candidate for coronary bypass grafting.     Patient Active Problem List   Diagnosis   • Acute renal failure (CMS/HCC)   • Essential hypertension   • Dyslipidemia   • Diabetic neuropathy (CMS/HCC)   • Chronic pain   • Anxiety   • Anemia   • Acute on chronic renal failure (CMS/HCC)   • CHF (congestive heart failure) (CMS/HCC)   • Acute on chronic respiratory failure (CMS/HCC)   • Shortness of breath   • Tobacco use   • STEPHANIE (obstructive sleep apnea)   • Chest pain at rest   • Sepsis affecting skin (CMS/HCC)   • ASCVD (arteriosclerotic cardiovascular disease) with 70% stenosis of the LAD, left circumflex and about 80% stenosis in the mid PDA on coronary angiography in October 2015.   • Peripheral arterial disease (CMS/HCC)   • Bilateral carotid artery stenosis   • End stage renal disease, on hemodialysis Tuesdays, Thursdays and Saturdays.     Past  Medical History:   Diagnosis Date   • Angina at rest (CMS/HCC)    • Anxiety    • Aortic stenosis    • ASCVD (arteriosclerotic cardiovascular disease)    • Breast cancer (CMS/HCC)    • CHF (congestive heart failure) (CMS/HCC) 7/24/2017   • Chronic pain    • COPD (chronic obstructive pulmonary disease) (CMS/HCC)    • Diabetic neuropathy (CMS/HCC)    • Dyslipidemia    • Essential hypertension    • Insulin dependent diabetes mellitus (CMS/HCC)    • Left carotid bruit    • Memory deficits    • Recurrent pneumonia    • Renal failure      Past Surgical History:   Procedure Laterality Date   • APPENDECTOMY     • CARDIAC CATHETERIZATION     • CARDIAC CATHETERIZATION N/A 6/21/2018    Procedure: Left Heart Cath;  Surgeon: Carter Avelar MD;  Location:  [facility]ATH [facility];  Service: Cardiovascular   • COLONOSCOPY     • COLONOSCOPY N/A 7/10/2017    Procedure: COLONOSCOPY;  Surgeon: Zheng Suarez III, MD;  Location: [facility] OR;  Service:    • CORONARY STENT PLACEMENT  06/21/2018    x2 RCA   • ENDOSCOPY N/A 7/10/2017    Procedure: ESOPHAGOGASTRODUODENOSCOPY;  Surgeon: Zheng Suarez III, MD;  Location: [facility] OR;  Service:    • HYSTERECTOMY     • MASTECTOMY Right    • WI RT/LT HEART CATHETERS N/A 6/21/2018    Procedure: Percutaneous Coronary Intervention;  Surgeon: Carter Avelar MD;  Location:  [facility]ATH [facility] LOCATION;  Service: Cardiovascular   • RHINOPLASTY         Current Outpatient Prescriptions:   •  albuterol (PROVENTIL HFA;VENTOLIN HFA) 108 (90 Base) MCG/ACT inhaler, Inhale 2 puffs Every 4 (Four) Hours As Needed for Wheezing., Disp: , Rfl:   •  aspirin 81 MG chewable tablet, Chew 81 mg Daily., Disp: , Rfl:   •  atorvastatin (LIPITOR) 10 MG tablet, Take 1 tablet by mouth Daily., Disp: 90 tablet, Rfl: 0  •  budesonide-formoterol (SYMBICORT) 80-4.5 MCG/ACT inhaler, Inhale 2 puffs 2 (Two) Times a Day., Disp: , Rfl:   •  calcium acetate (PHOSLO) 667 MG capsule, Take 2,001 mg by mouth 3 (Three) Times a  Day With Meals., Disp: , Rfl:   •  clonazePAM (KlonoPIN) 0.5 MG tablet, Take 0.5 mg by mouth 2 (Two) Times a Day., Disp: , Rfl:   •  clopidogrel (PLAVIX) 75 MG tablet, Take 75 mg by mouth Daily., Disp: , Rfl:   •  gabapentin (NEURONTIN) 100 MG capsule, Take 1 capsule by mouth every night at bedtime., Disp: 30 capsule, Rfl: 0  •  insulin glargine (LANTUS) 100 UNIT/ML injection, Inject 10 Units under the skin Every Night., Disp: , Rfl:   •  isosorbide mononitrate (IMDUR) 60 MG 24 hr tablet, Take 1 tablet by mouth Every Morning., Disp: 30 tablet, Rfl: 5  •  metoprolol tartrate (LOPRESSOR) 25 MG tablet, Take 1 tablet by mouth Every 12 (Twelve) Hours., Disp: 60 tablet, Rfl: 11  •  mupirocin (BACTROBAN) 2 % ointment, Apply 1 application topically 2 (Two) Times a Day. Applies to back for cellulitis, Disp: , Rfl:   •  nitroglycerin (NITROSTAT) 0.4 MG SL tablet, Place 1 tablet under the tongue Every 5 (Five) Minutes As Needed for Chest Pain. Take no more than 3 doses in 15 minutes., Disp: 15 tablet, Rfl: 0  •  oxyCODONE-acetaminophen (PERCOCET) 5-325 MG per tablet, Take 1 tablet by mouth Every 6 (Six) Hours As Needed for Moderate Pain ., Disp: , Rfl:   •  ranolazine (RANEXA) 500 MG 12 hr tablet, Take 1 tablet by mouth Every 12 (Twelve) Hours., Disp: 60 tablet, Rfl: 0  No Known Allergies  Social History     Social History   • Marital status:      Spouse name: N/A   • Number of children: 2   • Years of education: N/A     Occupational History   • Retired       and      Social History Main Topics   • Smoking status: Former Smoker     Packs/day: 0.25     Years: 40.00     Types: Cigarettes     Quit date: 02/2018   • Smokeless tobacco: Never Used   • Alcohol use No   • Drug use: No   • Sexual activity: Defer     Other Topics Concern   • Not on file     Social History Narrative   • No narrative on file     Family History   Problem Relation Age of Onset   • Diabetes Mother    • Lung cancer Father    • Heart  "attack Father      Review of Systems   Constitution: Positive for decreased appetite. Negative for chills, fever, malaise/fatigue, night sweats and weight loss.   HENT: Negative for congestion, hearing loss, odynophagia and sore throat.    Cardiovascular: Positive for chest pain, claudication and leg swelling. Negative for dyspnea on exertion, orthopnea and palpitations.   Respiratory: Negative for cough, hemoptysis and shortness of breath.    Endocrine: Negative for cold intolerance, heat intolerance, polydipsia, polyphagia and polyuria.   Hematologic/Lymphatic: Does not bruise/bleed easily.   Skin: Negative for itching and rash.   Musculoskeletal: Negative for back pain, joint pain, joint swelling, muscle cramps, muscle weakness, myalgias and neck pain.   Gastrointestinal: Positive for diarrhea (one week ago ). Negative for abdominal pain, constipation, hematemesis, hematochezia, melena, nausea and vomiting.   Genitourinary: Negative for dysuria, frequency and hematuria.   Neurological: Positive for numbness and paresthesias. Negative for dizziness, focal weakness, headaches, light-headedness, loss of balance and seizures.   Psychiatric/Behavioral: Negative for depression and suicidal ideas. The patient is not nervous/anxious.    All other systems reviewed and are negative.    Vitals:    08/08/18 1126   BP: 101/69   BP Location: Left arm   Patient Position: Sitting   Pulse: 95   Weight: 59.3 kg (130 lb 12.8 oz)   Height: 154.9 cm (61\")      Physical Exam   Constitutional: She is oriented to person, place, and time. She appears well-developed and well-nourished. No distress.   Patient appears older than state age.  She is in a wheelchair and can't take her own shoes off.     HENT:   Head: Normocephalic and atraumatic.   Eyes: Conjunctivae are normal. No scleral icterus.   Neck: Normal range of motion. No JVD present. Carotid bruit is not present. No tracheal deviation present.   Cardiovascular: Normal rate, regular " rhythm and normal heart sounds.  Exam reveals no gallop and no friction rub.    No murmur heard.  Pulses:       Dorsalis pedis pulses are 0 on the right side, and 0 on the left side.        Posterior tibial pulses are 0 on the right side, and 0 on the left side.   No left DP or AT doppler signals.  There are left PT and right DP/PT doppler signals.     Pulmonary/Chest: Effort normal and breath sounds normal. No stridor. No respiratory distress. She has no wheezes. She has no rales.   Abdominal: Soft. She exhibits no distension and no mass. There is no tenderness. There is no rebound and no guarding.   Musculoskeletal: Normal range of motion. She exhibits edema.   Bilateral 1+ pedal edema   Neurological: She is alert and oriented to person, place, and time.   Skin: Skin is warm and dry. No rash noted. She is not diaphoretic. No erythema.   Psychiatric: She has a normal mood and affect. Her behavior is normal. Judgment and thought content normal.       Labs/Imaging:  -Cardiac catheterization performed 6/21/18, personally reviewed, demonstrates 80% mid LAD stenosis, 80% mid circumflex stenosis, proximal 60% RCA stenosis, 90% mid RCA stenosis and 90% mid PDA stenosis.      Assessment/Plan:  68-year-old female with a history of hypertension, dyslipidemia, diabetes, chronic pain, chronic renal failure, congestive heart failure and tobacco abuse who presented to me in May with bilateral calf pain.  She has multivessel coronary artery disease and has unstable angina.  With her extensive list of co-morbidities and extremely poor functional status, the patient is not a reasonable candidate for coronary artery bypass grafting.  Medical therapy should be optimized and possible future stenting should be pursued.  I explained to the patient, that a CTA of her aorta with lower extremity runoff before dialysis would be possible to assess for possible percutaneous intervention.  She agrees with the above plan.      IDr. Rafa  Rashida, personally performed the services described in the documentation as scribed in my presence and the documentation is both accurate and complete.        Patient Active Problem List   Diagnosis   • Acute renal failure (CMS/[facility])   • Essential hypertension   • Dyslipidemia   • Diabetic neuropathy (CMS/[facility])   • Chronic pain   • Anxiety   • Anemia   • Acute on chronic renal failure (CMS/[facility])   • CHF (congestive heart failure) (CMS/[facility])   • Acute on chronic respiratory failure (CMS/[facility])   • Shortness of breath   • Tobacco use   • STEPHANIE (obstructive sleep apnea)   • Chest pain at rest   • Sepsis affecting skin (CMS/[facility])   • ASCVD (arteriosclerotic cardiovascular disease) with 70% stenosis of the LAD, left circumflex and about 80% stenosis in the mid PDA on coronary angiography in October 2015.   • Peripheral arterial disease (CMS/[facility])   • Bilateral carotid artery stenosis   • End stage renal disease, on hemodialysis Tuesdays, Thursdays and Saturdays.     CC: Marcelino Sheehan MD    Scribed for Rafa Field MD by Oralia Nazario. 8/8/2018  12:23 PM

## 2018-08-09 ENCOUNTER — TRANSCRIBE ORDERS (OUTPATIENT)
Dept: ADMINISTRATIVE | Facility: HOSPITAL | Age: 68
End: 2018-08-09

## 2018-08-09 DIAGNOSIS — R19.00 ABDOMINAL MASS, UNSPECIFIED ABDOMINAL LOCATION: Primary | ICD-10-CM

## 2018-08-22 ENCOUNTER — TELEPHONE (OUTPATIENT)
Dept: CARDIAC SURGERY | Facility: CLINIC | Age: 68
End: 2018-08-22

## 2018-08-22 NOTE — TELEPHONE ENCOUNTER
----- Message from Rafa Field MD sent at 8/22/2018  3:59 AM EDT -----  There was discussion of Dr. Avelar performing a lower extremity angiogram.  IF this is not planned or  Performed, she needs to be set up for a CTA aorta with lower extremity runoff.  Thanks

## 2018-08-23 ENCOUNTER — HOSPITAL ENCOUNTER (OUTPATIENT)
Dept: CT IMAGING | Facility: HOSPITAL | Age: 68
Discharge: HOME | End: 2018-08-23
Admitting: RADIOLOGY

## 2018-08-23 ENCOUNTER — APPOINTMENT (OUTPATIENT)
Dept: CT IMAGING | Facility: HOSPITAL | Age: 68
End: 2018-08-23
Attending: INTERNAL MEDICINE

## 2018-08-23 PROCEDURE — 75635 CT ANGIO ABDOMINAL ARTERIES: CPT

## 2018-08-23 PROCEDURE — 25010000002 IOPAMIDOL 61 % SOLUTION: Performed by: RADIOLOGY

## 2018-08-23 PROCEDURE — 75635 CT ANGIO ABDOMINAL ARTERIES: CPT | Performed by: RADIOLOGY

## 2018-08-23 RX ADMIN — IOPAMIDOL 100 ML: 612 INJECTION, SOLUTION INTRAVENOUS at 11:46

## 2018-09-19 ENCOUNTER — OFFICE VISIT (OUTPATIENT)
Dept: CARDIAC SURGERY | Facility: CLINIC | Age: 68
End: 2018-09-19

## 2018-09-19 VITALS
HEIGHT: 61 IN | BODY MASS INDEX: 24.55 KG/M2 | SYSTOLIC BLOOD PRESSURE: 100 MMHG | HEART RATE: 49 BPM | WEIGHT: 130 LBS | DIASTOLIC BLOOD PRESSURE: 70 MMHG

## 2018-09-19 DIAGNOSIS — I73.9 PERIPHERAL ARTERIAL DISEASE (HCC): Primary | ICD-10-CM

## 2018-09-19 PROCEDURE — 99213 OFFICE O/P EST LOW 20 MIN: CPT | Performed by: PHYSICIAN ASSISTANT

## 2018-09-19 NOTE — PROGRESS NOTES
09/19/2018  Patient Information  Sara ELIZABETH  [place] [address]   1950  'PCP/Referring Physician'  Marcelino Sheehan MD  [phone]  No ref. provider found    Chief Complaint   Patient presents with   • Follow-up     1 MO FU with CTA Aorta with Runoff for PVD   • Peripheral Vascular Disease       History of Present Illness:  Patient is a 68-year-old  female with history of tobacco abuse, hypertension, dyslipidemia, chronic pain, diabetes, renal failure, coronary artery disease and peripheral arterial disease who presents to office today for follow-up for discussion and review of recent CTA with runoff.  The patient was last seen on 8/8/18 and complains of interval chest pain and difficulty with ambulation, for which she uses a walker.  The patient also complains of bilateral calf pain, and left leg pain with ambulation after short distances.  She denies any shortness of breath or rest pain at this time.  She is receiving dialysis 3 times per week.  We discussed at her last office visit that she is not an appropriate candidate for a CABG.  We ordered a CTA with runoff to evaluate lower extremity pain she has been experiencing.  She is otherwise doing well.    Patient Active Problem List   Diagnosis   • Acute renal failure (CMS/HCC)   • Essential hypertension   • Dyslipidemia   • Diabetic neuropathy (CMS/HCC)   • Chronic pain   • Anxiety   • Anemia   • Acute on chronic renal failure (CMS/HCC)   • CHF (congestive heart failure) (CMS/HCC)   • Acute on chronic respiratory failure (CMS/HCC)   • Shortness of breath   • Tobacco use   • STEPHANIE (obstructive sleep apnea)   • Chest pain at rest   • Sepsis affecting skin (CMS/HCC)   • ASCVD (arteriosclerotic cardiovascular disease) with 70% stenosis of the LAD, left circumflex and about 80% stenosis in the mid PDA on coronary angiography in October  2015.   • Peripheral arterial disease (CMS/[facility])   • Bilateral carotid artery stenosis   • End stage renal disease, on hemodialysis Tuesdays, Thursdays and Saturdays.     Past Medical History:   Diagnosis Date   • Angina at rest (CMS/[facility])    • Anxiety    • Aortic stenosis    • ASCVD (arteriosclerotic cardiovascular disease)    • Breast cancer (CMS/[facility])    • CHF (congestive heart failure) (CMS/[facility]) 7/24/2017   • Chronic pain    • COPD (chronic obstructive pulmonary disease) (CMS/[facility])    • Diabetic neuropathy (CMS/HCC)    • Dyslipidemia    • Essential hypertension    • Insulin dependent diabetes mellitus (CMS/[facility])    • Left carotid bruit    • Memory deficits    • PVD (peripheral vascular disease) (CMS/[facility])    • Recurrent pneumonia    • Renal failure      Past Surgical History:   Procedure Laterality Date   • APPENDECTOMY     • CARDIAC CATHETERIZATION     • CARDIAC CATHETERIZATION N/A 6/21/2018    Procedure: Left Heart Cath;  Surgeon: Carter Avelar MD;  Location:  [facility] LOCATION;  Service: Cardiovascular   • COLONOSCOPY     • COLONOSCOPY N/A 7/10/2017    Procedure: COLONOSCOPY;  Surgeon: Zheng Suarez III, MD;  Location: [facility] OR;  Service:    • CORONARY STENT PLACEMENT  06/21/2018    x2 RCA   • ENDOSCOPY N/A 7/10/2017    Procedure: ESOPHAGOGASTRODUODENOSCOPY;  Surgeon: Zheng Suarez III, MD;  Location: [facility] OR;  Service:    • HYSTERECTOMY     • MASTECTOMY Right    • CA RT/LT HEART CATHETERS N/A 6/21/2018    Procedure: Percutaneous Coronary Intervention;  Surgeon: Carter Avelar MD;  Location:  [facility];  Service: Cardiovascular   • RHINOPLASTY         Current Outpatient Prescriptions:   •  albuterol (PROVENTIL HFA;VENTOLIN HFA) 108 (90 Base) MCG/ACT inhaler, Inhale 2 puffs Every 4 (Four) Hours As Needed for Wheezing., Disp: , Rfl:   •  aspirin 81 MG chewable tablet, Chew 81 mg Daily., Disp: , Rfl:   •  atorvastatin (LIPITOR) 10 MG tablet, Take 1 tablet by mouth Daily.,  Disp: 90 tablet, Rfl: 0  •  budesonide-formoterol (SYMBICORT) 80-4.5 MCG/ACT inhaler, Inhale 2 puffs 2 (Two) Times a Day., Disp: , Rfl:   •  calcium acetate (PHOSLO) 667 MG capsule, Take 2,001 mg by mouth 3 (Three) Times a Day With Meals., Disp: , Rfl:   •  clonazePAM (KlonoPIN) 0.5 MG tablet, Take 0.5 mg by mouth 2 (Two) Times a Day., Disp: , Rfl:   •  clopidogrel (PLAVIX) 75 MG tablet, Take 75 mg by mouth Daily., Disp: , Rfl:   •  gabapentin (NEURONTIN) 100 MG capsule, Take 1 capsule by mouth every night at bedtime., Disp: 30 capsule, Rfl: 0  •  insulin glargine (LANTUS) 100 UNIT/ML injection, Inject 10 Units under the skin Every Night., Disp: , Rfl:   •  isosorbide mononitrate (IMDUR) 60 MG 24 hr tablet, Take 1 tablet by mouth Every Morning., Disp: 30 tablet, Rfl: 5  •  metoprolol tartrate (LOPRESSOR) 25 MG tablet, Take 1 tablet by mouth Every 12 (Twelve) Hours., Disp: 60 tablet, Rfl: 11  •  mupirocin (BACTROBAN) 2 % ointment, Apply 1 application topically 2 (Two) Times a Day. Applies to back for cellulitis, Disp: , Rfl:   •  nitroglycerin (NITROSTAT) 0.4 MG SL tablet, Place 1 tablet under the tongue Every 5 (Five) Minutes As Needed for Chest Pain. Take no more than 3 doses in 15 minutes., Disp: 15 tablet, Rfl: 0  •  oxyCODONE-acetaminophen (PERCOCET) 5-325 MG per tablet, Take 1 tablet by mouth Every 6 (Six) Hours As Needed for Moderate Pain ., Disp: , Rfl:   •  ranolazine (RANEXA) 500 MG 12 hr tablet, Take 1 tablet by mouth Every 12 (Twelve) Hours., Disp: 60 tablet, Rfl: 0  No Known Allergies  Social History     Social History   • Marital status:      Spouse name: N/A   • Number of children: 2   • Years of education: N/A     Occupational History   • Retired       and      Social History Main Topics   • Smoking status: Former Smoker     Packs/day: 0.25     Years: 40.00     Types: Cigarettes     Quit date: 02/2018   • Smokeless tobacco: Never Used   • Alcohol use No   • Drug use: No   •  "Sexual activity: Defer     Other Topics Concern   • Not on file     Social History Narrative    Lives in [place].      Family History   Problem Relation Age of Onset   • Diabetes Mother    • Lung cancer Father    • Heart attack Father      Review of Systems   Constitution: Positive for weight loss (Fluctuates with Dialysis). Negative for chills, fever, malaise/fatigue and night sweats.   HENT: Negative for hearing loss, odynophagia and sore throat.    Cardiovascular: Positive for claudication (pain in legs, unable to walk very much) and leg swelling. Negative for chest pain, dyspnea on exertion, orthopnea and palpitations.   Respiratory: Positive for cough. Negative for hemoptysis.    Endocrine: Negative for cold intolerance, heat intolerance, polydipsia, polyphagia and polyuria.   Hematologic/Lymphatic: Bruises/bleeds easily.   Skin: Positive for poor wound healing (Back area). Negative for itching and rash.   Musculoskeletal: Positive for joint pain. Negative for joint swelling and myalgias.   Gastrointestinal: Positive for abdominal pain, hematochezia and nausea. Negative for constipation, diarrhea, hematemesis, melena and vomiting.   Genitourinary: Positive for hematuria. Negative for dysuria and frequency.   Neurological: Positive for dizziness and light-headedness. Negative for focal weakness, headaches, numbness and seizures.   Psychiatric/Behavioral: Negative for suicidal ideas.   All other systems reviewed and are negative.    Vitals:    09/19/18 0918   BP: 100/70   BP Location: Right arm   Patient Position: Sitting   Pulse: (!) 49   Weight: 59 kg (130 lb)   Height: 154.9 cm (61\")      Physical Exam:  Gen - NAD, pleasant, cooperative  CV - Regular rate and rhythm, no murmur gallop or rub  Pulm - Lungs clear to auscultation without wheeze or rhonchi   GI - Soft, normoactive bowel sounds, non-tender, distended  Ext - trace edema noted bilaterally, 2 ulcerations noted on her anterior left shin measuring " approximately 0.75cm each, discoloration noted bilaterally, feet cool to couch, left low ext pulses absent, right low ext dorsalis pedis pulse present - right posterior tibialis pulse absent  Skin - Warm and dry     Labs/Imagin18 CTA of the abdomen with runoff  1. Small right pleural effusion and cardiomegaly. Generalized body wall  anasarca.  2. Coronary artery calcifications that are very dense versus possibly  some stents.  3.There is multifocal atherosclerotic vascular calcification. Moderate  to severe right external iliac artery stenosis with some moderate right  and left common femoral artery stenosis. Varying degrees of moderate to  severe mid and distal superficial femoral artery stenosis with some  regions of complete occlusion of the left side with reconstitution of  the popliteal artery but regions of fairly dense calcifications  throughout the posterior and anterior tibial arteries.    Assessment/Plan:  Patient is a 68-year-old  female with history of tobacco abuse, hypertension, dyslipidemia, chronic pain, diabetes, renal failure, coronary artery disease and peripheral arterial disease who presents to office today for follow-up for discussion and review of recent CTA with runoff.  The patient has continued lower external he pain, since her last office visit.  I personally reviewed the patient's recent CTA with runoff, and discussed the results with the patient and family, answering all questions to their satisfaction.  The patient has very diffuse peripheral vascular disease, and is also not a reasonable candidate for surgical intervention.  I encouraged the patient to take excellent care of her feet, always wearing comfortable shoes and socks.  I will refer the patient to a podiatrist to ensure proper foot care.  As there is nothing for us to do at this time, we will follow up with the patient as needed.  She may call our office at any time during the interval, should any questions or  concerns arise.  If the patient develops any acutely worsening symptoms, she should present to the nearest emergency department immediately.      Patient Active Problem List   Diagnosis   • Acute renal failure (CMS/HCC)   • Essential hypertension   • Dyslipidemia   • Diabetic neuropathy (CMS/HCC)   • Chronic pain   • Anxiety   • Anemia   • Acute on chronic renal failure (CMS/HCC)   • CHF (congestive heart failure) (CMS/HCC)   • Acute on chronic respiratory failure (CMS/HCC)   • Shortness of breath   • Tobacco use   • STEPHANIE (obstructive sleep apnea)   • Chest pain at rest   • Sepsis affecting skin (CMS/[facility])   • ASCVD (arteriosclerotic cardiovascular disease) with 70% stenosis of the LAD, left circumflex and about 80% stenosis in the mid PDA on coronary angiography in October 2015.   • Peripheral arterial disease (CMS/[facility])   • Bilateral carotid artery stenosis   • End stage renal disease, on hemodialysis Tuesdays, Thursdays and Saturdays.     Signed by:

## 2018-10-18 ENCOUNTER — APPOINTMENT (OUTPATIENT)
Dept: GENERAL RADIOLOGY | Facility: HOSPITAL | Age: 68
End: 2018-10-18

## 2018-10-18 ENCOUNTER — HOSPITAL ENCOUNTER (EMERGENCY)
Facility: HOSPITAL | Age: 68
Discharge: ANOTHER HOSPITAL | End: 2018-10-18
Attending: EMERGENCY MEDICINE | Admitting: EMERGENCY MEDICINE

## 2018-10-18 VITALS
HEART RATE: 88 BPM | HEIGHT: 61 IN | DIASTOLIC BLOOD PRESSURE: 61 MMHG | WEIGHT: 125 LBS | RESPIRATION RATE: 18 BRPM | TEMPERATURE: 97.9 F | BODY MASS INDEX: 23.6 KG/M2 | SYSTOLIC BLOOD PRESSURE: 97 MMHG | OXYGEN SATURATION: 99 %

## 2018-10-18 DIAGNOSIS — J18.9 COMMUNITY ACQUIRED PNEUMONIA OF RIGHT LOWER LOBE OF LUNG: ICD-10-CM

## 2018-10-18 DIAGNOSIS — E87.5 HYPERKALEMIA: ICD-10-CM

## 2018-10-18 DIAGNOSIS — N18.6 END-STAGE RENAL DISEASE ON HEMODIALYSIS (HCC): ICD-10-CM

## 2018-10-18 DIAGNOSIS — K92.2 GASTROINTESTINAL HEMORRHAGE, UNSPECIFIED GASTROINTESTINAL HEMORRHAGE TYPE: ICD-10-CM

## 2018-10-18 DIAGNOSIS — A41.9 SEPTIC SHOCK (HCC): Primary | ICD-10-CM

## 2018-10-18 DIAGNOSIS — D50.0 BLOOD LOSS ANEMIA: ICD-10-CM

## 2018-10-18 DIAGNOSIS — R65.21 SEPTIC SHOCK (HCC): Primary | ICD-10-CM

## 2018-10-18 DIAGNOSIS — Z99.2 END-STAGE RENAL DISEASE ON HEMODIALYSIS (HCC): ICD-10-CM

## 2018-10-18 LAB
A-A DO2: 114.6 MMHG (ref 0–300)
ABO GROUP BLD: NORMAL
ABO GROUP BLD: NORMAL
ACANTHOCYTES BLD QL SMEAR: ABNORMAL
ALBUMIN SERPL-MCNC: 2.9 G/DL (ref 3.4–4.8)
ALBUMIN/GLOB SERPL: 0.9 G/DL (ref 1.5–2.5)
ALP SERPL-CCNC: 93 U/L (ref 35–104)
ALT SERPL W P-5'-P-CCNC: 7 U/L (ref 10–36)
ANION GAP SERPL CALCULATED.3IONS-SCNC: 22 MMOL/L (ref 3.6–11.2)
ANISOCYTOSIS BLD QL: ABNORMAL
APTT PPP: 30.9 SECONDS (ref 23.8–36.1)
ARTERIAL PATENCY WRIST A: ABNORMAL
AST SERPL-CCNC: 14 U/L (ref 10–30)
ATMOSPHERIC PRESS: 733 MMHG
BASE EXCESS BLDA CALC-SCNC: -14.5 MMOL/L
BDY SITE: ABNORMAL
BILIRUB SERPL-MCNC: 0.8 MG/DL (ref 0.2–1.8)
BLD GP AB SCN SERPL QL: NEGATIVE
BODY TEMPERATURE: 98.6 C
BUN BLD-MCNC: 64 MG/DL (ref 7–21)
BUN/CREAT SERPL: 19.2 (ref 7–25)
CALCIUM SPEC-SCNC: 8.4 MG/DL (ref 7.7–10)
CHLORIDE SERPL-SCNC: 95 MMOL/L (ref 99–112)
CO2 SERPL-SCNC: 13 MMOL/L (ref 24.3–31.9)
COHGB MFR BLD: 2.9 % (ref 0–5)
CREAT BLD-MCNC: 3.33 MG/DL (ref 0.43–1.29)
D-LACTATE SERPL-SCNC: 14.1 MMOL/L (ref 0.5–2)
DEPRECATED RDW RBC AUTO: 67.6 FL (ref 37–54)
DEVELOPER EXPIRATION DATE: ABNORMAL
DEVELOPER LOT NUMBER: ABNORMAL
ERYTHROCYTE [DISTWIDTH] IN BLOOD BY AUTOMATED COUNT: 23 % (ref 11.5–14.5)
EXPIRATION DATE: ABNORMAL
FECAL OCCULT BLOOD SCREEN, POC: POSITIVE
GAS FLOW AIRWAY: 2 LPM
GFR SERPL CREATININE-BSD FRML MDRD: 14 ML/MIN/1.73
GFR SERPL CREATININE-BSD FRML MDRD: ABNORMAL ML/MIN/1.73
GLOBULIN UR ELPH-MCNC: 3.2 GM/DL
GLUCOSE BLD-MCNC: 155 MG/DL (ref 70–110)
HCO3 BLDA-SCNC: 13.5 MMOL/L (ref 22–26)
HCT VFR BLD AUTO: 17.5 % (ref 37–47)
HCT VFR BLD CALC: 13 % (ref 37–47)
HGB BLD-MCNC: 4.5 G/DL (ref 12–16)
HGB BLDA-MCNC: 4.4 G/DL (ref 12–16)
HOLD SPECIMEN: NORMAL
HOROWITZ INDEX BLD+IHG-RTO: 28 %
HYPOCHROMIA BLD QL: ABNORMAL
INR PPP: 1.88 (ref 0.9–1.1)
LARGE PLATELETS: ABNORMAL
LYMPHOCYTES # BLD MANUAL: 1.35 10*3/MM3 (ref 1–3)
LYMPHOCYTES NFR BLD MANUAL: 11 % (ref 16–46)
LYMPHOCYTES NFR BLD MANUAL: 5 % (ref 0–12)
Lab: ABNORMAL
MAGNESIUM SERPL-MCNC: 2 MG/DL (ref 1.7–2.6)
MCH RBC QN AUTO: 22.3 PG (ref 27–33)
MCHC RBC AUTO-ENTMCNC: 25.7 G/DL (ref 33–37)
MCV RBC AUTO: 86.6 FL (ref 80–94)
METHGB BLD QL: 2.7 % (ref 0–3)
MODALITY: ABNORMAL
MONOCYTES # BLD AUTO: 0.62 10*3/MM3 (ref 0.1–0.9)
NEGATIVE CONTROL: NEGATIVE
NEUTROPHILS # BLD AUTO: 10.34 10*3/MM3 (ref 1.4–6.5)
NEUTROPHILS NFR BLD MANUAL: 84 % (ref 40–75)
NRBC SPEC MANUAL: 3 /100 WBC (ref 0–0)
OSMOLALITY SERPL CALC.SUM OF ELEC: 282.3 MOSM/KG (ref 273–305)
OVALOCYTES BLD QL SMEAR: ABNORMAL
OXYHGB MFR BLDV: 21.3 % (ref 85–100)
PCO2 BLDA: 44.1 MM HG (ref 35–45)
PH BLDA: 7.1 PH UNITS (ref 7.35–7.45)
PLATELET # BLD AUTO: 296 10*3/MM3 (ref 130–400)
PMV BLD AUTO: 11.2 FL (ref 6–10)
PO2 BLDA: 25.4 MM HG (ref 80–100)
POLYCHROMASIA BLD QL SMEAR: ABNORMAL
POSITIVE CONTROL: POSITIVE
POTASSIUM BLD-SCNC: 6.2 MMOL/L (ref 3.5–5.3)
PROT SERPL-MCNC: 6.1 G/DL (ref 6–8)
PROTHROMBIN TIME: 21.8 SECONDS (ref 11–15.4)
RBC # BLD AUTO: 2.02 10*6/MM3 (ref 4.2–5.4)
RH BLD: NEGATIVE
RH BLD: NEGATIVE
SAO2 % BLDCOA: 22.6 % (ref 90–100)
SCAN SLIDE: NORMAL
SCHISTOCYTES BLD QL SMEAR: ABNORMAL
SMALL PLATELETS BLD QL SMEAR: ADEQUATE
SODIUM BLD-SCNC: 130 MMOL/L (ref 135–153)
T&S EXPIRATION DATE: NORMAL
TARGETS BLD QL SMEAR: ABNORMAL
WBC NRBC COR # BLD: 12.31 10*3/MM3 (ref 4.5–12.5)

## 2018-10-18 PROCEDURE — 93005 ELECTROCARDIOGRAM TRACING: CPT | Performed by: PHYSICIAN ASSISTANT

## 2018-10-18 PROCEDURE — 83605 ASSAY OF LACTIC ACID: CPT | Performed by: EMERGENCY MEDICINE

## 2018-10-18 PROCEDURE — 25010000002 LINEZOLID 600 MG/300ML SOLUTION: Performed by: EMERGENCY MEDICINE

## 2018-10-18 PROCEDURE — 93005 ELECTROCARDIOGRAM TRACING: CPT | Performed by: EMERGENCY MEDICINE

## 2018-10-18 PROCEDURE — 36430 TRANSFUSION BLD/BLD COMPNT: CPT

## 2018-10-18 PROCEDURE — 86850 RBC ANTIBODY SCREEN: CPT | Performed by: EMERGENCY MEDICINE

## 2018-10-18 PROCEDURE — 36415 COLL VENOUS BLD VENIPUNCTURE: CPT

## 2018-10-18 PROCEDURE — 96367 TX/PROPH/DG ADDL SEQ IV INF: CPT

## 2018-10-18 PROCEDURE — 74018 RADEX ABDOMEN 1 VIEW: CPT

## 2018-10-18 PROCEDURE — 82270 OCCULT BLOOD FECES: CPT | Performed by: EMERGENCY MEDICINE

## 2018-10-18 PROCEDURE — P9016 RBC LEUKOCYTES REDUCED: HCPCS

## 2018-10-18 PROCEDURE — C1751 CATH, INF, PER/CENT/MIDLINE: HCPCS

## 2018-10-18 PROCEDURE — 85025 COMPLETE CBC W/AUTO DIFF WBC: CPT | Performed by: EMERGENCY MEDICINE

## 2018-10-18 PROCEDURE — 80053 COMPREHEN METABOLIC PANEL: CPT | Performed by: EMERGENCY MEDICINE

## 2018-10-18 PROCEDURE — 94799 UNLISTED PULMONARY SVC/PX: CPT

## 2018-10-18 PROCEDURE — 96375 TX/PRO/DX INJ NEW DRUG ADDON: CPT

## 2018-10-18 PROCEDURE — 94640 AIRWAY INHALATION TREATMENT: CPT

## 2018-10-18 PROCEDURE — 83050 HGB METHEMOGLOBIN QUAN: CPT | Performed by: EMERGENCY MEDICINE

## 2018-10-18 PROCEDURE — 36600 WITHDRAWAL OF ARTERIAL BLOOD: CPT | Performed by: EMERGENCY MEDICINE

## 2018-10-18 PROCEDURE — 71045 X-RAY EXAM CHEST 1 VIEW: CPT

## 2018-10-18 PROCEDURE — 74018 RADEX ABDOMEN 1 VIEW: CPT | Performed by: RADIOLOGY

## 2018-10-18 PROCEDURE — 71045 X-RAY EXAM CHEST 1 VIEW: CPT | Performed by: RADIOLOGY

## 2018-10-18 PROCEDURE — 86900 BLOOD TYPING SEROLOGIC ABO: CPT

## 2018-10-18 PROCEDURE — 82375 ASSAY CARBOXYHB QUANT: CPT | Performed by: EMERGENCY MEDICINE

## 2018-10-18 PROCEDURE — 86900 BLOOD TYPING SEROLOGIC ABO: CPT | Performed by: EMERGENCY MEDICINE

## 2018-10-18 PROCEDURE — 85007 BL SMEAR W/DIFF WBC COUNT: CPT | Performed by: EMERGENCY MEDICINE

## 2018-10-18 PROCEDURE — 86901 BLOOD TYPING SEROLOGIC RH(D): CPT | Performed by: EMERGENCY MEDICINE

## 2018-10-18 PROCEDURE — 96365 THER/PROPH/DIAG IV INF INIT: CPT

## 2018-10-18 PROCEDURE — 85730 THROMBOPLASTIN TIME PARTIAL: CPT | Performed by: EMERGENCY MEDICINE

## 2018-10-18 PROCEDURE — 83735 ASSAY OF MAGNESIUM: CPT | Performed by: EMERGENCY MEDICINE

## 2018-10-18 PROCEDURE — 85610 PROTHROMBIN TIME: CPT | Performed by: EMERGENCY MEDICINE

## 2018-10-18 PROCEDURE — 99291 CRITICAL CARE FIRST HOUR: CPT

## 2018-10-18 PROCEDURE — 87040 BLOOD CULTURE FOR BACTERIA: CPT | Performed by: EMERGENCY MEDICINE

## 2018-10-18 PROCEDURE — 63710000001 INSULIN REGULAR HUMAN PER 5 UNITS: Performed by: EMERGENCY MEDICINE

## 2018-10-18 PROCEDURE — 96361 HYDRATE IV INFUSION ADD-ON: CPT

## 2018-10-18 PROCEDURE — 25010000002 MEROPENEM: Performed by: EMERGENCY MEDICINE

## 2018-10-18 PROCEDURE — 86901 BLOOD TYPING SEROLOGIC RH(D): CPT

## 2018-10-18 PROCEDURE — 82805 BLOOD GASES W/O2 SATURATION: CPT | Performed by: EMERGENCY MEDICINE

## 2018-10-18 PROCEDURE — 86920 COMPATIBILITY TEST SPIN: CPT

## 2018-10-18 PROCEDURE — 25010000002 CEFTRIAXONE: Performed by: EMERGENCY MEDICINE

## 2018-10-18 RX ORDER — SODIUM CHLORIDE 0.9 % (FLUSH) 0.9 %
10 SYRINGE (ML) INJECTION AS NEEDED
Status: DISCONTINUED | OUTPATIENT
Start: 2018-10-18 | End: 2018-10-18 | Stop reason: HOSPADM

## 2018-10-18 RX ORDER — DEXTROSE MONOHYDRATE 25 G/50ML
25 INJECTION, SOLUTION INTRAVENOUS ONCE
Status: COMPLETED | OUTPATIENT
Start: 2018-10-18 | End: 2018-10-18

## 2018-10-18 RX ORDER — SODIUM CHLORIDE 9 MG/ML
125 INJECTION, SOLUTION INTRAVENOUS CONTINUOUS
Status: DISCONTINUED | OUTPATIENT
Start: 2018-10-18 | End: 2018-10-18 | Stop reason: HOSPADM

## 2018-10-18 RX ORDER — ALBUTEROL SULFATE 2.5 MG/3ML
2.5 SOLUTION RESPIRATORY (INHALATION) ONCE
Status: COMPLETED | OUTPATIENT
Start: 2018-10-18 | End: 2018-10-18

## 2018-10-18 RX ORDER — LINEZOLID 2 MG/ML
600 INJECTION, SOLUTION INTRAVENOUS ONCE
Status: COMPLETED | OUTPATIENT
Start: 2018-10-18 | End: 2018-10-18

## 2018-10-18 RX ORDER — SODIUM CHLORIDE 9 MG/ML
INJECTION, SOLUTION INTRAVENOUS
Status: DISCONTINUED
Start: 2018-10-18 | End: 2018-10-18 | Stop reason: HOSPADM

## 2018-10-18 RX ADMIN — CEFTRIAXONE 2 G: 2 INJECTION, POWDER, FOR SOLUTION INTRAMUSCULAR; INTRAVENOUS at 10:27

## 2018-10-18 RX ADMIN — SODIUM CHLORIDE 500 ML: 9 INJECTION, SOLUTION INTRAVENOUS at 09:08

## 2018-10-18 RX ADMIN — SODIUM CHLORIDE 350 ML: 9 INJECTION, SOLUTION INTRAVENOUS at 10:26

## 2018-10-18 RX ADMIN — LINEZOLID 600 MG: 600 INJECTION, SOLUTION INTRAVENOUS at 12:38

## 2018-10-18 RX ADMIN — DEXTROSE MONOHYDRATE 25 G: 25 INJECTION, SOLUTION INTRAVENOUS at 08:47

## 2018-10-18 RX ADMIN — HUMAN INSULIN 10 UNITS: 100 INJECTION, SOLUTION SUBCUTANEOUS at 08:47

## 2018-10-18 RX ADMIN — ALBUTEROL SULFATE 2.5 MG: 2.5 SOLUTION RESPIRATORY (INHALATION) at 08:48

## 2018-10-18 RX ADMIN — SODIUM CHLORIDE 500 ML: 9 INJECTION, SOLUTION INTRAVENOUS at 07:16

## 2018-10-18 RX ADMIN — MEROPENEM 1 G: 1 INJECTION, POWDER, FOR SOLUTION INTRAVENOUS at 12:01

## 2018-10-18 RX ADMIN — SODIUM CHLORIDE 350 ML: 9 INJECTION, SOLUTION INTRAVENOUS at 12:00

## 2018-10-18 RX ADMIN — SODIUM CHLORIDE 125 ML/HR: 9 INJECTION, SOLUTION INTRAVENOUS at 07:15

## 2018-10-18 RX ADMIN — CALCIUM CHLORIDE 1 G: 100 INJECTION, SOLUTION INTRAVENOUS at 09:23

## 2018-10-18 NOTE — ED PROVIDER NOTES
Subjective   Pt sent from dialysis for weakness/lethargy.  Pt presented for her normally scheduled dialysis this morning, too weak to get out of car.  Had truncated dialysis on Tuesday.  Dialysis was stopped due to clogged line/machine.  She has had decreased appetitie and PO intake for 2-3 days.  No fever.  No cough.  No CP    EMS reports hypotension        History provided by:  Patient, spouse, caregiver and EMS personnel  Weakness - Generalized   Severity:  Severe  Onset quality:  Unable to specify  Progression:  Worsening  Chronicity:  New  Context: dehydration    Context: not alcohol use, not allergies, not change in medication, not decreased sleep, not drug use, not increased activity, not pinched nerve, not recent infection, not stress and not urinary tract infection    Relieved by:  Nothing  Worsened by:  Activity  Ineffective treatments:  None tried  Associated symptoms: anorexia, difficulty walking, dizziness, lethargy and nausea    Associated symptoms: no abdominal pain, no aphasia, no arthralgias, no ataxia, no chest pain, no cough, no diarrhea, no drooling, no dysphagia, no dysuria, no numbness in extremities, no falls, no fever, no foul-smelling urine, no frequency, no headaches, no hematochezia, no loss of consciousness, no melena, no myalgias, no near-syncope, no seizures, no sensory-motor deficit, no shortness of breath, no stroke symptoms, no syncope, no urgency, no vision change and no vomiting    Risk factors: congestive heart failure, coronary artery disease, diabetes and heart disease    Risk factors: no excessive menstruation, no family hx of stroke, no neurologic disease and no new medications        Review of Systems   Constitutional: Negative for fever.   HENT: Negative.  Negative for drooling.    Eyes: Negative.    Respiratory: Negative.  Negative for cough, chest tightness and shortness of breath.    Cardiovascular: Positive for leg swelling. Negative for chest pain, syncope and  near-syncope.   Gastrointestinal: Positive for anorexia and nausea. Negative for abdominal pain, diarrhea, dysphagia, hematochezia, melena and vomiting.   Endocrine: Negative.    Genitourinary: Negative.  Negative for dysuria, frequency and urgency.   Musculoskeletal: Negative.  Negative for arthralgias, falls and myalgias.   Skin: Negative.    Allergic/Immunologic: Negative.    Neurological: Positive for dizziness and weakness. Negative for seizures, loss of consciousness and headaches.   Hematological: Negative.    Psychiatric/Behavioral: Negative.    All other systems reviewed and are negative.      Past Medical History:   Diagnosis Date   • Angina at rest (CMS/[facility])    • Anxiety    • Aortic stenosis    • ASCVD (arteriosclerotic cardiovascular disease)    • Breast cancer (CMS/[facility])    • CHF (congestive heart failure) (CMS/[facility]) 7/24/2017   • Chronic pain    • COPD (chronic obstructive pulmonary disease) (CMS/[facility])    • Diabetic neuropathy (CMS/[facility])    • Dyslipidemia    • Essential hypertension    • Insulin dependent diabetes mellitus (CMS/[facility])    • Left carotid bruit    • Memory deficits    • PVD (peripheral vascular disease) (CMS/HCC)    • Recurrent pneumonia    • Renal failure    • Renal failure        No Known Allergies    Past Surgical History:   Procedure Laterality Date   • APPENDECTOMY     • CARDIAC CATHETERIZATION     • CARDIAC CATHETERIZATION N/A 6/21/2018    Procedure: Left Heart Cath;  Surgeon: Carter Avelar MD;  Location: [facility] CATH INVASIVE LOCATION;  Service: Cardiovascular   • COLONOSCOPY     • COLONOSCOPY N/A 7/10/2017    Procedure: COLONOSCOPY;  Surgeon: Zheng Suarez III, MD;  Location: [facility];  Service:    • CORONARY STENT PLACEMENT  06/21/2018    x2 RCA   • ENDOSCOPY N/A 7/10/2017    Procedure: ESOPHAGOGASTRODUODENOSCOPY;  Surgeon: Zheng Suarez III, MD;  Location: [facility];  Service:    • HYSTERECTOMY     • MASTECTOMY Right    • OH RT/LT HEART CATHETERS N/A 6/21/2018     Procedure: Percutaneous Coronary Intervention;  Surgeon: Carter Avelar MD;  Location: [facility] INVASIVE LOCATION;  Service: Cardiovascular   • RHINOPLASTY         Family History   Problem Relation Age of Onset   • Diabetes Mother    • Lung cancer Father    • Heart attack Father        Social History     Social History   • Marital status:    • Number of children: 2     Occupational History   • Retired       and      Social History Main Topics   • Smoking status: Former Smoker     Packs/day: 0.25     Years: 40.00     Types: Cigarettes     Quit date: 02/2018   • Smokeless tobacco: Never Used   • Alcohol use No   • Drug use: No   • Sexual activity: Defer     Other Topics Concern   • Not on file     Social History Narrative    Lives in [place].            Objective   Physical Exam   Constitutional: She is oriented to person, place, and time. She appears well-developed. No distress.   HENT:   Head: Normocephalic and atraumatic.   Nose: Nose normal.   Mouth/Throat: Oropharynx is clear and moist.   Eyes: EOM are normal. Right eye exhibits no discharge. Left eye exhibits no discharge. No scleral icterus.   Conjunctiva pale   Neck: Normal range of motion. No JVD present. No tracheal deviation present. No thyromegaly present.   Cardiovascular: Normal heart sounds and intact distal pulses.  Exam reveals no gallop and no friction rub.    No murmur heard.  Hypotensive on monitor   Pulmonary/Chest: Effort normal and breath sounds normal. No stridor. No respiratory distress. She has no wheezes. She has no rales.   Abdominal: Soft. She exhibits no distension. There is no tenderness. There is no guarding.   Musculoskeletal: She exhibits edema.   Lymphadenopathy:     She has no cervical adenopathy.   Neurological: She is alert and oriented to person, place, and time. No cranial nerve deficit or sensory deficit. Coordination normal.   2+ Pitting pretibial edema   Skin: Skin is warm. Capillary refill  takes 2 to 3 seconds. There is pallor.   Psychiatric:   Flat affect   Nursing note and vitals reviewed.      Central Line At Bedside  Date/Time: 10/18/2018 2:36 PM  Performed by: JENNIFER GUILLEN  Authorized by: JENNIFER GUILLEN     Consent:     Consent obtained:  Verbal    Consent given by:  Patient    Risks discussed:  Arterial puncture, incorrect placement, nerve damage, infection, pneumothorax and bleeding    Alternatives discussed:  Alternative treatment and no treatment  Pre-procedure details:     Hand hygiene: Hand hygiene performed prior to insertion      Sterile barrier technique: All elements of maximal sterile technique followed      Skin preparation:  2% chlorhexidine    Skin preparation agent: Skin preparation agent completely dried prior to procedure    Anesthesia (see MAR for exact dosages):     Anesthesia method:  Local infiltration    Local anesthetic:  Lidocaine 1% w/o epi  Procedure details:     Location:  R femoral    Site selection rationale:  Status post right mastectomy, hemodialysis shunt left upper extremity.    Patient position:  Flat    Procedural supplies:  Triple lumen    Catheter size:  7 Fr    Landmarks identified: yes      Ultrasound guidance: no      Number of attempts:  1    Successful placement: yes    Post-procedure details:     Post-procedure:  Dressing applied and line sutured    Assessment:  Blood return through all ports, placement verified by x-ray and free fluid flow    Patient tolerance of procedure:  Tolerated well, no immediate complications      Results for orders placed or performed during the hospital encounter of 10/18/18   Comprehensive Metabolic Panel   Result Value Ref Range    Glucose 155 (H) 70 - 110 mg/dL    BUN 64 (H) 7 - 21 mg/dL    Creatinine 3.33 (H) 0.43 - 1.29 mg/dL    Sodium 130 (L) 135 - 153 mmol/L    Potassium 6.2 (C) 3.5 - 5.3 mmol/L    Chloride 95 (L) 99 - 112 mmol/L    CO2 13.0 (L) 24.3 - 31.9 mmol/L    Calcium 8.4 7.7 - 10.0 mg/dL    Total Protein 6.1 6.0  - 8.0 g/dL    Albumin 2.90 (L) 3.40 - 4.80 g/dL    ALT (SGPT) 7 (L) 10 - 36 U/L    AST (SGOT) 14 10 - 30 U/L    Alkaline Phosphatase 93 35 - 104 U/L    Total Bilirubin 0.8 0.2 - 1.8 mg/dL    eGFR Non African Amer 14 (L) >60 mL/min/1.73    eGFR  African Amer  >60 mL/min/1.73    Globulin 3.2 gm/dL    A/G Ratio 0.9 (L) 1.5 - 2.5 g/dL    BUN/Creatinine Ratio 19.2 7.0 - 25.0    Anion Gap 22.0 (H) 3.6 - 11.2 mmol/L   aPTT   Result Value Ref Range    PTT 30.9 23.8 - 36.1 seconds   Protime-INR   Result Value Ref Range    Protime 21.8 (H) 11.0 - 15.4 Seconds    INR 1.88 (H) 0.90 - 1.10   Magnesium   Result Value Ref Range    Magnesium 2.0 1.7 - 2.6 mg/dL   CBC Auto Differential   Result Value Ref Range    WBC 12.31 4.50 - 12.50 10*3/mm3    RBC 2.02 (L) 4.20 - 5.40 10*6/mm3    Hemoglobin 4.5 (C) 12.0 - 16.0 g/dL    Hematocrit 17.5 (C) 37.0 - 47.0 %    MCV 86.6 80.0 - 94.0 fL    MCH 22.3 (L) 27.0 - 33.0 pg    MCHC 25.7 (L) 33.0 - 37.0 g/dL    RDW 23.0 (H) 11.5 - 14.5 %    RDW-SD 67.6 (H) 37.0 - 54.0 fl    MPV 11.2 (H) 6.0 - 10.0 fL    Platelets 296 130 - 400 10*3/mm3   Scan Slide   Result Value Ref Range    Scan Slide     Osmolality, Calculated   Result Value Ref Range    Osmolality Calc 282.3 273.0 - 305.0 mOsm/kg   Blood Gas, Arterial   Result Value Ref Range    Site Arterial: left femoral     Vince's Test N/A     pH, Arterial 7.103 (C) 7.350 - 7.450 pH units    pCO2, Arterial 44.1 35.0 - 45.0 mm Hg    pO2, Arterial 25.4 (C) 80.0 - 100.0 mm Hg    HCO3, Arterial 13.5 (C) 22.0 - 26.0 mmol/L    Base Excess, Arterial -14.5 mmol/L    O2 Saturation, Arterial 22.6 (C) 90.0 - 100.0 %    Hemoglobin, Blood Gas 4.4 (C) 12 - 16 g/dL    Hematocrit, Blood Gas 13.0 (C) 37.0 - 47.0 %    Oxyhemoglobin 21.3 (L) 85 - 100 %    Methemoglobin 2.70 0.00 - 3.00 %    Carboxyhemoglobin 2.9 0 - 5 %    A-a Gradiant 114.6 0.0 - 300.0 mmHg    Temperature 98.6 C    Barometric Pressure for Blood Gas 733 mmHg    Modality Cannula - Nasal     FIO2 28 %     Flow Rate 2 lpm   Lactic Acid, Plasma   Result Value Ref Range    Lactate 14.1 (C) 0.5 - 2.0 mmol/L   Lactic Acid, Reflex Timer (This will reflex a repeat order 3-3:15 hours after ordered.)   Result Value Ref Range    Extra Tube Hold for add-ons.    POC Occult Blood Stool   Result Value Ref Range    Fecal Occult Blood Positive (A) Negative    Lot Number [id number]     Expiration Date 11/2,020     DEVELOPER LOT NUMBER [id number]     DEVELOPER EXPIRATION DATE 10/2,020     Positive Control Positive Positive    Negative Control Negative Negative   Type & Screen   Result Value Ref Range    ABO Type O     RH type Negative     Antibody Screen Negative     T&S Expiration Date 10/21/2018 11:59:59 PM    Prepare RBC, 2 Units   Result Value Ref Range    Product Code X2499Y81     Unit Number [id number]     UNIT  ABO O     UNIT  RH NEG     Crossmatch 1 Interpretation Compatible     Dispense Status XM     Blood Type ONE     Blood Expiration Date 358899248879     Blood Type Barcode 9500     Product Code I5102K85     Unit Number [id number]     UNIT  ABO O     UNIT  RH NEG     Crossmatch 1 Interpretation Compatible     Dispense Status IS     Blood Type ONE     Blood Expiration Date 496487652732     Blood Type Barcode 9500    ABO RH Specimen Verification   Result Value Ref Range    ABO Type O     RH type Negative    Manual Differential   Result Value Ref Range    Neutrophil % 84.0 (H) 40.0 - 75.0 %    Lymphocyte % 11.0 (L) 16.0 - 46.0 %    Monocyte % 5.0 0.0 - 12.0 %    Neutrophils Absolute 10.34 (H) 1.40 - 6.50 10*3/mm3    Lymphocytes Absolute 1.35 1.00 - 3.00 10*3/mm3    Monocytes Absolute 0.62 0.10 - 0.90 10*3/mm3    nRBC 3.0 (H) 0.0 - 0.0 /100 WBC    Acanthocytes Mod/2+ None Seen    Anisocytosis Large/3+ None Seen    Hypochromia Large/3+ None Seen    Ovalocytes Slight/1+ None Seen    Polychromasia Slight/1+ None Seen    Target Cells Slight/1+ None Seen    Schistocytes Slight/1+ None Seen    Platelet Estimate Adequate Normal     Large Platelets Slight/1+ None Seen     Xr Chest 1 View    Result Date: 10/18/2018  Narrative: EXAMINATION: XR CHEST 1 VW-  CLINICAL INDICATION:     SOA  TECHNIQUE:  XR CHEST 1 VW-  COMPARISON: 40 2018  FINDINGS: RIGHT LUNG BASE AIRSPACE DISEASE. SMALL RIGHT PLEURAL EFFUSION. Heart and mediastinum contours are unremarkable.    No pneumothorax. Bony and soft tissue structures are unremarkable.      Impression: RIGHT LUNG BASE AIRSPACE DISEASE. SMALL RIGHT PLEURAL EFFUSION.  This report was finalized on 10/18/2018 9:51 AM by Dr. Andres Wu MD.      Xr Abdomen Kub    Result Date: 10/18/2018  Narrative: EXAMINATION: XR ABDOMEN KUB-  Technique: Frontal view of the abdomen.  CLINICAL INDICATION:     femoral line  COMPARISON:    None available.  FINDINGS:   Right central femoral line. Bowel gas pattern is nonspecific and nonobstructive in appearance. Scattered fecal material seen throughout colon. No significant bony abnormality identified. No definite radiographic evidence of soft tissue mass.          Impression: Overall nonspecific bowel gas pattern. Scattered fecal material within the colon. Right central femoral line.  This report was finalized on 10/18/2018 12:55 PM by Dr. Andres Wu MD.               ED Course  ED Course as of Oct 18 1448   u Oct 18, 2018   0655 12-lead EKG performed at 0636 hrs.  Interpreted by me at 0640 hrs.  Normal sinus rhythm.  First-degree AV block.  Ventricular rate 76.  ND interval 262.  QRS duration 110.  .  QTc 504.  No pathologic blocks.  No sustained ectopy or dysrhythmia.  No acute ischemic ST segment elevation.  No pathologic T-wave changes.  No overt criteria for acute infarct/STEMI. ECG 12 Lead [TZ]   0843 Discussed with Dr. Holcomb.  No CCU beds available.  [BC]   1003 Assumed care from Dr. Block at shift change.  Patient continues to be hypotensive.  Family relates that she has had some rectal bleeding over the past couple of months.  She is heme-positive on  rectal exam.  She is also hyperkalemic, awaiting dialysis, and acidotic..  [BC]   1448 Discussed with Dr. Johnston, hospitalist at [facility].  He agrees to take patient in transfer.  He requests placement of a central line and to begin the patient on meropenem.  [BC]      ED Course User Index  [BC] Antoni Anthony MD  [TZ] Juanjose Block MD                  MDM  Number of Diagnoses or Management Options  Blood loss anemia:   Community acquired pneumonia of right lower lobe of lung (CMS/HCC):   End-stage renal disease on hemodialysis (CMS/HCC):   Gastrointestinal hemorrhage, unspecified gastrointestinal hemorrhage type:   Hyperkalemia:   Septic shock (CMS/HCC):      Amount and/or Complexity of Data Reviewed  Clinical lab tests: reviewed  Tests in the radiology section of CPT®: reviewed  Tests in the medicine section of CPT®: reviewed  Decide to obtain previous medical records or to obtain history from someone other than the patient: yes  Independent visualization of images, tracings, or specimens: yes    Risk of Complications, Morbidity, and/or Mortality  Presenting problems: high  Diagnostic procedures: high  Management options: high    Critical Care  Total time providing critical care: 30-74 minutes (60)        Final diagnoses:   Septic shock (CMS/HCC)   Community acquired pneumonia of right lower lobe of lung (CMS/HCC)   End-stage renal disease on hemodialysis (CMS/HCC)   Hyperkalemia   Gastrointestinal hemorrhage, unspecified gastrointestinal hemorrhage type   Blood loss anemia            Antoni nAthony MD  10/18/18 1421       Antoni Anthony MD  10/18/18 [id number]

## 2018-10-18 NOTE — ED NOTES
Contacted [facility] for Dr. Anthony to speak with Hospitalist about admitting pt to there facility.      Sonia Andujar  10/18/18 [phone]

## 2018-10-18 NOTE — ED NOTES
Contacted [facility] for transport patient to [facility], 14 minute ETA.      Sonia Andujar  10/18/18 8662

## 2018-10-18 NOTE — ED NOTES
Consent to transfuse 2 units prbc signed by poa at this time     Nay Mancilla, MARILY  10/18/18 1138

## 2018-10-18 NOTE — ED NOTES
Consent for central line insertion signed by poa at this time     Nay Mancilla, RN  10/18/18 8672

## 2018-10-21 LAB
ABO + RH BLD: NORMAL
ABO + RH BLD: NORMAL
BH BB BLOOD EXPIRATION DATE: NORMAL
BH BB BLOOD EXPIRATION DATE: NORMAL
BH BB BLOOD TYPE BARCODE: 9500
BH BB BLOOD TYPE BARCODE: 9500
BH BB DISPENSE STATUS: NORMAL
BH BB DISPENSE STATUS: NORMAL
BH BB PRODUCT CODE: NORMAL
BH BB PRODUCT CODE: NORMAL
BH BB UNIT NUMBER: NORMAL
BH BB UNIT NUMBER: NORMAL
CROSSMATCH INTERPRETATION: NORMAL
CROSSMATCH INTERPRETATION: NORMAL
UNIT  ABO: NORMAL
UNIT  ABO: NORMAL
UNIT  RH: NORMAL
UNIT  RH: NORMAL

## 2018-10-23 LAB
BACTERIA SPEC AEROBE CULT: NORMAL
BACTERIA SPEC AEROBE CULT: NORMAL

## 2019-10-11 NOTE — PROGRESS NOTES
LOS: 5 days     Chief Complaint:  Pulmonology is following for generalized weakness    Subjective     Interval History: Ms. Cardona is resting in bed.  She received dialysis this morning.  No acute distress noted.    History taken from: patient chart RN    Review of Systems:     Review of Systems - History obtained from chart review and the patient  General ROS: negative for - chills, fatigue or fever  Psychological ROS: negative for - anxiety or depression  ENT ROS: negative for - headaches, visual changes or vocal changes  Allergy and Immunology ROS: negative for - nasal congestion, postnasal drip or seasonal allergies  Endocrine ROS: negative for - polydipsia/polyuria  Respiratory ROS: no cough, shortness of breath, or wheezing  Cardiovascular ROS: no chest pain or dyspnea on exertion  Gastrointestinal ROS: no abdominal pain, change in bowel habits, or black or bloody stools  Musculoskeletal ROS: negative for - joint pain, joint stiffness or joint swelling  Neurological ROS: no TIA or stroke symptoms                    Objective     Vital Signs  Temp:  [97.3 °F (36.3 °C)-98.4 °F (36.9 °C)] 97.7 °F (36.5 °C)  Heart Rate:  [77-94] 80  Resp:  [17-22] 17  BP: (121-151)/() 122/72  FiO2 (%):  [60 %] 60 %  Body mass index is 28.1 kg/m².    Intake/Output Summary (Last 24 hours) at 04/03/18 1130  Last data filed at 04/03/18 0308   Gross per 24 hour   Intake               60 ml   Output                0 ml   Net               60 ml     No intake/output data recorded.    Physical Exam:  GENERAL APPEARANCE: Well developed, well nourished, alert and cooperative, and appears to be in no acute distress.    HEAD: normocephalic.    EYES: PERRL    NECK: Neck supple.     CARDIAC: Normal S1 and S2. No S3, S4 or murmurs. Rhythm is regular. There is no peripheral edema, cyanosis or pallor. Extremities are warm and well perfused. Capillary refill is less than 2 seconds. No carotid bruits.    RESPIRATORY: Clear to auscultation  "    Outpatient Physical Therapy Neuro Treatment Note  [facility]     Patient Name: Karen Pineda  : 1947  MRN: [id number]  Today's Date: 10/11/2019      Visit Date: 10/11/2019    Visit Dx:    ICD-10-CM ICD-9-CM   1. Status post craniotomy Z98.890 V45.89   2. Brain tumor (CMS/HCC) D49.6 239.6   3. Hemiparesis of right dominant side due to non-cerebrovascular etiology (CMS/HCC) G81.91 342.91   4. Functional gait abnormality R26.89 781.2       Patient Active Problem List   Diagnosis   • Hemoptysis   • Lung mass   • Cough   • Arthritis   • Surgery follow-up examination   • Brain metastases (CMS/HCC)   • Small cell lung cancer (CMS/HCC)   • High risk medication use   • Encounter for fitting and adjustment of vascular catheter   • Rash   • Chemotherapy-induced thrombocytopenia           PT Neuro     Row Name 10/11/19 1015             Subjective Comments    Subjective Comments  When ask if she was sore pt reported, \"My toe is bruised.\" (pointing to her left 2nd toe)  When ask if she had any episodes of her right knee buckling pt and  stated, \"No.\" When ask if she had any dragging of the left oe pt stated one time when she was walking at the radiation center with her rollator. Pt and  deny any falls. Pt and  report she is using her rollator when she goes out and at home at night.   -LB         Precautions and Contraindications    Precautions/Limitations  fall precautions  -LB      Precautions  radiation therapy M-F, chemo therapy started 19 (3 x week) then off   -LB         Subjective Pain    Able to rate subjective pain?  yes  -LB      Pre-Treatment Pain Level  0  -LB      Post-Treatment Pain Level  0  -LB      Subjective Pain Comment  No complaints of pain   -LB         Vision-Basic Assessment    Current Vision  Wears glasses all the time  -LB         Cognition    Overall Cognitive Status  WFL  -LB      Orientation Level  Oriented to place;Oriented to situation;Oriented to " and percussion without rales, rhonchi, wheezing or diminished breath sounds.    GI: Positive bowel sounds. Soft, nondistended, nontender.     MUSCULOSKELTAL: No significant deformity or joint abnormality. No edema. Peripheral pulses intact.     NEUROLOGICAL: Strength and sensation symmetric and intact throughout.     PSYCHIATRIC: The mental examination revealed the patient was oriented to person, place, and time.                 Results Review:                I reviewed the patient's new clinical results.  I reviewed the patient's new imaging results and agree with the interpretation.    Results from last 7 days  Lab Units 04/03/18 0047 04/02/18 0113 04/01/18 0348   WBC 10*3/mm3 12.85* 11.07 10.93   HEMOGLOBIN g/dL 11.2* 11.4* 11.6*   PLATELETS 10*3/mm3 173 169 172       Results from last 7 days  Lab Units 04/03/18 0047 04/02/18 0113 04/01/18 0348 03/28/18  2246   SODIUM mmol/L 139 137 135  < > 129*   POTASSIUM mmol/L 3.5 3.7 3.6  < > 3.9   CHLORIDE mmol/L 101 100 98*  < > 95*   CO2 mmol/L 27.7 27.0 30.0  < > 21.7*   BUN mg/dL 30* 25* 18  < > 40*   CREATININE mg/dL 3.75* 3.37* 2.95*  < > 5.87*   CALCIUM mg/dL 8.3 7.7 7.7  < > 8.3   GLUCOSE mg/dL 93 135* 132*  < > 138*   MAGNESIUM mg/dL  --   --  1.8  --  2.1   < > = values in this interval not displayed.  Lab Results   Component Value Date    INR 1.08 01/12/2018    INR 1.09 11/14/2017    INR 1.08 09/24/2017    PROTIME 14.1 01/12/2018    PROTIME 14.2 11/14/2017    PROTIME 14.2 09/24/2017       Results from last 7 days  Lab Units 04/03/18 0047 04/02/18 0113 04/01/18 0348   ALK PHOS U/L 100 95 102   BILIRUBIN mg/dL 0.5 0.6 0.8   ALT (SGPT) U/L 8* 4* 8*   AST (SGOT) U/L 13 12 12       Results from last 7 days  Lab Units 03/29/18  0445   PH, ARTERIAL pH units 7.233*   PO2 ART mm Hg 89.0   PCO2, ARTERIAL mm Hg 38.9   HCO3 ART mmol/L 16.0*     Imaging Results (last 24 hours)     Procedure Component Value Units Date/Time    CT Head Without Contrast [[id number]]  person  -LB      Safety Judgment  -- see below  -LB      Deficits  Fully aware of deficits  -LB      Comments  Improved awareness of need to use the rollator.  -LB         Posture/Observations    Posture- WNL  Posture is WNL  -LB      Observations  Ecchymosis/bruising light brusing on the drosum of the 2nd toe  -LB      Posture/Observations Comments  no swelling in the left foot noted   -LB         Bed Mobility Assessment/Treatment    Comment (Bed Mobility)  not performed   -LB         Transfers    Sit-Stand [place] (Transfers)  conditional independence  -LB      Stand-Sit [place] (Transfers)  conditional independence  -LB      Transfers, Sit-Stand-Sit, Assist Device  other (see comments) no device  -LB      Transfer, Safety Issues  sequencing ability decreased  -LB      Comment (Transfers)  Pt coming to stand with = wt both LE's with light use of UE's.   -LB         Gait/Stairs Assessment/Training    [place] Level (Gait)  stand by assist;contact guard  -LB      Assistive Device (Gait)  -- no device  -LB      Distance in Feet (Gait)  200' x 3 with multiple turns with no loss of balance  -LB      Deviations/Abnormal Patterns (Gait)  -- none noted during session, no L foot drag or R knee buckling  -LB      [place] Level (Stairs)  stand by assist  -LB      Assistive Device (Stairs)  walker, 4-wheeled  -LB      Number of Steps (Stairs)  curb  -LB      Stairs, Safety Issues  sequencing ability decreased  -LB      Stairs, Impairments  strength decreased  -LB      [place] Level (Ramp)  stand by assist;verbal cues  -LB      Assistive Device (Ramp)  walker, 4-wheeled  -LB         Balance Skills Training    Standing-Level of Assistance  Contact guard  -LB      Static Standing Balance Support  Right upper extremity supported;Left upper extremity supported;parallel bars light UE support   -LB      Standing-Balance Activities  Feet together;Standing on 1/2 roll;Rocker board  -LB      Gait Balance-Level  "of Assistance  Close supervision  -LB      Gait Balance Support  Right upper extremity supported;Left upper extremity supported;neli bar  -LB      Gait Balance Activities  side-stepping with knees slightly flexed  -LB        User Key  (r) = Recorded By, (t) = Taken By, (c) = Cosigned By    Initials Name Provider Type    Ángela Cramer PT Physical Therapist                  PT Assessment/Plan     Row Name 10/11/19 1213          PT Assessment    Assessment Comments  Pt reporting she has a bruise on her left 2nd toe. PT noted light bruising on the dorsum of her left 2nd toe. Pt did not report pain before or during PT. Pt did report on epeisode of her left foot dragging while ambulating with her rollator. Pt reported no falls. Pt did not demonstrate any right knee buckling or left toe drag during PT session.   -LB       User Key  (r) = Recorded By, (t) = Taken By, (c) = Cosigned By    Initials Name Provider Type    Ángela Cramer PT Physical Therapist             OP Exercises     Row Name 10/11/19 1015             Subjective Comments    Subjective Comments  When ask if she was sore pt reported, \"My toe is bruised.\" (pointing to her left 2nd toe)  When ask if she had any episodes of her right knee buckling pt and  stated, \"No.\" When ask if she had any dragging of the left oe pt stated one time when she was walking at the [facility] center with her rollator. Pt and  deny any falls. Pt and  report she is using her rollator when she goes out and at home at night.   -LB         Subjective Pain    Able to rate subjective pain?  yes  -LB      Pre-Treatment Pain Level  0  -LB      Post-Treatment Pain Level  0  -LB      Subjective Pain Comment  No complaints of pain   -LB         Exercise 1    Exercise Name 1  knee flexion in standing within ll bars   -LB      Cueing 1  Verbal  -LB      Reps 1  10 each LE   -LB         Exercise 2    Exercise Name 2  bilateral plantarflexion standing on incline of ll " Collected:  04/02/18 1553     Updated:  04/02/18 1557    Narrative:       EXAMINATION: CT HEAD WO CONTRAST-      CLINICAL INDICATION:     Neuro deficit(s), subacute; L03.116-Cellulitis  of left lower limb; L03.115-Cellulitis of right lower limb;  L89.90-Pressure ulcer of unspecified site, unspecified stage;  L08.9-Local infection of the skin and subcutaneous tissue, unspecified;  N18.9-Chronic kidney disease, unspecified; I95.9-Hypotension,  unspecified     COMPARISON:    11/14/2017     Technique: Multiple CT axial images were obtained through the level of  the brain without IV contrast administration. Reformatted images in the  coronal and/or sagittal plane(s) were generated from the axial data set  to facilitate diagnostic accuracy and/or surgical planning.     Radiation dose reduction techniques were utilized per ALARA protocol.  Automated exposure control was initiated through either or [employer] or  [employer] software packages by  protocol.       DOSE (DLP mGy-cm):         FINDINGS:   Moderate chronic small vessel ischemic disease is stable. No  acute intracranial abnormality. No midline shift or mass effect. No  hydrocephalus or intracranial hemorrhage. There is no CT evidence of  acute vascular territory infarct.  Bone windows show no acute osseous  abnormality.       Impression:       Stable exam. No CT evidence of acute intracranial  abnormality.     This report was finalized on 4/2/2018 3:54 PM by Dr. Junior Angel MD.       XR Chest PA & Lateral [[id number]] Collected:  04/02/18 1244     Updated:  04/02/18 1247    Narrative:       EXAMINATION: XR CHEST PA AND LATERAL-      CLINICAL INDICATION:     Cough; L03.116-Cellulitis of left lower limb;  L03.115-Cellulitis of right lower limb; L89.90-Pressure ulcer of  unspecified site, unspecified stage; L08.9-Local infection of the skin  and subcutaneous tissue, unspecified; N18.9-Chronic kidney disease,  unspecified; I95.9-Hypotension, unspecified      TECHNIQUE:  XR CHEST PA AND LATERAL-      COMPARISON: 03/31/2018      FINDINGS:   CHF/edema noted. Bibasilar airspace disease stable. Effusions not  significantly changed. No pneumothorax.       Impression:       Stable changes of CHF/edema with effusions and bibasilar  airspace disease.     This report was finalized on 4/2/2018 12:45 PM by Dr. Junior Angel MD.                Medication Review:   Scheduled Medications:    aspirin 81 mg Oral Daily   budesonide-formoterol 2 puff Inhalation BID - RT   clonazePAM 0.5 mg Oral BID   guaiFENesin 1,200 mg Oral BID   heparin (porcine) 5,000 Units Subcutaneous Q12H   insulin aspart 0-7 Units Subcutaneous 4x Daily AC & at Bedtime   ipratropium-albuterol 3 mL Nebulization 4x Daily - RT   lactobacillus acidophilus 1 capsule Oral Daily   piperacillin-tazobactam 3.375 g Intravenous Q12H   ranolazine 500 mg Oral Q12H   sodium chloride 1,000 mL Intravenous Once in Dialysis   sodium chloride 10 mL Intracatheter Q12H   Vancomycin Pharmacy Intermittent Dosing  Does not apply Daily     Continuous infusions:       Assessment/Plan       Respiratory: No current distress noted.  Continue supplemental oxygen.  Continue BiPAP at night and diet recommendations per speech therapy.  Continue scheduled inhalants and as needed neb treatments.     ID:  CBC is 12.85, neutrophils are 75.  Continue current antibiotics.  Continue to monitor lab trends.     ESRD requiring HD: Creatinine is 3.75.  Patient is receiving dialysis today.  Continue management per nephrology.     IV access:  central line 3/29/18 in right groin, surgery was consulted last week to have line replaced at another site, she has a fistula in right arm and mastectomy in her left, nurses are unable to place peripheral line.     Pulmonary and critical care will sign off of case.  Please call with any further questions or concerns.        Patient Active Problem List   Diagnosis Code   • Acute renal failure N17.9   • Essential  bars with bilateral UE support  -LB      Cueing 2  Verbal  -LB      Reps 2  15  -LB        User Key  (r) = Recorded By, (t) = Taken By, (c) = Cosigned By    Initials Name Provider Type    Ángela Cramer, PT Physical Therapist                          Therapy Education  Education Details: Advised pt to continue ambulation with her rollator.   How Provided: Verbal  Provided to: Patient, Caregiver  Level of Understanding: Verbalized              Time Calculation:   Start Time: 1015  Stop Time: 1050  Time Calculation (min): 35 min  Total Timed Code Minutes- PT: 35 minute(s)   Therapy Charges for Today     Code Description Service Date Service Provider Modifiers Qty    [id number] HC PT NEUROMUSC RE EDUCATION EA 15 MIN 10/11/2019 Ángela Lewis, PT GP 2                    Ángela Lewis, PT  10/11/2019      hypertension I10   • Dyslipidemia E78.5   • Diabetic neuropathy E11.40   • Chronic pain G89.29   • Anxiety F41.9   • Anemia D64.9   • Acute on chronic renal failure N17.9, N18.9   • CHF (congestive heart failure) I50.9   • Acute on chronic respiratory failure J96.20   • Shortness of breath R06.02   • Tobacco use Z72.0   • STEPHANIE (obstructive sleep apnea) G47.33   • Chest pain at rest R07.9   • Sepsis affecting skin A41.9             CLARA Presley  04/03/18  11:30 AM        Attestation: Scribed for Dr. Martin, by CLARA Laboy    I, Devin Martin M.D. attest that the above note accurately reflects the work and decisions made  by me.  Patient was seen and evaluated by Dr. Martin, including history of present illness, physical exam, assessment, and treatment plan.  The above note was reviewed and edited by Dr. Martin.

## 2023-01-16 NOTE — PROGRESS NOTES
[facility] HOSPITALIST PROGRESS NOTE     Patient Identification:  Name:  Sara Cardona  Age:  67 y.o.  Sex:  female  :  1950  MRN:  [id number]  Visit Number:  [id number]  Primary Care Provider:  Marcelino Sheehan MD    Length of stay:  8    Chief complaint:  67 years old female admitted with acute on chronic renal failure.     Subjective:    Patient was wanting to smoke a cigarette and threatening to leave AMA.  Patient was explained the dangers of leaving AMA.  Patient was explained her medical condition and severity of her illness and the fact that she may die if treatment is not completed.  Her nicotine patch was increased to prevent nicotine withdrawal and craving for cigarettes.  Urine output slightly improved in last 24 hours.    Patient states that her breathing is better today.  He shouldn't states that she is using BiPAP at night and whenever she takes a nap and has been sleeping very well with BiPAP.  Patient denies any complaints.  She states that she wants a cigarette.    ----------------------------------------------------------------------------------------------------------------------  Current Hospital Meds:    aspirin 81 mg Oral Nightly   atorvastatin 40 mg Oral Nightly   budesonide-formoterol 2 puff Inhalation BID - RT   clopidogrel 75 mg Oral Daily   doxycycline 100 mg Oral Q12H   epoetin jane 10,000 Units Subcutaneous Once per day on Mon Thu   gabapentin 100 mg Oral Q12H   guaiFENesin 1,200 mg Oral Q12H   heparin (porcine) 5,000 Units Subcutaneous Q12H   insulin aspart 0-24 Units Subcutaneous 4x Daily AC & at Bedtime   insulin detemir 25 Units Subcutaneous Nightly   ipratropium-albuterol 3 mL Nebulization Q4H - RT   isosorbide mononitrate 60 mg Oral Q24H   metoprolol tartrate 25 mg Oral Q12H   nicotine 1 patch Transdermal Nightly   pantoprazole 40 mg Oral QAM AC   Pharmacy Meds to Bed Consult  Does not apply Daily   predniSONE 15 mg Oral Daily With Breakfast   sodium  Bedside report given to [facility].  POC transferred bicarbonate 1,300 mg Oral 4x Daily       hold 1 each     ----------------------------------------------------------------------------------------------------------------------  Vital Signs:  Temp:  [97.4 °F (36.3 °C)-98.8 °F (37.1 °C)] 98.5 °F (36.9 °C)  Heart Rate:  [64-89] 80  Resp:  [12-20] 18  BP: (108-175)/(54-92) 172/89  Last 3 weights    07/30/17  0600 07/31/17  0600 08/01/17  0400   Weight: 160 lb 4.8 oz (72.7 kg) 163 lb 1.6 oz (74 kg) 159 lb 12.8 oz (72.5 kg)     Body mass index is 30.19 kg/(m^2).    Intake/Output Summary (Last 24 hours) at 08/01/17 0942  Last data filed at 08/01/17 0400   Gross per 24 hour   Intake              960 ml   Output              400 ml   Net              560 ml     Diet Regular; Cardiac, Consistent Carbohydrate, Low Potassium  ----------------------------------------------------------------------------------------------------------------------  Physical exam:  Constitutional:  Well-developed and well-nourished.     HENT:  Head:  Normocephalic and atraumatic.  Mouth:  Moist mucous membranes.    Eyes:  Conjunctivae and EOM are normal.  Pupils are equal, round, and reactive to light.   Neck:  Neck supple.  No JVD present.    Cardiovascular:  Regular rate and rhythm. S1+S2. No murmur, rubs or gallops.   Pulmonary/Chest: Inspiratory bibasilar crackles with expiratory rhonchi in right basilar area.  Abdominal:  Soft. Non-tender. No viscera palpable.  Bowel sounds audible.   Musculoskeletal: No deformity or joint swelling.   Peripheral vascular: Bilateral dorsalis pedis palpable.  +2 pitting edema extending up to her thighs  Neurological:  Alert and oriented to person, place, and time.  Cranial nerves grossly intact. Strength bilaterally symmetrical in upper and lower extremities.   Skin:  Skin is warm and dry. No rash noted. No pallor.   ----------------------------------------------------------------------------------------------------------------------  Tele:  Issa rhythm with  heart rate 70s-80  ----------------------------------------------------------------------------------------------------------------------        Results from last 7 days  Lab Units 08/01/17 0055 07/31/17 0454 07/30/17 0404 07/28/17  0215 07/27/17  0124 07/26/17  0132   CRP mg/dL  --   --   --   --  0.66 1.21* 2.17*   WBC 10*3/mm3 12.44 10.35 9.13  < > 10.18 11.84 7.90   HEMOGLOBIN g/dL 9.4* 8.3* 8.2*  < > 8.0* 8.3* 8.1*   HEMATOCRIT % 31.6* 28.5* 27.8*  < > 27.0* 27.8* 27.8*   MCV fL 92.1 95.3* 95.5*  < > 94.4* 91.4 93.3   MCHC g/dL 29.7* 29.1* 29.5*  < > 29.6* 29.9* 29.1*   PLATELETS 10*3/mm3 303 256 260  < > 276 289 309   < > = values in this interval not displayed.    Results from last 7 days  Lab Units 07/31/17  0651   PH, ARTERIAL pH units 7.267*   PO2 ART mm Hg 95.8   PCO2, ARTERIAL mm Hg 36.8   HCO3 ART mmol/L 16.4*       Results from last 7 days  Lab Units 08/01/17 0055 07/31/17 0454 07/30/17 0404 07/29/17  0135   SODIUM mmol/L 141 140 137  --  137   POTASSIUM mmol/L 4.7 4.5 5.3  < > 5.4*   MAGNESIUM mg/dL 1.8 1.6*  --   --   --    CHLORIDE mmol/L 111 111 110  --  110   CO2 mmol/L 17.1* 15.9* 15.9*  --  16.8*   BUN mg/dL 117* 109* 99*  --  88*   CREATININE mg/dL 3.59* 3.52* 3.73*  --  3.54*   EGFR IF NONAFRICN AM mL/min/1.73 13* 13* 12*  --  13*   CALCIUM mg/dL 7.2* 6.9* 6.9*  --  7.1*   GLUCOSE mg/dL 123* 142* 268*  --  97   ALBUMIN g/dL  --  3.30* 3.20*  --  3.40   BILIRUBIN mg/dL  --  0.1* 0.1*  --  0.1*   ALK PHOS U/L  --  94 100  --  105*   AST (SGOT) U/L  --  24 36*  --  44*   ALT (SGPT) U/L  --  58* 76*  --  56*   < > = values in this interval not displayed.Estimated Creatinine Clearance: 13.9 mL/min (by C-G formula based on Cr of 3.59).    No results found for: AMMONIA               Stool Culture   Date Value Ref Range Status   07/27/2017 Normal Fecal Andreina  Final       I have personally looked at the labs and they are summarized  above.  ----------------------------------------------------------------------------------------------------------------------  Imaging Results (last 24 hours)     ** No results found for the last 24 hours. **        ----------------------------------------------------------------------------------------------------------------------  Assessment and Plan:    - Acute on chronic hypoxic respiratory failure:   2/2 COPD exacerbation and diastolic CHF exacerbation. ABGs show persistent Metabolic acidosis with normal PCO2. CXR showed bibasilar atelactasis.  Continue treatment for COPD exacerbation and CHF.  Holding diuresis 2/2 worsening renal function. Cont BiPAP PRN and QHS. Pulm input appreciated.     - SIMON on CKD IV:   Creatinine and blood urea nitrogen worsened since yesterday.  Continues to have anion gap acidosis..  24-hour creatinine clearance is 0.75 and WNL.    Pt is at high risk for dialysis. Nephrology input appreciated.      - Acute diastolic CHF exacerbation:   Echo on 7/4/17 showed an EF of 66-70%, trace AR, mild MR, mild MS, mild to moderate TR and severe pulmonary HTN. Holding diuresis 2/2 worsening renal function. Lower extremity edema is worsening. Cardiology input appreciated.       - Acute exacerbation of COPD with ongoing tobacco abuse:   Improving. Cont steroids, antibiotics and nebs. symbicortCont BiPAP. Nicotine patch ordered. Cont to encourage cessation.     - Anemia on chronic disease  Due to CKD. Continue epogen.      - Mixed respiratory and metabolic acidosis:   Pt has been more compliant with BiPAP. Currently on PO sodium bicarb per nephrology. Cont BiPAP and treatment per nephrology.  Patient continues to have persistent anion gap acidosis.     - Hyperkalemia:   Improved.  Potassium continues to remain within normal limits.  Continue low potassium diet.      - Essential HTN:   Blood pressure controlled. On metoprolol.      - DM II, insulin dependent:   HgbA1c 6.1 on 7/3/17.   Blood sugars  elevated to 200s.  Hyperglycemia likely secondary to systemic steroids.  Episode of hypoglycemia on 7/28.    Continue Levemir 25 units daily at bedtime.  Will admit and insulin and continue sliding scale insulin.  Monitor blood sugar with Accu-Cheks before meals and at bedtime..       - Pleural effusions:   R>L. S/P right-sided thoracentesis. Pleural fluid appears transudative. Cultures negative.      - CAD:   Asymptomatic.  Continue aspirin, atorvastatin, Plavix, metoprolol and Imdur. Cardiology following with input appreciated.      - Chronic pain:   Neurontin decreased for renal failure per pharmacy adjustment.      - Anxiety:   Cont klonopin with close monitoring in the setting of respiratory failure.      - Prophylaxis:  Heparin and PPI.     - Fluid, electrolytes and nutrition:  No IVF.   Electrolyte replacement per protocol.  Low K+ diet.      Pt is at high risk 2/2 acute on chronic hypoxic respiratory failure, SIMON on CKD IV at high risk for dialysis, CHF exacerbation, COPD exacerbation with ongoing tobacco abuse, mixed acidosis and hx of noncompliance.    Priscilla Juárez MD  08/01/17  9:42 AM     I was called by the nursing staff that patient has been increasingly lethargic and sleepy.  The nurse states that she kept checking on her.  He shouldn't was getting more and more sleepy and lethargic and upon questioning admitted to the nurse that her daughter came to see her and gave her 2 tablets of Klonopin that she took.  We'll check her ABGs.  Advised nursing staff to limit unauthorized visits to the patient's room.    ABG showed mixed respiratory and metabolic acidosis with pH of 7.1 and PCO2 of 54.  Will give patient one dose of flumazenil and repeat ABGs. We'll transfer patient to intensive care unit.  Advised nursing staff to inform consulting physicians.

## 2024-01-01 NOTE — PROGRESS NOTES
[facility] HOSPITALIST PROGRESS NOTE     Patient Identification:  Name:  Sara Cardona  Age:  67 y.o.  Sex:  female  :  1950  MRN:  [id number]  Visit Number:  [id number]  Primary Care Provider:  Marcelino Sheehan MD    Length of stay:  13    Chief complaint:  67 years old female admitted with acute on chronic renal failure.     Subjective:    Patient continues to have diarrhea though gradually improving.  Denies any vomiting.  Still has some nausea.  Pt states that her breathing is significantly improved.  Patient reports improvement in her cough and sore throat.  Patient denies any headache,  chest pain, palpitation, constipation or diarrhea.  Diuresed total of 8L.    ----------------------------------------------------------------------------------------------------------------------  Current Hospital Meds:    aspirin 81 mg Oral Nightly   atorvastatin 40 mg Oral Nightly   budesonide-formoterol 2 puff Inhalation BID - RT   cefepime 1 g Intravenous Q24H   clopidogrel 75 mg Oral Daily   doxycycline 100 mg Oral Q12H   epoetin jane 10,000 Units Subcutaneous Once per day on    furosemide 80 mg Intravenous Q12H   guaiFENesin 1,200 mg Oral Q12H   heparin (porcine) 5,000 Units Subcutaneous Q12H   insulin aspart 0-24 Units Subcutaneous 4x Daily AC & at Bedtime   insulin detemir 25 Units Subcutaneous Nightly   ipratropium-albuterol 3 mL Nebulization Q4H - RT   isosorbide mononitrate 60 mg Oral Q24H   magnesium oxide 800 mg Oral BID   metoprolol tartrate 50 mg Oral Q12H   metroNIDAZOLE 500 mg Intravenous Q8H   nicotine 1 patch Transdermal Nightly   nystatin susp + lidocaine viscous 5 mL Swish & Swallow 4x Daily   pantoprazole 40 mg Oral QAM AC   Pharmacy Meds to Bed Consult  Does not apply Daily   predniSONE 5 mg Oral Daily With Breakfast   sodium bicarbonate 1,300 mg Oral 4x Daily       hold 1 each          ----------------------------------------------------------------------------------------------------------------------  Vital Signs:  Temp:  [98.2 °F (36.8 °C)-99.5 °F (37.5 °C)] 98.2 °F (36.8 °C)  Heart Rate:  [] 71  Resp:  [18-20] 18  BP: (132-189)/() 155/98  Last 3 weights    08/04/17  0600 08/05/17  0600 08/06/17  0404   Weight: 164 lb (74.4 kg) 153 lb 9.6 oz (69.7 kg) 143 lb 12.8 oz (65.2 kg)     Body mass index is 27.17 kg/(m^2).    Intake/Output Summary (Last 24 hours) at 08/06/17 0926  Last data filed at 08/06/17 0701   Gross per 24 hour   Intake             2490 ml   Output             3150 ml   Net             -660 ml     Diet Regular; Cardiac, Consistent Carbohydrate, Low Potassium  ----------------------------------------------------------------------------------------------------------------------  Physical exam:  Constitutional:  Well-developed and well-nourished.     HENT:  Head:  Normocephalic and atraumatic.  Mouth:  Moist mucous membranes. Minimal erythema with the small pus points in left tonsillar area.   Eyes:  Conjunctivae and EOM are normal.  Pupils are equal, round, and reactive to light.   Neck:  Neck supple.  No JVD present.    Cardiovascular:  Regular rate and rhythm. S1+S2. No murmur, rubs or gallops.   Pulmonary/Chest: Inspiratory bibasilar crackles..  Abdominal:  Soft. Non-tender. No viscera palpable.  Bowel sounds audible.   Musculoskeletal: No deformity or joint swelling.   Peripheral vascular: Bilateral dorsalis pedis palpable.  +2 pitting edema extending up to her thighs  Neurological:  Alert and oriented to person, place, and time.  Cranial nerves grossly intact. Strength bilaterally symmetrical in upper and lower extremities.   Skin:  Skin is warm and dry. No rash noted. No pallor.   ----------------------------------------------------------------------------------------------------------------------  Tele:  Sinus rhythm with heart rate  70s-80  ----------------------------------------------------------------------------------------------------------------------        Results from last 7 days  Lab Units 08/06/17  0459 08/05/17  0601 08/04/17  0823 08/04/17  0341   CRP mg/dL  --   --  <0.50  --    WBC 10*3/mm3 11.82 17.35*  --  17.87*   HEMOGLOBIN g/dL 9.8* 10.1*  --  9.3*   HEMATOCRIT % 32.7* 33.3*  --  30.7*   MCV fL 94.2* 92.2  --  93.0   MCHC g/dL 30.0* 30.3*  --  30.3*   PLATELETS 10*3/mm3 166 182  --  207       Results from last 7 days  Lab Units 08/02/17  0922   PH, ARTERIAL pH units 7.249*   PO2 ART mm Hg 87.4   PCO2, ARTERIAL mm Hg 39.9   HCO3 ART mmol/L 17.1*       Results from last 7 days  Lab Units 08/06/17  0459 08/05/17  0601 08/04/17  0341  07/31/17  0454   SODIUM mmol/L 146 144 145  < > 140   POTASSIUM mmol/L 3.1* 3.9 3.9  < > 4.5   MAGNESIUM mg/dL 1.9 1.9 1.4*  < > 1.6*   CHLORIDE mmol/L 107 108 110  < > 111   CO2 mmol/L 28.1 25.8 22.4*  < > 15.9*   BUN mg/dL 100* 106* 108*  < > 109*   CREATININE mg/dL 3.33* 3.55* 3.62*  < > 3.52*   EGFR IF NONAFRICN AM mL/min/1.73 14* 13* 13*  < > 13*   CALCIUM mg/dL 6.8* 6.6* 6.3*  < > 6.9*   GLUCOSE mg/dL 31* 157* 84  < > 142*   ALBUMIN g/dL  --   --   --   --  3.30*   BILIRUBIN mg/dL  --   --   --   --  0.1*   ALK PHOS U/L  --   --   --   --  94   AST (SGOT) U/L  --   --   --   --  24   ALT (SGPT) U/L  --   --   --   --  58*   < > = values in this interval not displayed.Estimated Creatinine Clearance: 14.2 mL/min (by C-G formula based on Cr of 3.33).    No results found for: AMMONIA               Stool Culture   Date Value Ref Range Status   07/27/2017 Normal Fecal Andreina  Final       I have personally looked at the labs and they are summarized above.  ----------------------------------------------------------------------------------------------------------------------  Imaging Results (last 24 hours)     ** No results found for the last 24 hours. **         ----------------------------------------------------------------------------------------------------------------------  Assessment and Plan:    - Acute on chronic hypoxic respiratory failure:   Improved.  2/2 COPD exacerbation and diastolic CHF exacerbation.   CXR showed bibasilar atelactasis.    Continue treatment for COPD exacerbation and CHF. Cont BiPAP PRN and QHS. Pulm input appreciated.   Repeat chest x-ray showed patchy bibasilar airspace disease, prominent interstitial markings and  small bilateral pleural effusions.  CRP is normal.    - Healthcare associated pneumonia  Chest x-ray showed patchy bibasilar airspace disease.  Leukocytosis resolved.  Continue doxycycline, Flagyl and cefepime.  Initial blood culture showed no growth.  Repeat blood culture pending.  Patient unable to produce sputum.    - SIMON on CKD IV:   Creatinine  Improving and is 3.3 today. fluid overload present but improving. Pt diuresed well with lasix, not much diuresis in last 24 hours. Continue lasix per nephrology recs. Monitor renal function and panel. Pt is at high risk for dialysis. Nephrology following.     - Acute tonsillitis  Pt thinks that she has developed diarrhea due to Augmentin.  Discontinue Augmentin and continue doxycycline.     - Acute diastolic CHF exacerbation:   Continues to have fluid overload. Diuresing well.   Echo on 7/4/17 showed an EF of 66-70%, trace AR, mild MR, mild MS, mild to moderate TR and severe pulmonary HTN.  Diuresed well with Lasix.  Lower extremity edema is unchanged. Cardiology input appreciated.       - Acute exacerbation of COPD with ongoing tobacco abuse:   Improved. Cont steroids, antibiotics and nebs. Symbicort. Cont BiPAP. Nicotine patch ordered. Cont to encourage cessation.   Home bipap arranged.    - Anemia on chronic disease  Due to CKD. Continue epogen.     - Diarrhea  C diff and stool Cx pending.     - Mixed respiratory and metabolic acidosis:   Improved. Pt has been more compliant  with BiPAP. Currently on PO sodium bicarb per nephrology. Cont BiPAP and treatment per nephrology.       - Hyperkalemia:   Resolve.  Potassium is low today but will avoid supplementation at this time due to her renal failure..  Continue low potassium diet.      - Essential HTN:   Blood pressure was up to 189 systolic earlier this morning. BP is better now. She has been noncompliant with dietary restrictions.  Continue metoprolol and PRN hydralazine.      - Leucocytosis:  Resolved. WBC normal today. Likely due to acute tonsillitis/HCAP. Patient is afebrile.  CRP is normal.  Antibiotic coverage broadened.  We'll check sputum cultures and blood cultures.     - DM II, insulin dependent:   HgbA1c 6.1 on 7/3/17.   Ptt had low blood sugar this morning.  We'll decrease her Levemir to 18 units daily.  Continue sliding scale insulin.  Monitor blood sugar with Accu-Cheks achs.   Patient had an episode of hypoglycemia on 7/28, her insulin was decreased at that time but then she did develop hyperglycemia and Levemir was increased.    Patient had been noncompliant with dietary restriction with the family members bringing her food from outside.  This makes her glycemic control challenging due to her erratic and inconsistent calorie consumption.     - Pleural effusions:   R>L. S/P right-sided thoracentesis. Pleural fluid appears transudative. Cultures negative.  chest x-ray showed improvement in pleural effusion.     - CAD:   Asymptomatic.  Continue aspirin, atorvastatin, Plavix, metoprolol and Imdur. Cardiology following with input appreciated.      - Chronic pain:   Neurontin dose adjusted for decreased creatinine clearance per pharmacy recommendations.     - Anxiety:   Cont klonopin with close monitoring in the setting of respiratory failure.      - Prophylaxis:  Heparin and PPI.     - Fluid, electrolytes and nutrition:  No IVF.   Electrolyte replacement per protocol. Mg is 1.9  Low K+ diet.      Pt is at high risk 2/2 acute on  chronic hypoxic respiratory failure, SIMON on CKD IV at high risk for dialysis, CHF exacerbation, COPD exacerbation with ongoing tobacco abuse, mixed acidosis and hx of noncompliance.  Patient has been noncompliant with her prescribed diet restrictions, medications and instructions from physicians and nursing staff.  Patient states that she will be compliant with the restrictions and medications.    Priscilla Juárez MD  08/06/17  9:26 AM        RHINORRHEA/CONGESTION

## (undated) DEVICE — Device: Brand: DEFENDO AIR/WATER/SUCTION AND BIOPSY VALVE

## (undated) DEVICE — DEV INFL MONARCH 25W

## (undated) DEVICE — Device

## (undated) DEVICE — TUBING, SUCTION, 1/4" X 20', STRAIGHT: Brand: MEDLINE INDUSTRIES, INC.

## (undated) DEVICE — GW INQWIRE FC PTFE J/3MM .035 180

## (undated) DEVICE — MYNXGRIP 6F/7F: Brand: MYNXGRIP

## (undated) DEVICE — CATH F5 INF JL 4 100CM: Brand: INFINITI

## (undated) DEVICE — 6F .070 JR 4 100CM: Brand: CORDIS

## (undated) DEVICE — GOWN,REINF,POLY,ECL,PP SLV,XL: Brand: MEDLINE

## (undated) DEVICE — Device: Brand: MEDEX

## (undated) DEVICE — GUIDELINER CATHETERS ARE INTENDED TO BE USED IN CONJUNCTION WITH GUIDE CATHETERS TO ACCESS DISCRETE REGIONS OF THE CORONARY AND/OR PERIPHERAL VASCULATURE, AND TO FACILITATE PLACEMENT OF INTERVENTIONAL DEVICES.: Brand: GUIDELINER® V3 CATHETER

## (undated) DEVICE — PK CATH CARD 70

## (undated) DEVICE — COPILOT BLEEDBACK CONTROL VALVE: Brand: COPILOT

## (undated) DEVICE — ST EXT IV SMARTSITE 2VLV SP M LL 5ML IV1

## (undated) DEVICE — THE BITE BLOCK MAXI, LATEX FREE STRAP IS USED TO PROTECT THE ENDOSCOPE INSERTION TUBE FROM BEING BITTEN BY THE PATIENT.

## (undated) DEVICE — CONN Y IRR DISP 1P/U

## (undated) DEVICE — DRSNG SURESITE WNDW 4X4.5

## (undated) DEVICE — ADULT, RADIOTRANSPARENT ELEMENT, COMPATIBLE W/ ZOLL: Brand: DEFIBRILLATION ELECTRODES

## (undated) DEVICE — ADULT DISPOSABLE SINGLE-PATIENT USE PULSE OXIMETER SENSOR: Brand: NONIN

## (undated) DEVICE — SHEATH INTRO SUPERSHEATH JWIRE .035 6F 11CM

## (undated) DEVICE — Device: Brand: ENDOGATOR

## (undated) DEVICE — TREK CORONARY DILATATION CATHETER 2.50 MM X 20 MM / RAPID-EXCHANGE: Brand: TREK

## (undated) DEVICE — FRCP BX RADJAW4 NDL 2.8 240CM LG OG BX40

## (undated) DEVICE — NC TREK CORONARY DILATATION CATHETER 3.0 MM X 12 MM / RAPID-EXCHANGE: Brand: NC TREK

## (undated) DEVICE — HI-TORQUE WHISPER MS GUIDE WIRE .014 STRAIGHT TIP 3.0 CM X 190 CM: Brand: HI-TORQUE WHISPER

## (undated) DEVICE — SINGLE PORT MANIFOLD: Brand: NEPTUNE 2

## (undated) DEVICE — SYR LUERLOK 30CC

## (undated) DEVICE — NC TREK CORONARY DILATATION CATHETER 2.5 MM X 15 MM / RAPID-EXCHANGE: Brand: NC TREK

## (undated) DEVICE — ST INF PRI SMRTSTE 20DRP 2VLV 24ML 117

## (undated) DEVICE — CANNULA,OXY,ADULT,SUPER SOFT,W/14'TUB,UC: Brand: MEDLINE INDUSTRIES, INC.

## (undated) DEVICE — CATH F5 INF JR 4 100CM: Brand: INFINITI